# Patient Record
Sex: FEMALE | Race: WHITE | HISPANIC OR LATINO | Employment: OTHER | URBAN - METROPOLITAN AREA
[De-identification: names, ages, dates, MRNs, and addresses within clinical notes are randomized per-mention and may not be internally consistent; named-entity substitution may affect disease eponyms.]

---

## 2017-03-02 ENCOUNTER — OFFICE VISIT (OUTPATIENT)
Dept: FAMILY MEDICINE | Facility: CLINIC | Age: 82
End: 2017-03-02
Payer: MEDICARE

## 2017-03-02 VITALS
OXYGEN SATURATION: 98 % | BODY MASS INDEX: 25.64 KG/M2 | SYSTOLIC BLOOD PRESSURE: 146 MMHG | HEIGHT: 59 IN | DIASTOLIC BLOOD PRESSURE: 63 MMHG | HEART RATE: 72 BPM | WEIGHT: 127.19 LBS | TEMPERATURE: 98 F

## 2017-03-02 DIAGNOSIS — J30.1 SEASONAL ALLERGIC RHINITIS DUE TO POLLEN: ICD-10-CM

## 2017-03-02 DIAGNOSIS — J45.20 RAD (REACTIVE AIRWAY DISEASE), MILD INTERMITTENT, UNCOMPLICATED: Primary | ICD-10-CM

## 2017-03-02 PROCEDURE — 1159F MED LIST DOCD IN RCRD: CPT | Mod: S$GLB,,, | Performed by: FAMILY MEDICINE

## 2017-03-02 PROCEDURE — 99999 PR PBB SHADOW E&M-EST. PATIENT-LVL III: CPT | Mod: PBBFAC,,, | Performed by: FAMILY MEDICINE

## 2017-03-02 PROCEDURE — 1160F RVW MEDS BY RX/DR IN RCRD: CPT | Mod: S$GLB,,, | Performed by: FAMILY MEDICINE

## 2017-03-02 PROCEDURE — 99499 UNLISTED E&M SERVICE: CPT | Mod: S$GLB,,, | Performed by: FAMILY MEDICINE

## 2017-03-02 PROCEDURE — 1126F AMNT PAIN NOTED NONE PRSNT: CPT | Mod: S$GLB,,, | Performed by: FAMILY MEDICINE

## 2017-03-02 PROCEDURE — 1157F ADVNC CARE PLAN IN RCRD: CPT | Mod: S$GLB,,, | Performed by: FAMILY MEDICINE

## 2017-03-02 PROCEDURE — 99214 OFFICE O/P EST MOD 30 MIN: CPT | Mod: S$GLB,,, | Performed by: FAMILY MEDICINE

## 2017-03-02 RX ORDER — PANTOPRAZOLE SODIUM 40 MG/1
TABLET, DELAYED RELEASE ORAL
COMMUNITY
Start: 2016-11-29 | End: 2017-05-22

## 2017-03-02 RX ORDER — FLUTICASONE PROPIONATE 50 MCG
1 SPRAY, SUSPENSION (ML) NASAL DAILY
Qty: 1 BOTTLE | Refills: 1 | Status: SHIPPED | OUTPATIENT
Start: 2017-03-02 | End: 2017-05-22

## 2017-03-02 RX ORDER — MONTELUKAST SODIUM 10 MG/1
10 TABLET ORAL NIGHTLY
Qty: 30 TABLET | Refills: 3 | Status: SHIPPED | OUTPATIENT
Start: 2017-03-02 | End: 2017-04-01

## 2017-03-02 NOTE — PROGRESS NOTES
Subjective:       Patient ID: Brie Han is a 89 y.o. female.    Chief Complaint: Sinus Problem    Sinus Problem   This is a new problem. The current episode started 1 to 4 weeks ago. The problem has been waxing and waning since onset. There has been no fever. Associated symptoms include congestion, coughing, shortness of breath, sinus pressure and sneezing. (Clear PND seasonal pattern by hx) Past treatments include nothing.     Review of Systems   Constitutional: Negative for fever.   HENT: Positive for congestion, sinus pressure and sneezing.    Respiratory: Positive for cough and shortness of breath.    Cardiovascular: Negative for chest pain.   Gastrointestinal: Negative for abdominal pain and nausea.   Skin: Negative for rash.   All other systems reviewed and are negative.      Objective:      Physical Exam   Constitutional: She appears well-developed. No distress.   HENT:   Right Ear: Tympanic membrane is not erythematous.   Left Ear: Tympanic membrane is not erythematous.   Nose: Mucosal edema present. Right sinus exhibits no maxillary sinus tenderness. Left sinus exhibits no maxillary sinus tenderness.   Mouth/Throat: Posterior oropharyngeal erythema present.   Neck: Neck supple.   Cardiovascular: Normal rate and regular rhythm.    No murmur heard.  Pulmonary/Chest: Effort normal and breath sounds normal. She has no wheezes. She has no rales.   Lymphadenopathy:     She has no cervical adenopathy.       Assessment:       1. RAD (reactive airway disease), mild intermittent, uncomplicated    2. Seasonal allergic rhinitis due to pollen        Plan:         Brie was seen today for sinus problem.    Diagnoses and all orders for this visit:    RAD (reactive airway disease), mild intermittent, uncomplicated  -     fluticasone (FLONASE) 50 mcg/actuation nasal spray; 1 spray by Each Nare route once daily.  -     montelukast (SINGULAIR) 10 mg tablet; Take 1 tablet (10 mg total) by mouth every  evening.    Seasonal allergic rhinitis due to pollen  -     fluticasone (FLONASE) 50 mcg/actuation nasal spray; 1 spray by Each Nare route once daily.  -     montelukast (SINGULAIR) 10 mg tablet; Take 1 tablet (10 mg total) by mouth every evening.

## 2017-03-02 NOTE — MR AVS SNAPSHOT
[facility]  [address] [place] [address]  Phone: [phone]  Fax: [phone]                  Brie Han   3/2/2017 11:00 AM   Office Visit    Description:  Female : 1927   Provider:  Clayton Ford MD   Department:  [facility]           Reason for Visit     Sinus Problem           Diagnoses this Visit        Comments    RAD (reactive airway disease), mild intermittent, uncomplicated    -  Primary     Seasonal allergic rhinitis due to pollen                To Do List           Future Appointments        Provider Department Dept Phone    3/2/2017 11:00 AM Clayton Ford MD [facility] [phone]    2017 8:30 AM Colten Gould MD [facility] [phone]      Goals (5 Years of Data)     None       These Medications        Disp Refills Start End    fluticasone (FLONASE) 50 mcg/actuation nasal spray 1 Bottle 1 3/2/2017     1 spray by Each Nare route once daily. - Each Nare    Pharmacy: [facility] [address] [place] [address] [id number] Ph #: [phone]       montelukast (SINGULAIR) 10 mg tablet 30 tablet 3 3/2/2017 2017    Take 1 tablet (10 mg total) by mouth every evening. - Oral    Pharmacy: [facility] [address] [place] [address] [id number] Ph #: [phone]         [facility] On Call     [facility] On Call Nurse Care Line -  Assistance  Registered nurses in the [facility] provide clinical advisement, health education, appointment booking, and other advisory services.  Call for this free service at [phone].             Medications           Message regarding Medications     Verify the changes and/or additions to your medication regime listed below are the same as discussed with your clinician today.  If any of these changes or additions are incorrect, please notify your healthcare provider.        START taking these NEW medications        Refills    fluticasone (FLONASE)  50 mcg/actuation nasal spray 1    Si spray by Each Nare route once daily.    Class: Normal    Route: Each Nare    montelukast (SINGULAIR) 10 mg tablet 3    Sig: Take 1 tablet (10 mg total) by mouth every evening.    Class: Normal    Route: Oral           Verify that the below list of medications is an accurate representation of the medications you are currently taking.  If none reported, the list may be blank. If incorrect, please contact your healthcare provider. Carry this list with you in case of emergency.           Current Medications     b complex vitamins tablet Take 1 tablet by mouth once daily.    calcium citrate-vitamin D (CITRACAL + D) 315-200 mg-unit per tablet Every day    clopidogrel (PLAVIX) 75 mg tablet Take 1 tablet (75 mg total) by mouth once daily. Every day    KRILL OIL ORAL Take by mouth.    losartan (COZAAR) 100 MG tablet Take 1 tablet (100 mg total) by mouth once daily.    lovastatin (MEVACOR) 10 MG tablet TAKE 1 TABLET EVERY EVENING    multivitamin capsule Every day    nifedipine 30 MG ORAL TR24 (PROCARDIA-XL) 30 MG (OSM) 24 hr tablet TAKE 1 TABLET ONE TIME DAILY    pantoprazole (PROTONIX) 40 MG tablet     fluticasone (FLONASE) 50 mcg/actuation nasal spray 1 spray by Each Nare route once daily.    montelukast (SINGULAIR) 10 mg tablet Take 1 tablet (10 mg total) by mouth every evening.           Clinical Reference Information           Your Vitals Were     BP                   146/63 (BP Location: Right arm, Patient Position: Sitting, BP Method: Automatic)           Blood Pressure          Most Recent Value    BP  (!)  146/63      Allergies as of 3/2/2017     Aspirin    Ciprofloxacin    Iron    Iodine    Penicillins      Immunizations Administered on Date of Encounter - 3/2/2017     None      Instructions    Zyrtec (store brand is fine to use) - one a day       Language Assistance Services     ATTENTION: Language assistance services are available, free of charge. Please call  [phone].      ATENCIÓN: Si habla español, tiene a pena disposición servicios gratuitos de asistencia lingüística. Llame al [phone].     CHÚ Ý: N?u b?n nói Ti?ng Vi?t, có các d?ch v? h? tr? ngôn ng? mi?n phí dành cho b?n. G?i s? [phone].         [facility] complies with applicable Federal civil rights laws and does not discriminate on the basis of race, color, national origin, age, disability, or sex.

## 2017-05-06 ENCOUNTER — HOSPITAL ENCOUNTER (EMERGENCY)
Facility: HOSPITAL | Age: 82
Discharge: HOME | End: 2017-05-06
Attending: EMERGENCY MEDICINE
Payer: MEDICARE

## 2017-05-06 VITALS
RESPIRATION RATE: 14 BRPM | SYSTOLIC BLOOD PRESSURE: 173 MMHG | OXYGEN SATURATION: 99 % | TEMPERATURE: 98 F | DIASTOLIC BLOOD PRESSURE: 71 MMHG | HEIGHT: 59 IN | WEIGHT: 125 LBS | HEART RATE: 68 BPM | BODY MASS INDEX: 25.2 KG/M2

## 2017-05-06 DIAGNOSIS — R04.0 POSTERIOR EPISTAXIS: Primary | ICD-10-CM

## 2017-05-06 PROCEDURE — 99283 EMERGENCY DEPT VISIT LOW MDM: CPT

## 2017-05-06 NOTE — ED AVS SNAPSHOT
[facility] [address]  [place] [address]               Brie Han   2017  8:46 AM   ED    Description:  Female : 1927   Department:  [facility]           Your Care was Coordinated By:     Provider Role From To    Saleem Broderick MD Attending Provider 17 0911 --      Reason for Visit     Epistaxis           Diagnoses this Visit        Comments    Posterior epistaxis    -  Primary       ED Disposition     None           To Do List           Follow-up Information     Call Diogo Levin MD.    Specialty:  Otolaryngology    Why:  Call this week to schedule a follow up appointment with Dr. Levin, the ENT.    Contact information:    [address]  [place] [address] [phone]        [facility] On Call     [facility] Care Line -  Assistance  Unless otherwise directed by your provider, please contact [facility], our nurse care line that is available for  assistance.     Registered nurses in the [facility] provide: appointment scheduling, clinical advisement, health education, and other advisory services.  Call: [phone] (toll free)               Medications           Message regarding Medications     Verify the changes and/or additions to your medication regime listed below are the same as discussed with your clinician today.  If any of these changes or additions are incorrect, please notify your healthcare provider.             Verify that the below list of medications is an accurate representation of the medications you are currently taking.  If none reported, the list may be blank. If incorrect, please contact your healthcare provider. Carry this list with you in case of emergency.           Current Medications     b complex vitamins tablet Take 1 tablet by mouth once daily.    calcium citrate-vitamin D (CITRACAL + D) 315-200 mg-unit per tablet Every day    clopidogrel  "(PLAVIX) 75 mg tablet Take 1 tablet (75 mg total) by mouth once daily. Every day    fluticasone (FLONASE) 50 mcg/actuation nasal spray 1 spray by Each Nare route once daily.    KRILL OIL ORAL Take by mouth.    losartan (COZAAR) 100 MG tablet Take 1 tablet (100 mg total) by mouth once daily.    lovastatin (MEVACOR) 10 MG tablet TAKE 1 TABLET EVERY EVENING    multivitamin capsule Every day    nifedipine 30 MG ORAL TR24 (PROCARDIA-XL) 30 MG (OSM) 24 hr tablet TAKE 1 TABLET ONE TIME DAILY    pantoprazole (PROTONIX) 40 MG tablet            Clinical Reference Information           Your Vitals Were     BP Pulse Temp Resp Height Weight    173/71 68 98.1 °F (36.7 °C) (Oral) 14 4' 11" (1.499 m) 56.7 kg (125 lb)    Last Period SpO2 BMI          12/07/1972 99% 25.25 kg/m2        Allergies as of 5/6/2017        Reactions    Aspirin Other (See Comments)    Ciprofloxacin Other (See Comments)    Iron Other (See Comments)    Iodine Rash    Penicillins Rash      Immunizations Administered on Date of Encounter - 5/6/2017     None      ED Micro, Lab, POCT     None      ED Imaging Orders     None        Discharge Instructions         Nosebleed (Adult)    Bleeding from the nose most commonly occurs because of injury or drying and cracking of the inner lining of the nose. Most nosebleeds are because of dry air or nose-picking. They can occur during a common cold or an allergy attack. They can also occur on a very hot day, or from dry air in the winter.  If the bleeding site is found, it may be cauterized. This means it is treated to cause a blood clot to form. This may be done with a chemical, heat, or electricity. If the bleeding continues after the site is cauterized, or if the site cannot be found, packing may be put in your nose. This is to apply pressure and stop the bleeding. The packing may be made of gauze or sponge. A small balloon catheter is sometimes used. These must be removed by your doctor. Some types of packing dissolve on " their own.  Home care  · If packing was put in your nose, unless told otherwise, do not pull on it or try to remove it yourself. You will be given an appointment to have it removed. You may also have been given antibiotics to prevent a sinus infection. If so, finish all of the medicine.  · Do not blow your nose for 12 hours after the bleeding stops. This will allow a strong blood clot to form. Do not pick your nose. This may restart bleeding.  · Avoid drinking alcohol and hot liquids for the next 2 days. Alcohol or hot liquids in your mouth can dilate blood vessels in your nose. This can cause bleeding to start again.  · Do not take ibuprofen, naproxen, or medicines that contain aspirin. These thin the blood and may cause your nose to bleed. You may take acetaminophen for pain, unless another pain medicine was prescribed.  · If the bleeding starts again, sit up and lean forward to prevent swallowing blood. Pinch your nose tightly on both sides, as shown above, for 10 to 15 minutes. Time yourself. Dont release the pressure on your nose until 10 minutes is up. If bleeding does not stop, continue to pinch your nose and call your healthcare provider or return to this facility.  · If you have a cold, allergies, or dry nasal membranes, lubricate the nasal passages. Apply a small amount of petroleum jelly inside the nose with a cotton swab twice a day (morning and night).  · Avoid overheating your home. This can dry the air and make your condition worse.  · Put a humidifier in the room where you sleep. This will add moisture to the air.  · Use a saline nasal spray to keep nasal passages moist.  · Do not pick your nose. Keep fingernails trimmed to decrease risk of bleeds.  · Do not smoke.  Follow-up care  Follow up with your healthcare provider, or as advised. Nasal packing should be rechecked or removed within 2 to 3 days.  When to seek medical advice  Call your healthcare provider right away if any of these occur.  · You  have another nosebleed that you cannot control  · Dizziness, weakness, or fainting  · You become tired or confused  · Fever of 100.4ºF (38ºC) or higher, or as directed by your healthcare provider  · Headache  · Sinus or facial pain  · Shortness of breath or trouble breathing  Date Last Reviewed: 3/22/2015  © 2673-6127 [employer]. [address] [place] [address]. All rights reserved. This information is not intended as a substitute for professional medical care. Always follow your healthcare professional's instructions.          Your Scheduled Appointments     May 22, 2017  8:40 AM CDT   Established Patient Visit with MD [facility] ([facility])    [address] [place] [address] [phone]               [facility] complies with applicable Federal civil rights laws and does not discriminate on the basis of race, color, national origin, age, disability, or sex.        Language Assistance Services     ATTENTION: Language assistance services are available, free of charge. Please call [phone].      ATENCIÓN: Si habla español, tiene a pena disposición servicios gratuitos de asistencia lingüística. Llame al [phone].     CHÚ Ý: N?u b?n nói Ti?ng Vi?t, có các d?ch v? h? tr? ngôn ng? mi?n phí dành cho b?n. G?i s? [phone].

## 2017-05-06 NOTE — DISCHARGE INSTRUCTIONS
Nosebleed (Adult)    Bleeding from the nose most commonly occurs because of injury or drying and cracking of the inner lining of the nose. Most nosebleeds are because of dry air or nose-picking. They can occur during a common cold or an allergy attack. They can also occur on a very hot day, or from dry air in the winter.  If the bleeding site is found, it may be cauterized. This means it is treated to cause a blood clot to form. This may be done with a chemical, heat, or electricity. If the bleeding continues after the site is cauterized, or if the site cannot be found, packing may be put in your nose. This is to apply pressure and stop the bleeding. The packing may be made of gauze or sponge. A small balloon catheter is sometimes used. These must be removed by your doctor. Some types of packing dissolve on their own.  Home care  · If packing was put in your nose, unless told otherwise, do not pull on it or try to remove it yourself. You will be given an appointment to have it removed. You may also have been given antibiotics to prevent a sinus infection. If so, finish all of the medicine.  · Do not blow your nose for 12 hours after the bleeding stops. This will allow a strong blood clot to form. Do not pick your nose. This may restart bleeding.  · Avoid drinking alcohol and hot liquids for the next 2 days. Alcohol or hot liquids in your mouth can dilate blood vessels in your nose. This can cause bleeding to start again.  · Do not take ibuprofen, naproxen, or medicines that contain aspirin. These thin the blood and may cause your nose to bleed. You may take acetaminophen for pain, unless another pain medicine was prescribed.  · If the bleeding starts again, sit up and lean forward to prevent swallowing blood. Pinch your nose tightly on both sides, as shown above, for 10 to 15 minutes. Time yourself. Dont release the pressure on your nose until 10 minutes is up. If bleeding does not stop, continue to pinch your  nose and call your healthcare provider or return to this facility.  · If you have a cold, allergies, or dry nasal membranes, lubricate the nasal passages. Apply a small amount of petroleum jelly inside the nose with a cotton swab twice a day (morning and night).  · Avoid overheating your home. This can dry the air and make your condition worse.  · Put a humidifier in the room where you sleep. This will add moisture to the air.  · Use a saline nasal spray to keep nasal passages moist.  · Do not pick your nose. Keep fingernails trimmed to decrease risk of bleeds.  · Do not smoke.  Follow-up care  Follow up with your healthcare provider, or as advised. Nasal packing should be rechecked or removed within 2 to 3 days.  When to seek medical advice  Call your healthcare provider right away if any of these occur.  · You have another nosebleed that you cannot control  · Dizziness, weakness, or fainting  · You become tired or confused  · Fever of 100.4ºF (38ºC) or higher, or as directed by your healthcare provider  · Headache  · Sinus or facial pain  · Shortness of breath or trouble breathing  Date Last Reviewed: 3/22/2015  © 0877-5721 [employer]. [address] [place] [address], [place] [address]. All rights reserved. This information is not intended as a substitute for professional medical care. Always follow your healthcare professional's instructions.

## 2017-05-06 NOTE — ED PROVIDER NOTES
"Encounter Date: 2017    SCRIBE #1 NOTE: I, Ginger Aguilar, am scribing for, and in the presence of, Dr. Broderick.       History     Chief Complaint   Patient presents with    Epistaxis     This am, resolved.     Review of patient's allergies indicates:   Allergen Reactions    Aspirin Other (See Comments)    Ciprofloxacin Other (See Comments)    Iron Other (See Comments)    Iodine Rash    Penicillins Rash     HPI Comments:   2017 9:16 AM     Chief Complaint: Nosebleed      The patient is a 89 y.o. female with HTN, HLD, and prior stroke who presents to the ED with an onset of nosebleed this morning with associated light-headedness. She reports suddenly feeling light-headed while washing her face and noticed blood on the towel. When she looked in the mirror, the patient states there was blood "running from her nose" and "spitting up blood clots" after. The patient is on a Plavix regimen. She also reports having nasal congestion for the last week. The patient denies SOB, chest pain, abdominal pain, severe HA, weakness, or any other symptoms at this time. No pertinent SHx noted.     The history is provided by the patient.     Past Medical History:   Diagnosis Date    Arthritis     Glaucoma     resolved    Hyperlipidemia     Hypertension     Stroke      Past Surgical History:   Procedure Laterality Date    APPENDECTOMY  's    CATARACT EXTRACTION W/  INTRAOCULAR LENS IMPLANT Bilateral     Dr Rojo      SECTION      3 c/s and d/c    EYE SURGERY      Glaucoma     HYSTERECTOMY      TONSILLECTOMY       Family History   Problem Relation Age of Onset    Early death Mother     Stroke Father     Hypertension Father     Diabetes Father     Hypertension Sister     Thyroid disease Paternal Grandfather     No Known Problems Brother     No Known Problems Maternal Aunt     No Known Problems Maternal Uncle     No Known Problems Paternal Aunt     No Known Problems Paternal Uncle     " No Known Problems Maternal Grandmother     No Known Problems Maternal Grandfather     No Known Problems Paternal Grandmother     Amblyopia Neg Hx     Blindness Neg Hx     Cancer Neg Hx     Cataracts Neg Hx     Glaucoma Neg Hx     Macular degeneration Neg Hx     Retinal detachment Neg Hx     Strabismus Neg Hx      Social History   Substance Use Topics    Smoking status: Never Smoker    Smokeless tobacco: None    Alcohol use No     Review of Systems   Constitutional: Negative for chills and fever.   HENT: Positive for congestion (nasal) and nosebleeds. Negative for rhinorrhea, sneezing and sore throat.    Eyes: Negative for visual disturbance.   Respiratory: Negative for cough and shortness of breath.    Cardiovascular: Negative for chest pain and palpitations.   Gastrointestinal: Negative for abdominal pain, diarrhea, nausea and vomiting.   Genitourinary: Negative for dysuria and hematuria.   Musculoskeletal: Negative for back pain and neck pain.   Skin: Negative for rash.   Neurological: Positive for light-headedness. Negative for seizures, syncope, weakness and headaches.     Physical Exam   Initial Vitals   BP Pulse Resp Temp SpO2   05/06/17 0844 05/06/17 0844 05/06/17 0844 05/06/17 0844 05/06/17 0844   195/81 77 14 98.1 °F (36.7 °C) 99 %     Physical Exam    Nursing note and vitals reviewed.  Constitutional: She appears well-developed.   HENT:   Head: Normocephalic and atraumatic.   Moist lips. Dry tongue. No bleeding noticed in the posterior oropharynx. Boggy and inflamed sinuses with dried blood on the floor of the left naris. No signs of anterior epistaxis. Dried blood on the floor of the right naris. The posterior portion appears to be okay.    Eyes: Conjunctivae and EOM are normal. Pupils are equal, round, and reactive to light.   Neck: Neck supple.   Cardiovascular: Normal rate, regular rhythm, normal heart sounds and intact distal pulses. Exam reveals no gallop and no friction rub.    No  murmur heard.  Pulmonary/Chest: Breath sounds normal. No respiratory distress. She has no decreased breath sounds. She has no wheezes. She has no rhonchi. She has no rales.   Abdominal: Soft. Bowel sounds are normal. There is no tenderness.   Musculoskeletal: Normal range of motion. She exhibits no edema.   Neurological: She is alert and oriented to person, place, and time. She has normal strength. No cranial nerve deficit or sensory deficit.   Neurologically intact.    Skin: Skin is warm and dry. No pallor.   Patient doesn't appear pale.    Psychiatric: She has a normal mood and affect.       ED Course   Procedures   Labs Reviewed - No data to display        Medical Decision Making:   History:   Old Medical Records: I decided to obtain old medical records.  Patient had epistaxis this morning.  She recently had sinusitis over the past week.  I explained to her she needs to take her blood pressure medicine, Afrin only if she bleeds, codeine or lining of her nose with Vaseline and I feel that she is stable for discharge.            Scribe Attestation:   Scribe #1: I performed the above scribed service and the documentation accurately describes the services I performed. I attest to the accuracy of the note.    Attending Attestation:           Physician Attestation for Scribe:  Physician Attestation Statement for Scribe #1: I, Dr. Broderick, reviewed documentation, as scribed by Ginger Aguilar in my presence, and it is both accurate and complete.                 ED Course     Clinical Impression:     1. Posterior epistaxis          Disposition:   Disposition: Discharged  Condition: Stable       Saleem Broderick MD  05/06/17 0949

## 2017-05-09 ENCOUNTER — PATIENT MESSAGE (OUTPATIENT)
Dept: FAMILY MEDICINE | Facility: CLINIC | Age: 82
End: 2017-05-09

## 2017-05-09 DIAGNOSIS — R04.0 POSTERIOR EPISTAXIS: Primary | ICD-10-CM

## 2017-05-10 ENCOUNTER — TELEPHONE (OUTPATIENT)
Dept: FAMILY MEDICINE | Facility: CLINIC | Age: 82
End: 2017-05-10

## 2017-05-10 DIAGNOSIS — I10 ESSENTIAL HYPERTENSION: Primary | ICD-10-CM

## 2017-05-10 DIAGNOSIS — E78.00 HYPERCHOLESTEREMIA: ICD-10-CM

## 2017-05-10 NOTE — TELEPHONE ENCOUNTER
Lab orders placed. Appointment scheduled for 5-18-17. Patient agreed to appointment date and time.

## 2017-05-16 ENCOUNTER — DOCUMENTATION ONLY (OUTPATIENT)
Dept: FAMILY MEDICINE | Facility: CLINIC | Age: 82
End: 2017-05-16

## 2017-05-16 NOTE — PROGRESS NOTES
Pre-Visit Chart Review  For Appointment Scheduled on 05/22/2017    Health Maintenance Due   Topic Date Due    DEXA SCAN  09/28/1967    Zoster Vaccine  09/28/1987    Pneumococcal (65+) (1 of 2 - PCV13) 09/28/1992

## 2017-05-18 ENCOUNTER — LAB VISIT (OUTPATIENT)
Dept: LAB | Facility: HOSPITAL | Age: 82
End: 2017-05-18
Attending: FAMILY MEDICINE
Payer: MEDICARE

## 2017-05-18 DIAGNOSIS — I10 ESSENTIAL HYPERTENSION: ICD-10-CM

## 2017-05-18 DIAGNOSIS — E78.00 HYPERCHOLESTEREMIA: ICD-10-CM

## 2017-05-18 LAB
ALBUMIN SERPL BCP-MCNC: 3.6 G/DL
ALP SERPL-CCNC: 84 U/L
ALT SERPL W/O P-5'-P-CCNC: 21 U/L
ANION GAP SERPL CALC-SCNC: 9 MMOL/L
AST SERPL-CCNC: 26 U/L
BASOPHILS # BLD AUTO: 0.05 K/UL
BASOPHILS NFR BLD: 1 %
BILIRUB SERPL-MCNC: 0.5 MG/DL
BUN SERPL-MCNC: 20 MG/DL
CALCIUM SERPL-MCNC: 9.6 MG/DL
CHLORIDE SERPL-SCNC: 103 MMOL/L
CHOLEST/HDLC SERPL: 3.2 {RATIO}
CO2 SERPL-SCNC: 24 MMOL/L
CREAT SERPL-MCNC: 0.9 MG/DL
DIFFERENTIAL METHOD: ABNORMAL
EOSINOPHIL # BLD AUTO: 0.1 K/UL
EOSINOPHIL NFR BLD: 2.3 %
ERYTHROCYTE [DISTWIDTH] IN BLOOD BY AUTOMATED COUNT: 14.5 %
EST. GFR  (AFRICAN AMERICAN): >60 ML/MIN/1.73 M^2
EST. GFR  (NON AFRICAN AMERICAN): 56.9 ML/MIN/1.73 M^2
GLUCOSE SERPL-MCNC: 90 MG/DL
HCT VFR BLD AUTO: 36.6 %
HDL/CHOLESTEROL RATIO: 31.5 %
HDLC SERPL-MCNC: 197 MG/DL
HDLC SERPL-MCNC: 62 MG/DL
HGB BLD-MCNC: 12 G/DL
LDLC SERPL CALC-MCNC: 106 MG/DL
LYMPHOCYTES # BLD AUTO: 1.7 K/UL
LYMPHOCYTES NFR BLD: 35.4 %
MCH RBC QN AUTO: 31.6 PG
MCHC RBC AUTO-ENTMCNC: 32.8 %
MCV RBC AUTO: 96 FL
MONOCYTES # BLD AUTO: 0.5 K/UL
MONOCYTES NFR BLD: 11.1 %
NEUTROPHILS # BLD AUTO: 2.4 K/UL
NEUTROPHILS NFR BLD: 50.2 %
NONHDLC SERPL-MCNC: 135 MG/DL
PLATELET # BLD AUTO: 299 K/UL
PMV BLD AUTO: 10.5 FL
POTASSIUM SERPL-SCNC: 5.1 MMOL/L
PROT SERPL-MCNC: 7.3 G/DL
RBC # BLD AUTO: 3.8 M/UL
SODIUM SERPL-SCNC: 136 MMOL/L
TRIGL SERPL-MCNC: 145 MG/DL
WBC # BLD AUTO: 4.77 K/UL

## 2017-05-18 PROCEDURE — 80053 COMPREHEN METABOLIC PANEL: CPT

## 2017-05-18 PROCEDURE — 36415 COLL VENOUS BLD VENIPUNCTURE: CPT | Mod: PO

## 2017-05-18 PROCEDURE — 80061 LIPID PANEL: CPT

## 2017-05-18 PROCEDURE — 85025 COMPLETE CBC W/AUTO DIFF WBC: CPT

## 2017-05-22 ENCOUNTER — LAB VISIT (OUTPATIENT)
Dept: LAB | Facility: HOSPITAL | Age: 82
End: 2017-05-22
Attending: FAMILY MEDICINE
Payer: MEDICARE

## 2017-05-22 ENCOUNTER — TELEPHONE (OUTPATIENT)
Dept: FAMILY MEDICINE | Facility: CLINIC | Age: 82
End: 2017-05-22

## 2017-05-22 ENCOUNTER — OFFICE VISIT (OUTPATIENT)
Dept: FAMILY MEDICINE | Facility: CLINIC | Age: 82
End: 2017-05-22
Payer: MEDICARE

## 2017-05-22 VITALS
HEART RATE: 70 BPM | SYSTOLIC BLOOD PRESSURE: 122 MMHG | WEIGHT: 125.69 LBS | TEMPERATURE: 98 F | DIASTOLIC BLOOD PRESSURE: 80 MMHG | HEIGHT: 59 IN | BODY MASS INDEX: 25.34 KG/M2

## 2017-05-22 DIAGNOSIS — I10 ESSENTIAL HYPERTENSION: Primary | ICD-10-CM

## 2017-05-22 DIAGNOSIS — R35.0 URINARY FREQUENCY: ICD-10-CM

## 2017-05-22 DIAGNOSIS — I73.9 PAD (PERIPHERAL ARTERY DISEASE): ICD-10-CM

## 2017-05-22 LAB
BILIRUB SERPL-MCNC: ABNORMAL MG/DL
BLOOD URINE, POC: 50
COLOR, POC UA: ABNORMAL
GLUCOSE UR QL STRIP: ABNORMAL
KETONES UR QL STRIP: ABNORMAL
LEUKOCYTE ESTERASE URINE, POC: ABNORMAL
NITRITE, POC UA: ABNORMAL
PH, POC UA: 8
PROTEIN, POC: ABNORMAL
SPECIFIC GRAVITY, POC UA: 1
UROBILINOGEN, POC UA: ABNORMAL

## 2017-05-22 PROCEDURE — 1160F RVW MEDS BY RX/DR IN RCRD: CPT | Mod: S$GLB,,, | Performed by: FAMILY MEDICINE

## 2017-05-22 PROCEDURE — 99214 OFFICE O/P EST MOD 30 MIN: CPT | Mod: 25,S$GLB,, | Performed by: FAMILY MEDICINE

## 2017-05-22 PROCEDURE — 99999 PR PBB SHADOW E&M-EST. PATIENT-LVL III: CPT | Mod: PBBFAC,,, | Performed by: FAMILY MEDICINE

## 2017-05-22 PROCEDURE — 90732 PPSV23 VACC 2 YRS+ SUBQ/IM: CPT | Mod: S$GLB,,, | Performed by: FAMILY MEDICINE

## 2017-05-22 PROCEDURE — 1159F MED LIST DOCD IN RCRD: CPT | Mod: S$GLB,,, | Performed by: FAMILY MEDICINE

## 2017-05-22 PROCEDURE — 99499 UNLISTED E&M SERVICE: CPT | Mod: S$GLB,,, | Performed by: FAMILY MEDICINE

## 2017-05-22 PROCEDURE — 1125F AMNT PAIN NOTED PAIN PRSNT: CPT | Mod: S$GLB,,, | Performed by: FAMILY MEDICINE

## 2017-05-22 PROCEDURE — G0009 ADMIN PNEUMOCOCCAL VACCINE: HCPCS | Mod: S$GLB,,, | Performed by: FAMILY MEDICINE

## 2017-05-22 PROCEDURE — 81002 URINALYSIS NONAUTO W/O SCOPE: CPT | Mod: S$GLB,,, | Performed by: FAMILY MEDICINE

## 2017-05-22 PROCEDURE — 1157F ADVNC CARE PLAN IN RCRD: CPT | Mod: S$GLB,,, | Performed by: FAMILY MEDICINE

## 2017-05-22 PROCEDURE — 87086 URINE CULTURE/COLONY COUNT: CPT

## 2017-05-22 RX ORDER — NIFEDIPINE 30 MG/1
TABLET, EXTENDED RELEASE ORAL
Qty: 90 TABLET | Refills: 4 | Status: SHIPPED | OUTPATIENT
Start: 2017-05-22 | End: 2018-06-13 | Stop reason: SDUPTHER

## 2017-05-22 RX ORDER — SULFAMETHOXAZOLE AND TRIMETHOPRIM 400; 80 MG/1; MG/1
1 TABLET ORAL 2 TIMES DAILY
Qty: 20 TABLET | Refills: 1 | Status: SHIPPED | OUTPATIENT
Start: 2017-05-22 | End: 2017-05-23 | Stop reason: SDUPTHER

## 2017-05-22 NOTE — PROGRESS NOTES
2 patient identifiers used (name and ). Administered Pneumovax vaccine IM. Patient tolerated well, no bleeding at insertion site noted. Pain scale 1/10. Aseptic technique maintained. Immunization information given to patient.

## 2017-05-22 NOTE — PROGRESS NOTES
Subjective:       Patient ID: Brie Han is a 89 y.o. female.    Chief Complaint: Hypertension; shooting pain in face; and Sinusitis    Patient had elevated BP, no dyspnea, ED visit due to epistaxis. Resoleved. Recent  Zithromax,       Hypertension   This is a recurrent problem. The problem has been resolved since onset. The problem is controlled. Associated symptoms include anxiety and malaise/fatigue. Pertinent negatives include no blurred vision, chest pain, headaches, palpitations, shortness of breath or sweats.     Review of Systems   Constitutional: Positive for malaise/fatigue. Negative for activity change and unexpected weight change.   HENT: Positive for rhinorrhea. Negative for hearing loss and trouble swallowing.    Eyes: Positive for discharge. Negative for blurred vision and visual disturbance.   Respiratory: Negative for chest tightness, shortness of breath and wheezing.    Cardiovascular: Negative for chest pain and palpitations.   Gastrointestinal: Negative for blood in stool, constipation, diarrhea and vomiting.   Endocrine: Negative for polydipsia and polyuria.   Genitourinary: Negative for difficulty urinating, dysuria, hematuria and menstrual problem.   Musculoskeletal: Negative for arthralgias and joint swelling.   Neurological: Positive for weakness. Negative for headaches.   Psychiatric/Behavioral: Negative for confusion and dysphoric mood.       Objective:      Physical Exam   Constitutional: She is oriented to person, place, and time. She appears well-developed and well-nourished.   HENT:   Head: Normocephalic and atraumatic.   Right Ear: External ear normal.   Left Ear: External ear normal.   Nose: Nose normal.   Mouth/Throat: No oropharyngeal exudate.   Eyes: Conjunctivae and EOM are normal. Pupils are equal, round, and reactive to light. Right eye exhibits no discharge. Left eye exhibits no discharge. No scleral icterus.   Neck: Normal range of motion. Neck supple. No JVD present.  No tracheal deviation present. No thyromegaly present.   Cardiovascular: Normal rate, normal heart sounds and intact distal pulses.  Exam reveals no gallop and no friction rub.    No murmur heard.  Pulmonary/Chest: Effort normal. No stridor. No respiratory distress. She has no wheezes. She has no rales. She exhibits no tenderness.   Abdominal: Soft. Bowel sounds are normal. She exhibits no distension and no mass. There is no tenderness. There is no rebound and no guarding.   Musculoskeletal: Normal range of motion. She exhibits no edema.   Lymphadenopathy:     She has no cervical adenopathy.   Neurological: She is alert and oriented to person, place, and time. She displays normal reflexes. No cranial nerve deficit. She exhibits normal muscle tone. Coordination normal.   Skin: Skin is dry. No rash noted. She is not diaphoretic. No erythema. No pallor.   Psychiatric: She has a normal mood and affect. Her behavior is normal. Judgment and thought content normal.   Vitals reviewed.        Chemistry        Component Value Date/Time     05/18/2017 0905    K 5.1 05/18/2017 0905     05/18/2017 0905    CO2 24 05/18/2017 0905    BUN 20 05/18/2017 0905    CREATININE 0.9 05/18/2017 0905    CREATININE 0.7 05/23/2013 1205    GLU 90 05/18/2017 0905        Component Value Date/Time    CALCIUM 9.6 05/18/2017 0905    CALCIUM 9.9 05/23/2013 1205    ALKPHOS 84 05/18/2017 0905    ALKPHOS 101 04/04/2013 2110    AST 26 05/18/2017 0905    AST 24 04/04/2013 2110    ALT 21 05/18/2017 0905    BILITOT 0.5 05/18/2017 0905        Lab Results   Component Value Date    WBC 4.77 05/18/2017    HGB 12.0 05/18/2017    HCT 36.6 (L) 05/18/2017    MCV 96 05/18/2017     05/18/2017       Assessment:       1. Essential hypertension    2. PAD (peripheral artery disease)    3. Urinary frequency        Plan:       Essential hypertension    PAD (peripheral artery disease)    Urinary frequency  -   Improved with Zithromax    Other orders  -      nifedipine (PROCARDIA-XL) 30 MG (OSM) 24 hr tablet; TAKE 1 TABLET ONE TIME DAILY  Dispense: 90 tablet; Refill: 4  -     Pneumococcal Polysaccharide Vaccine (23 Valent) (SQ/IM)      Patient readiness: acceptance and barriers:readiness    During the course of the visit the patient was educated and counseled about the following:     Hypertension:   Dietary sodium restriction.  Check blood pressures daily and record.  Obesity:   General weight loss/lifestyle modification strategies discussed (elicit support from others; identify saboteurs; non-food rewards, etc).    Goals: Hypertension: Reduce Blood Pressure    Did patient meet goals/outcomes: Yes    The following self management tools provided: blood pressure log  excercise log    Patient Instructions (the written plan) was given to the patient/family.     Time spent with patient: 45 minutes

## 2017-05-23 ENCOUNTER — TELEPHONE (OUTPATIENT)
Dept: FAMILY MEDICINE | Facility: CLINIC | Age: 82
End: 2017-05-23

## 2017-05-23 RX ORDER — SULFAMETHOXAZOLE AND TRIMETHOPRIM 400; 80 MG/1; MG/1
1 TABLET ORAL 2 TIMES DAILY
Qty: 20 TABLET | Refills: 1 | Status: SHIPPED | OUTPATIENT
Start: 2017-05-23 | End: 2017-09-07 | Stop reason: ALTCHOICE

## 2017-05-23 NOTE — TELEPHONE ENCOUNTER
----- Message from Deann Miller sent at 5/23/2017  8:29 AM CDT -----  Patient is at the [facility] waiting to  her antibiotic. [facility] says they didn't receive the request.      [facility] [address] [facility] [place] - [address] [address]  [address] [facility] [address]  Phone: [phone] Fax: [phone]      Please call patient back at [phone] phone belongs to a friend of the patient and the patient is using this phone.     Thank you

## 2017-05-23 NOTE — TELEPHONE ENCOUNTER
Patient notified. Verbalized understanding. Original prescription for Bactrim sent to mail order, mail order canceled and called in to local pharmacy.

## 2017-05-23 NOTE — TELEPHONE ENCOUNTER
----- Message from Colten Gould MD sent at 5/22/2017 12:19 PM CDT -----  Bactrim sent. Pending culture.

## 2017-05-23 NOTE — TELEPHONE ENCOUNTER
Call placed to [facility] on [address] to call in prescription for Bactrim. Writer informed pharmacist was in a consultation. Voicemail left for pharmacist regarding patient's order for Bactrim. Patient notified.

## 2017-05-24 LAB — BACTERIA UR CULT: NO GROWTH

## 2017-07-05 DIAGNOSIS — I10 HTN (HYPERTENSION), BENIGN: ICD-10-CM

## 2017-07-05 RX ORDER — LOSARTAN POTASSIUM 100 MG/1
TABLET ORAL
Qty: 90 TABLET | Refills: 5 | Status: SHIPPED | OUTPATIENT
Start: 2017-07-05 | End: 2017-07-05 | Stop reason: SDUPTHER

## 2017-07-05 RX ORDER — LOSARTAN POTASSIUM 100 MG/1
TABLET ORAL
Qty: 90 TABLET | Refills: 5 | Status: SHIPPED | OUTPATIENT
Start: 2017-07-05 | End: 2018-06-13 | Stop reason: SDUPTHER

## 2017-09-05 ENCOUNTER — DOCUMENTATION ONLY (OUTPATIENT)
Dept: FAMILY MEDICINE | Facility: CLINIC | Age: 82
End: 2017-09-05

## 2017-09-05 NOTE — PROGRESS NOTES
Pre-Visit Chart Review  For Appointment Scheduled on 09/07/2017    Health Maintenance Due   Topic Date Due    Zoster Vaccine  09/28/1987    Influenza Vaccine  08/01/2017

## 2017-09-07 ENCOUNTER — OFFICE VISIT (OUTPATIENT)
Dept: FAMILY MEDICINE | Facility: CLINIC | Age: 82
End: 2017-09-07
Payer: MEDICARE

## 2017-09-07 VITALS
DIASTOLIC BLOOD PRESSURE: 60 MMHG | SYSTOLIC BLOOD PRESSURE: 130 MMHG | HEART RATE: 73 BPM | BODY MASS INDEX: 25.34 KG/M2 | TEMPERATURE: 98 F | WEIGHT: 125.69 LBS | HEIGHT: 59 IN

## 2017-09-07 DIAGNOSIS — I10 ESSENTIAL HYPERTENSION: Primary | ICD-10-CM

## 2017-09-07 PROCEDURE — 99999 PR PBB SHADOW E&M-EST. PATIENT-LVL III: CPT | Mod: PBBFAC,,, | Performed by: FAMILY MEDICINE

## 2017-09-07 PROCEDURE — 1157F ADVNC CARE PLAN IN RCRD: CPT | Mod: S$GLB,,, | Performed by: FAMILY MEDICINE

## 2017-09-07 PROCEDURE — 1159F MED LIST DOCD IN RCRD: CPT | Mod: S$GLB,,, | Performed by: FAMILY MEDICINE

## 2017-09-07 PROCEDURE — 3008F BODY MASS INDEX DOCD: CPT | Mod: S$GLB,,, | Performed by: FAMILY MEDICINE

## 2017-09-07 PROCEDURE — 99213 OFFICE O/P EST LOW 20 MIN: CPT | Mod: S$GLB,,, | Performed by: FAMILY MEDICINE

## 2017-09-07 PROCEDURE — 1126F AMNT PAIN NOTED NONE PRSNT: CPT | Mod: S$GLB,,, | Performed by: FAMILY MEDICINE

## 2017-09-07 PROCEDURE — 99499 UNLISTED E&M SERVICE: CPT | Mod: S$GLB,,, | Performed by: FAMILY MEDICINE

## 2017-09-07 RX ORDER — METHYLPREDNISOLONE 4 MG/1
TABLET ORAL
Qty: 1 PACKAGE | Refills: 0 | Status: SHIPPED | OUTPATIENT
Start: 2017-09-07 | End: 2017-09-28

## 2017-09-07 RX ORDER — AZELASTINE 1 MG/ML
1 SPRAY, METERED NASAL 2 TIMES DAILY
Qty: 30 ML | Refills: 3 | Status: SHIPPED | OUTPATIENT
Start: 2017-09-07 | End: 2019-01-03

## 2017-09-07 NOTE — PROGRESS NOTES
Subjective:       Patient ID: Brie Han is a 89 y.o. female.    Chief Complaint: Hypertension and Sinusitis    Hypertension   This is a chronic problem. The problem has been resolved since onset. The problem is controlled. Associated symptoms include anxiety. Pertinent negatives include no blurred vision, chest pain, headaches, malaise/fatigue, neck pain, orthopnea, palpitations or shortness of breath. Risk factors for coronary artery disease include sedentary lifestyle and stress. Past treatments include calcium channel blockers and angiotensin blockers. The current treatment provides significant improvement. Compliance problems include exercise.    Sinusitis   Associated symptoms include congestion, sneezing and a sore throat. Pertinent negatives include no chills, coughing, ear pain, headaches, neck pain, shortness of breath or sinus pressure.     Review of Systems   Constitutional: Negative for chills, fatigue, fever, malaise/fatigue and unexpected weight change.   HENT: Positive for congestion, postnasal drip, rhinorrhea, sneezing and sore throat. Negative for ear discharge, ear pain and sinus pressure.    Eyes: Negative for blurred vision.   Respiratory: Negative for cough, chest tightness, shortness of breath and wheezing.    Cardiovascular: Negative for chest pain, palpitations, orthopnea and leg swelling.   Gastrointestinal: Negative for abdominal pain.   Musculoskeletal: Negative for arthralgias and neck pain.   Neurological: Negative for dizziness, syncope, light-headedness and headaches.       Patient Active Problem List   Diagnosis    HTN (hypertension)    PAD (peripheral artery disease)    CAD (coronary artery disease)    Hypercholesteremia    Refusal of blood transfusions as patient is [employer]    Hyponatremia    Hypokalemia    Chest pain    DDD (degenerative disc disease), lumbosacral    Herpes simplex    Neurogenic pain of right foot    GERD without esophagitis        Objective:      Physical Exam   Constitutional: She is oriented to person, place, and time. She appears well-developed and well-nourished.   HENT:   Right Ear: Tympanic membrane and ear canal normal.   Left Ear: Tympanic membrane and ear canal normal.   Nose: Mucosal edema and rhinorrhea present. Right sinus exhibits no maxillary sinus tenderness and no frontal sinus tenderness. Left sinus exhibits no maxillary sinus tenderness and no frontal sinus tenderness.   Mouth/Throat: Posterior oropharyngeal erythema present. No oropharyngeal exudate, posterior oropharyngeal edema or tonsillar abscesses.   Cardiovascular: Normal rate, regular rhythm and normal heart sounds.    Pulmonary/Chest: Effort normal and breath sounds normal.   Neurological: She is alert and oriented to person, place, and time.   Skin: Skin is warm and dry.   Psychiatric: She has a normal mood and affect.   Nursing note and vitals reviewed.      Lab Results   Component Value Date    WBC 4.77 05/18/2017    HGB 12.0 05/18/2017    HCT 36.6 (L) 05/18/2017     05/18/2017    CHOL 197 05/18/2017    TRIG 145 05/18/2017    HDL 62 05/18/2017    ALT 21 05/18/2017    AST 26 05/18/2017     05/18/2017    K 5.1 05/18/2017     05/18/2017    CREATININE 0.9 05/18/2017    BUN 20 05/18/2017    CO2 24 05/18/2017    TSH 2.8 02/08/2007    INR 0.9 12/16/2013     The ASCVD Risk score (Diane SAMUEL Jr., et al., 2013) failed to calculate for the following reasons:    The 2013 ASCVD risk score is only valid for ages 40 to 79    Assessment:       1. Essential hypertension        Plan:       Essential hypertension  -     Basic metabolic panel; Future; Expected date: 09/07/2017  -     CBC auto differential; Future; Expected date: 09/07/2017    Other orders  -     methylPREDNISolone (MEDROL DOSEPACK) 4 mg tablet; use as directed  Dispense: 1 Package; Refill: 0  -     azelastine (ASTELIN) 137 mcg (0.1 %) nasal spray; 1 spray (137 mcg total) by Nasal route 2 (two)  times daily.  Dispense: 30 mL; Refill: 3  -     aluminum hydrox-magnesium carb (GAVISCON EXTRA STRENGTH) 160-105 mg Chew; 1 tab ac and HS.; Refill: 0      Patient readiness: acceptance and barriers:readiness    During the course of the visit the patient was educated and counseled about the following:     Hypertension:   Dietary sodium restriction.  Regular aerobic exercise.  Check blood pressures daily and record.  Obesity:   General weight loss/lifestyle modification strategies discussed (elicit support from others; identify saboteurs; non-food rewards, etc).    Goals: Hypertension: Reduce Blood Pressure    Did patient meet goals/outcomes: Yes    The following self management tools provided: blood pressure log  excercise log    Patient Instructions (the written plan) was given to the patient/family.     Time spent with patient: 30 minutes

## 2017-09-07 NOTE — PATIENT INSTRUCTIONS
Established High Blood Pressure    High blood pressure (hypertension) is a chronic disease. Often health care providers dont know what causes it. But it can be caused by certain health conditions and medicines.  If you have high blood pressure, you may not have any symptoms. If you do have symptoms, they may include headache, dizziness, changes in your vision, chest pain, and shortness of breath. But even without symptoms, high blood pressure thats not treated raises your risk for heart attack and stroke. High blood pressure is a serious health risk and shouldnt be ignored.  A blood pressure reading is made up of two numbers: a higher number over a lower number. The top number is the systolic pressure. The bottom number is the diastolic pressure. A normal blood pressure is less than 120 over less than 80.  High blood pressure is when either the top number is 140 or higher, or the bottom number is 90 or higher. This must be the result when taking your blood pressure a number of times. The blood pressures between normal and high are called prehypertension.  Home care  If you have high blood pressure, you should do what is listed below to lower your blood pressure. If you are taking medicines for high blood pressure, these methods may reduce or end your need for medicines in the future.  · Begin a weight-loss program if you are overweight.  · Cut back on how much salt you get in your diet. Heres how to do this:  ¨ Dont eat foods that have a lot of salt. These include olives, pickles, smoked meats, and salted potato chips.  ¨ Dont add salt to your food at the table.  ¨ Use only small amounts of salt when cooking.  · Begin an exercise program. Talk with your health care provider about the type of exercise program that would be best for you. It doesn't have to be hard. Even brisk walking for 20 minutes 3 times a week is a good form of exercise.  · Dont take medicines that have heart stimulants. This includes many  cold and sinus decongestant pills and sprays, as well as diet pills. Check the warnings about hypertension on the label. Stimulants such as amphetamine or cocaine could be lethal for someone with high blood pressure. Never take these.  · Limit how much caffeine you get in your diet. Switch to caffeine-free products.  · Stop smoking. If you are a long-time smoker, this can be hard. Enroll in a stop-smoking program to make it more likely that you will quit for good.  · Learn how to handle stress. This is an important part of any program to lower blood pressure. Learn about relaxation methods like meditation, yoga, or biofeedback.  · If your provider prescribed medicines, take them exactly as directed. Missing doses may cause your blood pressure get out of control.  · Consider buying an automatic blood pressure machine. You can get one of these at most pharmacies. Use this to watch your blood pressure at home. Give the results to your provider.  Follow-up care  You will need to make regular visits to your health care provider. This is to check your blood pressure and to make changes to your medicines. Make a follow-up appointment as directed.  When to seek medical advice  Call your health care provider right away if any of these occur:  · Chest pain or shortness of breath  · Severe headache  · Throbbing or rushing sound in the ears  · Nosebleed  · Sudden severe pain in your belly (abdomen)  · Extreme drowsiness, confusion, or fainting  · Dizziness or dizziness with a spinning sensation (vertigo)  · Weakness of an arm or leg or one side of the face  · You have problems speaking or seeing   Date Last Reviewed: 11/25/2014  © 8799-8315 [employer] [address] [place] [address], [place] [address]. All rights reserved. This information is not intended as a substitute for professional medical care. Always follow your healthcare professional's instructions.

## 2017-10-02 RX ORDER — LOVASTATIN 10 MG/1
10 TABLET ORAL NIGHTLY
Qty: 90 TABLET | Refills: 3 | Status: SHIPPED | OUTPATIENT
Start: 2017-10-02 | End: 2018-10-25 | Stop reason: SDUPTHER

## 2017-10-13 ENCOUNTER — TELEPHONE (OUTPATIENT)
Dept: FAMILY MEDICINE | Facility: CLINIC | Age: 82
End: 2017-10-13

## 2017-10-13 NOTE — TELEPHONE ENCOUNTER
Spoke to patient who states she felt fine this morning when she woke up. States she ate breakfast and drank green tea. After breakfast she experienced some chest pain but hesitated to go to the ER. States she is feeling fine now. Denies presence of chest pain presently. Advised patient anytime experiencing chest pain it is strongly advised to be seen at ED. Patient states she is not experiencing chest pain presently, but if symptoms reoccur she will go to ER. Please be advised.

## 2017-10-13 NOTE — TELEPHONE ENCOUNTER
1.Chest pain that last more than 15 mins are worrisome. 2.There are many reason to have chest pains.3.Please consider ; as we get older we have to chew more due to the lack of digestive enzymes in the mouth and the stomach. 4.The stomach and the intestines are also sluggish er than before.  5.We may have vaso-spasm of the heart/ esophagus.  6.Please consider taking Tums/ or gaviscon if chest fullness after meals. Take a baby aspirin 81 with breakfast.  Do not hesitate to go to ED if persistent chest tightness, fatigue, and shortness of breath.

## 2017-10-13 NOTE — TELEPHONE ENCOUNTER
Patient notified. Verbalized understanding. States she is allergic to ASA and can not take anything containing ASA. Please be advised.

## 2017-10-13 NOTE — TELEPHONE ENCOUNTER
----- Message from Rody Quintanilla sent at 10/13/2017  1:42 PM CDT -----  Contact: self  Please call back at [phone] (home)---personal. Needs a call back as soon as possible.

## 2017-10-22 ENCOUNTER — HOSPITAL ENCOUNTER (OUTPATIENT)
Facility: HOSPITAL | Age: 82
Discharge: HOME HEALTH | End: 2017-10-23
Attending: EMERGENCY MEDICINE | Admitting: HOSPITALIST
Payer: MEDICARE

## 2017-10-22 DIAGNOSIS — R11.0 NAUSEA: ICD-10-CM

## 2017-10-22 DIAGNOSIS — G45.9 TIA (TRANSIENT ISCHEMIC ATTACK): Primary | ICD-10-CM

## 2017-10-22 DIAGNOSIS — H54.7 VISION LOSS: ICD-10-CM

## 2017-10-22 DIAGNOSIS — H54.3 VISION LOSS, BILATERAL: ICD-10-CM

## 2017-10-22 PROBLEM — Z86.73 HX-TIA (TRANSIENT ISCHEMIC ATTACK): Status: ACTIVE | Noted: 2017-10-22

## 2017-10-22 LAB
ALBUMIN SERPL BCP-MCNC: 3.8 G/DL
ALP SERPL-CCNC: 83 U/L
ALT SERPL W/O P-5'-P-CCNC: 26 U/L
ANION GAP SERPL CALC-SCNC: 14 MMOL/L
AST SERPL-CCNC: 33 U/L
BACTERIA #/AREA URNS HPF: NORMAL /HPF
BASOPHILS NFR BLD: 0 %
BILIRUB SERPL-MCNC: 0.5 MG/DL
BILIRUB UR QL STRIP: NEGATIVE
BUN SERPL-MCNC: 19 MG/DL
CALCIUM SERPL-MCNC: 10.1 MG/DL
CHLORIDE SERPL-SCNC: 100 MMOL/L
CLARITY UR: CLEAR
CO2 SERPL-SCNC: 20 MMOL/L
COLOR UR: YELLOW
CREAT SERPL-MCNC: 0.8 MG/DL
DIFFERENTIAL METHOD: ABNORMAL
EOSINOPHIL NFR BLD: 3 %
ERYTHROCYTE [DISTWIDTH] IN BLOOD BY AUTOMATED COUNT: 14.3 %
EST. GFR  (AFRICAN AMERICAN): >60 ML/MIN/1.73 M^2
EST. GFR  (NON AFRICAN AMERICAN): >60 ML/MIN/1.73 M^2
GLUCOSE SERPL-MCNC: 93 MG/DL
GLUCOSE UR QL STRIP: NEGATIVE
HCT VFR BLD AUTO: 38.9 %
HGB BLD-MCNC: 13.2 G/DL
HGB UR QL STRIP: ABNORMAL
KETONES UR QL STRIP: NEGATIVE
LEUKOCYTE ESTERASE UR QL STRIP: NEGATIVE
LYMPHOCYTES NFR BLD: 26 %
MAGNESIUM SERPL-MCNC: 2.5 MG/DL
MCH RBC QN AUTO: 32.4 PG
MCHC RBC AUTO-ENTMCNC: 34 G/DL
MCV RBC AUTO: 95 FL
MICROSCOPIC COMMENT: NORMAL
MONOCYTES NFR BLD: 9 %
NEUTROPHILS NFR BLD: 61 %
NEUTS BAND NFR BLD MANUAL: 1 %
NITRITE UR QL STRIP: NEGATIVE
PH UR STRIP: 7 [PH] (ref 5–8)
PHOSPHATE SERPL-MCNC: 3.7 MG/DL
PLATELET # BLD AUTO: 227 K/UL
PLATELET BLD QL SMEAR: ABNORMAL
PMV BLD AUTO: 9.2 FL
POTASSIUM SERPL-SCNC: 4.8 MMOL/L
PROT SERPL-MCNC: 7.5 G/DL
PROT UR QL STRIP: NEGATIVE
RBC # BLD AUTO: 4.09 M/UL
RBC #/AREA URNS HPF: 2 /HPF (ref 0–4)
SODIUM SERPL-SCNC: 134 MMOL/L
SP GR UR STRIP: <=1.005 (ref 1–1.03)
SQUAMOUS #/AREA URNS HPF: 1 /HPF
TROPONIN I SERPL DL<=0.01 NG/ML-MCNC: <0.006 NG/ML
TSH SERPL DL<=0.005 MIU/L-ACNC: 1.53 UIU/ML
URN SPEC COLLECT METH UR: ABNORMAL
UROBILINOGEN UR STRIP-ACNC: NEGATIVE EU/DL
WBC # BLD AUTO: 4.4 K/UL
WBC #/AREA URNS HPF: 0 /HPF (ref 0–5)

## 2017-10-22 PROCEDURE — 94760 N-INVAS EAR/PLS OXIMETRY 1: CPT

## 2017-10-22 PROCEDURE — 81000 URINALYSIS NONAUTO W/SCOPE: CPT

## 2017-10-22 PROCEDURE — 25000003 PHARM REV CODE 250: Performed by: EMERGENCY MEDICINE

## 2017-10-22 PROCEDURE — 84443 ASSAY THYROID STIM HORMONE: CPT

## 2017-10-22 PROCEDURE — 83735 ASSAY OF MAGNESIUM: CPT

## 2017-10-22 PROCEDURE — 85007 BL SMEAR W/DIFF WBC COUNT: CPT | Mod: NCS

## 2017-10-22 PROCEDURE — 93005 ELECTROCARDIOGRAM TRACING: CPT

## 2017-10-22 PROCEDURE — 84484 ASSAY OF TROPONIN QUANT: CPT

## 2017-10-22 PROCEDURE — G0378 HOSPITAL OBSERVATION PER HR: HCPCS

## 2017-10-22 PROCEDURE — 80053 COMPREHEN METABOLIC PANEL: CPT

## 2017-10-22 PROCEDURE — 99285 EMERGENCY DEPT VISIT HI MDM: CPT | Mod: 25

## 2017-10-22 PROCEDURE — 84100 ASSAY OF PHOSPHORUS: CPT

## 2017-10-22 PROCEDURE — 36415 COLL VENOUS BLD VENIPUNCTURE: CPT

## 2017-10-22 PROCEDURE — 85027 COMPLETE CBC AUTOMATED: CPT

## 2017-10-22 RX ORDER — ONDANSETRON 2 MG/ML
4 INJECTION INTRAMUSCULAR; INTRAVENOUS EVERY 8 HOURS PRN
Status: DISCONTINUED | OUTPATIENT
Start: 2017-10-22 | End: 2017-10-23 | Stop reason: HOSPADM

## 2017-10-22 RX ORDER — CLOPIDOGREL BISULFATE 75 MG/1
75 TABLET ORAL DAILY
Status: DISCONTINUED | OUTPATIENT
Start: 2017-10-23 | End: 2017-10-23 | Stop reason: HOSPADM

## 2017-10-22 RX ORDER — ACETAMINOPHEN 325 MG/1
650 TABLET ORAL EVERY 6 HOURS PRN
Status: DISCONTINUED | OUTPATIENT
Start: 2017-10-22 | End: 2017-10-23

## 2017-10-22 RX ORDER — LOVASTATIN 10 MG/1
10 TABLET ORAL NIGHTLY
Status: DISCONTINUED | OUTPATIENT
Start: 2017-10-22 | End: 2017-10-23 | Stop reason: HOSPADM

## 2017-10-22 RX ORDER — ACETAMINOPHEN 325 MG/1
650 TABLET ORAL EVERY 8 HOURS PRN
Status: DISCONTINUED | OUTPATIENT
Start: 2017-10-22 | End: 2017-10-23 | Stop reason: HOSPADM

## 2017-10-22 RX ORDER — NIFEDIPINE 30 MG/1
30 TABLET, EXTENDED RELEASE ORAL DAILY
Status: DISCONTINUED | OUTPATIENT
Start: 2017-10-23 | End: 2017-10-23

## 2017-10-22 RX ORDER — LOSARTAN POTASSIUM 25 MG/1
100 TABLET ORAL DAILY
Status: DISCONTINUED | OUTPATIENT
Start: 2017-10-23 | End: 2017-10-23

## 2017-10-22 RX ORDER — AMOXICILLIN 250 MG
1 CAPSULE ORAL 2 TIMES DAILY PRN
Status: DISCONTINUED | OUTPATIENT
Start: 2017-10-22 | End: 2017-10-23 | Stop reason: HOSPADM

## 2017-10-22 RX ORDER — PANTOPRAZOLE SODIUM 40 MG/1
40 TABLET, DELAYED RELEASE ORAL DAILY
Status: DISCONTINUED | OUTPATIENT
Start: 2017-10-23 | End: 2017-10-23 | Stop reason: HOSPADM

## 2017-10-22 RX ADMIN — LOVASTATIN 10 MG: 10 TABLET ORAL at 09:10

## 2017-10-22 NOTE — ED PROVIDER NOTES
"Encounter Date: 10/22/2017    SCRIBE #1 NOTE: I, Yaneli Quick, am scribing for, and in the presence of,  Dr. Monterroso . I have scribed the entire note.       History     Chief Complaint   Patient presents with    Loss of Vision     2 episodes this week     Nausea       10/22/2017 3:33 PM     Chief complaint: Loss of vision       Brie Han is a 89 y.o. female with a history of stroke, HTN, HLD, glaucoma, and arthritis who presents to the ED concerned for loss of vision that occurred earlier today when she was reading. Patient reports that she had a similar episode "a few weeks ago" where she temporarily lost her vision. Today, she lost her vision for "about a minute" and then it came back. When she lost her vision, "everything was blurry" in both eyes and there was no pain in her eyes. Her vision is back to baseline now. Also, patient reports that she intermittently cannot see the top half of her visual field out of her right eye. When this happens, she can still see clearly out of her left eye. She noticed this intermittent change in vision "a few weeks ago." Additionally, the patient states that her left arm hurt 3 days ago, but the pain was relieved with muscle cream and does not currently hurt. Occasionally, she experiences chest tightness when she is anxious. Today, she experienced mild chest tightness with associated SOB when in the waiting room of the ED after she noticed that her blood pressure was high during triage. Patient denies having a headache, left arm pain today, a history of atrial fibrillation, or chest pain, but notes having a normal tremor in both hands that is unchanged from baseline. She is compliant with Plavix and is allergic to Aspirin. Her PSHx includes a hysterectomy, appendectomy, eye surgery, and a bilateral cataract extraction with intraocular lens implant.       The history is provided by the patient and a relative.     Review of patient's allergies indicates:   Allergen " Reactions    Aspirin Other (See Comments)    Ciprofloxacin Other (See Comments)    Iron Other (See Comments)    Iodine Rash    Penicillins Rash     Past Medical History:   Diagnosis Date    Arthritis     Glaucoma     resolved    Hyperlipidemia     Hypertension     Stroke 2006     Past Surgical History:   Procedure Laterality Date    APPENDECTOMY      CATARACT EXTRACTION W/  INTRAOCULAR LENS IMPLANT Bilateral     Dr Rojo      SECTION      3 c/s and d/c    EYE SURGERY      Glaucoma     HYSTERECTOMY      TONSILLECTOMY       Family History   Problem Relation Age of Onset    Early death Mother     Stroke Father     Hypertension Father     Diabetes Father     Hypertension Sister     Thyroid disease Paternal Grandfather     No Known Problems Brother     No Known Problems Maternal Aunt     No Known Problems Maternal Uncle     No Known Problems Paternal Aunt     No Known Problems Paternal Uncle     No Known Problems Maternal Grandmother     No Known Problems Maternal Grandfather     No Known Problems Paternal Grandmother     Amblyopia Neg Hx     Blindness Neg Hx     Cancer Neg Hx     Cataracts Neg Hx     Glaucoma Neg Hx     Macular degeneration Neg Hx     Retinal detachment Neg Hx     Strabismus Neg Hx      Social History   Substance Use Topics    Smoking status: Never Smoker    Smokeless tobacco: Not on file    Alcohol use No     Review of Systems   Constitutional: Negative for fever.   HENT: Negative for sore throat.    Eyes: Positive for visual disturbance (intermittent ).   Respiratory: Positive for chest tightness (occasional, secondary to anxiety) and shortness of breath (secondary to chest tightness with anxiety).    Cardiovascular: Negative for chest pain.   Gastrointestinal: Negative for nausea.   Genitourinary: Negative for dysuria.   Musculoskeletal: Negative for back pain.   Skin: Negative for rash.   Neurological: Positive for tremors (in hands,  unchanged from baseline). Negative for weakness and headaches.   Hematological: Does not bruise/bleed easily.       Physical Exam     Initial Vitals [10/22/17 1503]   BP Pulse Resp Temp SpO2   (!) 194/81 78 16 97.6 °F (36.4 °C) 99 %      MAP       118.67         Physical Exam    Nursing note and vitals reviewed.  Constitutional: She appears well-developed and well-nourished.  Non-toxic appearance. No distress.   HENT:   Head: Normocephalic and atraumatic.   Eyes: EOM are normal. Pupils are equal, round, and reactive to light.   Extraocular movements intact.    Neck: Normal range of motion. Neck supple. No neck rigidity. No JVD present.   Cardiovascular: Normal rate, regular rhythm and intact distal pulses.   Murmur heard.   Systolic murmur is present   Soft, systolic expiratory murmur.    Abdominal: Soft. Bowel sounds are normal. She exhibits no distension. There is no tenderness. There is no rigidity, no rebound and no guarding.   Musculoskeletal: Normal range of motion.   Neurological: She is alert and oriented to person, place, and time. She has normal strength and normal reflexes. She displays tremor. No cranial nerve deficit or sensory deficit. She exhibits normal muscle tone. Coordination normal. GCS eye subscore is 4. GCS verbal subscore is 5. GCS motor subscore is 6.   Reflex Scores:       Tricep reflexes are 2+ on the right side and 2+ on the left side.       Bicep reflexes are 2+ on the right side and 2+ on the left side.       Brachioradialis reflexes are 2+ on the right side and 2+ on the left side.       Patellar reflexes are 2+ on the right side and 2+ on the left side.       Achilles reflexes are 2+ on the right side and 2+ on the left side.  No sensory deficit. Sensation intact throughout. Intention tremor. Cranial nerves intact. No visual field deficits. Strength is symmetric and 5/5 to flexion and extension in the upper extremities. Speech is clear.    Skin: Skin is warm and dry.   Psychiatric: She  has a normal mood and affect. Her speech is normal and behavior is normal. She is not actively hallucinating.         ED Course   Procedures  Labs Reviewed   CBC W/ AUTO DIFFERENTIAL - Abnormal; Notable for the following:        Result Value    MCH 32.4 (*)     Platelet Estimate Clumped (*)     All other components within normal limits   COMPREHENSIVE METABOLIC PANEL - Abnormal; Notable for the following:     Sodium 134 (*)     CO2 20 (*)     All other components within normal limits   URINALYSIS - Abnormal; Notable for the following:     Specific Gravity, UA <=1.005 (*)     Occult Blood UA 1+ (*)     All other components within normal limits   MAGNESIUM   PHOSPHORUS   TROPONIN I   URINALYSIS MICROSCOPIC   TSH        Imaging Results          X-Ray Chest AP Portable (Final result)  Result time 10/22/17 16:16:12    Final result by Fatoumata Morris MD (10/22/17 16:16:12)                 Impression:        No acute cardiopulmonary disease.        Electronically signed by: FATOUMATA MORRIS MD  Date:     10/22/17  Time:    16:16              Narrative:    Comparison study: 5/23/2014  One view of the chest was obtained.    The heart is not enlarged. The lungs are well expanded and are clear of infiltrate.  The costophrenic sulci are sharp.                             CT Head Without Contrast (Final result)  Result time 10/22/17 16:15:51    Final result by Fatoumata Morris MD (10/22/17 16:15:51)                 Narrative:    Comparison study: 4/4/2013    FINDINGS    Axial scans of the head were obtained without IV contrast.Sagittal and coronal reformatted images were obtained.    The ventricles, sulci, fissures are within normal limits in appearance for the patient's age.  There is no acute intracranial hemorrhage. There is no intracranial mass effect.  There is no acute major vascular territory infarct evident. The calvarium  is intact, the mastoids well pneumatized, and the visualized paranasal sinuses  clear.    IMPRESSION    No acute intracranial findings.      Electronically signed by: ASHLEY MORRIS MD  Date:     10/22/17  Time:    16:15                               X-Rays:   Independently Interpreted Readings:   Head CT: No acute intracranial abnormality appreciated.     Medical Decision Making:   History:   Old Medical Records: I decided to obtain old medical records.  Initial Assessment:   89-year-old woman with a history of previous CVA in vision loss presents emergency Department with recurrent intermittent vision loss.  Last episode today and lasted approximately 1 minute.  Symptoms have resolved.  Neurological exam reveals an a stroke scale of 0.  Concern for TIA.  CT head shows no acute abnormalities.  Patient takes daily Plavix secondary to aspirin ALLERGY.  Patient also endorses chest tightness with a negative cardiac workup initially.  Discussed with Dr. pike who agrees to admit the patient for TIA/stroke workup with MRI and CTA.  She is stable for the floor on telemetry.  Clinical Tests:   Lab Tests: Ordered and Reviewed  Radiological Study: Ordered and Reviewed  Medical Tests: Ordered and Reviewed            Scribe Attestation:   Scribe #1: I performed the above scribed service and the documentation accurately describes the services I performed. I attest to the accuracy of the note.    I, Loc Verdugo, personally performed the services described in this documentation. All medical record entries made by the scribe were at my direction and in my presence.  I have reviewed the chart and agree that the record reflects my personal performance and is accurate and complete. Joey Monterroso MD.  6:50 PM 10/22/2017          ED Course      Clinical Impression:     1. Vision loss    2. Nausea          Disposition:   Disposition: Admitted                        Joey Monterroso MD  10/22/17 1850

## 2017-10-23 VITALS
OXYGEN SATURATION: 99 % | WEIGHT: 121.06 LBS | SYSTOLIC BLOOD PRESSURE: 155 MMHG | TEMPERATURE: 96 F | BODY MASS INDEX: 24.4 KG/M2 | RESPIRATION RATE: 18 BRPM | HEART RATE: 66 BPM | HEIGHT: 59 IN | DIASTOLIC BLOOD PRESSURE: 68 MMHG

## 2017-10-23 PROBLEM — G45.9 TIA (TRANSIENT ISCHEMIC ATTACK): Status: ACTIVE | Noted: 2017-10-23

## 2017-10-23 PROBLEM — I16.0 HYPERTENSIVE URGENCY: Status: RESOLVED | Noted: 2017-10-23 | Resolved: 2017-10-23

## 2017-10-23 PROBLEM — I16.0 HYPERTENSIVE URGENCY: Status: ACTIVE | Noted: 2017-10-23

## 2017-10-23 PROBLEM — H54.7 VISION LOSS: Status: RESOLVED | Noted: 2017-10-22 | Resolved: 2017-10-23

## 2017-10-23 LAB
ALBUMIN SERPL BCP-MCNC: 3.4 G/DL
ALP SERPL-CCNC: 74 U/L
ALT SERPL W/O P-5'-P-CCNC: 22 U/L
ANION GAP SERPL CALC-SCNC: 9 MMOL/L
AORTIC VALVE REGURGITATION: ABNORMAL
AST SERPL-CCNC: 31 U/L
BASOPHILS # BLD AUTO: 0 K/UL
BASOPHILS NFR BLD: 0.6 %
BILIRUB SERPL-MCNC: 0.5 MG/DL
BUN SERPL-MCNC: 13 MG/DL
CALCIUM SERPL-MCNC: 9.9 MG/DL
CHLORIDE SERPL-SCNC: 103 MMOL/L
CHOLEST SERPL-MCNC: 179 MG/DL
CHOLEST/HDLC SERPL: 3.3 {RATIO}
CO2 SERPL-SCNC: 22 MMOL/L
CREAT SERPL-MCNC: 0.7 MG/DL
DIASTOLIC DYSFUNCTION: NO
DIFFERENTIAL METHOD: ABNORMAL
EOSINOPHIL # BLD AUTO: 0.1 K/UL
EOSINOPHIL NFR BLD: 2.9 %
ERYTHROCYTE [DISTWIDTH] IN BLOOD BY AUTOMATED COUNT: 14.8 %
EST. GFR  (AFRICAN AMERICAN): >60 ML/MIN/1.73 M^2
EST. GFR  (NON AFRICAN AMERICAN): >60 ML/MIN/1.73 M^2
ESTIMATED AVG GLUCOSE: 108 MG/DL
GLOBAL PERICARDIAL EFFUSION: ABNORMAL
GLUCOSE SERPL-MCNC: 81 MG/DL
HBA1C MFR BLD HPLC: 5.4 %
HCT VFR BLD AUTO: 37.4 %
HDLC SERPL-MCNC: 55 MG/DL
HDLC SERPL: 30.7 %
HGB BLD-MCNC: 12.7 G/DL
LDLC SERPL CALC-MCNC: 111.2 MG/DL
LYMPHOCYTES # BLD AUTO: 1.4 K/UL
LYMPHOCYTES NFR BLD: 40 %
MAGNESIUM SERPL-MCNC: 2.4 MG/DL
MCH RBC QN AUTO: 32.4 PG
MCHC RBC AUTO-ENTMCNC: 33.9 G/DL
MCV RBC AUTO: 96 FL
MONOCYTES # BLD AUTO: 0.5 K/UL
MONOCYTES NFR BLD: 15.2 %
NEUTROPHILS # BLD AUTO: 1.4 K/UL
NEUTROPHILS NFR BLD: 41.3 %
NONHDLC SERPL-MCNC: 124 MG/DL
PHOSPHATE SERPL-MCNC: 3.4 MG/DL
PLATELET # BLD AUTO: 249 K/UL
PMV BLD AUTO: 8.2 FL
POTASSIUM SERPL-SCNC: 4.7 MMOL/L
PROT SERPL-MCNC: 6.9 G/DL
RBC # BLD AUTO: 3.92 M/UL
RETIRED EF AND QEF - SEE NOTES: 58 (ref 55–65)
SODIUM SERPL-SCNC: 134 MMOL/L
TRIGL SERPL-MCNC: 64 MG/DL
TROPONIN I SERPL DL<=0.01 NG/ML-MCNC: 0.03 NG/ML
TROPONIN I SERPL DL<=0.01 NG/ML-MCNC: 0.03 NG/ML
WBC # BLD AUTO: 3.5 K/UL

## 2017-10-23 PROCEDURE — 85025 COMPLETE CBC W/AUTO DIFF WBC: CPT

## 2017-10-23 PROCEDURE — G0008 ADMIN INFLUENZA VIRUS VAC: HCPCS | Performed by: HOSPITALIST

## 2017-10-23 PROCEDURE — 80053 COMPREHEN METABOLIC PANEL: CPT

## 2017-10-23 PROCEDURE — 96372 THER/PROPH/DIAG INJ SC/IM: CPT

## 2017-10-23 PROCEDURE — 84484 ASSAY OF TROPONIN QUANT: CPT

## 2017-10-23 PROCEDURE — 90471 IMMUNIZATION ADMIN: CPT | Performed by: HOSPITALIST

## 2017-10-23 PROCEDURE — 90662 IIV NO PRSV INCREASED AG IM: CPT | Performed by: HOSPITALIST

## 2017-10-23 PROCEDURE — 63600175 PHARM REV CODE 636 W HCPCS: Performed by: INTERNAL MEDICINE

## 2017-10-23 PROCEDURE — 25000003 PHARM REV CODE 250: Performed by: EMERGENCY MEDICINE

## 2017-10-23 PROCEDURE — 84100 ASSAY OF PHOSPHORUS: CPT

## 2017-10-23 PROCEDURE — 36415 COLL VENOUS BLD VENIPUNCTURE: CPT

## 2017-10-23 PROCEDURE — G0378 HOSPITAL OBSERVATION PER HR: HCPCS

## 2017-10-23 PROCEDURE — 96374 THER/PROPH/DIAG INJ IV PUSH: CPT

## 2017-10-23 PROCEDURE — 80061 LIPID PANEL: CPT

## 2017-10-23 PROCEDURE — 25000003 PHARM REV CODE 250: Performed by: NURSE PRACTITIONER

## 2017-10-23 PROCEDURE — 63600175 PHARM REV CODE 636 W HCPCS: Performed by: HOSPITALIST

## 2017-10-23 PROCEDURE — 83036 HEMOGLOBIN GLYCOSYLATED A1C: CPT

## 2017-10-23 PROCEDURE — 83735 ASSAY OF MAGNESIUM: CPT

## 2017-10-23 RX ORDER — ACETAMINOPHEN 325 MG/1
650 TABLET ORAL EVERY 8 HOURS PRN
Refills: 0 | COMMUNITY
Start: 2017-10-23

## 2017-10-23 RX ORDER — GADOBUTROL 604.72 MG/ML
INJECTION INTRAVENOUS
Status: DISCONTINUED
Start: 2017-10-23 | End: 2017-10-23 | Stop reason: WASHOUT

## 2017-10-23 RX ORDER — NIFEDIPINE 30 MG/1
30 TABLET, EXTENDED RELEASE ORAL DAILY
Status: DISCONTINUED | OUTPATIENT
Start: 2017-10-23 | End: 2017-10-23 | Stop reason: HOSPADM

## 2017-10-23 RX ORDER — LOSARTAN POTASSIUM 25 MG/1
100 TABLET ORAL DAILY
Status: DISCONTINUED | OUTPATIENT
Start: 2017-10-23 | End: 2017-10-23 | Stop reason: HOSPADM

## 2017-10-23 RX ORDER — AZELASTINE 1 MG/ML
1 SPRAY, METERED NASAL 2 TIMES DAILY
Status: DISCONTINUED | OUTPATIENT
Start: 2017-10-23 | End: 2017-10-23 | Stop reason: HOSPADM

## 2017-10-23 RX ADMIN — PANTOPRAZOLE SODIUM 40 MG: 40 TABLET, DELAYED RELEASE ORAL at 08:10

## 2017-10-23 RX ADMIN — HUMAN ALBUMIN MICROSPHERES AND PERFLUTREN 0.11 MG: 10; .22 INJECTION, SOLUTION INTRAVENOUS at 12:10

## 2017-10-23 RX ADMIN — NIFEDIPINE 30 MG: 30 TABLET, FILM COATED, EXTENDED RELEASE ORAL at 12:10

## 2017-10-23 RX ADMIN — CLOPIDOGREL BISULFATE 75 MG: 75 TABLET ORAL at 08:10

## 2017-10-23 RX ADMIN — AZELASTINE HYDROCHLORIDE 137 MCG: 137 SPRAY, METERED NASAL at 12:10

## 2017-10-23 RX ADMIN — INFLUENZA A VIRUSA/MICHIGAN/45/2015 X-275 (H1N1) ANTIGEN (FORMALDEHYDE INACTIVATED), INFLUENZA A VIRUS A/HONG KONG/4801/2014 X-263B (H3N2) ANTIGEN (FORMALDEHYDE INACTIVATED), AND INFLUENZA B VIRUS B/BRISBANE/60/2008 ANTIGEN (FORMALDEHYDE INACTIVATED) 0.5 ML: 60; 60; 60 INJECTION, SUSPENSION INTRAMUSCULAR at 02:10

## 2017-10-23 RX ADMIN — LOSARTAN POTASSIUM 100 MG: 25 TABLET, FILM COATED ORAL at 12:10

## 2017-10-23 NOTE — PLAN OF CARE
10/23/17 1323   Final Note   Assessment Type Final Discharge Note   Discharge Disposition Home

## 2017-10-23 NOTE — PLAN OF CARE
PCP is Dr Gould.  Verified insurance as [employer] medicare.  Pharmacy is [facility] and [facility] on [address].  Patient lives alone, independent with ADL's.  Patient doesn't drive, states friends drive her as needed.  D/c plan is home; possibly with HH.       10/23/17 1203   Discharge Assessment   Assessment Type Discharge Planning Assessment   Confirmed/corrected address and phone number on facesheet? Yes   Assessment information obtained from? Patient   Prior to hospitilization cognitive status: Alert/Oriented   Prior to hospitalization functional status: Independent   Current cognitive status: Alert/Oriented   Current Functional Status: Independent   Lives With alone   Able to Return to Prior Arrangements yes   Is patient able to care for self after discharge? Yes   Patient's perception of discharge disposition home or selfcare   Readmission Within The Last 30 Days no previous admission in last 30 days   Patient currently being followed by outpatient case management? No   Patient currently receives any other outside agency services? No   Equipment Currently Used at Home none   Do you have any problems affording any of your prescribed medications? No   Is the patient taking medications as prescribed? yes   Does the patient have transportation home? Yes   Transportation Available family or friend will provide   Does the patient receive services at the [facility]? No   Discharge Plan A Home   Discharge Plan B Home Health   Patient/Family In Agreement With Plan yes

## 2017-10-23 NOTE — DISCHARGE SUMMARY
"[facility]  Hospital Medicine  Discharge Summary      Patient Name: Brie Han  MRN: [id number]  Admission Date: 10/22/2017  Hospital Length of Stay: 0 days  Discharge Date and Time:  10/23/2017 5:30 PM  Attending Physician: Yanet att. providers found   Discharging Provider: ROSEANNE Ortiz  Primary Care Provider: Colten Gould MD    HPI:   Brie Han is a 89 y.o. female with a history of TIA (with visual manifestations), HTN, HLD, glaucoma, and arthritis.  She was admitted to the service of hospital medicine with transient vision loss concerning for TIA.  She presented to the ED concerned for an episode loss of vision that occurred earlier today when she was reading. Patient reports that she had a similar episode "a few weeks ago" where she temporarily lost her vision. Today, she lost her vision for "about a minute" and then it came back. When she lost her vision, "everything was blurry" in both eyes and there was no pain in her eyes. Her vision is back to baseline now. Also, patient reports that she intermittently cannot see the top half of her visual field out of her right eye (for several weeks now) noting no impairment with LEFT visual field during this time.  Occasionally, she experiences chest tightness when she is anxious. Today, she experienced mild chest tightness with associated SOB when in the waiting room of the ED after she noticed that her blood pressure was high during triage which has since resolved without return.  Denies any SOB, but reports some PND when laying on her LEFT side and has been evaluated for complaint by cardiology. Patient denies having a headache, focal weakness, numbness, or tingling, or speech or gait abnormalities.She reports a chronic LEFT hand tremor unchanged from baseline. She reports prior TIA in 2006 with similar visual manifestations without any visual deficits since that time until now.  She states she is due for an eye exam. She is " compliant with Plavix and is allergic to Aspirin. She lives alone; her grandson drove in from [place] and is at bedside to assist in history provision. Patient placed in hospital for further evaluation and treatment.     * No surgery found *      Indwelling Lines/Drains at time of discharge:   Lines/Drains/Airways          No matching active lines, drains, or airways        Hospital Course:   The patient was monitored closely during her stay. A Ct of head without contrast showed no acute intracranial findings. Patient's overall condition remained stable and reported resolution of her initial symptoms. She was continued on her home plavix and statin. Patient was discussed with Dr. Buitrago. She was initially ordered for MRI and MRA brain and MRA of neck but later canceled due to patient's reported allergy to contrast media. Patient had history of recent carotid ultrasound in past year with no acute findings. An echocardiogram was done that showed no acute issues and no evidence of intracavity mass, thrombi, or vegetation. Patient's overall condition remained stable and she was discharged to home. Patient was to continue plavix and statin and monitor blood pressures at home for follow up with PCP. Patient was also to follow up with her opthamologist as out patient.     Consults:   Consults         Status Ordering Provider     Inpatient consult to Social Work/Case Management  Once     Provider:  (Not yet assigned)    Completed DI [facility]          Significant Diagnostic Studies:     CT Head Without Contrast- No acute intracranial findings.    Cxr- No acute cardiopulmonary disease.    2D Echo-     1 - Normal left ventricular systolic function (EF 55-60%).     2 - Normal left ventricular diastolic function.     3 - Normal right ventricular systolic function .     4 - Small pericardial effusion.     5 - No evidence of intracardiac shunt, technically difficult study.     6 - Difficult windows, Optison used for wall motion  analysis.     7 - Suggest improved systolic function with normal central venous pressure changed from Echo in 2/2013.   Intracavitary: There is no evidence of intracavity mass, thrombi, or vegetation.     Labs:   CMP   Recent Labs  Lab 10/22/17  1648 10/23/17  0433   * 134*   K 4.8 4.7    103   CO2 20* 22*   GLU 93 81   BUN 19 13   CREATININE 0.8 0.7   CALCIUM 10.1 9.9   PROT 7.5 6.9   ALBUMIN 3.8 3.4*   BILITOT 0.5 0.5   ALKPHOS 83 74   AST 33 31   ALT 26 22   ANIONGAP 14 9   ESTGFRAFRICA >60 >60   EGFRNONAA >60 >60   , CBC   Recent Labs  Lab 10/22/17  1648 10/23/17  0702   WBC 4.40 3.50*   HGB 13.2 12.7   HCT 38.9 37.4    249    and Troponin   Recent Labs  Lab 10/23/17  0433   TROPONINI 0.027*       Pending Diagnostic Studies:     None        Final Active Diagnoses:    Diagnosis Date Noted POA    PRINCIPAL PROBLEM:  TIA (transient ischemic attack) [G45.9] 10/23/2017 Yes    Hx-TIA (transient ischemic attack) [Z86.73] 10/22/2017 Not Applicable    GERD without esophagitis [K21.9] 09/14/2015 Yes     Chronic    Hyponatremia [E87.1] 05/20/2013 Yes    Refusal of blood transfusions as patient is Christianity [Z53.1] 02/14/2013 Not Applicable    HTN (hypertension) [I10] 09/21/2012 Yes      Problems Resolved During this Admission:    Diagnosis Date Noted Date Resolved POA    Hypertensive urgency [I16.0] 10/23/2017 10/23/2017 Yes    Vision loss [H54.7] 10/22/2017 10/23/2017 Yes    Chest pain [R07.9] 12/16/2013 10/23/2017 Yes      No new Assessment & Plan notes have been filed under this hospital service since the last note was generated.  Service: Hospital Medicine      Discharged Condition: stable    Disposition: Home-Health Care c    Follow Up:  Follow-up Information     opthamologist.           Colten Gould MD In 2 weeks.    Specialty:  Family Medicine  Why:  Take blood pressure and pulse 2 x day and keep log for follow up  Contact information:  [address] [place]  [address]  [phone]             Sam Smart OD.    Specialty:  Optometry  Why:  Follow up with Dr. Smart as scheduled or sooner if able  Contact information:  [address]  [place] [address]  [phone]             [facility].    Specialty:  Home Health Services  Why:  Home Health  Contact information:  [address] [place] [address]  [phone]                 Patient Instructions:     Referral to Home health   Referral Priority: Routine Referral Type: Home Health Care   Referral Reason: Specialty Services Required    Requested Specialty: Home Health Services    Number of Visits Requested: 1      Diet general   Order Comments: Cardiac diet     Activity as tolerated   Order Comments: Fall precautions     Call MD for:  severe persistent headache     Call MD for:  persistent dizziness, light-headedness, or visual disturbances     Call MD for:  increased confusion or weakness     Call MD for:   Order Comments: Any decline in condition       Medications:  Reconciled Home Medications:   Discharge Medication List as of 10/23/2017  2:59 PM      START taking these medications    Details   acetaminophen (TYLENOL) 325 MG tablet Take 2 tablets (650 mg total) by mouth every 8 (eight) hours as needed., Starting Mon 10/23/2017, OTC         CONTINUE these medications which have NOT CHANGED    Details   azelastine (ASTELIN) 137 mcg (0.1 %) nasal spray 1 spray (137 mcg total) by Nasal route 2 (two) times daily., Starting Thu 9/7/2017, Until Fri 9/7/2018, Normal      b complex vitamins tablet Take 1 tablet by mouth once daily., Until Discontinued, Historical Med      calcium citrate-vitamin D (CITRACAL + D) 315-200 mg-unit per tablet Every day, Starting 11/15/2011, Until Discontinued, Historical Med      clopidogrel (PLAVIX) 75 mg tablet Take 1 tablet (75 mg total) by mouth once daily. Every day, Starting 12/2/2016, Until Discontinued, Normal      KRILL OIL ORAL Take by  mouth., Until Discontinued, Historical Med      losartan (COZAAR) 100 MG tablet TAKE 1 TABLET EVERY DAY, Normal      lovastatin (MEVACOR) 10 MG tablet Take 1 tablet (10 mg total) by mouth every evening., Starting Mon 10/2/2017, Normal      multivitamin capsule Every day, Starting 11/15/2011, Until Discontinued, Historical Med      nifedipine (PROCARDIA-XL) 30 MG (OSM) 24 hr tablet TAKE 1 TABLET ONE TIME DAILY, Normal         STOP taking these medications       aluminum hydrox-magnesium carb (GAVISCON EXTRA STRENGTH) 160-105 mg Chew Comments:   Reason for Stopping:             Time spent on the discharge of patient: 52 minutes      ROSEANNE Ortiz  Department of Hospital Medicine  [facility]

## 2017-10-23 NOTE — ASSESSMENT & PLAN NOTE
Concern for TIA with previous TIA manifested with similar visual deficits.  Continue plavix, statin.  Check lipid panel and HgbA1c.  MRI/MRA brain/neck ordered for AM.  If negative may D/C with opthalmology follow up.  Consider Echo if neurovascular imaging warrants source investigation. Monitor on telemetry. Utilize fall precautions, neuro checks.

## 2017-10-23 NOTE — PLAN OF CARE
10/22/17 2104   Patient Assessment/Suction   Level of Consciousness (AVPU) alert   Respiratory Effort Normal;Unlabored   Expansion/Accessory Muscles/Retractions no use of accessory muscles   PRE-TX-O2-ETCO2   O2 Device (Oxygen Therapy) room air   SpO2 99 %   Pulse 62   Pt tolerates RA well.

## 2017-10-23 NOTE — PLAN OF CARE
10/23/17 1440   Final Note   Assessment Type Final Discharge Note   Discharge Disposition Home-Health

## 2017-10-23 NOTE — ASSESSMENT & PLAN NOTE
Suspect an anxiety reaction with no subsequent chest pain.  Monitor on telemetry and trend troponin.  Would not pursue further cardiac testing if troponin negative.  Patient established with cardiology and may f/u upon discharge unless clinical picture requires more immediate attention.

## 2017-10-23 NOTE — ASSESSMENT & PLAN NOTE
Chronic, hold antihypertensives for now, allowing for permissive HTN (up to asymptomatic 220/110) in case of CVA.  Once imaging completed and CVA ruled out in AM resume BP medications as per home dosing.  Monitor BP closely.

## 2017-10-23 NOTE — PROGRESS NOTES
POC reviewed with pt with verbalized understanding. Pt stated that she had headaches during the shift and was relieved with turning off the lights. Originally was wanting to be DNR and refusing blood, but after talking with NIEVES Griffith, NP has made the decision that she will be Full code.

## 2017-10-23 NOTE — HPI
"Brie Han is a 89 y.o. female with a history of TIA (with visual manifestations), HTN, HLD, glaucoma, and arthritis.  She was admitted to the service of hospital medicine with transient vision loss concerning for TIA.  She presented to the ED concerned for an episode loss of vision that occurred earlier today when she was reading. Patient reports that she had a similar episode "a few weeks ago" where she temporarily lost her vision. Today, she lost her vision for "about a minute" and then it came back. When she lost her vision, "everything was blurry" in both eyes and there was no pain in her eyes. Her vision is back to baseline now. Also, patient reports that she intermittently cannot see the top half of her visual field out of her right eye (for several weeks now) noting no impairment with LEFT visual field during this time.  Occasionally, she experiences chest tightness when she is anxious. Today, she experienced mild chest tightness with associated SOB when in the waiting room of the ED after she noticed that her blood pressure was high during triage which has since resolved without return.  Denies any SOB, but reports some PND when laying on her LEFT side and has been evaluated for complaint by cardiology. Patient denies having a headache, focal weakness, numbness, or tingling, or speech or gait abnormalities.She reports a chronic LEFT hand tremor unchanged from baseline. She reports prior TIA in 2006 with similar visual manifestations without any visual deficits since that time until now.  She states she is due for an eye exam. She is compliant with Plavix and is allergic to Aspirin. She lives alone; her grandson drove in from [place] and is at bedside to assist in history provision. Patient placed in hospital for further evaluation and treatment.   "

## 2017-10-23 NOTE — DISCHARGE INSTRUCTIONS
Thank you for choosing [facility] for your medical care. The primary doctor who is taking care of you at the time of your discharge is Armando Buitrago MD.     You were admitted to the hospital with Vision loss.     Please note your discharge instructions, including diet/activity restrictions, follow-up appointments, and medication changes.  If you have any questions about your medical issues, prescriptions, or any other questions, please feel free to contact the [facility] Medicine Dept at [phone] and we will help.    Please direct all long term medication refills and follow up to your primary care provider, Colten Gould MD. Thank you again for letting us take care of your health care needs.    Please note the following discharge instructions per your discharging physician-  Monet Bella Np

## 2017-10-23 NOTE — PROGRESS NOTES
SSC met with patient and grandson at bedside regarding home health.  Patient's grandson signed patient choice disclosure choosing [facility].  SSC sent patient information to [facility] through [facility] system for processing and acceptance.BRENT tao    The referral for the patient in [facility], room 203, bed 203 B admitted at 10/22/2017 3:19 PM has been updated by [email or web address].  Update: Accepted.

## 2017-10-23 NOTE — SUBJECTIVE & OBJECTIVE
Past Medical History:   Diagnosis Date    Anticoagulant long-term use     Arthritis     Glaucoma     resolved    Hyperlipidemia     Hypertension     Stroke 2006       Past Surgical History:   Procedure Laterality Date    APPENDECTOMY  's    BREAST SURGERY      CATARACT EXTRACTION W/  INTRAOCULAR LENS IMPLANT Bilateral     Dr Rojo      SECTION      3 c/s and d/c    EYE SURGERY      Glaucoma     HYSTERECTOMY      TONSILLECTOMY         Review of patient's allergies indicates:   Allergen Reactions    Aspirin Other (See Comments)    Ciprofloxacin Other (See Comments)    Iron Other (See Comments)    Iodine Rash    Penicillins Rash       No current facility-administered medications on file prior to encounter.      Current Outpatient Prescriptions on File Prior to Encounter   Medication Sig    b complex vitamins tablet Take 1 tablet by mouth once daily.    calcium citrate-vitamin D (CITRACAL + D) 315-200 mg-unit per tablet Every day    clopidogrel (PLAVIX) 75 mg tablet Take 1 tablet (75 mg total) by mouth once daily. Every day    KRILL OIL ORAL Take by mouth.    losartan (COZAAR) 100 MG tablet TAKE 1 TABLET EVERY DAY    lovastatin (MEVACOR) 10 MG tablet Take 1 tablet (10 mg total) by mouth every evening.    multivitamin capsule Every day    nifedipine (PROCARDIA-XL) 30 MG (OSM) 24 hr tablet TAKE 1 TABLET ONE TIME DAILY    azelastine (ASTELIN) 137 mcg (0.1 %) nasal spray 1 spray (137 mcg total) by Nasal route 2 (two) times daily.     Family History     Problem Relation (Age of Onset)    Diabetes Father    Early death Mother    Hypertension Father, Sister    No Known Problems Brother, Maternal Aunt, Maternal Uncle, Paternal Aunt, Paternal Uncle, Maternal Grandmother, Maternal Grandfather, Paternal Grandmother    Stroke Father    Thyroid disease Paternal Grandfather        Social History Main Topics    Smoking status: Never Smoker    Smokeless tobacco: Never Used    Alcohol use No     Drug use: No    Sexual activity: No     Review of Systems   Constitutional: Negative for activity change, chills, fatigue and fever.   HENT: Negative for congestion, postnasal drip, sore throat and trouble swallowing.    Eyes: Positive for visual disturbance. Negative for photophobia, pain and redness.   Respiratory: Negative for cough, shortness of breath and wheezing.    Cardiovascular: Positive for chest pain (intermittent with anxiety). Negative for palpitations and leg swelling.   Gastrointestinal: Negative for abdominal distention, abdominal pain, constipation, diarrhea, nausea and vomiting.   Genitourinary: Negative for difficulty urinating, dysuria, flank pain, frequency and hematuria.   Musculoskeletal: Positive for myalgias (LEFT arm pain 3 days ago 2/2 BP cuff: responsive to muscle cream.). Negative for arthralgias, neck pain and neck stiffness.   Skin: Negative for color change.   Neurological: Positive for tremors. Negative for dizziness, speech difficulty, weakness, light-headedness, numbness and headaches.   Psychiatric/Behavioral: Negative for confusion. The patient is not nervous/anxious.      Objective:     Vital Signs (Most Recent):  Temp: 97.7 °F (36.5 °C) (10/22/17 2339)  Pulse: 60 (10/22/17 2339)  Resp: 18 (10/22/17 2339)  BP: (!) 140/66 (10/22/17 2339)  SpO2: 97 % (10/22/17 2339) Vital Signs (24h Range):  Temp:  [97.6 °F (36.4 °C)-98 °F (36.7 °C)] 97.7 °F (36.5 °C)  Pulse:  [60-78] 60  Resp:  [16-18] 18  SpO2:  [97 %-100 %] 97 %  BP: (140-194)/(66-81) 140/66     Weight: 54.9 kg (121 lb 0.5 oz)  Body mass index is 24.45 kg/m².    Physical Exam   Constitutional: She is oriented to person, place, and time. She appears well-developed and well-nourished. No distress.   HENT:   Head: Normocephalic and atraumatic.   Eyes: Conjunctivae and EOM are normal. Pupils are equal, round, and reactive to light.   Neck: Normal range of motion. Neck supple. No thyromegaly present.   Cardiovascular: Normal  rate, regular rhythm and intact distal pulses.    Murmur (soft murmur) heard.  Pulmonary/Chest: Effort normal and breath sounds normal. No respiratory distress.   Abdominal: Soft. Bowel sounds are normal. She exhibits no distension. There is no tenderness.   Musculoskeletal: Normal range of motion. She exhibits no edema or tenderness.   Neurological: She is alert and oriented to person, place, and time. No cranial nerve deficit or sensory deficit. She exhibits normal muscle tone. Coordination normal.   EOM intact.  Reports some diminished light brightness with RIGHT eye.  No evident visual field deficits.   Skin: Skin is warm and dry. Capillary refill takes less than 2 seconds. No erythema.   Psychiatric: She has a normal mood and affect. Her behavior is normal. Judgment and thought content normal.        Significant Labs:   CBC:   Recent Labs  Lab 10/22/17  1648   WBC 4.40   HGB 13.2   HCT 38.9        CMP:   Recent Labs  Lab 10/22/17  1648   *   K 4.8      CO2 20*   GLU 93   BUN 19   CREATININE 0.8   CALCIUM 10.1   PROT 7.5   ALBUMIN 3.8   BILITOT 0.5   ALKPHOS 83   AST 33   ALT 26   ANIONGAP 14   EGFRNONAA >60     Coagulation: No results for input(s): INR, APTT in the last 48 hours.    Invalid input(s): PT  Magnesium:   Recent Labs  Lab 10/22/17  1648   MG 2.5     Troponin:   Recent Labs  Lab 10/22/17  1648   TROPONINI <0.006     TSH:   Recent Labs  Lab 10/22/17  1845   TSH 1.532       Significant Imaging:  CT Head: No acute intracranial findings.    Chest XR: No acute cardiopulmonary disease.

## 2017-10-23 NOTE — HOSPITAL COURSE
The patient was monitored closely during her stay. A Ct of head without contrast showed no acute intracranial findings. Patient's overall condition remained stable and reported resolution of her initial symptoms. She was continued on her home plavix and statin. Patient was discussed with Dr. Buitrago. She was initially ordered for MRI and MRA brain and MRA of neck but later canceled due to patient's reported allergy to contrast media. Patient had history of recent carotid ultrasound in past year with no acute findings. An echocardiogram was done that showed no acute issues and no evidence of intracavity mass, thrombi, or vegetation. Patient's overall condition remained stable and she was discharged to home. Patient was to continue plavix and statin and monitor blood pressures at home for follow up with PCP. Patient was also to follow up with her opthamologist as out patient.

## 2017-10-23 NOTE — H&P
"[facility] Hospital Medicine  History & Physical    Patient Name: Brie Han  MRN: [id number]  Admission Date: 10/22/2017  Attending Physician: Kathy Vyas MD   Primary Care Provider: Colten Gould MD         Patient information was obtained from patient, relative(s) and ER records.     Subjective:     Principal Problem:Vision loss    Chief Complaint:   Chief Complaint   Patient presents with    Loss of Vision     2 episodes this week     Nausea        HPI: Brie Han is a 89 y.o. female with a history of TIA (with visual manifestations), HTN, HLD, glaucoma, and arthritis.  She was admitted to the service of hospital medicine with transient vision loss concerning for TIA.  She presented to the ED concerned for an episode loss of vision that occurred earlier today when she was reading. Patient reports that she had a similar episode "a few weeks ago" where she temporarily lost her vision. Today, she lost her vision for "about a minute" and then it came back. When she lost her vision, "everything was blurry" in both eyes and there was no pain in her eyes. Her vision is back to baseline now. Also, patient reports that she intermittently cannot see the top half of her visual field out of her right eye (for several weeks now) noting no impairment with LEFT visual field during this time.  Occasionally, she experiences chest tightness when she is anxious. Today, she experienced mild chest tightness with associated SOB when in the waiting room of the ED after she noticed that her blood pressure was high during triage which has since resolved without return.  Denies any SOB, but reports some PND when laying on her LEFT side and has been evaluated for complaint by cardiology. Patient denies having a headache, focal weakness, numbness, or tingling, or speech or gait abnormalities.She reports a chronic LEFT hand tremor unchanged from baseline. She reports prior TIA in 2006 with similar " visual manifestations without any visual deficits since that time until now.  She states she is due for an eye exam. She is compliant with Plavix and is allergic to Aspirin. She lives alone; her grandson drove in from [place] and is at bedside to assist in history provision.    Past Medical History:   Diagnosis Date    Anticoagulant long-term use     Arthritis     Glaucoma     resolved    Hyperlipidemia     Hypertension     Stroke        Past Surgical History:   Procedure Laterality Date    APPENDECTOMY  's    BREAST SURGERY      CATARACT EXTRACTION W/  INTRAOCULAR LENS IMPLANT Bilateral     Dr Rojo      SECTION      3 c/s and d/c    EYE SURGERY      Glaucoma     HYSTERECTOMY      TONSILLECTOMY         Review of patient's allergies indicates:   Allergen Reactions    Aspirin Other (See Comments)    Ciprofloxacin Other (See Comments)    Iron Other (See Comments)    Iodine Rash    Penicillins Rash       No current facility-administered medications on file prior to encounter.      Current Outpatient Prescriptions on File Prior to Encounter   Medication Sig    b complex vitamins tablet Take 1 tablet by mouth once daily.    calcium citrate-vitamin D (CITRACAL + D) 315-200 mg-unit per tablet Every day    clopidogrel (PLAVIX) 75 mg tablet Take 1 tablet (75 mg total) by mouth once daily. Every day    KRILL OIL ORAL Take by mouth.    losartan (COZAAR) 100 MG tablet TAKE 1 TABLET EVERY DAY    lovastatin (MEVACOR) 10 MG tablet Take 1 tablet (10 mg total) by mouth every evening.    multivitamin capsule Every day    nifedipine (PROCARDIA-XL) 30 MG (OSM) 24 hr tablet TAKE 1 TABLET ONE TIME DAILY    azelastine (ASTELIN) 137 mcg (0.1 %) nasal spray 1 spray (137 mcg total) by Nasal route 2 (two) times daily.     Family History     Problem Relation (Age of Onset)    Diabetes Father    Early death Mother    Hypertension Father, Sister    No Known Problems Brother, Maternal Aunt, Maternal  Uncle, Paternal Aunt, Paternal Uncle, Maternal Grandmother, Maternal Grandfather, Paternal Grandmother    Stroke Father    Thyroid disease Paternal Grandfather        Social History Main Topics    Smoking status: Never Smoker    Smokeless tobacco: Never Used    Alcohol use No    Drug use: No    Sexual activity: No     Review of Systems   Constitutional: Negative for activity change, chills, fatigue and fever.   HENT: Negative for congestion, postnasal drip, sore throat and trouble swallowing.    Eyes: Positive for visual disturbance. Negative for photophobia, pain and redness.   Respiratory: Negative for cough, shortness of breath and wheezing.    Cardiovascular: Positive for chest pain (intermittent with anxiety). Negative for palpitations and leg swelling.   Gastrointestinal: Negative for abdominal distention, abdominal pain, constipation, diarrhea, nausea and vomiting.   Genitourinary: Negative for difficulty urinating, dysuria, flank pain, frequency and hematuria.   Musculoskeletal: Positive for myalgias (LEFT arm pain 3 days ago 2/2 BP cuff: responsive to muscle cream.). Negative for arthralgias, neck pain and neck stiffness.   Skin: Negative for color change.   Neurological: Positive for tremors. Negative for dizziness, speech difficulty, weakness, light-headedness, numbness and headaches.   Psychiatric/Behavioral: Negative for confusion. The patient is not nervous/anxious.      Objective:     Vital Signs (Most Recent):  Temp: 97.7 °F (36.5 °C) (10/22/17 2339)  Pulse: 60 (10/22/17 2339)  Resp: 18 (10/22/17 2339)  BP: (!) 140/66 (10/22/17 2339)  SpO2: 97 % (10/22/17 2339) Vital Signs (24h Range):  Temp:  [97.6 °F (36.4 °C)-98 °F (36.7 °C)] 97.7 °F (36.5 °C)  Pulse:  [60-78] 60  Resp:  [16-18] 18  SpO2:  [97 %-100 %] 97 %  BP: (140-194)/(66-81) 140/66     Weight: 54.9 kg (121 lb 0.5 oz)  Body mass index is 24.45 kg/m².    Physical Exam   Constitutional: She is oriented to person, place, and time. She  appears well-developed and well-nourished. No distress.   HENT:   Head: Normocephalic and atraumatic.   Eyes: Conjunctivae and EOM are normal. Pupils are equal, round, and reactive to light.   Neck: Normal range of motion. Neck supple. No thyromegaly present.   Cardiovascular: Normal rate, regular rhythm and intact distal pulses.    Murmur (soft murmur) heard.  Pulmonary/Chest: Effort normal and breath sounds normal. No respiratory distress.   Abdominal: Soft. Bowel sounds are normal. She exhibits no distension. There is no tenderness.   Musculoskeletal: Normal range of motion. She exhibits no edema or tenderness.   Neurological: She is alert and oriented to person, place, and time. No cranial nerve deficit or sensory deficit. She exhibits normal muscle tone. Coordination normal.   EOM intact.  Reports some diminished light brightness with RIGHT eye.  No evident visual field deficits.   Skin: Skin is warm and dry. Capillary refill takes less than 2 seconds. No erythema.   Psychiatric: She has a normal mood and affect. Her behavior is normal. Judgment and thought content normal.        Significant Labs:   CBC:   Recent Labs  Lab 10/22/17  1648   WBC 4.40   HGB 13.2   HCT 38.9        CMP:   Recent Labs  Lab 10/22/17  1648   *   K 4.8      CO2 20*   GLU 93   BUN 19   CREATININE 0.8   CALCIUM 10.1   PROT 7.5   ALBUMIN 3.8   BILITOT 0.5   ALKPHOS 83   AST 33   ALT 26   ANIONGAP 14   EGFRNONAA >60     Coagulation: No results for input(s): INR, APTT in the last 48 hours.    Invalid input(s): PT  Magnesium:   Recent Labs  Lab 10/22/17  1648   MG 2.5     Troponin:   Recent Labs  Lab 10/22/17  1648   TROPONINI <0.006     TSH:   Recent Labs  Lab 10/22/17  1845   TSH 1.532       Significant Imaging:  CT Head: No acute intracranial findings.    Chest XR: No acute cardiopulmonary disease.     Assessment/Plan:     * Vision loss     Hx-TIA (transient ischemic attack)    Concern for TIA with previous TIA  manifested with similar visual deficits.  Continue plavix, statin.  Check lipid panel and HgbA1c.  MRI/MRA brain/neck ordered for AM.  If negative may D/C with opthalmology follow up.  Consider Echo if neurovascular imaging warrants source investigation. Monitor on telemetry. Utilize fall precautions, neuro checks.          HTN (hypertension)    Chronic, hold antihypertensives for now, allowing for permissive HTN (up to asymptomatic 220/110) in case of CVA.  Once imaging completed and CVA ruled out in AM resume BP medications as per home dosing.  Monitor BP closely.          GERD without esophagitis    Chronic, stable.  Continue PPI therapy. Avoid provocative foods.          Refusal of blood transfusions as patient is Sabianism    Noted.  No blood products to be administered.          Chest pain    Suspect an anxiety reaction with no subsequent chest pain.  Monitor on telemetry and trend troponin.  Would not pursue further cardiac testing if troponin negative.  Patient established with cardiology and may f/u upon discharge unless clinical picture requires more immediate attention.              I spent 16 minutes of face to face discussion regarding advance directives and end of life planning which included patient and family. Patient/Family understands the seriousness of their condition and would like to make their end of life decisions known as follows- patient to remain full code.  Does not wish to be kept alive if no chance of meaningful recovery.  Grandson at bedside, acknowledges wishes.    Discussed the importance of getting a life-alert device as patient lives alone.    VTE Risk Mitigation         Ordered     Place sequential compression device  Until discontinued      10/23/17 0055     Medium Risk of VTE  Once      10/22/17 1939     Place MAURA hose  Until discontinued      10/22/17 1939             Estrella Griffith NP  Department of Hospital Medicine   [facility]

## 2017-10-24 ENCOUNTER — PATIENT MESSAGE (OUTPATIENT)
Dept: FAMILY MEDICINE | Facility: CLINIC | Age: 82
End: 2017-10-24

## 2017-10-24 ENCOUNTER — TELEPHONE (OUTPATIENT)
Dept: MEDSURG UNIT | Facility: HOSPITAL | Age: 82
End: 2017-10-24

## 2017-10-25 ENCOUNTER — TELEPHONE (OUTPATIENT)
Dept: FAMILY MEDICINE | Facility: CLINIC | Age: 82
End: 2017-10-25

## 2017-10-25 DIAGNOSIS — G45.9 TRANSIENT CEREBRAL ISCHEMIA, UNSPECIFIED TYPE: Primary | ICD-10-CM

## 2017-10-25 NOTE — TELEPHONE ENCOUNTER
Good afternoon,   Dr. Gould placed a referral to Neurology for this patient. Can you please contact patient's grandjessika Rick at [phone] to schedule an appointment?  Thank you.

## 2017-10-26 ENCOUNTER — TELEPHONE (OUTPATIENT)
Dept: NEUROLOGY | Facility: CLINIC | Age: 82
End: 2017-10-26

## 2017-10-26 ENCOUNTER — PATIENT MESSAGE (OUTPATIENT)
Dept: OPTOMETRY | Facility: CLINIC | Age: 82
End: 2017-10-26

## 2017-10-26 ENCOUNTER — DOCUMENTATION ONLY (OUTPATIENT)
Dept: FAMILY MEDICINE | Facility: CLINIC | Age: 82
End: 2017-10-26

## 2017-10-26 ENCOUNTER — OFFICE VISIT (OUTPATIENT)
Dept: FAMILY MEDICINE | Facility: CLINIC | Age: 82
End: 2017-10-26
Payer: MEDICARE

## 2017-10-26 VITALS
BODY MASS INDEX: 26.22 KG/M2 | HEART RATE: 71 BPM | HEIGHT: 59 IN | DIASTOLIC BLOOD PRESSURE: 60 MMHG | SYSTOLIC BLOOD PRESSURE: 134 MMHG | WEIGHT: 130.06 LBS | TEMPERATURE: 98 F

## 2017-10-26 DIAGNOSIS — G45.3 AMAUROSIS FUGAX: Primary | ICD-10-CM

## 2017-10-26 PROCEDURE — 99499 UNLISTED E&M SERVICE: CPT | Mod: S$GLB,,, | Performed by: FAMILY MEDICINE

## 2017-10-26 PROCEDURE — 99214 OFFICE O/P EST MOD 30 MIN: CPT | Mod: S$GLB,,, | Performed by: FAMILY MEDICINE

## 2017-10-26 PROCEDURE — 99999 PR PBB SHADOW E&M-EST. PATIENT-LVL III: CPT | Mod: PBBFAC,,, | Performed by: FAMILY MEDICINE

## 2017-10-26 NOTE — TELEPHONE ENCOUNTER
Spoke with patient's grandson, Prabhjot. Offered that we could schedule any day that Dr. Lawler is in office for 4pm; requested for 11/20/17, scheduled.

## 2017-10-26 NOTE — TELEPHONE ENCOUNTER
----- Message from Annie Rooney sent at 10/25/2017  3:57 PM CDT -----  Contact: meri Connell call   [phone]

## 2017-10-26 NOTE — TELEPHONE ENCOUNTER
Spoke with patient's grandson, Prabhjot. Stated that she is a prior stroke victim; has had two episodes of temporary vision loss. After last hospitalization, they sent referral for Neurology. Stated they aren't sure why she has had the loss of vision temporarily; stated they thought it might be TIAs. Scheduled for 11/30/17; was wondering if it was possible to get her worked in any sooner one day to follow up; concerned since this is the second time this has happened with no one knowing what was going on with patient.

## 2017-10-26 NOTE — PROGRESS NOTES
"Subjective:       Patient ID: Brie Han is a 89 y.o. female.    Chief Complaint: Hypertension and Blurred Vision    Brie Han is a 89 y.o. female with a history of TIA (with visual manifestations), HTN, HLD, glaucoma, and arthritis.  She was admitted to the service of hospital medicine with transient vision loss concerning for TIA.  She presented to the ED concerned for an episode loss of vision that occurred earlier today when she was reading. Patient reports that she had a similar episode "a few weeks ago" where she temporarily lost her vision. Today, she lost her vision for "about a minute" and then it came back. When she lost her vision, "everything was blurry" in both eyes and there was no pain in her eyes. Her vision is back to baseline now. Also, patient reports that she intermittently cannot see the top half of her visual field out of her right eye (for several weeks now) noting no impairment with LEFT visual field during this time.  Occasionally, she experiences chest tightness when she is anxious. Today, she experienced mild chest tightness with associated SOB when in the waiting room of the ED after she noticed that her blood pressure was high during triage which has since resolved without return.  Denies any SOB, but reports some PND when laying on her LEFT side and has been evaluated for complaint by cardiology. Patient denies having a headache, focal weakness, numbness, or tingling, or speech or gait abnormalities.She reports a chronic LEFT hand tremor unchanged from baseline. She reports prior TIA in 2006 with similar visual manifestations without any visual deficits since that time until now.  She states she is due for an eye exam. She is compliant with Plavix and is allergic to Aspirin. She lives alone; her grandson drove in from [place] and is at bedside to assist in history provision. Patient placed in hospital for further evaluation and treatment.          Hypertension   This is a " recurrent problem. The problem has been waxing and waning since onset. The problem is controlled. Associated symptoms include anxiety. Pertinent negatives include no chest pain, headaches, neck pain or palpitations.     Review of Systems   Constitutional: Negative for activity change and unexpected weight change.   HENT: Negative for hearing loss, rhinorrhea and trouble swallowing.    Eyes: Positive for visual disturbance. Negative for discharge.   Respiratory: Negative for chest tightness and wheezing.    Cardiovascular: Negative for chest pain and palpitations.   Gastrointestinal: Negative for blood in stool, constipation, diarrhea and vomiting.   Endocrine: Negative for polydipsia and polyuria.   Genitourinary: Negative for difficulty urinating, dysuria, hematuria and menstrual problem.   Musculoskeletal: Negative for arthralgias, joint swelling and neck pain.   Neurological: Positive for weakness. Negative for headaches.   Psychiatric/Behavioral: Negative for confusion and dysphoric mood.       Patient Active Problem List   Diagnosis    HTN (hypertension)    PAD (peripheral artery disease)    CAD (coronary artery disease)    Hypercholesteremia    Refusal of blood transfusions as patient is Rastafari    Hyponatremia    Hypokalemia    DDD (degenerative disc disease), lumbosacral    Herpes simplex    Neurogenic pain of right foot    GERD without esophagitis    Hx-TIA (transient ischemic attack)    TIA (transient ischemic attack)       Objective:      Physical Exam    Lab Results   Component Value Date    WBC 3.50 (L) 10/23/2017    HGB 12.7 10/23/2017    HCT 37.4 10/23/2017     10/23/2017    CHOL 179 10/23/2017    TRIG 64 10/23/2017    HDL 55 10/23/2017    ALT 22 10/23/2017    AST 31 10/23/2017     (L) 10/23/2017    K 4.7 10/23/2017     10/23/2017    CREATININE 0.7 10/23/2017    BUN 13 10/23/2017    CO2 22 (L) 10/23/2017    TSH 1.532 10/22/2017    INR 0.9 12/16/2013    HGBA1C  5.4 10/23/2017     The ASCVD Risk score (Diane SAMUEL Brown, et al., 2013) failed to calculate for the following reasons:    The 2013 ASCVD risk score is only valid for ages 40 to 79    Assessment:       1. Amaurosis fugax        Plan:       Amaurosis fugax  -     Holter monitor - 24 hour; Future  -     Ambulatory referral to Optometry      Patient readiness: acceptance and barriers:none    During the course of the visit the patient was educated and counseled about the following:     Hypertension:   Dietary sodium restriction.  Regular aerobic exercise.  Check blood pressures daily and record.    Goals: Hypertension: Reduce Blood Pressure    Did patient meet goals/outcomes: Yes    The following self management tools provided: blood pressure log  excercise log    Patient Instructions (the written plan) was given to the patient/family.     Time spent with patient: 30 minutes

## 2017-10-26 NOTE — TELEPHONE ENCOUNTER
----- Message from Kareen Schafer sent at 10/26/2017  9:51 AM CDT -----  Contact: Caterina Han, patient [phone], returning the nurse's call. Must call before 1 PM today, he is at work. Please advise. Thanks.

## 2017-10-26 NOTE — TELEPHONE ENCOUNTER
----- Message from Cameron Meeks sent at 10/26/2017 10:46 AM CDT -----  Contact: Prabhjot Rick patient's grandson called stated that he was speaking with Ginger and called was disconnected.requesting a call back at [phone]. Thanks,

## 2017-10-26 NOTE — PROGRESS NOTES
Pre-Visit Chart Review  For Appointment Scheduled on 10/27/2017    Health Maintenance Due   Topic Date Due    Zoster Vaccine  09/28[birth year]1987

## 2017-10-27 ENCOUNTER — TELEPHONE (OUTPATIENT)
Dept: FAMILY MEDICINE | Facility: CLINIC | Age: 82
End: 2017-10-27

## 2017-10-27 NOTE — TELEPHONE ENCOUNTER
Spoke to Nany with [facility] who states patient usually takes Losartan in the morning and Nifedipine at night, but was recently instructed while in the hospital to take both of these medications in the morning. Patient requesting to know if she should take both of these medications at the same time. Please advise.

## 2017-10-27 NOTE — TELEPHONE ENCOUNTER
----- Message from Tanesha Brody RT sent at 10/27/2017  4:16 PM CDT -----  Contact: OMAYRA Ayon, [phone], [facility]  OMAYRA Ayon, [phone], [facility], requesting to inform pt had questions about her medication on how long will she need to take, thanks.

## 2017-10-30 NOTE — TELEPHONE ENCOUNTER
Nany with [facility] notified. Verbalized understanding. States she will notify patient.

## 2017-11-07 ENCOUNTER — PATIENT MESSAGE (OUTPATIENT)
Dept: NEUROLOGY | Facility: CLINIC | Age: 82
End: 2017-11-07

## 2017-11-10 ENCOUNTER — OFFICE VISIT (OUTPATIENT)
Dept: OPTOMETRY | Facility: CLINIC | Age: 82
End: 2017-11-10
Payer: MEDICARE

## 2017-11-10 DIAGNOSIS — Z98.42 S/P CATARACT SURGERY, LEFT: ICD-10-CM

## 2017-11-10 DIAGNOSIS — Z98.41 S/P CATARACT SURGERY, RIGHT: ICD-10-CM

## 2017-11-10 DIAGNOSIS — Z96.1 PSEUDOPHAKIA OF BOTH EYES: ICD-10-CM

## 2017-11-10 DIAGNOSIS — Z13.5 SCREENING FOR EYE CONDITION: ICD-10-CM

## 2017-11-10 DIAGNOSIS — H52.7 REFRACTIVE ERRORS: ICD-10-CM

## 2017-11-10 DIAGNOSIS — G45.3 AMAUROSIS FUGAX, BOTH EYES: Primary | ICD-10-CM

## 2017-11-10 PROCEDURE — 92015 DETERMINE REFRACTIVE STATE: CPT | Mod: S$GLB,,, | Performed by: OPTOMETRIST

## 2017-11-10 PROCEDURE — 92014 COMPRE OPH EXAM EST PT 1/>: CPT | Mod: S$GLB,,, | Performed by: OPTOMETRIST

## 2017-11-10 PROCEDURE — 99999 PR PBB SHADOW E&M-EST. PATIENT-LVL II: CPT | Mod: PBBFAC,,, | Performed by: OPTOMETRIST

## 2017-11-10 NOTE — LETTER
November 13, 2017      Colten Gould MD  [address] [facility] [place] [address]           [facility]  [address]  [facility] [address]  Phone: [phone]  Fax: [phone]          Patient: Brie Han   MR Number: [id number]   YOB: 1927   Date of Visit: 11/10/2017       Dear Dr. Colten Gould:    Thank you for referring Brie Han to me for evaluation. Attached you will find relevant portions of my assessment and plan of care.    If you have questions, please do not hesitate to call me. I look forward to following Brie Han along with you.    Sincerely,    Jonatan dEuardo  CC:  No Recipients    If you would like to receive this communication electronically, please contact [email or web address] or [phone] to request more information on [employer] Link access.    For providers and/or their staff who would like to refer a patient to [facility], please contact us through our one-stop-shop provider referral line, [facility] , at [phone].    If you feel you have received this communication in error or would no longer like to receive these types of communications, please e-mail [email or web address]

## 2017-11-11 ENCOUNTER — TELEPHONE (OUTPATIENT)
Dept: OPHTHALMOLOGY | Facility: CLINIC | Age: 82
End: 2017-11-11

## 2017-11-11 NOTE — TELEPHONE ENCOUNTER
Called pt's devan @ [phone] got voice recorder stating voice mailbox not set up yet please call back later.

## 2017-11-11 NOTE — TELEPHONE ENCOUNTER
Called devan's number again got voice recorder has a mailbox that has not been set up yet.  Please try back later.

## 2017-11-12 NOTE — PROGRESS NOTES
"HPI     Concerns About Ocular Health    Additional comments: Eye exam- reports recent diagnosis of "amaurosis   fugax" per PCP (Dr. Gould).  Notes episode of transient loss of vision in the upper half of visual   field in both eyes (simultaneously)  - lasted only few seconds.  Was seen   at [facility] in [place].             Comments   Patient states she's been seeing on 1/2 of things in her OD when closing   her OS. Patient states she was told she loses oxygen and then she lose her   vision for a couple of seconds.( duration 2 weeks ).    Patient's age: 89 y.o.   Occupation: housewife  Approximate date of last eye examination:  11/30/2016  Name of last eye doctor seen: Dr. Smart   City/State: [facility]   Wears glasses? no     If yes, wears  Full-time or part-time?    Present glasses are: Bifocal, SV Distance, SV Reading?  Bifocals?  Approximate age of present glasses:    Got new glasses following last exam, or subsequently?:    Any problem with VA with glasses?    Wears CLs?:  no           If yes:              Type of CL worn:                Wears full-time or part-time:                 Sleeps with contact lenses:                 CL Solution used:                 How often replace CLs:                Any problem with VA with CLs?                Has patient read and signed the contact lens fee information   document?   Headaches?  no  Eye pain/discomfort?  Yes/in and around ou                                                                                     Flashes?  no  Floaters?  No  Diplopia/Double vision?  no  Patient's Ocular History:         Any eye surgeries? S/p cat sx-OU         Any eye injury?  no         Any treatment for eye disease? Was treated for glaucoma in 2003  Family history of eye disease?  none  Significant patient medical history:         1. Diabetes?  no       If yes, IDDM or NIDDM? n/a   2. HBP?  yes              3. Other (describe):  High cholesterol, mild stroke, sinus   pblms, " "abdominal anuerysm   ! OTC eyedrops currently using:  no   ! Prescription eye meds currently using:  no   ! Any history of allergy/adverse reaction to any eye meds used   previously?  no    ! Any history of allergy/adverse reaction to eyedrops used during prior   eye exam(s)? no    ! Any history of allergy/adverse reaction to Novacaine or similar meds?   no   ! Any history of allergy/adverse reaction to Epinephrine or similar meds?   no    ! Patient okay with use of anesthetic eyedrops to check eye pressure?    yes        ! Patient okay with use of eyedrops to dilate pupils today?  yes   !  Allergies/Medications/Medical History/Family History reviewed today?    yes      PD =   66/62  Desired reading distance =  13"                                                                      Last edited by Sam Smart, OD on 11/10/2017  2:38 PM. (History)            Assessment /Plan     For exam results, see Encounter Report.    1. Amaurosis fugax, both eyes  Sedimentation rate, manual    C-reactive protein   2. S/P cataract surgery, left     3. S/P cataract surgery, right     4. Pseudophakia of both eyes     5. Refractive errors     6. Screening for eye condition                  Recent history of bilateral transient vision disturbance.   Saw PCP, who diagnosed probable amaurosis fugax, and suggested complete eye examination.  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  Visual field appears full otherwise.    S/P cataract surgery in both eyes, with bilateral posterior chamber intraocular lens.  Residual refractive error in each eye, with better best-corrected VA in the right eye than in the left eye.  New spectacle lens Rx issued for use as desired    Last carortid ultrasound done in 03/2016 (per Dr. Gould, PCP)  Order ESR and C-reactive protein studies to be " done today to rule out inflammatory etiology of symptoms.   Devan (Saleem) notes that an appointment in neurology has already been scheduled.    Will call regarding results of blood work ordered.    Reviewed results of blood work ordered:     ESR = 27 mm/hr, which is above normal range but not suggestive of                     Temporal Ateritis/Giant Cell Arteritis     C-Reactive Protein = 0.5 mg/L - normal    Addendum to notes:   Spoke with devan on 11/11/2017 and notified him of test results.  Advised him to have his grandmother keep the appointment with neurology as scheduled, and advised him to discuss with the neurologist whether a repeat of carotid artery ultrasound might be justified.     Repeat general eye examination and refraction in one year.

## 2017-11-12 NOTE — PATIENT INSTRUCTIONS
Recent history of bilateral transient vision disturbance.   Saw PCP, who diagnosed probable amaurosis fugax, and suggested complete eye examination.  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  Visual field appears full otherwise.    S/P cataract surgery in both eyes, with bilateral posterior chamber intraocular lens.  Residual refractive error in each eye, with better best-corrected VA in the right eye than in the left eye.  New spectacle lens Rx issued for use as desired    Last carortid ultrasound done in 03/2016 (per Dr. Gould, PCP)  Order ESR and C-reactive protein studies to be done today to rule out inflammatory etiology of symptoms.   Devan (Saleem) notes that an appointment in neurology has already been scheduled.    Will call regarding results of blood work ordered.    Reviewed results of blood work ordered:     ESR = 27 mm/hr, which is above normal range but not suggestive of                     Temporal Ateritis/Giant Cell Arteritis     C-Reactive Protein = 0.5 mg/L - normal    Addendum to notes:   Spoke with devan on 11/11/2017 and notified him of test results.  Advised him to have his grandmother keep the appointment with neurology as scheduled, and advised him to discuss with the neurologist whether a repeat of carotid artery ultrasound might be justified.     Repeat general eye examination and refraction in one year.

## 2017-11-13 ENCOUNTER — PATIENT MESSAGE (OUTPATIENT)
Dept: FAMILY MEDICINE | Facility: CLINIC | Age: 82
End: 2017-11-13

## 2017-11-15 ENCOUNTER — PATIENT MESSAGE (OUTPATIENT)
Dept: FAMILY MEDICINE | Facility: CLINIC | Age: 82
End: 2017-11-15

## 2017-11-15 DIAGNOSIS — G45.3 RETINAL AMAUROSIS FUGAX: Primary | ICD-10-CM

## 2017-11-20 ENCOUNTER — HOSPITAL ENCOUNTER (OUTPATIENT)
Dept: RADIOLOGY | Facility: HOSPITAL | Age: 82
Discharge: HOME | End: 2017-11-20
Attending: FAMILY MEDICINE
Payer: MEDICARE

## 2017-11-20 DIAGNOSIS — G45.3 RETINAL AMAUROSIS FUGAX: ICD-10-CM

## 2017-11-20 PROCEDURE — 93880 EXTRACRANIAL BILAT STUDY: CPT | Mod: 26,,, | Performed by: RADIOLOGY

## 2017-11-20 PROCEDURE — 93880 EXTRACRANIAL BILAT STUDY: CPT | Mod: TC

## 2017-11-21 ENCOUNTER — TELEPHONE (OUTPATIENT)
Dept: NEUROLOGY | Facility: CLINIC | Age: 82
End: 2017-11-21

## 2017-11-21 NOTE — TELEPHONE ENCOUNTER
Attempted to contact so patient could be rescheduled with Zunilda De Leon for hospital f/u TIA. No voicemail box set up.

## 2017-11-24 ENCOUNTER — PATIENT MESSAGE (OUTPATIENT)
Dept: FAMILY MEDICINE | Facility: CLINIC | Age: 82
End: 2017-11-24

## 2017-11-24 NOTE — TELEPHONE ENCOUNTER
Dr. Gould referred pt to Dr. Lawler neurologist but is currently out on leave. Pt would like Dr. Gould to recommend another neurologist.

## 2017-11-27 ENCOUNTER — TELEPHONE (OUTPATIENT)
Dept: FAMILY MEDICINE | Facility: CLINIC | Age: 82
End: 2017-11-27

## 2017-11-27 NOTE — TELEPHONE ENCOUNTER
Dr. Gould placed a referral to Neurology for this patient. Please contact patient to schedule. Thank you.

## 2017-11-28 ENCOUNTER — TELEPHONE (OUTPATIENT)
Dept: FAMILY MEDICINE | Facility: CLINIC | Age: 82
End: 2017-11-28

## 2017-11-28 RX ORDER — MICONAZOLE NITRATE 2 %
POWDER (GRAM) TOPICAL
Qty: 85 G | Refills: 0 | COMMUNITY
Start: 2017-11-28 | End: 2018-01-11 | Stop reason: SDUPTHER

## 2017-11-28 NOTE — TELEPHONE ENCOUNTER
----- Message from Rosi Graham sent at 11/28/2017 10:25 AM CST -----  [facility] [phone] is calling concerning patient having a yeast infection or rash under both breasts. Nurse is calling for treatment orders. Please call back (which would be better) or fax. She did not have the fax on her.

## 2018-01-11 ENCOUNTER — OFFICE VISIT (OUTPATIENT)
Dept: FAMILY MEDICINE | Facility: CLINIC | Age: 83
End: 2018-01-11
Payer: MEDICARE

## 2018-01-11 ENCOUNTER — DOCUMENTATION ONLY (OUTPATIENT)
Dept: FAMILY MEDICINE | Facility: CLINIC | Age: 83
End: 2018-01-11

## 2018-01-11 VITALS
WEIGHT: 125.69 LBS | HEIGHT: 59 IN | TEMPERATURE: 98 F | SYSTOLIC BLOOD PRESSURE: 128 MMHG | DIASTOLIC BLOOD PRESSURE: 60 MMHG | HEART RATE: 74 BPM | BODY MASS INDEX: 25.34 KG/M2

## 2018-01-11 DIAGNOSIS — M51.36 DDD (DEGENERATIVE DISC DISEASE), LUMBAR: ICD-10-CM

## 2018-01-11 DIAGNOSIS — I10 ESSENTIAL HYPERTENSION: Primary | ICD-10-CM

## 2018-01-11 DIAGNOSIS — E78.1 PURE HYPERGLYCERIDEMIA: ICD-10-CM

## 2018-01-11 PROCEDURE — 99499 UNLISTED E&M SERVICE: CPT | Mod: S$GLB,,, | Performed by: FAMILY MEDICINE

## 2018-01-11 PROCEDURE — 99999 PR PBB SHADOW E&M-EST. PATIENT-LVL IV: CPT | Mod: PBBFAC,,, | Performed by: FAMILY MEDICINE

## 2018-01-11 PROCEDURE — 99214 OFFICE O/P EST MOD 30 MIN: CPT | Mod: S$GLB,,, | Performed by: FAMILY MEDICINE

## 2018-01-11 RX ORDER — MICONAZOLE NITRATE 2 %
POWDER (GRAM) TOPICAL
Qty: 85 G | Refills: 0 | COMMUNITY
Start: 2018-01-11 | End: 2019-04-30 | Stop reason: ALTCHOICE

## 2018-01-11 RX ORDER — TIZANIDINE 4 MG/1
4 TABLET ORAL NIGHTLY PRN
Qty: 15 TABLET | Refills: 1 | Status: SHIPPED | OUTPATIENT
Start: 2018-01-11 | End: 2018-01-21

## 2018-01-11 RX ORDER — FOLIC ACID 0.8 MG
800 TABLET ORAL DAILY
Qty: 90 TABLET | Refills: 4 | COMMUNITY
Start: 2018-01-11 | End: 2019-04-30

## 2018-01-11 NOTE — PATIENT INSTRUCTIONS
Established High Blood Pressure    High blood pressure (hypertension) is a chronic disease. Often, healthcare providers dont know what causes it. But it can be caused by certain health conditions and medicines.  If you have high blood pressure, you may not have any symptoms. If you do have symptoms, they may include headache, dizziness, changes in your vision, chest pain, and shortness of breath. But even without symptoms, high blood pressure thats not treated raises your risk for heart attack and stroke. High blood pressure is a serious health risk and shouldnt be ignored.  A blood pressure reading is made up of two numbers: a higher number over a lower number. The top number is the systolic pressure. The bottom number is the diastolic pressure. A normal blood pressure is a systolic pressure of  less than 120 over a diastolic pressure of less than 80. You will see your blood pressure readings written together. For example, a person with a systolic pressure of 188 and a diastolic pressure of 78 will have 118/78 written in the medical record.  High blood pressure is when either the top number is 140 or higher, or the bottom number is 90 or higher. This must be the result when taking your blood pressure a number of times. The blood pressures between normal and high are called prehypertension.  Home care  If you have high blood pressure, you should do what is listed below to lower your blood pressure. If you are taking medicines for high blood pressure, these methods may reduce or end your need for medicines in the future.  · Begin a weight-loss program if you are overweight.  · Cut back on how much salt you get in your diet. Heres how to do this:  ¨ Dont eat foods that have a lot of salt. These include olives, pickles, smoked meats, and salted potato chips.  ¨ Dont add salt to your food at the table.  ¨ Use only small amounts of salt when cooking.  · Start an exercise program. Talk with your healthcare  provider about the type of exercise program that would be best for you. It doesn't have to be hard. Even brisk walking for 20 minutes 3 times a week is a good form of exercise.  · Dont take medicines that stimulate the heart. This includes many over-the-counter cold and sinus decongestant pills and sprays, as well as diet pills. Check the warnings about hypertension on the label. Before buying any over-the-counter medicines or supplements, always ask the pharmacist about the product's potential interaction with your high blood pressure and your high blood pressure medicines.  · Stimulants such as amphetamine or cocaine could be deadly for someone with high blood pressure. Never take these.  · Limit how much caffeine you get in your diet. Switch to caffeine-free products.  · Stop smoking. If you are a long-time smoker, this can be hard. Talk to your healthcare provider about medicines and nicotine replacement options to help you. Also, enroll in a stop-smoking program to make it more likely that you will quit for good.  · Learn how to handle stress. This is an important part of any program to lower blood pressure. Learn about relaxation methods like meditation, yoga, or biofeedback.  · If your provider prescribed medicines, take them exactly as directed. Missing doses may cause your blood pressure get out of control.  · If you miss a dose or doses, check with your healthcare provider or pharmacist about what to do.  · Consider buying an automatic blood pressure machine. Ask your provider for a recommendation. You can get one of these at most pharmacies.     The American Heart Association recommends the following guidelines for home blood pressure monitoring:  · Don't smoke or drink coffee for 30 minutes before taking your blood pressure.  · Go to the bathroom before the test.  · Relax for 5 minutes before taking the measurement.  · Sit with your back supported (don't sit on a couch or soft chair); keep your feet on  the floor uncrossed. Place your arm on a solid flat surface (like a table) with the upper part of the arm at heart level. Place the middle of the cuff directly above the eye of the elbow. Check the monitor's instruction manual for an illustration.  · Take multiple readings. When you measure, take 2 to 3 readings one minute apart and record all of the results.  · Take your blood pressure at the same time every day, or as your healthcare provider recommends.  · Record the date, time, and blood pressure reading.  · Take the record with you to your next medical appointment. If your blood pressure monitor has a built-in memory, simply take the monitor with you to your next appointment.  · Call your provider if you have several high readings. Don't be frightened by a single high blood pressure reading, but if you get several high readings, check in with your healthcare provider.  · Note: When blood pressure reaches a systolic (top number) of 180 or higher OR diastolic (bottom number) of 110 or higher, seek emergency medical treatment.  Follow-up care  You will need to see your healthcare provider regularly. This is to check your blood pressure and to make changes to your medicines. Make a follow-up appointment as directed. Bring the record of your home blood pressure readings to the appointment.  When to seek medical advice  Call your healthcare provider right away if any of these occur:  · Blood pressure reaches a systolic (upper number) of 180 or higher OR a diastolic (bottom number) of 110 or higher  · Chest pain or shortness of breath  · Severe headache  · Throbbing or rushing sound in the ears  · Nosebleed  · Sudden severe pain in your belly (abdomen)  · Extreme drowsiness, confusion, or fainting  · Dizziness or spinning sensation (vertigo)  · Weakness of an arm or leg or one side of the face  · You have problems speaking or seeing   Date Last Reviewed: 12/1/2016  © 3567-3502 [employer]. [address] [place] [address]  [place] [address]. All rights reserved. This information is not intended as a substitute for professional medical care. Always follow your healthcare professional's instructions.

## 2018-01-11 NOTE — PROGRESS NOTES
Pre-Visit Chart Review  For Appointment Scheduled on 01/11/2017    Health Maintenance Due   Topic Date Due    Zoster Vaccine  09/28/1987

## 2018-01-12 NOTE — PROGRESS NOTES
Subjective:       Patient ID: Brie Han is a 89 y.o. female.    Chief Complaint: Hypertension and Chills    Hypertension   This is a chronic problem. The current episode started more than 1 year ago. The problem has been waxing and waning since onset. The problem is controlled. Associated symptoms include anxiety and malaise/fatigue. Pertinent negatives include no chest pain, headaches, neck pain, palpitations, peripheral edema, PND or shortness of breath. There are no associated agents to hypertension. Risk factors for coronary artery disease include sedentary lifestyle, stress, post-menopausal state and family history.     Review of Systems   Constitutional: Positive for malaise/fatigue. Negative for activity change and unexpected weight change.   HENT: Negative for hearing loss, rhinorrhea and trouble swallowing.    Eyes: Negative for discharge and visual disturbance.   Respiratory: Negative for chest tightness, shortness of breath and wheezing.    Cardiovascular: Negative for chest pain, palpitations and PND.   Gastrointestinal: Negative for blood in stool, constipation, diarrhea and vomiting.   Endocrine: Negative for polydipsia and polyuria.   Genitourinary: Negative for difficulty urinating, dysuria, hematuria and menstrual problem.   Musculoskeletal: Positive for arthralgias. Negative for joint swelling and neck pain.   Neurological: Negative for weakness and headaches.   Psychiatric/Behavioral: Negative for confusion and dysphoric mood.       Patient Active Problem List   Diagnosis    HTN (hypertension)    PAD (peripheral artery disease)    CAD (coronary artery disease)    Hypercholesteremia    Refusal of blood transfusions as patient is Faith    Hyponatremia    Hypokalemia    DDD (degenerative disc disease), lumbosacral    Herpes simplex    Neurogenic pain of right foot    GERD without esophagitis    Hx-TIA (transient ischemic attack)    TIA (transient ischemic attack)        Objective:      Physical Exam   Constitutional: She is oriented to person, place, and time. She appears well-developed and well-nourished.   Cardiovascular: Normal rate, regular rhythm and normal heart sounds.    Pulmonary/Chest: Effort normal and breath sounds normal.   Musculoskeletal: She exhibits no edema.   Neurological: She is alert and oriented to person, place, and time.   Skin: Skin is warm and dry.   Psychiatric: She has a normal mood and affect.   Nursing note and vitals reviewed.      Lab Results   Component Value Date    WBC 3.50 (L) 10/23/2017    HGB 12.7 10/23/2017    HCT 37.4 10/23/2017     10/23/2017    CHOL 179 10/23/2017    TRIG 64 10/23/2017    HDL 55 10/23/2017    ALT 22 10/23/2017    AST 31 10/23/2017     (L) 10/23/2017    K 4.7 10/23/2017     10/23/2017    CREATININE 0.7 10/23/2017    BUN 13 10/23/2017    CO2 22 (L) 10/23/2017    TSH 1.532 10/22/2017    INR 0.9 12/16/2013    HGBA1C 5.4 10/23/2017     The ASCVD Risk score (Diane SAMUEL Jr., et al., 2013) failed to calculate for the following reasons:    The 2013 ASCVD risk score is only valid for ages 40 to 79    Assessment:       1. Essential hypertension    2. DDD (degenerative disc disease), lumbar    3. Pure hyperglyceridemia        Plan:       Essential hypertension  -     Comprehensive metabolic panel; Future; Expected date: 01/11/2018  -     Lipid panel; Future; Expected date: 01/11/2018  -     URINALYSIS; Future; Expected date: 01/11/2018  -     CBC auto differential; Future; Expected date: 01/11/2018    DDD (degenerative disc disease), lumbar    Pure hyperglyceridemia  -     Lipid panel; Future; Expected date: 01/11/2018    Other orders  -     tiZANidine (ZANAFLEX) 4 MG tablet; Take 1 tablet (4 mg total) by mouth nightly as needed.  Dispense: 15 tablet; Refill: 1  -     folic acid (FOLVITE) 800 MCG Tab; Take 1 tablet (800 mcg total) by mouth once daily.  Dispense: 90 tablet; Refill: 4  -     miconazole NITRATE 2 %  (LOTRIMIN AF) 2 % top powder; Apply topically as needed for Itching.  Dispense: 85 g; Refill: 0      Patient readiness: acceptance and barriers:none    During the course of the visit the patient was educated and counseled about the following:     Hypertension:   Dietary sodium restriction.  Regular aerobic exercise.  Check blood pressures daily and record.    Goals: Hypertension: Reduce Blood Pressure    Did patient meet goals/outcomes: Yes    The following self management tools provided: blood pressure log  excercise log    Patient Instructions (the written plan) was given to the patient/family.     Time spent with patient: 30 minutes    Barriers to medications present (no )    Adverse reactions to current medications (no)    Over the counter medications reviewed (Yes)

## 2018-01-24 ENCOUNTER — PES CALL (OUTPATIENT)
Dept: ADMINISTRATIVE | Facility: CLINIC | Age: 83
End: 2018-01-24

## 2018-01-26 DIAGNOSIS — I25.84 CORONARY ARTERY DISEASE DUE TO CALCIFIED CORONARY LESION: ICD-10-CM

## 2018-01-26 DIAGNOSIS — I25.10 CORONARY ARTERY DISEASE DUE TO CALCIFIED CORONARY LESION: ICD-10-CM

## 2018-01-26 RX ORDER — CLOPIDOGREL BISULFATE 75 MG/1
TABLET ORAL
Qty: 90 TABLET | Refills: 4 | Status: SHIPPED | OUTPATIENT
Start: 2018-01-26 | End: 2019-01-29 | Stop reason: SDUPTHER

## 2018-03-27 ENCOUNTER — PATIENT MESSAGE (OUTPATIENT)
Dept: DERMATOLOGY | Facility: CLINIC | Age: 83
End: 2018-03-27

## 2018-05-06 ENCOUNTER — HOSPITAL ENCOUNTER (OUTPATIENT)
Facility: HOSPITAL | Age: 83
Discharge: HOME | End: 2018-05-06
Attending: EMERGENCY MEDICINE | Admitting: INTERNAL MEDICINE
Payer: MEDICARE

## 2018-05-06 VITALS
TEMPERATURE: 98 F | BODY MASS INDEX: 24.19 KG/M2 | WEIGHT: 120 LBS | RESPIRATION RATE: 18 BRPM | HEIGHT: 59 IN | OXYGEN SATURATION: 96 % | SYSTOLIC BLOOD PRESSURE: 149 MMHG | DIASTOLIC BLOOD PRESSURE: 65 MMHG | HEART RATE: 66 BPM

## 2018-05-06 DIAGNOSIS — R07.9 CHEST PAIN: Primary | ICD-10-CM

## 2018-05-06 DIAGNOSIS — R07.9 CHEST PAIN, UNSPECIFIED TYPE: ICD-10-CM

## 2018-05-06 LAB
ALBUMIN SERPL BCP-MCNC: 3.5 G/DL
ALP SERPL-CCNC: 92 U/L
ALT SERPL W/O P-5'-P-CCNC: 17 U/L
ANION GAP SERPL CALC-SCNC: 10 MMOL/L
AST SERPL-CCNC: 25 U/L
BASOPHILS # BLD AUTO: 0 K/UL
BASOPHILS NFR BLD: 0.2 %
BILIRUB SERPL-MCNC: 0.3 MG/DL
BNP SERPL-MCNC: 98 PG/ML
BUN SERPL-MCNC: 15 MG/DL
CALCIUM SERPL-MCNC: 9.9 MG/DL
CHLORIDE SERPL-SCNC: 102 MMOL/L
CHOLEST SERPL-MCNC: 170 MG/DL
CHOLEST/HDLC SERPL: 3 {RATIO}
CO2 SERPL-SCNC: 24 MMOL/L
CREAT SERPL-MCNC: 0.8 MG/DL
DIFFERENTIAL METHOD: ABNORMAL
EOSINOPHIL # BLD AUTO: 0.1 K/UL
EOSINOPHIL NFR BLD: 2.1 %
ERYTHROCYTE [DISTWIDTH] IN BLOOD BY AUTOMATED COUNT: 13.9 %
EST. GFR  (AFRICAN AMERICAN): >60 ML/MIN/1.73 M^2
EST. GFR  (NON AFRICAN AMERICAN): >60 ML/MIN/1.73 M^2
GLUCOSE SERPL-MCNC: 101 MG/DL
HCT VFR BLD AUTO: 37.1 %
HDLC SERPL-MCNC: 56 MG/DL
HDLC SERPL: 32.9 %
HGB BLD-MCNC: 12.2 G/DL
INR PPP: 1
LDLC SERPL CALC-MCNC: 98.6 MG/DL
LYMPHOCYTES # BLD AUTO: 1 K/UL
LYMPHOCYTES NFR BLD: 15.3 %
MCH RBC QN AUTO: 31.9 PG
MCHC RBC AUTO-ENTMCNC: 32.9 G/DL
MCV RBC AUTO: 97 FL
MONOCYTES # BLD AUTO: 0.5 K/UL
MONOCYTES NFR BLD: 7.6 %
NEUTROPHILS # BLD AUTO: 4.8 K/UL
NEUTROPHILS NFR BLD: 74.8 %
NONHDLC SERPL-MCNC: 114 MG/DL
PLATELET # BLD AUTO: 253 K/UL
PMV BLD AUTO: 7.7 FL
POTASSIUM SERPL-SCNC: 4.3 MMOL/L
PROT SERPL-MCNC: 7.3 G/DL
PROTHROMBIN TIME: 10.3 SEC
RBC # BLD AUTO: 3.83 M/UL
SODIUM SERPL-SCNC: 136 MMOL/L
TRIGL SERPL-MCNC: 77 MG/DL
TROPONIN I SERPL DL<=0.01 NG/ML-MCNC: 0.01 NG/ML
TROPONIN I SERPL DL<=0.01 NG/ML-MCNC: 0.02 NG/ML
TROPONIN I SERPL DL<=0.01 NG/ML-MCNC: 0.02 NG/ML
WBC # BLD AUTO: 6.4 K/UL

## 2018-05-06 PROCEDURE — G0378 HOSPITAL OBSERVATION PER HR: HCPCS

## 2018-05-06 PROCEDURE — 85025 COMPLETE CBC W/AUTO DIFF WBC: CPT

## 2018-05-06 PROCEDURE — 84484 ASSAY OF TROPONIN QUANT: CPT

## 2018-05-06 PROCEDURE — 25000003 PHARM REV CODE 250: Performed by: NURSE PRACTITIONER

## 2018-05-06 PROCEDURE — 80061 LIPID PANEL: CPT

## 2018-05-06 PROCEDURE — 99284 EMERGENCY DEPT VISIT MOD MDM: CPT | Mod: 25

## 2018-05-06 PROCEDURE — 85610 PROTHROMBIN TIME: CPT

## 2018-05-06 PROCEDURE — 36415 COLL VENOUS BLD VENIPUNCTURE: CPT

## 2018-05-06 PROCEDURE — 84484 ASSAY OF TROPONIN QUANT: CPT | Mod: 91

## 2018-05-06 PROCEDURE — 94761 N-INVAS EAR/PLS OXIMETRY MLT: CPT

## 2018-05-06 PROCEDURE — 83880 ASSAY OF NATRIURETIC PEPTIDE: CPT

## 2018-05-06 PROCEDURE — 80053 COMPREHEN METABOLIC PANEL: CPT

## 2018-05-06 RX ORDER — PANTOPRAZOLE SODIUM 40 MG/1
40 TABLET, DELAYED RELEASE ORAL DAILY
Qty: 30 TABLET | Refills: 0 | Status: SHIPPED | OUTPATIENT
Start: 2018-05-07 | End: 2018-06-13 | Stop reason: SDUPTHER

## 2018-05-06 RX ORDER — PANTOPRAZOLE SODIUM 40 MG/1
40 TABLET, DELAYED RELEASE ORAL DAILY
Status: DISCONTINUED | OUTPATIENT
Start: 2018-05-06 | End: 2018-05-06 | Stop reason: HOSPADM

## 2018-05-06 RX ORDER — NIFEDIPINE 30 MG/1
30 TABLET, EXTENDED RELEASE ORAL DAILY
Status: DISCONTINUED | OUTPATIENT
Start: 2018-05-06 | End: 2018-05-06 | Stop reason: HOSPADM

## 2018-05-06 RX ORDER — ACETAMINOPHEN 325 MG/1
650 TABLET ORAL EVERY 4 HOURS PRN
Status: DISCONTINUED | OUTPATIENT
Start: 2018-05-06 | End: 2018-05-06 | Stop reason: HOSPADM

## 2018-05-06 RX ORDER — AZELASTINE 1 MG/ML
1 SPRAY, METERED NASAL 2 TIMES DAILY
Status: DISCONTINUED | OUTPATIENT
Start: 2018-05-06 | End: 2018-05-06 | Stop reason: HOSPADM

## 2018-05-06 RX ORDER — SODIUM CHLORIDE 0.9 % (FLUSH) 0.9 %
5 SYRINGE (ML) INJECTION
Status: DISCONTINUED | OUTPATIENT
Start: 2018-05-06 | End: 2018-05-06 | Stop reason: HOSPADM

## 2018-05-06 RX ORDER — FOLIC ACID 0.4 MG
800 TABLET ORAL DAILY
Status: DISCONTINUED | OUTPATIENT
Start: 2018-05-06 | End: 2018-05-06 | Stop reason: HOSPADM

## 2018-05-06 RX ORDER — LOSARTAN POTASSIUM 25 MG/1
100 TABLET ORAL DAILY
Status: DISCONTINUED | OUTPATIENT
Start: 2018-05-06 | End: 2018-05-06 | Stop reason: HOSPADM

## 2018-05-06 RX ORDER — CLOPIDOGREL BISULFATE 75 MG/1
75 TABLET ORAL DAILY
Status: DISCONTINUED | OUTPATIENT
Start: 2018-05-06 | End: 2018-05-06 | Stop reason: HOSPADM

## 2018-05-06 RX ORDER — LOVASTATIN 10 MG/1
10 TABLET ORAL NIGHTLY
Status: DISCONTINUED | OUTPATIENT
Start: 2018-05-06 | End: 2018-05-06 | Stop reason: HOSPADM

## 2018-05-06 RX ORDER — PANTOPRAZOLE SODIUM 40 MG/1
40 TABLET, DELAYED RELEASE ORAL DAILY
Qty: 30 TABLET | Refills: 0 | Status: SHIPPED | OUTPATIENT
Start: 2018-05-07 | End: 2018-05-06

## 2018-05-06 RX ADMIN — FOLIC ACID TAB 400 MCG 800 MCG: 400 TAB at 09:05

## 2018-05-06 RX ADMIN — PANTOPRAZOLE SODIUM 40 MG: 40 TABLET, DELAYED RELEASE ORAL at 10:05

## 2018-05-06 RX ADMIN — CLOPIDOGREL BISULFATE 75 MG: 75 TABLET ORAL at 09:05

## 2018-05-06 RX ADMIN — LOSARTAN POTASSIUM 100 MG: 25 TABLET, FILM COATED ORAL at 09:05

## 2018-05-06 RX ADMIN — AZELASTINE HYDROCHLORIDE 137 MCG: 137 SPRAY, METERED NASAL at 09:05

## 2018-05-06 RX ADMIN — NIFEDIPINE 30 MG: 30 TABLET, FILM COATED, EXTENDED RELEASE ORAL at 09:05

## 2018-05-06 RX ADMIN — THERA TABS 1 TABLET: TAB at 09:05

## 2018-05-06 NOTE — H&P
"[facility] Hospital Medicine  History & Physical    Patient Name: Brie Han  MRN: [id number]  Admission Date: 2018  Attending Physician: Wilmar Vazquez MD   Primary Care Provider: Colten Gould MD         Patient information was obtained from patient, parent and ER records.     Subjective:     Principal Problem:Chest pain    Chief Complaint:   Chief Complaint   Patient presents with    Chest Pain     woke her at midnight; pressure to left chest        HPI: Brie Han is a 89 yo female with PMHx significant for HTN, stroke, and hyperlipidemia.  She was admitted to the service of hospital medicine with chest pain.  She reports acute onset of fleeting LEFT chest pressure while laying in bed that she noticed when turning onto her right side.  Associated symptoms include nausea and clamminess, as well as a "head rush".  She states she had a BM and drank some pedialyte and returned to bed.  She became nauseous and clammy again with another "head rush" so she called her daughter to bring her to ED.  She reported mild SOB as well.  No further CP.  No palpitations, vomiting, syncope, leg swelling or other complaints.  No dizziness or lightheadedness. She states she has had some musculoskeletal issues with her LEFT shoulder in the past and did pull herself over with her left arm when turning in bed. She is asymptomatic at present.  Other pertinent medical history as below:     Past Medical History:   Diagnosis Date    Anticoagulant long-term use     Arthritis     Glaucoma     resolved    Hyperlipidemia     Hypertension     Stroke 2006       Past Surgical History:   Procedure Laterality Date    APPENDECTOMY  1950's    BREAST SURGERY      CATARACT EXTRACTION W/  INTRAOCULAR LENS IMPLANT Bilateral     Dr Rojo      SECTION      3 c/s and d/c    EYE SURGERY      Glaucoma     HYSTERECTOMY      TONSILLECTOMY         Review of patient's allergies indicates:   Allergen " Reactions    Contrast media Other (See Comments)    Aspirin Other (See Comments)    Ciprofloxacin Other (See Comments)    Iron Other (See Comments)    Iodine Rash    Penicillins Rash       No current facility-administered medications on file prior to encounter.      Current Outpatient Prescriptions on File Prior to Encounter   Medication Sig    acetaminophen (TYLENOL) 325 MG tablet Take 2 tablets (650 mg total) by mouth every 8 (eight) hours as needed.    azelastine (ASTELIN) 137 mcg (0.1 %) nasal spray 1 spray (137 mcg total) by Nasal route 2 (two) times daily.    b complex vitamins tablet Take 1 tablet by mouth once daily.    calcium citrate-vitamin D (CITRACAL + D) 315-200 mg-unit per tablet Every day    clopidogrel (PLAVIX) 75 mg tablet TAKE 1 TABLET EVERY DAY    folic acid (FOLVITE) 800 MCG Tab Take 1 tablet (800 mcg total) by mouth once daily.    KRILL OIL ORAL Take by mouth.    losartan (COZAAR) 100 MG tablet TAKE 1 TABLET EVERY DAY    lovastatin (MEVACOR) 10 MG tablet Take 1 tablet (10 mg total) by mouth every evening.    miconazole NITRATE 2 % (LOTRIMIN AF) 2 % top powder Apply topically as needed for Itching.    multivitamin capsule Every day    nifedipine (PROCARDIA-XL) 30 MG (OSM) 24 hr tablet TAKE 1 TABLET ONE TIME DAILY     Family History     Problem Relation (Age of Onset)    Diabetes Father    Early death Mother    Hypertension Father, Sister    No Known Problems Brother, Maternal Aunt, Maternal Uncle, Paternal Aunt, Paternal Uncle, Maternal Grandmother, Maternal Grandfather, Paternal Grandmother    Stroke Father    Thyroid disease Paternal Grandfather        Social History Main Topics    Smoking status: Never Smoker    Smokeless tobacco: Never Used    Alcohol use No    Drug use: No    Sexual activity: No     Review of Systems   Constitutional: Negative for activity change, appetite change, chills, fatigue and fever.   HENT: Negative for congestion, postnasal drip, sore throat  and trouble swallowing.    Eyes: Negative for photophobia and visual disturbance.   Respiratory: Positive for shortness of breath. Negative for cough and wheezing.    Cardiovascular: Positive for chest pain. Negative for palpitations and leg swelling.   Gastrointestinal: Positive for nausea. Negative for abdominal distention, anal bleeding, constipation, diarrhea and vomiting.   Genitourinary: Negative for dysuria, flank pain and urgency.   Musculoskeletal: Negative for arthralgias and myalgias.   Skin: Negative for color change.   Neurological: Negative for dizziness, weakness, light-headedness and headaches.   Psychiatric/Behavioral: Negative for confusion. The patient is not nervous/anxious.      Objective:     Vital Signs (Most Recent):  Temp: 97.9 °F (36.6 °C) (05/06/18 0248)  Pulse: 72 (05/06/18 0342)  Resp: 18 (05/06/18 0248)  BP: (!) 154/65 (05/06/18 0342)  SpO2: 96 % (05/06/18 0342) Vital Signs (24h Range):  Temp:  [97.9 °F (36.6 °C)] 97.9 °F (36.6 °C)  Pulse:  [72-84] 72  Resp:  [18] 18  SpO2:  [96 %-100 %] 96 %  BP: (154-183)/(65-77) 154/65     Weight: 54.4 kg (120 lb)  Body mass index is 24.24 kg/m².    Physical Exam   Constitutional: She is oriented to person, place, and time. She appears well-developed and well-nourished. No distress.   HENT:   Head: Normocephalic and atraumatic.   Eyes: Conjunctivae and EOM are normal. Pupils are equal, round, and reactive to light.   Neck: Normal range of motion. Neck supple.   Cardiovascular: Normal rate, regular rhythm, normal heart sounds and intact distal pulses.    No murmur heard.  Pulmonary/Chest: Effort normal and breath sounds normal. No respiratory distress. She exhibits no tenderness.   Abdominal: Soft. Bowel sounds are normal. She exhibits no distension. There is no tenderness.   Musculoskeletal: Normal range of motion. She exhibits no edema or tenderness.   Full ROM of LEFT shoulder without pain   Neurological: She is alert and oriented to person, place,  and time. No cranial nerve deficit.   Skin: Skin is warm and dry. Capillary refill takes less than 2 seconds. No erythema.   Psychiatric: She has a normal mood and affect. Her behavior is normal. Judgment and thought content normal.         CRANIAL NERVES     CN III, IV, VI   Pupils are equal, round, and reactive to light.  Extraocular motions are normal.        Significant Labs:   CBC:   Recent Labs  Lab 05/06/18  0300   WBC 6.40   HGB 12.2   HCT 37.1        CMP:   Recent Labs  Lab 05/06/18  0409      K 4.3      CO2 24      BUN 15   CREATININE 0.8   CALCIUM 9.9   PROT 7.3   ALBUMIN 3.5   BILITOT 0.3   ALKPHOS 92   AST 25   ALT 17   ANIONGAP 10   EGFRNONAA >60     Troponin:   Recent Labs  Lab 05/06/18  0409   TROPONINI 0.006       Significant Imaging:   CXR: no infiltrates or effusion, no cardiomegaly (my review).    EKG: I reviewed. NSR 80 with PACs, LBBB (not new compared with 10/2017).  No ischemic ST or T wave findings.    Assessment/Plan:     * Chest pain    Acute; atypical-- possibly musculoskeletal.  Keep NPO, trend troponin and monitor on telemetry.  Patient refuses stress testing, will check ECHO.  If troponin trend normal can likely d/c with close outpatient f/u.  Check lipid panel in AM.      HEART score: 4          HTN (hypertension)    Chronic, BP slightly elevated on admission.  Continue home antihypertensives.  Monitor BP closely and titrate medication as required for sustained BP control.          GERD without esophagitis              Hypercholesteremia    Chroic, continue statin therapy.  Check lipid panel and adjust statin therapy as clinically indicated.          PAD (peripheral artery disease)    Chronic, continue plavix.              VTE Risk Mitigation     Low: SCD/ TEDs             Estrella Griffith NP  Department of Hospital Medicine   [facility]

## 2018-05-06 NOTE — PLAN OF CARE
Problem: Patient Care Overview  Goal: Plan of Care Review  SR up x2. Safety maintained. Medications reviewed and administered. Diagnostic tests done. Monitored I/O. Appetite good.

## 2018-05-06 NOTE — HOSPITAL COURSE
The patient was monitored closely during hospitalization and kept on continuous telemetry monitoring. The patient  remained in SR. The patient's troponin remained negative. The patient was offered a  stress test which she declined.  There patient did report a prior history of GERD requiring PPI in past and was placed back on PPI.The patient had no further complaints of chest pain and overall remained stable. The patient was discharged to home after an echocardiogram was performed.

## 2018-05-06 NOTE — ED PROVIDER NOTES
Encounter Date: 2018    SCRIBE #1 NOTE: I, Simon Metcalf, am scribing for, and in the presence of, Dr. Haider.       History     Chief Complaint   Patient presents with    Chest Pain     woke her at midnight; pressure to left chest       2018 3:08 AM     Chief complaint: Chest Pain      Brie Han is a 89 y.o. female with a past medical history of HTN, CVA, and hyperlipidemia presenting to the ED with a sudden onset of acute chest pain beginning 3 hrs ago. The pt reported she was almost asleep when the pain began. She described the chest pain as heavy. Associated symptoms of intermittent SOB and leg swelling. The pt endorsed she is currently pain free. She has no history of cigarette smoking. The pt has a past surgical history of cardiac surgery.       The history is provided by the patient.     Review of patient's allergies indicates:   Allergen Reactions    Contrast media Other (See Comments)    Aspirin Other (See Comments)    Ciprofloxacin Other (See Comments)    Iron Other (See Comments)    Iodine Rash    Penicillins Rash     Past Medical History:   Diagnosis Date    Anticoagulant long-term use     Arthritis     Glaucoma     resolved    Hyperlipidemia     Hypertension     Stroke 2006     Past Surgical History:   Procedure Laterality Date    APPENDECTOMY      BREAST SURGERY      CATARACT EXTRACTION W/  INTRAOCULAR LENS IMPLANT Bilateral     Dr Rojo      SECTION      3 c/s and d/c    EYE SURGERY      Glaucoma     HYSTERECTOMY      TONSILLECTOMY       Family History   Problem Relation Age of Onset    Early death Mother     Stroke Father     Hypertension Father     Diabetes Father     Hypertension Sister     Thyroid disease Paternal Grandfather     No Known Problems Brother     No Known Problems Maternal Aunt     No Known Problems Maternal Uncle     No Known Problems Paternal Aunt     No Known Problems Paternal Uncle     No Known Problems Maternal  Grandmother     No Known Problems Maternal Grandfather     No Known Problems Paternal Grandmother     Amblyopia Neg Hx     Blindness Neg Hx     Cancer Neg Hx     Cataracts Neg Hx     Glaucoma Neg Hx     Macular degeneration Neg Hx     Retinal detachment Neg Hx     Strabismus Neg Hx      Social History   Substance Use Topics    Smoking status: Never Smoker    Smokeless tobacco: Never Used    Alcohol use No     Review of Systems   Constitutional: Negative for fever.   HENT: Negative for congestion.    Respiratory: Negative for shortness of breath.    Cardiovascular: Positive for chest pain.   Gastrointestinal: Negative for abdominal pain.   Genitourinary: Negative for dysuria.   Musculoskeletal: Negative for back pain.   Skin: Negative for rash.   Neurological: Negative for headaches.   Psychiatric/Behavioral: Negative for confusion.       Physical Exam     Initial Vitals [05/06/18 0248]   BP Pulse Resp Temp SpO2   (!) 183/77 84 18 97.9 °F (36.6 °C) 100 %      MAP       112.33         Physical Exam    Nursing note and vitals reviewed.  Constitutional: She appears well-nourished.   HENT:   Head: Normocephalic and atraumatic.   Eyes: Conjunctivae and EOM are normal.   Neck: Normal range of motion. Neck supple. No thyroid mass present.   Cardiovascular: Normal rate, regular rhythm and normal heart sounds. Exam reveals no gallop and no friction rub.    No murmur heard.  Pulmonary/Chest: Breath sounds normal. She has no wheezes. She has no rhonchi. She has no rales.   Abdominal: Soft. Normal appearance and bowel sounds are normal. There is no tenderness.   Neurological: She is alert and oriented to person, place, and time. She has normal strength. No cranial nerve deficit or sensory deficit.   Skin: Skin is warm and dry. No rash noted. No erythema.   Psychiatric: She has a normal mood and affect. Her speech is normal. Cognition and memory are normal.         ED Course   Procedures  Labs Reviewed - No data to  display  EKG Readings: (Independently Interpreted)   Initial Reading: No STEMI. Previous EKG: Compared with most recent EKG Heart Rate: 80. Axis: Normal.   Sinus rhythm with premature atrial complex  LBBB old          Medical Decision Making:   History:   Old Medical Records: I decided to obtain old medical records.  Clinical Tests:   Lab Tests: Ordered and Reviewed  Radiological Study: Ordered and Reviewed  Medical Tests: Ordered and Reviewed  ED Management:    Brie Han is a 89 y.o. female with chest pain.    This is an emergent evaluation.  Patient is complaining of chest pain.  My differential diagnosis includes: Acute MI, unstable angina, stable angina, congestive heart failure, pneumonia, pneumothorax, COPD/asthma, bronchitis, and mass.    Clinically, the patient does not have any symptoms of bronchitis, asthma, or COPD exacerbation.    An EKG was performed and was negative for ST segment elevation.  A chest x-ray was performed and was negative for signs of heart failure or pulmonary edema.  There was no evidence of pneumonia or pneumothorax or mass lesion.    Cardiac markers-troponin and BNP-are both negative.  No evidence for NSTEMI or CHF.    This patient has multiple cardiac risk factors. Risk stratification with serial cardiac markers and stress testing is warranted in this patient.  I believe the patient should be admitted for observation to the definitively rule out acute coronary syndrome.    I discussed the patient's presentation and workup with the admitting NP who agrees with placing the patient in the hospital, and she placed orders for the hospitalist.           Imaging Results    None                 Scribe Attestation:   Scribe #1: I performed the above scribed service and the documentation accurately describes the services I performed. I attest to the accuracy of the note.    I, Dr. Trent Haider, personally performed the services described in this documentation. All medical record  entries made by the scribe were at my direction and in my presence.  I have reviewed the chart and agree that the record reflects my personal performance and is accurate and complete. Trent Haider MD.  5:45 AM 05/07/2018             Clinical Impression:   The primary encounter diagnosis was Chest pain. A diagnosis of Chest pain, unspecified type was also pertinent to this visit.    Disposition:   Disposition: Placed in Observation  Condition: Fair                        Trent Haider MD  05/07/18 0546       Trent Haider MD  05/07/18 0546

## 2018-05-06 NOTE — ED NOTES
Assumed care:  Patient awake, alert and oriented x 3, skin warm and dry, in NAD with family at bedside.  Patient woke up around midnight with left chest wall pressure.  States that she got SOB and nauseated for a few minutes and when chest pressure went away so did the nausea and SOB.    Patient identifiers for Brie Han checked and correct.  LOC:  Patient is awake, alert, and aware of environment with an appropriate affect. Patient is oriented x 3 and speaking appropriately.  APPEARANCE:  Patient resting comfortably and in no acute distress. Patient is clean and well groomed, patient's clothing is properly fastened.  SKIN:  The skin is warm and dry. Patient has normal skin turgor and moist mucus membranes. Skin is intact; no bruising or breakdown noted.  MUSCULOSKELETAL:  Patient is moving all extremities well, no obvious deformities noted. Pulses intact.   RESPIRATORY:  Airway is open and patent. Respirations are spontaneous and non-labored with normal effort and rate.  CARDIAC:  Patient has a normal rate and rhythm. No peripheral edema noted. Capillary refill < 3 seconds.  CO chest discomfort, pressure lasting only minutes  ABDOMEN:  No distention noted.  Soft and non-tender upon palpation.  CO intermittent nausea.   NEUROLOGICAL:  PERRL. Facial expression is symmetrical. Hand grasps are equal bilaterally. Normal sensation in all extremities when touched with finger.  CO dizziness  Allergies reported:    Review of patient's allergies indicates:   Allergen Reactions    Contrast media Other (See Comments)    Aspirin Other (See Comments)    Ciprofloxacin Other (See Comments)    Iron Other (See Comments)    Iodine Rash    Penicillins Rash     OTHER NOTES:

## 2018-05-06 NOTE — PLAN OF CARE
SW met w/ pt for assessment prior.   Pt scheduled for d/c today.  Pt lives alone, denies HH or use of DME.  Pt has friends that will provide transport at d/c. Pt reports independent w/ ADL.         05/06/18 0944   Discharge Assessment   Assessment Type Discharge Planning Assessment   Confirmed/corrected address and phone number on facesheet? Yes   Assessment information obtained from? Patient   Prior to hospitilization cognitive status: Alert/Oriented   Prior to hospitalization functional status: Independent   Current cognitive status: Alert/Oriented   Current Functional Status: Independent   Facility Arrived From: home   Lives With alone   Able to Return to Prior Arrangements yes   Is patient able to care for self after discharge? Yes   Who are your caregiver(s) and their phone number(s)? grandson:  Prabhjot Han  [phone] (lives in [place]).      Patient's perception of discharge disposition home or selfcare   Readmission Within The Last 30 Days no previous admission in last 30 days   Patient currently being followed by outpatient case management? No   Patient currently receives any other outside agency services? No   Equipment Currently Used at Home none   Do you have any problems affording any of your prescribed medications? No   Is the patient taking medications as prescribed? yes   Does the patient have transportation home? Yes   Transportation Available family or friend will provide   Does the patient receive services at the [facility]? No   Discharge Plan A Home   Discharge Plan B Home   Patient/Family In Agreement With Plan yes

## 2018-05-06 NOTE — ASSESSMENT & PLAN NOTE
Chroic, continue statin therapy.  Check lipid panel and adjust statin therapy as clinically indicated.

## 2018-05-06 NOTE — HPI
"Brie Han is a 89 yo female with PMHx significant for HTN, stroke, and hyperlipidemia.  She was admitted to the service of hospital medicine with chest pain.  She reports acute onset of fleeting LEFT chest pressure while laying in bed that she noticed when turning onto her right side.  Associated symptoms include nausea and clamminess, as well as a "head rush".  She states she had a BM and drank some pedialyte and returned to bed.  She became nauseous and clammy again with another "head rush" so she called her daughter to bring her to ED.  She reported mild SOB as well.  No further CP.  No palpitations, vomiting, syncope, leg swelling or other complaints.  No dizziness or lightheadedness. She states she has had some musculoskeletal issues with her LEFT shoulder in the past and did pull herself over with her left arm when turning in bed. She is asymptomatic at present. Patient placed in hospital for further evaluation and treatment.   "

## 2018-05-06 NOTE — ASSESSMENT & PLAN NOTE
Chronic, BP slightly elevated on admission.  Continue home antihypertensives.  Monitor BP closely and titrate medication as required for sustained BP control.

## 2018-05-06 NOTE — SUBJECTIVE & OBJECTIVE
Past Medical History:   Diagnosis Date    Anticoagulant long-term use     Arthritis     Glaucoma     resolved    Hyperlipidemia     Hypertension     Stroke 2006       Past Surgical History:   Procedure Laterality Date    APPENDECTOMY  1950's    BREAST SURGERY      CATARACT EXTRACTION W/  INTRAOCULAR LENS IMPLANT Bilateral     Dr Rojo      SECTION      3 c/s and d/c    EYE SURGERY      Glaucoma     HYSTERECTOMY      TONSILLECTOMY         Review of patient's allergies indicates:   Allergen Reactions    Contrast media Other (See Comments)    Aspirin Other (See Comments)    Ciprofloxacin Other (See Comments)    Iron Other (See Comments)    Iodine Rash    Penicillins Rash       No current facility-administered medications on file prior to encounter.      Current Outpatient Prescriptions on File Prior to Encounter   Medication Sig    acetaminophen (TYLENOL) 325 MG tablet Take 2 tablets (650 mg total) by mouth every 8 (eight) hours as needed.    azelastine (ASTELIN) 137 mcg (0.1 %) nasal spray 1 spray (137 mcg total) by Nasal route 2 (two) times daily.    b complex vitamins tablet Take 1 tablet by mouth once daily.    calcium citrate-vitamin D (CITRACAL + D) 315-200 mg-unit per tablet Every day    clopidogrel (PLAVIX) 75 mg tablet TAKE 1 TABLET EVERY DAY    folic acid (FOLVITE) 800 MCG Tab Take 1 tablet (800 mcg total) by mouth once daily.    KRILL OIL ORAL Take by mouth.    losartan (COZAAR) 100 MG tablet TAKE 1 TABLET EVERY DAY    lovastatin (MEVACOR) 10 MG tablet Take 1 tablet (10 mg total) by mouth every evening.    miconazole NITRATE 2 % (LOTRIMIN AF) 2 % top powder Apply topically as needed for Itching.    multivitamin capsule Every day    nifedipine (PROCARDIA-XL) 30 MG (OSM) 24 hr tablet TAKE 1 TABLET ONE TIME DAILY     Family History     Problem Relation (Age of Onset)    Diabetes Father    Early death Mother    Hypertension Father, Sister    No Known Problems Brother,  Maternal Aunt, Maternal Uncle, Paternal Aunt, Paternal Uncle, Maternal Grandmother, Maternal Grandfather, Paternal Grandmother    Stroke Father    Thyroid disease Paternal Grandfather        Social History Main Topics    Smoking status: Never Smoker    Smokeless tobacco: Never Used    Alcohol use No    Drug use: No    Sexual activity: No     Review of Systems   Constitutional: Negative for activity change, appetite change, chills, fatigue and fever.   HENT: Negative for congestion, postnasal drip, sore throat and trouble swallowing.    Eyes: Negative for photophobia and visual disturbance.   Respiratory: Positive for shortness of breath. Negative for cough and wheezing.    Cardiovascular: Positive for chest pain. Negative for palpitations and leg swelling.   Gastrointestinal: Positive for nausea. Negative for abdominal distention, anal bleeding, constipation, diarrhea and vomiting.   Genitourinary: Negative for dysuria, flank pain and urgency.   Musculoskeletal: Negative for arthralgias and myalgias.   Skin: Negative for color change.   Neurological: Negative for dizziness, weakness, light-headedness and headaches.   Psychiatric/Behavioral: Negative for confusion. The patient is not nervous/anxious.      Objective:     Vital Signs (Most Recent):  Temp: 97.9 °F (36.6 °C) (05/06/18 0248)  Pulse: 72 (05/06/18 0342)  Resp: 18 (05/06/18 0248)  BP: (!) 154/65 (05/06/18 0342)  SpO2: 96 % (05/06/18 0342) Vital Signs (24h Range):  Temp:  [97.9 °F (36.6 °C)] 97.9 °F (36.6 °C)  Pulse:  [72-84] 72  Resp:  [18] 18  SpO2:  [96 %-100 %] 96 %  BP: (154-183)/(65-77) 154/65     Weight: 54.4 kg (120 lb)  Body mass index is 24.24 kg/m².    Physical Exam   Constitutional: She is oriented to person, place, and time. She appears well-developed and well-nourished. No distress.   HENT:   Head: Normocephalic and atraumatic.   Eyes: Conjunctivae and EOM are normal. Pupils are equal, round, and reactive to light.   Neck: Normal range of  motion. Neck supple.   Cardiovascular: Normal rate, regular rhythm, normal heart sounds and intact distal pulses.    No murmur heard.  Pulmonary/Chest: Effort normal and breath sounds normal. No respiratory distress. She exhibits no tenderness.   Abdominal: Soft. Bowel sounds are normal. She exhibits no distension. There is no tenderness.   Musculoskeletal: Normal range of motion. She exhibits no edema or tenderness.   Full ROM of LEFT shoulder without pain   Neurological: She is alert and oriented to person, place, and time. No cranial nerve deficit.   Skin: Skin is warm and dry. Capillary refill takes less than 2 seconds. No erythema.   Psychiatric: She has a normal mood and affect. Her behavior is normal. Judgment and thought content normal.         CRANIAL NERVES     CN III, IV, VI   Pupils are equal, round, and reactive to light.  Extraocular motions are normal.        Significant Labs:   CBC:   Recent Labs  Lab 05/06/18  0300   WBC 6.40   HGB 12.2   HCT 37.1        CMP:   Recent Labs  Lab 05/06/18  0409      K 4.3      CO2 24      BUN 15   CREATININE 0.8   CALCIUM 9.9   PROT 7.3   ALBUMIN 3.5   BILITOT 0.3   ALKPHOS 92   AST 25   ALT 17   ANIONGAP 10   EGFRNONAA >60     Troponin:   Recent Labs  Lab 05/06/18  0409   TROPONINI 0.006       Significant Imaging:   CXR: no infiltrates or effusion, no cardiomegaly (my review).    EKG: I reviewed. NSR 80 with PACs, LBBB (not new compared with 10/2017).  No ischemic ST or T wave findings.

## 2018-05-06 NOTE — DISCHARGE SUMMARY
"[facility]  Hospital Medicine  Discharge Summary      Patient Name: Brie Han  MRN: [id number]  Admission Date: 5/6/2018  Hospital Length of Stay: 0 days  Discharge Date and Time:  05/06/2018 2:17 PM  Attending Physician: Wilmar Vazquez MD   Discharging Provider: ROSEANNE Ortiz  Primary Care Provider: Colten Gould MD    HPI:   Brie Han is a 89 yo female with PMHx significant for HTN, stroke, and hyperlipidemia.  She was admitted to the service of hospital medicine with chest pain.  She reports acute onset of fleeting LEFT chest pressure while laying in bed that she noticed when turning onto her right side.  Associated symptoms include nausea and clamminess, as well as a "head rush".  She states she had a BM and drank some pedialyte and returned to bed.  She became nauseous and clammy again with another "head rush" so she called her daughter to bring her to ED.  She reported mild SOB as well.  No further CP.  No palpitations, vomiting, syncope, leg swelling or other complaints.  No dizziness or lightheadedness. She states she has had some musculoskeletal issues with her LEFT shoulder in the past and did pull herself over with her left arm when turning in bed. She is asymptomatic at present. Patient placed in hospital for further evaluation and treatment.     * No surgery found *      Hospital Course:   The patient was monitored closely during hospitalization and kept on continuous telemetry monitoring. The patient  remained in SR. The patient's troponin remained negative. The patient was offered a  stress test which she declined.  There patient did report a prior history of GERD requiring PPI in past and was placed back on PPI.The patient had no further complaints of chest pain and overall remained stable. The patient was discharged to home after an echocardiogram was performed.       Consults: None    Service: Hospital Medicine    Final Active Diagnoses:    Diagnosis Date " Noted POA    PAD (peripheral artery disease) [I73.9] 02/14/2013 Yes    Hypercholesteremia [E78.00] 02/14/2013 Yes    Benign essential HTN [I10] 09/21/2012 Yes      Problems Resolved During this Admission:    Diagnosis Date Noted Date Resolved POA    PRINCIPAL PROBLEM:  Chest pain [R07.9] 05/06/2018 05/06/2018 Yes       Discharged Condition: stable    Disposition: Home or Self Care    Follow Up:  Follow-up Information     Colten Gould MD In 2 weeks.    Specialty:  Family Medicine  Why:  Take blood pressure and pulse 2 x day and keep log for follow up  Contact information:  [place] [id number]  [phone]                 Patient Instructions:     Diet Cardiac     Activity as tolerated     Notify your health care provider if you experience any of the following:  severe uncontrolled pain     Notify your health care provider if you experience any of the following:  difficulty breathing or increased cough     Notify your health care provider if you experience any of the following:   Order Comments: Any decline in condition       Significant Diagnostic Studies:     X-Ray Chest AP Portable-  No acute process.  Atherosclerosis and old granulomatous disease.    Labs:   CMP   Recent Labs  Lab 05/06/18  0409      K 4.3      CO2 24      BUN 15   CREATININE 0.8   CALCIUM 9.9   PROT 7.3   ALBUMIN 3.5   BILITOT 0.3   ALKPHOS 92   AST 25   ALT 17   ANIONGAP 10   ESTGFRAFRICA >60   EGFRNONAA >60   , CBC   Recent Labs  Lab 05/06/18  0300   WBC 6.40   HGB 12.2   HCT 37.1      , Lipid Panel   Lab Results   Component Value Date    CHOL 170 05/06/2018    HDL 56 05/06/2018    LDLCALC 98.6 05/06/2018    TRIG 77 05/06/2018    CHOLHDL 32.9 05/06/2018    and Troponin   Recent Labs  Lab 05/06/18  1157   TROPONINI 0.016       Pending Diagnostic Studies:     Procedure Component Value Units Date/Time    Transthoracic echo (TTE) complete [[id number]]     Order Status:  Sent Lab Status:  No result      Troponin I #2 [[id number]] Collected:  05/06/18 0409    Order Status:  Sent Lab Status:  In process Updated:  05/06/18 0410    Specimen:  Blood from Blood          Medications:  Reconciled Home Medications:      Medication List      START taking these medications    pantoprazole 40 MG tablet  Commonly known as:  PROTONIX  Take 1 tablet (40 mg total) by mouth once daily.  Start taking on:  5/7/2018        CONTINUE taking these medications    acetaminophen 325 MG tablet  Commonly known as:  TYLENOL  Take 2 tablets (650 mg total) by mouth every 8 (eight) hours as needed.     azelastine 137 mcg (0.1 %) nasal spray  Commonly known as:  ASTELIN  1 spray (137 mcg total) by Nasal route 2 (two) times daily.     b complex vitamins tablet  Take 1 tablet by mouth once daily.     CITRACAL PLUS D 315-200 mg-unit per tablet  Generic drug:  calcium citrate-vitamin D3 315-200 mg  Every day     clopidogrel 75 mg tablet  Commonly known as:  PLAVIX  TAKE 1 TABLET EVERY DAY     folic acid 800 MCG Tab  Commonly known as:  FOLVITE  Take 1 tablet (800 mcg total) by mouth once daily.     KRILL OIL ORAL  Take by mouth.     losartan 100 MG tablet  Commonly known as:  COZAAR  TAKE 1 TABLET EVERY DAY     lovastatin 10 MG tablet  Commonly known as:  MEVACOR  Take 1 tablet (10 mg total) by mouth every evening.     miconazole NITRATE 2 % 2 % top powder  Commonly known as:  LOTRIMIN AF  Apply topically as needed for Itching.     multivitamin capsule  Every day     NIFEdipine 30 MG (OSM) 24 hr tablet  Commonly known as:  PROCARDIA-XL  TAKE 1 TABLET ONE TIME DAILY            Indwelling Lines/Drains at time of discharge:   Lines/Drains/Airways          No matching active lines, drains, or airways        Time spent on the discharge of patient: 54 minutes  Patient was seen and examined on the date of discharge and determined to be suitable for discharge.    Monet Bella, ROSEANNE  Department of Hospital Medicine  [facility]

## 2018-05-06 NOTE — ASSESSMENT & PLAN NOTE
Acute; atypical-- possibly musculoskeletal.  Keep NPO, trend troponin and monitor on telemetry.  Patient refuses stress testing, will check ECHO.  If troponin trend normal can likely d/c with close outpatient f/u.  Check lipid panel in AM.      HEART score: 4

## 2018-05-07 ENCOUNTER — TELEPHONE (OUTPATIENT)
Dept: MEDSURG UNIT | Facility: HOSPITAL | Age: 83
End: 2018-05-07

## 2018-05-07 LAB
AORTIC ROOT ANNULUS: 2.43 CM
AORTIC VALVE CUSP SEPERATION: 1.9 CM
AV MEAN GRADIENT: 4.18 MMHG
AV VALVE AREA: 2.04 CM2
BSA FOR ECHO PROCEDURE: 1.51 M2
CV ECHO LV RWT: 0.43 CM
DOP CALC AO PEAK VEL: 1.36 M/S
DOP CALC AO VTI: 0.32 CM
DOP CALC LVOT AREA: 3.11 CM2
DOP CALC LVOT DIAMETER: 1.99 CM
DOP CALC LVOT STROKE VOLUME: 0.65 CM3
DOP CALCLVOT PEAK VEL VTI: 0.21 CM
E WAVE DECELERATION TIME: 277.01 MSEC
E/A RATIO: 0.63
E/E' RATIO: 7.44
ECHO EF ESTIMATED: 55 %
ECHO LV POSTERIOR WALL: 0.97 CM (ref 0.6–1.1)
FRACTIONAL SHORTENING: 23 % (ref 28–44)
INTERVENTRICULAR SEPTUM: 0.98 CM (ref 0.6–1.1)
IVRT: 0.12 MSEC
LEFT ATRIUM SIZE: 2.97 CM
LEFT INTERNAL DIMENSION IN SYSTOLE: 3.48 CM (ref 2.1–4)
LEFT VENTRICULAR INTERNAL DIMENSION IN DIASTOLE: 4.5 CM (ref 3.5–6)
LV LATERAL E/E' RATIO: 9.57
LV SEPTAL E/E' RATIO: 6.09
MV PEAK A VEL: 1.06 M/S
MV PEAK E VEL: 0.67 M/S
MV STENOSIS PRESSURE HALF TIME: 80.33 MS
MV VALVE AREA P 1/2 METHOD: 2.74 CM2
PULM VEIN A" WAVE DURATION": 143 MSEC
PV PEAK GRADIENT: 2.12 MMHG
PV PEAK VELOCITY: 0.73 CM/S
RIGHT VENTRICULAR END-DIASTOLIC DIMENSION: 2.77 CM
TDI LATERAL: 0.07
TDI SEPTAL: 0.11
TDI: 0.09

## 2018-05-15 ENCOUNTER — PATIENT MESSAGE (OUTPATIENT)
Dept: FAMILY MEDICINE | Facility: CLINIC | Age: 83
End: 2018-05-15

## 2018-05-16 ENCOUNTER — PES CALL (OUTPATIENT)
Dept: ADMINISTRATIVE | Facility: CLINIC | Age: 83
End: 2018-05-16

## 2018-05-24 ENCOUNTER — LAB VISIT (OUTPATIENT)
Dept: LAB | Facility: HOSPITAL | Age: 83
End: 2018-05-24
Attending: FAMILY MEDICINE
Payer: MEDICARE

## 2018-05-24 DIAGNOSIS — I10 ESSENTIAL HYPERTENSION: ICD-10-CM

## 2018-05-24 LAB
BACTERIA #/AREA URNS AUTO: NORMAL /HPF
BILIRUB UR QL STRIP: NEGATIVE
CLARITY UR REFRACT.AUTO: ABNORMAL
COLOR UR AUTO: YELLOW
GLUCOSE UR QL STRIP: NEGATIVE
HGB UR QL STRIP: NEGATIVE
KETONES UR QL STRIP: NEGATIVE
LEUKOCYTE ESTERASE UR QL STRIP: ABNORMAL
MICROSCOPIC COMMENT: NORMAL
NITRITE UR QL STRIP: NEGATIVE
NON-SQ EPI CELLS #/AREA URNS AUTO: <1 /HPF
PH UR STRIP: 6 [PH] (ref 5–8)
PROT UR QL STRIP: NEGATIVE
RBC #/AREA URNS AUTO: 2 /HPF (ref 0–4)
SP GR UR STRIP: 1.01 (ref 1–1.03)
SQUAMOUS #/AREA URNS AUTO: 1 /HPF
URN SPEC COLLECT METH UR: ABNORMAL
UROBILINOGEN UR STRIP-ACNC: NEGATIVE EU/DL
WBC #/AREA URNS AUTO: 5 /HPF (ref 0–5)

## 2018-05-24 PROCEDURE — 81001 URINALYSIS AUTO W/SCOPE: CPT

## 2018-06-13 ENCOUNTER — OFFICE VISIT (OUTPATIENT)
Dept: FAMILY MEDICINE | Facility: CLINIC | Age: 83
End: 2018-06-13
Payer: MEDICARE

## 2018-06-13 VITALS
WEIGHT: 120.81 LBS | HEART RATE: 75 BPM | HEIGHT: 59 IN | BODY MASS INDEX: 24.36 KG/M2 | DIASTOLIC BLOOD PRESSURE: 55 MMHG | TEMPERATURE: 98 F | SYSTOLIC BLOOD PRESSURE: 148 MMHG

## 2018-06-13 DIAGNOSIS — K21.9 GASTROESOPHAGEAL REFLUX DISEASE, ESOPHAGITIS PRESENCE NOT SPECIFIED: ICD-10-CM

## 2018-06-13 DIAGNOSIS — Z28.39 IMMUNIZATION DEFICIENCY: ICD-10-CM

## 2018-06-13 DIAGNOSIS — K59.00 CONSTIPATION, UNSPECIFIED CONSTIPATION TYPE: ICD-10-CM

## 2018-06-13 DIAGNOSIS — I10 HTN (HYPERTENSION), BENIGN: Primary | ICD-10-CM

## 2018-06-13 PROCEDURE — 90670 PCV13 VACCINE IM: CPT | Mod: S$GLB,,, | Performed by: FAMILY MEDICINE

## 2018-06-13 PROCEDURE — 99499 UNLISTED E&M SERVICE: CPT | Mod: S$PBB,,, | Performed by: NURSE PRACTITIONER

## 2018-06-13 PROCEDURE — 99214 OFFICE O/P EST MOD 30 MIN: CPT | Mod: S$GLB,,, | Performed by: NURSE PRACTITIONER

## 2018-06-13 PROCEDURE — 99999 PR PBB SHADOW E&M-EST. PATIENT-LVL IV: CPT | Mod: PBBFAC,,, | Performed by: NURSE PRACTITIONER

## 2018-06-13 PROCEDURE — G0009 ADMIN PNEUMOCOCCAL VACCINE: HCPCS | Mod: S$GLB,,, | Performed by: FAMILY MEDICINE

## 2018-06-13 RX ORDER — ASPIRIN 81 MG
100 TABLET, DELAYED RELEASE (ENTERIC COATED) ORAL DAILY
Qty: 90 TABLET | Refills: 3 | Status: SHIPPED | OUTPATIENT
Start: 2018-06-13 | End: 2021-01-28 | Stop reason: CLARIF

## 2018-06-13 RX ORDER — PANTOPRAZOLE SODIUM 40 MG/1
40 TABLET, DELAYED RELEASE ORAL DAILY
Qty: 90 TABLET | Refills: 3 | Status: SHIPPED | OUTPATIENT
Start: 2018-06-13 | End: 2019-06-05 | Stop reason: SDUPTHER

## 2018-06-13 RX ORDER — LOSARTAN POTASSIUM 100 MG/1
100 TABLET ORAL DAILY
Qty: 90 TABLET | Refills: 3 | Status: SHIPPED | OUTPATIENT
Start: 2018-06-13 | End: 2019-06-05 | Stop reason: SDUPTHER

## 2018-06-13 RX ORDER — NIFEDIPINE 30 MG/1
TABLET, EXTENDED RELEASE ORAL
Qty: 90 TABLET | Refills: 3 | Status: SHIPPED | OUTPATIENT
Start: 2018-06-13 | End: 2019-06-05 | Stop reason: SDUPTHER

## 2018-06-13 NOTE — PROGRESS NOTES
Subjective:       Patient ID: Brie Han is a 89 y.o. female.    Chief Complaint: Hypertension (lab results)    Ms. Han presents to the clinic today for routine follow up for hypertension.  Blood pressure is controlled.  She has been in the ED twice in the past for hyponatremia so no longer eats a low sodium diet.  She was hospitalized a little over a month ago for chest pain.  Troponins were normal.  She refused stress test and was sent home with prescription for pantoprazole for GERD.  She has not had any GERD symptoms since taking this.  She is not currently having chest pain.  She lives alone.  She has anxiety at times.  She has had some constipation.  She does not take anything for this.  States fiber makes this worse.       Hypertension   This is a chronic problem. The current episode started more than 1 year ago. The problem is unchanged. The problem is controlled. Associated symptoms include anxiety. Pertinent negatives include no blurred vision, chest pain, headaches, palpitations, peripheral edema or shortness of breath. There are no associated agents to hypertension. Risk factors for coronary artery disease include post-menopausal state. Past treatments include calcium channel blockers. The current treatment provides significant improvement. There are no compliance problems.  There is no history of angina, kidney disease, CAD/MI or heart failure. TIA.     Review of Systems   Constitutional: Negative for chills and fever.   HENT: Negative for congestion, ear pain and sinus pressure.    Eyes: Negative for blurred vision and visual disturbance.   Respiratory: Negative for cough and shortness of breath.    Cardiovascular: Negative for chest pain, palpitations and leg swelling.   Gastrointestinal: Positive for constipation. Negative for abdominal pain, diarrhea, nausea and vomiting.   Neurological: Negative for headaches.   Psychiatric/Behavioral: Negative for dysphoric mood. The patient is  nervous/anxious.        Objective:      Physical Exam   Constitutional: She is oriented to person, place, and time. She appears well-nourished. No distress.   HENT:   Head: Normocephalic and atraumatic.   Right Ear: External ear normal.   Left Ear: External ear normal.   Mouth/Throat: Oropharynx is clear and moist. No oropharyngeal exudate.   Eyes: Pupils are equal, round, and reactive to light. Right eye exhibits no discharge. Left eye exhibits no discharge.   Neck: Neck supple. No thyromegaly present.   Cardiovascular: Normal rate and regular rhythm.  Exam reveals no gallop and no friction rub.    No murmur heard.  Pulmonary/Chest: Effort normal and breath sounds normal. No respiratory distress. She has no wheezes. She has no rales.   Abdominal: Soft. She exhibits no distension. There is generalized tenderness. There is no rigidity and no guarding.   Lymphadenopathy:     She has no cervical adenopathy.   Neurological: She is alert and oriented to person, place, and time. Coordination normal.   Skin: Skin is warm and dry.   Psychiatric: She has a normal mood and affect. Her behavior is normal. Thought content normal.   Vitals reviewed.          Current Outpatient Prescriptions:     acetaminophen (TYLENOL) 325 MG tablet, Take 2 tablets (650 mg total) by mouth every 8 (eight) hours as needed., Disp: , Rfl: 0    azelastine (ASTELIN) 137 mcg (0.1 %) nasal spray, 1 spray (137 mcg total) by Nasal route 2 (two) times daily., Disp: 30 mL, Rfl: 3    b complex vitamins tablet, Take 1 tablet by mouth once daily., Disp: , Rfl:     calcium citrate-vitamin D (CITRACAL + D) 315-200 mg-unit per tablet, Every day, Disp: , Rfl:     clopidogrel (PLAVIX) 75 mg tablet, TAKE 1 TABLET EVERY DAY, Disp: 90 tablet, Rfl: 4    docusate sodium 100 mg capsule, Take 100 mg by mouth once daily., Disp: 90 tablet, Rfl: 3    folic acid (FOLVITE) 800 MCG Tab, Take 1 tablet (800 mcg total) by mouth once daily., Disp: 90 tablet, Rfl: 4    KRILL  OIL ORAL, Take by mouth., Disp: , Rfl:     losartan (COZAAR) 100 MG tablet, Take 1 tablet (100 mg total) by mouth once daily., Disp: 90 tablet, Rfl: 3    lovastatin (MEVACOR) 10 MG tablet, Take 1 tablet (10 mg total) by mouth every evening., Disp: 90 tablet, Rfl: 3    miconazole NITRATE 2 % (LOTRIMIN AF) 2 % top powder, Apply topically as needed for Itching., Disp: 85 g, Rfl: 0    multivitamin capsule, Every day, Disp: , Rfl:     NIFEdipine (PROCARDIA-XL) 30 MG (OSM) 24 hr tablet, TAKE 1 TABLET ONE TIME DAILY, Disp: 90 tablet, Rfl: 3    pantoprazole (PROTONIX) 40 MG tablet, Take 1 tablet (40 mg total) by mouth once daily., Disp: 90 tablet, Rfl: 3           Assessment:       1. HTN (hypertension), benign    2. Immunization deficiency    3. Constipation, unspecified constipation type    4. Gastroesophageal reflux disease, esophagitis presence not specified        Plan:       HTN (hypertension), benign  Stable, continue current medication.  RTC 6 mos.  -     NIFEdipine (PROCARDIA-XL) 30 MG (OSM) 24 hr tablet; TAKE 1 TABLET ONE TIME DAILY  Dispense: 90 tablet; Refill: 3  -     losartan (COZAAR) 100 MG tablet; Take 1 tablet (100 mg total) by mouth once daily.  Dispense: 90 tablet; Refill: 3    Immunization deficiency  -     (In Office Administered) Pneumococcal Conjugate Vaccine (13 Valent) (IM)    Constipation, unspecified constipation type  Eat 4-5 prunes every morning.  -     docusate sodium 100 mg capsule; Take 100 mg by mouth once daily.  Dispense: 90 tablet; Refill: 3    Gastroesophageal reflux disease, esophagitis presence not specified  Stable, continue current medication.  -     pantoprazole (PROTONIX) 40 MG tablet; Take 1 tablet (40 mg total) by mouth once daily.  Dispense: 90 tablet; Refill: 3    Patient readiness: acceptance and barriers:none    During the course of the visit the patient was educated and counseled about the following:     Hypertension:   Medication: no change.    Goals: Hypertension:  Reduce Blood Pressure    Did patient meet goals/outcomes: Yes    The following self management tools provided: declined    Patient Instructions (the written plan) was given to the patient/family.     Time spent with patient: 30 minutes    Barriers to medications present (no )    Adverse reactions to current medications (no)    Over the counter medications reviewed (Yes)

## 2018-07-26 ENCOUNTER — PES CALL (OUTPATIENT)
Dept: ADMINISTRATIVE | Facility: CLINIC | Age: 83
End: 2018-07-26

## 2018-10-25 RX ORDER — LOVASTATIN 10 MG/1
TABLET ORAL
Qty: 90 TABLET | Refills: 3 | Status: SHIPPED | OUTPATIENT
Start: 2018-10-25 | End: 2019-12-20 | Stop reason: SDUPTHER

## 2018-12-21 ENCOUNTER — PATIENT MESSAGE (OUTPATIENT)
Dept: FAMILY MEDICINE | Facility: CLINIC | Age: 83
End: 2018-12-21

## 2018-12-26 ENCOUNTER — LAB VISIT (OUTPATIENT)
Dept: LAB | Facility: HOSPITAL | Age: 83
End: 2018-12-26
Attending: FAMILY MEDICINE
Payer: MEDICARE

## 2018-12-26 DIAGNOSIS — E78.1 PURE HYPERGLYCERIDEMIA: ICD-10-CM

## 2018-12-26 DIAGNOSIS — I10 ESSENTIAL HYPERTENSION: ICD-10-CM

## 2018-12-26 LAB
BASOPHILS # BLD AUTO: 0.08 K/UL
BASOPHILS NFR BLD: 1.6 %
CHOLEST SERPL-MCNC: 175 MG/DL
CHOLEST/HDLC SERPL: 2.4 {RATIO}
DIFFERENTIAL METHOD: ABNORMAL
EOSINOPHIL # BLD AUTO: 0.1 K/UL
EOSINOPHIL NFR BLD: 2.6 %
ERYTHROCYTE [DISTWIDTH] IN BLOOD BY AUTOMATED COUNT: 13.9 %
HCT VFR BLD AUTO: 35.7 %
HDLC SERPL-MCNC: 74 MG/DL
HDLC SERPL: 42.3 %
HGB BLD-MCNC: 11.8 G/DL
IMM GRANULOCYTES # BLD AUTO: 0.01 K/UL
IMM GRANULOCYTES NFR BLD AUTO: 0.2 %
LDLC SERPL CALC-MCNC: 89.6 MG/DL
LYMPHOCYTES # BLD AUTO: 1.4 K/UL
LYMPHOCYTES NFR BLD: 27.4 %
MCH RBC QN AUTO: 32.1 PG
MCHC RBC AUTO-ENTMCNC: 33.1 G/DL
MCV RBC AUTO: 97 FL
MONOCYTES # BLD AUTO: 0.7 K/UL
MONOCYTES NFR BLD: 13 %
NEUTROPHILS # BLD AUTO: 2.8 K/UL
NEUTROPHILS NFR BLD: 55.2 %
NONHDLC SERPL-MCNC: 101 MG/DL
NRBC BLD-RTO: 0 /100 WBC
PLATELET # BLD AUTO: 298 K/UL
PMV BLD AUTO: 10.3 FL
RBC # BLD AUTO: 3.68 M/UL
TRIGL SERPL-MCNC: 57 MG/DL
WBC # BLD AUTO: 5.08 K/UL

## 2018-12-26 PROCEDURE — 80061 LIPID PANEL: CPT

## 2018-12-26 PROCEDURE — 80053 COMPREHEN METABOLIC PANEL: CPT

## 2018-12-26 PROCEDURE — 36415 COLL VENOUS BLD VENIPUNCTURE: CPT | Mod: PO

## 2018-12-26 PROCEDURE — 85025 COMPLETE CBC W/AUTO DIFF WBC: CPT

## 2019-01-02 ENCOUNTER — TELEPHONE (OUTPATIENT)
Dept: FAMILY MEDICINE | Facility: CLINIC | Age: 84
End: 2019-01-02

## 2019-01-02 NOTE — TELEPHONE ENCOUNTER
----- Message from Breanne Pelayo sent at 1/2/2019  3:07 PM CST -----  Patient wanted to know why there was a change in appt time from 10:40 to 11:00 for 01/03/2018.     She is overdue for her flu shot and she has been having ear pain in her left ear.   She would like to discuss during appt.

## 2019-01-02 NOTE — TELEPHONE ENCOUNTER
Confirmed with patient her appointment scheduled for tomorrow's date is for 11 am. Patient verbalized understanding.

## 2019-01-03 ENCOUNTER — OFFICE VISIT (OUTPATIENT)
Dept: FAMILY MEDICINE | Facility: CLINIC | Age: 84
End: 2019-01-03
Payer: MEDICARE

## 2019-01-03 VITALS
WEIGHT: 121.25 LBS | BODY MASS INDEX: 24.44 KG/M2 | HEART RATE: 74 BPM | HEIGHT: 59 IN | DIASTOLIC BLOOD PRESSURE: 60 MMHG | TEMPERATURE: 98 F | SYSTOLIC BLOOD PRESSURE: 132 MMHG

## 2019-01-03 DIAGNOSIS — I10 BENIGN ESSENTIAL HTN: Primary | ICD-10-CM

## 2019-01-03 DIAGNOSIS — G25.2 COARSE TREMORS: ICD-10-CM

## 2019-01-03 DIAGNOSIS — I73.9 PAD (PERIPHERAL ARTERY DISEASE): ICD-10-CM

## 2019-01-03 DIAGNOSIS — E78.00 HYPERCHOLESTEREMIA: ICD-10-CM

## 2019-01-03 DIAGNOSIS — E87.1 HYPONATREMIA: ICD-10-CM

## 2019-01-03 PROBLEM — G45.9 TIA (TRANSIENT ISCHEMIC ATTACK): Status: RESOLVED | Noted: 2017-10-23 | Resolved: 2019-01-03

## 2019-01-03 PROBLEM — Z86.73 HX-TIA (TRANSIENT ISCHEMIC ATTACK): Status: RESOLVED | Noted: 2017-10-22 | Resolved: 2019-01-03

## 2019-01-03 LAB
ALBUMIN SERPL BCP-MCNC: 3.7 G/DL
ALP SERPL-CCNC: 110 U/L
ALT SERPL W/O P-5'-P-CCNC: 18 U/L
ANION GAP SERPL CALC-SCNC: 8 MMOL/L
AST SERPL-CCNC: 24 U/L
BILIRUB SERPL-MCNC: 0.7 MG/DL
BUN SERPL-MCNC: 15 MG/DL
CALCIUM SERPL-MCNC: 9.8 MG/DL
CHLORIDE SERPL-SCNC: 98 MMOL/L
CO2 SERPL-SCNC: 28 MMOL/L
CREAT SERPL-MCNC: 0.8 MG/DL
EST. GFR  (AFRICAN AMERICAN): >60 ML/MIN/1.73 M^2
EST. GFR  (NON AFRICAN AMERICAN): >60 ML/MIN/1.73 M^2
GLUCOSE SERPL-MCNC: 92 MG/DL
POTASSIUM SERPL-SCNC: 4.2 MMOL/L
PROT SERPL-MCNC: 7.6 G/DL
SODIUM SERPL-SCNC: 134 MMOL/L

## 2019-01-03 PROCEDURE — 99999 PR PBB SHADOW E&M-EST. PATIENT-LVL III: ICD-10-PCS | Mod: PBBFAC,,, | Performed by: FAMILY MEDICINE

## 2019-01-03 PROCEDURE — 90662 FLU VACCINE - HIGH DOSE (65+) PRESERVATIVE FREE IM: ICD-10-PCS | Mod: S$GLB,,, | Performed by: FAMILY MEDICINE

## 2019-01-03 PROCEDURE — G0008 ADMIN INFLUENZA VIRUS VAC: HCPCS | Mod: S$GLB,,, | Performed by: FAMILY MEDICINE

## 2019-01-03 PROCEDURE — 99999 PR PBB SHADOW E&M-EST. PATIENT-LVL III: CPT | Mod: PBBFAC,,, | Performed by: FAMILY MEDICINE

## 2019-01-03 PROCEDURE — 99214 OFFICE O/P EST MOD 30 MIN: CPT | Mod: 25,S$GLB,, | Performed by: FAMILY MEDICINE

## 2019-01-03 PROCEDURE — 1101F PT FALLS ASSESS-DOCD LE1/YR: CPT | Mod: CPTII,S$GLB,, | Performed by: FAMILY MEDICINE

## 2019-01-03 PROCEDURE — 99214 PR OFFICE/OUTPT VISIT, EST, LEVL IV, 30-39 MIN: ICD-10-PCS | Mod: 25,S$GLB,, | Performed by: FAMILY MEDICINE

## 2019-01-03 PROCEDURE — 90662 IIV NO PRSV INCREASED AG IM: CPT | Mod: S$GLB,,, | Performed by: FAMILY MEDICINE

## 2019-01-03 PROCEDURE — 1101F PR PT FALLS ASSESS DOC 0-1 FALLS W/OUT INJ PAST YR: ICD-10-PCS | Mod: CPTII,S$GLB,, | Performed by: FAMILY MEDICINE

## 2019-01-03 PROCEDURE — G0008 FLU VACCINE - HIGH DOSE (65+) PRESERVATIVE FREE IM: ICD-10-PCS | Mod: S$GLB,,, | Performed by: FAMILY MEDICINE

## 2019-01-03 RX ORDER — PRIMIDONE 50 MG/1
50 TABLET ORAL NIGHTLY
Qty: 90 TABLET | Refills: 3 | Status: SHIPPED | OUTPATIENT
Start: 2019-01-03 | End: 2020-07-17 | Stop reason: ALTCHOICE

## 2019-01-03 NOTE — PATIENT INSTRUCTIONS
Low-Salt Diet  This diet removes foods that are high in salt. It also limits the amount of salt you use when cooking. It is most often used for people with high blood pressure, edema (fluid retention), and kidney, liver, or heart disease.  Table salt contains the mineral sodium. Your body needs sodium to work normally. But too much sodium can make your health problems worse. Your healthcare provider is recommending a low-salt (also called low-sodium) diet for you. Your total daily allowance of salt is 1,500 to 2,300 milligrams (mg). It is less than 1 teaspoon of table salt. This means you can have only about 500 to 700 mg of sodium at each meal. People with certain health problems should limit salt intake to the lower end of the recommended range.    When you cook, dont add much salt. If you can cook without using salt, even better. Dont add salt to your food at the table.  When shopping, read food labels. Salt is often called sodium on the label. Choose foods that are salt-free, low salt, or very low salt. Note that foods with reduced salt may not lower your salt intake enough.    Beans, potatoes, and pasta  Ok: Dry beans, split peas, lentils, potatoes, rice, macaroni, pasta, spaghetti without added salt  Avoid: Potato chips, tortilla chips, and similar products  Breads and cereals  Ok: Low-sodium breads, rolls, cereals, and cakes; low-salt crackers, matzo crackers  Avoid: Salted crackers, pretzels, popcorn, Hungarian toast, pancakes, muffins  Dairy  Ok: Milk, chocolate milk, hot chocolate mix, low-salt cheeses, and yogurt  Avoid: Processed cheese and cheese spreads; Roquefort, Camembert, and cottage cheese; buttermilk, instant breakfast drink  Desserts  Ok: Ice cream, frozen yogurt, juice bars, gelatin, cookies and pies, sugar, honey, jelly, hard candy  Avoid: Most pies, cakes and cookies prepared or processed with salt; instant pudding  Drinks  Ok: Tea, coffee, fizzy (carbonated) drinks, juices  Avoid: Flavored  coffees, electrolyte replacement drinks, sports drinks  Meats  Ok: All fresh meat, fish, poultry, low-salt tuna, eggs, egg substitute  Avoid: Smoked, pickled, brine-cured, or salted meats and fish. This includes rojas, chipped beef, corned beef, hot dogs, deli meats, ham, kosher meats, salt pork, sausage, canned tuna, salted codfish, smoked salmon, herring, sardines, or anchovies.  Seasonings and spices  Ok: Most seasonings are okay. Good substitutes for salt include: fresh herb blends, hot sauce, lemon, garlic, hernandez, vinegar, dry mustard, parsley, cilantro, horseradish, tomato paste, regular margarine, mayonnaise, unsalted butter, cream cheese, vegetable oil, cream, low-salt salad dressing and gravy.  Avoid: Regular ketchup, relishes, pickles, soy sauce, teriyaki sauce, Worcestershire sauce, BBQ sauce, tartar sauce, meat tenderizer, chili sauce, regular gravy, regular salad dressing, salted butter  Soups  Ok: Low-salt soups and broths made with allowed foods  Avoid: Bouillon cubes, soups with smoked or salted meats, regular soup and broth  Vegetables  Ok: Most vegetables are okay; also low-salt tomato and vegetable juices  Avoid: Sauerkraut and other brine-soaked vegetables; pickles and other pickled vegetables; tomato juice, olives  Date Last Reviewed: 8/1/2016 © 2000-2017 [employer]. [address] [place] [address]. All rights reserved. This information is not intended as a substitute for professional medical care. Always follow your healthcare professional's instructions.        Primidone tablets  What is this medicine?  PRIMIDONE (HELADIO mi done) is a barbiturate. This medicine is used to control seizures in certain types of epilepsy. It is not for use in absence (petit mal) seizures.  How should I use this medicine?  Take this medicine by mouth with a glass of water. Follow the directions on the prescription label. Take your doses at regular intervals. Do not take your medicine more  often than directed. Do not stop taking except on the advice of your doctor or health care professional.  A special MedGuide will be given to you by the pharmacist with each prescription and refill. Be sure to read this information carefully each time.  Contact your pediatrician or health care professional regarding the use of this medication in children. Special care may be needed. While this drug may be prescribed for children for selected conditions, precautions do apply.  What side effects may I notice from receiving this medicine?  Side effects that you should report to your doctor or health care professional as soon as possible:  · allergic reactions like skin rash, itching or hives, swelling of the face, lips, or tongue  · blurred, double vision, or uncontrollable rolling or movements of the eyes  · redness, blistering, peeling or loosening of the skin, including inside the mouth  · shortness of breath or difficulty breathing  · unusual excitement or restlessness, more likely in children and the elderly  · unusually weak or tired  · worsening of mood, thoughts or actions of suicide or dying  Side effects that usually do not require medical attention (report to your doctor or health care professional if they continue or are bothersome):  · clumsiness, unsteadiness, or a hang-over effect  · decreased sexual ability  · dizziness, drowsiness  · loss of appetite  · nausea or vomiting  What may interact with this medicine?  Do not take this medicine with any of the following medications:  · voriconazole  This medicine may also interact with the following medications:  · cancer-treating medications  · cyclosporine  · disopyramide  · doxycycline  · female hormones, including contraceptive or birth control pills  · medicines for mental depression, anxiety or other mood problems  · medicines for treating HIV infection or AIDS  · modafinil  · prescription pain medications  · quinidine  · warfarin  What if I miss a  dose?  If you miss a dose, take it as soon as you can. If it is almost time for your next dose, take only that dose. Do not take double or extra doses.  Where should I keep my medicine?  Keep out of the reach of children.  This medicine may cause accidental overdose and death if it taken by other adults, children, or pets. Mix any unused medicine with a substance like cat litter or coffee grounds. Then throw the medicine away in a sealed container like a sealed bag or a coffee can with a lid. Do not use the medicine after the expiration date.  Store at room temperature between 15 and 30 degrees C (59 and 86 degrees F).  What should I tell my health care provider before I take this medicine?  They need to know if you have any of these conditions:  · kidney disease  · liver disease  · porphyria  · suicidal thoughts, plans, or attempt; a previous suicide attempt by you or a family member  · an unusual or allergic reaction to primidone, phenobarbital, other barbiturates or seizure medications, other medicines, foods, dyes, or preservatives  · pregnant or trying to get pregnant  · breast-feeding  What should I watch for while using this medicine?  Visit your doctor or health care professional for regular checks on your progress. It may be 2 to 3 weeks before you see the full effects of this medicine. Do not suddenly stop taking this medicine, you may increase the risk of seizures. Your doctor or health care professional may want to gradually reduce the dose. Wear a medical identification bracelet or chain to say you have epilepsy, and carry a card that lists all your medications.  You may get drowsy or dizzy. Do not drive, use machinery, or do anything that needs mental alertness until you know how this medicine affects you. Do not stand or sit up quickly, especially if you are an older patient. This reduces the risk of dizzy or fainting spells. Alcohol may interfere with the effect of this medicine. Avoid alcoholic  drinks.  Birth control pills may not work properly while you are taking this medicine. Talk to your doctor about using an extra method of birth control.  The use of this medicine may increase the chance of suicidal thoughts or actions. Pay special attention to how you are responding while on this medicine. Any worsening of mood, or thoughts of suicide or dying should be reported to your health care professional right away.  Women who become pregnant while using this medicine may enroll in the [place] Antiepileptic Drug Pregnancy Registry by calling 1-417.797.1432. This registry collects information about the safety of antiepileptic drug use during pregnancy.  NOTE:This sheet is a summary. It may not cover all possible information. If you have questions about this medicine, talk to your doctor, pharmacist, or health care provider. Copyright© 2017 [employer]        Hyponatremia  Hyponatremia means low sodium levels in the blood. This condition most often occurs after prolonged vomiting or diarrhea, which causes your body to lose too much water and sodium. It can also result from drinking excess amounts of water or the use of diuretics (water pills).  Mild hyponatremia causes no symptoms. It is only discovered with a blood test. As sodium levels in the blood decreases, symptoms begin to appear. This includes weakness, confusion, muscle cramping and seizures.  Home care  · Reduce your daily water intake until the problem is corrected.  · If you have been taking diuretics, you may be asked to stop taking them for a short time.  · If you are having symptoms of weakness or confusion, do not drive or operate dangerous machinery until symptoms resolve.  Follow-up care  Follow up with your healthcare provider for a repeat blood test within the next week or as advised by our staff.  When to seek medical advice  Call your healthcare provider if any of the following occur:  · Increasing  weakness  · Dizziness  · Irregular heartbeat, extra beats or very fast heart rate  · Increasing confusion  · Fainting or loss of consciousness  Date Last Reviewed: 7/26/2015 © 2000-2017 [employer]. [address] [place] [address], [place] [address]. All rights reserved. This information is not intended as a substitute for professional medical care. Always follow your healthcare professional's instructions.

## 2019-01-03 NOTE — PROGRESS NOTES
Subjective:       Patient ID: Brie Han is a 89 y.o. female.    Chief Complaint: Hypertension and Leg Pain    Chronic HT, renal CKD III, no edema.   TAMIKO not controlled, white coat syndrome, and frequent problems with concentration.      Hypertension   This is a chronic problem. The current episode started more than 1 year ago. The problem has been waxing and waning since onset. The problem is uncontrolled. Associated symptoms include anxiety and malaise/fatigue. Pertinent negatives include no chest pain, orthopnea, palpitations, peripheral edema, PND, shortness of breath or sweats.     Review of Systems   Constitutional: Positive for malaise/fatigue. Negative for fatigue and unexpected weight change.   Respiratory: Negative for chest tightness and shortness of breath.    Cardiovascular: Negative for chest pain, palpitations, orthopnea and PND.   Gastrointestinal: Negative for abdominal pain.   Genitourinary: Negative for menstrual problem.   Neurological:        Chronic coarse tremors, left arm with intentional, no cog wheel rigidity.   Psychiatric/Behavioral: Negative for dysphoric mood.       Patient Active Problem List   Diagnosis    Benign essential HTN    PAD (peripheral artery disease)    CAD (coronary artery disease)    Hypercholesteremia    Refusal of blood transfusions as patient is [employer]    Hyponatremia    DDD (degenerative disc disease), lumbosacral    Herpes simplex    Neurogenic pain of right foot    GERD without esophagitis       Objective:      Physical Exam   Constitutional: She is oriented to person, place, and time. She appears well-developed and well-nourished.   HENT:   Head: Normocephalic and atraumatic.   Right Ear: External ear normal.   Left Ear: External ear normal.   Nose: Nose normal.   Mouth/Throat: No oropharyngeal exudate.   Eyes: Conjunctivae and EOM are normal. Pupils are equal, round, and reactive to light. Right eye exhibits no discharge. Left eye  exhibits no discharge. No scleral icterus.   Neck: Normal range of motion. Neck supple. No JVD present. No tracheal deviation present. No thyromegaly present.   Cardiovascular: Normal rate, normal heart sounds and intact distal pulses. Exam reveals no gallop and no friction rub.   No murmur heard.  Pulmonary/Chest: Effort normal. No stridor. No respiratory distress. She has no wheezes. She has no rales. She exhibits no tenderness.   Abdominal: Soft. Bowel sounds are normal. She exhibits no distension and no mass. There is no tenderness. There is no rebound and no guarding.   Musculoskeletal: Normal range of motion. She exhibits no edema.   Lymphadenopathy:     She has no cervical adenopathy.   Neurological: She is alert and oriented to person, place, and time. She displays abnormal reflex. No cranial nerve deficit. She exhibits normal muscle tone. Coordination abnormal.   Skin: Skin is dry. No rash noted. She is not diaphoretic. No erythema. No pallor.   Psychiatric: She has a normal mood and affect. Her behavior is normal. Judgment and thought content normal.   Vitals reviewed.      Lab Results   Component Value Date    WBC 5.08 12/26/2018    HGB 11.8 (L) 12/26/2018    HCT 35.7 (L) 12/26/2018     12/26/2018    CHOL 175 12/26/2018    TRIG 57 12/26/2018    HDL 74 12/26/2018    ALT 18 12/26/2018    AST 24 12/26/2018     (L) 12/26/2018    K 4.2 12/26/2018    CL 98 12/26/2018    CREATININE 0.8 12/26/2018    BUN 15 12/26/2018    CO2 28 12/26/2018    TSH 1.532 10/22/2017    INR 1.0 05/06/2018    HGBA1C 5.4 10/23/2017     The ASCVD Risk score (Diane DC Jr., et al., 2013) failed to calculate for the following reasons:    The 2013 ASCVD risk score is only valid for ages 40 to 79    Assessment:       1. Benign essential HTN    2. PAD (peripheral artery disease)    3. Hypercholesteremia    4. Coarse tremors    5. Hyponatremia        Plan:       Benign essential HTN  -     Basic metabolic panel; Future; Expected  date: 01/03/2019  -     CBC auto differential; Future; Expected date: 01/03/2019    PAD (peripheral artery disease)    Hypercholesteremia    Coarse tremors  -     primidone (MYSOLINE) 50 MG Tab; Take 1 tablet (50 mg total) by mouth every evening.  Dispense: 90 tablet; Refill: 3    Hyponatremia    Other orders  -     Influenza - High Dose (65+) (PF) (IM)      Patient readiness: acceptance and barriers:readiness    During the course of the visit the patient was educated and counseled about the following:     Hypertension:   Regular aerobic exercise.  Check blood pressures daily and record.  Follow up: 6 months and as needed.    Goals: Hypertension: Reduce Blood Pressure    Did patient meet goals/outcomes: Yes    The following self management tools provided: blood pressure log  excercise log    Patient Instructions (the written plan) was given to the patient/family.     Time spent with patient: 30 minutes    Barriers to medications present (no )    Adverse reactions to current medications (no)    Over the counter medications reviewed (Yes)        40 minutes spent counseling patient on diet, exercise, and weight loss.  This has been fully explained to the patient, who indicates understanding.

## 2019-01-05 ENCOUNTER — PATIENT MESSAGE (OUTPATIENT)
Dept: FAMILY MEDICINE | Facility: CLINIC | Age: 84
End: 2019-01-05

## 2019-01-10 ENCOUNTER — TELEPHONE (OUTPATIENT)
Dept: FAMILY MEDICINE | Facility: CLINIC | Age: 84
End: 2019-01-10

## 2019-01-10 NOTE — TELEPHONE ENCOUNTER
----- Message from Jeronimo Watts sent at 1/10/2019 12:48 PM CST -----  Contact: Patient  Type: Needs Medical Advice    Who Called:  Patient  Best Call Back Number: [phone]  Additional Information: Patient does not feel very good from fluid shot on Monday. Patient states they might have had a reaction to it. Please call to advise. Thanks!

## 2019-01-10 NOTE — TELEPHONE ENCOUNTER
Patient states she received flu shot at appointment on 1-3-19. States at the time of her appointment she reported to MD she was already having some sinus issues. Patient states on last night she started experiencing body aches and chills. Also reports she is experiencing frequent bowel movements. Stool is normal in color and texture (formed stool). Patient denies fever, difficulty breathing, dizziness, or palpitations. Advised patient to refer to VIS section 5 where normal reactions to vaccination are listed as hoarseness, cough, fever, aches, headache, and fatigue. Advised patient symptoms do not appear to be reaction to flu shot. Will send message to PCP for notification.

## 2019-01-11 ENCOUNTER — TELEPHONE (OUTPATIENT)
Dept: FAMILY MEDICINE | Facility: CLINIC | Age: 84
End: 2019-01-11

## 2019-01-11 NOTE — TELEPHONE ENCOUNTER
----- Message from Kim Levin sent at 1/11/2019  4:33 PM CST -----  Contact: Dionisio hartman  Type:  Patient Returning Call    Who Called:  Dionisio Dawson  Who Left Message for Patient:  na  Does the patient know what this is regarding?:  Sent a message on [facility]  Best Call Back Number:  [phone]    Additional Information:  na

## 2019-01-11 NOTE — TELEPHONE ENCOUNTER
CKD dx due to decrease kidney levels back 3 yrs ago. Improved.    Use OTC Coricidin if needed for sinus problems.

## 2019-01-12 ENCOUNTER — HOSPITAL ENCOUNTER (EMERGENCY)
Facility: HOSPITAL | Age: 84
Discharge: HOME | End: 2019-01-12
Attending: EMERGENCY MEDICINE
Payer: MEDICARE

## 2019-01-12 VITALS
HEIGHT: 59 IN | HEART RATE: 72 BPM | TEMPERATURE: 99 F | DIASTOLIC BLOOD PRESSURE: 77 MMHG | WEIGHT: 120 LBS | BODY MASS INDEX: 24.19 KG/M2 | SYSTOLIC BLOOD PRESSURE: 198 MMHG | RESPIRATION RATE: 18 BRPM | OXYGEN SATURATION: 100 %

## 2019-01-12 DIAGNOSIS — Z79.899 MEDICATION THERAPY CONTINUED: Primary | ICD-10-CM

## 2019-01-12 PROCEDURE — 99282 EMERGENCY DEPT VISIT SF MDM: CPT

## 2019-01-13 NOTE — ED NOTES
Given written and verbal DC instructions questions answered per MD aware to follow up with PCP encouraged to return if needed.

## 2019-01-13 NOTE — DISCHARGE INSTRUCTIONS
Continue your routine medications as planned.  For worsening symptoms, chest pain, shortness of breath, increased abdominal pain, high grade fever, stroke or stroke like symptoms, immediately go to the nearest Emergency Room or call 911 as soon as possible.

## 2019-01-13 NOTE — ED PROVIDER NOTES
Encounter Date: 2019    SCRIBE #1 NOTE: I, Saleem Schulte, am scribing for, and in the presence of, Elizabeth Lynn PA-C.       History     Chief Complaint   Patient presents with    Medication Reaction     pt reports she thinks she took 2 blood pressure pills this morning, denies adverse symptoms     Time seen by provider: 6:45 PM on 2019      Brie Han is a 89 y.o. female with a PMHx of HTN, CVA, HLD, and HLD who presents to the ED for further evaluation after missing a dose of her medication earlier this am. The patient states that she believes that she accidentally took her procardia instead of her Plavix this am this morning. She relays that she became fearful of missing the dose. She states that since then she has felt fine, but is concerned about the medications she took. She reports calling the on call nurse who recommend she come to the ER. She denied any complaints. Patient has a PSHx of hysterectomy, tonsillectomy, and breast surgery. The patient has no pertinent SHx.             The history is provided by the patient.     Review of patient's allergies indicates:   Allergen Reactions    Contrast media Other (See Comments)    Aspirin Other (See Comments)    Ciprofloxacin Other (See Comments)    Iron Other (See Comments)    Iodine Rash    Penicillins Rash     Past Medical History:   Diagnosis Date    Anticoagulant long-term use     Arthritis     Glaucoma     resolved    Hyperlipidemia     Hypertension     Stroke      Past Surgical History:   Procedure Laterality Date    APPENDECTOMY  's    BREAST SURGERY      CATARACT EXTRACTION W/  INTRAOCULAR LENS IMPLANT Bilateral     Dr Rojo      SECTION      3 c/s and d/c    EYE SURGERY      Glaucoma     HYSTERECTOMY      TONSILLECTOMY       Family History   Problem Relation Age of Onset    Early death Mother     Stroke Father     Hypertension Father     Diabetes Father     Hypertension Sister      Thyroid disease Paternal Grandfather     No Known Problems Brother     No Known Problems Maternal Aunt     No Known Problems Maternal Uncle     No Known Problems Paternal Aunt     No Known Problems Paternal Uncle     No Known Problems Maternal Grandmother     No Known Problems Maternal Grandfather     No Known Problems Paternal Grandmother     Amblyopia Neg Hx     Blindness Neg Hx     Cancer Neg Hx     Cataracts Neg Hx     Glaucoma Neg Hx     Macular degeneration Neg Hx     Retinal detachment Neg Hx     Strabismus Neg Hx      Social History     Tobacco Use    Smoking status: Never Smoker    Smokeless tobacco: Never Used   Substance Use Topics    Alcohol use: No    Drug use: No     Review of Systems   Constitutional: Negative for activity change, appetite change, chills and fever.        +missed medication dosage   HENT: Negative for congestion, rhinorrhea and sore throat.    Eyes: Negative for redness and visual disturbance.   Respiratory: Negative for cough, chest tightness and shortness of breath.    Cardiovascular: Negative for chest pain.   Gastrointestinal: Negative for abdominal pain, diarrhea, nausea and vomiting.   Genitourinary: Negative for dysuria and frequency.   Musculoskeletal: Negative for back pain, neck pain and neck stiffness.   Skin: Negative for rash.   Neurological: Negative for dizziness, syncope, numbness and headaches.       Physical Exam     Initial Vitals [01/12/19 1736]   BP Pulse Resp Temp SpO2   (!) 187/75 74 18 98.1 °F (36.7 °C) 99 %      MAP       --         Physical Exam    Nursing note and vitals reviewed.  Constitutional: She appears well-developed and well-nourished. She is cooperative.  Non-toxic appearance. She does not have a sickly appearance.   HENT:   Head: Normocephalic and atraumatic.   Right Ear: External ear normal.   Left Ear: External ear normal.   Nose: Nose normal.   Mouth/Throat: Oropharynx is clear and moist.   Eyes: Conjunctivae and lids are  normal. Pupils are equal, round, and reactive to light.   Neck: Normal range of motion and full passive range of motion without pain. Neck supple.   Cardiovascular: Normal rate, regular rhythm and normal heart sounds. Exam reveals no gallop and no friction rub.    No murmur heard.  Pulmonary/Chest: Breath sounds normal. She has no wheezes. She has no rhonchi. She has no rales.   Abdominal: Normal appearance.   Neurological: She is alert and oriented to person, place, and time.   Skin: Skin is warm, dry and intact. No rash noted.         ED Course   Procedures  Labs Reviewed - No data to display       Imaging Results    None          Medical Decision Making:   History:   Old Medical Records: I decided to obtain old medical records.       APC / Resident Notes:   Urgent evaluation of a 89-year-old female who presents after confusion about her medications.  She states that she normally takes Plavix in the morning but accidentally took her Procardia.  She notices tonight when she went to take her routine nightly medications.  The on-call nurse recommended that she be evaluated.  After talking with her it seems that she did not take Plavix today.  She did not double any of her blood pressure medications.  She states she took the broke cardiac out of her mouth before she repeated taking it again this evening.  Her blood pressure is actually elevated.  She states this is secondary to her being worked up for having to be here.  She denies any complaints.  She took her routine dose of Plavix here in the emergency room and has been instructed to continue her routine medications tomorrow as planned. Return precautions given. Based on my clinical evaluation, I do not appreciate any immediate, emergent, or life threatening condition or etiology that warrants additional workup today and feel that the patient can be discharged with close follow up care.  Patient is to follow up with their primary care provider. Case was discussed  with Dr. Haider who is in agreement with the plan of care. All questions answered.          Scribe Attestation:   Scribe #1: I performed the above scribed service and the documentation accurately describes the services I performed. I attest to the accuracy of the note.    I, Elizabeth Lynn PA-C, personally performed the services described in this documentation. All medical record entries made by the scribe were at my direction and in my presence.  I have reviewed the chart and agree that the record reflects my personal performance and is accurate and complete. Elizabeth Lynn PA-C.  9:25 PM 01/12/2019             Clinical Impression:   The encounter diagnosis was Medication therapy continued.      Disposition:   Disposition: Discharged  Condition: Stable                        Elizabeth Lynn PA-C  01/12/19 [id number]

## 2019-01-21 ENCOUNTER — PATIENT MESSAGE (OUTPATIENT)
Dept: FAMILY MEDICINE | Facility: CLINIC | Age: 84
End: 2019-01-21

## 2019-01-21 ENCOUNTER — TELEPHONE (OUTPATIENT)
Dept: FAMILY MEDICINE | Facility: CLINIC | Age: 84
End: 2019-01-21

## 2019-01-21 NOTE — TELEPHONE ENCOUNTER
----- Message from Dariana Caldwell sent at 1/21/2019  1:45 PM CST -----  Contact: devan Diggs - Saleem Han - [phone] is asking for Dr Gould to please look at his message on [facility] and give him a call/Thank You

## 2019-01-23 ENCOUNTER — OFFICE VISIT (OUTPATIENT)
Dept: OPHTHALMOLOGY | Facility: CLINIC | Age: 84
End: 2019-01-23
Payer: MEDICARE

## 2019-01-23 DIAGNOSIS — Z96.1 PSEUDOPHAKIA OF BOTH EYES: ICD-10-CM

## 2019-01-23 DIAGNOSIS — H02.834 DERMATOCHALASIS OF BOTH UPPER EYELIDS: ICD-10-CM

## 2019-01-23 DIAGNOSIS — H40.023 OPEN ANGLE WITH BORDERLINE FINDINGS AND HIGH GLAUCOMA RISK IN BOTH EYES: Primary | ICD-10-CM

## 2019-01-23 DIAGNOSIS — H02.831 DERMATOCHALASIS OF BOTH UPPER EYELIDS: ICD-10-CM

## 2019-01-23 PROCEDURE — 92015 PR REFRACTION: ICD-10-PCS | Mod: S$GLB,,, | Performed by: OPHTHALMOLOGY

## 2019-01-23 PROCEDURE — 92020 GONIOSCOPY: CPT | Mod: S$GLB,,, | Performed by: OPHTHALMOLOGY

## 2019-01-23 PROCEDURE — 92015 DETERMINE REFRACTIVE STATE: CPT | Mod: S$GLB,,, | Performed by: OPHTHALMOLOGY

## 2019-01-23 PROCEDURE — 92004 PR EYE EXAM, NEW PATIENT,COMPREHESV: ICD-10-PCS | Mod: S$GLB,,, | Performed by: OPHTHALMOLOGY

## 2019-01-23 PROCEDURE — 92004 COMPRE OPH EXAM NEW PT 1/>: CPT | Mod: S$GLB,,, | Performed by: OPHTHALMOLOGY

## 2019-01-23 PROCEDURE — 99999 PR PBB SHADOW E&M-EST. PATIENT-LVL III: ICD-10-PCS | Mod: PBBFAC,,, | Performed by: OPHTHALMOLOGY

## 2019-01-23 PROCEDURE — 99999 PR PBB SHADOW E&M-EST. PATIENT-LVL III: CPT | Mod: PBBFAC,,, | Performed by: OPHTHALMOLOGY

## 2019-01-23 PROCEDURE — 92020 PR SPECIAL EYE EVAL,GONIOSCOPY: ICD-10-PCS | Mod: S$GLB,,, | Performed by: OPHTHALMOLOGY

## 2019-01-23 NOTE — PROGRESS NOTES
HPI     DLS- 11/10/17 by Berry     Pt is here for routine eye exam. Pt states in her OD va she can only see   inferiorly. OS va is great. Denies eye pain. Cat sx in 2003 OU. Pt   complains of dry eyes in the morning. No flashes/floaters. No current   gtts. Artifical tears prn. Pt is currently taking meds for high blood   pressure, she states it is controlled currently. Wears correction only for   reading.     Last edited by River Perez on 1/23/2019 10:02 AM. (History)        ROS     Negative for: Constitutional, Gastrointestinal, Neurological, Skin,   Genitourinary, Musculoskeletal, HENT, Endocrine, Cardiovascular, Eyes,   Respiratory, Psychiatric, Allergic/Imm, Heme/Lymph    Last edited by Kvng Stern Jr., MD on 1/23/2019 11:35 AM. (History)        Assessment /Plan     For exam results, see Encounter Report.    Open angle with borderline findings and high glaucoma risk in both eyes- patient was diagnosed with glaucoma in the past, s/p LPI OU but after cataract surgery was taken  Off glaucoma meds, Today IOP 22 OU, gonio: open to SS ([facility])  -     Anderson Visual Field - OU - Extended - Both Eyes; Future; Expected date: 02/23/2019  -     Posterior Segment OCT Optic Nerve- Both eyes; Future; Expected date: 02/23/2019   -needs pachy at next visit  Follow-up in about 1 month (around 2/23/2019) for HVF, OCT ON.  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

## 2019-01-23 NOTE — PATIENT INSTRUCTIONS
Glaucoma, Open-Angle (Chronic)    The eye is a fluid-filled globe with a lens in the front and a light-sensitive screen in the back (retina). The optic nerve conducts light signals from the retina to the brain and allows you to see visual images. Eye fluid is constantly produced within the eye. Excess fluid drains out into the bloodstream.  Open-angle glaucoma is a condition where the fluid pressure in the eye gradually increases and reduces blood flow to the optic nerve. This causes a gradual loss of vision, over months to years, which may progress to complete blindness if not treated. The cause of glaucoma is not known.  Open-angle glaucoma is painless. The first symptoms may be loss of peripheral (side) vision. Since most people dont pay much attention to their peripheral vision, you may have a lot of vision loss before you become aware of the problem. The vision loss is permanent.  Open-angle glaucoma can be treated with eye drops, surgery, and sometimes pills. These help to lower the pressure in the eye. Treatment is usually successful at keeping pressures low and preventing further vision loss. However, the condition cannot be cured. You will need to receive treatment for the rest of your life. Regular follow-up care with an ophthalmologist (a medical doctor who specializes in eye care) is very important to follow your response to the medicines.  Home care  · Take prescribed medicines exactly as directed.  · The eye needs certain vitamins and minerals for good health--especially vitamins A, C, and E, as well as zinc and copper. Eat a healthy diet full of fruits and vegetables to ensure that you get enough of these nutrients. If you have trouble following a balanced diet, consider taking a vitamin and mineral supplement.  · Drink 6 to 8 glasses of water in the course of a day. Drinking too much at one time (more than 1 quart) may increase eye pressure.  · Limit the amount of caffeine that you  drink.  · Regular exercise (3 times a week) may help reduce eye pressure. Avoid exercise positions with your head below your waist (such as bending over). This position will increase eye pressure. Talk to your healthcare provider about an appropriate exercise program for you.  · Protect your eyes. An eye injury can cause increased eye pressure. Wear safety glasses or goggles when you play sports, use tools or machinery, or work with chemicals.  Follow-up care  Follow up with your eye doctor, or as advised. Regular appointments will help make sure that your treatment is helping to keep your eyes at a safe pressure. Note: Open-angle glaucoma tends to run in families. Other family members over the age of 40 should also be examined by an eye doctor.  When to seek medical advice  Call your healthcare provider right away if any of these occur:  · Further loss of peripheral vision  · Blurred vision  · Eye pain or redness  · Severe headache  · [place] halos around lights  · Sudden loss of vision  Date Last Reviewed: 6/22/2015  © 8023-9408 [employer]. [address] [place] [address], [place] [address]. All rights reserved. This information is not intended as a substitute for professional medical care. Always follow your healthcare professional's instructions.

## 2019-01-23 NOTE — LETTER
January 23, 2019      Colten Gould MD  [address] [place] [address]           [facility]  [address] [facility] [address] [place] [address]  Phone: [phone]  Fax: [phone]          Patient: Brie Han   MR Number: [id number]   YOB: 1927   Date of Visit: 1/23/2019       Dear Dr. Colten Gould:    Thank you for referring Brie Han to me for evaluation. Attached you will find relevant portions of my assessment and plan of care.    If you have questions, please do not hesitate to call me. I look forward to following Brie Han along with you.    Sincerely,    Kvng Stern Jr., MD    Enclosure  CC:  No Recipients    If you would like to receive this communication electronically, please contact [email or web address] or [phone] to request more information on [employer] access.    For providers and/or their staff who would like to refer a patient to [facility], please contact us through our one-stop-shop provider referral line, [facility] [place], at [phone].    If you feel you have received this communication in error or would no longer like to receive these types of communications, please e-mail [email or web address]

## 2019-01-29 DIAGNOSIS — I25.10 CORONARY ARTERY DISEASE DUE TO CALCIFIED CORONARY LESION: ICD-10-CM

## 2019-01-29 DIAGNOSIS — I25.84 CORONARY ARTERY DISEASE DUE TO CALCIFIED CORONARY LESION: ICD-10-CM

## 2019-01-29 RX ORDER — CLOPIDOGREL BISULFATE 75 MG/1
TABLET ORAL
Qty: 90 TABLET | Refills: 4 | Status: SHIPPED | OUTPATIENT
Start: 2019-01-29 | End: 2020-01-20

## 2019-01-30 ENCOUNTER — PATIENT MESSAGE (OUTPATIENT)
Dept: FAMILY MEDICINE | Facility: CLINIC | Age: 84
End: 2019-01-30

## 2019-01-30 DIAGNOSIS — B37.89 CANDIDIASIS OF BREAST: Primary | ICD-10-CM

## 2019-02-05 RX ORDER — NYSTATIN AND TRIAMCINOLONE ACETONIDE 100000; 1 [USP'U]/G; MG/G
OINTMENT TOPICAL 2 TIMES DAILY
Qty: 60 G | Refills: 1 | Status: SHIPPED | OUTPATIENT
Start: 2019-02-05 | End: 2019-04-30 | Stop reason: ALTCHOICE

## 2019-02-05 RX ORDER — FLUCONAZOLE 150 MG/1
150 TABLET ORAL DAILY
Qty: 1 TABLET | Refills: 0 | Status: SHIPPED | OUTPATIENT
Start: 2019-02-05 | End: 2019-02-06

## 2019-02-25 ENCOUNTER — PATIENT MESSAGE (OUTPATIENT)
Dept: FAMILY MEDICINE | Facility: CLINIC | Age: 84
End: 2019-02-25

## 2019-02-26 ENCOUNTER — CLINICAL SUPPORT (OUTPATIENT)
Dept: OPHTHALMOLOGY | Facility: CLINIC | Age: 84
End: 2019-02-26
Payer: MEDICARE

## 2019-02-26 ENCOUNTER — OFFICE VISIT (OUTPATIENT)
Dept: OPHTHALMOLOGY | Facility: CLINIC | Age: 84
End: 2019-02-26
Payer: MEDICARE

## 2019-02-26 ENCOUNTER — CLINICAL SUPPORT (OUTPATIENT)
Dept: OPHTHALMOLOGY | Facility: CLINIC | Age: 84
End: 2019-02-26
Attending: OPHTHALMOLOGY
Payer: MEDICARE

## 2019-02-26 DIAGNOSIS — H40.023 OPEN ANGLE WITH BORDERLINE FINDINGS AND HIGH GLAUCOMA RISK IN BOTH EYES: ICD-10-CM

## 2019-02-26 DIAGNOSIS — H02.831 DERMATOCHALASIS OF BOTH UPPER EYELIDS: ICD-10-CM

## 2019-02-26 DIAGNOSIS — Z96.1 PSEUDOPHAKIA OF BOTH EYES: ICD-10-CM

## 2019-02-26 DIAGNOSIS — H02.834 DERMATOCHALASIS OF BOTH UPPER EYELIDS: ICD-10-CM

## 2019-02-26 DIAGNOSIS — H40.1132 PRIMARY OPEN ANGLE GLAUCOMA (POAG) OF BOTH EYES, MODERATE STAGE: Primary | ICD-10-CM

## 2019-02-26 PROCEDURE — 99999 PR PBB SHADOW E&M-EST. PATIENT-LVL III: CPT | Mod: PBBFAC,HCNC,, | Performed by: OPHTHALMOLOGY

## 2019-02-26 PROCEDURE — 99999 PR PBB SHADOW E&M-EST. PATIENT-LVL III: ICD-10-PCS | Mod: PBBFAC,HCNC,, | Performed by: OPHTHALMOLOGY

## 2019-02-26 PROCEDURE — 99999 PR PBB SHADOW E&M-EST. PATIENT-LVL I: CPT | Mod: PBBFAC,HCNC,,

## 2019-02-26 PROCEDURE — 92012 PR EYE EXAM, EST PATIENT,INTERMED: ICD-10-PCS | Mod: HCNC,S$GLB,, | Performed by: OPHTHALMOLOGY

## 2019-02-26 PROCEDURE — 99999 PR PBB SHADOW E&M-EST. PATIENT-LVL I: ICD-10-PCS | Mod: PBBFAC,HCNC,,

## 2019-02-26 PROCEDURE — 92012 INTRM OPH EXAM EST PATIENT: CPT | Mod: HCNC,S$GLB,, | Performed by: OPHTHALMOLOGY

## 2019-02-26 RX ORDER — LATANOPROST 50 UG/ML
1 SOLUTION/ DROPS OPHTHALMIC NIGHTLY
Qty: 2.5 ML | Refills: 12 | Status: SHIPPED | OUTPATIENT
Start: 2019-02-26 | End: 2019-05-22 | Stop reason: SDUPTHER

## 2019-02-26 NOTE — PATIENT INSTRUCTIONS
Glaucoma, Open-Angle (Chronic)    The eye is a fluid-filled globe with a lens in the front and a light-sensitive screen in the back (retina). The optic nerve conducts light signals from the retina to the brain and allows you to see visual images. Eye fluid is constantly produced within the eye. Excess fluid drains out into the bloodstream.  Open-angle glaucoma is a condition where the fluid pressure in the eye gradually increases and reduces blood flow to the optic nerve. This causes a gradual loss of vision, over months to years, which may progress to complete blindness if not treated. The cause of glaucoma is not known.  Open-angle glaucoma is painless. The first symptoms may be loss of peripheral (side) vision. Since most people dont pay much attention to their peripheral vision, you may have a lot of vision loss before you become aware of the problem. The vision loss is permanent.  Open-angle glaucoma can be treated with eye drops, surgery, and sometimes pills. These help to lower the pressure in the eye. Treatment is usually successful at keeping pressures low and preventing further vision loss. However, the condition cannot be cured. You will need to receive treatment for the rest of your life. Regular follow-up care with an ophthalmologist (a medical doctor who specializes in eye care) is very important to follow your response to the medicines.  Home care  · Take prescribed medicines exactly as directed.  · The eye needs certain vitamins and minerals for good health--especially vitamins A, C, and E, as well as zinc and copper. Eat a healthy diet full of fruits and vegetables to ensure that you get enough of these nutrients. If you have trouble following a balanced diet, consider taking a vitamin and mineral supplement.  · Drink 6 to 8 glasses of water in the course of a day. Drinking too much at one time (more than 1 quart) may increase eye pressure.  · Limit the amount of caffeine that you  drink.  · Regular exercise (3 times a week) may help reduce eye pressure. Avoid exercise positions with your head below your waist (such as bending over). This position will increase eye pressure. Talk to your healthcare provider about an appropriate exercise program for you.  · Protect your eyes. An eye injury can cause increased eye pressure. Wear safety glasses or goggles when you play sports, use tools or machinery, or work with chemicals.  Follow-up care  Follow up with your eye doctor, or as advised. Regular appointments will help make sure that your treatment is helping to keep your eyes at a safe pressure. Note: Open-angle glaucoma tends to run in families. Other family members over the age of 40 should also be examined by an eye doctor.  When to seek medical advice  Call your healthcare provider right away if any of these occur:  · Further loss of peripheral vision  · Blurred vision  · Eye pain or redness  · Severe headache  · [place] halos around lights  · Sudden loss of vision  Date Last Reviewed: 6/22/2015  © 3220-2805 [employer], [employer]. [address] [place] [address], [place] [address]. All rights reserved. This information is not intended as a substitute for professional medical care. Always follow your healthcare professional's instructions.

## 2019-02-26 NOTE — PROGRESS NOTES
HPI     Glaucoma      Additional comments: 1 month iop ck with hvf and oct review              Comments     DLS: 1/23/19    Pt states no changes in va since last visit. Pt is having sinus and   allergy issues today.               Last edited by Cheryle Quintana on 2/26/2019 10:13 AM. (History)        ROS     Negative for: Constitutional, Gastrointestinal, Neurological, Skin,   Genitourinary, Musculoskeletal, HENT, Endocrine, Cardiovascular, Eyes,   Respiratory, Psychiatric, Allergic/Imm, Heme/Lymph    Last edited by Kvng Stern Jr., MD on 2/26/2019 10:40 AM. (History)        Assessment /Plan     For exam results, see Encounter Report.    Primary open angle glaucoma (POAG) of both eyes, moderate stage  -     latanoprost 0.005 % ophthalmic solution; Place 1 drop into both eyes every evening.  Dispense: 2.5 mL; Refill: 12  HVF altitudinal defect OD and arcuate defect OS OCT ON inferior thinning OU  Follow-up in about 1 month (around 3/26/2019) for IOP and Medication check.     Dermatochalasis of both upper eyelids- borderline, continue to monitor    Pseudophakia of both eyes- stable, observe

## 2019-03-26 ENCOUNTER — OFFICE VISIT (OUTPATIENT)
Dept: OPHTHALMOLOGY | Facility: CLINIC | Age: 84
End: 2019-03-26
Payer: MEDICARE

## 2019-03-26 DIAGNOSIS — H40.1132 PRIMARY OPEN ANGLE GLAUCOMA (POAG) OF BOTH EYES, MODERATE STAGE: Primary | ICD-10-CM

## 2019-03-26 DIAGNOSIS — H02.831 DERMATOCHALASIS OF BOTH UPPER EYELIDS: ICD-10-CM

## 2019-03-26 DIAGNOSIS — Z96.1 PSEUDOPHAKIA OF BOTH EYES: ICD-10-CM

## 2019-03-26 DIAGNOSIS — H02.834 DERMATOCHALASIS OF BOTH UPPER EYELIDS: ICD-10-CM

## 2019-03-26 PROCEDURE — 92012 INTRM OPH EXAM EST PATIENT: CPT | Mod: HCNC,S$GLB,, | Performed by: OPHTHALMOLOGY

## 2019-03-26 PROCEDURE — 99999 PR PBB SHADOW E&M-EST. PATIENT-LVL III: ICD-10-PCS | Mod: PBBFAC,HCNC,, | Performed by: OPHTHALMOLOGY

## 2019-03-26 PROCEDURE — 99999 PR PBB SHADOW E&M-EST. PATIENT-LVL III: CPT | Mod: PBBFAC,HCNC,, | Performed by: OPHTHALMOLOGY

## 2019-03-26 PROCEDURE — 92012 PR EYE EXAM, EST PATIENT,INTERMED: ICD-10-PCS | Mod: HCNC,S$GLB,, | Performed by: OPHTHALMOLOGY

## 2019-03-26 NOTE — PROGRESS NOTES
HPI     Dry Eye      Additional comments: pt uses atears (Not PM Gel) in PM only// advised pt   she can use throughtout the day also//   She               Glaucoma      Additional comments: 1 month iop ch//  Latanoprost QHS OU//              Comments     pt uses atears (Not PM Gel) in PM only// advised pt she can use   throughtout the day also//     1 month iop ch//  Latanoprost QHS OU//  NO trouble putting and getting   drops in per pt//          Last edited by Ana Lilia Marlow on 3/26/2019 10:30 AM. (History)        ROS     Negative for: Constitutional, Gastrointestinal, Neurological, Skin,   Genitourinary, Musculoskeletal, HENT, Endocrine, Cardiovascular, Eyes,   Respiratory, Psychiatric, Allergic/Imm, Heme/Lymph    Last edited by Kvng Stern Jr., MD on 3/26/2019 10:49 AM. (History)        Assessment /Plan     For exam results, see Encounter Report.    Primary open angle glaucoma (POAG) of both eyes, moderate stage    Dermatochalasis of both upper eyelids    Pseudophakia of both eyes       Primary open angle glaucoma (POAG) of both eyes, moderate stage  -Improved IOP with latanoprost QHS OU, continue latanoprost QHS OU  Follow up in about 4 months (around 7/26/2019) for IOP and Medication check.     Dermatochalasis of both upper eyelids- borderline, continue to monitor- asymptomatic, observe    Pseudophakia of both eyes- stable, observe

## 2019-03-26 NOTE — PATIENT INSTRUCTIONS
"  What Is Glaucoma?    Glaucoma is an eye disease that can cause blindness. If caught early, it can usually be controlled. But it often has no symptoms, so you need regular eye exams. Glaucoma usually begins when pressure builds up in the eye. This pressure can damage the optic nerve. The optic nerve sends messages to the brain so you can see. There are two main kinds of glaucoma: "open-angle" and "closed-angle."  Drainage area  The eye is always producing fluid. The eye's drainage areas may become clogged or blocked. Too much fluid stays in the eye. This increases eye pressure.  Optic nerve  Too much pressure in the eye can damage the optic nerve. If damaged, this nerve cannot send the messages to the brain that let you see.  Open-Angle Glaucoma  Open-angle is the most common kind of glaucoma. It occurs slowly as people age. The drainage area in the eye becomes clogged. Not enough fluid drains from the eye, so pressure slowly builds up. This causes gradual loss of side (peripheral) vision. You may not even notice changes until much of your vision is lost.  Closed-Angle Glaucoma  Closed-angle glaucoma is less common than open-angle. It usually comes on quickly. The drainage area in the eye suddenly becomes completely blocked. Eye pressure builds rapidly. You may notice blurred vision and rainbow halos around lights. You may also have headaches, nausea, vomiting, and severe pain. If not treated right away, blindness can occur quickly.  Date Last Reviewed: 6/1/2015  © 2706-0456 [employer]. [address] [place] [address], [place] [address]. All rights reserved. This information is not intended as a substitute for professional medical care. Always follow your healthcare professional's instructions.        "

## 2019-04-15 ENCOUNTER — PATIENT OUTREACH (OUTPATIENT)
Dept: ADMINISTRATIVE | Facility: HOSPITAL | Age: 84
End: 2019-04-15

## 2019-04-17 ENCOUNTER — LAB VISIT (OUTPATIENT)
Dept: LAB | Facility: HOSPITAL | Age: 84
End: 2019-04-17
Attending: FAMILY MEDICINE
Payer: MEDICARE

## 2019-04-17 DIAGNOSIS — I10 BENIGN ESSENTIAL HTN: ICD-10-CM

## 2019-04-17 LAB
ANION GAP SERPL CALC-SCNC: 8 MMOL/L (ref 8–16)
BASOPHILS # BLD AUTO: 0.04 K/UL (ref 0–0.2)
BASOPHILS NFR BLD: 1.1 % (ref 0–1.9)
BUN SERPL-MCNC: 13 MG/DL (ref 10–30)
CALCIUM SERPL-MCNC: 9.7 MG/DL (ref 8.7–10.5)
CHLORIDE SERPL-SCNC: 101 MMOL/L (ref 95–110)
CO2 SERPL-SCNC: 28 MMOL/L (ref 23–29)
CREAT SERPL-MCNC: 0.8 MG/DL (ref 0.5–1.4)
DIFFERENTIAL METHOD: ABNORMAL
EOSINOPHIL # BLD AUTO: 0.1 K/UL (ref 0–0.5)
EOSINOPHIL NFR BLD: 2.7 % (ref 0–8)
ERYTHROCYTE [DISTWIDTH] IN BLOOD BY AUTOMATED COUNT: 14.4 % (ref 11.5–14.5)
EST. GFR  (AFRICAN AMERICAN): >60 ML/MIN/1.73 M^2
EST. GFR  (NON AFRICAN AMERICAN): >60 ML/MIN/1.73 M^2
GLUCOSE SERPL-MCNC: 101 MG/DL (ref 70–110)
HCT VFR BLD AUTO: 36.9 % (ref 37–48.5)
HGB BLD-MCNC: 11.7 G/DL (ref 12–16)
IMM GRANULOCYTES # BLD AUTO: 0.01 K/UL (ref 0–0.04)
IMM GRANULOCYTES NFR BLD AUTO: 0.3 % (ref 0–0.5)
LYMPHOCYTES # BLD AUTO: 1.2 K/UL (ref 1–4.8)
LYMPHOCYTES NFR BLD: 31.8 % (ref 18–48)
MCH RBC QN AUTO: 31.9 PG (ref 27–31)
MCHC RBC AUTO-ENTMCNC: 31.7 G/DL (ref 32–36)
MCV RBC AUTO: 101 FL (ref 82–98)
MONOCYTES # BLD AUTO: 0.5 K/UL (ref 0.3–1)
MONOCYTES NFR BLD: 14.3 % (ref 4–15)
NEUTROPHILS # BLD AUTO: 1.9 K/UL (ref 1.8–7.7)
NEUTROPHILS NFR BLD: 49.8 % (ref 38–73)
NRBC BLD-RTO: 0 /100 WBC
PLATELET # BLD AUTO: 277 K/UL (ref 150–350)
PMV BLD AUTO: 10.4 FL (ref 9.2–12.9)
POTASSIUM SERPL-SCNC: 4.9 MMOL/L (ref 3.5–5.1)
RBC # BLD AUTO: 3.67 M/UL (ref 4–5.4)
SODIUM SERPL-SCNC: 137 MMOL/L (ref 136–145)
WBC # BLD AUTO: 3.71 K/UL (ref 3.9–12.7)

## 2019-04-17 PROCEDURE — 85025 COMPLETE CBC W/AUTO DIFF WBC: CPT | Mod: HCNC

## 2019-04-17 PROCEDURE — 80048 BASIC METABOLIC PNL TOTAL CA: CPT | Mod: HCNC

## 2019-04-17 PROCEDURE — 36415 COLL VENOUS BLD VENIPUNCTURE: CPT | Mod: HCNC,PO

## 2019-04-18 DIAGNOSIS — D72.819 LEUKOPENIA, UNSPECIFIED TYPE: Primary | ICD-10-CM

## 2019-04-29 ENCOUNTER — PATIENT MESSAGE (OUTPATIENT)
Dept: FAMILY MEDICINE | Facility: CLINIC | Age: 84
End: 2019-04-29

## 2019-04-30 ENCOUNTER — HOSPITAL ENCOUNTER (OUTPATIENT)
Dept: RADIOLOGY | Facility: CLINIC | Age: 84
Discharge: HOME | End: 2019-04-30
Attending: PHYSICIAN ASSISTANT
Payer: MEDICARE

## 2019-04-30 ENCOUNTER — OFFICE VISIT (OUTPATIENT)
Dept: FAMILY MEDICINE | Facility: CLINIC | Age: 84
End: 2019-04-30
Payer: MEDICARE

## 2019-04-30 VITALS
SYSTOLIC BLOOD PRESSURE: 134 MMHG | BODY MASS INDEX: 24.31 KG/M2 | WEIGHT: 120.56 LBS | RESPIRATION RATE: 17 BRPM | OXYGEN SATURATION: 98 % | DIASTOLIC BLOOD PRESSURE: 62 MMHG | HEART RATE: 70 BPM | TEMPERATURE: 98 F | HEIGHT: 59 IN

## 2019-04-30 DIAGNOSIS — M54.31 RIGHT SIDED SCIATICA: ICD-10-CM

## 2019-04-30 DIAGNOSIS — R21 RASH: ICD-10-CM

## 2019-04-30 DIAGNOSIS — I10 BENIGN ESSENTIAL HTN: Primary | ICD-10-CM

## 2019-04-30 DIAGNOSIS — M79.602 LEFT ARM PAIN: ICD-10-CM

## 2019-04-30 DIAGNOSIS — M25.551 RIGHT HIP PAIN: ICD-10-CM

## 2019-04-30 DIAGNOSIS — M54.2 NECK PAIN ON LEFT SIDE: ICD-10-CM

## 2019-04-30 DIAGNOSIS — D53.9 MACROCYTIC ANEMIA: ICD-10-CM

## 2019-04-30 DIAGNOSIS — K64.9 HEMORRHOIDS, UNSPECIFIED HEMORRHOID TYPE: ICD-10-CM

## 2019-04-30 DIAGNOSIS — D64.9 ANEMIA, UNSPECIFIED TYPE: ICD-10-CM

## 2019-04-30 PROCEDURE — 72050 X-RAY EXAM NECK SPINE 4/5VWS: CPT | Mod: TC,HCNC,FY,PO

## 2019-04-30 PROCEDURE — 99999 PR PBB SHADOW E&M-EST. PATIENT-LVL V: ICD-10-PCS | Mod: PBBFAC,HCNC,, | Performed by: PHYSICIAN ASSISTANT

## 2019-04-30 PROCEDURE — 99499 UNLISTED E&M SERVICE: CPT | Mod: HCNC,S$GLB,, | Performed by: PHYSICIAN ASSISTANT

## 2019-04-30 PROCEDURE — 99999 PR PBB SHADOW E&M-EST. PATIENT-LVL V: CPT | Mod: PBBFAC,HCNC,, | Performed by: PHYSICIAN ASSISTANT

## 2019-04-30 PROCEDURE — 73502 XR HIP 2 VIEW RIGHT: ICD-10-PCS | Mod: 26,HCNC,RT,S$GLB | Performed by: RADIOLOGY

## 2019-04-30 PROCEDURE — 73030 X-RAY EXAM OF SHOULDER: CPT | Mod: 26,HCNC,LT,S$GLB | Performed by: RADIOLOGY

## 2019-04-30 PROCEDURE — 72050 X-RAY EXAM NECK SPINE 4/5VWS: CPT | Mod: 26,HCNC,, | Performed by: RADIOLOGY

## 2019-04-30 PROCEDURE — 73030 X-RAY EXAM OF SHOULDER: CPT | Mod: TC,HCNC,FY,PO,LT

## 2019-04-30 PROCEDURE — 99499 RISK ADDL DX/OHS AUDIT: ICD-10-PCS | Mod: HCNC,S$GLB,, | Performed by: PHYSICIAN ASSISTANT

## 2019-04-30 PROCEDURE — 1101F PT FALLS ASSESS-DOCD LE1/YR: CPT | Mod: HCNC,CPTII,S$GLB, | Performed by: PHYSICIAN ASSISTANT

## 2019-04-30 PROCEDURE — 99214 OFFICE O/P EST MOD 30 MIN: CPT | Mod: HCNC,S$GLB,, | Performed by: PHYSICIAN ASSISTANT

## 2019-04-30 PROCEDURE — 72050 XR CERVICAL SPINE COMPLETE 5 VIEW: ICD-10-PCS | Mod: 26,HCNC,, | Performed by: RADIOLOGY

## 2019-04-30 PROCEDURE — 73502 X-RAY EXAM HIP UNI 2-3 VIEWS: CPT | Mod: 26,HCNC,RT,S$GLB | Performed by: RADIOLOGY

## 2019-04-30 PROCEDURE — 73030 XR SHOULDER COMPLETE 2 OR MORE VIEWS LEFT: ICD-10-PCS | Mod: 26,HCNC,LT,S$GLB | Performed by: RADIOLOGY

## 2019-04-30 PROCEDURE — 99214 PR OFFICE/OUTPT VISIT, EST, LEVL IV, 30-39 MIN: ICD-10-PCS | Mod: HCNC,S$GLB,, | Performed by: PHYSICIAN ASSISTANT

## 2019-04-30 PROCEDURE — 73502 X-RAY EXAM HIP UNI 2-3 VIEWS: CPT | Mod: TC,HCNC,FY,PO,RT

## 2019-04-30 PROCEDURE — 1101F PR PT FALLS ASSESS DOC 0-1 FALLS W/OUT INJ PAST YR: ICD-10-PCS | Mod: HCNC,CPTII,S$GLB, | Performed by: PHYSICIAN ASSISTANT

## 2019-04-30 RX ORDER — FLUCONAZOLE 150 MG/1
150 TABLET ORAL ONCE
Qty: 1 TABLET | Refills: 0 | Status: SHIPPED | OUTPATIENT
Start: 2019-04-30 | End: 2019-04-30

## 2019-04-30 RX ORDER — NYSTATIN 100000 [USP'U]/G
POWDER TOPICAL 4 TIMES DAILY
Qty: 60 G | Refills: 2 | Status: SHIPPED | OUTPATIENT
Start: 2019-04-30 | End: 2020-07-17 | Stop reason: ALTCHOICE

## 2019-04-30 NOTE — PROGRESS NOTES
Subjective:       Patient ID: Brie Han is a 89 y.o. female.    Chief Complaint: Follow-up (4 month )    Ms. Han comes to clinic today for follow up. She recently had labs that revealed anemia; this is chronic. The patient is accompanied by her grandson today. The patient has several complaints/ concerns. She reports that her left arm and left side of her neck have been causing her pain over the last month. She reports at rest there is no pain. The patient reports the pain is present when she is lifting and using her left arm. She also complains of left hip pain with sciatica and some pain radiating to the anterior thigh. The patient has chronic difficulty with hemorrhoids. She reports occasional bright red blood after bowel movement. She reports this can typically be controlled with high fiber diet and exercise. She uses preporation H with relief. The patient also has a rash underneath her breasts. This is chronic. She reports she has been applying ointment with some improvement in symptoms but no resolution. The patient continues to live alone. She cares for herself and takes care of her home. She does not drive. She does have glaucoma for which she is followed by Dr. Stern.     Review of Systems   Constitutional: Negative for activity change and unexpected weight change.   HENT: Negative for hearing loss, rhinorrhea and trouble swallowing.    Eyes: Positive for visual disturbance. Negative for discharge.   Respiratory: Negative for chest tightness and wheezing.    Cardiovascular: Negative for chest pain and palpitations.   Gastrointestinal: Negative for blood in stool, constipation, diarrhea and vomiting.   Endocrine: Negative for polydipsia and polyuria.   Genitourinary: Negative for difficulty urinating, dysuria, hematuria and menstrual problem.   Musculoskeletal: Negative for arthralgias, joint swelling and neck pain.   Neurological: Negative for weakness and headaches.   Psychiatric/Behavioral:  Negative for confusion and dysphoric mood.       Objective:      Physical Exam   Constitutional: She is oriented to person, place, and time.   HENT:   Mouth/Throat: Oropharynx is clear and moist. No oropharyngeal exudate.   Eyes: Pupils are equal, round, and reactive to light. Conjunctivae are normal.   Cardiovascular: Normal rate and regular rhythm.   Pulmonary/Chest: Effort normal and breath sounds normal. She has no wheezes.   Abdominal: Soft. Bowel sounds are normal. There is no tenderness.   Genitourinary:         Musculoskeletal: She exhibits no edema.   Lymphadenopathy:     She has no cervical adenopathy.   Neurological: She is alert and oriented to person, place, and time.   Skin: Rash noted. There is erythema.   Erythematous macular rash with satellite lesions presnet under bilateral breasts   Psychiatric: Her behavior is normal.       Assessment:       1. Benign essential HTN    2. Hemorrhoids, unspecified hemorrhoid type    3. Neck pain on left side    4. Left arm pain    5. Rash    6. Right hip pain    7. Right sided sciatica    8. Anemia, unspecified type    9. Macrocytic anemia        Plan:       Brie was seen today for follow-up.    Diagnoses and all orders for this visit:    Benign essential HTN  Well controlled  Low salt diet  Continue current meidcation  Hemorrhoids, unspecified hemorrhoid type  Chronic  High fiber diet  Increase water intake  Avoid constipation  Neck pain on left side  -     X-Ray Cervical Spine Complete 5 view; Future  -     X-Ray Shoulder 2 or More Views Left; Future    Left arm pain  -     X-Ray Cervical Spine Complete 5 view; Future  -     X-Ray Shoulder 2 or More Views Left; Future    Rash  -     nystatin (MYCOSTATIN) powder; Apply topically 4 (four) times daily.  -     fluconazole (DIFLUCAN) 150 MG Tab; Take 1 tablet (150 mg total) by mouth once. for 1 dose  Keep area dry and cool  Right hip pain  -     X-Ray Hip 2 or 3 views Right; Future    Right sided sciatica  -      X-Ray Hip 2 or 3 views Right; Future    Anemia, unspecified type  -     CBC auto differential; Future  -     Vitamin B12; Future  -     Folate; Future  -     Iron and TIBC; Future  -     Ferritin; Future    Macrocytic anemia  -     Vitamin B12; Future  -     Folate; Future

## 2019-05-01 ENCOUNTER — INITIAL CONSULT (OUTPATIENT)
Dept: PHYSICAL MEDICINE AND REHAB | Facility: CLINIC | Age: 84
End: 2019-05-01
Payer: MEDICARE

## 2019-05-01 VITALS
SYSTOLIC BLOOD PRESSURE: 134 MMHG | BODY MASS INDEX: 24.19 KG/M2 | HEIGHT: 59 IN | DIASTOLIC BLOOD PRESSURE: 61 MMHG | WEIGHT: 120 LBS | HEART RATE: 72 BPM

## 2019-05-01 DIAGNOSIS — M54.31 RIGHT SIDED SCIATICA: ICD-10-CM

## 2019-05-01 DIAGNOSIS — M25.512 LEFT SHOULDER PAIN, UNSPECIFIED CHRONICITY: ICD-10-CM

## 2019-05-01 DIAGNOSIS — M79.602 LEFT ARM PAIN: ICD-10-CM

## 2019-05-01 DIAGNOSIS — M54.2 NECK PAIN ON LEFT SIDE: ICD-10-CM

## 2019-05-01 DIAGNOSIS — M25.551 RIGHT HIP PAIN: ICD-10-CM

## 2019-05-01 PROCEDURE — 99999 PR PBB SHADOW E&M-EST. PATIENT-LVL III: ICD-10-PCS | Mod: PBBFAC,HCNC,, | Performed by: PHYSICAL MEDICINE & REHABILITATION

## 2019-05-01 PROCEDURE — 20553 PR INJECT TRIGGER POINTS, > 3: ICD-10-PCS | Mod: 59,HCNC,S$GLB, | Performed by: PHYSICAL MEDICINE & REHABILITATION

## 2019-05-01 PROCEDURE — 99204 PR OFFICE/OUTPT VISIT, NEW, LEVL IV, 45-59 MIN: ICD-10-PCS | Mod: 25,HCNC,S$GLB, | Performed by: PHYSICAL MEDICINE & REHABILITATION

## 2019-05-01 PROCEDURE — 99499 UNLISTED E&M SERVICE: CPT | Mod: HCNC,S$GLB,, | Performed by: PHYSICAL MEDICINE & REHABILITATION

## 2019-05-01 PROCEDURE — 1101F PR PT FALLS ASSESS DOC 0-1 FALLS W/OUT INJ PAST YR: ICD-10-PCS | Mod: HCNC,CPTII,S$GLB, | Performed by: PHYSICAL MEDICINE & REHABILITATION

## 2019-05-01 PROCEDURE — 20553 NJX 1/MLT TRIGGER POINTS 3/>: CPT | Mod: 59,HCNC,S$GLB, | Performed by: PHYSICAL MEDICINE & REHABILITATION

## 2019-05-01 PROCEDURE — 20611 PR DRAIN/ASP/INJECT MAJOR JOINT/BURSA W/US GUIDANCE: ICD-10-PCS | Mod: HCNC,LT,S$GLB, | Performed by: PHYSICAL MEDICINE & REHABILITATION

## 2019-05-01 PROCEDURE — 1101F PT FALLS ASSESS-DOCD LE1/YR: CPT | Mod: HCNC,CPTII,S$GLB, | Performed by: PHYSICAL MEDICINE & REHABILITATION

## 2019-05-01 PROCEDURE — 99204 OFFICE O/P NEW MOD 45 MIN: CPT | Mod: 25,HCNC,S$GLB, | Performed by: PHYSICAL MEDICINE & REHABILITATION

## 2019-05-01 PROCEDURE — 99499 RISK ADDL DX/OHS AUDIT: ICD-10-PCS | Mod: HCNC,S$GLB,, | Performed by: PHYSICAL MEDICINE & REHABILITATION

## 2019-05-01 PROCEDURE — 20611 DRAIN/INJ JOINT/BURSA W/US: CPT | Mod: HCNC,LT,S$GLB, | Performed by: PHYSICAL MEDICINE & REHABILITATION

## 2019-05-01 PROCEDURE — 99999 PR PBB SHADOW E&M-EST. PATIENT-LVL III: CPT | Mod: PBBFAC,HCNC,, | Performed by: PHYSICAL MEDICINE & REHABILITATION

## 2019-05-01 RX ORDER — BACLOFEN 10 MG/1
10 TABLET ORAL NIGHTLY
Qty: 30 TABLET | Refills: 6 | Status: SHIPPED | OUTPATIENT
Start: 2019-05-01 | End: 2020-01-06 | Stop reason: ALTCHOICE

## 2019-05-01 RX ORDER — METHYLPREDNISOLONE ACETATE 40 MG/ML
40 INJECTION, SUSPENSION INTRA-ARTICULAR; INTRALESIONAL; INTRAMUSCULAR; SOFT TISSUE
Status: COMPLETED | OUTPATIENT
Start: 2019-05-01 | End: 2019-05-01

## 2019-05-01 RX ORDER — DICLOFENAC SODIUM 10 MG/G
4 GEL TOPICAL 4 TIMES DAILY
Qty: 2 TUBE | Refills: 6 | Status: SHIPPED | OUTPATIENT
Start: 2019-05-01 | End: 2020-01-06 | Stop reason: ALTCHOICE

## 2019-05-01 RX ORDER — LIDOCAINE HYDROCHLORIDE 10 MG/ML
4 INJECTION INFILTRATION; PERINEURAL
Status: COMPLETED | OUTPATIENT
Start: 2019-05-01 | End: 2019-05-01

## 2019-05-01 RX ADMIN — LIDOCAINE HYDROCHLORIDE 4 ML: 10 INJECTION INFILTRATION; PERINEURAL at 12:05

## 2019-05-01 RX ADMIN — METHYLPREDNISOLONE ACETATE 40 MG: 40 INJECTION, SUSPENSION INTRA-ARTICULAR; INTRALESIONAL; INTRAMUSCULAR; SOFT TISSUE at 12:05

## 2019-05-01 NOTE — LETTER
May 1, 2019      Kathryn Lawton PA-C  [address]  [place] [address]           [place] [facility]  [address] [place] [address]  [address]  [place] [address]  Phone: [phone]  Fax: [phone]          Patient: Brie Han   MR Number: [id number]   YOB: 1927   Date of Visit: 5/1/2019       Dear Kathryn Lawton:    Thank you for referring Brie Han to me for evaluation. Attached you will find relevant portions of my assessment and plan of care.    If you have questions, please do not hesitate to call me. I look forward to following Brie Han along with you.    Sincerely,    Brenda Avery,     Enclosure  CC:  No Recipients    If you would like to receive this communication electronically, please contact [email or web address] or [phone] to request more information on [employer] access.    For providers and/or their staff who would like to refer a patient to [facility], please contact us through our one-stop-shop provider referral line, [facility] , at [phone].    If you feel you have received this communication in error or would no longer like to receive these types of communications, please e-mail [email or web address]

## 2019-05-01 NOTE — PROGRESS NOTES
HPI:  Patient is a 89 y.o. year old female w. Left sided neck pain and shoulder pain. It has been going on for 4 wks. She is having difficulty rasing her arm. It is worsening.  Morning's are worse. Different movements in her shoulder worsen her pain. Moving her arm back causes her a lot of pain.  She is also complaining of left sided back pain radiating to her hip. This has been going on for several years. Walking worsens her symptoms. She denies any trauma or falls.    Imaging  X-Ray Shoulder 2 or More Views Left   Order: [id number]   Status:  Final result   Visible to patient:  Yes (Patient Portal) Next appt:  07/30/2019 at 01:00 PM in Family Medicine (Kathryn Lawton PA-C) Dx:  Left arm pain; Neck pain on left side   Details     Reading Physician Reading Date Result Priority   Dae Verma MD 4/30/2019 Routine      Narrative     EXAMINATION:  XR SHOULDER COMPLETE 2 OR MORE VIEWS LEFT    CLINICAL HISTORY:  Cervicalgia    TECHNIQUE:  Two or three views of the left shoulder were performed.    COMPARISON:  None    FINDINGS:  Osteopenia.  No acute fracture or malalignment.  Moderate degenerative change of the acromioclavicular joint.  Glenohumeral joint not directly seen in profile although appears preserved.  No soft tissue mineralization.  Aortic arch atherosclerosis.             X-Ray Cervical Spine Complete 5 view   Order: [id number]   Status:  Final result   Visible to patient:  Yes (Patient Portal) Next appt:  07/30/2019 at 01:00 PM in Family Medicine (Kathryn Lawton PA-C) Dx:  Left arm pain; Neck pain on left side   Details     Reading Physician Reading Date Result Priority   Dae Verma MD 4/30/2019 Routine      Narrative     EXAMINATION:  XR CERVICAL SPINE COMPLETE 5 VIEW    CLINICAL HISTORY:  . Cervicalgia    TECHNIQUE:  AP, Lateral, bilateral oblique and open mouth views of the cervical spine were performed.    COMPARISON:  None    FINDINGS:  3 mm retrolisthesis C4 on C5.  No displaced  fracture with preserved vertebral body heights.  Degree of osteopenia limits assessment for nondisplaced fracture.  Mild degenerative change anterior C1-2 articulation.  Moderate degenerative disc disease at C3-4 and C4-5 and mild elsewhere.  Uncovertebral spurs contribute to bony neural foraminal narrowing on the right from C3-4 through C6-7 and on the left at C3-4, C4-5.  Atherosclerosis.           Labs  egfr cr gluc nl    Past Medical History:   Diagnosis Date    Anticoagulant long-term use     Arthritis     Glaucoma     resolved    Hyperlipidemia     Hypertension     Stroke 2006     Past Surgical History:   Procedure Laterality Date    APPENDECTOMY      BREAST SURGERY      CATARACT EXTRACTION W/  INTRAOCULAR LENS IMPLANT Bilateral     Dr Rojo      SECTION      3 c/s and d/c    EYE SURGERY      Glaucoma     HYSTERECTOMY      TONSILLECTOMY       Family History   Problem Relation Age of Onset    Early death Mother     Stroke Father     Hypertension Father     Diabetes Father     Hypertension Sister     Thyroid disease Paternal Grandfather     No Known Problems Brother     No Known Problems Maternal Aunt     No Known Problems Maternal Uncle     No Known Problems Paternal Aunt     No Known Problems Paternal Uncle     No Known Problems Maternal Grandmother     No Known Problems Maternal Grandfather     No Known Problems Paternal Grandmother     Amblyopia Neg Hx     Blindness Neg Hx     Cancer Neg Hx     Cataracts Neg Hx     Glaucoma Neg Hx     Macular degeneration Neg Hx     Retinal detachment Neg Hx     Strabismus Neg Hx      Social History     Socioeconomic History    Marital status:      Spouse name: Not on file    Number of children: Not on file    Years of education: Not on file    Highest education level: Not on file   Occupational History    Not on file   Social Needs    Financial resource strain: Not on file    Food insecurity:     Worry: Not  on file     Inability: Not on file    Transportation needs:     Medical: Not on file     Non-medical: Not on file   Tobacco Use    Smoking status: Never Smoker    Smokeless tobacco: Never Used   Substance and Sexual Activity    Alcohol use: No    Drug use: No    Sexual activity: Never   Lifestyle    Physical activity:     Days per week: Not on file     Minutes per session: Not on file    Stress: Not on file   Relationships    Social connections:     Talks on phone: Not on file     Gets together: Not on file     Attends Jain service: Not on file     Active member of club or organization: Not on file     Attends meetings of clubs or organizations: Not on file     Relationship status: Not on file   Other Topics Concern    Not on file   Social History Narrative    Not on file       Review of patient's allergies indicates:   Allergen Reactions    Contrast media Other (See Comments)    Aspirin Other (See Comments)    Ciprofloxacin Other (See Comments)    Iron Other (See Comments)    Iodine Rash    Penicillins Rash       Current Outpatient Medications:     acetaminophen (TYLENOL) 325 MG tablet, Take 2 tablets (650 mg total) by mouth every 8 (eight) hours as needed., Disp: , Rfl: 0    b complex vitamins tablet, Take 1 tablet by mouth once daily., Disp: , Rfl:     calcium citrate-vitamin D (CITRACAL + D) 315-200 mg-unit per tablet, Every day, Disp: , Rfl:     clopidogrel (PLAVIX) 75 mg tablet, TAKE 1 TABLET EVERY DAY, Disp: 90 tablet, Rfl: 4    docusate sodium 100 mg capsule, Take 100 mg by mouth once daily., Disp: 90 tablet, Rfl: 3    KRILL OIL ORAL, Take by mouth., Disp: , Rfl:     latanoprost 0.005 % ophthalmic solution, Place 1 drop into both eyes every evening., Disp: 2.5 mL, Rfl: 12    losartan (COZAAR) 100 MG tablet, Take 1 tablet (100 mg total) by mouth once daily., Disp: 90 tablet, Rfl: 3    lovastatin (MEVACOR) 10 MG tablet, TAKE 1 TABLET EVERY EVENING, Disp: 90 tablet, Rfl: 3     "multivitamin capsule, Every day, Disp: , Rfl:     NIFEdipine (PROCARDIA-XL) 30 MG (OSM) 24 hr tablet, TAKE 1 TABLET ONE TIME DAILY, Disp: 90 tablet, Rfl: 3    nystatin (MYCOSTATIN) powder, Apply topically 4 (four) times daily., Disp: 60 g, Rfl: 2    pantoprazole (PROTONIX) 40 MG tablet, Take 1 tablet (40 mg total) by mouth once daily., Disp: 90 tablet, Rfl: 3    primidone (MYSOLINE) 50 MG Tab, Take 1 tablet (50 mg total) by mouth every evening., Disp: 90 tablet, Rfl: 3      Review of Systems:    No nausea, vomiting, fevers, chills , contipation, diarrhea or sweats,no weight change, occ neck stiffness, no chest pain, no sob, no change of bowel or bladder habits,no coordination issues      Physical Exam:      Vitals:    05/01/19 1043   BP: 134/61   Pulse: 72     alert and oriented ×4 follows commands answers all questions appropriately,affect wnl  Manual muscle test 5 out of 5 except for left shoulder abduction and fwd flexion sensation to light touch grossly intact  Dec rom left shoulder all directions  +impingement 90deg in abduction and fwd flexion  +hawkin's +neers  +tenderness left cervical paraspinals  Nl gait  No C/C/E  -spurling's      Assessment:  Left rtc impingement  Shoulder oa  Cervical ddd w. Recurrent musc spasm      Plan:    Therapeutic/diagnostic cortisone injection to left shoulder today  tpi's to left sided neck today  Pamphlet for possible percutaneous tenotomy of supraspinatus issued, we may consider this in future if she gets miminal improvement w. Above  voltaren gel trial as adjunct  Baclofen 5mg nightly trial      Procedure note: Risk and benefit of US guided subacromial bursa  injection given to pt. 25 g 1.5" utilized. Injection performed after sterile prep w. chlorohexedine , verbal consent explained, no complications. Depomedrol 40mg 1ml and lidocaine 1ml were used, US guidance was used since it has been shown to have greater efficiency w. Accurate needle placement  Ultrasound " "interpretation was performed prior to the procedure to identify the target and any adjacent neurovascular structures.  Subsequently, interpretation was performed during real- time needle guidance confirming placement. Post- intervention interpretation was also performed confirming appropriate injectate flow and hemostasis.  Images indicating needle placement have been saved in [employer].    PROCEDURE NOTE:Risk and benefit of trigger point injections given to pt. Injections performed w. A" 25G needle after sterile prep w. chlorohexedine, verbal consent obtained . NO complications. b/l trapezius, splenius cap and lev scapulae were injected with a total of 3ML of lidocaine 1% to each side          Thank you for this interesting referral    "

## 2019-05-02 ENCOUNTER — PATIENT MESSAGE (OUTPATIENT)
Dept: FAMILY MEDICINE | Facility: CLINIC | Age: 84
End: 2019-05-02

## 2019-05-02 ENCOUNTER — PATIENT MESSAGE (OUTPATIENT)
Dept: PHYSICAL MEDICINE AND REHAB | Facility: CLINIC | Age: 84
End: 2019-05-02

## 2019-05-07 ENCOUNTER — PATIENT MESSAGE (OUTPATIENT)
Dept: PHYSICAL MEDICINE AND REHAB | Facility: CLINIC | Age: 84
End: 2019-05-07

## 2019-05-08 ENCOUNTER — PATIENT MESSAGE (OUTPATIENT)
Dept: PHYSICAL MEDICINE AND REHAB | Facility: CLINIC | Age: 84
End: 2019-05-08

## 2019-05-13 ENCOUNTER — PES CALL (OUTPATIENT)
Dept: ADMINISTRATIVE | Facility: CLINIC | Age: 84
End: 2019-05-13

## 2019-05-22 DIAGNOSIS — H40.1132 PRIMARY OPEN ANGLE GLAUCOMA (POAG) OF BOTH EYES, MODERATE STAGE: ICD-10-CM

## 2019-05-22 RX ORDER — LATANOPROST 50 UG/ML
1 SOLUTION/ DROPS OPHTHALMIC NIGHTLY
Qty: 2.5 ML | Refills: 12 | Status: SHIPPED | OUTPATIENT
Start: 2019-05-22 | End: 2020-05-25 | Stop reason: SDUPTHER

## 2019-05-28 ENCOUNTER — TELEPHONE (OUTPATIENT)
Dept: PHYSICAL MEDICINE AND REHAB | Facility: CLINIC | Age: 84
End: 2019-05-28

## 2019-05-28 NOTE — TELEPHONE ENCOUNTER
----- Message from Chapin SARAVIA Frisard sent at 5/28/2019 10:30 AM CDT -----  Contact: same  Patient called in and stated she needs to cancel her injection appt for this Friday 5/31/19 because she has no transportation.  Patient would like a call back back to reschedule at [phone])

## 2019-06-05 ENCOUNTER — PATIENT MESSAGE (OUTPATIENT)
Dept: FAMILY MEDICINE | Facility: CLINIC | Age: 84
End: 2019-06-05

## 2019-06-05 DIAGNOSIS — I10 HTN (HYPERTENSION), BENIGN: ICD-10-CM

## 2019-06-05 DIAGNOSIS — K21.9 GASTROESOPHAGEAL REFLUX DISEASE, ESOPHAGITIS PRESENCE NOT SPECIFIED: ICD-10-CM

## 2019-06-05 RX ORDER — LOSARTAN POTASSIUM 100 MG/1
100 TABLET ORAL DAILY
Qty: 90 TABLET | Refills: 2 | Status: SHIPPED | OUTPATIENT
Start: 2019-06-05 | End: 2020-05-26

## 2019-06-05 RX ORDER — PANTOPRAZOLE SODIUM 40 MG/1
40 TABLET, DELAYED RELEASE ORAL DAILY
Qty: 90 TABLET | Refills: 3 | Status: SHIPPED | OUTPATIENT
Start: 2019-06-05 | End: 2019-08-06

## 2019-06-05 RX ORDER — NIFEDIPINE 30 MG/1
TABLET, EXTENDED RELEASE ORAL
Qty: 90 TABLET | Refills: 3 | Status: SHIPPED | OUTPATIENT
Start: 2019-06-05 | End: 2019-08-06

## 2019-06-05 RX ORDER — LOSARTAN POTASSIUM 100 MG/1
100 TABLET ORAL DAILY
Qty: 90 TABLET | Refills: 3 | Status: SHIPPED | OUTPATIENT
Start: 2019-06-05 | End: 2019-08-06

## 2019-06-05 RX ORDER — PANTOPRAZOLE SODIUM 40 MG/1
40 TABLET, DELAYED RELEASE ORAL DAILY
Qty: 90 TABLET | Refills: 2 | Status: SHIPPED | OUTPATIENT
Start: 2019-06-05 | End: 2020-07-17 | Stop reason: ALTCHOICE

## 2019-06-05 RX ORDER — NIFEDIPINE 30 MG/1
TABLET, EXTENDED RELEASE ORAL
Qty: 90 TABLET | Refills: 3 | Status: SHIPPED | OUTPATIENT
Start: 2019-06-05 | End: 2020-01-20 | Stop reason: SDUPTHER

## 2019-06-05 NOTE — TELEPHONE ENCOUNTER
Patient requesting a refill of the following medication:  Losartan--LR--6/13/18  Nifedipine--LR--6/13/18  Pantoprazole--LR--6/13/18  LOV--4/30/19 (OSMANY Lawton)  FOV--7/30/19 (OSMANY Lawton)

## 2019-07-15 ENCOUNTER — TELEPHONE (OUTPATIENT)
Dept: OPHTHALMOLOGY | Facility: CLINIC | Age: 84
End: 2019-07-15

## 2019-07-15 ENCOUNTER — PATIENT MESSAGE (OUTPATIENT)
Dept: OPHTHALMOLOGY | Facility: CLINIC | Age: 84
End: 2019-07-15

## 2019-07-15 NOTE — TELEPHONE ENCOUNTER
Spoke with pt's son to go over upcoming appointment. Pt's son asked if they had availability to see Dr. Stern this upcoming Thursday. I notified him that Dr. Stern is in surgery on that day and would not be able to. I notified the patient that I can add her appointment to the wait list so if something does open up we can schedule her there. Pt agreed and I was able to add appointment to wait list.

## 2019-08-06 ENCOUNTER — OFFICE VISIT (OUTPATIENT)
Dept: OPHTHALMOLOGY | Facility: CLINIC | Age: 84
End: 2019-08-06
Payer: MEDICARE

## 2019-08-06 DIAGNOSIS — H02.834 DERMATOCHALASIS OF BOTH UPPER EYELIDS: ICD-10-CM

## 2019-08-06 DIAGNOSIS — H02.831 DERMATOCHALASIS OF BOTH UPPER EYELIDS: ICD-10-CM

## 2019-08-06 DIAGNOSIS — H40.1132 PRIMARY OPEN ANGLE GLAUCOMA (POAG) OF BOTH EYES, MODERATE STAGE: Primary | ICD-10-CM

## 2019-08-06 DIAGNOSIS — Z96.1 PSEUDOPHAKIA OF BOTH EYES: ICD-10-CM

## 2019-08-06 PROCEDURE — 92012 INTRM OPH EXAM EST PATIENT: CPT | Mod: HCNC,S$GLB,, | Performed by: OPHTHALMOLOGY

## 2019-08-06 PROCEDURE — 92012 PR EYE EXAM, EST PATIENT,INTERMED: ICD-10-PCS | Mod: HCNC,S$GLB,, | Performed by: OPHTHALMOLOGY

## 2019-08-06 PROCEDURE — 99999 PR PBB SHADOW E&M-EST. PATIENT-LVL III: CPT | Mod: PBBFAC,HCNC,, | Performed by: OPHTHALMOLOGY

## 2019-08-06 PROCEDURE — 99999 PR PBB SHADOW E&M-EST. PATIENT-LVL III: ICD-10-PCS | Mod: PBBFAC,HCNC,, | Performed by: OPHTHALMOLOGY

## 2019-08-06 RX ORDER — FLUCONAZOLE 150 MG/1
TABLET ORAL
Refills: 0 | COMMUNITY
Start: 2019-04-30 | End: 2019-08-22 | Stop reason: ALTCHOICE

## 2019-08-06 NOTE — PATIENT INSTRUCTIONS
"  What Is Glaucoma?    Glaucoma is an eye disease that can cause blindness. If caught early, it can usually be controlled. But it often has no symptoms, so you need regular eye exams. Glaucoma usually begins when pressure builds up in the eye. This pressure can damage the optic nerve. The optic nerve sends messages to the brain so you can see. There are two main kinds of glaucoma: "open-angle" and "closed-angle."  Drainage area  The eye is always producing fluid. The eye's drainage areas may become clogged or blocked. Too much fluid stays in the eye. This increases eye pressure.  Optic nerve  Too much pressure in the eye can damage the optic nerve. If damaged, this nerve cannot send the messages to the brain that let you see.  Open-Angle Glaucoma  Open-angle is the most common kind of glaucoma. It occurs slowly as people age. The drainage area in the eye becomes clogged. Not enough fluid drains from the eye, so pressure slowly builds up. This causes gradual loss of side (peripheral) vision. You may not even notice changes until much of your vision is lost.  Closed-Angle Glaucoma  Closed-angle glaucoma is less common than open-angle. It usually comes on quickly. The drainage area in the eye suddenly becomes completely blocked. Eye pressure builds rapidly. You may notice blurred vision and rainbow halos around lights. You may also have headaches, nausea, vomiting, and severe pain. If not treated right away, blindness can occur quickly.  Date Last Reviewed: 6/1/2015  © 1353-6743 [employer]. [address] [place] [address], [place] [address]. All rights reserved. This information is not intended as a substitute for professional medical care. Always follow your healthcare professional's instructions.        "

## 2019-08-06 NOTE — PROGRESS NOTES
HPI     Glaucoma      Additional comments: 4 month iop/med ck              Comments     DLS: 3/26/19    Pt states no changes in va since last visit. + sometimes eyes itch and are   painful on inside.     Gtts: Latanoprost QHS OU          Last edited by Cheryle Quintana on 8/6/2019 10:58 AM. (History)            Assessment /Plan     For exam results, see Encounter Report.    Primary open angle glaucoma (POAG) of both eyes, moderate stage    Dermatochalasis of both upper eyelids    Pseudophakia of both eyes       Primary open angle glaucoma (POAG) of both eyes, moderate stage  -IOP 20 OU, typically runs in the low 20's, been stable with latanoprost  -continue latanoprost QHS OU  Follow up in about 4 months (around 12/6/2019) for IOP and Medication check.     Dermatochalasis of both upper eyelids- borderline, continue to monitor- asymptomatic, observe    Pseudophakia of both eyes- stable, observe

## 2019-08-19 ENCOUNTER — PATIENT MESSAGE (OUTPATIENT)
Dept: FAMILY MEDICINE | Facility: CLINIC | Age: 84
End: 2019-08-19

## 2019-08-22 ENCOUNTER — OFFICE VISIT (OUTPATIENT)
Dept: FAMILY MEDICINE | Facility: CLINIC | Age: 84
End: 2019-08-22
Payer: MEDICARE

## 2019-08-22 VITALS
RESPIRATION RATE: 16 BRPM | HEIGHT: 59 IN | SYSTOLIC BLOOD PRESSURE: 138 MMHG | BODY MASS INDEX: 24 KG/M2 | DIASTOLIC BLOOD PRESSURE: 68 MMHG | TEMPERATURE: 99 F | OXYGEN SATURATION: 97 % | WEIGHT: 119.06 LBS | HEART RATE: 67 BPM

## 2019-08-22 DIAGNOSIS — M54.2 CERVICALGIA: ICD-10-CM

## 2019-08-22 DIAGNOSIS — M25.512 CHRONIC LEFT SHOULDER PAIN: ICD-10-CM

## 2019-08-22 DIAGNOSIS — I10 BENIGN ESSENTIAL HTN: ICD-10-CM

## 2019-08-22 DIAGNOSIS — G89.29 CHRONIC LEFT SHOULDER PAIN: ICD-10-CM

## 2019-08-22 DIAGNOSIS — M50.30 DDD (DEGENERATIVE DISC DISEASE), CERVICAL: Primary | ICD-10-CM

## 2019-08-22 PROCEDURE — 99214 OFFICE O/P EST MOD 30 MIN: CPT | Mod: HCNC,S$GLB,, | Performed by: PHYSICIAN ASSISTANT

## 2019-08-22 PROCEDURE — 99999 PR PBB SHADOW E&M-EST. PATIENT-LVL V: ICD-10-PCS | Mod: PBBFAC,HCNC,, | Performed by: PHYSICIAN ASSISTANT

## 2019-08-22 PROCEDURE — 99999 PR PBB SHADOW E&M-EST. PATIENT-LVL V: CPT | Mod: PBBFAC,HCNC,, | Performed by: PHYSICIAN ASSISTANT

## 2019-08-22 PROCEDURE — 1101F PR PT FALLS ASSESS DOC 0-1 FALLS W/OUT INJ PAST YR: ICD-10-PCS | Mod: HCNC,CPTII,S$GLB, | Performed by: PHYSICIAN ASSISTANT

## 2019-08-22 PROCEDURE — 1101F PT FALLS ASSESS-DOCD LE1/YR: CPT | Mod: HCNC,CPTII,S$GLB, | Performed by: PHYSICIAN ASSISTANT

## 2019-08-22 PROCEDURE — 99214 PR OFFICE/OUTPT VISIT, EST, LEVL IV, 30-39 MIN: ICD-10-PCS | Mod: HCNC,S$GLB,, | Performed by: PHYSICIAN ASSISTANT

## 2019-08-22 NOTE — PROGRESS NOTES
Subjective:       Patient ID: Brie Han is a 89 y.o. female.    Chief Complaint: Headache and Neck Pain    Ms. Han comes to clinic today accompanied by her grandson. The patient continues to have pain in her neck and shoulder. She was seen by Dr. Saucedo for this but the baclofen provided little relief. The patient reports she sleeps on 4-5 pillows each night. She reports this is out of habit due to history of GERD.  The patient reports that her GERD is controlled with pantoprazole and she does not need to sleep on so many pillows. The patient also often falls asleep in her recliner with her neck hanging down. The patient otherwise is feeling well and has no complaints. She is interested in physical therapy to help with her neck pain.     Review of Systems   Constitutional: Negative for activity change and unexpected weight change.   HENT: Negative for hearing loss, rhinorrhea and trouble swallowing.    Eyes: Positive for visual disturbance. Negative for discharge.   Respiratory: Negative for chest tightness and wheezing.    Cardiovascular: Negative for chest pain and palpitations.   Gastrointestinal: Negative for blood in stool, constipation, diarrhea and vomiting.   Endocrine: Negative for polydipsia and polyuria.   Genitourinary: Negative for difficulty urinating, dysuria, hematuria and menstrual problem.   Musculoskeletal: Positive for arthralgias, myalgias and neck pain. Negative for joint swelling.   Neurological: Negative for weakness and headaches.   Psychiatric/Behavioral: Negative for confusion and dysphoric mood.       Objective:      Physical Exam   Constitutional: She is oriented to person, place, and time.   Neck:   Left lateral cervical spine TTP  Flexion and extension of the cervical spine mild discomfort  Rotation of cervical spine causes mild discomfort.    Cardiovascular: Normal rate and regular rhythm.   Pulmonary/Chest: Effort normal and breath sounds normal. She has no wheezes.    Abdominal: Soft. Bowel sounds are normal. There is no tenderness.   Musculoskeletal: She exhibits no edema.   Neurological: She is alert and oriented to person, place, and time.   Skin: No erythema.   Psychiatric: Her behavior is normal.       Assessment:       1. DDD (degenerative disc disease), cervical    2. Cervicalgia    3. Chronic left shoulder pain    4. Benign essential HTN        Plan:   Brie was seen today for headache and neck pain.    Diagnoses and all orders for this visit:    DDD (degenerative disc disease), cervical  -     Ambulatory Referral to Physical/Occupational Therapy  -     Ambulatory referral to Orthopedics    Cervicalgia  -     Ambulatory Referral to Physical/Occupational Therapy  -     Ambulatory referral to Orthopedics    Chronic left shoulder pain  -     Ambulatory Referral to Physical/Occupational Therapy  -     Ambulatory referral to Orthopedics    Benign essential HTN  Controlled  Low salt diet  Continue current medication    Patient to try outpatient PT. If they do not hear from PT in the next week to start sessions, please contact office.

## 2019-08-27 ENCOUNTER — CLINICAL SUPPORT (OUTPATIENT)
Dept: REHABILITATION | Facility: HOSPITAL | Age: 84
End: 2019-08-27
Payer: MEDICARE

## 2019-08-27 DIAGNOSIS — M25.512 CHRONIC LEFT SHOULDER PAIN: ICD-10-CM

## 2019-08-27 DIAGNOSIS — M54.2 CERVICALGIA: ICD-10-CM

## 2019-08-27 DIAGNOSIS — M50.30 DDD (DEGENERATIVE DISC DISEASE), CERVICAL: Primary | ICD-10-CM

## 2019-08-27 DIAGNOSIS — G89.29 CHRONIC LEFT SHOULDER PAIN: ICD-10-CM

## 2019-08-27 PROCEDURE — 97161 PT EVAL LOW COMPLEX 20 MIN: CPT | Mod: HCNC,PN

## 2019-08-27 NOTE — PLAN OF CARE
TIME RECORD    Date: 08/27/2019    Start Time:  0906  Stop Time:  0955    PROCEDURES:    TIMED  Procedure Time Min.    Start:  Stop:     Start:  Stop:     Start:  Stop:     Start:  Stop:          UNTIMED  Procedure Time Min.   EVAL Start:  Stop:     Start:  Stop:      Total Timed Minutes:    Total Timed Units:    Total Untimed Units:  1  Charges Billed/# of units:  1    OUTPATIENT PHYSICAL THERAPY   PATIENT EVALUATION  Onset Date: Insidious.  Primary Diagnosis:   1. DDD (degenerative disc disease), cervical     2. Cervicalgia     3. Chronic left shoulder pain       Treatment Diagnosis: Neck pain,left shoulder pain.  Past Medical History:   Diagnosis Date    Anticoagulant long-term use     Arthritis     Glaucoma     resolved    Hyperlipidemia     Hypertension     Stroke 2006     Precautions: Standard.  Prior Therapy: No.  Medications: Brie Han has a current medication list which includes the following prescription(s): acetaminophen, b complex vitamins, baclofen, calcium citrate-vitamin d3 315-200 mg, clopidogrel, diclofenac sodium, docusate sodium, krill oil, latanoprost, losartan, lovastatin, multivitamin, nifedipine, nystatin, pantoprazole, and primidone.  Nutrition:  Normal  History of Present Illness: Neck and left shoulder pain started ~ 6 months ago without trauma.  Prior Level of Function: Independent  Social History: Not working.  Place of Residence (Steps/Adaptations): In trailer,4 steps to enter.  Functional Deficits Leading to Referral/Nature of Injury: Difficulties with ADLs,functional activities,homemaking,self care.  Patient Therapy Goals: Restore previous LEWIS.    Subjective     Brie JEWEL Han states .    Pain:  Location: neck  and shoulder left.  Description: Aching, Dull, Sharp and Variable  Activities Which Increase Pain: Standing, Bending, Extension, Flexing and Lifting  Activities Which Decrease Pain: relaxation, pain medication and rest  Pain Scale: 0/10 at best 2/10 now  10/10  at worst    Objective     Posture: Head forward,increased t/kyphosis,right shoulder elevated.  Palpation: C3-T1 paraspinals left upper trap,all aspects of left shoulder tender.   Sensation: Intact.  DTRs:  Range of Motion/Strength: Strength of CS is grossly 3-/5 in all planes.  Cervical/Thoracic/Lumbar AROM: Pain/Dysfunction with Movement:   Flexion   30    Extension  15    Right side bending  20    Left side bending   25    Right rotation  70    Left rotation   70       ROM of left shoulder is grossly WFL. Strength ~ 3/5 in all planes.  Flexibility: Decreased in CS.  Gait: Without AD  Analysis: SBA - guarded.  Bed Mobility:Independent  Transfers: Independent  Special Tests: Compression test on CS neg (-). C5 test pos (+) on left. Reach back test 12 cm.  Other: NDI 13/50 26% IMPAIRED.  Treatment: EVAL.    Assessment       Initial Assessment (Pertinent finding, problem list and factors affecting outcome): Pt presents ROM and strength deficits,poor posture decreased functional status due to pain and above listed deficits.  Rehab Potiential: good    Short Term Goals (3 Weeks): 1.Decrease pain x 1 grade. 2.Start HEP.  Long Term Goals (6 Weeks): 1.Pain 0-4/10. 2.Independent HEP. 3.ROM WNL/WFL.4.Strength4+/5.5.Normalize posture.6.NDI 11/50. 7.Restore previous LEWIS.    Plan     Certification Period: 8/27/19 to 10/27/19.  Recommended Treatment Plan: 2 times per week for 6 weeks: Cervical/Lumbar Traction, Electrical Stimulation PRN, Manual Therapy, Moist Heat/ Ice, Patient Education, Therapeutic Activites and Therapeutic Exercise  Other Recommendations: Dry needling PRN.IMS PRN.      Therapist: Tutu Perez, PT    I CERTIFY THE NEED FOR THESE SERVICES FURNISHED UNDER THIS PLAN OF TREATMENT AND WHILE UNDER MY CARE    Physician's comments: ________________________________________________________________________________________________________________________________________________      Physician's Name:  ___________________________________

## 2019-08-28 ENCOUNTER — CLINICAL SUPPORT (OUTPATIENT)
Dept: REHABILITATION | Facility: HOSPITAL | Age: 84
End: 2019-08-28
Payer: MEDICARE

## 2019-08-28 DIAGNOSIS — M54.2 CERVICALGIA: Primary | ICD-10-CM

## 2019-08-28 PROCEDURE — 97110 THERAPEUTIC EXERCISES: CPT | Mod: HCNC,PN

## 2019-08-28 NOTE — PROGRESS NOTES
Name: Brie Han  [facility] Number: [id number]  Date of Treatment: 08/28/2019   Diagnosis:   Encounter Diagnosis   Name Primary?    Cervicalgia Yes       Time in: 1115  Time Out: 1155  Total Treatment Time: 40  Group Time: 0      Subjective:    Brie reports neck soreness on L.  Patient reports their pain to be not stated/10 on a 0-10 scale with 0 being no pain and 10 being the worst pain imaginable.    Objective    Brie received therapeutic exercises to develop strength, endurance and posture for 40 minutes including:     Cervical ROM 2x10: Rotation, side bend, flex, ext  Chin tuck 2x10  Retro shoulder rolls 2x10  UBE 2'/2'  OHP 3'  Rows x20 Red Tband  Ext x20 Yellow Tband    Written Home Exercises Provided: yes    Pt demo good understanding of the education provided. Brie demonstrated good return demonstration of activities with cues for direction, proper form and decreased speed.     Assessment:     Pt will continue to benefit from skilled PT intervention. Medical Necessity is demonstrated by:  Pain limits function of effected part for some activities, Requires skilled supervision to complete and progress HEP and Weakness.    Plan:    Continue with established Plan of Care towards PT goals.

## 2019-09-03 ENCOUNTER — CLINICAL SUPPORT (OUTPATIENT)
Dept: REHABILITATION | Facility: HOSPITAL | Age: 84
End: 2019-09-03
Payer: MEDICARE

## 2019-09-03 DIAGNOSIS — M54.2 CERVICALGIA: Primary | ICD-10-CM

## 2019-09-03 PROCEDURE — 97110 THERAPEUTIC EXERCISES: CPT | Mod: HCNC,PN

## 2019-09-03 NOTE — PROGRESS NOTES
Name: Brie Han  [facility] Number: [id number]  Date of Treatment: 09/03/2019   Diagnosis:   Encounter Diagnosis   Name Primary?    Cervicalgia Yes       Time in: 1602  Time Out: 1658  Total Treatment Time: 56    Subjective:    Brie reports no pain during the day but nighttime pain is sometimes 8/10.      Objective:    Patient received individual therapy to increase strength, endurance, ROM, flexibility, posture and core stabilization with activities as follows:     Brie received therapeutic exercises to develop strength, endurance, ROM, flexibility, posture and core stabilization for 56 minutes including:     Seated UBE 3/3 L1  Seated TE 10/2:     Scap retraction B     Retro rolls B     C/s AROM : rot, side flexion     Chin tucks      T/s extn over foam roll  Standing rows RTB 10/2  Standing extn B RTB 10/2  Seated OH pulley flexion 3'    Continue HEP daily. Educated pt of DOMS effect. Written and pictorial HEP of ex's in EPIC reviewed and issued to pt. Pt instructed to perform HEP daily and stop if symptoms increase.  Issued RTB  for HEP with instruction to keep away from small children, animals, and anyone with latex allergies. Pt verbalizes understanding.     Pt demo good understanding of the education provided. Patient demonstrated good return demonstration of activities.     Assessment:     Progressing well with increased challenges without increase in discomfort.     Pt will continue to benefit from skilled PT intervention. Medical Necessity is demonstrated by:  Requires skilled supervision to complete and progress HEP and Weakness.    Patient is making good progress towards established goals.    Plan:    Continue with established Plan of Care towards PT goals.

## 2019-09-03 NOTE — PATIENT INSTRUCTIONS
Scapular Retraction: Elbow Flexion (Standing)        With elbows bent to 90°, pinch shoulder blades together and rotate arms out, keeping elbows bent.  Repeat __10__ times per set. Do __2__ sets per session. Do __1-2__ sessions per day.     [email or web address]     Copyright © [employer]. All rights reserved.   Strengthening: Resisted Extension        Hold tubing in right hand, arm forward. Pull arm back, elbow straight.  Repeat _10___ times per set. Do _2___ sets per session. Do _1-2___ sessions per day.     [email or web address]://[email or web address]     Copyright © [employer]. All rights reserved.   Scapular Retraction: Bilateral        Facing anchor, pull arms back, bringing shoulder blades together.  Repeat _10___ times per set. Do _2___ sets per session. Do _1-2___ sessions per day.     [email or web address]     Copyright © [employer]. All rights reserved.

## 2019-09-05 ENCOUNTER — CLINICAL SUPPORT (OUTPATIENT)
Dept: REHABILITATION | Facility: HOSPITAL | Age: 84
End: 2019-09-05
Payer: MEDICARE

## 2019-09-05 DIAGNOSIS — M25.512 CHRONIC LEFT SHOULDER PAIN: Primary | ICD-10-CM

## 2019-09-05 DIAGNOSIS — G89.29 CHRONIC LEFT SHOULDER PAIN: Primary | ICD-10-CM

## 2019-09-05 PROCEDURE — 97140 MANUAL THERAPY 1/> REGIONS: CPT | Mod: HCNC,PN

## 2019-09-05 PROCEDURE — 97014 ELECTRIC STIMULATION THERAPY: CPT | Mod: HCNC,PN

## 2019-09-05 PROCEDURE — 97110 THERAPEUTIC EXERCISES: CPT | Mod: HCNC,PN

## 2019-09-05 PROCEDURE — 97010 HOT OR COLD PACKS THERAPY: CPT | Mod: HCNC,PN

## 2019-09-05 NOTE — PROGRESS NOTES
"Name: Brie HOLLOWAY [facility] Number: [id number]  Date of Treatment: 09/05/2019   Diagnosis:Neck pain and Left shoulder pain    Time in: 1500  Time Out: 1600  Total Treatment Time: 60  Group Time: no      Subjective:    Brie reports Left upper shoulder (trap) pain that is radiating down her left arm. She states it keeps her awake and the pain is really bad when she first wakes up in the AM.  Patient reports their pain to be 4/10 on a 0-10 scale with 0 being no pain and 10 being the worst pain imaginable.    Objective    Brie received therapeutic exercises to develop strength, ROM, flexibility and posture for 40 minutes including:     UBE 5' forward  OH puley 3' shoulder flexion  CROM 2 x 10 ea rotation, side bending, forward/back  Upper trap stretch L 5/20"  Chin tucks 2 x 10  Rows with RTB standing x 20  Shoulder extension standing with RTB x 20  Thoracic extension in sitting (stretching) x 20  Scapular retraction x 20  Retro rolls x 20    MHP x 10 min    Brie received the following manual therapy techniques: Myofacial release and Soft tissue Mobilization were applied to the: left upper trap for 10 minutes.     The patient received the following supervised modalities after being cleared for contradictions: IFC Electrical Stimulation:  Brie received IFC Electrical Stimulation for pain control applied to the upper traps left. Pt received stimulation at 19 Ma for 10 minutes. Brie tolderated treatment well without any adverse effects.      Written Home Exercises Provided: continue with current  Pt demo good understanding of the education provided. Brie demonstrated good return demonstration of activities.     Assessment:     Pt had reduced pain and increased today after IFC with MHP and STM including myofascial release. She had palpable muscle spasm on upper trap that were released.  Pt will continue to benefit from skilled PT intervention. Medical Necessity is demonstrated by:  Pain limits " function of effected part for some activities, Unable to participate fully in daily activities, Requires skilled supervision to complete and progress HEP and Weakness.    Patient is making fair progress towards established goals.    New/Revised goals: no      Plan:  Continue with established Plan of Care towards PT goals.

## 2019-09-10 ENCOUNTER — CLINICAL SUPPORT (OUTPATIENT)
Dept: REHABILITATION | Facility: HOSPITAL | Age: 84
End: 2019-09-10
Payer: MEDICARE

## 2019-09-10 DIAGNOSIS — M54.2 CERVICALGIA: Primary | ICD-10-CM

## 2019-09-10 DIAGNOSIS — M25.512 CHRONIC LEFT SHOULDER PAIN: ICD-10-CM

## 2019-09-10 DIAGNOSIS — G89.29 CHRONIC LEFT SHOULDER PAIN: ICD-10-CM

## 2019-09-10 PROCEDURE — 97010 HOT OR COLD PACKS THERAPY: CPT | Mod: HCNC,PN

## 2019-09-10 PROCEDURE — 97110 THERAPEUTIC EXERCISES: CPT | Mod: HCNC,PN

## 2019-09-10 PROCEDURE — 97140 MANUAL THERAPY 1/> REGIONS: CPT | Mod: HCNC,PN

## 2019-09-10 PROCEDURE — 97014 ELECTRIC STIMULATION THERAPY: CPT | Mod: HCNC,PN

## 2019-09-10 NOTE — PROGRESS NOTES
"Name: Brie HOLLOWAY [facility] Number: [id number]  Date of Treatment: 09/10/2019   Diagnosis:   Encounter Diagnoses   Name Primary?    Cervicalgia Yes    Chronic left shoulder pain        Time in: 0845  Time Out: 1002  Total Treatment Time: 77      Subjective:    Brie reports "It's always night time when it hurts the worst."  Patient reports their pain to be 4/10 on a 0-10 scale with 0 being no pain and 10 being the worst pain imaginable.    Objective  Brie received therapeutic exercises to develop strength, endurance, ROM, flexibility and posture for 52 minutes including:   UBE 5'  OHP 3' in flexion  T/s extension with foam roll 10/10"  Scap retraction 3/10  Retro shld rolls 3/10  Rhomboid stretch 3/30s   AROM Cspine flex, ext, rot R/L, SB R/L   Upper trap stretch 3/30s R/L  Chin tucks 3/10    MT x 10' prior to therex session 2* increased discomfort today     CP and IFC x 15' to cervical spine and between shoulder blades to decrease pain and soreness post therex    Written Home Exercises Provided: Cont with HEP  Pt demo good understanding of the education provided. Brie demonstrated good return demonstration of activities.     Assessment:   Patient with soreness between B shld blades and B UT's, requires demonstration with therex and tactile cues to ensure being performed correctly    Pt will continue to benefit from skilled PT intervention. Medical Necessity is demonstrated by:  Pain limits function of effected part for some activities, Requires skilled supervision to complete and progress HEP and Weakness.    Patient is making fair progress towards established goals.    Plan:  Continue with established Plan of Care towards PT goals.   "

## 2019-09-12 ENCOUNTER — CLINICAL SUPPORT (OUTPATIENT)
Dept: REHABILITATION | Facility: HOSPITAL | Age: 84
End: 2019-09-12
Attending: PHYSICIAN ASSISTANT
Payer: MEDICARE

## 2019-09-12 DIAGNOSIS — G89.29 CHRONIC LEFT SHOULDER PAIN: Primary | ICD-10-CM

## 2019-09-12 DIAGNOSIS — M25.512 CHRONIC LEFT SHOULDER PAIN: Primary | ICD-10-CM

## 2019-09-12 PROCEDURE — 97140 MANUAL THERAPY 1/> REGIONS: CPT | Mod: HCNC,PN

## 2019-09-12 PROCEDURE — 97010 HOT OR COLD PACKS THERAPY: CPT | Mod: HCNC,PN

## 2019-09-12 PROCEDURE — 97110 THERAPEUTIC EXERCISES: CPT | Mod: HCNC,PN

## 2019-09-12 NOTE — PROGRESS NOTES
Name: Brie HOLLOWAY Farin  [facility] Number: [id number]  Date of Treatment: 09/12/2019   Diagnosis:   Encounter Diagnosis   Name Primary?    Chronic left shoulder pain Yes       Time in: 1500  Time Out: 1555  Total Treatment Time: 55  Group Time: 0      Subjective:    Brie reports continued L shoulder soreness.  Patient reports their pain to be 5/10 on a 0-10 scale with 0 being no pain and 10 being the worst pain imaginable.    Objective    Brie received therapeutic exercises to develop strength, endurance, flexibility and posture for 30 minutes including:     UBE 4'/4'  Shoulder flex with 2# cane in sit x20  Stand sh. Ext and IR 2# cane behind back x20  Pulleys 13# x20: rows, high rows, pull downs, add R/L    Brie received the following manual therapy techniques: Soft tissue Mobilization were applied to the: L UT and rhomboid for 10 minutes.     The patient received the following supervised modalities after being cleared for contradictions: IFC Electrical Stimulation:  Brie received IFC Electrical Stimulation for pain control applied to the L shoulder along with cold pack. Pt received stimulation at a frequency of 9.5mA for 15 minutes. Brie tolerated treatment well without any adverse effects.      Pt demo good understanding of the education provided. Brie demonstrated good return demonstration of activities with cues to stay within pain free range.     Assessment:     Pt will continue to benefit from skilled PT intervention. Medical Necessity is demonstrated by:  Pain limits function of effected part for some activities, Unable to participate fully in daily activities, Requires skilled supervision to complete and progress HEP and Weakness.    Plan:    Continue with established Plan of Care towards PT goals.

## 2019-09-20 ENCOUNTER — CLINICAL SUPPORT (OUTPATIENT)
Dept: REHABILITATION | Facility: HOSPITAL | Age: 84
End: 2019-09-20
Payer: MEDICARE

## 2019-09-20 PROCEDURE — 97010 HOT OR COLD PACKS THERAPY: CPT | Mod: HCNC,PN

## 2019-09-20 PROCEDURE — 97110 THERAPEUTIC EXERCISES: CPT | Mod: HCNC,PN

## 2019-09-20 NOTE — PROGRESS NOTES
"Name: Brie HOLLOWAY [facility] Number: [id number]  Date of Treatment: 09/20/2019   Diagnosis: No diagnosis found.    Time in: 0900  Time Out: 1005  Total Treatment Time: 65    Subjective:    Brie reports no pain at present. Had "almost a 10" out of 10 pain L shoulder while sleeping last night. Only has pain during the day when she extends L UE behind her at end range.      Objective:    Patient received individual therapy to increase strength, endurance, ROM, flexibility, posture and core stabilization with activities as follows:     Brie received therapeutic exercises to develop strength, endurance, ROM, flexibility, posture and core stabilization for 55 minutes including:     Seated UBE 5/5 L1  Seated TE 10/3:     Scap retraction B     Retro rolls B     C/s AROM : rot, side flexion     Chin tucks     L bicep isometrics with A  Standing rows RTB 10/2  Standing extn B RTB 10/2  Seated OH pulley flexion 3'  PROM L shoulder all planes  AAROM L shoulder all planes  Standing L shoulder flexion isometrics 10/3 with ball against wall    MHP to post neck and shoulders per pt request for improved pain x 10 minutes in seated position after being cleared for contraindications.     Continue HEP daily.    Pt demo good understanding of the education provided. Patient demonstrated good return demonstration of activities.     Assessment:     Difficulty maintaining scapular setting with ex's and requires cues. Discomfort with ex's requiring vc for proper techniques.     Pt will continue to benefit from skilled PT intervention. Medical Necessity is demonstrated by:  Requires skilled supervision to complete and progress HEP and Weakness.    Patient is making good progress towards established goals.    Plan:    Continue with established Plan of Care towards PT goals.     "

## 2019-09-24 ENCOUNTER — CLINICAL SUPPORT (OUTPATIENT)
Dept: REHABILITATION | Facility: HOSPITAL | Age: 84
End: 2019-09-24
Attending: PHYSICIAN ASSISTANT
Payer: MEDICARE

## 2019-09-24 ENCOUNTER — PATIENT MESSAGE (OUTPATIENT)
Dept: FAMILY MEDICINE | Facility: CLINIC | Age: 84
End: 2019-09-24

## 2019-09-24 DIAGNOSIS — M25.512 LEFT SHOULDER PAIN, UNSPECIFIED CHRONICITY: ICD-10-CM

## 2019-09-24 DIAGNOSIS — M54.2 NECK PAIN: Primary | ICD-10-CM

## 2019-09-24 PROCEDURE — 97110 THERAPEUTIC EXERCISES: CPT | Mod: HCNC,PN

## 2019-09-24 PROCEDURE — 97010 HOT OR COLD PACKS THERAPY: CPT | Mod: HCNC,PN

## 2019-09-24 NOTE — PROGRESS NOTES
09/24/19 1300   Cx/Thoracic Exercises   Shoulder Shrugs Rep/Sets/Weight 30   Band Rows Reps/Sets/Resistance 10# 30   Band Shoulder Extension Reps/Sets/Resistance 10# 30   Cervical Flexion Stretch Reps/Sets/Hold Time 10, EXT 10   Shoulder Exercises   Overhead Pulley 6'   Additional Exercises   Additional Exercise UBE 5/5',WAND FLEX 30   Additional Exercise CHIN TUCS

## 2019-09-24 NOTE — PROGRESS NOTES
TIME RECORD    Date:  09/24/2019    Start Time:  1306  Stop Time:  1357    PROCEDURES:    TIMED  Procedure Time Min.   TE Start:1306  Stop:1346 40    Start:  Stop:     Start:  Stop:     Start:  Stop:          UNTIMED  Procedure Time Min.   HP Start:1347  Stop:1357 10    Start:  Stop:      Total Timed Minutes:  40  Total Timed Units:  3  Total Untimed Units:  1  Charges Billed/# of units:  4      Progress/Current Status    Subjective:     Patient ID: Brie Han is a 89 y.o. female.  Diagnosis:   1. Neck pain     2. Left shoulder pain, unspecified chronicity       Pain: 5 /10      Objective:     TE for ROM,strength,stabilization.    Assessment:     Performed well. Reviewed POC with LONNY WALDEN    Patient Education/Response:     Posture ed.    Plans and Goals:     Cont per POC.

## 2019-09-24 NOTE — PROGRESS NOTES
6th visit PT-PTA face-to-face discussion with YOSVANY Cam re: pt status, POC, and plan for progression done today.    Kathleen Washington PTA

## 2019-09-27 ENCOUNTER — CLINICAL SUPPORT (OUTPATIENT)
Dept: REHABILITATION | Facility: HOSPITAL | Age: 84
End: 2019-09-27
Payer: MEDICARE

## 2019-09-27 DIAGNOSIS — M50.30 DDD (DEGENERATIVE DISC DISEASE), CERVICAL: ICD-10-CM

## 2019-09-27 DIAGNOSIS — G89.29 CHRONIC LEFT SHOULDER PAIN: ICD-10-CM

## 2019-09-27 DIAGNOSIS — M25.512 CHRONIC LEFT SHOULDER PAIN: ICD-10-CM

## 2019-09-27 DIAGNOSIS — M54.2 CERVICALGIA: Primary | ICD-10-CM

## 2019-09-27 PROCEDURE — 97010 HOT OR COLD PACKS THERAPY: CPT | Mod: HCNC,PN

## 2019-09-27 PROCEDURE — 97110 THERAPEUTIC EXERCISES: CPT | Mod: HCNC,PN

## 2019-09-27 PROCEDURE — 97140 MANUAL THERAPY 1/> REGIONS: CPT | Mod: HCNC,PN

## 2019-09-27 NOTE — PROGRESS NOTES
"Name: Brie HOLLOWAY [facility] Number: [id number]  Date of Treatment: 09/27/2019   Diagnosis:   Encounter Diagnoses   Name Primary?    Cervicalgia Yes    Chronic left shoulder pain     DDD (degenerative disc disease), cervical        Time in: 0905  Time Out: 1005  Total Treatment Time: 60         Subjective:    Brie reports no changes.  Patient reports their pain to be 2/10 on a 0-10 scale with 0 being no pain and 10 being the worst pain imaginable.    Objective  Brie received therapeutic exercises to develop strength, endurance, ROM, flexibility and posture for 40 minutes including:  UBE 5'fwd/5'bwd  Scap squeezes 30  Shld shrug 30  Post shld roll 30  Scapular setting 10 x 3"  Tspine ext over foam roll 10 x 5"    Manual therapy x 10'  in form of STM/MFR/TrP release to help decrease tension in lat dorsi at lateral edge of scapular border, upper traps, teres minor in sitting with L UE abd to 90, downward force, moderate amt of pressure    MHP after TE/MT x 10' to decrease pain    Written Home Exercises Provided: Cont with HEP  Pt demo good understanding of the education provided. Brie demonstrated fair return demonstration of activities.     Assessment:   Patient with decreased discomfort after heat, pain did increase throughout session as therex/MT were challenging for the patient.  Patinet limited 2* kyphosis.  Focus on improving posture with proper cervical position    Pt will continue to benefit from skilled PT intervention. Medical Necessity is demonstrated by:  Fall Risk, Pain limits function of effected part for some activities, Requires skilled supervision to complete and progress HEP and Weakness.    Patient is making good progress towards established goals.    Plan:  Continue with established Plan of Care towards PT goals.   "

## 2019-10-01 ENCOUNTER — CLINICAL SUPPORT (OUTPATIENT)
Dept: REHABILITATION | Facility: HOSPITAL | Age: 84
End: 2019-10-01
Payer: MEDICARE

## 2019-10-01 DIAGNOSIS — M54.2 CERVICALGIA: Primary | ICD-10-CM

## 2019-10-01 PROCEDURE — 97140 MANUAL THERAPY 1/> REGIONS: CPT | Mod: HCNC,PN

## 2019-10-01 PROCEDURE — 97032 APPL MODALITY 1+ESTIM EA 15: CPT | Mod: HCNC,PN

## 2019-10-01 NOTE — PROGRESS NOTES
"Name: Brie HOLLOWAY [facility] Number: [id number]  Date of Treatment: 10/01/2019   Diagnosis:   Encounter Diagnosis   Name Primary?    Cervicalgia Yes       Time in: 1005  Time Out: 1105  Total Treatment Time: 60  Group Time: 0      Subjective:    Brie reports continued difficulty with sleep 2nd to neck and shoulder soreness.  Patient reports their pain to be 7/10 on a 0-10 scale with 0 being no pain and 10 being the worst pain imaginable.    Objective    Brie received therapeutic exercises to develop strength, endurance, ROM, flexibility and posture for 25 minutes including:     UBE 5'/5'  OHP 5'  Retro shoulder rolls x30  Thoracic ext in chair with roll behind back (along thoracic spine) x10 5" hold    Hot pack to neck 10' before exercises to increase flexibility and decrease soreness    Manual therapy to L UT of soft tissue mobilization and myofascia release 10'.    The patient received the following supervised modalities after being cleared for contradictions: IFC Electrical Stimulation:  Brie received IFC Electrical Stimulation for pain control applied to the L Upper trap along with cold pack. Pt received stimulation at a frequency of 12.5mA for 15 minutes. Brie tolerated treatment well without any adverse effects.    Pt demo good understanding of the education provided. Brie demonstrated good return demonstration of activities with cues for direction and proper form.     Assessment:     Pt will continue to benefit from skilled PT intervention. Medical Necessity is demonstrated by:  Pain limits function of effected part for some activities, Unable to participate fully in daily activities, Requires skilled supervision to complete and progress HEP and Weakness.    Plan:    Continue with established Plan of Care towards PT goals.   "

## 2019-10-04 ENCOUNTER — CLINICAL SUPPORT (OUTPATIENT)
Dept: REHABILITATION | Facility: HOSPITAL | Age: 84
End: 2019-10-04
Attending: PHYSICIAN ASSISTANT
Payer: MEDICARE

## 2019-10-04 DIAGNOSIS — M25.512 LEFT SHOULDER PAIN, UNSPECIFIED CHRONICITY: ICD-10-CM

## 2019-10-04 DIAGNOSIS — M50.30 DDD (DEGENERATIVE DISC DISEASE), CERVICAL: Primary | ICD-10-CM

## 2019-10-04 PROCEDURE — 97140 MANUAL THERAPY 1/> REGIONS: CPT | Mod: HCNC,PN

## 2019-10-04 PROCEDURE — 97110 THERAPEUTIC EXERCISES: CPT | Mod: HCNC,PN

## 2019-10-04 NOTE — PROGRESS NOTES
"Name: Brie HOLLOWAY [facility] Number: [id number]  Date of Treatment: 10/04/2019   Diagnosis:   Encounter Diagnoses   Name Primary?    DDD (degenerative disc disease), cervical Yes    Left shoulder pain, unspecified chronicity        Time in: 1122  Time Out: 1200  Total Treatment Time: 38  Group Time: no      Subjective:    Brie reports improvement of symptoms and decreased pain.  Patient reports their pain to be 0/10 on a 0-10 scale with 0 being no pain and 10 being the worst pain imaginable. Patient reports she was so much better after last treatment and could sleep through the night.    Objective    Brie received therapeutic exercises to develop strength, ROM, flexibility and posture for 23 minutes including:     UBE 5/5  Shoulder shrugs x 30  Shoulder retro rolls x 30  Chin Tucks x 30  Scapular retraction with pulley rows 20# x 30  Wall corner stretch 5 x 10"  OH pulleys: shoulder flexion 3'    MH pack    Brie received the following manual therapy techniques: Soft tissue Mobilization were applied to the: left shoulder and cervical spine for 10 minutes.       Written Home Exercises Provided: continue with current  Pt demo good understanding of the education provided. Brie demonstrated good return demonstration of activities.     Assessment:       Pt will continue to benefit from skilled PT intervention. Medical Necessity is demonstrated by:  Pain limits function of effected part for some activities, Requires skilled supervision to complete and progress HEP and Weakness.    Patient is making good progress towards established goals.    New/Revised goals: no      Plan:  Continue with established Plan of Care towards PT goals.   "

## 2019-10-08 ENCOUNTER — CLINICAL SUPPORT (OUTPATIENT)
Dept: REHABILITATION | Facility: HOSPITAL | Age: 84
End: 2019-10-08
Attending: PHYSICIAN ASSISTANT
Payer: MEDICARE

## 2019-10-08 DIAGNOSIS — M25.512 CHRONIC LEFT SHOULDER PAIN: ICD-10-CM

## 2019-10-08 DIAGNOSIS — G89.29 CHRONIC LEFT SHOULDER PAIN: ICD-10-CM

## 2019-10-08 DIAGNOSIS — M50.30 DDD (DEGENERATIVE DISC DISEASE), CERVICAL: Primary | ICD-10-CM

## 2019-10-08 PROCEDURE — 97110 THERAPEUTIC EXERCISES: CPT | Mod: HCNC,PN

## 2019-10-08 PROCEDURE — 97010 HOT OR COLD PACKS THERAPY: CPT | Mod: HCNC,PN

## 2019-10-08 PROCEDURE — 97164 PT RE-EVAL EST PLAN CARE: CPT | Mod: HCNC,PN,59

## 2019-10-08 NOTE — PLAN OF CARE
TIME RECORD    Date: 10/08/2019    Start Time:  0900  Stop Time:  1005.    PROCEDURES:    TIMED  Procedure Time Min.   TE Start:0916  Stop:0954 38    Start:  Stop:     Start:  Stop:     Start:  Stop:          UNTIMED  Procedure Time Min.   RE-EVAL Start:0900  Stop:0915 15   HP Start:0955  Stop:1005 10     Total Timed Minutes:  38  Total Timed Units:  3  Total Untimed Units:  1  Charges Billed/# of units:  4    PHYSICAL THERAPY UPDATED PLAN OF TREATMENT    Patient name: Brie Han  Onset Date:  Insidious.  SOC Date:  8/27/19  Primary Diagnosis:    1. DDD (degenerative disc disease), cervical     2. Chronic left shoulder pain       Treatment Diagnosis:  Neck pain,left shoulder pain.    Precautions:  STANDARD.  Visits from SOC:  12  Functional Level Prior to SOC:  Independent.    Updated Assessment:    Cervical/Thoracic/Lumbar AROM: Pain/Dysfunction with Movement:   Flexion  35     Ext 25    Right side bending   25    Left side bending    25    Right rotation  70    Left rotation    75      Strength of CS is grossly 3+/5.  NDI 14/50.  Previous Short Term Goals Status:   Goals met.  New Short Term Goals Status:   Strength 4/5.  Long Term Goal Status:   continue per initial plan of care.  Reasons for Recertification of Therapy:   Pt presents ROM and strength deficits,poor posture,decreased functional status due to neck and left shoulder pain    Certification Period: 10/9/19 to 11/23/19  Recommended Treatment Plan: 2 times per week for 4 weeks:8 visits. Cervical/Lumbar Traction, Electrical Stimulation PRN, Manual Therapy, Moist Heat/ Ice, Patient Education, Therapeutic Activites and Therapeutic Exercise  Other Recommendations: DN PRN. IMS PRN.        Therapist's Name: CINTHIA DEVLIN P.T.   Date: 10/08/2019    I CERTIFY THE NEED FOR THESE SERVICES FURNISHED UNDER THIS PLAN OF TREATMENT AND WHILE UNDER MY CARE    Physician's comments:  ________________________________________________________________________________________________________________________________________________      Physician's Name: ___________________________________

## 2019-10-08 NOTE — PROGRESS NOTES
10/08/19 1000   Cx/Thoracic Exercises   Shoulder Shrugs Rep/Sets/Weight 30,ROLLS,RETR 30   Cervical Flexion Stretch Reps/Sets/Hold Time 10,EXT10   Shoulder Exercises   Overhead Pulley 6'   Additional Exercises   Additional Exercise UBE 5/5   Additional Exercise CHIN TUCKS.

## 2019-10-11 ENCOUNTER — CLINICAL SUPPORT (OUTPATIENT)
Dept: REHABILITATION | Facility: HOSPITAL | Age: 84
End: 2019-10-11
Payer: MEDICARE

## 2019-10-11 DIAGNOSIS — G89.29 CHRONIC LEFT SHOULDER PAIN: Primary | ICD-10-CM

## 2019-10-11 DIAGNOSIS — M25.512 CHRONIC LEFT SHOULDER PAIN: Primary | ICD-10-CM

## 2019-10-11 DIAGNOSIS — M54.2 CERVICALGIA: ICD-10-CM

## 2019-10-11 PROCEDURE — 97010 HOT OR COLD PACKS THERAPY: CPT | Mod: HCNC,PN

## 2019-10-11 PROCEDURE — 97140 MANUAL THERAPY 1/> REGIONS: CPT | Mod: HCNC,PN

## 2019-10-11 PROCEDURE — 97110 THERAPEUTIC EXERCISES: CPT | Mod: HCNC,PN

## 2019-10-11 NOTE — PROGRESS NOTES
"Name: Brie HOLLOWAY [facility] Number: [id number]  Date of Treatment: 10/11/2019   Diagnosis:   Encounter Diagnoses   Name Primary?    Chronic left shoulder pain Yes    Cervicalgia        Time in: 0900  Time Out: 1010  Total Treatment Time: 70  Group Time: 0      Subjective:    Brie reports doing well during the day and through days activities, but experiences neck and L shoulder pain at night, reports gets "dizzy" if laying flat and has 2 pillows to support head at night without better results.  Patient reports their pain to be 2-3/10 on a 0-10 scale with 0 being no pain and 10 being the worst pain imaginable.    Objective    Brie received therapeutic exercises to develop strength, endurance, ROM, flexibility and posture for 50 minutes including:     UBE 5'/5' (assist to maintain seat forward to dec kyphotic posture)  OHP 6'  Chin tucks 3x10  Supine on pillows with towel roll for neck support for 5 minutes without complaints (discussed need for change in pillows when current situation is not working)  PNF D2 Yellow Tband against wall 2x10 (x2)     Manual therapy to L UT and L lateral UE of soft tissue mobilization and myofascia release 10'    Cold pack to neck and L lat UE 10' to decrease soreness    Pt demo good understanding of the education provided. Brie demonstrated good return demonstration of activities with cues for direction and proper form.     Assessment:     Pt will continue to benefit from skilled PT intervention. Medical Necessity is demonstrated by:  Pain limits function of effected part for some activities, Requires skilled supervision to complete and progress HEP and Weakness.    Plan:    Continue with established Plan of Care towards PT goals.   "

## 2019-10-16 ENCOUNTER — CLINICAL SUPPORT (OUTPATIENT)
Dept: REHABILITATION | Facility: HOSPITAL | Age: 84
End: 2019-10-16
Attending: PHYSICIAN ASSISTANT
Payer: MEDICARE

## 2019-10-16 DIAGNOSIS — M25.512 CHRONIC LEFT SHOULDER PAIN: Primary | ICD-10-CM

## 2019-10-16 DIAGNOSIS — G89.29 CHRONIC LEFT SHOULDER PAIN: Primary | ICD-10-CM

## 2019-10-16 PROCEDURE — 97110 THERAPEUTIC EXERCISES: CPT | Mod: HCNC,PN

## 2019-10-16 PROCEDURE — 97010 HOT OR COLD PACKS THERAPY: CPT | Mod: HCNC,PN

## 2019-10-16 NOTE — PROGRESS NOTES
"Name: Brie HOLLOWAY [facility] Number: [id number]  Date of Treatment: 10/16/2019   Diagnosis: Degenerative Disk Disease                     Neck and shoulder pain  Time in: 0950  Time Out: 1045  Total Treatment Time: 55  Group Time: no      Subjective:    Brie reports improvement of symptoms and decreased pain. She states she is sleeping better at night now. Patient reports their pain to be 0/10 on a 0-10 scale with 0 being no pain and 10 being the worst pain imaginable.    Objective    Brie received therapeutic exercises to develop strength, endurance, ROM, flexibility and posture for 55 minutes including:     UBE 5/5  Scapular retraction with RTB 2 x 10  Retro rolls against wall x 20  Chin tucks x 20  Pulley rows 20# x 20   Low rows 20# x 20  PNF D2 with YTB 2 x 10 B  OH pulleys 3' with stretch at end range of shoulder flexion  Supine shoulder retraction/protractoin with 2# bar 2 x 10  Shoulder flexion with 2# bar 2 x 20  Prone cervical retraction 2 x 10  Roll TB up wall and overhead 2 x 10  Corner stretch with C-spine in neutral 3/30"    [facility]       Written Home Exercises Provided: continue with current  Pt demo good understanding of the education provided. Brie demonstrated good return demonstration of activities.     Assessment:     Patient is kyphotic which leads to most of her pain. As this is improving she is having less pain. Continue with PT's POC.  Pt will continue to benefit from skilled PT intervention. Medical Necessity is demonstrated by:  Pain limits function of effected part for some activities, Requires skilled supervision to complete and progress HEP and Weakness.    Patient is making good progress towards established goals.    New/Revised goals: no      Plan:  Continue with established Plan of Care towards PT goals.   "

## 2019-10-22 ENCOUNTER — PATIENT MESSAGE (OUTPATIENT)
Dept: FAMILY MEDICINE | Facility: CLINIC | Age: 84
End: 2019-10-22

## 2019-10-22 ENCOUNTER — CLINICAL SUPPORT (OUTPATIENT)
Dept: REHABILITATION | Facility: HOSPITAL | Age: 84
End: 2019-10-22
Payer: MEDICARE

## 2019-10-22 DIAGNOSIS — M50.30 DDD (DEGENERATIVE DISC DISEASE), CERVICAL: Primary | ICD-10-CM

## 2019-10-22 PROCEDURE — 97110 THERAPEUTIC EXERCISES: CPT | Mod: HCNC,PN

## 2019-10-22 PROCEDURE — 97010 HOT OR COLD PACKS THERAPY: CPT | Mod: HCNC,PN

## 2019-10-22 NOTE — PROGRESS NOTES
10/22/19 1500   Cx/Thoracic Exercises   Shoulder Shrugs Rep/Sets/Weight 30,ROLLS/RETR 30   Cervical Flexion Stretch Reps/Sets/Hold Time 10,LR/RR, SBL/R 10/10   Shoulder Exercises   Overhead Pulley 6' FL, 4' IR.   Band Rows Reps/Sets/Hold Time/Resistance 10# 30   Band Shoulder Extension Reps/Sets/Resistance 10# 30   Additional Exercises   Additional Exercise UBE 5/5,CHIN TUCKS 30   Additional Exercise MTT.

## 2019-10-22 NOTE — PROGRESS NOTES
TIME RECORD    Date:  10/22/2019    Start Time:  1100  Stop Time:  1153    PROCEDURES:    TIMED  Procedure Time Min.   TE Start:1100  Stop:1142 42    Start:  Stop:     Start:  Stop:     Start:  Stop:          UNTIMED  Procedure Time Min.   HP Start:1143  Stop:1153 10    Start:  Stop:      Total Timed Minutes:  42  Total Timed Units:  3  Total Untimed Units:  1  Charges Billed/# of units:  4      Progress/Current Status    Subjective:     Patient ID: Brie Han is a 89 y.o. female.  Diagnosis:   1. DDD (degenerative disc disease), cervical       Pain: 1 /10      Objective:     TE for ROM,strength f/b HP x 10 to neck.    Assessment:     Reach back test 10 cm.      Patient Education/Response:     Posture ed.    Plans and Goals:     Cont per POC.

## 2019-10-25 ENCOUNTER — CLINICAL SUPPORT (OUTPATIENT)
Dept: REHABILITATION | Facility: HOSPITAL | Age: 84
End: 2019-10-25
Payer: MEDICARE

## 2019-10-25 DIAGNOSIS — M54.2 CERVICALGIA: Primary | ICD-10-CM

## 2019-10-25 PROCEDURE — 97010 HOT OR COLD PACKS THERAPY: CPT | Mod: HCNC,PN

## 2019-10-25 PROCEDURE — 97110 THERAPEUTIC EXERCISES: CPT | Mod: HCNC,PN

## 2019-10-25 NOTE — PROGRESS NOTES
"Name: Brie HOLLOWAY [facility] Number: [id number]  Date of Treatment: 10/25/2019   Diagnosis:   Encounter Diagnosis   Name Primary?    Cervicalgia Yes       Time in: 1210  Time Out: 1302  Total Treatment Time: 52      Subjective:    Brie reports improvement of symptoms and decreased pain,"Sorry I was late, the streets are all flooded!"  "During the day it doesn't hurt, it's at night when it flares up."  Patient reports their pain to be 2/10 on a 0-10 scale with 0 being no pain and 10 being the worst pain imaginable.    Objective  Brie received therapeutic exercises to develop strength, endurance, ROM, flexibility and posture for 42 minutes including:   SciFit x 10' with UE's to increase blood flow to UE's and upper trunk   Scapular retraction with RTB 2 x 10   Retro rolls x 20   Chin tucks x 20   Pulley rows 5# x 20 (1 UE at a time with pulleys in rehab gym)   Low rows 20# x 20    PNF D2 with YTB 2 x 10 B   Supine shoulder retraction/protractoin with 2# bar 2 x 10   Supine shoulder flexion with 2# bar 2 x 20   Prone cervical retraction 2 x 10     MHP x 10' to decrease pain after stretching and therex    Written Home Exercises Provided: Cont with HEP  Pt demo good understanding of the education provided. Brie demonstrated good return demonstration of activities. Advised patient to use towel roll at lumbar curve when sitting in chairs with full back (in car, at kitchen table, couch/recliner)    Assessment:   Patient progressing well with therex, remains requiring VC's for postural correction in sitting and standing.  When towel roll is placed at patients lumbar spine, patients posture significantly improved    Pt will continue to benefit from skilled PT intervention. Medical Necessity is demonstrated by:  Fall Risk, Continued inability to participate in vocational pursuits, Pain limits function of effected part for some activities, Requires skilled supervision to complete and progress HEP and " Weakness.    Patient is making good progress towards established goals.    Plan:  Continue with established Plan of Care towards PT goals.

## 2019-10-29 ENCOUNTER — CLINICAL SUPPORT (OUTPATIENT)
Dept: REHABILITATION | Facility: HOSPITAL | Age: 84
End: 2019-10-29
Payer: MEDICARE

## 2019-10-29 DIAGNOSIS — M50.30 DDD (DEGENERATIVE DISC DISEASE), CERVICAL: Primary | ICD-10-CM

## 2019-10-29 PROCEDURE — 97010 HOT OR COLD PACKS THERAPY: CPT | Mod: HCNC,PN

## 2019-10-29 PROCEDURE — 97110 THERAPEUTIC EXERCISES: CPT | Mod: HCNC,PN

## 2019-10-29 NOTE — PROGRESS NOTES
10/29/19 0900   Cx/Thoracic Exercises   Shoulder Shrugs Rep/Sets/Weight 30,ROLLS/RETR 30   Cervical Flexion Stretch Reps/Sets/Hold Time CS ROM 10/10 EACH.   Shoulder Exercises   Overhead Pulley 6'   Band Rows Reps/Sets/Hold Time/Resistance 10# 30   Band Shoulder Extension Reps/Sets/Resistance 10# 30.   Additional Exercises   Additional Exercise UBE 5/5.   Additional Exercise MTT

## 2019-10-29 NOTE — PROGRESS NOTES
TIME RECORD    Date:  10/29/2019    Start Time:  0923  Stop Time:  0957    PROCEDURES:    TIMED  Procedure Time Min.   TE Start:0923  Stop:0946 23    Start:  Stop:     Start:  Stop:     Start:  Stop:          UNTIMED  Procedure Time Min.   HP Start:0947  Stop:0957 10    Start:  Stop:      Total Timed Minutes:  23  Total Timed Units:  2  Total Untimed Units:  1  Charges Billed/# of units:  3      Progress/Current Status    Subjective:     Patient ID: Brie Han is a 89 y.o. female.  Diagnosis:   1. DDD (degenerative disc disease), cervical       Pain: 1 /10      Objective:     TE for ROM,strength,stabilization f/b HP X 10'.    Assessment:     Short session,pt late.     Patient Education/Response:     Posture ed.    Plans and Goals:     Cont per POC.

## 2019-12-10 ENCOUNTER — OFFICE VISIT (OUTPATIENT)
Dept: OPHTHALMOLOGY | Facility: CLINIC | Age: 84
End: 2019-12-10
Payer: MEDICARE

## 2019-12-10 DIAGNOSIS — Z96.1 PSEUDOPHAKIA OF BOTH EYES: ICD-10-CM

## 2019-12-10 DIAGNOSIS — H02.834 DERMATOCHALASIS OF BOTH UPPER EYELIDS: ICD-10-CM

## 2019-12-10 DIAGNOSIS — H02.831 DERMATOCHALASIS OF BOTH UPPER EYELIDS: ICD-10-CM

## 2019-12-10 DIAGNOSIS — H40.1132 PRIMARY OPEN ANGLE GLAUCOMA (POAG) OF BOTH EYES, MODERATE STAGE: Primary | ICD-10-CM

## 2019-12-10 PROCEDURE — 99999 PR PBB SHADOW E&M-EST. PATIENT-LVL III: CPT | Mod: PBBFAC,HCNC,, | Performed by: OPHTHALMOLOGY

## 2019-12-10 PROCEDURE — 92012 INTRM OPH EXAM EST PATIENT: CPT | Mod: HCNC,S$GLB,, | Performed by: OPHTHALMOLOGY

## 2019-12-10 PROCEDURE — 99999 PR PBB SHADOW E&M-EST. PATIENT-LVL III: ICD-10-PCS | Mod: PBBFAC,HCNC,, | Performed by: OPHTHALMOLOGY

## 2019-12-10 PROCEDURE — 92012 PR EYE EXAM, EST PATIENT,INTERMED: ICD-10-PCS | Mod: HCNC,S$GLB,, | Performed by: OPHTHALMOLOGY

## 2019-12-10 NOTE — PATIENT INSTRUCTIONS
"  What Is Glaucoma?    Glaucoma is an eye disease that can cause blindness. If caught early, it can usually be controlled. But it often has no symptoms, so you need regular eye exams. Glaucoma usually begins when pressure builds up in the eye. This pressure can damage the optic nerve. The optic nerve sends messages to the brain so you can see. There are two main kinds of glaucoma: "open-angle" and "closed-angle."  Drainage area  The eye is always producing fluid. The eye's drainage areas may become clogged or blocked. Too much fluid stays in the eye. This increases eye pressure.  Optic nerve  Too much pressure in the eye can damage the optic nerve. If damaged, this nerve cannot send the messages to the brain that let you see.  Open-Angle Glaucoma  Open-angle is the most common kind of glaucoma. It occurs slowly as people age. The drainage area in the eye becomes clogged. Not enough fluid drains from the eye, so pressure slowly builds up. This causes gradual loss of side (peripheral) vision. You may not even notice changes until much of your vision is lost.  Closed-Angle Glaucoma  Closed-angle glaucoma is less common than open-angle. It usually comes on quickly. The drainage area in the eye suddenly becomes completely blocked. Eye pressure builds rapidly. You may notice blurred vision and rainbow halos around lights. You may also have headaches, nausea, vomiting, and severe pain. If not treated right away, blindness can occur quickly.  Date Last Reviewed: 6/1/2015  © 7821-1042 [employer]. [address] [place] [address], [place] [address]. All rights reserved. This information is not intended as a substitute for professional medical care. Always follow your healthcare professional's instructions.        "

## 2019-12-10 NOTE — PROGRESS NOTES
HPI     Glaucoma      Additional comments: 4 month iop ck              Comments     DLS: 8/6/19    Pt states having a little more difficulty reading with gls since last   visit. Has gray blob in va OD.    Gtts: Latanoprost QHS OU          Last edited by Cheryle Quintana on 12/10/2019 10:33 AM. (History)            Assessment /Plan     For exam results, see Encounter Report.    Primary open angle glaucoma (POAG) of both eyes, moderate stage    Dermatochalasis of both upper eyelids    Pseudophakia of both eyes         Primary open angle glaucoma (POAG) of both eyes, moderate stage- HVF: OD dense superior altitudinal defect OS- nasal inf and sup  -IOP 16 OU, typically runs in the low 20's, been stable with latanoprost  -continue latanoprost QHS OU  Follow up in about 4 months (around 4/10/2020) for IOP and Medication check.     Dermatochalasis of both upper eyelids- borderline, continue to monitor- asymptomatic, observe    Pseudophakia of both eyes- stable, observe

## 2019-12-20 NOTE — TELEPHONE ENCOUNTER
----- Message from Rachelle Nunes sent at 12/20/2019  9:18 AM CST -----  Type:  Pharmacy Calling to Clarify an RX    Name of Caller:  Tanisha  Pharmacy Name:    [facility] [address]  [address] [facility] [address]  Phone: [phone] Fax: [phone]      Prescription Name:  lovastatin (MEVACOR) 10 MG tablet  What do they need to clarify?:  Refill request for patient  Additional Information:  States that a refill request was sent to office on 12/12/2019 with no response

## 2019-12-23 RX ORDER — LOVASTATIN 10 MG/1
10 TABLET ORAL NIGHTLY
Qty: 90 TABLET | Refills: 3 | Status: SHIPPED | OUTPATIENT
Start: 2019-12-23 | End: 2020-11-13

## 2020-01-06 ENCOUNTER — OFFICE VISIT (OUTPATIENT)
Dept: FAMILY MEDICINE | Facility: CLINIC | Age: 85
End: 2020-01-06
Payer: MEDICARE

## 2020-01-06 VITALS
HEIGHT: 59 IN | OXYGEN SATURATION: 98 % | WEIGHT: 119.06 LBS | BODY MASS INDEX: 24 KG/M2 | HEART RATE: 70 BPM | TEMPERATURE: 98 F | DIASTOLIC BLOOD PRESSURE: 60 MMHG | SYSTOLIC BLOOD PRESSURE: 122 MMHG

## 2020-01-06 DIAGNOSIS — D50.8 IRON DEFICIENCY ANEMIA SECONDARY TO INADEQUATE DIETARY IRON INTAKE: Primary | ICD-10-CM

## 2020-01-06 DIAGNOSIS — I10 BENIGN ESSENTIAL HTN: ICD-10-CM

## 2020-01-06 PROCEDURE — 1101F PR PT FALLS ASSESS DOC 0-1 FALLS W/OUT INJ PAST YR: ICD-10-PCS | Mod: HCNC,CPTII,S$GLB, | Performed by: FAMILY MEDICINE

## 2020-01-06 PROCEDURE — G0008 ADMIN INFLUENZA VIRUS VAC: HCPCS | Mod: HCNC,S$GLB,, | Performed by: FAMILY MEDICINE

## 2020-01-06 PROCEDURE — 99999 PR PBB SHADOW E&M-EST. PATIENT-LVL IV: CPT | Mod: PBBFAC,HCNC,, | Performed by: FAMILY MEDICINE

## 2020-01-06 PROCEDURE — 1126F PR PAIN SEVERITY QUANTIFIED, NO PAIN PRESENT: ICD-10-PCS | Mod: HCNC,S$GLB,, | Performed by: FAMILY MEDICINE

## 2020-01-06 PROCEDURE — 1126F AMNT PAIN NOTED NONE PRSNT: CPT | Mod: HCNC,S$GLB,, | Performed by: FAMILY MEDICINE

## 2020-01-06 PROCEDURE — 1159F PR MEDICATION LIST DOCUMENTED IN MEDICAL RECORD: ICD-10-PCS | Mod: HCNC,S$GLB,, | Performed by: FAMILY MEDICINE

## 2020-01-06 PROCEDURE — 90662 FLU VACCINE - HIGH DOSE (65+) PRESERVATIVE FREE IM: ICD-10-PCS | Mod: HCNC,S$GLB,, | Performed by: FAMILY MEDICINE

## 2020-01-06 PROCEDURE — 99214 PR OFFICE/OUTPT VISIT, EST, LEVL IV, 30-39 MIN: ICD-10-PCS | Mod: HCNC,25,S$GLB, | Performed by: FAMILY MEDICINE

## 2020-01-06 PROCEDURE — 99214 OFFICE O/P EST MOD 30 MIN: CPT | Mod: HCNC,25,S$GLB, | Performed by: FAMILY MEDICINE

## 2020-01-06 PROCEDURE — 99999 PR PBB SHADOW E&M-EST. PATIENT-LVL IV: ICD-10-PCS | Mod: PBBFAC,HCNC,, | Performed by: FAMILY MEDICINE

## 2020-01-06 PROCEDURE — 90662 IIV NO PRSV INCREASED AG IM: CPT | Mod: HCNC,S$GLB,, | Performed by: FAMILY MEDICINE

## 2020-01-06 PROCEDURE — 1101F PT FALLS ASSESS-DOCD LE1/YR: CPT | Mod: HCNC,CPTII,S$GLB, | Performed by: FAMILY MEDICINE

## 2020-01-06 PROCEDURE — 1159F MED LIST DOCD IN RCRD: CPT | Mod: HCNC,S$GLB,, | Performed by: FAMILY MEDICINE

## 2020-01-06 PROCEDURE — G0008 FLU VACCINE - HIGH DOSE (65+) PRESERVATIVE FREE IM: ICD-10-PCS | Mod: HCNC,S$GLB,, | Performed by: FAMILY MEDICINE

## 2020-01-06 NOTE — PROGRESS NOTES
Subjective:       Patient ID: Brie Han is a 89 y.o. female.    Chief Complaint: Hypertension    Hypertension   This is a chronic problem. Associated symptoms include neck pain. Pertinent negatives include no chest pain, headaches or palpitations.     Review of Systems   Constitutional: Negative for activity change and unexpected weight change.   HENT: Positive for hearing loss. Negative for rhinorrhea and trouble swallowing.    Eyes: Positive for visual disturbance. Negative for discharge.   Respiratory: Negative for chest tightness and wheezing.    Cardiovascular: Negative for chest pain and palpitations.   Gastrointestinal: Negative for blood in stool, constipation, diarrhea and vomiting.   Endocrine: Negative for polydipsia and polyuria.   Genitourinary: Negative for difficulty urinating, dysuria, hematuria and menstrual problem.   Musculoskeletal: Positive for arthralgias, back pain, myalgias and neck pain. Negative for joint swelling.   Neurological: Negative for weakness and headaches.   Psychiatric/Behavioral: Negative for confusion and dysphoric mood. The patient is nervous/anxious.        Patient Active Problem List   Diagnosis    Benign essential HTN    PAD (peripheral artery disease)    CAD (coronary artery disease)    Hypercholesteremia    Refusal of blood transfusions as patient is Protestant    Hyponatremia    DDD (degenerative disc disease), lumbosacral    Herpes simplex    Neurogenic pain of right foot    GERD without esophagitis    DDD (degenerative disc disease), cervical    Cervicalgia    Chronic left shoulder pain    Left shoulder pain       Objective:      Physical Exam   Constitutional: She is oriented to person, place, and time. She appears well-developed and well-nourished.   HENT:   Head: Normocephalic and atraumatic.   Right Ear: External ear normal.   Left Ear: External ear normal.   Nose: Nose normal.   Mouth/Throat: No oropharyngeal exudate.   Eyes: Pupils  are equal, round, and reactive to light. Conjunctivae and EOM are normal. Right eye exhibits no discharge. Left eye exhibits no discharge. No scleral icterus.   Neck: Normal range of motion. Neck supple. No JVD present. No tracheal deviation present. No thyromegaly present.   Cardiovascular: Normal rate, normal heart sounds and intact distal pulses. Exam reveals no gallop and no friction rub.   No murmur heard.  Pulmonary/Chest: Effort normal. No stridor. No respiratory distress. She has no wheezes. She has no rales. She exhibits no tenderness.   Abdominal: Soft. Bowel sounds are normal. She exhibits no distension and no mass. There is no tenderness. There is no rebound and no guarding.   Musculoskeletal: She exhibits no edema.        Cervical back: She exhibits decreased range of motion and tenderness.        Lumbar back: She exhibits decreased range of motion and tenderness.   Lymphadenopathy:     She has no cervical adenopathy.   Neurological: She is alert and oriented to person, place, and time. She has normal reflexes. She displays normal reflexes. No cranial nerve deficit or sensory deficit. She exhibits normal muscle tone. Coordination normal.   Skin: Skin is dry. No rash noted. She is not diaphoretic. No erythema. No pallor.   Psychiatric: She has a normal mood and affect. Her behavior is normal. Judgment and thought content normal.   Vitals reviewed.      Lab Results   Component Value Date    WBC 3.87 (L) 04/30/2019    HGB 11.6 (L) 04/30/2019    HCT 35.6 (L) 04/30/2019     04/30/2019    CHOL 175 12/26/2018    TRIG 57 12/26/2018    HDL 74 12/26/2018    ALT 18 12/26/2018    AST 24 12/26/2018     04/17/2019    K 4.9 04/17/2019     04/17/2019    CREATININE 0.8 04/17/2019    BUN 13 04/17/2019    CO2 28 04/17/2019    TSH 1.532 10/22/2017    INR 1.0 05/06/2018    HGBA1C 5.4 10/23/2017     The ASCVD Risk score (Diane BAKER Jr., et al., 2013) failed to calculate for the following reasons:    The 2013  ASCVD risk score is only valid for ages 40 to 79  She wishes to also address some pain in the neck, back and shoulders at today's visit. These have been mild-to-moderate in nature, gradual in onset. . OTC or prescription NSAID's are recommended for PRN use, side effects are discussed. Return for further discussion if these persist or worsen.    Assessment:       1. Iron deficiency anemia secondary to inadequate dietary iron intake    2. Benign essential HTN        Plan:       Iron deficiency anemia secondary to inadequate dietary iron intake  -     CBC auto differential; Future; Expected date: 01/06/2020    Benign essential HTN  -     Lipid panel; Future; Expected date: 01/06/2020  -     Comprehensive metabolic panel; Future; Expected date: 01/06/2020  -     CBC auto differential; Future; Expected date: 01/06/2020    Other orders  -     Influenza - High Dose (65+) (PF) (IM)      Patient readiness: acceptance and barriers:readiness    During the course of the visit the patient was educated and counseled about the following:     Hypertension:   Regular aerobic exercise.    Goals: Hypertension: Reduce Blood Pressure    Did patient meet goals/outcomes: No    The following self management tools provided: excercise log    Patient Instructions (the written plan) was given to the patient/family.     Time spent with patient: 30 minutes    Barriers to medications present (no )    Adverse reactions to current medications (no)    Over the counter medications reviewed (Yes)        30-35 minutes spent counseling patient on diet, exercise, and weight loss.  This has been fully explained to the patient, who indicates understanding.

## 2020-01-06 NOTE — PATIENT INSTRUCTIONS

## 2020-01-20 ENCOUNTER — CLINICAL SUPPORT (OUTPATIENT)
Dept: OPHTHALMOLOGY | Facility: CLINIC | Age: 85
End: 2020-01-20
Payer: MEDICARE

## 2020-01-20 ENCOUNTER — TELEPHONE (OUTPATIENT)
Dept: FAMILY MEDICINE | Facility: CLINIC | Age: 85
End: 2020-01-20

## 2020-01-20 DIAGNOSIS — I25.10 CORONARY ARTERY DISEASE DUE TO CALCIFIED CORONARY LESION: ICD-10-CM

## 2020-01-20 DIAGNOSIS — I25.84 CORONARY ARTERY DISEASE DUE TO CALCIFIED CORONARY LESION: ICD-10-CM

## 2020-01-20 DIAGNOSIS — I10 HTN (HYPERTENSION), BENIGN: ICD-10-CM

## 2020-01-20 DIAGNOSIS — H40.1132 PRIMARY OPEN ANGLE GLAUCOMA (POAG) OF BOTH EYES, MODERATE STAGE: Primary | ICD-10-CM

## 2020-01-20 PROCEDURE — 92133 CPTRZD OPH DX IMG PST SGM ON: CPT | Mod: S$GLB,,, | Performed by: OPHTHALMOLOGY

## 2020-01-20 PROCEDURE — 92083 HUMPHREY VISUAL FIELD - OU - BOTH EYES: ICD-10-PCS | Mod: S$GLB,,, | Performed by: OPHTHALMOLOGY

## 2020-01-20 PROCEDURE — 92133 POSTERIOR SEGMENT OCT OPTIC NERVE(OCULAR COHERENCE TOMOGRAPHY) - OU - BOTH EYES: ICD-10-PCS | Mod: S$GLB,,, | Performed by: OPHTHALMOLOGY

## 2020-01-20 PROCEDURE — 92083 EXTENDED VISUAL FIELD XM: CPT | Mod: S$GLB,,, | Performed by: OPHTHALMOLOGY

## 2020-01-20 RX ORDER — NIFEDIPINE 30 MG/1
TABLET, EXTENDED RELEASE ORAL
Qty: 30 TABLET | Refills: 0 | Status: SHIPPED | OUTPATIENT
Start: 2020-01-20 | End: 2020-01-27 | Stop reason: SDUPTHER

## 2020-01-20 RX ORDER — CLOPIDOGREL BISULFATE 75 MG/1
TABLET ORAL
Qty: 90 TABLET | Refills: 4 | Status: SHIPPED | OUTPATIENT
Start: 2020-01-20 | End: 2021-02-11 | Stop reason: SDUPTHER

## 2020-01-20 NOTE — TELEPHONE ENCOUNTER
----- Message from Rachelle Nunes sent at 1/20/2020  8:57 AM CST -----  Type: Needs Medical Advice    Who Called:  Prabhjot maravilla)  Pharmacy name and phone #:    [facility] - [place], [place] - [address] [address]  [address] [address] [address]  Phone: [phone] Fax: [phone]  Best Call Back Number: [phone]  Additional Information: requesting a short for NIFEdipine (PROCARDIA-XL) 30 MG (OSM) 24 hr tablet to sent to to the above pharmacy. Patient missed the delivery. Please give call back.

## 2020-01-20 NOTE — TELEPHONE ENCOUNTER
See patient message . Caterina is requesting a 30 day supply to [facility] local pharmacy until mail order Rx is delivered

## 2020-01-27 DIAGNOSIS — I10 HTN (HYPERTENSION), BENIGN: ICD-10-CM

## 2020-01-27 RX ORDER — NIFEDIPINE 30 MG/1
TABLET, EXTENDED RELEASE ORAL
Qty: 90 TABLET | Refills: 3 | Status: SHIPPED | OUTPATIENT
Start: 2020-01-27 | End: 2020-11-13 | Stop reason: SDUPTHER

## 2020-01-28 ENCOUNTER — TELEPHONE (OUTPATIENT)
Dept: FAMILY MEDICINE | Facility: CLINIC | Age: 85
End: 2020-01-28

## 2020-01-28 NOTE — TELEPHONE ENCOUNTER
Spoke to pharmacy regarding this, looks like Rx for 30 days supply for nifedipine was picked up today at 01/28/20

## 2020-01-28 NOTE — TELEPHONE ENCOUNTER
----- Message from Jax Hodges sent at 1/28/2020  7:48 AM CST -----  Contact: manju Rick  Type: Needs Medical Advice    Who Called:  Prabhjot    Pharmacy name and phone #:    [facility] - [place], [place] - [address] [address]  [address] [address]  [facility] [address]  Phone: [phone] Fax: [phone]    Best Call Back Number: [phone]  Additional Information: requesting a call back to discuss a medication. He would not disclose anything further.

## 2020-04-13 NOTE — PROGRESS NOTES
Outpatient Therapy Discharge Summary     Name: Brie Han  [facility] Number: [id number]    Therapy Diagnosis:   Encounter Diagnosis   Name Primary?    DDD (degenerative disc disease), cervical Yes     Physician: Kathryn Lawton PA-C    Physician Orders: eval and treat  Medical Diagnosis: neck pain  Evaluation Date: 8/27/19      Date of Last visit: 10/29/19  Total Visits Received: 17  Cancelled Visits: NA  No Show Visits: NA    Assessment    Goals: Not met.    Discharge reason: Patient has not attended therapy since 10/29/19    Plan   This patient is discharged from Physical Therapy due to nonattendance.

## 2020-05-05 ENCOUNTER — PATIENT MESSAGE (OUTPATIENT)
Dept: ADMINISTRATIVE | Facility: HOSPITAL | Age: 85
End: 2020-05-05

## 2020-05-21 ENCOUNTER — PATIENT MESSAGE (OUTPATIENT)
Dept: OPHTHALMOLOGY | Facility: CLINIC | Age: 85
End: 2020-05-21

## 2020-05-25 ENCOUNTER — OFFICE VISIT (OUTPATIENT)
Dept: OPHTHALMOLOGY | Facility: CLINIC | Age: 85
End: 2020-05-25
Payer: MEDICARE

## 2020-05-25 DIAGNOSIS — I10 HTN (HYPERTENSION), BENIGN: ICD-10-CM

## 2020-05-25 DIAGNOSIS — H40.1132 PRIMARY OPEN ANGLE GLAUCOMA (POAG) OF BOTH EYES, MODERATE STAGE: Primary | ICD-10-CM

## 2020-05-25 DIAGNOSIS — H02.831 DERMATOCHALASIS OF BOTH UPPER EYELIDS: ICD-10-CM

## 2020-05-25 DIAGNOSIS — H02.834 DERMATOCHALASIS OF BOTH UPPER EYELIDS: ICD-10-CM

## 2020-05-25 DIAGNOSIS — Z96.1 PSEUDOPHAKIA OF BOTH EYES: ICD-10-CM

## 2020-05-25 PROCEDURE — 99499 RISK ADDL DX/OHS AUDIT: ICD-10-PCS | Mod: HCNC,S$GLB,, | Performed by: OPHTHALMOLOGY

## 2020-05-25 PROCEDURE — 99999 PR PBB SHADOW E&M-EST. PATIENT-LVL II: ICD-10-PCS | Mod: PBBFAC,HCNC,, | Performed by: OPHTHALMOLOGY

## 2020-05-25 PROCEDURE — 99499 UNLISTED E&M SERVICE: CPT | Mod: HCNC,S$GLB,, | Performed by: OPHTHALMOLOGY

## 2020-05-25 PROCEDURE — 92012 INTRM OPH EXAM EST PATIENT: CPT | Mod: HCNC,S$GLB,, | Performed by: OPHTHALMOLOGY

## 2020-05-25 PROCEDURE — 99999 PR PBB SHADOW E&M-EST. PATIENT-LVL II: CPT | Mod: PBBFAC,HCNC,, | Performed by: OPHTHALMOLOGY

## 2020-05-25 PROCEDURE — 92012 PR EYE EXAM, EST PATIENT,INTERMED: ICD-10-PCS | Mod: HCNC,S$GLB,, | Performed by: OPHTHALMOLOGY

## 2020-05-25 RX ORDER — LATANOPROST 50 UG/ML
1 SOLUTION/ DROPS OPHTHALMIC NIGHTLY
Qty: 2.5 ML | Refills: 12 | Status: SHIPPED | OUTPATIENT
Start: 2020-05-25 | End: 2021-04-12

## 2020-05-25 NOTE — PROGRESS NOTES
HPI     4 month IOP check, Using Latanoprost OU HS. Denies eye pain states drops   burned once and went down her that happened once.     Last edited by Minda Mireles on 5/25/2020  9:55 AM. (History)        ROS     Negative for: Constitutional, Gastrointestinal, Neurological, Skin,   Genitourinary, Musculoskeletal, HENT, Endocrine, Cardiovascular, Eyes,   Respiratory, Psychiatric, Allergic/Imm, Heme/Lymph    Last edited by Kvng Stern Jr., MD on 5/25/2020 10:17 AM. (History)        Assessment /Plan     For exam results, see Encounter Report.    Primary open angle glaucoma (POAG) of both eyes, moderate stage    Dermatochalasis of both upper eyelids    Pseudophakia of both eyes       Primary open angle glaucoma (POAG) of both eyes, moderate stage- HVF: OD dense superior altitudinal defect OS- nasal inf and sup  -IOP 16 OU, typically runs in the low 20's, been stable with latanoprost  -continue latanoprost QHS OU  Follow up in about 4 months (around 9/25/2020) for IOP and Medication check.     Dermatochalasis of both upper eyelids- borderline, continue to monitor- asymptomatic, observe    Pseudophakia of both eyes- stable, observe

## 2020-05-26 RX ORDER — LOSARTAN POTASSIUM 100 MG/1
TABLET ORAL
Qty: 90 TABLET | Refills: 1 | Status: SHIPPED | OUTPATIENT
Start: 2020-05-26 | End: 2020-11-13 | Stop reason: SDUPTHER

## 2020-07-10 ENCOUNTER — PATIENT MESSAGE (OUTPATIENT)
Dept: FAMILY MEDICINE | Facility: CLINIC | Age: 85
End: 2020-07-10

## 2020-07-10 NOTE — TELEPHONE ENCOUNTER
Lab appointment scheduled. Patient's grandson (Prabhjot) agreed to appointment date, time, and location.

## 2020-07-14 ENCOUNTER — LAB VISIT (OUTPATIENT)
Dept: LAB | Facility: HOSPITAL | Age: 85
End: 2020-07-14
Attending: FAMILY MEDICINE
Payer: MEDICARE

## 2020-07-14 DIAGNOSIS — D50.8 IRON DEFICIENCY ANEMIA SECONDARY TO INADEQUATE DIETARY IRON INTAKE: ICD-10-CM

## 2020-07-14 DIAGNOSIS — I10 BENIGN ESSENTIAL HTN: ICD-10-CM

## 2020-07-14 LAB
ALBUMIN SERPL BCP-MCNC: 3.9 G/DL (ref 3.5–5.2)
ALP SERPL-CCNC: 86 U/L (ref 55–135)
ALT SERPL W/O P-5'-P-CCNC: 21 U/L (ref 10–44)
ANION GAP SERPL CALC-SCNC: 9 MMOL/L (ref 8–16)
AST SERPL-CCNC: 25 U/L (ref 10–40)
BASOPHILS # BLD AUTO: 0.06 K/UL (ref 0–0.2)
BASOPHILS NFR BLD: 1.2 % (ref 0–1.9)
BILIRUB SERPL-MCNC: 0.5 MG/DL (ref 0.1–1)
BUN SERPL-MCNC: 27 MG/DL (ref 10–30)
CALCIUM SERPL-MCNC: 9.9 MG/DL (ref 8.7–10.5)
CHLORIDE SERPL-SCNC: 99 MMOL/L (ref 95–110)
CHOLEST SERPL-MCNC: 169 MG/DL (ref 120–199)
CHOLEST/HDLC SERPL: 2.7 {RATIO} (ref 2–5)
CO2 SERPL-SCNC: 27 MMOL/L (ref 23–29)
CREAT SERPL-MCNC: 0.9 MG/DL (ref 0.5–1.4)
DIFFERENTIAL METHOD: ABNORMAL
EOSINOPHIL # BLD AUTO: 0.1 K/UL (ref 0–0.5)
EOSINOPHIL NFR BLD: 2.5 % (ref 0–8)
ERYTHROCYTE [DISTWIDTH] IN BLOOD BY AUTOMATED COUNT: 13.9 % (ref 11.5–14.5)
EST. GFR  (AFRICAN AMERICAN): >60 ML/MIN/1.73 M^2
EST. GFR  (NON AFRICAN AMERICAN): 56 ML/MIN/1.73 M^2
GLUCOSE SERPL-MCNC: 93 MG/DL (ref 70–110)
HCT VFR BLD AUTO: 37.8 % (ref 37–48.5)
HDLC SERPL-MCNC: 62 MG/DL (ref 40–75)
HDLC SERPL: 36.7 % (ref 20–50)
HGB BLD-MCNC: 12.2 G/DL (ref 12–16)
IMM GRANULOCYTES # BLD AUTO: 0.01 K/UL (ref 0–0.04)
IMM GRANULOCYTES NFR BLD AUTO: 0.2 % (ref 0–0.5)
LDLC SERPL CALC-MCNC: 90 MG/DL (ref 63–159)
LYMPHOCYTES # BLD AUTO: 1.8 K/UL (ref 1–4.8)
LYMPHOCYTES NFR BLD: 37 % (ref 18–48)
MCH RBC QN AUTO: 31.4 PG (ref 27–31)
MCHC RBC AUTO-ENTMCNC: 32.3 G/DL (ref 32–36)
MCV RBC AUTO: 97 FL (ref 82–98)
MONOCYTES # BLD AUTO: 0.7 K/UL (ref 0.3–1)
MONOCYTES NFR BLD: 15 % (ref 4–15)
NEUTROPHILS # BLD AUTO: 2.1 K/UL (ref 1.8–7.7)
NEUTROPHILS NFR BLD: 44.1 % (ref 38–73)
NONHDLC SERPL-MCNC: 107 MG/DL
NRBC BLD-RTO: 0 /100 WBC
PLATELET # BLD AUTO: 249 K/UL (ref 150–350)
PMV BLD AUTO: 9.7 FL (ref 9.2–12.9)
POTASSIUM SERPL-SCNC: 4.9 MMOL/L (ref 3.5–5.1)
PROT SERPL-MCNC: 7.6 G/DL (ref 6–8.4)
RBC # BLD AUTO: 3.89 M/UL (ref 4–5.4)
SODIUM SERPL-SCNC: 135 MMOL/L (ref 136–145)
TRIGL SERPL-MCNC: 85 MG/DL (ref 30–150)
WBC # BLD AUTO: 4.86 K/UL (ref 3.9–12.7)

## 2020-07-14 PROCEDURE — 85025 COMPLETE CBC W/AUTO DIFF WBC: CPT | Mod: HCNC

## 2020-07-14 PROCEDURE — 36415 COLL VENOUS BLD VENIPUNCTURE: CPT | Mod: HCNC

## 2020-07-14 PROCEDURE — 80053 COMPREHEN METABOLIC PANEL: CPT | Mod: HCNC

## 2020-07-14 PROCEDURE — 80061 LIPID PANEL: CPT | Mod: HCNC

## 2020-07-17 ENCOUNTER — OFFICE VISIT (OUTPATIENT)
Dept: FAMILY MEDICINE | Facility: CLINIC | Age: 85
End: 2020-07-17
Payer: MEDICARE

## 2020-07-17 VITALS
OXYGEN SATURATION: 98 % | TEMPERATURE: 98 F | WEIGHT: 119.5 LBS | BODY MASS INDEX: 24.09 KG/M2 | SYSTOLIC BLOOD PRESSURE: 122 MMHG | HEART RATE: 71 BPM | DIASTOLIC BLOOD PRESSURE: 58 MMHG | HEIGHT: 59 IN

## 2020-07-17 DIAGNOSIS — M25.512 LEFT SHOULDER PAIN, UNSPECIFIED CHRONICITY: ICD-10-CM

## 2020-07-17 DIAGNOSIS — I10 BENIGN ESSENTIAL HTN: ICD-10-CM

## 2020-07-17 DIAGNOSIS — I70.0 ATHEROSCLEROSIS OF AORTA: ICD-10-CM

## 2020-07-17 DIAGNOSIS — I10 HTN (HYPERTENSION), BENIGN: ICD-10-CM

## 2020-07-17 DIAGNOSIS — M54.2 CERVICALGIA: Primary | ICD-10-CM

## 2020-07-17 PROCEDURE — 1126F AMNT PAIN NOTED NONE PRSNT: CPT | Mod: HCNC,S$GLB,, | Performed by: FAMILY MEDICINE

## 2020-07-17 PROCEDURE — 1159F PR MEDICATION LIST DOCUMENTED IN MEDICAL RECORD: ICD-10-PCS | Mod: HCNC,S$GLB,, | Performed by: FAMILY MEDICINE

## 2020-07-17 PROCEDURE — 99214 OFFICE O/P EST MOD 30 MIN: CPT | Mod: HCNC,S$GLB,, | Performed by: FAMILY MEDICINE

## 2020-07-17 PROCEDURE — 1159F MED LIST DOCD IN RCRD: CPT | Mod: HCNC,S$GLB,, | Performed by: FAMILY MEDICINE

## 2020-07-17 PROCEDURE — 1101F PR PT FALLS ASSESS DOC 0-1 FALLS W/OUT INJ PAST YR: ICD-10-PCS | Mod: HCNC,CPTII,S$GLB, | Performed by: FAMILY MEDICINE

## 2020-07-17 PROCEDURE — 99999 PR PBB SHADOW E&M-EST. PATIENT-LVL IV: ICD-10-PCS | Mod: PBBFAC,HCNC,, | Performed by: FAMILY MEDICINE

## 2020-07-17 PROCEDURE — 99499 RISK ADDL DX/OHS AUDIT: ICD-10-PCS | Mod: HCNC,S$GLB,, | Performed by: FAMILY MEDICINE

## 2020-07-17 PROCEDURE — 1101F PT FALLS ASSESS-DOCD LE1/YR: CPT | Mod: HCNC,CPTII,S$GLB, | Performed by: FAMILY MEDICINE

## 2020-07-17 PROCEDURE — 1126F PR PAIN SEVERITY QUANTIFIED, NO PAIN PRESENT: ICD-10-PCS | Mod: HCNC,S$GLB,, | Performed by: FAMILY MEDICINE

## 2020-07-17 PROCEDURE — 99214 PR OFFICE/OUTPT VISIT, EST, LEVL IV, 30-39 MIN: ICD-10-PCS | Mod: HCNC,S$GLB,, | Performed by: FAMILY MEDICINE

## 2020-07-17 PROCEDURE — 99499 UNLISTED E&M SERVICE: CPT | Mod: HCNC,S$GLB,, | Performed by: FAMILY MEDICINE

## 2020-07-17 PROCEDURE — 99999 PR PBB SHADOW E&M-EST. PATIENT-LVL IV: CPT | Mod: PBBFAC,HCNC,, | Performed by: FAMILY MEDICINE

## 2020-07-17 NOTE — PATIENT INSTRUCTIONS
Low-Salt Diet  This diet removes foods that are high in salt. It also limits the amount of salt you use when cooking. It is most often used for people with high blood pressure, edema (fluid retention), and kidney, liver, or heart disease.  Table salt contains the mineral sodium. Your body needs sodium to work normally. But too much sodium can make your health problems worse. Your healthcare provider is recommending a low-salt (also called low-sodium) diet for you. Your total daily allowance of salt is 1,500 to 2,300 milligrams (mg). It is less than 1 teaspoon of table salt. This means you can have only about 500 to 700 mg of sodium at each meal. People with certain health problems should limit salt intake to the lower end of the recommended range.    When you cook, dont add much salt. If you can cook without using salt, even better. Dont add salt to your food at the table.  When shopping, read food labels. Salt is often called sodium on the label. Choose foods that are salt-free, low salt, or very low salt. Note that foods with reduced salt may not lower your salt intake enough.    Beans, potatoes, and pasta  Ok: Dry beans, split peas, lentils, potatoes, rice, macaroni, pasta, spaghetti without added salt  Avoid: Potato chips, tortilla chips, and similar products  Breads and cereals  Ok: Low-sodium breads, rolls, cereals, and cakes; low-salt crackers, matzo crackers  Avoid: Salted crackers, pretzels, popcorn, Turkmen toast, pancakes, muffins  Dairy  Ok: Milk, chocolate milk, hot chocolate mix, low-salt cheeses, and yogurt  Avoid: Processed cheese and cheese spreads; Roquefort, Camembert, and cottage cheese; buttermilk, instant breakfast drink  Desserts  Ok: Ice cream, frozen yogurt, juice bars, gelatin, cookies and pies, sugar, honey, jelly, hard candy  Avoid: Most pies, cakes and cookies prepared or processed with salt; instant pudding  Drinks  Ok: Tea, coffee, fizzy (carbonated) drinks, juices  Avoid: Flavored  coffees, electrolyte replacement drinks, sports drinks  Meats  Ok: All fresh meat, fish, poultry, low-salt tuna, eggs, egg substitute  Avoid: Smoked, pickled, brine-cured, or salted meats and fish. This includes rojas, chipped beef, corned beef, hot dogs, deli meats, ham, kosher meats, salt pork, sausage, canned tuna, salted codfish, smoked salmon, herring, sardines, or anchovies.  Seasonings and spices  Ok: Most seasonings are okay. Good substitutes for salt include: fresh herb blends, hot sauce, lemon, garlic, hernandez, vinegar, dry mustard, parsley, cilantro, horseradish, tomato paste, regular margarine, mayonnaise, unsalted butter, cream cheese, vegetable oil, cream, low-salt salad dressing and gravy.  Avoid: Regular ketchup, relishes, pickles, soy sauce, teriyaki sauce, Worcestershire sauce, BBQ sauce, tartar sauce, meat tenderizer, chili sauce, regular gravy, regular salad dressing, salted butter  Soups  Ok: Low-salt soups and broths made with allowed foods  Avoid: Bouillon cubes, soups with smoked or salted meats, regular soup and broth  Vegetables  Ok: Most vegetables are okay; also low-salt tomato and vegetable juices  Avoid: Sauerkraut and other brine-soaked vegetables; pickles and other pickled vegetables; tomato juice, olives  Date Last Reviewed: 8/1/2016 © 2000-2017 [employer]. [address] [place] [id number]. All rights reserved. This information is not intended as a substitute for professional medical care. Always follow your healthcare professional's instructions.        Discharge Instructions: Taking Your Blood Pressure  Blood pressure is the force of blood as it moves from the heart through the blood vessels. You can take your own blood pressure reading using a digital monitor. Take readings as often as your healthcare provider instructs. Take your readings each time in the same way, using the same arm. Here are guidelines for taking your blood pressure.  The American Heart  Association (AHA) recommends purchasing a blood pressure monitor that is validated and approved by the Association for the Advancement of Medical Instrumentation, the [place] Hypertension Society, and the International Protocol for the Validation of Automated BP Measuring Devices. If the blood pressure monitor is for a senior adult, a pregnant woman, or a child, make certain it is validated for use with such a population. For the most reliable readings, the AHA recommends an automatic, cuff-style, upper arm (bicep) monitor. The readings from finger and wrist monitors are not as reliable as the upper arm monitor.        Step 1. Relax    · Wait at least a half hour after smoking, eating, or exercising. Do not drink coffee, tea, soda, or other caffeinated beverages before checking your blood pressure.   · Sit comfortably at a table. Place the monitor near you.  · Rest for a few minutes before you begin.        Step 2. Wrap the cuff    · Place your arm on the table, palm up. Put your arm in a position that is level with your heart. Wrap the cuff around your upper arm, about an inch above your elbow. Its best to wrap the cuff on bare skin, not over clothing.  · Make sure your cuff fits. If it doesnt wrap around your upper arm, order a larger cuff. A cuff that is too large or too small can result in an inaccurate blood pressure reading.           Step 3. Inflate the cuff    · Pump the cuff until the scale reads 200. If you have a self-inflating cuff, push the button that starts the pump.  · The cuff will tighten, then loosen.  · The numbers will change. When they stop changing, your blood pressure reading will appear.  · If you get a reading that is too high or too low for you, relax for a few minutes. Then do the test again.    Step 4. Write down the results  · Write down your blood pressure numbers. Tutu the date and time. Keep your results in one place, such as a notebook.  · Remove the cuff from your arm. Turn off  the machine.  · Take the readings with you to your medical appointments.  · If you start a new blood pressure medicine, or change a blood pressure medicine dose, note the day you started the new drug or dosage on your blood pressure recording sheet. This will help your healthcare provider monitor the effect of medication changes.     Date Last Reviewed: 4/27/2016  © 1877-0223 [employer]. [address] [place] [address]. All rights reserved. This information is not intended as a substitute for professional medical care. Always follow your healthcare professional's instructions.        Step-by-Step  Checking Your Blood Pressure    Date Last Reviewed: 4/27/2016  © 4783-2501 [employer]. [address] [place] [address]. All rights reserved. This information is not intended as a substitute for professional medical care. Always follow your healthcare professional's instructions.        Eating Heart-Healthy Foods  Eating has a big impact on your heart health. In fact, eating healthier can improve several of your heart risks at once. For instance, it helps you manage weight, cholesterol, and blood pressure. Here are ideas to help you make heart-healthy changes without giving up all the foods and flavors you love.  Getting started  · Talk with your health care provider about eating plans, such as the DASH or Mediterranean diet. You may also be referred to a dietitian.  · Change a few things at a time. Give yourself time to get used to a few eating changes before adding more.  · Work to create a tasty, healthy eating plan that you can stick to for the rest of your life.    Goals for healthy eating  Below are some tips to improve your eating habits:  · Limit saturated fats and trans fats. Saturated fats raise your levels of cholesterol, so keep these fats to a minimum. They are found in foods such as fatty meats, whole milk, cheese, and palm and coconut oils. Avoid trans fats  because they lower good cholesterol as well as raise bad cholesterol. Trans fats are most often found in processed foods.  · Reduce sodium (salt) intake. Eating too much salt may increase your blood pressure. Limit your sodium intake to 2,300 milligrams (mg) per day, or less if your health care provider recommends it. Dining out less often and eating fewer processed foods are two great ways to decrease the amount of salt you consume.  · Managing calories. A calorie is a unit of energy. Your body burns calories for fuel, but if you eat more calories than your body burns, the extras are stored as fat. Your health care provider can help you create a diet plan to manage your calories. This will likely include eating healthier foods as well as exercising regularly. To help you track your progress, keep a diary to record what you eat and how often you exercise.  Choose the right foods  Aim to make these foods staples of your diet. If you have diabetes, you may have different recommendations than what is listed here:  · Fruits and vegetable provide plenty of nutrients without a lot of calories. At meals, fill half your plate with these foods. Split the other half of your plate between whole grains and lean protein.  · Whole grains are high in fiber and rich in vitamins and nutrients. Good choices include whole-wheat bread, pasta, and brown rice.  · Lean proteins give you nutrition with less fat. Good choices include fish, skinless chicken, and beans.  · Low-fat or nonfat dairy provides nutrients without a lot of fat. Try low-fat or nonfat milk, cheese, or yogurt.  · Healthy fats can be good for you in small amounts. These are unsaturated fats, such as olive oil, nuts, and fish. Try to have at least 2 servings per week of fatty fish such as salmon, sardines, mackerel, rainbow trout, and albacore tuna. These contain omega-3 fatty acids, which are good for your heart. Flaxseed is another source of a heart-healthy fat.  More  on heart healthy eating    Read food labels  Healthy eating starts at the grocery store. Be sure to pay attention to food labels on packaged foods. Look for products that are high in fiber and protein, and low in saturated fat, cholesterol, and sodium. Avoid products that contain trans fat. And pay close attention to serving size. For instance, if you plan to eat two servings, double all the numbers on the label.  Prepare food right  A key part of healthy cooking is cutting down on added fat and salt. Look on the internet for lower-fat, lower-sodium recipes. Also, try these tips:  · Remove fat from meat and skin from poultry before cooking.  · Skim fat from the surface of soups and sauces.  · Broil, boil, bake, steam, grill, and microwave food without added fats.  · Choose ingredients that spice up your food without adding calories, fat, or sodium. Try these items: horseradish, hot sauce, lemon, mustard, nonfat salad dressings, and vinegar. For salt-free herbs and spices, try basil, cilantro, cinnamon, pepper, and rosemary.  Date Last Reviewed: 6/25/2015  © 0034-0642 [employer]. [address] [place] [address], [place] [address]. All rights reserved. This information is not intended as a substitute for professional medical care. Always follow your healthcare professional's instructions.        Walking for Fitness  Fitness walking has something for everyone, even people who are already fit. Walking is one of the safest ways to condition your body aerobically. It can boost energy, help you lose weight, and reduce stress.    Physical benefits  · Walking strengthens your heart and lungs, and tones your muscles.  · When walking, your feet land with less impact than in other sports. This reduces chances of muscle, bone, and joint injury.  · Regular walking improves your cholesterol levels and lowers your risk of heart disease. And it helps you control your blood sugar if you have diabetes.  · Walking is a  weight-bearing activity, which helps maintain bone density. This can help prevent osteoporosis.  Personal rewards  · Taking walks can help you relax and manage stress. And fitness walking may make you feel better about yourself.  · Walking can help you sleep better at night and make you less likely to be depressed.  · Regular walking may help maintain your memory as you get older.  · Walking is a great way to spend extra time with friends and family members. Be sure to invite your dog along!  Q&A about fitness walking  Q: Will walking keep me fit?  A: Yes. Regular walking at the right pace gives you all the benefits of other aerobic activities, such as jogging and swimming.  Q: Will walking help me lose weight and keep it off?  A: Yes. Per mile, walking can burn as many calories as jogging. Your health care provider can help work walking into your weight-loss plan.  Q: Is walking safe for my health?  A: Yes. Walking is safe if you have high blood pressure, diabetes, heart disease, or other conditions. Talk to your healthcare provider before you start.  Date Last Reviewed: 4/1/2017  © 0857-6217 [employer]. [address] [place] [address]. All rights reserved. This information is not intended as a substitute for professional medical care. Always follow your healthcare professional's instructions.        Deep Breathing  Deep breathing helps keep your lungs clear. If youve had surgery or not, it will help you get better faster. It may also prevent a lung infection. If you have lung problems, it will help you breathe easier. Follow the steps below.       1.  · Sit on the edge of a bed or a chair. Or lie on your back with your knees slightly bent.  · Hold a pillow or rolled-up towel firmly against your incision with both hands.  · Breathe out normally. 2.  · Breathe in deeply through your nose.  · You should feel your stomach push out as you breathe in. 3.  · Pucker your lips as if you were going  to blow out a candle.  · Breathe out slowly through your mouth. You should feel your chest go down as you breathe out.  · Rest for a few seconds. Then repeat as many times as directed by your health care provider.  · You may be given a device called an incentive spirometer to help you with the exercise while in the hospital.   Date Last Reviewed: 10/16/2014  © 6536-6195 [employer]. [address] [place] [address]. All rights reserved. This information is not intended as a substitute for professional medical care. Always follow your healthcare professional's instructions.        Exercises to Increase Agility: Cross Steps    The following exercise helps to increase your ease and quickness of movement. It copies complex movements.  Note: Always warm up before exercising and cool down after exercising. Stop any exercise that causes pain.  · Start with knees slightly bent and feet shoulder-width apart.  · Cross your right foot in front of your left.  · With your left foot, step to your left.  · With your right foot, step behind your left foot.  · With your left foot, step to the left.  · After doing the cross-step sequence 2 times in that direction, switch directions.  Repeat the entire exercise 10 times.  Date Last Reviewed: 8/16/2015  © 3615-8432 [employer]. [address] [place] [address]. All rights reserved. This information is not intended as a substitute for professional medical care. Always follow your healthcare professional's instructions.        Exercises to Increase Agility: Short Sprints    The following exercise helps to increase your ease and quickness of movement. It copies complex movements.  Note: Always waarm up before and cool down after exercising. Stop any exercise that causes pain.  · Sprint forward 10 to 15 feet. Stop. Feel your muscles absorb the shock.  · Sprint backward the same distance. Stop.  · Go forward again.  · Continue for 2 to 3  minutes.  · Gradually increase your distance, speed, and total exercise time.  Date Last Reviewed: 8/16/2015  © 5379-8807 [employer]. [address] [place] [address]. All rights reserved. This information is not intended as a substitute for professional medical care. Always follow your healthcare professional's instructions.        Exercises to Increase Agility: Side Steps    The following exercise helps to increase your ease and quickness of movement. It copies complex movements.  Note: Always warm up before and cool down after exercising. Stop any exercise that causes pain.  · Start with knees slightly bent and feet together.  · Step to the side with your left foot.  · Step with your right foot to meet your left foot.  · Step 3 times. Keep your steps short and comfortable.  · Repeat the sequence in the opposite direction.  · Continue for 2 to 3 minutes.  Date Last Reviewed: 8/16/2015 © 2000-2017 [employer]. [address] [place] [address]. All rights reserved. This information is not intended as a substitute for professional medical care. Always follow your healthcare professional's instructions.      Back Exercise to Keep Fit for Low Back Pain  To help in your recovery and to prevent further back problems, keep your back, abdominal muscles and legs strong. Walk daily as soon as you can. Gradually add other physical activities such as swimming and biking, which can help improve lower back strength. Begin as soon as you can do them comfortably. Do not do any exercises that make your pain a lot worse. The following are some back exercises that can help relieve low back pain.     Pelvic tilt   Repeat 5-10 times, twice per day.  Lie flat on your back (or stand with your back to a wall), knees bent, feet flat on the floor, body relaxed. Tighten your abdominal and buttock muscles, and tilt your pelvis. The curve of the small of your back should flatten towards the floor  (or wall). Hold 10 seconds and then relax.       Knee raise     Repeat 5-10 times, twice per day.    Lie flat on your back, knees bent. Bring one knee slowly to your chest. Hug your knee gently. Then lower your leg toward the floor, keeping your knee bent. Do not straighten your legs. Repeat exercise with your other leg.              Partial press-up     Lie face down on a soft, firm surface. Do not turn your head to either side. Rest your arms bent at the elbows alongside your body. Relax for a few minutes. Then raise your upper body enough to lean on your elbows. Relax your lower back and legs as much as possible. Hold this position for 30 seconds at first. Gradually work up to five minutes. Or try slow press-ups. Hold each for five seconds, and repeat five to six times.              Copyright © 2012 by [employer]   Basia VICENTE, Chuy HOLLOWAY, Juan ROSEN, Mell SNOWDEN, Jonh R, Laurence B, Geovany K, Tunde C, Linda B, River S, Filomena L, Kathryn ZEPEDA. [employer]. Adult Acute and Subacute Low Back Pain. Updated November 2012.

## 2020-08-09 NOTE — PROGRESS NOTES
Subjective:       Patient ID: Brie Han is a 89 y.o. female.    Chief Complaint: Follow-up (6 month )    Follow-up  This is a chronic problem. Associated symptoms include weakness. Pertinent negatives include no abdominal pain, anorexia, arthralgias, change in bowel habit, chest pain, chills, congestion, fatigue, fever, headaches, joint swelling, myalgias, nausea, neck pain, rash, sore throat, swollen glands or urinary symptoms. Nothing aggravates the symptoms. The treatment provided significant relief.     Review of Systems   Constitutional: Negative for chills, fatigue and fever.   HENT: Positive for hearing loss. Negative for congestion and sore throat.    Cardiovascular: Negative for chest pain.   Gastrointestinal: Negative for abdominal pain, anorexia, change in bowel habit and nausea.   Musculoskeletal: Negative for arthralgias, joint swelling, myalgias and neck pain.   Skin: Negative for rash.   Neurological: Positive for weakness. Negative for headaches.       Patient Active Problem List   Diagnosis    Benign essential HTN    PAD (peripheral artery disease)    CAD (coronary artery disease)    Hypercholesteremia    Refusal of blood transfusions as patient is Christianity    Hyponatremia    DDD (degenerative disc disease), lumbosacral    Neurogenic pain of right foot    DDD (degenerative disc disease), cervical    Cervicalgia    Chronic left shoulder pain    Left shoulder pain       Objective:      Physical Exam  Vitals signs reviewed.   Constitutional:       Appearance: She is well-developed. She is not diaphoretic.   HENT:      Head: Normocephalic and atraumatic.      Right Ear: External ear normal.      Left Ear: External ear normal.      Nose: Nose normal.      Mouth/Throat:      Pharynx: No oropharyngeal exudate.   Eyes:      General: No scleral icterus.        Right eye: No discharge.         Left eye: No discharge.      Conjunctiva/sclera: Conjunctivae normal.      Pupils: Pupils  are equal, round, and reactive to light.   Neck:      Musculoskeletal: Normal range of motion and neck supple.      Thyroid: No thyromegaly.      Vascular: No JVD.      Trachea: No tracheal deviation.   Cardiovascular:      Rate and Rhythm: Normal rate.      Heart sounds: Normal heart sounds. No murmur. No friction rub. No gallop.    Pulmonary:      Effort: Pulmonary effort is normal. No respiratory distress.      Breath sounds: No stridor. No wheezing or rales.   Chest:      Chest wall: No tenderness.   Abdominal:      General: Bowel sounds are normal. There is no distension.      Palpations: Abdomen is soft. There is no mass.      Tenderness: There is no abdominal tenderness. There is no guarding or rebound.   Musculoskeletal: Normal range of motion.   Lymphadenopathy:      Cervical: No cervical adenopathy.   Skin:     General: Skin is dry.      Coloration: Skin is not pale.      Findings: No erythema or rash.   Neurological:      Mental Status: She is alert and oriented to person, place, and time.      Cranial Nerves: No cranial nerve deficit.      Motor: No abnormal muscle tone.      Coordination: Coordination normal.      Deep Tendon Reflexes: Reflexes normal.   Psychiatric:         Behavior: Behavior normal.         Thought Content: Thought content normal.         Judgment: Judgment normal.         Lab Results   Component Value Date    WBC 4.86 07/14/2020    HGB 12.2 07/14/2020    HCT 37.8 07/14/2020     07/14/2020    CHOL 169 07/14/2020    TRIG 85 07/14/2020    HDL 62 07/14/2020    ALT 21 07/14/2020    AST 25 07/14/2020     (L) 07/14/2020    K 4.9 07/14/2020    CL 99 07/14/2020    CREATININE 0.9 07/14/2020    BUN 27 07/14/2020    CO2 27 07/14/2020    TSH 1.532 10/22/2017    INR 1.0 05/06/2018    HGBA1C 5.4 10/23/2017     The ASCVD Risk score (Diane DC Jr., et al., 2013) failed to calculate for the following reasons:    The 2013 ASCVD risk score is only valid for ages 40 to 79    Assessment:        1. Cervicalgia    2. Atherosclerosis of aorta    3. Left shoulder pain, unspecified chronicity    4. Benign essential HTN    5. HTN (hypertension), benign        Plan:       Cervicalgia    Atherosclerosis of aorta    Left shoulder pain, unspecified chronicity    Benign essential HTN    HTN (hypertension), benign  -     Comprehensive metabolic panel; Future; Expected date: 07/17/2020  -     CBC auto differential; Future; Expected date: 07/17/2020      Patient readiness: acceptance and barriers:none    During the course of the visit the patient was educated and counseled about the following:     Hypertension:   Check blood pressures 3 times weekly and record.  Follow up: 4 months and as needed.    Goals: Hypertension: Reduce Blood Pressure    Did patient meet goals/outcomes: Yes    The following self management tools provided: blood pressure log  excercise log    Patient Instructions (the written plan) was given to the patient/family.     Time spent with patient: 30 minutes    Barriers to medications present (yes )    Adverse reactions to current medications (yes)    Over the counter medications reviewed (Yes)        30-minute visit. 10 minutes spent counseling patient on diet, exercise, and weight loss.  This has been fully explained to the patient, who indicates understanding.    Discussed health maintenance guidelines appropriate for age.

## 2020-08-31 ENCOUNTER — OFFICE VISIT (OUTPATIENT)
Dept: OPHTHALMOLOGY | Facility: CLINIC | Age: 85
End: 2020-08-31
Payer: MEDICARE

## 2020-08-31 DIAGNOSIS — H02.831 DERMATOCHALASIS OF BOTH UPPER EYELIDS: ICD-10-CM

## 2020-08-31 DIAGNOSIS — Z96.1 PSEUDOPHAKIA OF BOTH EYES: ICD-10-CM

## 2020-08-31 DIAGNOSIS — H40.1132 PRIMARY OPEN ANGLE GLAUCOMA (POAG) OF BOTH EYES, MODERATE STAGE: Primary | ICD-10-CM

## 2020-08-31 DIAGNOSIS — H02.834 DERMATOCHALASIS OF BOTH UPPER EYELIDS: ICD-10-CM

## 2020-08-31 PROCEDURE — 92012 INTRM OPH EXAM EST PATIENT: CPT | Mod: HCNC,S$GLB,, | Performed by: OPHTHALMOLOGY

## 2020-08-31 PROCEDURE — 99999 PR PBB SHADOW E&M-EST. PATIENT-LVL III: ICD-10-PCS | Mod: PBBFAC,HCNC,, | Performed by: OPHTHALMOLOGY

## 2020-08-31 PROCEDURE — 99999 PR PBB SHADOW E&M-EST. PATIENT-LVL III: CPT | Mod: PBBFAC,HCNC,, | Performed by: OPHTHALMOLOGY

## 2020-08-31 PROCEDURE — 92012 PR EYE EXAM, EST PATIENT,INTERMED: ICD-10-PCS | Mod: HCNC,S$GLB,, | Performed by: OPHTHALMOLOGY

## 2020-08-31 NOTE — PROGRESS NOTES
HPI     4 month IOP check. Denies eye pain states eyes itch sometimes but she   does suffer from allergies. Using Latanoprost OU HS states compliance.     Last edited by Minda Mireles on 8/31/2020  9:10 AM. (History)        ROS     Negative for: Constitutional, Gastrointestinal, Neurological, Skin,   Genitourinary, Musculoskeletal, HENT, Endocrine, Cardiovascular, Eyes,   Respiratory, Psychiatric, Allergic/Imm, Heme/Lymph    Last edited by Kvng Stern Jr., MD on 8/31/2020  9:27 AM. (History)        Assessment /Plan     For exam results, see Encounter Report.    Primary open angle glaucoma (POAG) of both eyes, moderate stage    Dermatochalasis of both upper eyelids    Pseudophakia of both eyes       Primary open angle glaucoma (POAG) of both eyes, moderate stage- HVF: OD dense superior altitudinal defect OS- nasal inf and sup  -IOP 16 OU, typically runs in the low 20's, been stable with latanoprost  -continue latanoprost QHS OU  Follow up in about 4 months (around 12/31/2020) for IOP and Medication check.     Dermatochalasis of both upper eyelids- borderline, continue to monitor- asymptomatic, observe    Pseudophakia of both eyes- stable, observe

## 2020-09-11 ENCOUNTER — PES CALL (OUTPATIENT)
Dept: ADMINISTRATIVE | Facility: CLINIC | Age: 85
End: 2020-09-11

## 2020-09-29 ENCOUNTER — PATIENT MESSAGE (OUTPATIENT)
Dept: OTHER | Facility: OTHER | Age: 85
End: 2020-09-29

## 2020-11-02 ENCOUNTER — PES CALL (OUTPATIENT)
Dept: ADMINISTRATIVE | Facility: CLINIC | Age: 85
End: 2020-11-02

## 2020-11-13 DIAGNOSIS — I10 HTN (HYPERTENSION), BENIGN: ICD-10-CM

## 2020-11-13 RX ORDER — NIFEDIPINE 30 MG/1
TABLET, EXTENDED RELEASE ORAL
Qty: 90 TABLET | Refills: 3 | Status: SHIPPED | OUTPATIENT
Start: 2020-11-13 | End: 2021-10-28 | Stop reason: SDUPTHER

## 2020-11-13 RX ORDER — LOVASTATIN 10 MG/1
10 TABLET ORAL NIGHTLY
Qty: 90 TABLET | Refills: 3 | Status: SHIPPED | OUTPATIENT
Start: 2020-11-13 | End: 2022-08-05

## 2020-11-13 RX ORDER — LOSARTAN POTASSIUM 100 MG/1
100 TABLET ORAL DAILY
Qty: 90 TABLET | Refills: 3 | Status: SHIPPED | OUTPATIENT
Start: 2020-11-13 | End: 2021-11-01 | Stop reason: SDUPTHER

## 2020-11-13 NOTE — TELEPHONE ENCOUNTER
Prescription refill request.   LOV: 07/17/2020  NOV:01/22/2021   LDR: Cozaar-05/26/2020, Procardia-PROMISE- 01/27/2020  Last Labs: 07/14/2020

## 2020-12-11 ENCOUNTER — PATIENT MESSAGE (OUTPATIENT)
Dept: OTHER | Facility: OTHER | Age: 85
End: 2020-12-11

## 2020-12-16 ENCOUNTER — PATIENT OUTREACH (OUTPATIENT)
Dept: ADMINISTRATIVE | Facility: OTHER | Age: 85
End: 2020-12-16

## 2020-12-16 NOTE — PROGRESS NOTES
LINKS immunization registry not responding  Care Everywhere updated  Health Maintenance updated  Chart reviewed for overdue Proactive [facility] ([facility]) health maintenance testing (CRS, Breast Ca, Diabetic Eye Exam)   Orders entered:N/A

## 2020-12-18 ENCOUNTER — OFFICE VISIT (OUTPATIENT)
Dept: OPHTHALMOLOGY | Facility: CLINIC | Age: 85
End: 2020-12-18
Payer: MEDICARE

## 2020-12-18 ENCOUNTER — TELEPHONE (OUTPATIENT)
Dept: FAMILY MEDICINE | Facility: CLINIC | Age: 85
End: 2020-12-18

## 2020-12-18 ENCOUNTER — IMMUNIZATION (OUTPATIENT)
Dept: PHARMACY | Facility: CLINIC | Age: 85
End: 2020-12-18
Payer: MEDICARE

## 2020-12-18 DIAGNOSIS — H02.834 DERMATOCHALASIS OF BOTH UPPER EYELIDS: ICD-10-CM

## 2020-12-18 DIAGNOSIS — H40.1132 PRIMARY OPEN ANGLE GLAUCOMA (POAG) OF BOTH EYES, MODERATE STAGE: Primary | ICD-10-CM

## 2020-12-18 DIAGNOSIS — H02.831 DERMATOCHALASIS OF BOTH UPPER EYELIDS: ICD-10-CM

## 2020-12-18 DIAGNOSIS — Z96.1 PSEUDOPHAKIA OF BOTH EYES: ICD-10-CM

## 2020-12-18 PROCEDURE — 1126F AMNT PAIN NOTED NONE PRSNT: CPT | Mod: HCNC,S$GLB,, | Performed by: OPHTHALMOLOGY

## 2020-12-18 PROCEDURE — 99499 RISK ADDL DX/OHS AUDIT: ICD-10-PCS | Mod: S$GLB,,, | Performed by: OPHTHALMOLOGY

## 2020-12-18 PROCEDURE — 1157F ADVNC CARE PLAN IN RCRD: CPT | Mod: HCNC,S$GLB,, | Performed by: OPHTHALMOLOGY

## 2020-12-18 PROCEDURE — 3288F FALL RISK ASSESSMENT DOCD: CPT | Mod: HCNC,CPTII,S$GLB, | Performed by: OPHTHALMOLOGY

## 2020-12-18 PROCEDURE — 99999 PR PBB SHADOW E&M-EST. PATIENT-LVL III: CPT | Mod: PBBFAC,HCNC,, | Performed by: OPHTHALMOLOGY

## 2020-12-18 PROCEDURE — 1100F PTFALLS ASSESS-DOCD GE2>/YR: CPT | Mod: HCNC,CPTII,S$GLB, | Performed by: OPHTHALMOLOGY

## 2020-12-18 PROCEDURE — 99499 UNLISTED E&M SERVICE: CPT | Mod: S$GLB,,, | Performed by: OPHTHALMOLOGY

## 2020-12-18 PROCEDURE — 92012 INTRM OPH EXAM EST PATIENT: CPT | Mod: HCNC,S$GLB,, | Performed by: OPHTHALMOLOGY

## 2020-12-18 PROCEDURE — 99999 PR PBB SHADOW E&M-EST. PATIENT-LVL III: ICD-10-PCS | Mod: PBBFAC,HCNC,, | Performed by: OPHTHALMOLOGY

## 2020-12-18 PROCEDURE — 1100F PR PT FALLS ASSESS DOC 2+ FALLS/FALL W/INJURY/YR: ICD-10-PCS | Mod: HCNC,CPTII,S$GLB, | Performed by: OPHTHALMOLOGY

## 2020-12-18 PROCEDURE — 3288F PR FALLS RISK ASSESSMENT DOCUMENTED: ICD-10-PCS | Mod: HCNC,CPTII,S$GLB, | Performed by: OPHTHALMOLOGY

## 2020-12-18 PROCEDURE — 1126F PR PAIN SEVERITY QUANTIFIED, NO PAIN PRESENT: ICD-10-PCS | Mod: HCNC,S$GLB,, | Performed by: OPHTHALMOLOGY

## 2020-12-18 PROCEDURE — 1157F PR ADVANCE CARE PLAN OR EQUIV PRESENT IN MEDICAL RECORD: ICD-10-PCS | Mod: HCNC,S$GLB,, | Performed by: OPHTHALMOLOGY

## 2020-12-18 PROCEDURE — 92012 PR EYE EXAM, EST PATIENT,INTERMED: ICD-10-PCS | Mod: HCNC,S$GLB,, | Performed by: OPHTHALMOLOGY

## 2020-12-18 NOTE — TELEPHONE ENCOUNTER
----- Message from Michelle Smart sent at 12/18/2020  8:21 AM CST -----  Contact: pt  Type: Needs Medical Advice    Who Called: pt Alok Chris Call Back Number: [phone]  Additional Info-caller is inquiring about flu shot for pt.  Please Advise-Thank you~

## 2020-12-18 NOTE — PATIENT INSTRUCTIONS
"  What Is Glaucoma?    Glaucoma is an eye disease that can cause blindness. If caught early, it can usually be controlled. But it often has no symptoms, so you need regular eye exams. Glaucoma usually begins when pressure builds up in the eye. This pressure can damage the optic nerve. The optic nerve sends messages to the brain so you can see. There are two main kinds of glaucoma: "open-angle" and "closed-angle."  Drainage area  The eye is always producing fluid. The eye's drainage areas may become clogged or blocked. Too much fluid stays in the eye. This increases eye pressure.  Optic nerve  Too much pressure in the eye can damage the optic nerve. If damaged, this nerve cannot send the messages to the brain that let you see.  Open-Angle Glaucoma  Open-angle is the most common kind of glaucoma. It occurs slowly as people age. The drainage area in the eye becomes clogged. Not enough fluid drains from the eye, so pressure slowly builds up. This causes gradual loss of side (peripheral) vision. You may not even notice changes until much of your vision is lost.  Closed-Angle Glaucoma  Closed-angle glaucoma is less common than open-angle. It usually comes on quickly. The drainage area in the eye suddenly becomes completely blocked. Eye pressure builds rapidly. You may notice blurred vision and rainbow halos around lights. You may also have headaches, nausea, vomiting, and severe pain. If not treated right away, blindness can occur quickly.  Date Last Reviewed: 6/1/2015  © 6679-0725 [employer]. [address] [place] [address], [place] [address]. All rights reserved. This information is not intended as a substitute for professional medical care. Always follow your healthcare professional's instructions.        "

## 2020-12-18 NOTE — TELEPHONE ENCOUNTER
Patient will be in [place] today seeing Ophth. Wants to know if patient can get flu shot at that time. He will book flu shot there.

## 2020-12-18 NOTE — PROGRESS NOTES
HPI     Glaucoma      Additional comments: 4 month iop ch// Latanopriost QHS ou              Comments     4 month iop ch// Latanopriost QHS ou          Last edited by Ana Lilia Marlow on 12/18/2020  9:59 AM. (History)        ROS     Negative for: Constitutional, Gastrointestinal, Neurological, Skin,   Genitourinary, Musculoskeletal, HENT, Endocrine, Cardiovascular, Eyes,   Respiratory, Psychiatric, Allergic/Imm, Heme/Lymph    Last edited by Kvng Stern Jr., MD on 12/18/2020 10:19 AM. (History)        Assessment /Plan     For exam results, see Encounter Report.    Primary open angle glaucoma (POAG) of both eyes, moderate stage    Dermatochalasis of both upper eyelids    Pseudophakia of both eyes     Primary open angle glaucoma (POAG) of both eyes, moderate stage- HVF: OD dense superior altitudinal defect OS- nasal inf and sup  -IOP OD 19 OS 18, typically runs in the low 20's, been stable with latanoprost  -continue latanoprost QHS OU  Follow up in about 4 months (around 4/18/2021) for IOP and Medication check, HVF, OCT ON, Gonio,Refraction and Dilation.     Dermatochalasis of both upper eyelids- borderline, continue to monitor- asymptomatic, observe    Pseudophakia of both eyes- stable, observe

## 2021-01-09 ENCOUNTER — PATIENT MESSAGE (OUTPATIENT)
Dept: FAMILY MEDICINE | Facility: CLINIC | Age: 86
End: 2021-01-09

## 2021-01-09 ENCOUNTER — OFFICE VISIT (OUTPATIENT)
Dept: FAMILY MEDICINE | Facility: CLINIC | Age: 86
End: 2021-01-09
Payer: MEDICARE

## 2021-01-09 ENCOUNTER — LAB VISIT (OUTPATIENT)
Dept: LAB | Facility: HOSPITAL | Age: 86
End: 2021-01-09
Attending: FAMILY MEDICINE
Payer: MEDICARE

## 2021-01-09 DIAGNOSIS — R21 RASH: Primary | ICD-10-CM

## 2021-01-09 DIAGNOSIS — I10 HTN (HYPERTENSION), BENIGN: ICD-10-CM

## 2021-01-09 LAB
ALBUMIN SERPL BCP-MCNC: 3.8 G/DL (ref 3.5–5.2)
ALP SERPL-CCNC: 81 U/L (ref 55–135)
ALT SERPL W/O P-5'-P-CCNC: 16 U/L (ref 10–44)
ANION GAP SERPL CALC-SCNC: 7 MMOL/L (ref 8–16)
AST SERPL-CCNC: 25 U/L (ref 10–40)
BASOPHILS # BLD AUTO: 0.04 K/UL (ref 0–0.2)
BASOPHILS NFR BLD: 0.9 % (ref 0–1.9)
BILIRUB SERPL-MCNC: 0.6 MG/DL (ref 0.1–1)
BUN SERPL-MCNC: 17 MG/DL (ref 10–30)
CALCIUM SERPL-MCNC: 9.7 MG/DL (ref 8.7–10.5)
CHLORIDE SERPL-SCNC: 97 MMOL/L (ref 95–110)
CO2 SERPL-SCNC: 29 MMOL/L (ref 23–29)
CREAT SERPL-MCNC: 0.9 MG/DL (ref 0.5–1.4)
DIFFERENTIAL METHOD: ABNORMAL
EOSINOPHIL # BLD AUTO: 0 K/UL (ref 0–0.5)
EOSINOPHIL NFR BLD: 0.9 % (ref 0–8)
ERYTHROCYTE [DISTWIDTH] IN BLOOD BY AUTOMATED COUNT: 13.5 % (ref 11.5–14.5)
EST. GFR  (AFRICAN AMERICAN): >60 ML/MIN/1.73 M^2
EST. GFR  (NON AFRICAN AMERICAN): 55.3 ML/MIN/1.73 M^2
GLUCOSE SERPL-MCNC: 93 MG/DL (ref 70–110)
HCT VFR BLD AUTO: 38 % (ref 37–48.5)
HGB BLD-MCNC: 12.1 G/DL (ref 12–16)
IMM GRANULOCYTES # BLD AUTO: 0.01 K/UL (ref 0–0.04)
IMM GRANULOCYTES NFR BLD AUTO: 0.2 % (ref 0–0.5)
LYMPHOCYTES # BLD AUTO: 1.2 K/UL (ref 1–4.8)
LYMPHOCYTES NFR BLD: 29.1 % (ref 18–48)
MCH RBC QN AUTO: 32.1 PG (ref 27–31)
MCHC RBC AUTO-ENTMCNC: 31.8 G/DL (ref 32–36)
MCV RBC AUTO: 101 FL (ref 82–98)
MONOCYTES # BLD AUTO: 0.6 K/UL (ref 0.3–1)
MONOCYTES NFR BLD: 14.2 % (ref 4–15)
NEUTROPHILS # BLD AUTO: 2.3 K/UL (ref 1.8–7.7)
NEUTROPHILS NFR BLD: 54.7 % (ref 38–73)
NRBC BLD-RTO: 0 /100 WBC
PLATELET # BLD AUTO: 261 K/UL (ref 150–350)
PMV BLD AUTO: 10.6 FL (ref 9.2–12.9)
POTASSIUM SERPL-SCNC: 4.7 MMOL/L (ref 3.5–5.1)
PROT SERPL-MCNC: 7.4 G/DL (ref 6–8.4)
RBC # BLD AUTO: 3.77 M/UL (ref 4–5.4)
SODIUM SERPL-SCNC: 133 MMOL/L (ref 136–145)
WBC # BLD AUTO: 4.23 K/UL (ref 3.9–12.7)

## 2021-01-09 PROCEDURE — 99212 OFFICE O/P EST SF 10 MIN: CPT | Mod: HCNC,95,, | Performed by: NURSE PRACTITIONER

## 2021-01-09 PROCEDURE — 85025 COMPLETE CBC W/AUTO DIFF WBC: CPT | Mod: HCNC

## 2021-01-09 PROCEDURE — 1157F ADVNC CARE PLAN IN RCRD: CPT | Mod: HCNC,95,, | Performed by: NURSE PRACTITIONER

## 2021-01-09 PROCEDURE — 1157F PR ADVANCE CARE PLAN OR EQUIV PRESENT IN MEDICAL RECORD: ICD-10-PCS | Mod: HCNC,95,, | Performed by: NURSE PRACTITIONER

## 2021-01-09 PROCEDURE — 99212 PR OFFICE/OUTPT VISIT, EST, LEVL II, 10-19 MIN: ICD-10-PCS | Mod: HCNC,95,, | Performed by: NURSE PRACTITIONER

## 2021-01-09 PROCEDURE — 1159F MED LIST DOCD IN RCRD: CPT | Mod: HCNC,95,, | Performed by: NURSE PRACTITIONER

## 2021-01-09 PROCEDURE — 1159F PR MEDICATION LIST DOCUMENTED IN MEDICAL RECORD: ICD-10-PCS | Mod: HCNC,95,, | Performed by: NURSE PRACTITIONER

## 2021-01-09 PROCEDURE — 36415 COLL VENOUS BLD VENIPUNCTURE: CPT | Mod: HCNC,PO

## 2021-01-09 PROCEDURE — 80053 COMPREHEN METABOLIC PANEL: CPT | Mod: HCNC

## 2021-01-09 RX ORDER — HYDROCORTISONE 25 MG/G
CREAM TOPICAL 2 TIMES DAILY
Qty: 10.5 G | Refills: 0 | Status: SHIPPED | OUTPATIENT
Start: 2021-01-09 | End: 2021-07-26 | Stop reason: ALTCHOICE

## 2021-01-09 RX ORDER — LORATADINE 5 MG/1
1 TABLET, ORALLY DISINTEGRATING ORAL NIGHTLY
Qty: 5 TABLET | Refills: 0 | Status: SHIPPED | OUTPATIENT
Start: 2021-01-09 | End: 2021-01-22 | Stop reason: ALTCHOICE

## 2021-01-22 ENCOUNTER — OFFICE VISIT (OUTPATIENT)
Dept: FAMILY MEDICINE | Facility: CLINIC | Age: 86
End: 2021-01-22
Payer: MEDICARE

## 2021-01-22 VITALS
HEIGHT: 59 IN | TEMPERATURE: 97 F | OXYGEN SATURATION: 99 % | RESPIRATION RATE: 16 BRPM | WEIGHT: 120.81 LBS | SYSTOLIC BLOOD PRESSURE: 140 MMHG | HEART RATE: 70 BPM | DIASTOLIC BLOOD PRESSURE: 68 MMHG | BODY MASS INDEX: 24.36 KG/M2

## 2021-01-22 DIAGNOSIS — E78.41 ELEVATED LIPOPROTEIN(A): ICD-10-CM

## 2021-01-22 DIAGNOSIS — D53.9 MACROCYTIC ANEMIA: ICD-10-CM

## 2021-01-22 DIAGNOSIS — D50.8 IRON DEFICIENCY ANEMIA SECONDARY TO INADEQUATE DIETARY IRON INTAKE: ICD-10-CM

## 2021-01-22 DIAGNOSIS — B88.8 INFESTATION BY BED BUG: Primary | ICD-10-CM

## 2021-01-22 DIAGNOSIS — I10 BENIGN ESSENTIAL HTN: ICD-10-CM

## 2021-01-22 PROCEDURE — 1126F PR PAIN SEVERITY QUANTIFIED, NO PAIN PRESENT: ICD-10-PCS | Mod: S$GLB,,, | Performed by: FAMILY MEDICINE

## 2021-01-22 PROCEDURE — 1101F PR PT FALLS ASSESS DOC 0-1 FALLS W/OUT INJ PAST YR: ICD-10-PCS | Mod: CPTII,S$GLB,, | Performed by: FAMILY MEDICINE

## 2021-01-22 PROCEDURE — 1126F AMNT PAIN NOTED NONE PRSNT: CPT | Mod: S$GLB,,, | Performed by: FAMILY MEDICINE

## 2021-01-22 PROCEDURE — 1157F PR ADVANCE CARE PLAN OR EQUIV PRESENT IN MEDICAL RECORD: ICD-10-PCS | Mod: S$GLB,,, | Performed by: FAMILY MEDICINE

## 2021-01-22 PROCEDURE — 3288F FALL RISK ASSESSMENT DOCD: CPT | Mod: CPTII,S$GLB,, | Performed by: FAMILY MEDICINE

## 2021-01-22 PROCEDURE — 99213 OFFICE O/P EST LOW 20 MIN: CPT | Mod: S$GLB,,, | Performed by: FAMILY MEDICINE

## 2021-01-22 PROCEDURE — 1159F MED LIST DOCD IN RCRD: CPT | Mod: S$GLB,,, | Performed by: FAMILY MEDICINE

## 2021-01-22 PROCEDURE — 1101F PT FALLS ASSESS-DOCD LE1/YR: CPT | Mod: CPTII,S$GLB,, | Performed by: FAMILY MEDICINE

## 2021-01-22 PROCEDURE — 99999 PR PBB SHADOW E&M-EST. PATIENT-LVL V: CPT | Mod: PBBFAC,,, | Performed by: FAMILY MEDICINE

## 2021-01-22 PROCEDURE — 1159F PR MEDICATION LIST DOCUMENTED IN MEDICAL RECORD: ICD-10-PCS | Mod: S$GLB,,, | Performed by: FAMILY MEDICINE

## 2021-01-22 PROCEDURE — 1157F ADVNC CARE PLAN IN RCRD: CPT | Mod: S$GLB,,, | Performed by: FAMILY MEDICINE

## 2021-01-22 PROCEDURE — 3288F PR FALLS RISK ASSESSMENT DOCUMENTED: ICD-10-PCS | Mod: CPTII,S$GLB,, | Performed by: FAMILY MEDICINE

## 2021-01-22 PROCEDURE — 99213 PR OFFICE/OUTPT VISIT, EST, LEVL III, 20-29 MIN: ICD-10-PCS | Mod: S$GLB,,, | Performed by: FAMILY MEDICINE

## 2021-01-22 PROCEDURE — 99999 PR PBB SHADOW E&M-EST. PATIENT-LVL V: ICD-10-PCS | Mod: PBBFAC,,, | Performed by: FAMILY MEDICINE

## 2021-01-22 RX ORDER — HYDROCORTISONE VALERATE CREAM 2 MG/G
CREAM TOPICAL 2 TIMES DAILY
Qty: 60 G | Refills: 3 | Status: SHIPPED | OUTPATIENT
Start: 2021-01-22 | End: 2021-07-26 | Stop reason: ALTCHOICE

## 2021-01-28 ENCOUNTER — PATIENT MESSAGE (OUTPATIENT)
Dept: FAMILY MEDICINE | Facility: CLINIC | Age: 86
End: 2021-01-28

## 2021-01-28 DIAGNOSIS — L29.9 PRURITUS AND RELATED CONDITIONS: Primary | ICD-10-CM

## 2021-02-01 ENCOUNTER — PATIENT MESSAGE (OUTPATIENT)
Dept: FAMILY MEDICINE | Facility: CLINIC | Age: 86
End: 2021-02-01

## 2021-02-04 ENCOUNTER — OFFICE VISIT (OUTPATIENT)
Dept: DERMATOLOGY | Facility: CLINIC | Age: 86
End: 2021-02-04
Payer: MEDICARE
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
## 2021-02-04 DIAGNOSIS — L85.3 XEROSIS CUTIS: Primary | ICD-10-CM

## 2021-02-04 PROCEDURE — 99203 PR OFFICE/OUTPT VISIT, NEW, LEVL III, 30-44 MIN: ICD-10-PCS | Mod: S$GLB,,, | Performed by: STUDENT IN AN ORGANIZED HEALTH CARE EDUCATION/TRAINING PROGRAM

## 2021-02-04 PROCEDURE — 1126F PR PAIN SEVERITY QUANTIFIED, NO PAIN PRESENT: ICD-10-PCS | Mod: S$GLB,,, | Performed by: STUDENT IN AN ORGANIZED HEALTH CARE EDUCATION/TRAINING PROGRAM

## 2021-02-04 PROCEDURE — 1159F PR MEDICATION LIST DOCUMENTED IN MEDICAL RECORD: ICD-10-PCS | Mod: S$GLB,,, | Performed by: STUDENT IN AN ORGANIZED HEALTH CARE EDUCATION/TRAINING PROGRAM

## 2021-02-04 PROCEDURE — 99999 PR PBB SHADOW E&M-EST. PATIENT-LVL III: ICD-10-PCS | Mod: PBBFAC,,, | Performed by: STUDENT IN AN ORGANIZED HEALTH CARE EDUCATION/TRAINING PROGRAM

## 2021-02-04 PROCEDURE — 99999 PR PBB SHADOW E&M-EST. PATIENT-LVL III: CPT | Mod: PBBFAC,,, | Performed by: STUDENT IN AN ORGANIZED HEALTH CARE EDUCATION/TRAINING PROGRAM

## 2021-02-04 PROCEDURE — 1126F AMNT PAIN NOTED NONE PRSNT: CPT | Mod: S$GLB,,, | Performed by: STUDENT IN AN ORGANIZED HEALTH CARE EDUCATION/TRAINING PROGRAM

## 2021-02-04 PROCEDURE — 1157F ADVNC CARE PLAN IN RCRD: CPT | Mod: S$GLB,,, | Performed by: STUDENT IN AN ORGANIZED HEALTH CARE EDUCATION/TRAINING PROGRAM

## 2021-02-04 PROCEDURE — 1157F PR ADVANCE CARE PLAN OR EQUIV PRESENT IN MEDICAL RECORD: ICD-10-PCS | Mod: S$GLB,,, | Performed by: STUDENT IN AN ORGANIZED HEALTH CARE EDUCATION/TRAINING PROGRAM

## 2021-02-04 PROCEDURE — 3288F PR FALLS RISK ASSESSMENT DOCUMENTED: ICD-10-PCS | Mod: CPTII,S$GLB,, | Performed by: STUDENT IN AN ORGANIZED HEALTH CARE EDUCATION/TRAINING PROGRAM

## 2021-02-04 PROCEDURE — 3288F FALL RISK ASSESSMENT DOCD: CPT | Mod: CPTII,S$GLB,, | Performed by: STUDENT IN AN ORGANIZED HEALTH CARE EDUCATION/TRAINING PROGRAM

## 2021-02-04 PROCEDURE — 99203 OFFICE O/P NEW LOW 30 MIN: CPT | Mod: S$GLB,,, | Performed by: STUDENT IN AN ORGANIZED HEALTH CARE EDUCATION/TRAINING PROGRAM

## 2021-02-04 PROCEDURE — 1101F PT FALLS ASSESS-DOCD LE1/YR: CPT | Mod: CPTII,S$GLB,, | Performed by: STUDENT IN AN ORGANIZED HEALTH CARE EDUCATION/TRAINING PROGRAM

## 2021-02-04 PROCEDURE — 1101F PR PT FALLS ASSESS DOC 0-1 FALLS W/OUT INJ PAST YR: ICD-10-PCS | Mod: CPTII,S$GLB,, | Performed by: STUDENT IN AN ORGANIZED HEALTH CARE EDUCATION/TRAINING PROGRAM

## 2021-02-04 PROCEDURE — 1159F MED LIST DOCD IN RCRD: CPT | Mod: S$GLB,,, | Performed by: STUDENT IN AN ORGANIZED HEALTH CARE EDUCATION/TRAINING PROGRAM

## 2021-02-04 RX ORDER — TRIAMCINOLONE ACETONIDE 1 MG/G
OINTMENT TOPICAL
Qty: 30 G | Refills: 0 | Status: SHIPPED | OUTPATIENT
Start: 2021-02-04 | End: 2023-08-10

## 2021-02-26 ENCOUNTER — IMMUNIZATION (OUTPATIENT)
Dept: INTERNAL MEDICINE | Facility: CLINIC | Age: 86
End: 2021-02-26
Payer: MEDICARE

## 2021-02-26 DIAGNOSIS — Z23 NEED FOR VACCINATION: Primary | ICD-10-CM

## 2021-02-26 PROCEDURE — 91300 COVID-19, MRNA, LNP-S, PF, 30 MCG/0.3 ML DOSE VACCINE: CPT | Mod: PBBFAC | Performed by: INTERNAL MEDICINE

## 2021-03-16 ENCOUNTER — PATIENT MESSAGE (OUTPATIENT)
Dept: OPHTHALMOLOGY | Facility: CLINIC | Age: 86
End: 2021-03-16

## 2021-03-17 ENCOUNTER — TELEPHONE (OUTPATIENT)
Dept: OPHTHALMOLOGY | Facility: CLINIC | Age: 86
End: 2021-03-17

## 2021-03-19 ENCOUNTER — IMMUNIZATION (OUTPATIENT)
Dept: INTERNAL MEDICINE | Facility: CLINIC | Age: 86
End: 2021-03-19
Payer: MEDICARE

## 2021-03-19 ENCOUNTER — PATIENT MESSAGE (OUTPATIENT)
Dept: FAMILY MEDICINE | Facility: CLINIC | Age: 86
End: 2021-03-19

## 2021-03-19 DIAGNOSIS — Z23 NEED FOR VACCINATION: Primary | ICD-10-CM

## 2021-03-19 DIAGNOSIS — K21.9 GASTROESOPHAGEAL REFLUX DISEASE WITHOUT ESOPHAGITIS: Primary | ICD-10-CM

## 2021-03-19 PROCEDURE — 91300 COVID-19, MRNA, LNP-S, PF, 30 MCG/0.3 ML DOSE VACCINE: CPT | Mod: HCNC,PBBFAC | Performed by: INTERNAL MEDICINE

## 2021-03-19 PROCEDURE — 0002A COVID-19, MRNA, LNP-S, PF, 30 MCG/0.3 ML DOSE VACCINE: CPT | Mod: HCNC,PBBFAC | Performed by: INTERNAL MEDICINE

## 2021-03-30 RX ORDER — PANTOPRAZOLE SODIUM 20 MG/1
20 TABLET, DELAYED RELEASE ORAL DAILY
Qty: 30 TABLET | Refills: 11 | Status: SHIPPED | OUTPATIENT
Start: 2021-03-30 | End: 2023-08-10

## 2021-04-05 ENCOUNTER — PES CALL (OUTPATIENT)
Dept: ADMINISTRATIVE | Facility: CLINIC | Age: 86
End: 2021-04-05

## 2021-04-23 ENCOUNTER — OFFICE VISIT (OUTPATIENT)
Dept: OPHTHALMOLOGY | Facility: CLINIC | Age: 86
End: 2021-04-23
Payer: MEDICARE

## 2021-04-23 ENCOUNTER — CLINICAL SUPPORT (OUTPATIENT)
Dept: OPHTHALMOLOGY | Facility: CLINIC | Age: 86
End: 2021-04-23
Payer: MEDICARE

## 2021-04-23 DIAGNOSIS — Z96.1 PSEUDOPHAKIA OF BOTH EYES: ICD-10-CM

## 2021-04-23 DIAGNOSIS — H40.1132 PRIMARY OPEN ANGLE GLAUCOMA (POAG) OF BOTH EYES, MODERATE STAGE: Primary | ICD-10-CM

## 2021-04-23 DIAGNOSIS — H02.831 DERMATOCHALASIS OF BOTH UPPER EYELIDS: ICD-10-CM

## 2021-04-23 DIAGNOSIS — H02.834 DERMATOCHALASIS OF BOTH UPPER EYELIDS: ICD-10-CM

## 2021-04-23 DIAGNOSIS — H04.123 DRY EYES, BILATERAL: ICD-10-CM

## 2021-04-23 PROCEDURE — 92133 POSTERIOR SEGMENT OCT OPTIC NERVE(OCULAR COHERENCE TOMOGRAPHY) - OU - BOTH EYES: ICD-10-PCS | Mod: S$GLB,,, | Performed by: OPHTHALMOLOGY

## 2021-04-23 PROCEDURE — 99214 OFFICE O/P EST MOD 30 MIN: CPT | Mod: S$GLB,,, | Performed by: OPHTHALMOLOGY

## 2021-04-23 PROCEDURE — 1126F PR PAIN SEVERITY QUANTIFIED, NO PAIN PRESENT: ICD-10-PCS | Mod: S$GLB,,, | Performed by: OPHTHALMOLOGY

## 2021-04-23 PROCEDURE — 1159F MED LIST DOCD IN RCRD: CPT | Mod: S$GLB,,, | Performed by: OPHTHALMOLOGY

## 2021-04-23 PROCEDURE — 92015 DETERMINE REFRACTIVE STATE: CPT | Mod: S$GLB,,, | Performed by: OPHTHALMOLOGY

## 2021-04-23 PROCEDURE — 92083 EXTENDED VISUAL FIELD XM: CPT | Mod: S$GLB,,, | Performed by: OPHTHALMOLOGY

## 2021-04-23 PROCEDURE — 3288F FALL RISK ASSESSMENT DOCD: CPT | Mod: CPTII,S$GLB,, | Performed by: OPHTHALMOLOGY

## 2021-04-23 PROCEDURE — 1101F PT FALLS ASSESS-DOCD LE1/YR: CPT | Mod: CPTII,S$GLB,, | Performed by: OPHTHALMOLOGY

## 2021-04-23 PROCEDURE — 1159F PR MEDICATION LIST DOCUMENTED IN MEDICAL RECORD: ICD-10-PCS | Mod: S$GLB,,, | Performed by: OPHTHALMOLOGY

## 2021-04-23 PROCEDURE — 1101F PR PT FALLS ASSESS DOC 0-1 FALLS W/OUT INJ PAST YR: ICD-10-PCS | Mod: CPTII,S$GLB,, | Performed by: OPHTHALMOLOGY

## 2021-04-23 PROCEDURE — 99499 RISK ADDL DX/OHS AUDIT: ICD-10-PCS | Mod: HCNC,S$GLB,, | Performed by: OPHTHALMOLOGY

## 2021-04-23 PROCEDURE — 1126F AMNT PAIN NOTED NONE PRSNT: CPT | Mod: S$GLB,,, | Performed by: OPHTHALMOLOGY

## 2021-04-23 PROCEDURE — 92083 HUMPHREY VISUAL FIELD - OU - BOTH EYES: ICD-10-PCS | Mod: S$GLB,,, | Performed by: OPHTHALMOLOGY

## 2021-04-23 PROCEDURE — 99214 PR OFFICE/OUTPT VISIT, EST, LEVL IV, 30-39 MIN: ICD-10-PCS | Mod: S$GLB,,, | Performed by: OPHTHALMOLOGY

## 2021-04-23 PROCEDURE — 92015 PR REFRACTION: ICD-10-PCS | Mod: S$GLB,,, | Performed by: OPHTHALMOLOGY

## 2021-04-23 PROCEDURE — 1157F PR ADVANCE CARE PLAN OR EQUIV PRESENT IN MEDICAL RECORD: ICD-10-PCS | Mod: S$GLB,,, | Performed by: OPHTHALMOLOGY

## 2021-04-23 PROCEDURE — 92133 CPTRZD OPH DX IMG PST SGM ON: CPT | Mod: S$GLB,,, | Performed by: OPHTHALMOLOGY

## 2021-04-23 PROCEDURE — 1157F ADVNC CARE PLAN IN RCRD: CPT | Mod: S$GLB,,, | Performed by: OPHTHALMOLOGY

## 2021-04-23 PROCEDURE — 3288F PR FALLS RISK ASSESSMENT DOCUMENTED: ICD-10-PCS | Mod: CPTII,S$GLB,, | Performed by: OPHTHALMOLOGY

## 2021-04-23 PROCEDURE — 99999 PR PBB SHADOW E&M-EST. PATIENT-LVL III: ICD-10-PCS | Mod: PBBFAC,,, | Performed by: OPHTHALMOLOGY

## 2021-04-23 PROCEDURE — 99999 PR PBB SHADOW E&M-EST. PATIENT-LVL III: CPT | Mod: PBBFAC,,, | Performed by: OPHTHALMOLOGY

## 2021-04-23 PROCEDURE — 99499 UNLISTED E&M SERVICE: CPT | Mod: HCNC,S$GLB,, | Performed by: OPHTHALMOLOGY

## 2021-04-23 RX ORDER — BRIMONIDINE TARTRATE 2 MG/ML
1 SOLUTION/ DROPS OPHTHALMIC 2 TIMES DAILY
Qty: 15 ML | Refills: 4 | Status: SHIPPED | OUTPATIENT
Start: 2021-04-23 | End: 2022-03-15

## 2021-04-23 RX ORDER — BRIMONIDINE TARTRATE 2 MG/ML
1 SOLUTION/ DROPS OPHTHALMIC 2 TIMES DAILY
Qty: 5 ML | Refills: 0 | Status: SHIPPED | OUTPATIENT
Start: 2021-04-23 | End: 2022-03-15

## 2021-07-01 ENCOUNTER — PATIENT MESSAGE (OUTPATIENT)
Dept: OPHTHALMOLOGY | Facility: CLINIC | Age: 86
End: 2021-07-01

## 2021-07-02 ENCOUNTER — TELEPHONE (OUTPATIENT)
Dept: OPHTHALMOLOGY | Facility: CLINIC | Age: 86
End: 2021-07-02

## 2021-07-10 ENCOUNTER — LAB VISIT (OUTPATIENT)
Dept: LAB | Facility: HOSPITAL | Age: 86
End: 2021-07-10
Attending: FAMILY MEDICINE
Payer: MEDICARE

## 2021-07-10 DIAGNOSIS — I10 BENIGN ESSENTIAL HTN: ICD-10-CM

## 2021-07-10 DIAGNOSIS — E78.41 ELEVATED LIPOPROTEIN(A): ICD-10-CM

## 2021-07-10 LAB
ALBUMIN SERPL BCP-MCNC: 3.8 G/DL (ref 3.5–5.2)
ALP SERPL-CCNC: 79 U/L (ref 55–135)
ALT SERPL W/O P-5'-P-CCNC: 20 U/L (ref 10–44)
ANION GAP SERPL CALC-SCNC: 11 MMOL/L (ref 8–16)
AST SERPL-CCNC: 27 U/L (ref 10–40)
BASOPHILS # BLD AUTO: 0.05 K/UL (ref 0–0.2)
BASOPHILS NFR BLD: 1.4 % (ref 0–1.9)
BILIRUB SERPL-MCNC: 0.6 MG/DL (ref 0.1–1)
BUN SERPL-MCNC: 18 MG/DL (ref 10–30)
CALCIUM SERPL-MCNC: 9.6 MG/DL (ref 8.7–10.5)
CHLORIDE SERPL-SCNC: 97 MMOL/L (ref 95–110)
CHOLEST SERPL-MCNC: 183 MG/DL (ref 120–199)
CHOLEST/HDLC SERPL: 2.6 {RATIO} (ref 2–5)
CO2 SERPL-SCNC: 24 MMOL/L (ref 23–29)
CREAT SERPL-MCNC: 0.9 MG/DL (ref 0.5–1.4)
DIFFERENTIAL METHOD: ABNORMAL
EOSINOPHIL # BLD AUTO: 0.1 K/UL (ref 0–0.5)
EOSINOPHIL NFR BLD: 2 % (ref 0–8)
ERYTHROCYTE [DISTWIDTH] IN BLOOD BY AUTOMATED COUNT: 13.9 % (ref 11.5–14.5)
EST. GFR  (AFRICAN AMERICAN): >60 ML/MIN/1.73 M^2
EST. GFR  (NON AFRICAN AMERICAN): 55.3 ML/MIN/1.73 M^2
GLUCOSE SERPL-MCNC: 81 MG/DL (ref 70–110)
HCT VFR BLD AUTO: 36.3 % (ref 37–48.5)
HDLC SERPL-MCNC: 71 MG/DL (ref 40–75)
HDLC SERPL: 38.8 % (ref 20–50)
HGB BLD-MCNC: 11.9 G/DL (ref 12–16)
IMM GRANULOCYTES # BLD AUTO: 0 K/UL (ref 0–0.04)
IMM GRANULOCYTES NFR BLD AUTO: 0 % (ref 0–0.5)
LDLC SERPL CALC-MCNC: 99.6 MG/DL (ref 63–159)
LYMPHOCYTES # BLD AUTO: 0.9 K/UL (ref 1–4.8)
LYMPHOCYTES NFR BLD: 27 % (ref 18–48)
MCH RBC QN AUTO: 31.6 PG (ref 27–31)
MCHC RBC AUTO-ENTMCNC: 32.8 G/DL (ref 32–36)
MCV RBC AUTO: 97 FL (ref 82–98)
MONOCYTES # BLD AUTO: 0.5 K/UL (ref 0.3–1)
MONOCYTES NFR BLD: 13.2 % (ref 4–15)
NEUTROPHILS # BLD AUTO: 2 K/UL (ref 1.8–7.7)
NEUTROPHILS NFR BLD: 56.4 % (ref 38–73)
NONHDLC SERPL-MCNC: 112 MG/DL
NRBC BLD-RTO: 0 /100 WBC
PLATELET # BLD AUTO: 297 K/UL (ref 150–450)
PMV BLD AUTO: 10.4 FL (ref 9.2–12.9)
POTASSIUM SERPL-SCNC: 4.3 MMOL/L (ref 3.5–5.1)
PROT SERPL-MCNC: 7.4 G/DL (ref 6–8.4)
RBC # BLD AUTO: 3.76 M/UL (ref 4–5.4)
SODIUM SERPL-SCNC: 132 MMOL/L (ref 136–145)
TRIGL SERPL-MCNC: 62 MG/DL (ref 30–150)
WBC # BLD AUTO: 3.48 K/UL (ref 3.9–12.7)

## 2021-07-10 PROCEDURE — 80053 COMPREHEN METABOLIC PANEL: CPT | Performed by: FAMILY MEDICINE

## 2021-07-10 PROCEDURE — 80061 LIPID PANEL: CPT | Performed by: FAMILY MEDICINE

## 2021-07-10 PROCEDURE — 36415 COLL VENOUS BLD VENIPUNCTURE: CPT | Mod: PO | Performed by: FAMILY MEDICINE

## 2021-07-10 PROCEDURE — 85025 COMPLETE CBC W/AUTO DIFF WBC: CPT | Performed by: FAMILY MEDICINE

## 2021-07-26 ENCOUNTER — OFFICE VISIT (OUTPATIENT)
Dept: FAMILY MEDICINE | Facility: CLINIC | Age: 86
End: 2021-07-26
Payer: MEDICARE

## 2021-07-26 VITALS
BODY MASS INDEX: 23.11 KG/M2 | TEMPERATURE: 98 F | OXYGEN SATURATION: 99 % | RESPIRATION RATE: 12 BRPM | DIASTOLIC BLOOD PRESSURE: 65 MMHG | HEIGHT: 59 IN | WEIGHT: 114.63 LBS | HEART RATE: 70 BPM | SYSTOLIC BLOOD PRESSURE: 142 MMHG

## 2021-07-26 DIAGNOSIS — I73.9 PAD (PERIPHERAL ARTERY DISEASE): ICD-10-CM

## 2021-07-26 DIAGNOSIS — I10 HTN (HYPERTENSION), BENIGN: ICD-10-CM

## 2021-07-26 DIAGNOSIS — H90.3 SENSORINEURAL HEARING LOSS (SNHL) OF BOTH EARS: Primary | ICD-10-CM

## 2021-07-26 PROCEDURE — 1101F PR PT FALLS ASSESS DOC 0-1 FALLS W/OUT INJ PAST YR: ICD-10-PCS | Mod: CPTII,S$GLB,, | Performed by: FAMILY MEDICINE

## 2021-07-26 PROCEDURE — 99999 PR PBB SHADOW E&M-EST. PATIENT-LVL V: ICD-10-PCS | Mod: PBBFAC,,, | Performed by: FAMILY MEDICINE

## 2021-07-26 PROCEDURE — 99999 PR PBB SHADOW E&M-EST. PATIENT-LVL V: CPT | Mod: PBBFAC,,, | Performed by: FAMILY MEDICINE

## 2021-07-26 PROCEDURE — 1159F PR MEDICATION LIST DOCUMENTED IN MEDICAL RECORD: ICD-10-PCS | Mod: CPTII,S$GLB,, | Performed by: FAMILY MEDICINE

## 2021-07-26 PROCEDURE — 1157F ADVNC CARE PLAN IN RCRD: CPT | Mod: CPTII,S$GLB,, | Performed by: FAMILY MEDICINE

## 2021-07-26 PROCEDURE — 3288F PR FALLS RISK ASSESSMENT DOCUMENTED: ICD-10-PCS | Mod: CPTII,S$GLB,, | Performed by: FAMILY MEDICINE

## 2021-07-26 PROCEDURE — 1101F PT FALLS ASSESS-DOCD LE1/YR: CPT | Mod: CPTII,S$GLB,, | Performed by: FAMILY MEDICINE

## 2021-07-26 PROCEDURE — 1160F PR REVIEW ALL MEDS BY PRESCRIBER/CLIN PHARMACIST DOCUMENTED: ICD-10-PCS | Mod: CPTII,S$GLB,, | Performed by: FAMILY MEDICINE

## 2021-07-26 PROCEDURE — 99499 RISK ADDL DX/OHS AUDIT: ICD-10-PCS | Mod: HCNC,S$GLB,, | Performed by: FAMILY MEDICINE

## 2021-07-26 PROCEDURE — 1126F AMNT PAIN NOTED NONE PRSNT: CPT | Mod: CPTII,S$GLB,, | Performed by: FAMILY MEDICINE

## 2021-07-26 PROCEDURE — 99214 OFFICE O/P EST MOD 30 MIN: CPT | Mod: S$GLB,,, | Performed by: FAMILY MEDICINE

## 2021-07-26 PROCEDURE — 3288F FALL RISK ASSESSMENT DOCD: CPT | Mod: CPTII,S$GLB,, | Performed by: FAMILY MEDICINE

## 2021-07-26 PROCEDURE — 99214 PR OFFICE/OUTPT VISIT, EST, LEVL IV, 30-39 MIN: ICD-10-PCS | Mod: S$GLB,,, | Performed by: FAMILY MEDICINE

## 2021-07-26 PROCEDURE — 1160F RVW MEDS BY RX/DR IN RCRD: CPT | Mod: CPTII,S$GLB,, | Performed by: FAMILY MEDICINE

## 2021-07-26 PROCEDURE — 1157F PR ADVANCE CARE PLAN OR EQUIV PRESENT IN MEDICAL RECORD: ICD-10-PCS | Mod: CPTII,S$GLB,, | Performed by: FAMILY MEDICINE

## 2021-07-26 PROCEDURE — 1159F MED LIST DOCD IN RCRD: CPT | Mod: CPTII,S$GLB,, | Performed by: FAMILY MEDICINE

## 2021-07-26 PROCEDURE — 1126F PR PAIN SEVERITY QUANTIFIED, NO PAIN PRESENT: ICD-10-PCS | Mod: CPTII,S$GLB,, | Performed by: FAMILY MEDICINE

## 2021-07-26 PROCEDURE — 99499 UNLISTED E&M SERVICE: CPT | Mod: HCNC,S$GLB,, | Performed by: FAMILY MEDICINE

## 2021-08-12 ENCOUNTER — PATIENT MESSAGE (OUTPATIENT)
Dept: OTOLARYNGOLOGY | Facility: CLINIC | Age: 86
End: 2021-08-12

## 2021-08-18 ENCOUNTER — PATIENT OUTREACH (OUTPATIENT)
Dept: ADMINISTRATIVE | Facility: OTHER | Age: 86
End: 2021-08-18

## 2021-09-20 DIAGNOSIS — H91.90 HEARING DIFFICULTY, UNSPECIFIED LATERALITY: Primary | ICD-10-CM

## 2021-10-26 ENCOUNTER — OFFICE VISIT (OUTPATIENT)
Dept: OPHTHALMOLOGY | Facility: CLINIC | Age: 86
End: 2021-10-26
Payer: MEDICARE

## 2021-10-26 DIAGNOSIS — H02.834 DERMATOCHALASIS OF BOTH UPPER EYELIDS: ICD-10-CM

## 2021-10-26 DIAGNOSIS — H26.491 PCO (POSTERIOR CAPSULAR OPACIFICATION), RIGHT: Primary | ICD-10-CM

## 2021-10-26 DIAGNOSIS — H40.1132 PRIMARY OPEN ANGLE GLAUCOMA (POAG) OF BOTH EYES, MODERATE STAGE: ICD-10-CM

## 2021-10-26 DIAGNOSIS — H02.831 DERMATOCHALASIS OF BOTH UPPER EYELIDS: ICD-10-CM

## 2021-10-26 DIAGNOSIS — H04.123 DRY EYES, BILATERAL: ICD-10-CM

## 2021-10-26 DIAGNOSIS — Z96.1 PSEUDOPHAKIA OF BOTH EYES: ICD-10-CM

## 2021-10-26 PROCEDURE — 1159F MED LIST DOCD IN RCRD: CPT | Mod: HCNC,CPTII,S$GLB, | Performed by: OPHTHALMOLOGY

## 2021-10-26 PROCEDURE — 99214 PR OFFICE/OUTPT VISIT, EST, LEVL IV, 30-39 MIN: ICD-10-PCS | Mod: HCNC,S$GLB,, | Performed by: OPHTHALMOLOGY

## 2021-10-26 PROCEDURE — 99499 UNLISTED E&M SERVICE: CPT | Mod: S$GLB,,, | Performed by: OPHTHALMOLOGY

## 2021-10-26 PROCEDURE — 1101F PR PT FALLS ASSESS DOC 0-1 FALLS W/OUT INJ PAST YR: ICD-10-PCS | Mod: HCNC,CPTII,S$GLB, | Performed by: OPHTHALMOLOGY

## 2021-10-26 PROCEDURE — 99999 PR PBB SHADOW E&M-EST. PATIENT-LVL III: ICD-10-PCS | Mod: PBBFAC,HCNC,, | Performed by: OPHTHALMOLOGY

## 2021-10-26 PROCEDURE — 99999 PR PBB SHADOW E&M-EST. PATIENT-LVL III: CPT | Mod: PBBFAC,HCNC,, | Performed by: OPHTHALMOLOGY

## 2021-10-26 PROCEDURE — 99499 RISK ADDL DX/OHS AUDIT: ICD-10-PCS | Mod: S$GLB,,, | Performed by: OPHTHALMOLOGY

## 2021-10-26 PROCEDURE — 1159F PR MEDICATION LIST DOCUMENTED IN MEDICAL RECORD: ICD-10-PCS | Mod: HCNC,CPTII,S$GLB, | Performed by: OPHTHALMOLOGY

## 2021-10-26 PROCEDURE — 3288F FALL RISK ASSESSMENT DOCD: CPT | Mod: HCNC,CPTII,S$GLB, | Performed by: OPHTHALMOLOGY

## 2021-10-26 PROCEDURE — 1160F PR REVIEW ALL MEDS BY PRESCRIBER/CLIN PHARMACIST DOCUMENTED: ICD-10-PCS | Mod: HCNC,CPTII,S$GLB, | Performed by: OPHTHALMOLOGY

## 2021-10-26 PROCEDURE — 1160F RVW MEDS BY RX/DR IN RCRD: CPT | Mod: HCNC,CPTII,S$GLB, | Performed by: OPHTHALMOLOGY

## 2021-10-26 PROCEDURE — 1126F AMNT PAIN NOTED NONE PRSNT: CPT | Mod: HCNC,CPTII,S$GLB, | Performed by: OPHTHALMOLOGY

## 2021-10-26 PROCEDURE — 1126F PR PAIN SEVERITY QUANTIFIED, NO PAIN PRESENT: ICD-10-PCS | Mod: HCNC,CPTII,S$GLB, | Performed by: OPHTHALMOLOGY

## 2021-10-26 PROCEDURE — 1157F PR ADVANCE CARE PLAN OR EQUIV PRESENT IN MEDICAL RECORD: ICD-10-PCS | Mod: HCNC,CPTII,S$GLB, | Performed by: OPHTHALMOLOGY

## 2021-10-26 PROCEDURE — 1157F ADVNC CARE PLAN IN RCRD: CPT | Mod: HCNC,CPTII,S$GLB, | Performed by: OPHTHALMOLOGY

## 2021-10-26 PROCEDURE — 1101F PT FALLS ASSESS-DOCD LE1/YR: CPT | Mod: HCNC,CPTII,S$GLB, | Performed by: OPHTHALMOLOGY

## 2021-10-26 PROCEDURE — 99214 OFFICE O/P EST MOD 30 MIN: CPT | Mod: HCNC,S$GLB,, | Performed by: OPHTHALMOLOGY

## 2021-10-26 PROCEDURE — 3288F PR FALLS RISK ASSESSMENT DOCUMENTED: ICD-10-PCS | Mod: HCNC,CPTII,S$GLB, | Performed by: OPHTHALMOLOGY

## 2021-10-28 ENCOUNTER — PATIENT MESSAGE (OUTPATIENT)
Dept: FAMILY MEDICINE | Facility: CLINIC | Age: 86
End: 2021-10-28
Payer: MEDICARE

## 2021-10-28 ENCOUNTER — PATIENT MESSAGE (OUTPATIENT)
Dept: OTOLARYNGOLOGY | Facility: CLINIC | Age: 86
End: 2021-10-28
Payer: MEDICARE

## 2021-10-28 ENCOUNTER — TELEPHONE (OUTPATIENT)
Dept: FAMILY MEDICINE | Facility: CLINIC | Age: 86
End: 2021-10-28
Payer: MEDICARE

## 2021-10-29 ENCOUNTER — TELEPHONE (OUTPATIENT)
Dept: FAMILY MEDICINE | Facility: CLINIC | Age: 86
End: 2021-10-29
Payer: MEDICARE

## 2021-10-29 ENCOUNTER — IMMUNIZATION (OUTPATIENT)
Dept: PRIMARY CARE CLINIC | Facility: CLINIC | Age: 86
End: 2021-10-29
Payer: MEDICARE

## 2021-10-29 DIAGNOSIS — Z23 NEED FOR VACCINATION: Primary | ICD-10-CM

## 2021-10-29 PROCEDURE — 91300 COVID-19, MRNA, LNP-S, PF, 30 MCG/0.3 ML DOSE VACCINE: ICD-10-PCS | Mod: S$GLB,,, | Performed by: FAMILY MEDICINE

## 2021-10-29 PROCEDURE — 91300 COVID-19, MRNA, LNP-S, PF, 30 MCG/0.3 ML DOSE VACCINE: CPT | Mod: S$GLB,,, | Performed by: FAMILY MEDICINE

## 2021-10-29 PROCEDURE — 0003A COVID-19, MRNA, LNP-S, PF, 30 MCG/0.3 ML DOSE VACCINE: CPT | Mod: S$GLB,,, | Performed by: FAMILY MEDICINE

## 2021-10-29 PROCEDURE — 0003A COVID-19, MRNA, LNP-S, PF, 30 MCG/0.3 ML DOSE VACCINE: ICD-10-PCS | Mod: S$GLB,,, | Performed by: FAMILY MEDICINE

## 2021-11-01 ENCOUNTER — PATIENT MESSAGE (OUTPATIENT)
Dept: OPHTHALMOLOGY | Facility: CLINIC | Age: 86
End: 2021-11-01
Payer: MEDICARE

## 2021-11-02 ENCOUNTER — PATIENT MESSAGE (OUTPATIENT)
Dept: OPHTHALMOLOGY | Facility: CLINIC | Age: 86
End: 2021-11-02
Payer: MEDICARE

## 2021-11-09 ENCOUNTER — PATIENT MESSAGE (OUTPATIENT)
Dept: OPHTHALMOLOGY | Facility: CLINIC | Age: 86
End: 2021-11-09
Payer: MEDICARE

## 2021-11-11 ENCOUNTER — PROCEDURE VISIT (OUTPATIENT)
Dept: OPHTHALMOLOGY | Facility: CLINIC | Age: 86
End: 2021-11-11
Payer: MEDICARE

## 2021-11-11 ENCOUNTER — CLINICAL SUPPORT (OUTPATIENT)
Dept: AUDIOLOGY | Facility: CLINIC | Age: 86
End: 2021-11-11
Payer: MEDICARE

## 2021-11-11 DIAGNOSIS — H26.491 PCO (POSTERIOR CAPSULAR OPACIFICATION), RIGHT: Primary | ICD-10-CM

## 2021-11-11 DIAGNOSIS — H90.3 SENSORINEURAL HEARING LOSS (SNHL) OF BOTH EARS: ICD-10-CM

## 2021-11-11 DIAGNOSIS — Z98.890 S/P YAG CAPSULOTOMY, RIGHT: ICD-10-CM

## 2021-11-11 PROCEDURE — 66821 AFTER CATARACT LASER SURGERY: CPT | Mod: HCNC,RT,S$GLB, | Performed by: OPHTHALMOLOGY

## 2021-11-11 PROCEDURE — 99999 PR PBB SHADOW E&M-EST. PATIENT-LVL II: CPT | Mod: PBBFAC,HCNC,,

## 2021-11-11 PROCEDURE — 99999 PR PBB SHADOW E&M-EST. PATIENT-LVL II: ICD-10-PCS | Mod: PBBFAC,HCNC,,

## 2021-11-11 PROCEDURE — 99499 NO LOS: ICD-10-PCS | Mod: HCNC,S$GLB,, | Performed by: OPHTHALMOLOGY

## 2021-11-11 PROCEDURE — 92567 PR TYMPA2METRY: ICD-10-PCS | Mod: HCNC,S$GLB,, | Performed by: AUDIOLOGIST

## 2021-11-11 PROCEDURE — 92567 TYMPANOMETRY: CPT | Mod: HCNC,S$GLB,, | Performed by: AUDIOLOGIST

## 2021-11-11 PROCEDURE — 66821 YAG CAPSULOTOMY - OD - RIGHT EYE: ICD-10-PCS | Mod: HCNC,RT,S$GLB, | Performed by: OPHTHALMOLOGY

## 2021-11-11 PROCEDURE — 92557 PR COMPREHENSIVE HEARING TEST: ICD-10-PCS | Mod: HCNC,S$GLB,, | Performed by: AUDIOLOGIST

## 2021-11-11 PROCEDURE — 99499 UNLISTED E&M SERVICE: CPT | Mod: HCNC,S$GLB,, | Performed by: OPHTHALMOLOGY

## 2021-11-11 PROCEDURE — 92557 COMPREHENSIVE HEARING TEST: CPT | Mod: HCNC,S$GLB,, | Performed by: AUDIOLOGIST

## 2021-11-17 ENCOUNTER — PATIENT MESSAGE (OUTPATIENT)
Dept: OTOLARYNGOLOGY | Facility: CLINIC | Age: 86
End: 2021-11-17
Payer: MEDICARE

## 2021-11-24 ENCOUNTER — OFFICE VISIT (OUTPATIENT)
Dept: OPHTHALMOLOGY | Facility: CLINIC | Age: 86
End: 2021-11-24
Payer: MEDICARE

## 2021-11-24 ENCOUNTER — OFFICE VISIT (OUTPATIENT)
Dept: OTOLARYNGOLOGY | Facility: CLINIC | Age: 86
End: 2021-11-24
Payer: MEDICARE

## 2021-11-24 ENCOUNTER — PATIENT MESSAGE (OUTPATIENT)
Dept: OTOLARYNGOLOGY | Facility: CLINIC | Age: 86
End: 2021-11-24
Payer: MEDICARE

## 2021-11-24 VITALS — BODY MASS INDEX: 23.11 KG/M2 | WEIGHT: 114.63 LBS | TEMPERATURE: 99 F | HEIGHT: 59 IN

## 2021-11-24 DIAGNOSIS — H40.1132 PRIMARY OPEN ANGLE GLAUCOMA (POAG) OF BOTH EYES, MODERATE STAGE: Primary | ICD-10-CM

## 2021-11-24 DIAGNOSIS — Z96.1 PSEUDOPHAKIA OF BOTH EYES: ICD-10-CM

## 2021-11-24 DIAGNOSIS — H04.123 DRY EYES, BILATERAL: ICD-10-CM

## 2021-11-24 DIAGNOSIS — H02.834 DERMATOCHALASIS OF BOTH UPPER EYELIDS: ICD-10-CM

## 2021-11-24 DIAGNOSIS — H02.831 DERMATOCHALASIS OF BOTH UPPER EYELIDS: ICD-10-CM

## 2021-11-24 DIAGNOSIS — H90.3 BILATERAL SENSORINEURAL HEARING LOSS: Primary | ICD-10-CM

## 2021-11-24 DIAGNOSIS — Z98.890 S/P YAG CAPSULOTOMY, RIGHT: ICD-10-CM

## 2021-11-24 PROCEDURE — 1157F ADVNC CARE PLAN IN RCRD: CPT | Mod: HCNC,CPTII,S$GLB, | Performed by: OPHTHALMOLOGY

## 2021-11-24 PROCEDURE — 1157F PR ADVANCE CARE PLAN OR EQUIV PRESENT IN MEDICAL RECORD: ICD-10-PCS | Mod: HCNC,CPTII,S$GLB, | Performed by: OTOLARYNGOLOGY

## 2021-11-24 PROCEDURE — 99203 PR OFFICE/OUTPT VISIT, NEW, LEVL III, 30-44 MIN: ICD-10-PCS | Mod: HCNC,S$GLB,, | Performed by: OTOLARYNGOLOGY

## 2021-11-24 PROCEDURE — 99999 PR PBB SHADOW E&M-EST. PATIENT-LVL III: ICD-10-PCS | Mod: PBBFAC,HCNC,, | Performed by: OTOLARYNGOLOGY

## 2021-11-24 PROCEDURE — 99203 OFFICE O/P NEW LOW 30 MIN: CPT | Mod: HCNC,S$GLB,, | Performed by: OTOLARYNGOLOGY

## 2021-11-24 PROCEDURE — 99999 PR PBB SHADOW E&M-EST. PATIENT-LVL III: ICD-10-PCS | Mod: PBBFAC,HCNC,, | Performed by: OPHTHALMOLOGY

## 2021-11-24 PROCEDURE — 99024 PR POST-OP FOLLOW-UP VISIT: ICD-10-PCS | Mod: HCNC,S$GLB,, | Performed by: OPHTHALMOLOGY

## 2021-11-24 PROCEDURE — 99024 POSTOP FOLLOW-UP VISIT: CPT | Mod: HCNC,S$GLB,, | Performed by: OPHTHALMOLOGY

## 2021-11-24 PROCEDURE — 1157F ADVNC CARE PLAN IN RCRD: CPT | Mod: HCNC,CPTII,S$GLB, | Performed by: OTOLARYNGOLOGY

## 2021-11-24 PROCEDURE — 99999 PR PBB SHADOW E&M-EST. PATIENT-LVL III: CPT | Mod: PBBFAC,HCNC,, | Performed by: OTOLARYNGOLOGY

## 2021-11-24 PROCEDURE — 99999 PR PBB SHADOW E&M-EST. PATIENT-LVL III: CPT | Mod: PBBFAC,HCNC,, | Performed by: OPHTHALMOLOGY

## 2021-11-24 PROCEDURE — 1157F PR ADVANCE CARE PLAN OR EQUIV PRESENT IN MEDICAL RECORD: ICD-10-PCS | Mod: HCNC,CPTII,S$GLB, | Performed by: OPHTHALMOLOGY

## 2022-01-11 ENCOUNTER — LAB VISIT (OUTPATIENT)
Dept: LAB | Facility: HOSPITAL | Age: 87
End: 2022-01-11
Attending: FAMILY MEDICINE
Payer: MEDICARE

## 2022-01-11 DIAGNOSIS — I10 HTN (HYPERTENSION), BENIGN: ICD-10-CM

## 2022-01-11 LAB
ANION GAP SERPL CALC-SCNC: 6 MMOL/L (ref 8–16)
BASOPHILS # BLD AUTO: 0.04 K/UL (ref 0–0.2)
BASOPHILS NFR BLD: 0.9 % (ref 0–1.9)
BUN SERPL-MCNC: 21 MG/DL (ref 10–30)
CALCIUM SERPL-MCNC: 9.7 MG/DL (ref 8.7–10.5)
CHLORIDE SERPL-SCNC: 97 MMOL/L (ref 95–110)
CO2 SERPL-SCNC: 29 MMOL/L (ref 23–29)
CREAT SERPL-MCNC: 0.8 MG/DL (ref 0.5–1.4)
DIFFERENTIAL METHOD: ABNORMAL
EOSINOPHIL # BLD AUTO: 0.1 K/UL (ref 0–0.5)
EOSINOPHIL NFR BLD: 2.4 % (ref 0–8)
ERYTHROCYTE [DISTWIDTH] IN BLOOD BY AUTOMATED COUNT: 14.4 % (ref 11.5–14.5)
EST. GFR  (AFRICAN AMERICAN): >60 ML/MIN/1.73 M^2
EST. GFR  (NON AFRICAN AMERICAN): >60 ML/MIN/1.73 M^2
GLUCOSE SERPL-MCNC: 86 MG/DL (ref 70–110)
HCT VFR BLD AUTO: 35.5 % (ref 37–48.5)
HGB BLD-MCNC: 11.8 G/DL (ref 12–16)
IMM GRANULOCYTES # BLD AUTO: 0 K/UL (ref 0–0.04)
IMM GRANULOCYTES NFR BLD AUTO: 0 % (ref 0–0.5)
LYMPHOCYTES # BLD AUTO: 1.3 K/UL (ref 1–4.8)
LYMPHOCYTES NFR BLD: 31.1 % (ref 18–48)
MCH RBC QN AUTO: 32.3 PG (ref 27–31)
MCHC RBC AUTO-ENTMCNC: 33.2 G/DL (ref 32–36)
MCV RBC AUTO: 97 FL (ref 82–98)
MONOCYTES # BLD AUTO: 0.6 K/UL (ref 0.3–1)
MONOCYTES NFR BLD: 13 % (ref 4–15)
NEUTROPHILS # BLD AUTO: 2.2 K/UL (ref 1.8–7.7)
NEUTROPHILS NFR BLD: 52.6 % (ref 38–73)
NRBC BLD-RTO: 0 /100 WBC
PLATELET # BLD AUTO: 266 K/UL (ref 150–450)
PMV BLD AUTO: 10.6 FL (ref 9.2–12.9)
POTASSIUM SERPL-SCNC: 4.4 MMOL/L (ref 3.5–5.1)
RBC # BLD AUTO: 3.65 M/UL (ref 4–5.4)
SODIUM SERPL-SCNC: 132 MMOL/L (ref 136–145)
WBC # BLD AUTO: 4.24 K/UL (ref 3.9–12.7)

## 2022-01-11 PROCEDURE — 36415 COLL VENOUS BLD VENIPUNCTURE: CPT | Mod: HCNC,PO | Performed by: FAMILY MEDICINE

## 2022-01-11 PROCEDURE — 85025 COMPLETE CBC W/AUTO DIFF WBC: CPT | Mod: HCNC | Performed by: FAMILY MEDICINE

## 2022-01-11 PROCEDURE — 80048 BASIC METABOLIC PNL TOTAL CA: CPT | Mod: HCNC | Performed by: FAMILY MEDICINE

## 2022-01-14 ENCOUNTER — OFFICE VISIT (OUTPATIENT)
Dept: FAMILY MEDICINE | Facility: CLINIC | Age: 87
End: 2022-01-14
Payer: MEDICARE

## 2022-01-14 VITALS
TEMPERATURE: 98 F | BODY MASS INDEX: 23.56 KG/M2 | OXYGEN SATURATION: 97 % | DIASTOLIC BLOOD PRESSURE: 68 MMHG | WEIGHT: 116.88 LBS | HEIGHT: 59 IN | SYSTOLIC BLOOD PRESSURE: 126 MMHG | RESPIRATION RATE: 16 BRPM | HEART RATE: 70 BPM

## 2022-01-14 DIAGNOSIS — N18.31 STAGE 3A CHRONIC KIDNEY DISEASE: ICD-10-CM

## 2022-01-14 DIAGNOSIS — I73.9 PAD (PERIPHERAL ARTERY DISEASE): ICD-10-CM

## 2022-01-14 DIAGNOSIS — M77.8 TENDINITIS OF LEFT ELBOW: ICD-10-CM

## 2022-01-14 DIAGNOSIS — G25.0 ESSENTIAL TREMOR: ICD-10-CM

## 2022-01-14 DIAGNOSIS — I10 HTN (HYPERTENSION), BENIGN: Primary | ICD-10-CM

## 2022-01-14 PROCEDURE — 1160F RVW MEDS BY RX/DR IN RCRD: CPT | Mod: HCNC,CPTII,S$GLB, | Performed by: FAMILY MEDICINE

## 2022-01-14 PROCEDURE — 3288F FALL RISK ASSESSMENT DOCD: CPT | Mod: HCNC,CPTII,S$GLB, | Performed by: FAMILY MEDICINE

## 2022-01-14 PROCEDURE — 1157F ADVNC CARE PLAN IN RCRD: CPT | Mod: HCNC,CPTII,S$GLB, | Performed by: FAMILY MEDICINE

## 2022-01-14 PROCEDURE — 99499 RISK ADDL DX/OHS AUDIT: ICD-10-PCS | Mod: S$GLB,,, | Performed by: FAMILY MEDICINE

## 2022-01-14 PROCEDURE — 99499 UNLISTED E&M SERVICE: CPT | Mod: S$GLB,,, | Performed by: FAMILY MEDICINE

## 2022-01-14 PROCEDURE — 1126F AMNT PAIN NOTED NONE PRSNT: CPT | Mod: HCNC,CPTII,S$GLB, | Performed by: FAMILY MEDICINE

## 2022-01-14 PROCEDURE — 1157F PR ADVANCE CARE PLAN OR EQUIV PRESENT IN MEDICAL RECORD: ICD-10-PCS | Mod: HCNC,CPTII,S$GLB, | Performed by: FAMILY MEDICINE

## 2022-01-14 PROCEDURE — 1101F PR PT FALLS ASSESS DOC 0-1 FALLS W/OUT INJ PAST YR: ICD-10-PCS | Mod: HCNC,CPTII,S$GLB, | Performed by: FAMILY MEDICINE

## 2022-01-14 PROCEDURE — 3288F PR FALLS RISK ASSESSMENT DOCUMENTED: ICD-10-PCS | Mod: HCNC,CPTII,S$GLB, | Performed by: FAMILY MEDICINE

## 2022-01-14 PROCEDURE — 99999 PR PBB SHADOW E&M-EST. PATIENT-LVL V: CPT | Mod: PBBFAC,HCNC,, | Performed by: FAMILY MEDICINE

## 2022-01-14 PROCEDURE — 1126F PR PAIN SEVERITY QUANTIFIED, NO PAIN PRESENT: ICD-10-PCS | Mod: HCNC,CPTII,S$GLB, | Performed by: FAMILY MEDICINE

## 2022-01-14 PROCEDURE — 99214 OFFICE O/P EST MOD 30 MIN: CPT | Mod: HCNC,S$GLB,, | Performed by: FAMILY MEDICINE

## 2022-01-14 PROCEDURE — 1159F PR MEDICATION LIST DOCUMENTED IN MEDICAL RECORD: ICD-10-PCS | Mod: HCNC,CPTII,S$GLB, | Performed by: FAMILY MEDICINE

## 2022-01-14 PROCEDURE — 1101F PT FALLS ASSESS-DOCD LE1/YR: CPT | Mod: HCNC,CPTII,S$GLB, | Performed by: FAMILY MEDICINE

## 2022-01-14 PROCEDURE — 1160F PR REVIEW ALL MEDS BY PRESCRIBER/CLIN PHARMACIST DOCUMENTED: ICD-10-PCS | Mod: HCNC,CPTII,S$GLB, | Performed by: FAMILY MEDICINE

## 2022-01-14 PROCEDURE — 99999 PR PBB SHADOW E&M-EST. PATIENT-LVL V: ICD-10-PCS | Mod: PBBFAC,HCNC,, | Performed by: FAMILY MEDICINE

## 2022-01-14 PROCEDURE — 99214 PR OFFICE/OUTPT VISIT, EST, LEVL IV, 30-39 MIN: ICD-10-PCS | Mod: HCNC,S$GLB,, | Performed by: FAMILY MEDICINE

## 2022-01-14 PROCEDURE — 1159F MED LIST DOCD IN RCRD: CPT | Mod: HCNC,CPTII,S$GLB, | Performed by: FAMILY MEDICINE

## 2022-01-17 PROBLEM — N18.31 STAGE 3A CHRONIC KIDNEY DISEASE: Status: ACTIVE | Noted: 2022-01-17

## 2022-01-17 NOTE — PROGRESS NOTES
Subjective:       Patient ID: Brie Han is a 89 y.o. female.    Chief Complaint: Follow-up    SUBJECTIVE: Brie Han is a 89 y.o. female seen for a follow up visit; she has hypertension, hyperlipidemia, coronary artery disease and peripheral vascular disease.Patient denies any exertional chest pain, dyspnea, palpitations, syncope, orthopnea, edema or paroxysmal nocturnal dyspnea.  .  Current Outpatient Medications:  acetaminophen (TYLENOL) 325 MG tablet, Take 2 tablets (650 mg total) by mouth every 8 (eight) hours as needed., Disp: , Rfl: 0  b complex vitamins tablet, Take 1 tablet by mouth once daily., Disp: , Rfl:    brimonidine 0.2% (ALPHAGAN) 0.2 % Drop, Place 1 drop into the right eye 2 (two) times a day., Disp: 15 mL, Rfl: 4  brimonidine 0.2% (ALPHAGAN) 0.2 % Drop, Place 1 drop into the right eye 2 (two) times daily., Disp: 5 mL, Rfl: 0  calcium citrate-vitamin D3 315-200 mg (CITRACAL+D) 315 mg-5 mcg (200 unit) per tablet, Every day, Disp: , Rfl:   clopidogreL (PLAVIX) 75 mg tablet, Take 1 tablet (75 mg total) by mouth once daily., Disp: 90 tablet, Rfl: 4  clopidogreL (PLAVIX) 75 mg tablet, TAKE 1 TABLET EVERY DAY, Disp: 90 tablet, Rfl: 3  KRILL OIL ORAL, Take by mouth., Disp: , Rfl:   latanoprost 0.005 % ophthalmic solution, INSTILL 1 DROP INTO BOTH EYES EVERY EVENING., Disp: 2.5 mL, Rfl: 11  losartan (COZAAR) 100 MG tablet, Take 1 tablet (100 mg total) by mouth once daily., Disp: 90 tablet, Rfl: 3  lovastatin (MEVACOR) 10 MG tablet, TAKE 1 TABLET EVERY EVENING, Disp: 90 tablet, Rfl: 3  lovastatin (MEVACOR) 10 MG tablet, Take 1 tablet (10 mg total) by mouth every evening., Disp: 90 tablet, Rfl: 3  lovastatin (MEVACOR) 10 MG tablet, TAKE 1 TABLET (10 MG TOTAL) BY MOUTH EVERY EVENING., Disp: 90 tablet, Rfl: 1  multivitamin capsule, Every day, Disp: , Rfl:   NIFEdipine (PROCARDIA-XL) 30 MG (OSM) 24 hr tablet, TAKE 1 TABLET ONE TIME DAILY, Disp: 90 tablet, Rfl: 3  pantoprazole (PROTONIX) 20 MG  tablet, Take 1 tablet (20 mg total) by mouth once daily., Disp: 30 tablet, Rfl: 11  triamcinolone acetonide 0.1% (KENALOG) 0.1 % ointment, Use on AA BID x 14 days, Disp: 30 g, Rfl: 0    No current facility-administered medications for this visit.    Patient Active Problem List:     Benign essential HTN     PAD (peripheral artery disease)     CAD (coronary artery disease)     Hypercholesteremia     Refusal of blood transfusions as patient is Jew     Hyponatremia     DDD (degenerative disc disease), lumbosacral     Neurogenic pain of right foot     DDD (degenerative disc disease), cervical     Cervicalgia     Chronic left shoulder pain     Left shoulder pain     Stage 3a chronic kidney disease      Follow-up  This is a chronic problem. Associated symptoms include weakness. Pertinent negatives include no abdominal pain, anorexia, arthralgias, change in bowel habit, chest pain, chills, congestion, fatigue, fever, headaches, joint swelling, myalgias, nausea, neck pain, rash, sore throat, swollen glands or urinary symptoms. Nothing aggravates the symptoms. The treatment provided significant relief.     Review of Systems   Constitutional: Negative for chills, fatigue, fever and unexpected weight change.   HENT: Positive for hearing loss. Negative for congestion and sore throat.    Respiratory: Negative for chest tightness and shortness of breath.    Cardiovascular: Negative for chest pain.   Gastrointestinal: Negative for abdominal pain, anorexia, change in bowel habit and nausea.   Genitourinary: Negative for menstrual problem.   Musculoskeletal: Negative for arthralgias, joint swelling, myalgias and neck pain.   Skin: Negative for rash.   Allergic/Immunologic: Positive for food allergies.   Neurological: Positive for weakness. Negative for headaches.   Psychiatric/Behavioral: Negative for dysphoric mood.       Patient Active Problem List   Diagnosis    Benign essential HTN    PAD (peripheral artery  disease)    CAD (coronary artery disease)    Hypercholesteremia    Refusal of blood transfusions as patient is Zoroastrianism    Hyponatremia    DDD (degenerative disc disease), lumbosacral    Neurogenic pain of right foot    DDD (degenerative disc disease), cervical    Cervicalgia    Chronic left shoulder pain    Left shoulder pain    Stage 3a chronic kidney disease       Objective:      Physical Exam  Vitals reviewed.   Constitutional:       Appearance: She is well-developed. She is not diaphoretic.   HENT:      Head: Normocephalic and atraumatic.      Right Ear: External ear normal. Decreased hearing noted.      Left Ear: External ear normal. Decreased hearing noted.      Nose: Nose normal.      Mouth/Throat:      Pharynx: No oropharyngeal exudate.   Eyes:      General: No scleral icterus.        Right eye: No discharge.         Left eye: No discharge.      Conjunctiva/sclera: Conjunctivae normal.      Pupils: Pupils are equal, round, and reactive to light.   Neck:      Thyroid: No thyromegaly.      Vascular: No JVD.      Trachea: No tracheal deviation.   Cardiovascular:      Rate and Rhythm: Normal rate and regular rhythm.      Heart sounds: Murmur heard.    Systolic murmur is present with a grade of 2/6.  No friction rub. No gallop.    Pulmonary:      Effort: Pulmonary effort is normal. No respiratory distress.      Breath sounds: No stridor. No wheezing or rales.   Chest:      Chest wall: No tenderness.   Abdominal:      General: Bowel sounds are normal. There is no distension.      Palpations: Abdomen is soft. There is no mass.      Tenderness: There is no abdominal tenderness. There is no guarding or rebound.   Musculoskeletal:      Cervical back: Neck supple. Crepitus present. Pain with movement and spinous process tenderness present. Decreased range of motion.      Thoracic back: Deformity present. Decreased range of motion.      Lumbar back: Deformity present. Decreased range of motion.  Scoliosis present.   Lymphadenopathy:      Cervical: No cervical adenopathy.   Skin:     General: Skin is dry.      Coloration: Skin is not pale.      Findings: No erythema or rash.   Neurological:      Mental Status: She is alert and oriented to person, place, and time.      Cranial Nerves: No cranial nerve deficit.      Motor: No abnormal muscle tone.      Coordination: Coordination normal.      Deep Tendon Reflexes: Reflexes normal.   Psychiatric:         Behavior: Behavior normal.         Thought Content: Thought content normal.         Judgment: Judgment normal.         Lab Results   Component Value Date    WBC 4.24 01/11/2022    HGB 11.8 (L) 01/11/2022    HCT 35.5 (L) 01/11/2022     01/11/2022    CHOL 183 07/10/2021    TRIG 62 07/10/2021    HDL 71 07/10/2021    ALT 20 07/10/2021    AST 27 07/10/2021     (L) 01/11/2022    K 4.4 01/11/2022    CL 97 01/11/2022    CREATININE 0.8 01/11/2022    BUN 21 01/11/2022    CO2 29 01/11/2022    TSH 1.532 10/22/2017    INR 1.0 05/06/2018    HGBA1C 5.4 10/23/2017     The ASCVD Risk score (Diane DC Jr., et al., 2013) failed to calculate for the following reasons:    The 2013 ASCVD risk score is only valid for ages 40 to 79    Assessment:       1. HTN (hypertension), benign    2. Tendinitis of left elbow    3. Essential tremor    4. Stage 3a chronic kidney disease    5. PAD (peripheral artery disease)        Plan:       HTN (hypertension), benign  -     Basic Metabolic Panel; Future; Expected date: 01/14/2022  -     CBC Auto Differential; Future; Expected date: 01/14/2022    Tendinitis of left elbow    Essential tremor  -     Ambulatory referral/consult to Physical/Occupational Therapy; Future; Expected date: 01/21/2022  -     Ambulatory referral/consult to Physical/Occupational Therapy; Future; Expected date: 01/21/2022    Stage 3a chronic kidney disease    PAD (peripheral artery disease)      Patient readiness: acceptance and barriers:none    During the course of  the visit the patient was educated and counseled about the following:     Hypertension:   Check blood pressures 3 times weekly and record.  Follow up: 4 months and as needed.    Goals: Hypertension: Reduce Blood Pressure    Did patient meet goals/outcomes: Yes    The following self management tools provided: blood pressure log  excercise log    Patient Instructions (the written plan) was given to the patient/family.     Time spent with patient: 30 minutes    Barriers to medications present (yes )    Adverse reactions to current medications (yes)    Over the counter medications reviewed (Yes)        30-minute visit. 10 minutes spent counseling patient on diet, exercise, and weight loss.  This has been fully explained to the patient, who indicates understanding.    Discussed health maintenance guidelines appropriate for age.  Discussed health maintenance guidelines appropriate for age.

## 2022-01-24 ENCOUNTER — CLINICAL SUPPORT (OUTPATIENT)
Dept: REHABILITATION | Facility: HOSPITAL | Age: 87
End: 2022-01-24
Attending: FAMILY MEDICINE
Payer: MEDICARE

## 2022-01-24 DIAGNOSIS — G25.0 ESSENTIAL TREMOR: ICD-10-CM

## 2022-01-24 DIAGNOSIS — Z74.09 DECREASED FUNCTIONAL MOBILITY AND ENDURANCE: Primary | ICD-10-CM

## 2022-01-24 DIAGNOSIS — R26.89 IMPAIRMENT OF BALANCE: ICD-10-CM

## 2022-01-24 PROCEDURE — 97530 THERAPEUTIC ACTIVITIES: CPT | Mod: HCNC,PN

## 2022-01-24 PROCEDURE — 97165 OT EVAL LOW COMPLEX 30 MIN: CPT | Mod: HCNC,PN

## 2022-01-24 PROCEDURE — 97162 PT EVAL MOD COMPLEX 30 MIN: CPT | Mod: HCNC,PN

## 2022-01-24 NOTE — PROGRESS NOTES
Thank you for consult. Please refer to POC for complete note and goals.  Rivka Martinez PT, DPT, CLT  1/24/2022

## 2022-01-24 NOTE — PROGRESS NOTES
[facility] Occupational Therapy  Initial Neurological Evaluation     Date: 1/24/2022  Patient: Brie Han  Chart Number: [id number]    Therapy Diagnosis: Essential tremor, L medial epicondylitis  Physician: Colten Gould MD    Physician Orders: Eval and Treat  Medical Diagnosis: essential tremor  Evaluation Date: 1/24/2022  Plan of Care Expiration: 3/4/2022  Insurance Authorization period Expiration: 2/24/2022  Date of Return to MD: 7/14/2022  Visit # / Visits Authorized: 1 / 1    Time In:1123  Time Out: 1200  Total Billable (one on one) Time:37 minutes    Precautions: Standard and Fall    Subjective     History of Current Condition: Essential tremors which has gotten worse since she has aged. It is mostly left hand affecting writing and eating. She had injection 2 weeks ago in left medial elbow for tendonitis. She had TIA in the past.     Involved Side: bilateral with Left worse than right  Dominant Side: Left  Date of Onset: chronic after a fever when she was a little girl.   Surgical Procedure: none  Imaging: none   Previous Therapy: physical therapy for shoulders in the past. physical therapy evaluated today for balance.      Pain:  Pain Related Behaviors Observed: no   Functional Pain Scale Rating 0-10:   0/10 on average since injection. 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  Home Access: mobile home 4 step egress with rail  DME: none     Leisure: housekeeping, reading, and cooking. She goes to grocery store with grandson.     Driving: no longer driving due to glaucoma    Past Medical History/Physical Systems Review:   Brie Han  has a past medical history of  Anticoagulant long-term use, Arthritis, Glaucoma, Hyperlipidemia, Hypertension, and Stroke.    Brie Han  has a past surgical history that includes Hysterectomy;  section; Eye surgery; Tonsillectomy; Appendectomy ('s); Breast surgery; and Cataract extraction w/  intraocular lens implant (Bilateral).    Brie has a current medication list which includes the following prescription(s): acetaminophen, b complex vitamins, brimonidine 0.2%, brimonidine 0.2%, calcium citrate-vitamin d3 315-200 mg, clopidogrel, clopidogrel, krill oil, latanoprost, losartan, lovastatin, lovastatin, lovastatin, multivitamin, nifedipine, pantoprazole, and triamcinolone acetonide 0.1%.    Review of patient's allergies indicates:   Allergen Reactions    Contrast media Other (See Comments)    Aspirin Other (See Comments)    Ciprofloxacin Other (See Comments)    Iron Other (See Comments)    Iodine Rash    Penicillins Rash          Objective     Cognitive Exam: alert and oriented    Physical Exam:  Postural examination/scapula alignment: Rounded shoulders bilateral with mild kyphosis.   Joint integrity: Firm end feeling  Skin integrity: intact  Edema: none   Palpation: Tender to palpation at spine of scapula bilateral and medial L elbow.    A/P range of motion left upper extremity:  Shoulder flex: 135/145  Shoulder abd: 130/147  external rotation normal  internal rotation normal but poor scapular stability  Hadd normal  Elbow wrist and hand WNL    Fist: normal      Strength eval   **within available ROM** Left   Shoulder flex 4+/5   Shoulder abd 4+/5   Shoulder ER 4+/5   Shoulder IR 4/5   Shoulder Extension 4+/5   Shoulder Horizontal adduction 4+/5   Elbow flex 4+/5   Elbow ext 4+/5   Wrist flex 4+/5   Wrist ext 4+/5   Supination 4+/5   Pronation 4+/5      Strength: (NEELAM Dynamometer in lbs.) Average 3 trials, Position II:     Theresa Cardenas    Left Right   Rung II 38# 45#     Pinch Strength (Measured in psi)     Eval  Eval    Left Right   Key Pinch 11 psi 13 psi   3pt Pinch 11 psi 11.5 psi   2pt Pinch 10 psi 9.5 psi     Fine Motor Coordination: 9 Hole Peg Test  Left Eval Right Eval   31 28     Gross motor coordination:   - HELLEN: intact   - Finger to Nose: intact   - Finger Flicks: impaired slow    Tone:  Modified Triston Scale:   0 - No increase in muscle tone    Comments:     Sensation:  Brie  reports normal sensation    Balance:   Static Sit - GOOD+: Takes MAXIMAL challenges from all directions.    Dynamic sit- GOOD+: Takes MAXIMAL challenges from all directions.    Endurance Deficit: mild, she is very active                    Functional Status      Functional Mobility: I  ADL's:  Feeding: Independent  Grooming: Independent  Hygiene: Independent  UB Dressing: Independent  LB Dressing: Independent  Toileting: Independent  Bathing: Independent  IADL's:  Homecare: Modified Luce  Cooking: Modified Luce  Laundry: Modified Luce  Yard work: not applicable  Use of telephone: Independent  Money management: Independent  Medication management: Independent  Handwriting:handwriting is legible but slow and shaky  Technology Use:not applicable    Comments:      CMS Impairment/Limitation/Restriction for FOTO upper extremity QOL Survey    Therapist reviewed FOTO scores for Brie Han on 1/24/2022.   FOTO documents entered into [employer] - see Media section.             Treatment     Eval only. Reviewed Occupational Therapy role and purpose and goals.     Home Exercises and Patient Education Provided    Education provided:   -role of OT, goals for OT, scheduling/cancellations, insurance limitations with patient.  -Additional Education provided: remediation vs. adaptation    Written Home Exercises Provided: none.    Assessment     Brie Han is a 89 y.o. female referred to outpatient occupational therapy and presents with a medical diagnosis of essential tremor and medial epicondylitis, resulting in pain and  demonstrates limitations as described in the chart below. Following medical record review it is determined that pt will benefit from occupational therapy services in order to maximize pain free and/or functional use of left UE.    Pt prognosis is Good due to  Great premorbid condition.   Pt will benefit from skilled outpatient Occupational Therapy to address the deficits stated above and in the chart below, provide pt/family education, and to maximize pt's level of independence.     Plan of care discussed with patient: Yes  Pt's spiritual, cultural and educational needs considered and patient is agreeable to the plan of care and goals as stated below:     Anticipated Barriers for therapy: none    Medical Necessity is demonstrated by the following  Profile and History Assessment of Occupational Performance Level of Clinical Decision Making Complexity Score   Occupational Profile:   Brie Han is a 89 y.o. 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  She has difficulty with carrying things due to tremor and pain in left elbow.            Comorbidities:   advanced age    Medical and Therapy History Review:   Brief               Performance Deficits    Physical:  Joint Mobility  Control of Voluntary Movement   Strength  Pinch Strength  Gross Motor Coordination  Pain    Cognitive:  No Deficits    Psychosocial:    No Deficits     Clinical Decision Making:  low    Assessment Process:  Problem-Focused Assessments    Modification/Need for Assistance:  Not Necessary    Intervention Selection:  Limited Treatment Options       low  Based on PMHX, co morbidities , data from assessments and functional level of assistance required with task and clinical presentation directly impacting function.       The following goals were discussed with the patient and patient is in agreement with them as to be addressed in the treatment plan.       Long Term Goals:  Time frame: 5 weeks/ 10 visits  I in Home exercise  program for tremor control and medial epicondylitis pain management.  Aware of adaptive techniques to help complete ADL and IADL safely.  Complete grocery list legibly.   Short Term Goals:  Time frame: 2 weeks/ 5 visits  Demonstrate tremor control techniques daily.  I with Home exercise program .      Plan   Certification Period/Plan of care expiration: 1/24/2022 to 3/4/2022.    Outpatient Occupational Therapy 2 times weekly for 5 weeks to include the following interventions: Patient Education, Self Care, Therapeutic Activities and Therapeutic Exercise.    Martha Dangelo OT      I certify the need for these services furnished under this plan of treatment and while under my care.  ____________________________________ Physician/Referring Practitioner   Date of Signature

## 2022-01-24 NOTE — PLAN OF CARE
"[facility]  Physical Therapy Initial Evaluation    Date: 2022   Name: Brie Han  Clinic Number: [id number]    Therapy Diagnosis:   Encounter Diagnosis   Name Primary?    Essential tremor      Physician: Colten Gould MD    Physician Orders: PT Eval and Treat  Medical Diagnosis from Referral: G25.0 (ICD-10-CM) - Essential tremor  Evaluation Date: 2022  Authorization Period Expiration: 2022 - 2022  Plan of Care Expiration: 2022  Visit # / Visits authorized:     Time In: 930  Time Out: 1015  Total Appointment Time (timed & untimed codes): 45 minutes    Precautions: Standard and Fall    Subjective   Date of onset: past few years  History of current condition - Brie reports: having "pinching" pain all over body, especially left arm and legs when trying to sleep. Patient's grandson present during evaluation, states she has a tremor in her left hand, hand writting has worsened and drops things. Patient's grandson would like her to receive some strengthening exercises to keep her independent.  Patient also has glaucoma and is losing eye sight.      Medical History:   Past Medical History:   Diagnosis Date    Anticoagulant long-term use     Arthritis     Glaucoma     resolved    Hyperlipidemia     Hypertension     Stroke 2006       Surgical History:   Brie Han  has a past surgical history that includes Hysterectomy;  section; Eye surgery; Tonsillectomy; Appendectomy ('s); Breast surgery; and Cataract extraction w/  intraocular lens implant (Bilateral).    Medications:   Brie has a current medication list which includes the following prescription(s): acetaminophen, b complex vitamins, brimonidine 0.2%, brimonidine 0.2%, calcium citrate-vitamin d3 315-200 mg, clopidogrel, clopidogrel, krill oil, latanoprost, losartan, lovastatin, lovastatin, lovastatin, multivitamin, nifedipine, pantoprazole, and triamcinolone acetonide " "0.1%.    Allergies:   Review of patient's allergies indicates:   Allergen Reactions    Contrast media Other (See Comments)    Aspirin Other (See Comments)    Ciprofloxacin Other (See Comments)    Iron Other (See Comments)    Iodine Rash    Penicillins Rash        Imaging, no recent imaging:    Social History:  Residence: lives alone trailer/mobile home 4STE, railng  Support available: family - grandson ( lives in [facility], here 2-3x/wk)   Equipment Used: grab bar in shower  Equipment Owned (not using): None  Prior level of function: independent  Current level of functions: independent, needs help grocery, doctor visits.  Hand Dominance: left.   Work: Retired.   Drive: no longer.  Sleep schedule: sleeps wells  Appetite: normal  BMI: 23.60kg/m2  Managing Medicines/Managing Home: yes.   Hobbies: prepare for meetings as [employer].    Pain:  Current 0/10, worst 10/10, best 0/10   Location: all over body uncomfortable  Description: pinching  Aggravating Factors: lying down for bed  Easing Factors: when active, its okay. No pain medicine    Patients goals: "relief in my left arm."    Objective     Vitals:   Blood Pressure:176/72mmHg (BP meds in morning)  Heart Rate: 67bpm  O2: 99%    Cognition:   · Alert  · AxOx4  · Command following: Follows multistep verbal commands  · Fluency: clear/fluent  Hearing: Intact  Vision:  wears glasses ; glaucoma, losing vision in right eye.    Posture:   Sitting: rounded shoulders, kyphotic posture  Standing: dowager's hump present    Functional Mobility:  Bed mobility: independence  Sit to stand:  independence  Stand pivot:  independence     Left LE Right LE   Edema absent absent   ROM AROM WFL AROM WFL   Strength 4/5 4/5   Sensation intact to light touch intact to light touch   Coordination normal normal   *Left upper extremity coordination slower but intact    Gait Assessment:   · Patient ambulated: 120ft   · Patient required: supervision  · Patient used: no assistive " "device  · Gait Pattern observed: reciprocal gait  · Gait Speed: 0.98m/s  · 0.80m/s - 1.25 Community Ambulator  · Normal gait speed for average healthy adult: 1.2m/s-1.4m/s  · Gait Deviation(s): occasional unsteady gait, narrow base of support, shuffle gait and flexed posture  · Impairments due to: impaired balance and decreased strength  - Stairs: Supervision. patient negotiated steps with reciprocal gait pattern using hand rail.    Outcome Measures:  Five Times Sit to Stand Test:  How long the patient takes to completed 5 sit to stand form chair with arms folded across chest: 20 seconds  "Inability to perform test in less than 13.6 seconds indicates increased disability and Morbidity." (Eron 2000).  Normative values for community dwelling elderly:   · 60-68y/o: 11.4 seconds  · 70-80y/o: 12.6 seconds  · 80-90y/o: 14.8 seconds    Dynamic Gait Index (DGI):    1. Gait level surface (3) Normal: Walks 20ft, no assistive devices, good speed, no evidence for imbalance, normal gait pattern.    2. Change in gait speed: (2) Mild Impairment: Is able to change speed but demonstrates mild gait deviations, or not gaitdeviations but unable to achieve a significant change in velocity, or uses an assistive device..    3. Gait with horizontal head turns:(2) Mild Impairment: Performs head turns smoothly with slight change in gait velocity, i.e., minordisruption to smooth gait path or uses walking aid..    4. Gait with vertical head turns (2) Mild Impairment: Performs head turns smoothly with slight change in gait velocity, i.e., minordisruption to smooth gait path or uses walking aid..    5. Gait and pivot turn:(2) Mild Impairment: Pivot turns safely in > 3 seconds and stops with no loss of balance..    6. Step over obstacle:(2) Mild Impairment: Is able to step over box, but must slow down and adjust steps to clear box safely..    7. Step around obstacles:(2) Mild Impairment: Is able to step around both cones, but must slow down " and adjust steps to clearCones..    8. Steps:(2) Mild Impairment: Alternating feet, must use rail..    TOTAL SCORE: 17  / 24    < 19/24 = predictive of falls in the elderly  > 22/24 = safe ambulators     Activities-specific Balance Confidence Scale:   Score: 62.5%--moderate level of physical functioning    TREATMENT   Total Treatment time (time-based codes) separate from Evaluation: 10 minutes    Brie participated in dynamic functional therapeutic activities to improve functional performance for 10  minutes, including:  The following education  Home Exercises and Patient Education Provided    Education provided:   - deficits noted including decreased strength and balance as well as poor posture.  - Patient and Family member Verbalized Understanding.  - Time provided for therapeutic counseling and discussion of current health disposition. All questions answered to satisfaction, within scope of PT practice; voiced no other concerns.     Written Home Exercises Provided: no.  Exercises were reviewed and Brie was able to demonstrate them prior to the end of the session.  Brie demonstrated good  understanding of the education provided.     Assessment   Brie is a 89 y.o. female referred to outpatient Physical Therapy with a medical diagnosis of essential tremor. Patient presents with decreased balance and strength. Patient with very independent lifestyle, would benefit from conditioning program to continue safe mobility at home and in the community.     Patient prognosis is Good.   Patientt will benefit from skilled outpatient Physical Therapy to address the deficits stated above and in the chart below, provide patient /family education, and to maximize patientt's level of independence.     Plan of care discussed with patient: Yes  Patient's spiritual, cultural and educational needs considered and patient is agreeable to the plan of care and goals as stated below:     Anticipated Barriers for therapy:  none    Medical Necessity is demonstrated by the following  History  Co-morbidities and personal factors that may impact the plan of care Co-morbidities:   history of CVA, HTN and glaucoma    Personal Factors:   no deficits     moderate   Examination  Body Structures and Functions, activity limitations and participation restrictions that may impact the plan of care Body Regions:   lower extremities  upper extremities  trunk    Body Systems:    ROM  strength  balance  blood pressure    Participation Restrictions:   none    Activity limitations:   Learning and applying knowledge  no deficits    General Tasks and Commands  no deficits    Communication  no deficits    Mobility  no deficits    Self care  no deficits    Domestic Life  no deficits    Interactions/Relationships  no deficits    Life Areas  no deficits    Community and Social Life  no deficits         moderate   Clinical Presentation evolving clinical presentation with changing clinical characteristics moderate   Decision Making/ Complexity Score: moderate     Goals:  Short Term Goals: 5 weeks   1. Patient to perform dynamic standing for 20 minutes with [place] using No Assistive Device to prepare for functional tasks in standing. NOT MET  2. Patient to perform stair navigation with right Handrails Modified [place] using No Assistive Device. NOT MET  3. Patient to increase DGI score to normative value to show reduction in fall risk. NOT MET    Long Term Goals: 10 weeks   1. Patient to increase strength of BLE  to 5/5. NOT MET  2. Patient to report decrease in pain by 4 points to increase quality of life. NOT MET  3. Patient to increase gait speed to normative values to increase community ambulation and quality of life. NOT MET    Plan   Plan of care Certification: 1/24/2022 to 4/30/2022.    Outpatient Physical Therapy 2 times weekly for 10 weeks to include the following interventions: Gait Training, Manual Therapy, Neuromuscular Re-ed, Patient  Education, Therapeutic Activities and Therapeutic Exercise.     Rivka Martinez PT, DPT, CLT  1/24/2022

## 2022-01-25 NOTE — PLAN OF CARE
[facility] Occupational Therapy  Initial Neurological Evaluation     Date: 1/24/2022  Patient: Brie Han  Chart Number: [id number]    Therapy Diagnosis: Essential tremor, L medial epicondylitis  Physician: Colten Gould MD    Physician Orders: Eval and Treat  Medical Diagnosis: essential tremor  Evaluation Date: 1/24/2022  Plan of Care Expiration: 3/4/2022  Insurance Authorization period Expiration: 2/24/2022  Date of Return to MD: 7/14/2022  Visit # / Visits Authorized: 1 / 1    Time In:1123  Time Out: 1200  Total Billable (one on one) Time:37 minutes    Precautions: Standard and Fall    Subjective     History of Current Condition: Essential tremors which has gotten worse since she has aged. It is mostly left hand affecting writing and eating. She had injection 2 weeks ago in left medial elbow for tendonitis. She had TIA in the past.     Involved Side: bilateral with Left worse than right  Dominant Side: Left  Date of Onset: chronic after a fever when she was a little girl.   Surgical Procedure: none  Imaging: none   Previous Therapy: physical therapy for shoulders in the past. physical therapy evaluated today for balance.      Pain:  Pain Related Behaviors Observed: no   Functional Pain Scale Rating 0-10:   0/10 on average since injection. 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  Home Access: mobile home 4 step egress with rail  DME: none     Leisure: housekeeping, reading, and cooking. She goes to grocery store with grandson.     Driving: no longer driving due to glaucoma    Past Medical History/Physical Systems Review:   Brie Han  has a past medical history of  Anticoagulant long-term use, Arthritis, Glaucoma, Hyperlipidemia, Hypertension, and Stroke.    Brie Han  has a past surgical history that includes Hysterectomy;  section; Eye surgery; Tonsillectomy; Appendectomy ('s); Breast surgery; and Cataract extraction w/  intraocular lens implant (Bilateral).    Brie has a current medication list which includes the following prescription(s): acetaminophen, b complex vitamins, brimonidine 0.2%, brimonidine 0.2%, calcium citrate-vitamin d3 315-200 mg, clopidogrel, clopidogrel, krill oil, latanoprost, losartan, lovastatin, lovastatin, lovastatin, multivitamin, nifedipine, pantoprazole, and triamcinolone acetonide 0.1%.    Review of patient's allergies indicates:   Allergen Reactions    Contrast media Other (See Comments)    Aspirin Other (See Comments)    Ciprofloxacin Other (See Comments)    Iron Other (See Comments)    Iodine Rash    Penicillins Rash          Objective     Cognitive Exam: alert and oriented    Physical Exam:  Postural examination/scapula alignment: Rounded shoulders bilateral with mild kyphosis.   Joint integrity: Firm end feeling  Skin integrity: intact  Edema: none   Palpation: Tender to palpation at spine of scapula bilateral and medial L elbow.    A/P range of motion left upper extremity:  Shoulder flex: 135/145  Shoulder abd: 130/147  external rotation normal  internal rotation normal but poor scapular stability  Hadd normal  Elbow wrist and hand WNL    Fist: normal      Strength eval   **within available ROM** Left   Shoulder flex 4+/5   Shoulder abd 4+/5   Shoulder ER 4+/5   Shoulder IR 4/5   Shoulder Extension 4+/5   Shoulder Horizontal adduction 4+/5   Elbow flex 4+/5   Elbow ext 4+/5   Wrist flex 4+/5   Wrist ext 4+/5   Supination 4+/5   Pronation 4+/5      Strength: (NEELAM Dynamometer in lbs.) Average 3 trials, Position II:     Theresa Cardenas    Left Right   Rung II 38# 45#     Pinch Strength (Measured in psi)     Eval  Eval    Left Right   Key Pinch 11 psi 13 psi   3pt Pinch 11 psi 11.5 psi   2pt Pinch 10 psi 9.5 psi     Fine Motor Coordination: 9 Hole Peg Test  Left Eval Right Eval   31 28     Gross motor coordination:   - HELLEN: intact   - Finger to Nose: intact   - Finger Flicks: impaired slow    Tone:  Modified [place] Scale:   0 - No increase in muscle tone    Comments:     Sensation:  Brie  reports normal sensation    Balance:   Static Sit - GOOD+: Takes MAXIMAL challenges from all directions.    Dynamic sit- GOOD+: Takes MAXIMAL challenges from all directions.    Endurance Deficit: mild, she is very active                    Functional Status      Functional Mobility: I  ADL's:  Feeding: Independent  Grooming: Independent  Hygiene: Independent  UB Dressing: Independent  LB Dressing: Independent  Toileting: Independent  Bathing: Independent  IADL's:  Homecare: Modified [place]  Cooking: Modified [place]  Laundry: [place]  Yard work: not applicable  Use of telephone: Independent  Money management: Independent  Medication management: Independent  Handwriting:handwriting is legible but slow and shaky  Technology Use:not applicable    Comments:      CMS Impairment/Limitation/Restriction for FOTO upper extremity QOL Survey    Therapist reviewed FOTO scores for Brie Han on 1/24/2022.   FOTO documents entered into [employer] - see Media section.             Treatment     Eval only. Reviewed Occupational Therapy role and purpose and goals.     Home Exercises and Patient Education Provided    Education provided:   -role of OT, goals for OT, scheduling/cancellations, insurance limitations with patient.  -Additional Education provided: remediation vs. adaptation    Written Home Exercises Provided: none.    Assessment     Brie Han is a 89 y.o. female referred to outpatient occupational therapy and presents with a medical diagnosis of essential tremor and medial epicondylitis, resulting in pain and  demonstrates limitations as described in the chart below. Following medical record review it is determined that pt will benefit from occupational therapy services in order to maximize pain free and/or functional use of left UE.    Pt prognosis is Good due to  Great premorbid condition.   Pt will benefit from skilled outpatient Occupational Therapy to address the deficits stated above and in the chart below, provide pt/family education, and to maximize pt's level of independence.     Plan of care discussed with patient: Yes  Pt's spiritual, cultural and educational needs considered and patient is agreeable to the plan of care and goals as stated below:     Anticipated Barriers for therapy: none    Medical Necessity is demonstrated by the following  Profile and History Assessment of Occupational Performance Level of Clinical Decision Making Complexity Score   Occupational Profile:   Brie Han is a 89 y.o. 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  She has difficulty with carrying things due to tremor and pain in left elbow.     Comorbidities:   advanced age    Medical and Therapy History Review:   Brief               Performance Deficits    Physical:  Joint Mobility  Control of Voluntary Movement   Strength  Pinch Strength  Gross Motor Coordination  Pain    Cognitive:  No Deficits    Psychosocial:    No Deficits     Clinical Decision Making:  low    Assessment Process:  Problem-Focused Assessments    Modification/Need for Assistance:  Not Necessary    Intervention Selection:  Limited Treatment Options       low  Based on PMHX, co morbidities , data from assessments and functional level of assistance required with task and clinical presentation directly impacting function.       The following goals were discussed with the patient and patient is in agreement with them as to be addressed in the treatment plan.       Long Term Goals:  Time frame: 5 weeks/ 10 visits  I in Home exercise program for  tremor control and medial epicondylitis pain management.  Aware of adaptive techniques to help complete ADL and IADL safely.  Complete grocery list legibly.   Short Term Goals:  Time frame: 2 weeks/ 5 visits  Demonstrate tremor control techniques daily.  I with Home exercise program .      Plan   Certification Period/Plan of care expiration: 1/24/2022 to 3/4/2022.    Outpatient Occupational Therapy 2 times weekly for 5 weeks to include the following interventions: Patient Education, Self Care, Therapeutic Activities and Therapeutic Exercise.    Martha Dangelo OT      I certify the need for these services furnished under this plan of treatment and while under my care.  ____________________________________ Physician/Referring Practitioner   Date of Signature

## 2022-01-27 NOTE — PROGRESS NOTES
"[facility]  Occupational Therapy Treatment Note    Date: 1/28/2022  Name: Brie Han  Clinic Number: [id number]    Therapy Diagnosis:   Encounter Diagnoses   Name Primary?    Tremor     Impaired function of upper extremity     Left arm pain      Physician: Colten Gould MD     Physician Orders: Eval and Treat  Medical Diagnosis: essential tremor  Evaluation Date: 1/24/2022  Plan of Care Expiration: 3/4/2022  Progress Note Due: 2/4/2022   Insurance Authorization period Expiration: 2/24/2022  Date of Return to MD: 7/14/2022  Visit # / Visits Authorized: 1 / 20  Total Visits: 2    FOTO: 56/100 on 1/24/2022 (higher score=higher function)     Precautions:  Standard and Fall    Time In: 0805  Time Out: 0845  Total Billable Time: 40 minutes    SUBJECTIVE     Pt reports: she is concerned about her handwriting and how much effort it takes to write. She feels like she has to  the pen really hard and push down into the paper really hard.     Related to the medial epicondylitis, she notes the cortisone shot did help. Also, she has been using "2 goats" ointment, which she finds beneficial.    She was compliant with home exercise program given last session.   Response to previous treatment: no concerns  Functional change:  no significant change compared to previous session per pt report     Pain: 0/10 at rest, but up to 6/10 during handwriting.   Location: left forearm and up in to shoulder     OBJECTIVE     Objective Measures updated at progress report unless specified.    Treatment     Brie received the treatments listed below:     Manual therapy techniques for 2 minutes, including:  Circular massage to medial epicondyle in both directions.    Therapeutic exercises to develop ROM and flexibility for 6 minutes, including:  Gentle stretching to wrist and finger flexors to decrease irritation at medial epicondyle.    Therapeutic activities to improve functional performance for 32  " "minutes, including:    Handwriting: "Today is Friday. It is cloudy outside." Print name. Sign name. She only completed this in entirety for the standard pen. See Media.    When writing with all pens/ , thumb cmc with palmar drift noted.   Standard pen: Not timed, but required significantly increased time and effort. 100% legible.   : ~70s for the 1st sentence only, 100% legible  Pen with red foam : ~80s for the 1st sentence, 100% legible  The Pencil  Pinch : ~70s for the 1st sentence, 100% legible with slight decrease in effort reported  The Pencil  Crossover : ~80s for the 1st sentence, 100% legible (but with decreased quality compared to other writing samples) and with significant increase in effort reported.    Pt noted pain 6/10 in forearm during writing with standard pen.    Patient Education and Home Exercises      Education provided:   - Benefit of using a built-up pen. Benefit of the "Pinch " as the way her fingers are supported facilitates her ability to push down into the paper.   - how to order additional pencil   - Progress towards goals     Written Home Exercises Provided: Patient instructed to cont prior HEP (and work on handwriting with pencil ).  Exercises were reviewed and Brie was able to demonstrate them prior to the end of the session.  Brie demonstrated good  understanding of the HEP provided. See EMR under Patient Instructions for exercises provided during therapy sessions.       Assessment     Pt would continue to benefit from skilled OT. Although the quality of her handwriting did not change appreciably with any of the above writing utensils (except with the crossover ), the effort necessary for writing decreased with the Pinch . OT issued one for home use and ed pt/ grandson on how to order via [employer] or [employer]. Will continue to address proximal stabilization and pain related to medial epicondylitis, but it is anticipated that use " of the Pinch  will decrease the amount of effort pt has to put into writing, and thereby, hopefully decrease her medial elbow pain.      Brie is progressing slowly towards her goals and there are no updates to goals at this time. Pt prognosis is Good.     Pt will continue to benefit from skilled outpatient occupational therapy to address the deficits listed in the problem list on initial evaluation provide pt/family education and to maximize pt's level of independence in the home and community environment.     Pt's spiritual, cultural and educational needs considered and pt agreeable to plan of care and goals.    Anticipated barriers to occupational therapy: none    Goals:  Long Term Goals:  Time frame: 5 weeks/ 10 visits  I in Home exercise program for tremor control and medial epicondylitis pain management. (progressing 1/28/2022)   Aware of adaptive techniques to help complete ADL and IADL safely.  Complete grocery list legibly. (progressing 1/28/2022)   Short Term Goals:  Time frame: 2 weeks/ 5 visits  Demonstrate tremor control techniques daily. (progressing 1/28/2022)   I with Home exercise program . (progressing 1/28/2022)     PLAN     Certification Period/Plan of care expiration: 1/24/2022 to 3/4/2022.     Outpatient Occupational Therapy 2 times weekly for 5 weeks to include the following interventions: Patient Education, Self Care, Therapeutic Activities and Therapeutic Exercise.    Updates/Grading for next session: review benefit of pen ; HEP for management of medial epicondylitis    Daya Grant OT

## 2022-01-28 ENCOUNTER — CLINICAL SUPPORT (OUTPATIENT)
Dept: REHABILITATION | Facility: HOSPITAL | Age: 87
End: 2022-01-28
Payer: MEDICARE

## 2022-01-28 DIAGNOSIS — Z74.09 DECREASED FUNCTIONAL MOBILITY AND ENDURANCE: ICD-10-CM

## 2022-01-28 DIAGNOSIS — R25.1 TREMOR: ICD-10-CM

## 2022-01-28 DIAGNOSIS — R26.89 IMPAIRMENT OF BALANCE: ICD-10-CM

## 2022-01-28 DIAGNOSIS — R68.89 IMPAIRED FUNCTION OF UPPER EXTREMITY: ICD-10-CM

## 2022-01-28 DIAGNOSIS — M79.602 LEFT ARM PAIN: ICD-10-CM

## 2022-01-28 PROCEDURE — 97110 THERAPEUTIC EXERCISES: CPT | Mod: HCNC,PN,CQ

## 2022-01-28 PROCEDURE — 97530 THERAPEUTIC ACTIVITIES: CPT | Mod: HCNC,PN

## 2022-01-28 PROCEDURE — 97112 NEUROMUSCULAR REEDUCATION: CPT | Mod: HCNC,PN,CQ

## 2022-01-28 PROCEDURE — 97110 THERAPEUTIC EXERCISES: CPT | Mod: HCNC,PN

## 2022-01-28 NOTE — PROGRESS NOTES
[facility]   Physical Therapy Treatment Note     Name: Brie Han  Clinic Number: [id number]    Therapy Diagnosis:   Encounter Diagnoses   Name Primary?    Decreased functional mobility and endurance     Impairment of balance      Physician: Colten Gould MD    Visit Date: 1/28/2022    Physician Orders: PT Eval and Treat  Medical Diagnosis from Referral: G25.0 (ICD-10-CM) - Essential tremor  Evaluation Date: 1/24/2022  Authorization Period Expiration: 1/24/2022 - 2/24/2022  Plan of Care Expiration: 4/30/2022  Visit # / Visits authorized: 1/20  FOTO visit #: 1/5  FOTO score: 70.6 (1/28/2022)    PTA Visit #: 1/5     Time In: 0845  Time Out: 0930  Total Billable Time: 45 minutes    Precautions: Standard and Fall       SUBJECTIVE     Pt reports: Doing well, without complaints.  She had not received  home exercise program.  Response to previous treatment: no problems stated  Functional change: ongoing    Pain: 0/10  Location:  Not Applicable      OBJECTIVE     Objective Measures updated at progress report unless specified.     Treatment     Brie received the treatments listed below:      therapeutic exercises to develop strength, endurance, range of motion , flexibility, posture and core stabilization for 30 minutes including:    NuStep Level 2 10 minutes with Bilateral Upper Extremities     Sit: Hamstring stretch with Dorsiflexion, foot on stool 30 times Right Left     Supine: Ball squeeze 3 sets of 10     Straight leg raise 2 sets of 10 Right Left     neuromuscular re-education activities to improve: Balance, Proprioception and Posture for 15 minutes. The following activities were included:    Parallel bars: Bilateral Heel Raises / Toe Raises 20 times    March in place 20 times Right Left alternating    Hip abduction with toe touch 20 times Right Left alternating    Hamstring curls 20 times Right Left alternating    Patient Education and Home Exercises     Home Exercises Provided  and Patient Education Provided     Education provided:   - Patient provided with verbal and demonstrative instruction for all activities performed in today's session.  - Decrease speed with all activities    Written Home Exercises Provided: yes. Exercises were reviewed and Brie was able to demonstrate them prior to the end of the session.  Brie demonstrated fair  understanding of the education provided. See EMR under Patient Instructions for exercises provided during therapy sessions    ASSESSMENT     Brie provided good participation and effort during today's session without complaints.  Treatment focused on lower extremity strengthening, core stabilization and balance with patient needing cues for counting. Brie did well with all activities and needed monitoring for over doing each exercise.    Brie Is progressing well towards her goals.   Pt prognosis is Good.     Pt will continue to benefit from skilled outpatient physical therapy to address the deficits listed in the problem list box on initial evaluation, provide pt/family education and to maximize pt's level of independence in the home and community environment.     Pt's spiritual, cultural and educational needs considered and pt agreeable to plan of care and goals.     Anticipated barriers to physical therapy: none    Goals:     Short Term Goals: 5 weeks   1. Patient to perform dynamic standing for 20 minutes with [place] using No Assistive Device to prepare for functional tasks in standing. (ongoing)  2. Patient to perform stair navigation with right Handrails Modified [place] using No Assistive Device. (ongoing)  3. Patient to increase DGI score to normative value to show reduction in fall risk. (ongoing)     Long Term Goals: 10 weeks   1. Patient to increase strength of BLE  to 5/5. (ongoing)  2. Patient to report decrease in pain by 4 points to increase quality of life. (ongoing)  3. Patient to increase gait speed to  normative values to increase community ambulation and quality of life. (ongoing)      PLAN     Outpatient Physical Therapy 2 times weekly for 10 weeks to include the following interventions: Gait Training, Manual Therapy, Neuromuscular Re-ed, Patient Education, Therapeutic Activities and Therapeutic Exercise.       Deann Yuen, PTA

## 2022-02-01 ENCOUNTER — CLINICAL SUPPORT (OUTPATIENT)
Dept: REHABILITATION | Facility: HOSPITAL | Age: 87
End: 2022-02-01
Payer: MEDICARE

## 2022-02-01 DIAGNOSIS — Z74.09 DECREASED FUNCTIONAL MOBILITY AND ENDURANCE: ICD-10-CM

## 2022-02-01 DIAGNOSIS — R26.89 IMPAIRMENT OF BALANCE: ICD-10-CM

## 2022-02-01 PROCEDURE — 97110 THERAPEUTIC EXERCISES: CPT | Mod: HCNC,PN

## 2022-02-01 NOTE — PROGRESS NOTES
"[facility]   Physical Therapy Treatment Note     Name: Brie Han  Clinic Number: [id number]    Therapy Diagnosis:   Encounter Diagnoses   Name Primary?    Decreased functional mobility and endurance     Impairment of balance      Physician: Colten Gould MD    Visit Date: 2/1/2022    Physician Orders: PT Eval and Treat  Medical Diagnosis from Referral: G25.0 (ICD-10-CM) - Essential tremor  Evaluation Date: 1/24/2022  Authorization Period Expiration: 1/24/2022 - 2/24/2022  Plan of Care Expiration: 4/30/2022  Visit # / Visits authorized: 1/20  FOTO visit #: 1/5  FOTO score: 70.6 (1/28/2022)     PTA Visit #: 1/5     Time In: 1135  Time Out: 1220  Total Billable Time: 45 minutes    SUBJECTIVE     Pt reports: feeling okay today, no questions after last session  She was compliant with home exercise program.  Response to previous treatment: n/a  Functional change: n/a    Pain: 0/10  Location: none    OBJECTIVE     Objective Measures updated at progress report unless specified.     Treatment     Brie received the treatments listed below:      therapeutic exercises to develop strength, endurance, ROM, flexibility, posture and core stabilization for 45 minutes including:    -Nustep, BUE/BLE x8' level 2    Seated: hamstring stretch, foot on stool 3x30 bilaterally. Tactile and verbal cues for posture   LAQs with #2lbs   Marching with #2lbs     Standing   Heel raises on blue foam; 2x10   Tandem walking x 3 laps   Backward walking x3 laps   gastroc stretch on half roll 3x30" bilaterall   scap retractions with red theraband 2x10   Shoulder extensions with red theraband 2x10    Patient Education and Home Exercises     Home Exercises Provided and Patient Education Provided     Education provided:   - proper posture and form for all exercises    Written Home Exercises Provided: Patient instructed to cont prior HEP. Exercises were reviewed and Brie was able to demonstrate them prior to the " end of the session.  Brie demonstrated good  understanding of the education provided. See EMR under Patient Instructions for exercises provided during therapy sessions    ASSESSMENT     Patient tolerated stretching and strengthening activities well with no complaints of pain. Patient with kyphotic posture, able to correct with tactile cues. Patient with good endurance with all activities as well.     Brie Is progressing well towards her goals.   Pt prognosis is Good.     Pt will continue to benefit from skilled outpatient physical therapy to address the deficits listed in the problem list box on initial evaluation, provide pt/family education and to maximize pt's level of independence in the home and community environment.     Pt's spiritual, cultural and educational needs considered and pt agreeable to plan of care and goals.     Anticipated barriers to physical therapy: none    Goals:   Short Term Goals: 5 weeks   1. Patient to perform dynamic standing for 20 minutes with [place] using No Assistive Device to prepare for functional tasks in standing. (ongoing)  2. Patient to perform stair navigation with right Handrails Modified [place] using No Assistive Device. (ongoing)  3. Patient to increase DGI score to normative value to show reduction in fall risk. (ongoing)     Long Term Goals: 10 weeks   1. Patient to increase strength of BLE  to 5/5. (ongoing)  2. Patient to report decrease in pain by 4 points to increase quality of life. (ongoing)  3. Patient to increase gait speed to normative values to increase community ambulation and quality of life. (ongoing)    PLAN     Outpatient Physical Therapy 2 times weekly for 10 weeks to include the following interventions: Gait Training, Manual Therapy, Neuromuscular Re-ed, Patient Education, Therapeutic Activities and Therapeutic Exercise.     Rivka Martinez PT, DPT, CLT  2/1/2022

## 2022-02-03 ENCOUNTER — CLINICAL SUPPORT (OUTPATIENT)
Dept: REHABILITATION | Facility: HOSPITAL | Age: 87
End: 2022-02-03
Payer: MEDICARE

## 2022-02-03 DIAGNOSIS — R26.89 IMPAIRMENT OF BALANCE: ICD-10-CM

## 2022-02-03 DIAGNOSIS — Z74.09 DECREASED FUNCTIONAL MOBILITY AND ENDURANCE: Primary | ICD-10-CM

## 2022-02-03 PROCEDURE — 97530 THERAPEUTIC ACTIVITIES: CPT | Mod: HCNC,PN

## 2022-02-03 PROCEDURE — 97110 THERAPEUTIC EXERCISES: CPT | Mod: HCNC,PN

## 2022-02-03 NOTE — PROGRESS NOTES
"[facility]  Occupational Therapy Treatment Note    Date: 2/3/2022  Name: Brie Han  Clinic Number: [id number]    Therapy Diagnosis:   Encounter Diagnoses   Name Primary?    Decreased functional mobility and endurance Yes    Impairment of balance      Physician: Colten Gould MD     Physician Orders: Eval and Treat  Medical Diagnosis: essential tremor  Evaluation Date: 1/24/2022  Plan of Care Expiration: 3/4/2022  Progress Note Due: 2/4/2022   Insurance Authorization period Expiration: 2/24/2022  Date of Return to MD: 7/14/2022  Visit # / Visits Authorized: 2 / 20  Total Visits: 2    FOTO: 56/100 on 1/24/2022 (higher score=higher function)     Precautions:  Standard and Fall    Time In: 0830  Time Out: 0915  Total Billable Time: 45 minutes    SUBJECTIVE     Pt reports: she continues to be having trouble with her handwriting but grandson ordered her a pinch  for home which she has been using. "I have been keeping very busy which makes me tired", "My elbow hurts the most when I am vacuuming, mopping, and writing".    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     She was compliant with home exercise program given last session.   Response to previous treatment: no concerns.  Functional change:  no significant change compared to previous session. Pt has been practicing using the pinch  for handwriting at home.    Pain: 0/10 at rest, but up to 5/10 during handwriting.   Location: left forearm and up in to shoulder     OBJECTIVE     Objective Measures updated at progress report unless specified.    Treatment     Brie received the treatments listed below:     Manual therapy techniques for 4 minutes, including:  -Circular massage to medial epicondyle in both directions.    Therapeutic exercises to increase ROM and flexibility for 10 minutes, including:  -Gentle stretching to wrist and finger flexors as well as abduction/ " "adduction of digits to decrease irritation at medial epicondyle.  -Gentle flexion and extension of fingers using a flat towel to focus on gripping motion.    Therapeutic activities to improve functional performance for 34 minutes, including:    Handwriting: "Today is Thursday. It is foggy outside." Sign name. She only completed this in entirety 2 times. See Media.    -When writing with both utensils/ , thumb cmc with palmar drift noted.   -Continued to use pinch  with ball-point pen and demonstrated 100% legibility, taking ~2 min 12 sec.   -Attempted to use juneLsy  and did not initiate handwriting due to uncomfortable fit of the .  -Continued to use pinch  with fine tip felt marker to decrease required force while writing and demonstrated 100% legibility within ~1 min 45 sec. Patient reported that she liked using the marker because it was easier to write with and required less force.    Pt noted pain 5/10 in forearm during writing with standard pen and 4/10 while using the felt tip marker.    Patient Education and Home Exercises      Education provided:   - Benefit of using a felt tip marker compared to a ballpoint pen to reduce required force while handwriting   - Simple stretches to do throughout the day for relief from medial epicondylitis  - Progress towards goals     Written Home Exercises Provided: Continued stretching of wrist and fingers flexors demonstrated during session.  Stretches were reviewed and Brie was able to demonstrate them prior to the end of the session.  Brie demonstrated good understanding of these stretches. Continued practice of handwriting using the pinch  with both a felt tip marker and a ball point pen.     Assessment     Pt would continue to benefit from skilled OT. The quality of Brie handwriting I consistent with last session, but more techniques were introduced to practice at home such as using a fine point felt tip marker to decrease the amount " of force required to complete handwriting when needed. Grandson confirmed he will look for fine tip markers at home for Brie to practice with. Will continue to address pain related to medial epicondylitis, and go over stretches and exercises that will hopefully stretch out that left arm and reduce pain related to overuse.    Brie is progressing slowly towards her goals and there are no updates to goals at this time. Pt prognosis is Good.     Pt will continue to benefit from skilled outpatient occupational therapy to address the deficits listed in the problem list on initial evaluation provide pt/family education and to maximize pt's level of independence in the home and community environment.     Pt's spiritual, cultural and educational needs considered and pt agreeable to plan of care and goals.    Anticipated barriers to occupational therapy: none    Goals:  Long Term Goals:  Time frame: 5 weeks/ 10 visits  I in Home exercise program for tremor control and medial epicondylitis pain management. (progressing 2/3/2022)   Aware of adaptive techniques to help complete ADL and IADL safely. (progressing 2/3/2022)  Complete grocery list legibly. (progressing 2/3/2022)   Short Term Goals:  Time frame: 2 weeks/ 5 visits  Demonstrate tremor control techniques daily. (progressing 2/3/2022)   I with Home exercise program . (progressing 2/3/2022)     PLAN     Certification Period/Plan of care expiration: 1/24/2022 to 3/4/2022.     Outpatient Occupational Therapy 2 times weekly for 5 weeks to include the following interventions: Patient Education, Self Care, Therapeutic Activities and Therapeutic Exercise.    Updates/Grading for next session: review benefit of pen  and marker use; HEP for management of medial epicondylitis, and light proximal strengthening of left upper extremity.    Mario Rojas, OT

## 2022-02-04 ENCOUNTER — CLINICAL SUPPORT (OUTPATIENT)
Dept: REHABILITATION | Facility: HOSPITAL | Age: 87
End: 2022-02-04
Payer: MEDICARE

## 2022-02-04 DIAGNOSIS — R68.89 IMPAIRED FUNCTION OF UPPER EXTREMITY: ICD-10-CM

## 2022-02-04 DIAGNOSIS — R25.1 TREMOR: Primary | ICD-10-CM

## 2022-02-04 DIAGNOSIS — Z74.09 DECREASED FUNCTIONAL MOBILITY AND ENDURANCE: ICD-10-CM

## 2022-02-04 DIAGNOSIS — R26.89 IMPAIRMENT OF BALANCE: ICD-10-CM

## 2022-02-04 DIAGNOSIS — M79.602 LEFT ARM PAIN: ICD-10-CM

## 2022-02-04 PROCEDURE — 97110 THERAPEUTIC EXERCISES: CPT | Mod: HCNC,PN

## 2022-02-04 PROCEDURE — 97530 THERAPEUTIC ACTIVITIES: CPT | Mod: HCNC,PN

## 2022-02-04 NOTE — PATIENT INSTRUCTIONS
"  Stretch to Wrist Flexors  Use your right hand to bend the left wrist up/back as shown. The left elbow can be straight or bent. Hold for 30 seconds and repeat 10 times, 2 sets per day.       Scapular Retraction (Standing or Seated)        With arms at sides, pinch shoulder blades together and try to touch your elbows behind your back (I know they won't actually touch). Hold the position for 5s.  Repeat 10 times per set. Do 2 sets per session. Do 2 sessions per day.     Supine Shoulder Circles          Lie on your back with your arm straight up toward the ceiling at shoulder height. Moving your whole arm as one unit, "draw" circles on the ceiling. The elbow, forearm and wrist should be straight. Make the largest circles you can control without pain.     Do 10 in each direction, 3 sets, 1 times per day with a 3# weight.      "

## 2022-02-04 NOTE — PROGRESS NOTES
"[facility]  Occupational Therapy Treatment Note    Date: 2/4/2022  Name: Brie Han  Clinic Number: [id number]    Therapy Diagnosis:   Encounter Diagnoses   Name Primary?    Tremor Yes    Impaired function of upper extremity     Left arm pain      Physician: Colten Gould MD     Physician Orders: Eval and Treat  Medical Diagnosis: essential tremor  Evaluation Date: 1/24/2022  Plan of Care Expiration: 3/4/2022  Progress Note Due: 2/4/2022   Insurance Authorization period Expiration: 2/24/2022  Date of Return to MD: 7/14/2022  Visit # / Visits Authorized: 3/ 20  Total Visits: 4    FOTO: 56/100 on 1/24/2022 (higher score=higher function)     Precautions:  Standard and Fall    Time In: 0847  Time Out: 0930  Total Billable Time: 40 minutes    SUBJECTIVE     Pt reports:   She is doing well today.   "My elbow hurts the most when I am vacuuming, mopping, and writing".    She was compliant with home exercise program given last session.   Response to previous treatment: no concerns.  Functional change:  no significant change compared to previous session.     Pain: 0/10 at rest but she does complain of generalized achiness due to the weather  Location: left forearm and up in to shoulder     OBJECTIVE     Objective Measures updated at progress report unless specified.    Treatment     Brie received the treatments listed below:     Manual therapy techniques for 4 minutes, including:  -Circular massage to medial epicondyle in both directions.    Therapeutic exercises to increase ROM and flexibility for 20 minutes, including:  -Gentle stretching to wrist and finger flexors as well as abduction/ adduction of digits to decrease irritation at medial epicondyle, 10x30s.  -Bilateral scapular retraction AROM holding x5s each, 2x10 to work on proximal strength to facilitate distal control.    Therapeutic activities to improve functional performance for 0 minutes, including:  -n/a     Neuromuscular " re-education activities to improve Coordination, Kinesthetic Sense and Proprioception for 16 minutes. The following activities were included:  -supine on wedge (attempted lying flat but she complained of dizziness and noted that this is a longstanding issue for her) for left ue shoulder circles for coordination and strengthening of periscapular muscles. Mod verbal & tactile cues for keeping elbow, wrist and fingers straight and producing all movement from shoulder. She completed AROM x10 in each direction, 2x10 in each direction with flashlight to increase visual feedback, x10 in each direction for 1#, 2# & 3# (pt has 3# weight at home and would like to be able to use)     Dynamic Self-Care activities to increase independence, safety and/or efficiency in activities of daily living for 0 minutes. The following activities were included:  -n/a     Patient Education and Home Exercises      Education provided:   - Simple stretches to do throughout the day for relief from medial epicondylitis  - recommend grandson be near pt when she's doing shoulder circles, especially with the 3# weight. He v/u.   -continue working on functional handwriting at home with pinch  (write notes, grocery list, etc)  - Progress towards goals     Written Home Exercises Provided: yes  Exercises were reviewed and Brie was able to demonstrate them prior to the end of the session.  Brie demonstrated good understanding of the HEP provided.     See EMR under Patient Instructions for exercises provided 2/4/2022.      Assessment     Pt would continue to benefit from skilled OT. Decreased complaints of pain noted today in the left arm, but overall achiness was noted due to the weather. Decreased focus on handwriting today to allow for proximal strengthening, which will lend distal control in the hopes of decreasing pressure when gripping. Will continue to address pain related to medial epicondylitis, and go over stretches and exercises that  will hopefully stretch out that left arm and reduce pain related to overuse.    Brie is progressing slowly towards her goals and there are no updates to goals at this time. Pt prognosis is Good.     Pt will continue to benefit from skilled outpatient occupational therapy to address the deficits listed in the problem list on initial evaluation provide pt/family education and to maximize pt's level of independence in the home and community environment.     Pt's spiritual, cultural and educational needs considered and pt agreeable to plan of care and goals.    Anticipated barriers to occupational therapy: none    Goals:  Long Term Goals:  Time frame: 5 weeks/ 10 visits  I in Home exercise program for tremor control and medial epicondylitis pain management. (progressing 2/4/2022)   Aware of adaptive techniques to help complete ADL and IADL safely. (progressing 2/4/2022)  Complete grocery list legibly. (progressing 2/4/2022)   Short Term Goals:  Time frame: 2 weeks/ 5 visits  Demonstrate tremor control techniques daily. (progressing 2/4/2022)   I with Home exercise program . (progressing 2/4/2022)     PLAN     Certification Period/Plan of care expiration: 1/24/2022 to 3/4/2022.     Outpatient Occupational Therapy 2 times weekly for 5 weeks to include the following interventions: Patient Education, Self Care, Therapeutic Activities and Therapeutic Exercise.    Updates/Grading for next session: review new HEP; functional overhead reaching with light-weight items, focus on using only a light .     Daya Grant, OT

## 2022-02-04 NOTE — PROGRESS NOTES
"[facility]   Physical Therapy Treatment Note     Name: Brie Han  Clinic Number: [id number]    Therapy Diagnosis:   Encounter Diagnoses   Name Primary?    Decreased functional mobility and endurance     Impairment of balance      Physician: Colten Gould MD    Visit Date: 2/4/2022    Physician Orders: PT Eval and Treat  Medical Diagnosis from Referral: G25.0 (ICD-10-CM) - Essential tremor  Evaluation Date: 1/24/2022  Authorization Period Expiration: 1/24/2022 - 4/24/2022  Plan of Care Expiration: 4/30/2022  Episodes: 5   Visit # / Visits authorized: 3/20  FOTO visit #: 1/5  FOTO score: 70.6 (1/28/2022)     PTA Visit #: 1/5     Time In: 1020  Time Out: 1115  Total Billable Time: 55 minutes    SUBJECTIVE     Pt reports: having some soreness after last session.  She was compliant with home exercise program.  Response to previous treatment: a little soreness, but feeling good.  Functional change: n/a    Pain: 0/10  Location: none    OBJECTIVE     Objective Measures updated at progress report unless specified.     Treatment     Brie received the treatments listed below:      therapeutic exercises to develop strength, endurance, ROM, flexibility, posture and core stabilization for 45 minutes including:    -Nustep, BUE/BLE x10' level 3    Supine:   hamstring stretch with strap bilaterally 3x30"   Bridges 2x10   Hip abd/add with ball and red theraband 2x10   single leg raise bilaterally 2x10     Standing   Heel raises on blue foam; 2x10   Squats on blue foam; 2x10    Patient Education and Home Exercises     Home Exercises Provided and Patient Education Provided     Education provided:   - proper posture and form for all exercises    Written Home Exercises Provided: Patient instructed to cont prior HEP. Exercises were reviewed and Brie was able to demonstrate them prior to the end of the session.  Brie demonstrated good  understanding of the education provided. See EMR under " Patient Instructions for exercises provided during therapy sessions    ASSESSMENT     Patient tolerated stretching and strengthening activities well with no complaints of pain. Focused session on low back and hip strengthening to improve balance.     Brie Is progressing well towards her goals.   Pt prognosis is Good.     Pt will continue to benefit from skilled outpatient physical therapy to address the deficits listed in the problem list box on initial evaluation, provide pt/family education and to maximize pt's level of independence in the home and community environment.     Pt's spiritual, cultural and educational needs considered and pt agreeable to plan of care and goals.     Anticipated barriers to physical therapy: none    Goals:   Short Term Goals: 5 weeks   1. Patient to perform dynamic standing for 20 minutes with [place] using No Assistive Device to prepare for functional tasks in standing. (ongoing)  2. Patient to perform stair navigation with right Handrails Modified [place] using No Assistive Device. (ongoing)  3. Patient to increase DGI score to normative value to show reduction in fall risk. (ongoing)     Long Term Goals: 10 weeks   1. Patient to increase strength of BLE  to 5/5. (ongoing)  2. Patient to report decrease in pain by 4 points to increase quality of life. (ongoing)  3. Patient to increase gait speed to normative values to increase community ambulation and quality of life. (ongoing)    PLAN     Outpatient Physical Therapy 2 times weekly for 10 weeks to include the following interventions: Gait Training, Manual Therapy, Neuromuscular Re-ed, Patient Education, Therapeutic Activities and Therapeutic Exercise.     Rivka Martinez PT, DPT, CLT  2/4/2022

## 2022-02-09 NOTE — PROGRESS NOTES
"[facility]   Physical Therapy Treatment Note     Name: Brie Han  Clinic Number: [id number]    Therapy Diagnosis:   Encounter Diagnoses   Name Primary?    Decreased functional mobility and endurance     Impairment of balance      Physician: Colten Gould MD    Visit Date: 2/10/2022    Physician Orders: PT Eval and Treat  Medical Diagnosis from Referral: G25.0 (ICD-10-CM) - Essential tremor  Evaluation Date: 1/24/2022  Authorization Period Expiration: 1/24/2022 - 4/24/2022  Plan of Care Expiration: 4/30/2022  Episodes: 6  Visit # / Visits authorized: 4/20  FOTO visit #: 1/5  FOTO score: 70.6 (1/28/2022)     PTA Visit #: 1/5     Time In: 920  Time Out: 1005  Total Billable Time: 45 minutes    SUBJECTIVE     Pt reports: "I was having some leg cramps after last session."  She was compliant with home exercise program.  Response to previous treatment: feeling okay  Functional change: "a little better balance."    Pain: 0/10  Location: none    OBJECTIVE     Objective Measures updated at progress report unless specified.     Treatment     Brie received the treatments listed below:      therapeutic exercises to develop strength, endurance, ROM, flexibility, posture and core stabilization for 45 minutes including:    -SciFit, BLE x10' level 1    Supine:   hamstring stretch with strap bilaterally 3x30"   Bridges 2x10   Supine to sit & sit to supine x 2 trials    -verbal cues for log roll to protect back and shoulders as patient had arm behind her putting more strain on shoulder joint.    Seated:   Hip abd/add with ball and red theraband 2x10      Standing in parallel bars:   Heel raises on blue foam: 2x10   Squats on blue foam: 2x10   Marching on blue foam: 2 x 1 minute    -verbal cues for light hands on bars and upright posture.     Gait: 214ft with no assistance device, independence. Patient with reciprocal gait pattern and appropriate step length and arm swing.     Patient Education " and Home Exercises     Home Exercises Provided and Patient Education Provided     Education provided:   - proper posture and form for all exercises    Written Home Exercises Provided: Patient instructed to cont prior HEP. Exercises were reviewed and Brie was able to demonstrate them prior to the end of the session.  Brie demonstrated good  understanding of the education provided. See EMR under Patient Instructions for exercises provided during therapy sessions    ASSESSMENT     Patient tolerated stretching and strengthening activities well with no complaints of pain. Provided verbal cues for logroll technique for safety of back and protection of shoulders.     Brie Is progressing well towards her goals.   Pt prognosis is Good.     Pt will continue to benefit from skilled outpatient physical therapy to address the deficits listed in the problem list box on initial evaluation, provide pt/family education and to maximize pt's level of independence in the home and community environment.     Pt's spiritual, cultural and educational needs considered and pt agreeable to plan of care and goals.     Anticipated barriers to physical therapy: none    Goals:   Short Term Goals: 5 weeks   1. Patient to perform dynamic standing for 20 minutes with [place] using No Assistive Device to prepare for functional tasks in standing. (ongoing)  2. Patient to perform stair navigation with right Handrails Modified [place] using No Assistive Device. (ongoing)  3. Patient to increase DGI score to normative value to show reduction in fall risk. (ongoing)     Long Term Goals: 10 weeks   1. Patient to increase strength of BLE  to 5/5. (ongoing)  2. Patient to report decrease in pain by 4 points to increase quality of life. (ongoing)  3. Patient to increase gait speed to normative values to increase community ambulation and quality of life. (ongoing)    PLAN     Outpatient Physical Therapy 2 times weekly for 10 weeks to  include the following interventions: Gait Training, Manual Therapy, Neuromuscular Re-ed, Patient Education, Therapeutic Activities and Therapeutic Exercise.     Rivka Martinez PT, DPT, CLT  2/10/2022

## 2022-02-10 ENCOUNTER — CLINICAL SUPPORT (OUTPATIENT)
Dept: REHABILITATION | Facility: HOSPITAL | Age: 87
End: 2022-02-10
Payer: MEDICARE

## 2022-02-10 DIAGNOSIS — M79.602 LEFT ARM PAIN: ICD-10-CM

## 2022-02-10 DIAGNOSIS — R68.89 IMPAIRED FUNCTION OF UPPER EXTREMITY: ICD-10-CM

## 2022-02-10 DIAGNOSIS — Z74.09 DECREASED FUNCTIONAL MOBILITY AND ENDURANCE: ICD-10-CM

## 2022-02-10 DIAGNOSIS — R26.89 IMPAIRMENT OF BALANCE: ICD-10-CM

## 2022-02-10 DIAGNOSIS — R25.1 TREMOR: Primary | ICD-10-CM

## 2022-02-10 PROCEDURE — 97112 NEUROMUSCULAR REEDUCATION: CPT | Mod: HCNC,PN

## 2022-02-10 PROCEDURE — 97110 THERAPEUTIC EXERCISES: CPT | Mod: HCNC,PN

## 2022-02-10 NOTE — PROGRESS NOTES
"[facility]  Occupational Therapy Treatment Note    Date: 2/11/2022  Name: Brie Han  Clinic Number: [id number]    Therapy Diagnosis:   Encounter Diagnoses   Name Primary?    Tremor     Impaired function of upper extremity     Left arm pain      Physician: Colten Gould MD     Physician Orders: Eval and Treat  Medical Diagnosis: essential tremor  Evaluation Date: 1/24/2022  Plan of Care Expiration: 3/4/2022  Progress Note Due: 3/4/2022   Insurance Authorization period Expiration: 2/24/2022  Date of Return to MD: 7/14/2022  Visit # / Visits Authorized: 5/ 20  Total Visits: 6    FOTO: 56/100 on 1/24/2022 (higher score=higher function)     Precautions:  Standard and Fall    Time In: 850  Time Out: 930  Total Billable Time: 40 minutes    SUBJECTIVE     Pt reports:   Complained of total body soreness after therapy yesterda. Left elbow feels pretty good "compared to what I was feeling.   She was compliant with home exercise program given last session.   Response to previous treatment: no concerns.  Functional change:  no significant change compared to previous session.     Pain: 0/10 at rest, but right elbow discomfort  Location: bilateral arms sore from work out.     OBJECTIVE     Objective Measures updated at progress report unless specified.    Treatment     Brie received the treatments listed below:     Manual therapy techniques for 4 minutes, including:  -Circular massage to medial epicondyle in both directions to both elbows.    Therapeutic exercises to increase ROM and flexibility for 41 minutes, including:  -ergometer no resistance height 5 , 2.5 minutes forward and 2.5 minutes reverse with rest between rotations. Gloves worn to help .   -Gentle mobilization and stretching of soft-tissues throughout bilateral shoulders, including levator, serratus anterior, subscapularis, anterior complex and combined internal rotators to allow for increased PROM and decreased " irritation.  -stretch to wrist flexors (HEP review) 5x30s, pt encouraged to keep elbow bent to decrease compensatory shoulder shrug  -Bilateral shoulder reverse rolls  X 30 to work on proximal strength to facilitate distal control.  -bilateral weight bearing on arms in external rotation and extension with various weight shifts for posture and proximal strength.  -Supine on wedge- scapula stabilization with reverse pendulum and supine punches    Therapeutic activities to improve functional performance for 0 minutes, including:  -n/a     Neuromuscular re-education activities to improve Coordination, Kinesthetic Sense and Proprioception for 0 minutes. -n/a  Dynamic Self-Care activities to increase independence, safety and/or efficiency in activities of daily living for 0 minutes. The following activities were included:  -n/a     Patient Education and Home Exercises      Education provided:   - Simple stretches to do throughout the day for relief from medial epicondylitis - allow the elbow to be bent and be sure to relax shoulders.   - recommend grandson be near pt when she's doing shoulder circles, especially with the 2# weight. He verbalized understanding. Educated to decrease weight if too sore and decrease frequency.   -continue working on functional handwriting at home with pinch  (write notes, grocery list, etc)  - Progress towards goals   -reviewed yellow theraband Home exercise program and how to set up at home.   Written Home Exercises Provided: yes  Exercises were reviewed and rBie was able to demonstrate them prior to the end of the session.  Brie demonstrated good understanding of the HEP provided.     See EMR under Patient Instructions for exercises provided 2/11/2022.      Assessment     Pt would continue to benefit from skilled OT. Limited resisted exercises this date due to general muscle soreness from resistance. Reviewed Home exercise program but did not perform due to muscle soreness. No  tremors noted during treatment this date.  She may benefit from ed regarding modifications to strategies for cleaning at home to limit discomfort.     Brie is progressing slowly towards her goals and there are no updates to goals at this time. Pt prognosis is Good.     Pt will continue to benefit from skilled outpatient occupational therapy to address the deficits listed in the problem list on initial evaluation provide pt/family education and to maximize pt's level of independence in the home and community environment.     Pt's spiritual, cultural and educational needs considered and pt agreeable to plan of care and goals.    Anticipated barriers to occupational therapy: none    Goals:  Long Term Goals:  Time frame: 5 weeks/ 10 visits  I in Home exercise program for tremor control and medial epicondylitis pain management. (progressing 2/11/2022)   Aware of adaptive techniques to help complete ADL and IADL safely. (progressing 2/11/2022)  Complete grocery list legibly. (progressing 2/11/2022)   Short Term Goals:  Time frame: 2 weeks/ 5 visits  Demonstrate tremor control techniques daily. (progressing 2/11/2022)   I with Home exercise program . (progressing 2/11/2022)     PLAN     Certification Period/Plan of care expiration: 1/24/2022 to 3/4/2022.     Outpatient Occupational Therapy 2 times weekly for 5 weeks to include the following interventions: Patient Education, Self Care, Therapeutic Activities and Therapeutic Exercise.    Updates/Grading for next session: Review bilateral scapular retraction against yellow t-band with handles and add to HEP as appropriate. Print out Home exercise program from 2/11/2022. Functional overhead reaching with light-weight items, focus on using only a light . Consider addressing potential modifications to IADLs to decrease pain/ need for compensatory movements.     Martha Dangelo, OT        Complex Repair And Z Plasty Text: The defect edges were debeveled with a #15 scalpel blade.  The primary defect was closed partially with a complex linear closure.  Given the location of the remaining defect, shape of the defect and the proximity to free margins a Z plasty was deemed most appropriate for complete closure of the defect.  Using a sterile surgical marker, an appropriate advancement flap was drawn incorporating the defect and placing the expected incisions within the relaxed skin tension lines where possible.    The area thus outlined was incised deep to adipose tissue with a #15 scalpel blade.  The skin margins were undermined to an appropriate distance in all directions utilizing iris scissors.

## 2022-02-10 NOTE — PROGRESS NOTES
"[facility]  Occupational Therapy Treatment Note    Date: 2/10/2022  Name: Brie Han  Clinic Number: [id number]    Therapy Diagnosis:   Encounter Diagnoses   Name Primary?    Tremor Yes    Impaired function of upper extremity     Left arm pain      Physician: Colten Gould MD     Physician Orders: Eval and Treat  Medical Diagnosis: essential tremor  Evaluation Date: 1/24/2022  Plan of Care Expiration: 3/4/2022  Progress Note Due: 2/4/2022   Insurance Authorization period Expiration: 2/24/2022  Date of Return to MD: 7/14/2022  Visit # / Visits Authorized: 4/ 20  Total Visits: 5    FOTO: 56/100 on 1/24/2022 (higher score=higher function)     Precautions:  Standard and Fall    Time In: 9551-0351 & 0630-1069  Time Out:   Total Billable Time: 40 minutes    SUBJECTIVE     Pt reports:   Right shoulder is sore today. Caterina notes that she cleaned yesterday. Left elbow feels pretty good "compared to what I was feeling. Now the left is helping the right." She noted that she was wrong about her weights at home and they are 2#, not 3#.    She was compliant with home exercise program given last session.   Response to previous treatment: no concerns.  Functional change:  no significant change compared to previous session.     Pain: 0/10 at rest, but right upper extremity discomfort  Location: left forearm and up in to shoulder     OBJECTIVE     Objective Measures updated at progress report unless specified.    Treatment     Brie received the treatments listed below:     Manual therapy techniques for 4 minutes, including:  -Circular massage to medial epicondyle in both directions.    Therapeutic exercises to increase ROM and flexibility for 28 minutes, including:  -Gentle mobilization and stretching of soft-tissues throughout bilateral shoulders, including levator, serratus anterior, subscapularis, anterior complex and combined internal rotators to allow for increased PROM and decreased " irritation.  -stretch to wrist flexors (HEP review) 5x30s, pt encouraged to keep elbow bent to decrease compensatory shoulder shrug  -Bilateral scapular retraction AROM holding x5s each, x5 to work on proximal strength to facilitate distal control.  -bilateral elbow flexion, 2#, focus on light , palm up, palm down, thumb up, 3x10 each with tactile input to improve sitting posture and keep arms at side for focus on elbow (and not incorporating shoulders)  -bilateral scapular retraction, 2x10 against yellow t-band with handles and cues to limit force of  to decrease irritation to the left elbow .    Therapeutic activities to improve functional performance for 0 minutes, including:  -n/a     Neuromuscular re-education activities to improve Coordination, Kinesthetic Sense and Proprioception for 8 minutes. The following activities were included:  -supine on wedge for left ue shoulder circles for coordination and strengthening of periscapular muscles. Mod verbal & tactile cues for keeping elbow, straight and producing all movement from shoulder. She completed x10 in each direction with 2# weight.     Dynamic Self-Care activities to increase independence, safety and/or efficiency in activities of daily living for 0 minutes. The following activities were included:  -n/a     Patient Education and Home Exercises      Education provided:   - Simple stretches to do throughout the day for relief from medial epicondylitis - allow the elbow to be bent and be sure to relax shoulders.   - recommend grandson be near pt when she's doing shoulder circles, especially with the 3# weight. He v/u.   -continue working on functional handwriting at home with pinch  (write notes, grocery list, etc)  - Progress towards goals     Written Home Exercises Provided: yes  Exercises were reviewed and Brie was able to demonstrate them prior to the end of the session.  Brie demonstrated good understanding of the HEP provided.     See  EMR under Patient Instructions for exercises provided 2/4/2022.      Assessment     Pt would continue to benefit from skilled OT. Pt demonstrated good awareness of keeping a loose  after initial education. She does complain of increased right shoulder discomfort today (at typical site of referred rotator cuff pain), which needs to be monitored. Will continue to address pain related to medial epicondylitis, and go over stretches and exercises that will hopefully stretch out that left arm and reduce pain related to overuse. She may benefit from ed regarding modifications to strategies for cleaning at home to limit discomfort.     Brie is progressing slowly towards her goals and there are no updates to goals at this time. Pt prognosis is Good.     Pt will continue to benefit from skilled outpatient occupational therapy to address the deficits listed in the problem list on initial evaluation provide pt/family education and to maximize pt's level of independence in the home and community environment.     Pt's spiritual, cultural and educational needs considered and pt agreeable to plan of care and goals.    Anticipated barriers to occupational therapy: none    Goals:  Long Term Goals:  Time frame: 5 weeks/ 10 visits  I in Home exercise program for tremor control and medial epicondylitis pain management. (progressing 2/10/2022)   Aware of adaptive techniques to help complete ADL and IADL safely. (progressing 2/10/2022)  Complete grocery list legibly. (progressing 2/10/2022)   Short Term Goals:  Time frame: 2 weeks/ 5 visits  Demonstrate tremor control techniques daily. (progressing 2/10/2022)   I with Home exercise program . (progressing 2/10/2022)     PLAN     Certification Period/Plan of care expiration: 1/24/2022 to 3/4/2022.     Outpatient Occupational Therapy 2 times weekly for 5 weeks to include the following interventions: Patient Education, Self Care, Therapeutic Activities and Therapeutic  Exercise.    Updates/Grading for next session: Review bilateral scapular retraction against yellow t-band with handles and add to HEP as appropriate. Functional overhead reaching with light-weight items, focus on using only a light . Consider addressing potential modifications to IADLs to decrease pain/ need for compensatory movements.     Daya Grant, OT

## 2022-02-11 ENCOUNTER — CLINICAL SUPPORT (OUTPATIENT)
Dept: REHABILITATION | Facility: HOSPITAL | Age: 87
End: 2022-02-11
Payer: MEDICARE

## 2022-02-11 DIAGNOSIS — M25.512 CHRONIC LEFT SHOULDER PAIN: ICD-10-CM

## 2022-02-11 DIAGNOSIS — R68.89 IMPAIRED FUNCTION OF UPPER EXTREMITY: ICD-10-CM

## 2022-02-11 DIAGNOSIS — G89.29 CHRONIC LEFT SHOULDER PAIN: ICD-10-CM

## 2022-02-11 DIAGNOSIS — M79.602 LEFT ARM PAIN: ICD-10-CM

## 2022-02-11 DIAGNOSIS — R25.1 TREMOR: ICD-10-CM

## 2022-02-11 PROCEDURE — 97110 THERAPEUTIC EXERCISES: CPT | Mod: HCNC,PN

## 2022-02-24 ENCOUNTER — CLINICAL SUPPORT (OUTPATIENT)
Dept: REHABILITATION | Facility: HOSPITAL | Age: 87
End: 2022-02-24
Payer: MEDICARE

## 2022-02-24 DIAGNOSIS — R68.89 IMPAIRED FUNCTION OF UPPER EXTREMITY: ICD-10-CM

## 2022-02-24 DIAGNOSIS — R26.89 IMPAIRMENT OF BALANCE: ICD-10-CM

## 2022-02-24 DIAGNOSIS — M79.602 LEFT ARM PAIN: ICD-10-CM

## 2022-02-24 DIAGNOSIS — Z74.09 DECREASED FUNCTIONAL MOBILITY AND ENDURANCE: Primary | ICD-10-CM

## 2022-02-24 DIAGNOSIS — R25.1 TREMOR: Primary | ICD-10-CM

## 2022-02-24 PROCEDURE — 97110 THERAPEUTIC EXERCISES: CPT | Mod: HCNC,PN,CQ

## 2022-02-24 PROCEDURE — 97110 THERAPEUTIC EXERCISES: CPT | Mod: HCNC,PN

## 2022-02-24 PROCEDURE — 97530 THERAPEUTIC ACTIVITIES: CPT | Mod: HCNC,PN

## 2022-02-24 NOTE — PROGRESS NOTES
"[facility]  Occupational Therapy Treatment Note & POC Update    Date: 2/24/2022  Name: Brie Han  Clinic Number: [id number]    Therapy Diagnosis:   Encounter Diagnoses   Name Primary?    Tremor Yes    Impaired function of upper extremity     Left arm pain      Physician: Colten Gould MD     Physician Orders: Eval and Treat  Medical Diagnosis: essential tremor  Evaluation Date: 1/24/2022  Plan of Care Expiration: 3/4/2022  Progress Note Due: 3/4/2022   Insurance Authorization period Expiration: 2/24/2022  Date of Return to MD: 7/14/2022  Visit # / Visits Authorized: 6/ 20  Total Visits: 7    FOTO: 56/100 on 1/24/2022 (higher score=higher function)     Precautions:  Standard and Fall    Time In: 1301  Time Out: 1345  Total Billable Time: 42 minutes    SUBJECTIVE     Pt reports:   Feeling better than last week. Elbows are feeling better. She's been writing letters and overall is "doing better."   However, sometimes, especially when getting up in the morning, she feels like her tremors are worse, making it difficult to manage dishes.    She was compliant with home exercise program given last session.   Response to previous treatment: no concerns.  Functional change: improving tolerance for handwriting    Pain: 0/10 at rest, but right elbow discomfort  Location: bilateral arms sore from work out.     OBJECTIVE     Objective Measures updated at progress report unless specified.    Hand Strength (NEELAM Dynamometer, Setting II)   (lbs) Right Left   1 45 48   2 47 48   3 51 49   avg 47.7 48.7   avg on eval 45 38   [norms for women aged 75+: R=42.6 +/-11.0; L=37.6 +/-8.9 (Edil et al, 1985)]    Fine motor coordination:   9 Hole Peg Test (9HPT): (R) 27s (L) 29s (same time x2 trials. Attempted finger flicks prior to the 2nd trial).  [norms for women aged 71+: R=22.49s +/-6.02s; L=24.11s +/-5.66s (Anitra et al, 2003)]    Treatment     Brie received the treatments listed below: "     Therapeutic exercises to increase ROM and flexibility for 17 minutes, including:  -yellow t-band for bilateral scapular retraction with mod cues for positioning and performance, controlling return to start position. 3x10  -yellow t-band for bilateral shoulder extension. Had to reposition band to a lower position in door as pt was consistently bending her elbows on return to start position. With band positioned at hip height, she was able to complete shoulder extension with elbows also in extension. Again, mod cues for performance, controlling return to start position. 3x10   -measurements as noted above.    Therapeutic activities to improve functional performance for 25 minutes, including:  Handwriting for grocery list of 10 items using pinch  on ballpoint pen with 100% legibility and min spelling errors related to English as 2nd language. No pain in elbow following.   left upper extremity placement and removal of Squigz requiring overhead reach to work on  and pinch strength x6 to place and 6 to remove. No pain in elbow following.   -measurements as noted above.       Patient Education and Home Exercises      Education provided:   - Simple stretches to do throughout the day for relief from medial epicondylitis - allow the elbow to be bent and be sure to relax shoulders.   - recommend grandson be near pt when she's doing shoulder circles, especially with the 2# weight. He verbalized understanding. Educated to decrease weight if too sore and decrease frequency.   -continue working on functional handwriting at home with pinch  (write notes, grocery list, etc)  - Progress towards goals   -reviewed yellow theraband Home exercise program and how to set up at home.   Written Home Exercises Provided: yes  Exercises were reviewed and Brie was able to demonstrate them prior to the end of the session.  Brie demonstrated good understanding of the HEP provided.     See EMR under Patient Instructions for  exercises provided 2/11/2022.      Assessment     Pt would continue to benefit from skilled OT. Significant improvement in elbow pain noted today compared to previous session. See attached POC udpate. Limited resisted exercises this date due to general muscle soreness from resistance. Reviewed Home exercise program but did not perform due to muscle soreness. No tremors noted during treatment this date.  She may benefit from ed regarding modifications to strategies for cleaning at home to limit discomfort.     Brie is progressing slowly towards her goals and there are no updates to goals at this time. Pt prognosis is Good.     Pt will continue to benefit from skilled outpatient occupational therapy to address the deficits listed in the problem list on initial evaluation provide pt/family education and to maximize pt's level of independence in the home and community environment.     Pt's spiritual, cultural and educational needs considered and pt agreeable to plan of care and goals.    Anticipated barriers to occupational therapy: none    Goals:  Long Term Goals:  Time frame: 5 weeks/ 10 visits  I in Home exercise program for tremor control and medial epicondylitis pain management. (progressing 2/24/2022)   Aware of adaptive techniques to help complete ADL and IADL safely. (progressing 2/24/2022)  Complete grocery list legibly. (MET 2/24/22 for a list of 10 items)    Short Term Goals:  Time frame: 2 weeks/ 5 visits  Demonstrate tremor control techniques daily. (progressing 2/24/2022)   I with Home exercise program . (progressing 2/24/2022)     PLAN     Certification Period/Plan of care expiration: 1/24/2022 to 3/4/2022.     Outpatient Occupational Therapy 2 times weekly for 5 weeks to include the following interventions: Patient Education, Self Care, Therapeutic Activities and Therapeutic Exercise.    Updates/Grading for next session: see POC update; Functional overhead reaching with light-weight items, focus on  using only a light . Address potential modifications to IADLs to decrease pain/ need for compensatory movements.     Daya Grant, OT

## 2022-02-24 NOTE — PROGRESS NOTES
"[facility]   Physical Therapy Treatment Note     Name: Brie Han  Clinic Number: [id number]    Therapy Diagnosis:   Encounter Diagnoses   Name Primary?    Decreased functional mobility and endurance Yes    Impairment of balance      Physician: Colten Gould MD    Visit Date: 2/24/2022    Physician Orders: PT Eval and Treat  Medical Diagnosis from Referral: G25.0 (ICD-10-CM) - Essential tremor  Evaluation Date: 1/24/2022  Authorization Period Expiration: 1/24/2022 - 4/24/2022  Plan of Care Expiration: 4/30/2022  Episodes: 7  Visit # / Visits authorized: 5/20  FOTO visit #: 7  FOTO score: 70.6 (1/28/2022) 59 (2/24/2022)     PTA Visit #: 1/5    Time In: 1215  Time Out: 1300  Total Billable Time: 45 minutes    SUBJECTIVE     Pt reports: Bilateral knee soreness, reports went to grocery this morning and did not sleep well last night.  Also reported Bilateral "arm" soreness with use on SciFit.  She was compliant with home exercise program.  Response to previous treatment: no problems stated  Functional change: ongoing    Pain: 0/10  Location: Bilateral knees and Bilateral Upper Extremities     OBJECTIVE     Objective Measures updated at progress report unless specified.     Treatment     Brie received the treatments listed below:      therapeutic exercises to develop strength, endurance, range of motion , flexibility, posture and core stabilization for 45 minutes including:    SciFit Level 1 10 minutes with Bilateral Upper Extremities to without with 1 rest break at 5 minutes    Sit: Dorsiflexion with foot on stool 30 times Right Left    Long arc quads 3 sets of 10 2# Right Left    March 3 sets of 10 2# Right Left    Ball squeeze 30 times    Parallel bars: Anterior/posterior weight shift 30 times on blue foam cushion    March in place 20 times    Gait without assistive device 350 feet even/uneven surface, up/down 60 foot ramp, Up/down platform steps x2 step through without upper " extremity support, up/down 21 steps step through with Right upper extremity support going up and Bilateral upper extremity support going down,    Patient Education and Home Exercises     Home Exercises Provided and Patient Education Provided     Education provided:   - Patient provided with verbal and demonstrative instruction for all activities performed in today's session.    Written Home Exercises Provided: Patient instructed to cont prior HEP. Exercises were reviewed and Brie was able to demonstrate them prior to the end of the session.  Brie demonstrated good  understanding of the education provided. See EMR under Patient Instructions for exercises provided during therapy sessions    ASSESSMENT     Brie provided good participation and effort during today's session with reports of Bilateral knee  and Bilateral arm soreness with NuStep where she took a break and was able to continue, reported no pain after rest break.  Treatment focused on Bilateral lower extremity strengthening and prolonged standing activities of 15 minutes.    Brie Is progressing well towards her goals.   Pt prognosis is Good.     Pt will continue to benefit from skilled outpatient physical therapy to address the deficits listed in the problem list box on initial evaluation, provide pt/family education and to maximize pt's level of independence in the home and community environment.     Pt's spiritual, cultural and educational needs considered and pt agreeable to plan of care and goals.     Anticipated barriers to physical therapy: none    Goals:   Short Term Goals: 5 weeks   1. Patient to perform dynamic standing for 20 minutes with Willard using No Assistive Device to prepare for functional tasks in standing. (progressing)  2. Patient to perform stair navigation with right Handrails Modified Willard using No Assistive Device. (progressing)  3. Patient to increase DGI score to normative value to show reduction in  fall risk. (ongoing)     Long Term Goals: 10 weeks   1. Patient to increase strength of BLE  to 5/5. (ongoing)  2. Patient to report decrease in pain by 4 points to increase quality of life. (progressing)  3. Patient to increase gait speed to normative values to increase community ambulation and quality of life. (ongoing)    PLAN     Outpatient Physical Therapy 2 times weekly for 10 weeks to include the following interventions: Gait Training, Manual Therapy, Neuromuscular Re-ed, Patient Education, Therapeutic Activities and Therapeutic Exercise.     Deann Yuen, Physical Therapist Assistant

## 2022-02-25 ENCOUNTER — CLINICAL SUPPORT (OUTPATIENT)
Dept: REHABILITATION | Facility: HOSPITAL | Age: 87
End: 2022-02-25
Payer: MEDICARE

## 2022-02-25 DIAGNOSIS — R26.89 IMPAIRMENT OF BALANCE: ICD-10-CM

## 2022-02-25 DIAGNOSIS — M79.602 LEFT ARM PAIN: ICD-10-CM

## 2022-02-25 DIAGNOSIS — R25.1 TREMOR: Primary | ICD-10-CM

## 2022-02-25 DIAGNOSIS — Z74.09 DECREASED FUNCTIONAL MOBILITY AND ENDURANCE: Primary | ICD-10-CM

## 2022-02-25 DIAGNOSIS — R68.89 IMPAIRED FUNCTION OF UPPER EXTREMITY: ICD-10-CM

## 2022-02-25 PROCEDURE — 97530 THERAPEUTIC ACTIVITIES: CPT | Mod: HCNC,PN

## 2022-02-25 PROCEDURE — 97110 THERAPEUTIC EXERCISES: CPT | Mod: HCNC,PN

## 2022-02-25 PROCEDURE — 97110 THERAPEUTIC EXERCISES: CPT | Mod: HCNC,PN,CQ

## 2022-02-25 NOTE — PROGRESS NOTES
[facility]   Physical Therapy Treatment Note     Name: Brie Han  Clinic Number: [id number]    Therapy Diagnosis:   Encounter Diagnoses   Name Primary?    Decreased functional mobility and endurance Yes    Impairment of balance      Physician: Colten Gould MD    Visit Date: 2/25/2022    Physician Orders: PT Eval and Treat  Medical Diagnosis from Referral: G25.0 (ICD-10-CM) - Essential tremor  Evaluation Date: 1/24/2022  Authorization Period Expiration: 1/24/2022 - 4/24/2022  Plan of Care Expiration: 4/30/2022  Episodes: 8  Visit # / Visits authorized: 6/20  FOTO visit #: 7  FOTO score: 70.6 (1/28/2022) 59 (2/24/2022)     PTA Visit #: 2/5    Time In: 0933  Time Out: 1015  Total Billable Time: 43 minutes    SUBJECTIVE     Pt reports: Doing well today, without complaints. Reported back pain at end or time on NuStep.  She was compliant with home exercise program.  Response to previous treatment: no problems stated  Functional change: ongoing    Pain: 0/10  Location: Not Applicable     OBJECTIVE     Objective Measures updated at progress report unless specified.     Treatment     Brie received the treatments listed below:      therapeutic exercises to develop strength, endurance, range of motion , flexibility, posture and core stabilization for 43 minutes including:    NuStep Level 3 10 minutes Bilater Lower Extremities only    Supine: Bridge with ball squeeze 2 sets of 10     Lateral trunk rotation with Theraball 30 times Right Left     Double knee to chest with Theraball 20 times    Straight leg raise 3 sets of 10 Right Left     Gait around mat x2: forward, to Left, backward, to Right contact guard assistance     Patient Education and Home Exercises     Home Exercises Provided and Patient Education Provided     Education provided:   - Patient provided with verbal and demonstrative instruction for all activities performed in today's session.    Written Home Exercises Provided:  Patient instructed to cont prior HEP. Exercises were reviewed and Brie was able to demonstrate them prior to the end of the session.  Brie demonstrated good  understanding of the education provided. See EMR under Patient Instructions for exercises provided during therapy sessions    ASSESSMENT     Brie provided good participation and effort during today's session with reports of back pain at end of NuStep where patient had scooted forward.  Treatment focused on Bilateral lower extremity strengthening and core stabilization activities to improve hamstring length while doing Straight leg raise without lifting hips.    Brie Is progressing well towards her goals.   Pt prognosis is Good.     Pt will continue to benefit from skilled outpatient physical therapy to address the deficits listed in the problem list box on initial evaluation, provide pt/family education and to maximize pt's level of independence in the home and community environment.     Pt's spiritual, cultural and educational needs considered and pt agreeable to plan of care and goals.     Anticipated barriers to physical therapy: none    Goals:   Short Term Goals: 5 weeks   1. Patient to perform dynamic standing for 20 minutes with Caroleen using No Assistive Device to prepare for functional tasks in standing. (progressing)  2. Patient to perform stair navigation with right Handrails Modified Caroleen using No Assistive Device. (progressing)  3. Patient to increase DGI score to normative value to show reduction in fall risk. (ongoing)     Long Term Goals: 10 weeks   1. Patient to increase strength of BLE  to 5/5. (ongoing)  2. Patient to report decrease in pain by 4 points to increase quality of life. (progressing)  3. Patient to increase gait speed to normative values to increase community ambulation and quality of life. (ongoing)    PLAN     Outpatient Physical Therapy 2 times weekly for 10 weeks to include the following  interventions: Gait Training, Manual Therapy, Neuromuscular Re-ed, Patient Education, Therapeutic Activities and Therapeutic Exercise.     Deann Yuen, Physical Therapist Assistant

## 2022-02-25 NOTE — PROGRESS NOTES
[facility]  Occupational Therapy Treatment Note     Date: 2/28/2022  Name: rBie Han  Clinic Number: [id number]    Therapy Diagnosis:   Encounter Diagnoses   Name Primary?    Tremor Yes    Impaired function of upper extremity     Left arm pain      Physician: Colten Gould MD     Physician Orders: Eval and Treat  Medical Diagnosis: essential tremor  Evaluation Date: 1/24/2022  Plan of Care Expiration: 3/4/2022  Progress Note Due: 3/4/2022   Insurance Authorization period Expiration: 2/24/2022  Date of Return to MD: 7/14/2022  Visit # / Visits Authorized: 8/ 20  Total Visits: 9    FOTO: 56/100 on 1/24/2022 (higher score=higher function)     Precautions:  Standard and Fall    Time In: 1700  Time Out: 1745  Total Billable Time: 45 minutes    SUBJECTIVE     Pt reports:   Feeling good today. Pt states she feels like her tremors get better throughout the day. She is only having trouble with cooking and handwriting.    She was compliant with home exercise program given last session.   Response to previous treatment: no concerns.  Functional change: improving tolerance for handwriting    Pain: 0/10 at rest, but right elbow discomfort  Location: bilateral arms sore from work out.     OBJECTIVE     Objective Measures updated at progress report unless specified.      Treatment     Brie received the treatments listed below:     Therapeutic exercises to increase ROM and flexibility for 20 minutes, including:  -used overhead pully system with no resistance for bilateral upper extremity AROM (for 5 minutes)   -elbow flexion/ extension, and overhead reaching   -bilateral scapular retraction with mod cues for positioning and performance, controlling return to start position. 3x10  -table top wrist and digit stretches on top of wash clothes for ease of movements   -finger flexion and extension (finger scrunches)   -radial and ulnar deviation   -digit abduction and adduction   -wrist  "circles  -tendon gliding exercises- had some trouble coordinating these on left side, but will continue to practice    Therapeutic activities to improve functional performance for 25 minutes, including:  -pt brought pen and  she was provided last session to work on handwriting again today   -handwriting, executive functioning crossword puzzle activity- completed puzzle with 75% legibility.   -pt required mod verbal cueing to fill out puzzle   -while standing (for standing tolerance during activities):    -table top "gardening" activity filling up small seedling cups with "soil" as pt likes to garden at home    -practice of functional pronation and supination of wrist with coordinated finer movements     -had greater difficulties performing this activity with left upper extremity due to slight tremoring  -pt picked up and placed large marbles one at a time in groups of 4 for finger to palm translation and in hand manipulation in left hand   -serial opposition of medium sized olvin in groups of 5 to improved fine motor coordination (FMC)   -slight tremoring while manipulating marbles    Patient Education and Home Exercises      Education provided:   -continue working on functional handwriting at home with pinch  (write notes, grocery list, etc)  -Progress towards goals   -reviewed benefits of built up handles on utensils, where to get them and how to make them at home.    Written Home Exercises Provided: yes  Exercises were reviewed and Brie was able to demonstrate them prior to the end of the session.  Brie demonstrated good understanding of the HEP provided.     See EMR under Patient Instructions for exercises provided 2/11/2022.      Assessment     Pt would continue to benefit from skilled OT. Significant improvement in elbow pain noted today compared to previous session. Pt feels she is having most trouble with meal prep, cooking, and handwriting which were the main focus of todays session. " Therapist went over benefit of built up handles, where to get them, or how to simply make them at home. Will continue working on these areas.    Brie is progressing slowly towards her goals and there are no updates to goals at this time. Pt prognosis is Good.     Pt will continue to benefit from skilled outpatient occupational therapy to address the deficits listed in the problem list on initial evaluation provide pt/family education and to maximize pt's level of independence in the home and community environment.     Pt's spiritual, cultural and educational needs considered and pt agreeable to plan of care and goals.    Anticipated barriers to occupational therapy: none    Goals:  Long Term Goals:  Time frame: 5 weeks/ 10 visits  I in Home exercise program for tremor control and medial epicondylitis pain management. (progressing 2/28/2022)   Aware of adaptive techniques to help complete ADL and IADL safely. (progressing 2/28/2022)  Complete grocery list legibly. (MET 2/24/22 for a list of 10 items)    Short Term Goals:  Time frame: 2 weeks/ 5 visits  Demonstrate tremor control techniques daily. (progressing 2/28/2022)   I with Home exercise program . (progressing 2/28/2022)     PLAN     Certification Period/Plan of care expiration: 1/24/2022 to 3/4/2022.     Outpatient Occupational Therapy 2 times weekly for 5 weeks to include the following interventions: Patient Education, Self Care, Therapeutic Activities and Therapeutic Exercise.    Updates/Grading for next session: compensatory options for Activities of Daily Living, fine motor coordination (FMC), stretches of wrist, digits, elbows, handwriting, meal prep    Mario Rojas, OT

## 2022-02-25 NOTE — PLAN OF CARE
"[facility]  Occupational Therapy Plan of Care Note    Name: Brie Han  Clinic Number: [id number]    Therapy Diagnosis:   Encounter Diagnoses   Name Primary?    Tremor Yes    Impaired function of upper extremity     Left arm pain      Physician: Colten Gould MD    Visit Date: 2/24/2022    Physician Orders: Eval and Treat  Medical Diagnosis: essential tremor  Evaluation Date: 1/24/2022  Authorization Period Expiration: 4/30/2022   Plan of Care Expiration: 3/4/2022  New Plan of Care Expiration: 4/1/2022  Visit # / Visits authorized: 6/ 20  FOTO: 60/100 on 2/25/2022 (higher score=higher function)     Precautions: Standard and Fall  Functional Level Prior to Evaluation:  Modified independent    SUBJECTIVE     Update: Feeling better than last week. Elbows are feeling better. She's been writing letters and overall is "doing better."   However, sometimes, especially when getting up in the morning, she feels like her tremors are worse, making it difficult to manage dishes.    OBJECTIVE     Update:   Handwriting for grocery list of 10 items using pinch  on ballpoint pen with 100% legibility and min spelling errors related to English as 2nd language. No pain in elbow following.     Hand Strength (NEELAM Dynamometer, Setting II)   (lbs) Right Left   1 45 48   2 47 48   3 51 49   avg 47.7 48.7   avg on eval 45 38   [norms for women aged 75+: R=42.6 +/-11.0; L=37.6 +/-8.9 (Edil et al, 1985)]     Fine motor coordination:   9 Hole Peg Test (9HPT): (R) 27s (L) 29s (same time x2 trials. Attempted finger flicks prior to the 2nd trial).  [norms for women aged 71+: R=22.49s +/-6.02s; L=24.11s +/-5.66s (Anitra et al, 2003)]    ASSESSMENT     Update: Pt reports improved function verbally, and objective measures taken during therapy session demonstrates improving strength and coordination as well as decreased pain/ difficulty related to the left elbow. However, her responses on FOTO are " inconsistent with all of the above. Ms. Baird has made significant progress in improving her pain and function but has not yet met goals. She can continue to benefit from skilled therapy for upper extremity and core strengthening and to ensure proper performance of HEP.    Long Term Goal Status: continue per initial plan of care.    GOALS  Long Term Goals:  Time frame: 5 weeks/ 10 visits  I in Home exercise program for tremor control and medial epicondylitis pain management. (progressing 2/24/2022)   Aware of adaptive techniques to help complete ADL and IADL safely. (progressing 2/24/2022)  Complete grocery list legibly. (MET 2/24/22 for a list of 10 items)    Short Term Goals:  Time frame: 2 weeks/ 5 visits  Demonstrate tremor control techniques daily. (progressing 2/24/2022)   I with Home exercise program . (progressing 2/24/2022)     Reasons for Recertification of Therapy:  Ms. Baird has made significant progress in improving her pain and function but has not yet met goals. She can continue to benefit from skilled therapy for upper extremity and core strengthening and to ensure proper performance of HEP.    PLAN     Updated Certification Period: 2/24/2022 to 4/1/2022   Recommended Treatment Plan: 2 times per week for 5 weeks:  Manual Therapy, Moist Heat/ Ice, Neuromuscular Re-ed, Patient Education, Self Care, Therapeutic Activities and Therapeutic Exercise    Daya Grant OT    I CERTIFY THE NEED FOR THESE SERVICES FURNISHED UNDER THIS PLAN OF TREATMENT AND WHILE UNDER MY CARE  Physician's comments:      Physician's Signature: ___________________________________________________

## 2022-02-25 NOTE — PROGRESS NOTES
[facility]  Occupational Therapy Treatment Note & POC Update    Date: 2/25/2022  Name: Brie Han  Clinic Number: [id number]    Therapy Diagnosis:   Encounter Diagnoses   Name Primary?    Tremor Yes    Impaired function of upper extremity     Left arm pain      Physician: Colten Gould MD     Physician Orders: Eval and Treat  Medical Diagnosis: essential tremor  Evaluation Date: 1/24/2022  Plan of Care Expiration: 3/4/2022  Progress Note Due: 3/4/2022   Insurance Authorization period Expiration: 2/24/2022  Date of Return to MD: 7/14/2022  Visit # / Visits Authorized: 7/ 20  Total Visits: 8    FOTO: 56/100 on 1/24/2022 (higher score=higher function)     Precautions:  Standard and Fall    Time In: 1015  Time Out: 1100  Total Billable Time: 45 minutes    SUBJECTIVE     Pt reports:   Feeling good today. Pt states she feels like her tremors get better throughout the day. She is only having trouble with cooking and handwriting.    She was compliant with home exercise program given last session.   Response to previous treatment: no concerns.  Functional change: improving tolerance for handwriting    Pain: 0/10 at rest, but right elbow discomfort  Location: bilateral arms sore from work out.     OBJECTIVE     Objective Measures updated at progress report unless specified.      Treatment     Brie received the treatments listed below:     Therapeutic exercises to increase ROM and flexibility for 20 minutes, including:  -yellow t-band for bilateral scapular retraction with mod cues for positioning and performance, controlling return to start position. 3x10  -yellow t-band for bilateral shoulder extension.   -these were placed in closed door to for stable hold   -pt advised to stand farther back if she feels there is not enough resistance  -table top wrist and digit stretches on top of wash clothes for ease of movements   -finger flexion and extension (finger scrunches)   -radial and ulnar  "deviation   -digit abduction and adduction   -wrist circles    Therapeutic activities to improve functional performance for 25 minutes, including:  -pt brought pen and  she was provided last session to work on handwriting today   -handwriting, executive function/ planning activity writing down provided dates in the calender to organization, and did so with 75% legibility.   -pt became fatigued after 10 minutes of this  -while standing (for standing tolerance during activities):    -pt practiced "flipping omelets" with a spatula, pan, and sponges   -pt practiced using both hands to flip and had greater trouble with left upper extremity and noticed an increase in tremors   -pt practiced cutting theraputty with a knife and fork to simulate meal prep.   -pt enjoyed this activity and liked the built up handles- felt like she had more control. Pt and grandson advised they can get spoons with  built up handles on [employer] or they can make them at home by wrapping something around handle such as a washcloth.         Patient Education and Home Exercises      Education provided:   -continue working on functional handwriting at home with pinch  (write notes, grocery list, etc)  - Progress towards goals   -reviewed yellow theraband Home exercise program and how to set up at home.   -reviewed benefits of built up handles on utensils, where to get them and how to make them at home.    Written Home Exercises Provided: yes  Exercises were reviewed and Brie was able to demonstrate them prior to the end of the session.  Brie demonstrated good understanding of the HEP provided.     See EMR under Patient Instructions for exercises provided 2/11/2022.      Assessment     Pt would continue to benefit from skilled OT. Significant improvement in elbow pain noted today compared to previous session. Pt feels she is having most trouble with meal prep, cooking, and handwriting which were the main focus of todays session. " Therapist went over benefit of built up handles, where to get them, or how to simply make them at home. Will continue working on these areas.    Brie is progressing slowly towards her goals and there are no updates to goals at this time. Pt prognosis is Good.     Pt will continue to benefit from skilled outpatient occupational therapy to address the deficits listed in the problem list on initial evaluation provide pt/family education and to maximize pt's level of independence in the home and community environment.     Pt's spiritual, cultural and educational needs considered and pt agreeable to plan of care and goals.    Anticipated barriers to occupational therapy: none    Goals:  Long Term Goals:  Time frame: 5 weeks/ 10 visits  I in Home exercise program for tremor control and medial epicondylitis pain management. (progressing 2/25/2022)   Aware of adaptive techniques to help complete ADL and IADL safely. (progressing 2/25/2022)  Complete grocery list legibly. (MET 2/24/22 for a list of 10 items)    Short Term Goals:  Time frame: 2 weeks/ 5 visits  Demonstrate tremor control techniques daily. (progressing 2/25/2022)   I with Home exercise program . (progressing 2/25/2022)     PLAN     Certification Period/Plan of care expiration: 1/24/2022 to 3/4/2022.     Outpatient Occupational Therapy 2 times weekly for 5 weeks to include the following interventions: Patient Education, Self Care, Therapeutic Activities and Therapeutic Exercise.    Updates/Grading for next session: compensatory options for Activities of Daily Living, fine motor coordination (FMC), stretches of wrist, digits, elbows, handwriting, meal prep    Mario Rojas, OT

## 2022-02-25 NOTE — PROGRESS NOTES
"[facility]  Occupational Therapy Treatment Note    Date: 2/25/2022  Name: Brie Han  Clinic Number: [id number]    Therapy Diagnosis:   Encounter Diagnoses   Name Primary?    Tremor Yes    Impaired function of upper extremity     Left arm pain      Physician: Colten Gould MD     Physician Orders: Eval and Treat  Medical Diagnosis: essential tremor  Evaluation Date: 1/24/2022  Plan of Care Expiration: 3/4/2022  Progress Note Due: 3/4/2022   Insurance Authorization period Expiration: 2/24/2022  Date of Return to MD: 7/14/2022  Visit # / Visits Authorized: 6/ 20  Total Visits: 7    FOTO: 56/100 on 1/24/2022 (higher score=higher function)     Precautions:  Standard and Fall    Time In: 1301  Time Out: ***  Total Billable Time: *** minutes    SUBJECTIVE     Pt reports:   Feeling better than last week. Elbows are feeling better. She's been writing letters and overall is "doing better."   However, sometimes, especially when getting up in the morning, she feels like her tremors are worse, making it difficult to manage dishes.    She was compliant with home exercise program given last session.   Response to previous treatment: no concerns.  Functional change: improving tolerance for handwriting    Pain: 0/10 at rest, but right elbow discomfort  Location: bilateral arms sore from work out.     OBJECTIVE     Objective Measures updated at progress report unless specified.    Treatment     List  Yellow band  squigz    Hand Strength (NEELAM Dynamometer, Setting II)   (lbs) Right Left   1 45 48   2 47 48   3 51 49   avg 47.7 48.7   avg on eval 45 38   {[id number]}    Gross motor coordination:   - HELLEN (Rapid Alternating Movements): ***  - Finger to Nose (5 times): ***  - Finger Flicks (coordination moving from digit flexion to digit extension): ***    [facility] and [facility] assessment: (R) *** (L) ***  {[id number]}    Fine motor coordination:   -   Thumb to Finger Opposition: " ***     9 Hole Peg Test (9HPT): (R) 27s (L) 29s (x2 trials. Attempted finger flicks prior to the 2nd trial).  {[id number]}      Brie received the treatments listed below:     Manual therapy techniques for 4 minutes, including:  -Circular massage to medial epicondyle in both directions to both elbows.    Therapeutic exercises to increase ROM and flexibility for 41 minutes, including:  -ergometer no resistance height 5 , 2.5 minutes forward and 2.5 minutes reverse with rest between rotations. Gloves worn to help .   -Gentle mobilization and stretching of soft-tissues throughout bilateral shoulders, including levator, serratus anterior, subscapularis, anterior complex and combined internal rotators to allow for increased PROM and decreased irritation.  -stretch to wrist flexors (HEP review) 5x30s, pt encouraged to keep elbow bent to decrease compensatory shoulder shrug  -Bilateral shoulder reverse rolls  X 30 to work on proximal strength to facilitate distal control.  -bilateral weight bearing on arms in external rotation and extension with various weight shifts for posture and proximal strength.  -Supine on wedge- scapula stabilization with reverse pendulum and supine punches    Therapeutic activities to improve functional performance for *** minutes, including:  Handwriting for grocery list of 10 items with 100% legibility and min spelling errors related to english as 2nd language. No pain in elbow following.     Neuromuscular re-education activities to improve Coordination, Kinesthetic Sense and Proprioception for 0 minutes. -n/a  Dynamic Self-Care activities to increase independence, safety and/or efficiency in activities of daily living for 0 minutes. The following activities were included:  -n/a     Patient Education and Home Exercises      Education provided:   - Simple stretches to do throughout the day for relief from medial epicondylitis - allow the elbow to be bent and be sure to relax  shoulders.   - recommend grandjessika be near pt when she's doing shoulder circles, especially with the 2# weight. He verbalized understanding. Educated to decrease weight if too sore and decrease frequency.   -continue working on functional handwriting at home with pinch  (write notes, grocery list, etc)  - Progress towards goals   -reviewed yellow theraband Home exercise program and how to set up at home.   Written Home Exercises Provided: yes  Exercises were reviewed and Brie was able to demonstrate them prior to the end of the session.  Brie demonstrated good understanding of the HEP provided.     See EMR under Patient Instructions for exercises provided 2/11/2022.      Assessment     Pt would continue to benefit from skilled OT. Limited resisted exercises this date due to general muscle soreness from resistance. Reviewed Home exercise program but did not perform due to muscle soreness. No tremors noted during treatment this date.  She may benefit from ed regarding modifications to strategies for cleaning at home to limit discomfort.     Brie is progressing slowly towards her goals and there are no updates to goals at this time. Pt prognosis is Good.     Pt will continue to benefit from skilled outpatient occupational therapy to address the deficits listed in the problem list on initial evaluation provide pt/family education and to maximize pt's level of independence in the home and community environment.     Pt's spiritual, cultural and educational needs considered and pt agreeable to plan of care and goals.    Anticipated barriers to occupational therapy: none    Goals:  Long Term Goals:  Time frame: 5 weeks/ 10 visits  I in Home exercise program for tremor control and medial epicondylitis pain management. (progressing 2/25/2022)   Aware of adaptive techniques to help complete ADL and IADL safely. (progressing 2/25/2022)  Complete grocery list legibly. (MET 2/24/22 for a list of 10 items)    Short  Term Goals:  Time frame: 2 weeks/ 5 visits  Demonstrate tremor control techniques daily. (progressing 2/25/2022)   I with Home exercise program . (progressing 2/25/2022)     PLAN     Certification Period/Plan of care expiration: 1/24/2022 to 3/4/2022.     Outpatient Occupational Therapy 2 times weekly for 5 weeks to include the following interventions: Patient Education, Self Care, Therapeutic Activities and Therapeutic Exercise.    Updates/Grading for next session: Review bilateral scapular retraction against yellow t-band with handles and add to HEP as appropriate. Print out Home exercise program from 2/11/2022. Functional overhead reaching with light-weight items, focus on using only a light . Consider addressing potential modifications to IADLs to decrease pain/ need for compensatory movements.     Mario Rojas, OT

## 2022-02-28 ENCOUNTER — CLINICAL SUPPORT (OUTPATIENT)
Dept: REHABILITATION | Facility: HOSPITAL | Age: 87
End: 2022-02-28
Payer: MEDICARE

## 2022-02-28 DIAGNOSIS — M79.602 LEFT ARM PAIN: ICD-10-CM

## 2022-02-28 DIAGNOSIS — R25.1 TREMOR: Primary | ICD-10-CM

## 2022-02-28 DIAGNOSIS — R68.89 IMPAIRED FUNCTION OF UPPER EXTREMITY: ICD-10-CM

## 2022-02-28 PROCEDURE — 97530 THERAPEUTIC ACTIVITIES: CPT | Mod: HCNC,PN

## 2022-02-28 PROCEDURE — 97110 THERAPEUTIC EXERCISES: CPT | Mod: HCNC,PN

## 2022-03-03 ENCOUNTER — CLINICAL SUPPORT (OUTPATIENT)
Dept: REHABILITATION | Facility: HOSPITAL | Age: 87
End: 2022-03-03
Payer: MEDICARE

## 2022-03-03 ENCOUNTER — OFFICE VISIT (OUTPATIENT)
Dept: FAMILY MEDICINE | Facility: CLINIC | Age: 87
End: 2022-03-03
Payer: MEDICARE

## 2022-03-03 VITALS
OXYGEN SATURATION: 97 % | BODY MASS INDEX: 22.8 KG/M2 | TEMPERATURE: 98 F | HEART RATE: 62 BPM | WEIGHT: 113.13 LBS | DIASTOLIC BLOOD PRESSURE: 58 MMHG | SYSTOLIC BLOOD PRESSURE: 118 MMHG | RESPIRATION RATE: 18 BRPM | HEIGHT: 59 IN

## 2022-03-03 DIAGNOSIS — R35.0 URINARY FREQUENCY: Primary | ICD-10-CM

## 2022-03-03 DIAGNOSIS — Z74.09 DECREASED FUNCTIONAL MOBILITY AND ENDURANCE: Primary | ICD-10-CM

## 2022-03-03 DIAGNOSIS — R26.89 IMPAIRMENT OF BALANCE: ICD-10-CM

## 2022-03-03 LAB
BILIRUB SERPL-MCNC: ABNORMAL MG/DL
BLOOD URINE, POC: 50
CLARITY, POC UA: CLEAR
COLOR, POC UA: YELLOW
GLUCOSE UR QL STRIP: NORMAL
KETONES UR QL STRIP: ABNORMAL
LEUKOCYTE ESTERASE URINE, POC: ABNORMAL
NITRITE, POC UA: ABNORMAL
PH, POC UA: 7
PROTEIN, POC: ABNORMAL
SPECIFIC GRAVITY, POC UA: 1
UROBILINOGEN, POC UA: NORMAL

## 2022-03-03 PROCEDURE — 99213 OFFICE O/P EST LOW 20 MIN: CPT | Mod: S$GLB,,, | Performed by: NURSE PRACTITIONER

## 2022-03-03 PROCEDURE — 1157F PR ADVANCE CARE PLAN OR EQUIV PRESENT IN MEDICAL RECORD: ICD-10-PCS | Mod: CPTII,S$GLB,, | Performed by: NURSE PRACTITIONER

## 2022-03-03 PROCEDURE — 99999 PR PBB SHADOW E&M-EST. PATIENT-LVL IV: CPT | Mod: PBBFAC,,, | Performed by: NURSE PRACTITIONER

## 2022-03-03 PROCEDURE — 1126F PR PAIN SEVERITY QUANTIFIED, NO PAIN PRESENT: ICD-10-PCS | Mod: CPTII,S$GLB,, | Performed by: NURSE PRACTITIONER

## 2022-03-03 PROCEDURE — 81002 URINALYSIS NONAUTO W/O SCOPE: CPT | Mod: S$GLB,,, | Performed by: NURSE PRACTITIONER

## 2022-03-03 PROCEDURE — 1159F MED LIST DOCD IN RCRD: CPT | Mod: CPTII,S$GLB,, | Performed by: NURSE PRACTITIONER

## 2022-03-03 PROCEDURE — 1101F PR PT FALLS ASSESS DOC 0-1 FALLS W/OUT INJ PAST YR: ICD-10-PCS | Mod: CPTII,S$GLB,, | Performed by: NURSE PRACTITIONER

## 2022-03-03 PROCEDURE — 1157F ADVNC CARE PLAN IN RCRD: CPT | Mod: CPTII,S$GLB,, | Performed by: NURSE PRACTITIONER

## 2022-03-03 PROCEDURE — 1160F PR REVIEW ALL MEDS BY PRESCRIBER/CLIN PHARMACIST DOCUMENTED: ICD-10-PCS | Mod: CPTII,S$GLB,, | Performed by: NURSE PRACTITIONER

## 2022-03-03 PROCEDURE — 81002 POCT URINE DIPSTICK WITHOUT MICROSCOPE: ICD-10-PCS | Mod: S$GLB,,, | Performed by: NURSE PRACTITIONER

## 2022-03-03 PROCEDURE — 97530 THERAPEUTIC ACTIVITIES: CPT | Mod: HCNC,PN

## 2022-03-03 PROCEDURE — 99213 PR OFFICE/OUTPT VISIT, EST, LEVL III, 20-29 MIN: ICD-10-PCS | Mod: S$GLB,,, | Performed by: NURSE PRACTITIONER

## 2022-03-03 PROCEDURE — 1160F RVW MEDS BY RX/DR IN RCRD: CPT | Mod: CPTII,S$GLB,, | Performed by: NURSE PRACTITIONER

## 2022-03-03 PROCEDURE — 1101F PT FALLS ASSESS-DOCD LE1/YR: CPT | Mod: CPTII,S$GLB,, | Performed by: NURSE PRACTITIONER

## 2022-03-03 PROCEDURE — 1126F AMNT PAIN NOTED NONE PRSNT: CPT | Mod: CPTII,S$GLB,, | Performed by: NURSE PRACTITIONER

## 2022-03-03 PROCEDURE — 99999 PR PBB SHADOW E&M-EST. PATIENT-LVL IV: ICD-10-PCS | Mod: PBBFAC,,, | Performed by: NURSE PRACTITIONER

## 2022-03-03 PROCEDURE — 3288F PR FALLS RISK ASSESSMENT DOCUMENTED: ICD-10-PCS | Mod: CPTII,S$GLB,, | Performed by: NURSE PRACTITIONER

## 2022-03-03 PROCEDURE — 1159F PR MEDICATION LIST DOCUMENTED IN MEDICAL RECORD: ICD-10-PCS | Mod: CPTII,S$GLB,, | Performed by: NURSE PRACTITIONER

## 2022-03-03 PROCEDURE — 3288F FALL RISK ASSESSMENT DOCD: CPT | Mod: CPTII,S$GLB,, | Performed by: NURSE PRACTITIONER

## 2022-03-03 PROCEDURE — 87086 URINE CULTURE/COLONY COUNT: CPT | Mod: HCNC | Performed by: NURSE PRACTITIONER

## 2022-03-03 RX ORDER — DOXYCYCLINE 100 MG/1
100 CAPSULE ORAL 2 TIMES DAILY
Qty: 14 CAPSULE | Refills: 0 | Status: SHIPPED | OUTPATIENT
Start: 2022-03-03 | End: 2022-03-10

## 2022-03-03 NOTE — PROGRESS NOTES
Subjective:       Patient ID: Brie Han is a 89 y.o. female.    Chief Complaint: Urinary Frequency and Dysuria    Dysuria   This is a new problem. The current episode started 1 to 4 weeks ago. The quality of the pain is described as burning. The pain is at a severity of 4/10. There has been no fever. Associated symptoms include frequency and urgency. Pertinent negatives include no hematuria or rash. She has tried nothing for the symptoms.       Past Medical History:   Diagnosis Date    Anticoagulant long-term use     Arthritis     Glaucoma     resolved    Hyperlipidemia     Hypertension     Stroke 2006       Review of patient's allergies indicates:   Allergen Reactions    Contrast media Other (See Comments)    Aspirin Other (See Comments)    Ciprofloxacin Other (See Comments)    Iron Other (See Comments)    Iodine Rash    Penicillins Rash         Current Outpatient Medications:     acetaminophen (TYLENOL) 325 MG tablet, Take 2 tablets (650 mg total) by mouth every 8 (eight) hours as needed., Disp: , Rfl: 0    b complex vitamins tablet, Take 1 tablet by mouth once daily., Disp: , Rfl:     calcium citrate-vitamin D3 315-200 mg (CITRACAL+D) 315 mg-5 mcg (200 unit) per tablet, Every day, Disp: , Rfl:     clopidogreL (PLAVIX) 75 mg tablet, Take 1 tablet (75 mg total) by mouth once daily., Disp: 90 tablet, Rfl: 4    clopidogreL (PLAVIX) 75 mg tablet, TAKE 1 TABLET EVERY DAY, Disp: 90 tablet, Rfl: 3    KRILL OIL ORAL, Take by mouth., Disp: , Rfl:     latanoprost 0.005 % ophthalmic solution, INSTILL 1 DROP INTO BOTH EYES EVERY EVENING., Disp: 2.5 mL, Rfl: 11    losartan (COZAAR) 100 MG tablet, Take 1 tablet (100 mg total) by mouth once daily., Disp: 90 tablet, Rfl: 3    lovastatin (MEVACOR) 10 MG tablet, TAKE 1 TABLET EVERY EVENING, Disp: 90 tablet, Rfl: 3    lovastatin (MEVACOR) 10 MG tablet, Take 1 tablet (10 mg total) by mouth every evening., Disp: 90 tablet, Rfl: 3    lovastatin (MEVACOR)  "10 MG tablet, TAKE 1 TABLET (10 MG TOTAL) BY MOUTH EVERY EVENING., Disp: 90 tablet, Rfl: 1    multivitamin capsule, Every day, Disp: , Rfl:     NIFEdipine (PROCARDIA-XL) 30 MG (OSM) 24 hr tablet, TAKE 1 TABLET ONE TIME DAILY, Disp: 90 tablet, Rfl: 3    pantoprazole (PROTONIX) 20 MG tablet, Take 1 tablet (20 mg total) by mouth once daily., Disp: 30 tablet, Rfl: 11    triamcinolone acetonide 0.1% (KENALOG) 0.1 % ointment, Use on AA BID x 14 days, Disp: 30 g, Rfl: 0    brimonidine 0.2% (ALPHAGAN) 0.2 % Drop, Place 1 drop into the right eye 2 (two) times a day., Disp: 15 mL, Rfl: 4    brimonidine 0.2% (ALPHAGAN) 0.2 % Drop, Place 1 drop into the right eye 2 (two) times daily., Disp: 5 mL, Rfl: 0    Review of Systems   Constitutional: Negative for unexpected weight change.   HENT: Negative for trouble swallowing.    Eyes: Negative for visual disturbance.   Respiratory: Negative for shortness of breath.    Cardiovascular: Negative for chest pain, palpitations and leg swelling.   Gastrointestinal: Negative for blood in stool.   Genitourinary: Positive for dysuria, frequency and urgency. Negative for hematuria.   Skin: Negative for rash.   Allergic/Immunologic: Negative for immunocompromised state.   Neurological: Negative for headaches.   Hematological: Does not bruise/bleed easily.   Psychiatric/Behavioral: Negative for agitation. The patient is not nervous/anxious.        Objective:      BP (!) 118/58 (BP Location: Right arm, Patient Position: Sitting, BP Method: Large (Manual))   Pulse 62   Temp 97.7 °F (36.5 °C) (Oral)   Resp 18   Ht 4' 11" (1.499 m)   Wt 51.3 kg (113 lb 1.5 oz)   LMP 12/07/1972   SpO2 97%   BMI 22.84 kg/m²   Physical Exam  Constitutional:       Appearance: She is well-developed.   Eyes:      Pupils: Pupils are equal, round, and reactive to light.   Cardiovascular:      Rate and Rhythm: Normal rate and regular rhythm.      Heart sounds: Normal heart sounds.   Pulmonary:      Effort: " Pulmonary effort is normal.      Breath sounds: Normal breath sounds.   Abdominal:      General: Bowel sounds are normal.      Palpations: Abdomen is soft.   Musculoskeletal:         General: Normal range of motion.      Cervical back: Normal range of motion.   Skin:     General: Skin is warm and dry.   Neurological:      Mental Status: She is alert and oriented to person, place, and time.   Psychiatric:         Behavior: Behavior normal.         Thought Content: Thought content normal.         Judgment: Judgment normal.         Assessment:       1. Urinary frequency    2. BMI 22.0-22.9, adult        Plan:       Urinary frequency  -     POCT URINE DIPSTICK WITHOUT MICROSCOPE  -     Cancel: POCT URINE DIPSTICK WITHOUT MICROSCOPE  -     Urine culture  -     doxycycline (MONODOX) 100 MG capsule; Take 1 capsule (100 mg total) by mouth 2 (two) times daily. for 7 days  Dispense: 14 capsule; Refill: 0    BMI 22.0-22.9, adult  Stable, continue management        Time spent with patient: 20 minutes    Patient with be reevaluated in 3 months or sooner prn    Greater than 50% of this visit was spent counseling as described in above documentation:Yes

## 2022-03-03 NOTE — PROGRESS NOTES
"[facility] OUTPATIENT THERAPY AND WELLNESS   Physical Therapy Treatment Note     Name: Brie Han  Clinic Number: [id number]    Therapy Diagnosis:   Encounter Diagnoses   Name Primary?    Decreased functional mobility and endurance Yes    Impairment of balance      Physician: Colten Gould MD    Visit Date: 3/4/2022    Physician Orders: PT Eval and Treat  Medical Diagnosis from Referral: G25.0 (ICD-10-CM) - Essential tremor  Evaluation Date: 1/24/2022  Authorization Period Expiration: 1/24/2022 - 4/24/2022  Plan of Care Expiration: 4/30/2022  Episodes: 10  Visit # / Visits authorized: 8/20  FOTO visit #: 7  FOTO score: 70.6 (1/28/2022) 59 (2/24/2022)     PTA Visit #: 0/5    Time In: 0930  Time Out: 1030  Total Billable Time: 60 minutes    SUBJECTIVE     Pt reports: "I started my antibiotics this morning."  She was compliant with home exercise program.  Response to previous treatment: no problems stated  Functional change: ongoing    Pain: 0/10  Location: Not Applicable     OBJECTIVE     Objective Measures updated at progress report unless specified.     Treatment     Brie received the treatments listed below:      therapeutic exercises to develop strength, endurance, range of motion , flexibility, posture and core stabilization for 60 minutes including:     NuStep Level 2 10 minutes Bilater Lower Extremities only    Performed all exercises on home exercise program provided:  Scap retractions with red theraband x20  Shoulder extensions with red theraband x20  Bicep curls with red theraband x20  minisquats x20  Heel raises x 20  Seated hip adduction with towel x20  Seated hip abduction with red theraband x20  Supine single knee to chest x10 bilaterally  Supine bridges x10    Patient Education and Home Exercises     Home Exercises Provided and Patient Education Provided     Education provided:   - Patient provided with verbal and demonstrative instruction for all activities performed in today's " session.    Written Home Exercises Provided: provided home exercise program 3/4/22. Provided walking program as well. Exercises were reviewed and Brie was able to demonstrate them prior to the end of the session.  Brie demonstrated good  understanding of the education provided. See EMR under Patient Instructions for exercises provided during therapy sessions    ASSESSMENT     Brie provided good participation and effort during today's session. Patient with good improvements in balance and mobility. Provided patient with home exercise program to maintain strength and balance. Encouraged patient to start a walking program to keep and improve her endurance gains.     Brie Is progressing well towards her goals.   Pt prognosis is Good.     Pt will continue to benefit from skilled outpatient physical therapy to address the deficits listed in the problem list box on initial evaluation, provide pt/family education and to maximize pt's level of independence in the home and community environment.     Pt's spiritual, cultural and educational needs considered and pt agreeable to plan of care and goals.     Anticipated barriers to physical therapy: none    Goals:   Short Term Goals: 5 weeks   1. Patient to perform dynamic standing for 20 minutes with [place] using No Assistive Device to prepare for functional tasks in standing. (progressing)  2. Patient to perform stair navigation with right Handrails Modified [place] using No Assistive Device. (MET)  3. Patient to increase DGI score to normative value to show reduction in fall risk. (MET)     Long Term Goals: 10 weeks   1. Patient to increase strength of BLE  to 5/5. (MET)  2. Patient to report decrease in pain by 4 points to increase quality of life. (MET)  3. Patient to increase gait speed to normative values to increase community ambulation and quality of life. (MET)     New goals to be met by 4/3/22  1. Patient independent with home exercise  program. (ongoing)  2. Patient to perform dynamic standing balance activities for 20 minutes with independence. (ongoing)  3. FOTO to increase by 10 points from initial visit. (ongoing)    PLAN     Outpatient Physical Therapy 1 times weekly for 10 weeks to include the following interventions: Gait Training, Manual Therapy, Neuromuscular Re-ed, Patient Education, Therapeutic Activities and Therapeutic Exercise.     Rivka Martinez PT, DPT, CLT  3/4/2022

## 2022-03-03 NOTE — PLAN OF CARE
"[facility]   Physical Therapy Treatment Note   plan of care update    Name: Brie Han  Clinic Number: [id number]    Therapy Diagnosis:   Encounter Diagnoses   Name Primary?    Decreased functional mobility and endurance Yes    Impairment of balance      Physician: Colten Gould MD    Visit Date: 3/3/2022    Physician Orders: PT Eval and Treat  Medical Diagnosis from Referral: G25.0 (ICD-10-CM) - Essential tremor  Evaluation Date: 1/24/2022  Authorization Period Expiration: 1/24/2022 - 4/24/2022  Plan of Care Expiration: 4/30/2022  Plan of Care update: 4/3/2022  Episodes: 9  Visit # / Visits authorized: 7/20  FOTO visit #: 7  FOTO score: 70.6 (1/28/2022) 59 (2/24/2022)     PTA Visit #: 0/5    Time In: 0930  Time Out: 1015  Total Billable Time: 45 minutes    SUBJECTIVE     Pt reports: Doing well today. Patient states having another appointment this morning.  She was compliant with home exercise program.  Response to previous treatment: no problems stated  Functional change: "my legs are stronger."    Pain: 0/10  Location: Not Applicable     OBJECTIVE     Objective Measures updated at progress report unless specified.     Therapeutic Activity x 45 minutes:   bilateral lower extremities 5/5 grossly on manual muscle testing.    Five Times Sit to Stand Test:  How long the patient takes to completed 5 sit to stand form chair with arms folded across chest: 20 seconds  "Inability to perform test in less than 13.6 seconds indicates increased disability and Morbidity." (Eron 2000).  Normative values for community dwelling elderly:   · 60-68y/o: 11.4 seconds  · 70-80y/o: 12.6 seconds  · 80-90y/o: 14.8 seconds    Dynamic Gait Index (DGI):    1. Gait level surface (3) Normal: Walks 20ft, no assistive devices, good speed, no evidence for imbalance, normal gait pattern.    2. Change in gait speed: (3) Normal: Able to smoothly change walking speed without loss of balance or gait deviation. " Shows asignificant difference in walking speeds between normal, fast and slow speeds..    3. Gait with horizontal head turns:(3) Normal: Performs head turns smoothly with no change in gait..    4. Gait with vertical head turns (3) Normal: Performs head turns smoothly with no change in gait..    5. Gait and pivot turn:(3) Normal: Pivot turns safely within 3 seconds and stops quickly with no loss of balance..    6. Step over obstacle:(2) Mild Impairment: Is able to step over box, but must slow down and adjust steps to clear box safely..    7. Step around obstacles: (3) Normal: Is able to walk around cones safely without changing gait speed; no evidence ofImbalance..    8. Steps:(2) Mild Impairment: Alternating feet, must use rail..    TOTAL SCORE: 22 / 24    < 19/24 = predictive of falls in the elderly  > 22/24 = safe ambulators    Gait Speed: 1.2m/s  · 1.2m/s - 1.4m/s normal walking speed & can cross street  · Normal gait speed for average healthy adult: 1.2m/s-1.4m/s    Activities-specific Balance Confidence Scale:   Score: 81%--high level of physical functioning    Treatment     Brie received the treatments listed below:      therapeutic exercises to develop strength, endurance, range of motion , flexibility, posture and core stabilization for 0 minutes including:    Re-assessed patient's balance, strength and goals.     Patient Education and Home Exercises     Home Exercises Provided and Patient Education Provided     Education provided:   - Patient provided with verbal and demonstrative instruction for all activities performed in today's session.    Written Home Exercises Provided: Patient instructed to cont prior HEP. Exercises were reviewed and Brie was able to demonstrate them prior to the end of the session.  Brie demonstrated good  understanding of the education provided. See EMR under Patient Instructions for exercises provided during therapy sessions    ASSESSMENT     Brie provided good  participation and effort during today's session. Patient with improved strength, balance and gait speed since evaluation. Patient also with no complaints of pain. Patient has met goals as well. Patient is appropriate to reduce physical therapy frequency to 1x/wk for 4 weeks to continue balance training and provide home exercise program to continue once therapy is completed.     Brie Is progressing well towards her goals.   Pt prognosis is Good.     Pt will continue to benefit from skilled outpatient physical therapy to address the deficits listed in the problem list box on initial evaluation, provide pt/family education and to maximize pt's level of independence in the home and community environment.     Pt's spiritual, cultural and educational needs considered and pt agreeable to plan of care and goals.     Anticipated barriers to physical therapy: none    Goals:   Short Term Goals: 5 weeks   1. Patient to perform dynamic standing for 20 minutes with [place] using No Assistive Device to prepare for functional tasks in standing. (progressing)  2. Patient to perform stair navigation with right Handrails Modified [place] using No Assistive Device. (MET)  3. Patient to increase DGI score to normative value to show reduction in fall risk. (MET)     Long Term Goals: 10 weeks   1. Patient to increase strength of BLE  to 5/5. (MET)  2. Patient to report decrease in pain by 4 points to increase quality of life. (MET)  3. Patient to increase gait speed to normative values to increase community ambulation and quality of life. (MET)    New goals to be met by 4/3/22  1. Patient independent with home exercise program. (ongoing)  2. Patient to perform dynamic standing balance activities for 20 minutes with independence. (ongoing)  3. FOTO to increase by 10 points from initial visit. (ongoing)    PLAN     Outpatient Physical Therapy 1 times weekly for 5 weeks to include the following interventions: Gait Training,  Manual Therapy, Neuromuscular Re-ed, Patient Education, Therapeutic Activities and Therapeutic Exercise.     Rivka Martinez PT, DPT, CLT  3/3/2022

## 2022-03-04 ENCOUNTER — CLINICAL SUPPORT (OUTPATIENT)
Dept: REHABILITATION | Facility: HOSPITAL | Age: 87
End: 2022-03-04
Payer: MEDICARE

## 2022-03-04 DIAGNOSIS — Z74.09 DECREASED FUNCTIONAL MOBILITY AND ENDURANCE: Primary | ICD-10-CM

## 2022-03-04 DIAGNOSIS — R26.89 IMPAIRMENT OF BALANCE: ICD-10-CM

## 2022-03-04 PROCEDURE — 97110 THERAPEUTIC EXERCISES: CPT | Mod: HCNC,PN

## 2022-03-05 LAB — BACTERIA UR CULT: NO GROWTH

## 2022-03-08 ENCOUNTER — CLINICAL SUPPORT (OUTPATIENT)
Dept: REHABILITATION | Facility: HOSPITAL | Age: 87
End: 2022-03-08
Payer: MEDICARE

## 2022-03-08 DIAGNOSIS — M79.602 LEFT ARM PAIN: ICD-10-CM

## 2022-03-08 DIAGNOSIS — R25.1 TREMOR: Primary | ICD-10-CM

## 2022-03-08 DIAGNOSIS — R68.89 IMPAIRED FUNCTION OF UPPER EXTREMITY: ICD-10-CM

## 2022-03-08 PROCEDURE — 97110 THERAPEUTIC EXERCISES: CPT | Mod: HCNC,PN

## 2022-03-08 PROCEDURE — 97530 THERAPEUTIC ACTIVITIES: CPT | Mod: HCNC,PN

## 2022-03-08 NOTE — PROGRESS NOTES
"[facility]  Occupational Therapy Treatment Note     Date: 3/8/2022  Name: Brie Han  Clinic Number: [id number]    Therapy Diagnosis:   Encounter Diagnoses   Name Primary?    Tremor Yes    Impaired function of upper extremity     Left arm pain      Physician: Colten Gould MD     Physician Orders: Eval and Treat  Medical Diagnosis: essential tremor  Evaluation Date: 1/24/2022  Plan of Care Expiration: 4/1/2022  Progress Note Due: 3/4/2022   Insurance Authorization period Expiration: 4/30/2022  Date of Return to MD: 7/14/2022  Visit # / Visits Authorized: 9/ 20  Total Visits: 10    FOTO: 60/100 on 2/25/2022 (higher score=higher function)    Precautions:  Standard and Fall    Time In: 0927  Time Out: 1000  Total Billable Time: 30 minutes    SUBJECTIVE     Pt reports: "I got nervous... he was rushing me."   She was compliant with home exercise program given last session.   Response to previous treatment: no concerns.  Functional change: handwriting is improving    Pain: 0/10 at rest, but right elbow discomfort  Location: bilateral arms sore from work out.     OBJECTIVE     Objective Measures updated at progress report unless specified.    Treatment       LOB x2 during session - slight trip maneuvering around chair when leaving lobby and then when positioning self for t-band exs.  At 0941, HT=177/69, HR=69, SpO2=98%.  At 1001, AO=627/70, HR=69, SpO2=98%.     Brie received the treatments listed below:     Therapeutic exercises to increase ROM and flexibility for 13 minutes, including:  -bilateral scapular retraction: yellow t-band x15, red t-band x15  -bilateral shoulder extension: red t-band 2x15  -right & left shoulder external rotation was initiated but deferred due to LOB noted above and OT taking vitals. Completed remainder of session seated.     Therapeutic activities to improve functional performance for 17 minutes, including:    Due to posterior pelvic tilt, pt positioned on " wedge to increase upright positioning. No change with fixing postural positioning, and tremor still present. Attempted serial opposition, but pt not consistently switching fingers. Completed with ~10 sets of 4 on each hand. Repeated with 1/2# weight at wrist, improved attention to alternating fingers and slight decr in tremor noted. Repeated with 1/4# weight at dorsum of hand, again with slight improvement in tremor noted, slower movements and increased attention to alternating fingers.     Patient Education and Home Exercises      Education provided:   -continue working on functional handwriting at home with pinch  (write notes, grocery list, etc)  -Progress towards goals   -reviewed benefits of built up handles on utensils, where to get them and how to make them at home.  -possible benefit of Readi-steadi Glove system to address tremors.     Written Home Exercises Provided: Patient instructed to cont prior HEP.  Exercises were reviewed and Brie was able to demonstrate them prior to the end of the session.  Brie demonstrated good  understanding of the HEP provided.     See EMR under Patient Instructions for exercises provided during therapy sessions.      Assessment     Pt would continue to benefit from skilled OT. Ms. Baird responded well to distal weighting and may benefit from a Readi-Steadi Glove system, an anti-tremor orthotic. Pt and grandson expressed interest. Will continue to discuss and get measurements and videos at next session. See attached POC update.    Brie is progressing slowly towards her goals and there are no updates to goals at this time. Pt prognosis is Good.     Pt will continue to benefit from skilled outpatient occupational therapy to address the deficits listed in the problem list on initial evaluation provide pt/family education and to maximize pt's level of independence in the home and community environment.     Pt's spiritual, cultural and educational needs considered  and pt agreeable to plan of care and goals.    Anticipated barriers to occupational therapy: none    Goals:  Long Term Goals:  Time frame: 5 weeks/ 10 visits  I in Home exercise program for tremor control and medial epicondylitis pain management. (progressing 3/8/2022)   Aware of adaptive techniques to help complete ADL and IADL safely. (progressing 3/8/2022)  Complete grocery list legibly. (MET 2/24/22 for a list of 10 items)    Short Term Goals:  Time frame: 2 weeks/ 5 visits  Demonstrate tremor control techniques daily. (progressing 3/8/2022)   I with Home exercise program . (progressing 3/8/2022)     PLAN     Updated Certification Period: 2/24/2022 to 4/1/2022   Recommended Treatment Plan: 2 times per week for 5 weeks:  Manual Therapy, Moist Heat/ Ice, Neuromuscular Re-ed, Patient Education, Self Care, Therapeutic Activities and Therapeutic Exercise     Updates/Grading for next session: measurements and videos for naheedi-nigel glove; compensatory options for Activities of Daily Living, fine motor coordination (FMC), stretches of wrist, digits, elbows, handwriting, meal prep    Daya Grant, OT

## 2022-03-09 ENCOUNTER — CLINICAL SUPPORT (OUTPATIENT)
Dept: REHABILITATION | Facility: HOSPITAL | Age: 87
End: 2022-03-09
Payer: MEDICARE

## 2022-03-09 DIAGNOSIS — Z74.09 DECREASED FUNCTIONAL MOBILITY AND ENDURANCE: Primary | ICD-10-CM

## 2022-03-09 DIAGNOSIS — R26.89 IMPAIRMENT OF BALANCE: ICD-10-CM

## 2022-03-09 PROCEDURE — 97110 THERAPEUTIC EXERCISES: CPT | Mod: HCNC,PN,CQ

## 2022-03-09 NOTE — PROGRESS NOTES
[facility]   Physical Therapy Treatment Note     Name: Brie Han  Clinic Number: [id number]    Therapy Diagnosis:   Encounter Diagnoses   Name Primary?    Decreased functional mobility and endurance Yes    Impairment of balance      Physician: Colten Gould MD    Visit Date: 3/9/2022    Physician Orders: PT Eval and Treat  Medical Diagnosis from Referral: G25.0 (ICD-10-CM) - Essential tremor  Evaluation Date: 1/24/2022  Authorization Period Expiration: 1/24/2022 - 4/24/2022  Plan of Care Expiration: 4/30/2022  Episodes: 8  Visit # / Visits authorized: 9/20  FOTO visit #: 9  FOTO score: 70.6 (1/28/2022) 59 (2/24/2022)     PTA Visit #: 1/5    Time In: 1422  Time Out: 1508  Total Billable Time: 46 minutes    SUBJECTIVE     Pt reports: Brie reported feeling tired today secondary to busy activities at home. Reports no pain but does report muscle fatigue with exercise. Reports sleeping well last night but not the previous nights secondary to taking medications.  Response to previous treatment: no problems stated  Functional change: ongoing    Pain: 0/10  Location: Not Applicable     OBJECTIVE     Objective Measures updated at progress report unless specified.     Treatment     Brie received the treatments listed below:      therapeutic exercises to develop strength, endurance, range of motion , flexibility, posture and core stabilization for 48 minutes including:    NuStep Level 3 11.5 minutes Bilater Lower Extremities and upper extremities    Sitting: Ball squeezes 3 sets of 10              hip abduction Red Theraband 3 sets of 10     Parallel Bars:                  Calf raises 3 sets of 10                  Mini squats 2 sets of 10 stand by assist, cues for proper form                  Hamstring curls 2 sets of 10 left right alternating      Standing:   Biceps curl with red thera-band 2 sets of 10 stand by assist, manual and verbal                                   cues to not  elevate shoulder                  Rows with red thera-band 2 sets of 10 stand by assist, manual and verbal cues to engage                shoulder blades and keep elbows in    Supine: Bridge  2 sets of 10     single knee to chest with  1 set of 15 left right    Patient Education and Home Exercises     Home Exercises Provided and Patient Education Provided     Education provided:   - Patient provided with verbal and demonstrative instruction for all activities performed in today's session.    Written Home Exercises Provided: Patient instructed to cont prior HEP. Exercises were reviewed and Mercedes was able to demonstrate them prior to the end of the session.  Mercedes demonstrated good  understanding of the education provided. See EMR under Patient Instructions for exercises provided during therapy sessions    ASSESSMENT     Mercedes provided good participation and effort during today's session with reports of fatigue so mercedes was given rest breaks as needed. Treatment focused on Bilateral lower extremity and upper extremity strengthening activities to improve muscle endurance. Mercedes needed cues for proper form and direction when performing her tasks. Mercedes provided with towel in supine to support head/neck.    Mercedes Is progressing well towards her goals.   Pt prognosis is Good.     Pt will continue to benefit from skilled outpatient physical therapy to address the deficits listed in the problem list box on initial evaluation, provide pt/family education and to maximize pt's level of independence in the home and community environment.     Pt's spiritual, cultural and educational needs considered and pt agreeable to plan of care and goals.     Anticipated barriers to physical therapy: none    Goals:   Short Term Goals: 5 weeks   1. Patient to perform dynamic standing for 20 minutes with [place] using No Assistive Device to prepare for functional tasks in standing. (progressing)  2. Patient to perform  stair navigation with right Handrails Modified [place] using No Assistive Device. (progressing)  3. Patient to increase DGI score to normative value to show reduction in fall risk. (ongoing)     Long Term Goals: 10 weeks   1. Patient to increase strength of BLE  to 5/5. (ongoing)  2. Patient to report decrease in pain by 4 points to increase quality of life. (progressing)  3. Patient to increase gait speed to normative values to increase community ambulation and quality of life. (ongoing)    PLAN     Outpatient Physical Therapy 2 times weekly for 10 weeks to include the following interventions: Gait Training, Manual Therapy, Neuromuscular Re-ed, Patient Education, Therapeutic Activities and Therapeutic Exercise.     I certify that I was present in the room directing SONAL Livingston  in service delivery and guiding them using my skilled judgment. As the co-signing therapist I have reviewed the students documentation and am responsible for the treatment, assessment, and plan.      Deann Yuen, Physical Therapist Assistant

## 2022-03-11 ENCOUNTER — CLINICAL SUPPORT (OUTPATIENT)
Dept: REHABILITATION | Facility: HOSPITAL | Age: 87
End: 2022-03-11
Payer: MEDICARE

## 2022-03-11 DIAGNOSIS — R25.1 TREMOR: Primary | ICD-10-CM

## 2022-03-11 DIAGNOSIS — M79.602 LEFT ARM PAIN: ICD-10-CM

## 2022-03-11 DIAGNOSIS — R68.89 IMPAIRED FUNCTION OF UPPER EXTREMITY: ICD-10-CM

## 2022-03-11 PROCEDURE — 97530 THERAPEUTIC ACTIVITIES: CPT | Mod: PN

## 2022-03-11 NOTE — PATIENT INSTRUCTIONS
PUTTY        Squeeze putty in hand trying to keep it round by rotating putty after each squeeze. Push fingers through putty to palm each time.   Avoid pain. 20 squeezes, 3 times per day.       PUTTY ROLL OUT AND TIP PINCH           Roll some putty into a ball with your left hand. Then, roll it into a tube. Gently pinch the putty with the tips of your left index finger and thumb; repeat down the section.  Then roll into a ball again and then into a tube again and repeat. Do the entire set 3 times.        PUTTY ROLL OUT AND 3 POINT PINCH           Roll some putty into a ball with your left hand. Then, roll it into a tube. Gently pinch the putty with the tips of your left index finger, long finger and thumb; repeat down the section. Then roll into a ball again and then into a tube again and repeat. Do the entire set 3 times.      PUTTY LATERAL PINCH      Hold the putty at the top of your hand. Squeeze the putty between your thumb and the side of your 2nd finger as shown. Repeat 15 pinches. 1 time per day.      PUTTY ADDUCTION    Hold the putty between your index & middle fingers. Squeeze gently. Repeat between all fingers. Repeat 10 times between each set of fingers.

## 2022-03-11 NOTE — PROGRESS NOTES
"[facility]  Occupational Therapy Treatment Note     Date: 3/11/2022  Name: Brie Han  Clinic Number: [id number]    Therapy Diagnosis:   Encounter Diagnoses   Name Primary?    Tremor Yes    Impaired function of upper extremity     Left arm pain      Physician: Colten Gould MD     Physician Orders: Eval and Treat  Medical Diagnosis: essential tremor  Evaluation Date: 1/24/2022  Plan of Care Expiration: 4/1/2022  Progress Note Due: 3/4/2022   Insurance Authorization period Expiration: 4/30/2022  Date of Return to MD: 7/14/2022  Visit # / Visits Authorized: 10/ 20  Total Visits: 11    FOTO: 60/100 on 2/25/2022 (higher score=higher function)    Precautions:  Standard and Fall    Time In: 1115  Time Out: 1155  Total Billable Time: 40 minutes    SUBJECTIVE     Pt reports: "I got nervous... he was rushing me."   She was compliant with home exercise program given last session.   Response to previous treatment: no concerns.  Functional change: handwriting is improving    Pain: 0/10 at rest, but right elbow discomfort  Location: bilateral arms sore from work out.     OBJECTIVE     Objective Measures updated at progress report unless specified.    Treatment     Brie received the treatments listed below:     Therapeutic activities to improve functional performance for 40 minutes, including:    Handwriting (to complete video release)  Pt was able to spread fingers for tracing of hand  Functional tasks like Bringing spoon to mouth, bringing empty cup to mouth and scooping beads with video of tremor.  Pt was unable to scoop small beads from one container to another with the left upper extremity and task was abandoned after 45s. However, once a 1/4# weight was added to the dorsum of her hand, she as able to complete 5 scoops in 32.38s.   Handwriting with 1/4# weight on dorsum of hand with improved performance.    Patient Education and Home Exercises      Education provided:   -continue " working on functional handwriting at home with pinch  (write notes, grocery list, etc)  -Progress towards goals   -reviewed benefits of built up handles on utensils, where to get them and how to make them at home.  -possible benefit of Readi-steadi Glove system to address tremors.     Written Home Exercises Provided: Patient instructed to cont prior HEP.  Exercises were reviewed and Brie was able to demonstrate them prior to the end of the session.  Brie demonstrated good  understanding of the HEP provided.     See EMR under Patient Instructions for exercises provided during therapy sessions.      Assessment     Pt would continue to benefit from skilled OT. Ms. Baird thus far has demonstrated some progress with traditional methods for tremor management, including awareness training, proximal strengthening, use of a particular pen  and other strategies. Pain from her left shoulder and medial epicondylitis have improved significantly. However, she continues to have her tremor interfering with her daily functional activities. Despite pen  (which does help considerably), she continues to apply tighter  and increased tension throughout the arm when writing, that may have contributed to her medial epicondylitis and shoulder pain. She has responded well to distal weighting at the dorsum of the hand and would likely benefit from a Readi-Steadi Glove system, an anti-tremor orthotic. Pt and grandson expressed interest.     Brie is progressing slowly towards her goals and there are no updates to goals at this time. Pt prognosis is Good.     Pt will continue to benefit from skilled outpatient occupational therapy to address the deficits listed in the problem list on initial evaluation provide pt/family education and to maximize pt's level of independence in the home and community environment.     Pt's spiritual, cultural and educational needs considered and pt agreeable to plan of care and  goals.    Anticipated barriers to occupational therapy: none    Goals:  Long Term Goals:  Time frame: 5 weeks/ 10 visits  I in Home exercise program for tremor control and medial epicondylitis pain management. (progressing 3/11/2022)   Aware of adaptive techniques to help complete ADL and IADL safely. (progressing 3/11/2022)  Complete grocery list legibly. (MET 2/24/22 for a list of 10 items)    Short Term Goals:  Time frame: 2 weeks/ 5 visits  Demonstrate tremor control techniques daily. (progressing 3/11/2022)   I with Home exercise program . (progressing 3/11/2022)     PLAN     Updated Certification Period: 2/24/2022 to 4/1/2022   Recommended Treatment Plan: 2 times per week for 5 weeks:  Manual Therapy, Moist Heat/ Ice, Neuromuscular Re-ed, Patient Education, Self Care, Therapeutic Activities and Therapeutic Exercise     Updates/Grading for next session: use distal weighting to work on FMC to simulate benefits of Readi-Steadi Orthotic system.     Daya Grant, OT

## 2022-03-14 ENCOUNTER — CLINICAL SUPPORT (OUTPATIENT)
Dept: REHABILITATION | Facility: HOSPITAL | Age: 87
End: 2022-03-14
Payer: MEDICARE

## 2022-03-14 DIAGNOSIS — R68.89 IMPAIRED FUNCTION OF UPPER EXTREMITY: ICD-10-CM

## 2022-03-14 DIAGNOSIS — R26.89 IMPAIRMENT OF BALANCE: ICD-10-CM

## 2022-03-14 DIAGNOSIS — R25.1 TREMOR: Primary | ICD-10-CM

## 2022-03-14 DIAGNOSIS — M79.602 LEFT ARM PAIN: ICD-10-CM

## 2022-03-14 DIAGNOSIS — Z74.09 DECREASED FUNCTIONAL MOBILITY AND ENDURANCE: Primary | ICD-10-CM

## 2022-03-14 PROCEDURE — 97530 THERAPEUTIC ACTIVITIES: CPT | Mod: HCNC,PN

## 2022-03-14 PROCEDURE — 97112 NEUROMUSCULAR REEDUCATION: CPT | Mod: HCNC,PN,CQ

## 2022-03-14 PROCEDURE — 97110 THERAPEUTIC EXERCISES: CPT | Mod: HCNC,PN,CQ

## 2022-03-14 PROCEDURE — 97110 THERAPEUTIC EXERCISES: CPT | Mod: HCNC,PN

## 2022-03-14 NOTE — PROGRESS NOTES
[facility]   Physical Therapy Treatment Note     Name: Brie Han  Clinic Number: [id number]    Therapy Diagnosis:   Encounter Diagnoses   Name Primary?    Decreased functional mobility and endurance Yes    Impairment of balance      Physician: Colten Gould MD    Visit Date: 3/14/2022    Physician Orders: PT Eval and Treat  Medical Diagnosis from Referral: G25.0 (ICD-10-CM) - Essential tremor  Evaluation Date: 1/24/2022  Authorization Period Expiration: 1/24/2022 - 4/24/2022  Plan of Care Expiration: 4/30/2022  Episodes: 8  Visit # / Visits authorized: 10/20  FOTO visit #: 12  FOTO score: 70.6 (1/28/2022) 59 (2/24/2022)     PTA Visit #: 2/5    Time In: 1430  Time Out: 1515  Total Billable Time: 45 minutes    SUBJECTIVE     Pt reports: Brie reported she doesn't want to do therapy today because she is really tired.  Response to previous treatment: no problems stated  Functional change: ongoing    Pain: 0/10  Location: Not Applicable     OBJECTIVE     Objective Measures updated at progress report unless specified.     Treatment     Brie received the treatments listed below:      therapeutic exercises to develop strength, endurance, range of motion , flexibility, posture and core stabilization for 20 minutes including:    NuStep Level 3 10 minutes Bilater Lower Extremities only    Supine: straight leg raise 1# Right Left 2 sets of 10, Right 1x10 Left                 Bridges 2 sets of 10               Short arc quad 2 sets of 10 left right 2#    neuromuscular re-education activities to improve: Balance, Coordination and Proprioception for 25 minutes. The following activities were included:    Parallel Bars:                  Calf raises 2 sets of 10 on foam pad without upper extremity support standby assistance                   Step ups on 8 inch box 2 sets of 10 unilateral upper extremity standby assistance                   Side steps 3 time 12 feet without upper extremity  support. standby assistance                   Standing marching 2 sets of 10 with upper extremity support standby assistance                   Mini squats 1 set of 10 without upper extremity support standby assistance     Patient Education and Home Exercises     Home Exercises Provided and Patient Education Provided     Education provided:   - Patient provided with verbal and demonstrative instruction for all activities performed in today's session.    Written Home Exercises Provided: Patient instructed to cont prior HEP. Exercises were reviewed and Mercedes was able to demonstrate them prior to the end of the session.  Mercedes demonstrated good  understanding of the education provided. See EMR under Patient Instructions for exercises provided during therapy sessions    ASSESSMENT     Mercedes provided good participation and effort during today's session with reports of fatigue so mercedes was given rest breaks as needed. Treatment focused on Bilateral lower extremity and balance activities without upper extremity use. Mercedes needed frequent tactile and verbal cues to perform exercises correctly.     Mercedes Is progressing towards her goals.   Pt prognosis is Good.     Pt's spiritual, cultural and educational needs considered and pt agreeable to plan of care and goals.     Anticipated barriers to physical therapy: none    Goals:   Short Term Goals: 5 weeks   1. Patient to perform dynamic standing for 20 minutes with [place] using No Assistive Device to prepare for functional tasks in standing. (progressing)  2. Patient to perform stair navigation with right Handrails Modified [place] using No Assistive Device. (progressing)  3. Patient to increase DGI score to normative value to show reduction in fall risk. (ongoing)     Long Term Goals: 10 weeks   1. Patient to increase strength of BLE  to 5/5. (ongoing)  2. Patient to report decrease in pain by 4 points to increase quality of life. (progressing)  3.  Patient to increase gait speed to normative values to increase community ambulation and quality of life. (ongoing)    PLAN     Outpatient Physical Therapy 2 times weekly for 10 weeks to include the following interventions: Gait Training, Manual Therapy, Neuromuscular Re-ed, Patient Education, Therapeutic Activities and Therapeutic Exercise.     I certify that I was present in the room directing SONAL Livingston  in service delivery and guiding them using my skilled judgment. As the co-signing therapist I have reviewed the students documentation and am responsible for the treatment, assessment, and plan.      Deann Yuen, Physical Therapist Assistant

## 2022-03-14 NOTE — PROGRESS NOTES
"[facility]  Occupational Therapy Treatment Note     Date: 3/14/2022  Name: Brie Han  Clinic Number: [id number]    Therapy Diagnosis:   Encounter Diagnoses   Name Primary?    Tremor Yes    Impaired function of upper extremity     Left arm pain      Physician: Colten Gould MD     Physician Orders: Eval and Treat  Medical Diagnosis: essential tremor  Evaluation Date: 1/24/2022  Plan of Care Expiration: 4/1/2022  Progress Note Due: 4/1/2022   Insurance Authorization period Expiration: 2/24/2022  Date of Return to MD: 7/14/2022  Visit # / Visits Authorized: 10/ 20  Total Visits: 11    FOTO: 56/100 on 1/24/2022 (higher score=higher function)     Precautions:  Standard and Fall    Time In: 1515  Time Out: 1545  Total Billable Time: 30 minutes due to late arrival    SUBJECTIVE     Pt reports: "I have been busy, busy, busy. I have an eye doctor appointment tomorrow, and lots to do."    She was compliant with home exercise program given last session.   Response to previous treatment: no concerns.  Functional change: improving tolerance for handwriting    Pain: 0/10 at rest, but right elbow discomfort  Location: bilateral arms sore from work out.     OBJECTIVE     Objective Measures updated at progress report unless specified.      Treatment     Brie received the treatments listed below:     Therapeutic exercises to increase ROM and flexibility for 15 minutes, including:  -for shoulder height reaching and pinch strength:   -placed resistance clips along shoulder height surface   -was able to pinch up to green clip (blue and black had too much resistance)  -used red theraband (with handles) placed and stabilized into door crack- performed bilateral elbow flexion and extension right below shoulder height to work on scapular adduction and proximal stabilization  -tendon gliding exercises- continued to have some trouble coordinating these on left side    Therapeutic activities to improve " functional performance for 15 minutes, including:  -pt brought pen and  she was provided previously to work on handwriting again today   -handwriting, executive functioning activity:   -was provided a grocery list and had to transfer items from list into provided categories for planning and organization   -requires some assistance to visually attend to all given categories to organize groceries    -was told to finish this activity at home for HW.  -visual attention, and visual perceptual activity:   -was given a deck of cards, and asked to slide one card off at a time (also working on finger to palm translation and fine motor coordination  (FMC)   -then asked to place cards into suits   -required mod verbal cues to slow down and place cards into right suits due to visual inattention   -also occasionally dropped cards due to tremors    Patient Education and Home Exercises      Education provided:   -continue working on functional handwriting at home with pinch  (write notes, grocery list, etc)  -Progress towards goals   -reviewed benefits of built up handles on utensils, where to get them and how to make them at home.    Written Home Exercises Provided: yes  Exercises were reviewed and Brie was able to demonstrate them prior to the end of the session.  Brie demonstrated good understanding of the HEP provided.     See EMR under Patient Instructions for exercises provided 2/11/2022.      Assessment     Pt would continue to benefit from skilled OT. Pt is aware that proximal strengthening and stabilization is important to aid with distal motor control and stabilization. Measurements and videos were taken previous session to obtain a redi steadi  for ease of tremors during Activities of Daily Living and IADLs. Pt was given handwriting homework today to also work on sustained attention to task, and visual attention/ perception. She continues to work hard during therapy but requires verbal cueing to slow  down during tasks and focus on form and accuracy.     Brie is progressing slowly towards her goals and there are no updates to goals at this time. Pt prognosis is Good.     Pt will continue to benefit from skilled outpatient occupational therapy to address the deficits listed in the problem list on initial evaluation provide pt/family education and to maximize pt's level of independence in the home and community environment.     Pt's spiritual, cultural and educational needs considered and pt agreeable to plan of care and goals.    Anticipated barriers to occupational therapy: none    Goals:  Long Term Goals:  Time frame: 5 weeks/ 10 visits  I in Home exercise program for tremor control and medial epicondylitis pain management. (progressing 3/14/2022)   Aware of adaptive techniques to help complete ADL and IADL safely. (progressing 3/14/2022)  Complete grocery list legibly. (MET 2/24/22 for a list of 10 items)    Short Term Goals:  Time frame: 2 weeks/ 5 visits  Demonstrate tremor control techniques daily. (progressing 3/14/2022)   I with Home exercise program . (progressing 3/14/2022)     PLAN     Certification Period/Plan of care expiration: 3/4/2022 to 4/01/2022     Outpatient Occupational Therapy 2 times weekly for 5 weeks to include the following interventions: Patient Education, Self Care, Therapeutic Activities and Therapeutic Exercise.    Updates/Grading for next session: proximal strength and stabilization of bilateral upper extremities, fine motor coordination (FMC), stretches of wrist, digits, elbows, handwriting, meal prep, and visual perceptual tasks.    Mario Rojas, OT

## 2022-03-15 ENCOUNTER — OFFICE VISIT (OUTPATIENT)
Dept: OPHTHALMOLOGY | Facility: CLINIC | Age: 87
End: 2022-03-15
Payer: MEDICARE

## 2022-03-15 DIAGNOSIS — H02.834 DERMATOCHALASIS OF BOTH UPPER EYELIDS: ICD-10-CM

## 2022-03-15 DIAGNOSIS — Z98.890 S/P YAG CAPSULOTOMY, RIGHT: ICD-10-CM

## 2022-03-15 DIAGNOSIS — H04.123 DRY EYES, BILATERAL: ICD-10-CM

## 2022-03-15 DIAGNOSIS — Z96.1 PSEUDOPHAKIA OF BOTH EYES: ICD-10-CM

## 2022-03-15 DIAGNOSIS — H40.1132 PRIMARY OPEN ANGLE GLAUCOMA (POAG) OF BOTH EYES, MODERATE STAGE: Primary | ICD-10-CM

## 2022-03-15 DIAGNOSIS — H02.831 DERMATOCHALASIS OF BOTH UPPER EYELIDS: ICD-10-CM

## 2022-03-15 PROCEDURE — 1159F MED LIST DOCD IN RCRD: CPT | Mod: CPTII,S$GLB,, | Performed by: OPHTHALMOLOGY

## 2022-03-15 PROCEDURE — 1126F AMNT PAIN NOTED NONE PRSNT: CPT | Mod: CPTII,S$GLB,, | Performed by: OPHTHALMOLOGY

## 2022-03-15 PROCEDURE — 99999 PR PBB SHADOW E&M-EST. PATIENT-LVL III: ICD-10-PCS | Mod: PBBFAC,,, | Performed by: OPHTHALMOLOGY

## 2022-03-15 PROCEDURE — 1159F PR MEDICATION LIST DOCUMENTED IN MEDICAL RECORD: ICD-10-PCS | Mod: CPTII,S$GLB,, | Performed by: OPHTHALMOLOGY

## 2022-03-15 PROCEDURE — 1157F PR ADVANCE CARE PLAN OR EQUIV PRESENT IN MEDICAL RECORD: ICD-10-PCS | Mod: CPTII,S$GLB,, | Performed by: OPHTHALMOLOGY

## 2022-03-15 PROCEDURE — 1160F PR REVIEW ALL MEDS BY PRESCRIBER/CLIN PHARMACIST DOCUMENTED: ICD-10-PCS | Mod: CPTII,S$GLB,, | Performed by: OPHTHALMOLOGY

## 2022-03-15 PROCEDURE — 1160F RVW MEDS BY RX/DR IN RCRD: CPT | Mod: CPTII,S$GLB,, | Performed by: OPHTHALMOLOGY

## 2022-03-15 PROCEDURE — 99214 OFFICE O/P EST MOD 30 MIN: CPT | Mod: S$GLB,,, | Performed by: OPHTHALMOLOGY

## 2022-03-15 PROCEDURE — 99999 PR PBB SHADOW E&M-EST. PATIENT-LVL III: CPT | Mod: PBBFAC,,, | Performed by: OPHTHALMOLOGY

## 2022-03-15 PROCEDURE — 99214 PR OFFICE/OUTPT VISIT, EST, LEVL IV, 30-39 MIN: ICD-10-PCS | Mod: S$GLB,,, | Performed by: OPHTHALMOLOGY

## 2022-03-15 PROCEDURE — 1157F ADVNC CARE PLAN IN RCRD: CPT | Mod: CPTII,S$GLB,, | Performed by: OPHTHALMOLOGY

## 2022-03-15 PROCEDURE — 1126F PR PAIN SEVERITY QUANTIFIED, NO PAIN PRESENT: ICD-10-PCS | Mod: CPTII,S$GLB,, | Performed by: OPHTHALMOLOGY

## 2022-03-15 NOTE — PROGRESS NOTES
HPI     Pt here for 4 month f/u for IOP and med ck for POAG.    Pt using Latanoprost QHS OU. Pt sts she has been off Brimonidine since   July 2021. Pt sts it gives her HA's and was told to stop. Pt uses Refresh   2-3 times daily OU. Pt c/o dry eyes due to allergies. Pt sts [facility] OU is   stable since last visit.     Last edited by Anali Bocanegra on 3/15/2022 10:24 AM. (History)        ROS     Negative for: Constitutional, Gastrointestinal, Neurological, Skin,   Genitourinary, Musculoskeletal, HENT, Endocrine, Cardiovascular, Eyes,   Respiratory, Psychiatric, Allergic/Imm, Heme/Lymph    Last edited by Kvng Stern Jr., MD on 3/15/2022 10:39 AM. (History)        Assessment /Plan     For exam results, see Encounter Report.    Primary open angle glaucoma (POAG) of both eyes, moderate stage    Dermatochalasis of both upper eyelids    Pseudophakia of both eyes    S/P YAG capsulotomy, right    Dry eyes, bilateral      Stable superior altitudinal defect OD, no change  IOP OD 20 OS 20, IOP stable  Brimonidine 1 drop 1 x daily OU  Latanoprost QHS  OU  AT's 3-4 x daily OU prn  Rx for glasses given to patient  Follow up in about 4 months (around 7/15/2022) for IOP and Medication check.

## 2022-03-17 ENCOUNTER — TELEPHONE (OUTPATIENT)
Dept: FAMILY MEDICINE | Facility: CLINIC | Age: 87
End: 2022-03-17
Payer: MEDICARE

## 2022-03-17 ENCOUNTER — TELEPHONE (OUTPATIENT)
Dept: REHABILITATION | Facility: HOSPITAL | Age: 87
End: 2022-03-17
Payer: MEDICARE

## 2022-03-17 NOTE — TELEPHONE ENCOUNTER
----- Message from Daya Grant OT sent at 3/17/2022  8:51 AM CDT -----  Regarding: orthosis script  Good Morning!  I just faxed a script to Dr. Gould for a Readi-Steadi Orthotic for Mrs. Han to address her essential tremor. We have attempted multiple approaches with little success, but I really think ,based on some things that we have done in therapy, that this device will help decrease the tremor in the left hand. If Dr. Gould could sign and return the script to me, I'd greatly appreciate it. Please let me know if he has any questions or concerns.     Thanks!  -JOHNNY Morales, STIVEN

## 2022-03-24 ENCOUNTER — CLINICAL SUPPORT (OUTPATIENT)
Dept: REHABILITATION | Facility: HOSPITAL | Age: 87
End: 2022-03-24
Payer: MEDICARE

## 2022-03-24 DIAGNOSIS — M25.512 LEFT SHOULDER PAIN, UNSPECIFIED CHRONICITY: ICD-10-CM

## 2022-03-24 DIAGNOSIS — R25.1 TREMOR: Primary | ICD-10-CM

## 2022-03-24 DIAGNOSIS — R68.89 IMPAIRED FUNCTION OF UPPER EXTREMITY: ICD-10-CM

## 2022-03-24 DIAGNOSIS — R26.89 IMPAIRMENT OF BALANCE: ICD-10-CM

## 2022-03-24 DIAGNOSIS — M79.602 LEFT ARM PAIN: ICD-10-CM

## 2022-03-24 DIAGNOSIS — Z74.09 DECREASED FUNCTIONAL MOBILITY AND ENDURANCE: Primary | ICD-10-CM

## 2022-03-24 PROCEDURE — 97112 NEUROMUSCULAR REEDUCATION: CPT | Mod: PN,CQ

## 2022-03-24 PROCEDURE — 97110 THERAPEUTIC EXERCISES: CPT | Mod: PN

## 2022-03-24 PROCEDURE — 97110 THERAPEUTIC EXERCISES: CPT | Mod: PN,CQ

## 2022-03-24 PROCEDURE — 97530 THERAPEUTIC ACTIVITIES: CPT | Mod: PN

## 2022-03-24 NOTE — PROGRESS NOTES
[facility]  Occupational Therapy Treatment Note     Date: 3/25/2022  Name: Brie Han  Clinic Number: [id number]    Therapy Diagnosis:   Encounter Diagnoses   Name Primary?    Tremor Yes    Impaired function of upper extremity     Left arm pain      Physician: Colten Gould MD     Physician Orders: Eval and Treat  Medical Diagnosis: essential tremor  Evaluation Date: 1/24/2022  Plan of Care Expiration: 4/1/2022  Progress Note Due: 4/1/2022   Insurance Authorization period Expiration: 2/24/2022  Date of Return to MD: 7/14/2022  Visit # / Visits Authorized: 12/ 20  Total Visits: 13    FOTO: 56/100 on 1/24/2022 (higher score=higher function)     Precautions:  Standard and Fall    Time In: 1100  Time Out: 1145  Total Billable Time: 45    SUBJECTIVE     Pt reports: She and grandson went for a walk before therapy today. She was compliant with home exercise program given last session.   Response to previous treatment: Less pain  Functional change: improving tolerance for handwriting    Pain: 0/10 at rest and with ergometer today  Location: bilateral arms not sore from work out.     OBJECTIVE     Objective Measures updated at progress report unless specified.      Treatment     Mercedes received the treatments listed below:     Therapeutic exercises to increase ROM and flexibility for 30 minutes, including:  -for range of motion and pinch strength:   -ergometer no resistance, height 5 for 6 minutes switching directions as needed.   -2# dowel x 30 for shoulder flexion, chest press, horizontal abduction, biceps curl, and reverse curls   -clothes pin task with varied resistance clothes  with both hands.    - concentric wrist flexion and extension x 20 each wrist and each movement with 1 #   - 1# supination/pronation and hammer x 20 each.   Therapeutic activities to improve functional performance for 15 minutes, including:   -Education regarding pacing activity and resting. Education on  respecting pain and resting on days she has pain.     Brie was assisted in obtaining a ready steady glove. This is in process.   Patient Education and Home Exercises      Education provided:   -continue working on functional handwriting at home with pinch  (write notes, grocery list, etc)  -Progress towards goals   -reviewed benefits of built up handles on utensils, where to get them and how to make them at home.  - reviewed energy conservation.   Written Home Exercises Provided: yes  Exercises were reviewed and Brie was able to demonstrate them prior to the end of the session.  Brie demonstrated good understanding of the HEP provided.     See EMR under Patient Instructions for exercises provided 2/11/2022.    3/24/2022: Patient given packet of clock climber, kite strings, loop group, and hill/valley to practice hand writing.   Assessment   Patient is having more days pain free. She has occasional pain which she needs to learn to respect.   Pt would continue to benefit from skilled OT. Pt is aware that proximal strengthening and stabilization is important to aid with distal motor control and stabilization. She continues to work hard during therapy but requires verbal cueing to slow down during tasks and focus on form and accuracy. She and grandson need Home exercise program review and grandson will need to supervise to slow Brie and assure accuracy and safety.     Brie is progressing  towards her goals and there are no updates to goals at this time. Pt prognosis is Good.     Pt will continue to benefit from skilled outpatient occupational therapy to address the deficits listed in the problem list on initial evaluation provide pt/family education and to maximize pt's level of independence in the home and community environment.     Pt's spiritual, cultural and educational needs considered and pt agreeable to plan of care and goals.    Anticipated barriers to occupational therapy:  none    Goals:  Long Term Goals:  Time frame: 5 weeks/ 10 visits  I in Home exercise program for tremor control and medial epicondylitis pain management. (progressing 3/25/2022)   Aware of adaptive techniques to help complete ADL and IADL safely. (progressing 3/25/2022)  Complete grocery list legibly. (MET 2/24/22 for a list of 10 items)    Short Term Goals:  Time frame: 2 weeks/ 5 visits  Demonstrate tremor control techniques daily. (progressing 3/25/2022)   I with Home exercise program . (progressing 3/25/2022)     PLAN     Certification Period/Plan of care expiration: 3/4/2022 to 4/01/2022     Outpatient Occupational Therapy 2 times weekly for 5 weeks to include the following interventions: Patient Education, Self Care, Therapeutic Activities and Therapeutic Exercise.    Updates/Grading for next session: review tremor control techniques and light strengthening Home exercise program. Review energy conservation and work simplification. D/C next week at 3/29/2022 visit.   Martha Dangelo, OT

## 2022-03-24 NOTE — PROGRESS NOTES
[facility]   Physical Therapy Treatment Note     Name: Brie Han  [facility] Number: [id number]    Therapy Diagnosis:   Encounter Diagnoses   Name Primary?    Decreased functional mobility and endurance Yes    Impairment of balance      Physician: Colten Gould MD    Visit Date: 3/24/2022    Physician Orders: PT Eval and Treat  Medical Diagnosis from Referral: G25.0 (ICD-10-CM) - Essential tremor  Evaluation Date: 1/24/2022  Authorization Period Expiration: 1/24/2022 - 4/24/2022  Plan of Care Expiration: 4/30/2022  Episodes: 8  Visit # / Visits authorized: 11/20  FOTO visit #: 13  FOTO score: 70.6 (1/28/2022) 59 (2/24/2022)     PTA Visit #: 3/5    Time In: 0920  Time Out: 1000  Total Billable Time: 40 minutes    SUBJECTIVE     Pt reports: Brie reported she doesn't want to be pushed too hard today cause she is tired. She stated she did not sleep much last night.  Response to previous treatment: no problems stated  Functional change: ongoing    Pain: 0/10  Location: Not Applicable     OBJECTIVE     Objective Measures updated at progress report unless specified.     Treatment     Brie received the treatments listed below:      therapeutic exercises to develop strength, endurance, range of motion and core stabilization for 32 minutes including:    NuStep Level 4 10 minutes Bilater Lower Extremities only    Supine: straight leg raise 1# Right Left 2 sets of 10,                Bridges with ball squeeze 2 sets of 10               Hip abduction hook-lying with Green thera-band 2 sets of 10     (Time includes rest breaks)    neuromuscular re-education activities to improve: Balance, Coordination and Proprioception for 8 minutes. The following activities were included:    Parallel Bars:Calf raises 2 sets of 10 on foam pad without upper extremity support standby                                       assistance                          tandem stance on foam pad 2 times 30 seconds without  upper extremity support                                standby assistance     Patient Education and Home Exercises     Home Exercises Provided and Patient Education Provided     Education provided:   - Patient provided with verbal and demonstrative instruction for all activities performed in today's session.    Written Home Exercises Provided: Patient instructed to cont prior HEP. Exercises were reviewed and Mercedes was able to demonstrate them prior to the end of the session.  Mercedes demonstrated good  understanding of the education provided. See EMR under Patient Instructions for exercises provided during therapy sessions    ASSESSMENT     Mercedes provided good participation and effort during today's session with reports of fatigue and  was given rest breaks as needed. Treatment focused on Bilateral lower extremity and some balance activities without upper extremity use. Standing activities were decreased secondary to reports of fatigue and needed verbal cues with mat activities for proper form.     Mercedes Is progressing towards her goals.   Pt prognosis is Good.     Pt's spiritual, cultural and educational needs considered and pt agreeable to plan of care and goals.     Anticipated barriers to physical therapy: none    Goals:   Short Term Goals: 5 weeks   1. Patient to perform dynamic standing for 20 minutes with [place] using No Assistive Device to prepare for functional tasks in standing. (progressing)  2. Patient to perform stair navigation with right Handrails Modified [place] using No Assistive Device. (progressing)  3. Patient to increase DGI score to normative value to show reduction in fall risk. (ongoing)     Long Term Goals: 10 weeks   1. Patient to increase strength of BLE  to 5/5. (progressing)  2. Patient to report decrease in pain by 4 points to increase quality of life. (progressing)  3. Patient to increase gait speed to normative values to increase community ambulation and quality  of life. (ongoing)    PLAN     Outpatient Physical Therapy 2 times weekly for 10 weeks to include the following interventions: Gait Training, Manual Therapy, Neuromuscular Re-ed, Patient Education, Therapeutic Activities and Therapeutic Exercise.     I certify that I was present in the room directing Bladimir Regan, Student Physical Therapist Assistant   in service delivery and guiding them using my skilled judgment. As the co-signing therapist I have reviewed the students documentation and am responsible for the treatment, assessment, and plan.      Deann Yuen, Physical Therapist Assistant

## 2022-03-25 ENCOUNTER — CLINICAL SUPPORT (OUTPATIENT)
Dept: REHABILITATION | Facility: HOSPITAL | Age: 87
End: 2022-03-25
Payer: MEDICARE

## 2022-03-25 DIAGNOSIS — M79.602 LEFT ARM PAIN: ICD-10-CM

## 2022-03-25 DIAGNOSIS — R68.89 IMPAIRED FUNCTION OF UPPER EXTREMITY: ICD-10-CM

## 2022-03-25 DIAGNOSIS — R25.1 TREMOR: Primary | ICD-10-CM

## 2022-03-25 PROCEDURE — 97110 THERAPEUTIC EXERCISES: CPT | Mod: PN

## 2022-03-25 PROCEDURE — 97530 THERAPEUTIC ACTIVITIES: CPT | Mod: PN

## 2022-03-28 ENCOUNTER — CLINICAL SUPPORT (OUTPATIENT)
Dept: REHABILITATION | Facility: HOSPITAL | Age: 87
End: 2022-03-28
Payer: MEDICARE

## 2022-03-28 ENCOUNTER — PATIENT MESSAGE (OUTPATIENT)
Dept: OPHTHALMOLOGY | Facility: CLINIC | Age: 87
End: 2022-03-28
Payer: MEDICARE

## 2022-03-28 DIAGNOSIS — M79.602 LEFT ARM PAIN: ICD-10-CM

## 2022-03-28 DIAGNOSIS — R68.89 IMPAIRED FUNCTION OF UPPER EXTREMITY: ICD-10-CM

## 2022-03-28 DIAGNOSIS — R26.89 IMPAIRMENT OF BALANCE: ICD-10-CM

## 2022-03-28 DIAGNOSIS — Z74.09 DECREASED FUNCTIONAL MOBILITY AND ENDURANCE: Primary | ICD-10-CM

## 2022-03-28 DIAGNOSIS — R25.1 TREMOR: Primary | ICD-10-CM

## 2022-03-28 PROCEDURE — 97110 THERAPEUTIC EXERCISES: CPT | Mod: PN

## 2022-03-28 PROCEDURE — 97112 NEUROMUSCULAR REEDUCATION: CPT | Mod: PN

## 2022-03-28 PROCEDURE — 97116 GAIT TRAINING THERAPY: CPT | Mod: PN

## 2022-03-28 PROCEDURE — 97530 THERAPEUTIC ACTIVITIES: CPT | Mod: PN

## 2022-03-28 NOTE — PROGRESS NOTES
"[facility]   Physical Therapy Treatment Note     Name: Brie Han  Clinic Number: [id number]    Therapy Diagnosis:   Encounter Diagnoses   Name Primary?    Decreased functional mobility and endurance Yes    Impairment of balance      Physician: Colten Gould MD    Visit Date: 3/28/2022    Physician Orders: PT Eval and Treat  Medical Diagnosis from Referral: G25.0 (ICD-10-CM) - Essential tremor  Evaluation Date: 1/24/2022  Authorization Period Expiration: 1/24/2022 - 4/24/2022  Plan of Care Expiration: 4/30/2022  Episodes: 14  Visit # / Visits authorized: 12/20  FOTO visit #: 13  FOTO score: 70.6 (1/28/2022) 59 (2/24/2022)     PTA Visit #: 0/5    Time In: 1130  Time Out: 1215  Total Billable Time: 45 minutes    SUBJECTIVE     Pt reports: has been doing walking program well, feeling well. Still has some balance difficulties.  Response to previous treatment: doing well with home exercise program and walking program  Functional change: ongoing    Pain: 0/10  Location: Not Applicable     OBJECTIVE     Objective Measures updated at progress report unless specified.     Treatment     Brie received the treatments listed below:      therapeutic exercises to develop strength, endurance, range of motion and core stabilization for 15 minutes including:    NuStep Level 3 10 minutes Bilater Lower Extremities & bilateral upper extremities    Standing gastroc stretch on foam roll 3x30"    neuromuscular re-education activities to improve: Balance, Coordination and Proprioception for 20 minutes. The following activities were included:    Parallel Bars:Calf raises 2 sets of 10 on foam pad without upper extremity support standby    Mini squats on blue foam 2x10    Static stand x1' eyes open foam    Static stand x1' eyes closed on blue foam    scap retractions 2x10 red theraband on blue foam    Agility ladder: bilaterally step into one square forward and laterally; x2 trials.    gait training to " improve functional mobility and safety for 10 minutes, includinft on unlevel surfaces outside in courtyard and in gym. No loss of balance or difficulty noted.                                                       Patient Education and Home Exercises     Home Exercises Provided and Patient Education Provided     Education provided:   - Patient provided with verbal and demonstrative instruction for all activities performed in today's session.    Written Home Exercises Provided: Patient instructed to cont prior HEP. Exercises were reviewed and Brie was able to demonstrate them prior to the end of the session.  Bire demonstrated good  understanding of the education provided. See EMR under Patient Instructions for exercises provided during therapy sessions    ASSESSMENT     Brie provided good participation and effort during today's session. Treatment focused on Bilateral lower extremity and some balance activities without upper extremity use. Increased balance activities in dynamic sense. Ms. Han is doing very well. Appropriate for discharge after next visit.     Brie Is progressing towards her goals.   Pt prognosis is Good.     Pt's spiritual, cultural and educational needs considered and pt agreeable to plan of care and goals.     Anticipated barriers to physical therapy: none    Goals:   Short Term Goals: 5 weeks   1. Patient to perform dynamic standing for 20 minutes with [place] using No Assistive Device to prepare for functional tasks in standing. (progressing)  2. Patient to perform stair navigation with right Handrails Modified [place] using No Assistive Device. (MET)  3. Patient to increase DGI score to normative value to show reduction in fall risk. (MET)     Long Term Goals: 10 weeks   1. Patient to increase strength of BLE  to 5/5. (MET)  2. Patient to report decrease in pain by 4 points to increase quality of life. (MET)  3. Patient to increase gait speed to normative  values to increase community ambulation and quality of life. (MET)     New goals to be met by 4/3/22  1. Patient independent with home exercise program. (MET)  2. Patient to perform dynamic standing balance activities for 20 minutes with independence. (ongoing)  3. FOTO to increase by 10 points from initial visit. (ongoing)    PLAN     Outpatient Physical Therapy 1 times weekly for 10 weeks to include the following interventions: Gait Training, Manual Therapy, Neuromuscular Re-ed, Patient Education, Therapeutic Activities and Therapeutic Exercise.     Rivka Martinez PT, DPT, CLT  3/28/2022

## 2022-03-28 NOTE — PROGRESS NOTES
"[facility]  Occupational Therapy Treatment Note     Date: 3/28/2022  Name: Brie Han  Clinic Number: [id number]    Therapy Diagnosis:   Encounter Diagnoses   Name Primary?    Tremor Yes    Impaired function of upper extremity     Left arm pain      Physician: Colten Gould MD     Physician Orders: Eval and Treat  Medical Diagnosis: essential tremor  Evaluation Date: 1/24/2022  Plan of Care Expiration: 4/1/2022  Progress Note Due: 4/1/2022   Insurance Authorization period Expiration: 2/24/2022  Date of Return to MD: 7/14/2022  Visit # / Visits Authorized: 14/ 20  Total Visits: 15    FOTO: 56/100 on 1/24/2022 (higher score=higher function)     Precautions:  Standard and Fall    Time In: 1035  Time Out: 1115  Total Billable Time: 40    SUBJECTIVE     Pt reports: "I feel like my handwriting has gotten a lot better"     She was compliant with home exercise program given last session.   Response to previous treatment: Less pain, and increased control of hand manipulation    Functional change: improving tolerance for handwriting    Pain: 0/10 at rest   Location: bilateral arms not sore from work out.     OBJECTIVE     Objective Measures updated at progress report unless specified.      Treatment     Brie received the treatments listed below:     Therapeutic exercises to increase ROM and flexibility for 25 minutes, including:  -for wrist active range of motion and pinch strength:   -bilateral wrist exercises with 1 pound wrist weights donned    -wrist flexion/ extension, pronation/ supination and ulnar/radial deviation  -theraputty hand/  strengthening exercises    Therapeutic activities to improve functional performance for 15 minutes, including:  -theraputty meal prep activity   -cutting putty into small pieces, and rolling into small balls    -kneading dough   -finger to palm translation of medium sized marbles   -10 marbles at a time  -Handwriting and executive functioning " activity:   -pt asked to draw out family tree from memory   -required min verbal cueing for proper ergonomic positioning for handwriting   -increased legibility and efficiency with handwriting tasks noted each session      Brie was assisted in obtaining a ready steady glove. This is still in process.   Patient Education and Home Exercises      Education provided:   -continue working on functional handwriting at home with pinch  (write notes, grocery list, etc)  -Progress towards goals   -reviewed benefits of built up handles on utensils, where to get them and how to make them at home.  - reviewed energy conservation.     Written Home Exercises Provided: yes  Exercises were reviewed and Brie was able to demonstrate them prior to the end of the session.  Brie demonstrated good understanding of the HEP provided.     See EMR under Patient Instructions for exercises provided 2/11/2022.    3/24/2022: Patient given packet of clock climber, kite strings, loop group, and hill/valley to practice hand writing.   Assessment     Pt is aware that proximal strengthening and stabilization is important to aid with distal motor control and stabilization. She continues to work hard during therapy but requires verbal cueing to slow down during tasks and focus on form and accuracy. She and grandson appear compliant with Home exercise program review and grandson will need to supervise to slow rBie and assure accuracy and safety.     Brie is progressing  towards her goals and there are no updates to goals at this time. Pt prognosis is Good.     Pt will continue to benefit from skilled outpatient occupational therapy to address the deficits listed in the problem list on initial evaluation provide pt/family education and to maximize pt's level of independence in the home and community environment.     Pt's spiritual, cultural and educational needs considered and pt agreeable to plan of care and goals.    Anticipated  barriers to occupational therapy: none    Goals:  Long Term Goals:  Time frame: 5 weeks/ 10 visits  I in Home exercise program for tremor control and medial epicondylitis pain management. (progressing 3/28/2022)   Aware of adaptive techniques to help complete ADL and IADL safely. (progressing 3/28/2022)  Complete grocery list legibly. (MET 2/24/22 for a list of 10 items)    Short Term Goals:  Time frame: 2 weeks/ 5 visits  Demonstrate tremor control techniques daily. (progressing 3/28/2022)   I with Home exercise program . (progressing 3/28/2022)     PLAN     Certification Period/Plan of care expiration: 3/4/2022 to 4/01/2022     Outpatient Occupational Therapy 2 times weekly for 5 weeks to include the following interventions: Patient Education, Self Care, Therapeutic Activities and Therapeutic Exercise.    Updates/Grading for next session:  D/C next week at 3/29/2022 visit.     Mario Rojas, OT

## 2022-03-29 ENCOUNTER — CLINICAL SUPPORT (OUTPATIENT)
Dept: REHABILITATION | Facility: HOSPITAL | Age: 87
End: 2022-03-29
Payer: MEDICARE

## 2022-03-29 ENCOUNTER — TELEPHONE (OUTPATIENT)
Dept: OPHTHALMOLOGY | Facility: CLINIC | Age: 87
End: 2022-03-29
Payer: MEDICARE

## 2022-03-29 ENCOUNTER — PATIENT MESSAGE (OUTPATIENT)
Dept: OPTOMETRY | Facility: CLINIC | Age: 87
End: 2022-03-29
Payer: MEDICARE

## 2022-03-29 DIAGNOSIS — M79.602 LEFT ARM PAIN: ICD-10-CM

## 2022-03-29 DIAGNOSIS — R25.1 TREMOR: Primary | ICD-10-CM

## 2022-03-29 DIAGNOSIS — R68.89 IMPAIRED FUNCTION OF UPPER EXTREMITY: ICD-10-CM

## 2022-03-29 PROCEDURE — 97110 THERAPEUTIC EXERCISES: CPT | Mod: PN

## 2022-03-29 NOTE — PROGRESS NOTES
"[facility]  Occupational Therapy Treatment Note and PROGRESS NOTE    Date: 3/29/2022  Name: Brie Han  Clinic Number: [id number]    Therapy Diagnosis:   Encounter Diagnoses   Name Primary?    Tremor Yes    Impaired function of upper extremity     Left arm pain      Physician: Colten Gould MD     Physician Orders: Eval and Treat  Medical Diagnosis: essential tremor  Evaluation Date: 1/24/2022  Plan of Care Expiration: 4/1/2022  Progress Note Due: 4/1/2022   Insurance Authorization period Expiration: 4/30/2022  Date of Return to MD: 7/14/2022  Visit # / Visits Authorized: 15/ 20  Total Visits: 16  FOTO: see below    Precautions:  Standard and Fall    Time In: 8:35  Time Out: 09:15  Total Billable Time: 40 minutes    SUBJECTIVE     Pt reports: "after I take the pressure meds, and I start feeling more shaky I take a nap, and after about 15-20 minutes I will better, and I start getting up".     She was compliant with home exercise program given last session.   Response to previous treatment: Less pain, and increased control of hand manipulation    Functional change: improving tolerance for handwriting    Pain: 0/10 at rest   Location: bilateral arms not sore from work out.     OBJECTIVE        Strength eval 3/29/2022   **within available ROM** Left LEFT   Shoulder flex 4+/5 5/5   Shoulder abd 4+/5 5/5   Shoulder ER 4+/5 4+/5   Shoulder IR 4/5 5/5   Shoulder Extension 4+/5 5/5   Shoulder Horizontal adduction 4+/5 4+/5   Elbow flex 4+/5 5/5   Elbow ext 4+/5 5/5   Wrist flex 4+/5 5/5   Wrist ext 4+/5 5/5   Supination 4+/5 5/5   Pronation 4+/5 5/5       Strength: (NEELAM Dynamometer in lbs.) Average 3 trials, Position II:       Eval Eval 2/24/2022 2/24/2022 3/29/2022 3/29/2022     Left Right Left  right left right   Rung II 38# 45# 48.7# 47.7# 44.7# 48.3#    [norms for women aged 75+: R=42.6 +/-11.0; L=37.6 +/-8.9 (Edil et al, 1985)]    Pinch Strength (Measured in psi)       Eval " Theresa 3/29/2022 3/29/2022     Left Right LEFT  RIGHT   Key Pinch 11 psi 13 psi 10 11   3pt Pinch 11 psi 11.5 psi 10 10   2pt Pinch 10 psi 9.5 psi 7 6      Fine Motor Coordination: 9 Hole Peg Test   Theresa 2/24/2022 3/29/2022   LEFT 31 27 54.12 (first attempt); 49.79 (2nd attempt)   RIGHT 28 29 29.30   Comment: tremors get worse after taking pressure medication, which she takes in the morning. With attempt to increase speed for completion, tremors increase, which increases slip .   [norms for women aged 71+: R=22.49s +/-6.02s; L=24.11s +/-5.66s (Anitra et al, 2003)]    Gross motor coordination:   · HELLEN: intact   · Finger to Nose: intact   · Finger Flicks: impaired slow      FOTO                     Functional Status      Functional Mobility: I    ADL's:  Feeding: Independent  Grooming: Independent  Hygiene: Independent  UB Dressing: Independent  LB Dressing: Independent  Toileting: Independent  Bathing: Independent    IADL's:  Homecare: Modified [place]  Cooking: Modified [place]  Laundry: Modified [place]  Yard work: not applicable  Use of telephone: Independent  Money management: Independent  Medication management: Independent  Handwriting: Independent and legible  Technology Use:not applicable    Treatment     Brie received the treatments listed below:     Therapeutic exercises to increase ROM and flexibility for 40 minutes, including:  - measures taken (see above)    Patient Education and Home Exercises      Education provided:   - continue working on functional handwriting at home with pinch  (write notes, grocery list, etc)  -Progress towards goals   -reviewed benefits of built up handles on utensils, where to get them and how to make them at home.  - reviewed energy conservation.   - readi steadi glove    Written Home Exercises Provided: instructed to continue HEP previously given.  Exercises were reviewed and Brie was able to demonstrate them prior to the end of the session.   Brie demonstrated good understanding of the HEP provided.     See EMR under Patient Instructions for exercises provided 2/11/2022.    3/24/2022: Patient given packet of clock climber, kite strings, loop group, and hill/valley to practice hand writing.     Assessment     Patient participated in taking measures today; although with report of increased (I) with handwriting, shows inconsistent improvement as per measures taken today. Is (I) with HEP, and she and grandson without questions regarding performance. Discussed plan to hold therapy for now, until maikel swensoncathryn arrives to continue with therapy to increase functional use with glove in place.     Brie is progressing towards her goals; updates to goals can be seen below. Pt prognosis is Good.     Pt will continue to benefit from skilled outpatient occupational therapy to address the deficits listed in the problem list on initial evaluation provide pt/family education and to maximize pt's level of independence in the home and community environment.     Pt's spiritual, cultural and educational needs considered and pt agreeable to plan of care and goals.    Anticipated barriers to occupational therapy: none    Goals:  Long Term Goals:  Time frame: 5 weeks/ 10 visits  I in Home exercise program for tremor control and medial epicondylitis pain management. (progressing 3/29/2022)   Aware of adaptive techniques to help complete ADL and IADL safely. (progressing 3/29/2022)  Complete grocery list legibly. (MET 2/24/22 for a list of 10 items)      Short Term Goals:  Time frame: 2 weeks/ 5 visits  Demonstrate tremor control techniques daily. (progressing 3/29/2022)   I with Home exercise program . (MET 3/29/2022)     PLAN     HOLD Occupational Therapy at this time; continue once readi-steadi gloves arrives    SHIRLEY Anders, OTR/L, CEAS-I  3/29/2022

## 2022-03-29 NOTE — TELEPHONE ENCOUNTER
----- Message from Cheryle Quintana sent at 3/28/2022  5:11 PM CDT -----  Kvng Stern Jr., MD  You 14 minutes ago (4:56 PM)      Referral to low vision for vision evalution with dr. warner   Message text          Rowena Reese MA routed conversation to Kvng Stern Jr., MD 5 hours ago (11:56 AM)    Brie Han Larry Jr., MD 5 hours ago (11:54 AM)    Last office visit, forgot to ask about Low Vision Occupational Therapy. Does Dr. Stern feel this would help my grandmother adjust better to her diagnosis?  If so what is available?  Thank you,  Dionisio Han   [phone]

## 2022-04-15 ENCOUNTER — PATIENT MESSAGE (OUTPATIENT)
Dept: OPTOMETRY | Facility: CLINIC | Age: 87
End: 2022-04-15
Payer: MEDICARE

## 2022-04-21 ENCOUNTER — PATIENT MESSAGE (OUTPATIENT)
Dept: OPHTHALMOLOGY | Facility: CLINIC | Age: 87
End: 2022-04-21
Payer: MEDICARE

## 2022-04-22 DIAGNOSIS — H40.1132 PRIMARY OPEN ANGLE GLAUCOMA (POAG) OF BOTH EYES, MODERATE STAGE: Primary | ICD-10-CM

## 2022-04-22 RX ORDER — BRIMONIDINE TARTRATE 2 MG/ML
1 SOLUTION/ DROPS OPHTHALMIC 2 TIMES DAILY
Qty: 5 ML | Refills: 12 | Status: SHIPPED | OUTPATIENT
Start: 2022-04-22 | End: 2022-05-24

## 2022-05-06 ENCOUNTER — OFFICE VISIT (OUTPATIENT)
Dept: OPTOMETRY | Facility: CLINIC | Age: 87
End: 2022-05-06
Payer: MEDICARE

## 2022-05-06 DIAGNOSIS — H52.7 REFRACTIVE ERROR: Primary | ICD-10-CM

## 2022-05-06 PROCEDURE — 1160F PR REVIEW ALL MEDS BY PRESCRIBER/CLIN PHARMACIST DOCUMENTED: ICD-10-PCS | Mod: CPTII,S$GLB,, | Performed by: OPTOMETRIST

## 2022-05-06 PROCEDURE — 1160F RVW MEDS BY RX/DR IN RCRD: CPT | Mod: CPTII,S$GLB,, | Performed by: OPTOMETRIST

## 2022-05-06 PROCEDURE — 3288F FALL RISK ASSESSMENT DOCD: CPT | Mod: CPTII,S$GLB,, | Performed by: OPTOMETRIST

## 2022-05-06 PROCEDURE — 1159F MED LIST DOCD IN RCRD: CPT | Mod: CPTII,S$GLB,, | Performed by: OPTOMETRIST

## 2022-05-06 PROCEDURE — 1157F PR ADVANCE CARE PLAN OR EQUIV PRESENT IN MEDICAL RECORD: ICD-10-PCS | Mod: CPTII,S$GLB,, | Performed by: OPTOMETRIST

## 2022-05-06 PROCEDURE — 99499 NO LOS: ICD-10-PCS | Mod: S$GLB,,, | Performed by: OPTOMETRIST

## 2022-05-06 PROCEDURE — 99999 PR PBB SHADOW E&M-EST. PATIENT-LVL I: ICD-10-PCS | Mod: PBBFAC,,, | Performed by: OPTOMETRIST

## 2022-05-06 PROCEDURE — 3288F PR FALLS RISK ASSESSMENT DOCUMENTED: ICD-10-PCS | Mod: CPTII,S$GLB,, | Performed by: OPTOMETRIST

## 2022-05-06 PROCEDURE — 99999 PR PBB SHADOW E&M-EST. PATIENT-LVL III: ICD-10-PCS | Mod: PBBFAC,,, | Performed by: OPTOMETRIST

## 2022-05-06 PROCEDURE — 99999 PR PBB SHADOW E&M-EST. PATIENT-LVL III: CPT | Mod: PBBFAC,,, | Performed by: OPTOMETRIST

## 2022-05-06 PROCEDURE — 99499 UNLISTED E&M SERVICE: CPT | Mod: S$GLB,,, | Performed by: OPTOMETRIST

## 2022-05-06 PROCEDURE — 99999 PR PBB SHADOW E&M-EST. PATIENT-LVL I: CPT | Mod: PBBFAC,,, | Performed by: OPTOMETRIST

## 2022-05-06 PROCEDURE — 1125F AMNT PAIN NOTED PAIN PRSNT: CPT | Mod: CPTII,S$GLB,, | Performed by: OPTOMETRIST

## 2022-05-06 PROCEDURE — 1159F PR MEDICATION LIST DOCUMENTED IN MEDICAL RECORD: ICD-10-PCS | Mod: CPTII,S$GLB,, | Performed by: OPTOMETRIST

## 2022-05-06 PROCEDURE — 1101F PR PT FALLS ASSESS DOC 0-1 FALLS W/OUT INJ PAST YR: ICD-10-PCS | Mod: CPTII,S$GLB,, | Performed by: OPTOMETRIST

## 2022-05-06 PROCEDURE — 1101F PT FALLS ASSESS-DOCD LE1/YR: CPT | Mod: CPTII,S$GLB,, | Performed by: OPTOMETRIST

## 2022-05-06 PROCEDURE — 1125F PR PAIN SEVERITY QUANTIFIED, PAIN PRESENT: ICD-10-PCS | Mod: CPTII,S$GLB,, | Performed by: OPTOMETRIST

## 2022-05-06 PROCEDURE — 1157F ADVNC CARE PLAN IN RCRD: CPT | Mod: CPTII,S$GLB,, | Performed by: OPTOMETRIST

## 2022-05-06 NOTE — PROGRESS NOTES
HPI     Blurred Vision      Additional comments: Sees better without glasses              Comments     Pt here for low va eval DLS- 03/15/22    Pt states she is wanting to get some vision therapy.   Doesn't see well with current specs, sees better without them.            Last edited by Rl Yusuf, OD on 5/6/2022  4:09 PM. (History)            Assessment /Plan     For exam results, see Encounter Report.    Refractive error      1. Remake spec rx , saw better with glasses today, refer to [facility] for occupational therapy.

## 2022-05-10 ENCOUNTER — PATIENT MESSAGE (OUTPATIENT)
Dept: FAMILY MEDICINE | Facility: CLINIC | Age: 87
End: 2022-05-10
Payer: MEDICARE

## 2022-05-13 ENCOUNTER — TELEPHONE (OUTPATIENT)
Dept: FAMILY MEDICINE | Facility: CLINIC | Age: 87
End: 2022-05-13
Payer: MEDICARE

## 2022-05-18 ENCOUNTER — TELEPHONE (OUTPATIENT)
Dept: FAMILY MEDICINE | Facility: CLINIC | Age: 87
End: 2022-05-18
Payer: MEDICARE

## 2022-05-18 NOTE — TELEPHONE ENCOUNTER
Pt will send message to fax for clinical notes needed to be sent for orthotic glove recommended per PT

## 2022-05-18 NOTE — TELEPHONE ENCOUNTER
"----- Message from Cyndi Hubbard sent at 5/18/2022  1:56 PM CDT -----  Contact: patient  Type: Needs Medical Advice  Who Called:  patient  Symptoms (please be specific):    How long has patient had these symptoms:    Pharmacy name and phone #:    Best Call Back Number: [phone]  Additional Information: patient requesting a return call concerning "previous messages"        "

## 2022-05-31 ENCOUNTER — PATIENT MESSAGE (OUTPATIENT)
Dept: REHABILITATION | Facility: HOSPITAL | Age: 87
End: 2022-05-31
Payer: MEDICARE

## 2022-06-13 ENCOUNTER — CLINICAL SUPPORT (OUTPATIENT)
Dept: REHABILITATION | Facility: HOSPITAL | Age: 87
End: 2022-06-13
Payer: MEDICARE

## 2022-06-13 ENCOUNTER — TELEPHONE (OUTPATIENT)
Dept: REHABILITATION | Facility: HOSPITAL | Age: 87
End: 2022-06-13
Payer: MEDICARE

## 2022-06-13 DIAGNOSIS — R68.89 IMPAIRED FUNCTION OF UPPER EXTREMITY: ICD-10-CM

## 2022-06-13 DIAGNOSIS — R25.1 TREMOR: Primary | ICD-10-CM

## 2022-06-13 DIAGNOSIS — M79.602 LEFT ARM PAIN: ICD-10-CM

## 2022-06-13 PROCEDURE — 97530 THERAPEUTIC ACTIVITIES: CPT | Mod: PN

## 2022-06-13 NOTE — PLAN OF CARE
[facility]  Occupational Therapy Plan of Care Note    Name: Brie Han  Clinic Number: [id number]    Therapy Diagnosis:   Encounter Diagnoses   Name Primary?    Tremor Yes    Impaired function of upper extremity     Left arm pain      Physician: Colten Goudl MD    Visit Date: 6/13/2022    Physician Orders: Eval and Treat  Medical Diagnosis: essential tremor  Evaluation Date: 1/24/2022  Authorization Period Expiration: 4/30/2022   Updated Plan of Care Expiration: 7/29/2022  Plan of Care Expiration: 4/1/202  Visit # / Visits authorized: 17/ 20  FOTO: 60/100 on 2/25/2022 (higher score=higher function)     Time In: 1250  Time Out: 1335  Total Time: 45    Precautions: Standard and Fall  Functional Level Prior to Evaluation:  Modified independent    SUBJECTIVE     Update: Ms. Baird has been using the left arm at home, she has been writing, everything continues to take an extended amount of time due to her tremor. Her Readi Steadi glove (with wrist & forearm components) arrived last week. She has not attempted using it yet, preferring to wait for OT. She is fatigued this afternoon after going to 2 different grocery stores.    OBJECTIVE     Update:     Scooping on 6/13/2022:  With forearm and wrist components of Readi Steadi Glove: completed scoop x5 in 14.19s (1st trial) & 16.33s (2nd trial)  With forearm component of Readi Steadi Glove only: completed scoop x5 in 14.76s  With wrist component of Readi Steadi Glove only: scoop x5 in 17.47s  With no device: scoop x5 in 16.32s    Scooping on 3/11/2022  Pt was unable to scoop small beads from one container to another with the left upper extremity and task was abandoned after 45s. However, once a 1/4# weight was added to the dorsum of her hand, she was able to complete 5 scoops in 32.38s.       Fine motor coordination:   9 Hole Peg Test (9HPT) Left Right   6/13/2022 (left wrist component only) 43.98 nt   6/13/2022 (no device) 35.24s 26.37s     6/13/22 (left forearm component only) 34.13s nt   6/13/2022 (left wrist & forearm components) 38.162 nt   2/25/2022 27s 29s   [norms for women aged 71+: R=22.49s +/-6.02s; L=24.11s +/-5.66s (Anitra et al, 2003)]    Handwriting:     Handwriting on 6/13/2022: 4 trials of an 8-items grocery list using the components of the Readi-Steadi Glove as noted above. Handwriting was not timed.     Handwriting on 3/11/2022 for grocery list of 10 items using pinch  on ballpoint pen with 100% legibility and min spelling errors related to English as 2nd language. No pain in elbow following. (2/25/2022)      Treatment     Brie received the treatments listed below:     Therapeutic activities to improve functional performance for 45 minutes, including:  Measurements as noted above.  Fit and ed re: use of Readi-Steadi Glove.  She did complains of discomfort with the wrist/hand component specificially at the dorsum of the 3rd MCP. OT added foam donut at that spot to decrease pressure/ risk for skin concerns and reported increased comfort after adjustment.     ASSESSMENT     Update: Ms. Baird demonstrated variable fine motor coordination today, which is expected given that she is fatigued, hasn't had therapy in 3 months and is just learning how to use the Readi Steadi Glove. She had significant improvements in her ability to simulate scooping food with minimal interference from her tremor in comparison to 3/11/2022. However, she had a decline in her 9-hole peg test time.  Now that it has arrived, she would benefit from several additional sessions to ensure independence and efficiency with use of the Readi-Steadi Glove, allowing for control of her tremor and increased independence and efficiency with all her desired activities.     Long Term Goal Status: continue unmet goals and add new goals as noted below.     GOALS  Long Term Goals:  Time frame: 5 weeks/ 10 visits  I in Home exercise program for tremor control and medial  epicondylitis pain management. (progressing 2/24/2022)   Aware of adaptive techniques to help complete ADL and IADL safely. (progressing 2/24/2022)  Complete grocery list legibly. (MET 2/24/22 for a list of 10 items)    Short Term Goals:  Time frame: 2 weeks/ 5 visits  Demonstrate tremor control techniques daily. (progressing 2/24/2022)   I with Home exercise program . (progressing 2/24/2022)     NEW GOAL 6/13/2022: Pt will be independent with wear/ care of Readi-Steadi Glove system including don/doff and choosing when to wear which component of the glove.  NEW GOAL 6/13/2022: Pt will be able to carry her coffee with the left upper extremity without interference from tremor using the Readi Steadi glove.  NEW GOAL 6/13/2022: Pt will be able to complete general household cooking and cleaning with use of the Readi-Steadi Glove and minimal interference from her tremor.     Reasons for Recertification of Therapy:  Ms. Baird has received her Readi-steadi Glove. Initial fitting was completed today. She would benefit from additional therapy sessions to ensure fit and independence with wear and care to gain maximum benefit of this new device.     PLAN     Updated Certification Period: 6/13/2022-7/29/2022   Recommended Treatment Plan: 2 times per week for 1 week and then 1 time per week for 4 weeks: Manual Therapy, Moist Heat/ Ice, Neuromuscular Re-ed, Patient Education, Self Care, Therapeutic Activities and Therapeutic Exercise; orthotic wear/ care; and any other treatment modalities that will facilitate Brie Han's ability to reach her goals.      Daya Grant OT    I CERTIFY THE NEED FOR THESE SERVICES FURNISHED UNDER THIS PLAN OF TREATMENT AND WHILE UNDER MY CARE  Physician's comments:      Physician's Signature: ___________________________________________________

## 2022-06-13 NOTE — PROGRESS NOTES
Occupational Therapy  Updated Plan of Care    Brie Han  MRN: [id number]    Plan of care update completed. Please see attached POC for details. Thank you for consult!    JOHNNY Lance LOTR     6/13/2022

## 2022-06-15 ENCOUNTER — CLINICAL SUPPORT (OUTPATIENT)
Dept: REHABILITATION | Facility: HOSPITAL | Age: 87
End: 2022-06-15
Payer: MEDICARE

## 2022-06-15 DIAGNOSIS — R68.89 IMPAIRED FUNCTION OF UPPER EXTREMITY: ICD-10-CM

## 2022-06-15 DIAGNOSIS — M79.602 LEFT ARM PAIN: ICD-10-CM

## 2022-06-15 DIAGNOSIS — R25.1 TREMOR: Primary | ICD-10-CM

## 2022-06-15 PROCEDURE — 97530 THERAPEUTIC ACTIVITIES: CPT | Mod: PN

## 2022-06-15 NOTE — PROGRESS NOTES
"[facility]  Occupational Therapy Treatment Note     Name: Brie Han  Clinic Number: [id number]     Therapy Diagnosis:        Encounter Diagnoses   Name Primary?    Tremor Yes    Impaired function of upper extremity      Left arm pain        Physician: Colten Gould MD     Visit Date: 6/15/2022      Physician Orders: Eval and Treat  Medical Diagnosis: essential tremor  Evaluation Date: 1/24/2022  Authorization Period Expiration: 12/31/2022  Updated Plan of Care Expiration: 7/29/2022  Progress Note Due: 7/8/2022  Visit # / Visits authorized: 17/ 40  FOTO: 60/100 on 2/25/2022 (higher score=higher function)      Time In: 1120  Time Out: 1200   Total Time: 40     Precautions: Standard and Fall     Subjective     Pt reports: Used glove yesterday while doing "nothing". She did not wear it during house work. She doesn't want to wear the device during housework because she doesn't want to get it dirty. She's had no recent symptoms of epicondylitis.     she was partially compliant with home exercise program given last session.   Response to previous treatment: no concerns  Functional change: as noted above    Pain: 0/10  Location:  n/a     Treatment      Brie received the treatments listed below:      Therapeutic activities to improve functional performance for 45 minutes, including:  Pt donned/doffed orthosis x2 with min A on the 1st trial and independence on the 2nd trial.  Multiple functional activities including filling and carrying mugs of drink, washing dishes, flipping pancakes, stirring batter, cutting food and self-feeding. Completed these with forearm component for the IADL tasks and forearm and hand/wrist component for carrying her mug and self-feeding.     Home Exercises and Education Provided     Education provided:   - Ed to use the forearm component during cleaning as it does help to control the tremor and it's far enough away from her hand that it shouldn't get dirty " during housework.  -Ed to use both parts of the device during self-feeding, carrying her coffee, other fine motor coordination (FMC) tasks for best efficiency.  - Progress towards goals     Written Home Exercises Provided: Patient instructed to cont prior HEP.  Exercises were reviewed and Mercedes was able to demonstrate them prior to the end of the session.  Mercedes demonstrated good  understanding of the HEP provided.   .   See EMR under Patient Instructions for exercises provided during prior visits.      ASSESSMENT      Ms. Baird is able to don/doff her glove. She is able to complete everyday tasks with increased indpendence and efficiency using the components of the glove as appropriate. Additional visits will be scheduled one at a time on an as-needed basis due to today's good results.      Long Term Goal Status: continue unmet goals and add new goals as noted below.      GOALS  Long Term Goals:  Time frame: 5 weeks/ 10 visits  I in Home exercise program for tremor control and medial epicondylitis pain management. (MET 6/15/2022, no symptoms of lateral epicondylitis noted today)  Aware of adaptive techniques to help complete ADL and IADL safely. (progressing 6/15/2022)   Complete grocery list legibly. (MET 2/24/22 for a list of 10 items)    Short Term Goals:  Time frame: 2 weeks/ 5 visits  Demonstrate tremor control techniques daily. (progressing 6/15/2022)   I with Home exercise program . (MET 6/15/2022 for functional HEP)      Pt will be independent with wear/ care of Readi-Steadi Glove system including don/doff and choosing when to wear which component of the glove. (progressing 6/15/2022)   Pt will be able to carry her coffee with the left upper extremity without interference from tremor using the Readi Steadi glove. (progressing 6/15/2022)   Pt will be able to complete general household cooking and cleaning with use of the Readi-Steadi Glove and minimal interference from her tremor. (progressing  6/15/2022)       Plan   Updated Certification Period: 6/13/2022-7/29/2022   Recommended Treatment Plan: 2 times per week for 1 week and then 1 time per week for 4 weeks: Manual Therapy, Moist Heat/ Ice, Neuromuscular Re-ed, Patient Education, Self Care, Therapeutic Activities and Therapeutic Exercise; orthotic wear/ care; and any other treatment modalities that will facilitate Brie Han's ability to reach her goals.       Updates/Grading for next session: Continue addressing functional tasks with Readi-Jesusadi Glove as necessary.     Daya Grant, OT

## 2022-07-06 ENCOUNTER — LAB VISIT (OUTPATIENT)
Dept: LAB | Facility: HOSPITAL | Age: 87
End: 2022-07-06
Attending: FAMILY MEDICINE
Payer: MEDICARE

## 2022-07-06 DIAGNOSIS — I10 HTN (HYPERTENSION), BENIGN: ICD-10-CM

## 2022-07-06 LAB
ANION GAP SERPL CALC-SCNC: 9 MMOL/L (ref 8–16)
BASOPHILS # BLD AUTO: 0.05 K/UL (ref 0–0.2)
BASOPHILS NFR BLD: 1.2 % (ref 0–1.9)
BUN SERPL-MCNC: 20 MG/DL (ref 10–30)
CALCIUM SERPL-MCNC: 9.6 MG/DL (ref 8.7–10.5)
CHLORIDE SERPL-SCNC: 101 MMOL/L (ref 95–110)
CO2 SERPL-SCNC: 24 MMOL/L (ref 23–29)
CREAT SERPL-MCNC: 0.8 MG/DL (ref 0.5–1.4)
DIFFERENTIAL METHOD: ABNORMAL
EOSINOPHIL # BLD AUTO: 0.1 K/UL (ref 0–0.5)
EOSINOPHIL NFR BLD: 2.1 % (ref 0–8)
ERYTHROCYTE [DISTWIDTH] IN BLOOD BY AUTOMATED COUNT: 13.9 % (ref 11.5–14.5)
EST. GFR  (AFRICAN AMERICAN): >60 ML/MIN/1.73 M^2
EST. GFR  (NON AFRICAN AMERICAN): >60 ML/MIN/1.73 M^2
GLUCOSE SERPL-MCNC: 83 MG/DL (ref 70–110)
HCT VFR BLD AUTO: 36.7 % (ref 37–48.5)
HGB BLD-MCNC: 12 G/DL (ref 12–16)
IMM GRANULOCYTES # BLD AUTO: 0.01 K/UL (ref 0–0.04)
IMM GRANULOCYTES NFR BLD AUTO: 0.2 % (ref 0–0.5)
LYMPHOCYTES # BLD AUTO: 1.4 K/UL (ref 1–4.8)
LYMPHOCYTES NFR BLD: 33.6 % (ref 18–48)
MCH RBC QN AUTO: 32.1 PG (ref 27–31)
MCHC RBC AUTO-ENTMCNC: 32.7 G/DL (ref 32–36)
MCV RBC AUTO: 98 FL (ref 82–98)
MONOCYTES # BLD AUTO: 0.5 K/UL (ref 0.3–1)
MONOCYTES NFR BLD: 12.4 % (ref 4–15)
NEUTROPHILS # BLD AUTO: 2.1 K/UL (ref 1.8–7.7)
NEUTROPHILS NFR BLD: 50.5 % (ref 38–73)
NRBC BLD-RTO: 0 /100 WBC
PLATELET # BLD AUTO: 253 K/UL (ref 150–450)
PMV BLD AUTO: 10.6 FL (ref 9.2–12.9)
POTASSIUM SERPL-SCNC: 4.9 MMOL/L (ref 3.5–5.1)
RBC # BLD AUTO: 3.74 M/UL (ref 4–5.4)
SODIUM SERPL-SCNC: 134 MMOL/L (ref 136–145)
WBC # BLD AUTO: 4.2 K/UL (ref 3.9–12.7)

## 2022-07-06 PROCEDURE — 80048 BASIC METABOLIC PNL TOTAL CA: CPT | Performed by: FAMILY MEDICINE

## 2022-07-06 PROCEDURE — 36415 COLL VENOUS BLD VENIPUNCTURE: CPT | Mod: PO | Performed by: FAMILY MEDICINE

## 2022-07-06 PROCEDURE — 85025 COMPLETE CBC W/AUTO DIFF WBC: CPT | Performed by: FAMILY MEDICINE

## 2022-07-20 ENCOUNTER — OFFICE VISIT (OUTPATIENT)
Dept: OPHTHALMOLOGY | Facility: CLINIC | Age: 87
End: 2022-07-20
Payer: MEDICARE

## 2022-07-20 DIAGNOSIS — Z98.890 S/P YAG CAPSULOTOMY, RIGHT: ICD-10-CM

## 2022-07-20 DIAGNOSIS — Z96.1 PSEUDOPHAKIA OF BOTH EYES: ICD-10-CM

## 2022-07-20 DIAGNOSIS — H04.123 DRY EYES, BILATERAL: ICD-10-CM

## 2022-07-20 DIAGNOSIS — H02.831 DERMATOCHALASIS OF BOTH UPPER EYELIDS: ICD-10-CM

## 2022-07-20 DIAGNOSIS — H40.1132 PRIMARY OPEN ANGLE GLAUCOMA (POAG) OF BOTH EYES, MODERATE STAGE: Primary | ICD-10-CM

## 2022-07-20 DIAGNOSIS — H02.834 DERMATOCHALASIS OF BOTH UPPER EYELIDS: ICD-10-CM

## 2022-07-20 PROCEDURE — 1160F PR REVIEW ALL MEDS BY PRESCRIBER/CLIN PHARMACIST DOCUMENTED: ICD-10-PCS | Mod: CPTII,S$GLB,, | Performed by: OPHTHALMOLOGY

## 2022-07-20 PROCEDURE — 1101F PR PT FALLS ASSESS DOC 0-1 FALLS W/OUT INJ PAST YR: ICD-10-PCS | Mod: CPTII,S$GLB,, | Performed by: OPHTHALMOLOGY

## 2022-07-20 PROCEDURE — 1159F MED LIST DOCD IN RCRD: CPT | Mod: CPTII,S$GLB,, | Performed by: OPHTHALMOLOGY

## 2022-07-20 PROCEDURE — 1126F PR PAIN SEVERITY QUANTIFIED, NO PAIN PRESENT: ICD-10-PCS | Mod: CPTII,S$GLB,, | Performed by: OPHTHALMOLOGY

## 2022-07-20 PROCEDURE — 1101F PT FALLS ASSESS-DOCD LE1/YR: CPT | Mod: CPTII,S$GLB,, | Performed by: OPHTHALMOLOGY

## 2022-07-20 PROCEDURE — 1160F RVW MEDS BY RX/DR IN RCRD: CPT | Mod: CPTII,S$GLB,, | Performed by: OPHTHALMOLOGY

## 2022-07-20 PROCEDURE — 99999 PR PBB SHADOW E&M-EST. PATIENT-LVL III: ICD-10-PCS | Mod: PBBFAC,,, | Performed by: OPHTHALMOLOGY

## 2022-07-20 PROCEDURE — 1157F ADVNC CARE PLAN IN RCRD: CPT | Mod: CPTII,S$GLB,, | Performed by: OPHTHALMOLOGY

## 2022-07-20 PROCEDURE — 1157F PR ADVANCE CARE PLAN OR EQUIV PRESENT IN MEDICAL RECORD: ICD-10-PCS | Mod: CPTII,S$GLB,, | Performed by: OPHTHALMOLOGY

## 2022-07-20 PROCEDURE — 3288F FALL RISK ASSESSMENT DOCD: CPT | Mod: CPTII,S$GLB,, | Performed by: OPHTHALMOLOGY

## 2022-07-20 PROCEDURE — 99999 PR PBB SHADOW E&M-EST. PATIENT-LVL III: CPT | Mod: PBBFAC,,, | Performed by: OPHTHALMOLOGY

## 2022-07-20 PROCEDURE — 1126F AMNT PAIN NOTED NONE PRSNT: CPT | Mod: CPTII,S$GLB,, | Performed by: OPHTHALMOLOGY

## 2022-07-20 PROCEDURE — 1159F PR MEDICATION LIST DOCUMENTED IN MEDICAL RECORD: ICD-10-PCS | Mod: CPTII,S$GLB,, | Performed by: OPHTHALMOLOGY

## 2022-07-20 PROCEDURE — 99214 PR OFFICE/OUTPT VISIT, EST, LEVL IV, 30-39 MIN: ICD-10-PCS | Mod: S$GLB,,, | Performed by: OPHTHALMOLOGY

## 2022-07-20 PROCEDURE — 3288F PR FALLS RISK ASSESSMENT DOCUMENTED: ICD-10-PCS | Mod: CPTII,S$GLB,, | Performed by: OPHTHALMOLOGY

## 2022-07-20 PROCEDURE — 99214 OFFICE O/P EST MOD 30 MIN: CPT | Mod: S$GLB,,, | Performed by: OPHTHALMOLOGY

## 2022-07-20 NOTE — PROGRESS NOTES
HPI     Glaucoma      Additional comments: 4 month iop ck              Comments     DLS: 5/6/22    Pt states no pain but eyes a little irritated and gritty. Just put drops   in about 30 min ago.     Gtts: Brimonidine BID, Refresh 3-4 times a day OU          Last edited by Cheryle Quintana on 7/20/2022  1:47 PM. (History)        ROS     Negative for: Constitutional, Gastrointestinal, Neurological, Skin,   Genitourinary, Musculoskeletal, HENT, Endocrine, Cardiovascular, Eyes,   Respiratory, Psychiatric, Allergic/Imm, Heme/Lymph    Last edited by Kvng Stern Jr., MD on 7/20/2022  2:00 PM. (History)        Assessment /Plan     For exam results, see Encounter Report.    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

## 2022-08-05 ENCOUNTER — OFFICE VISIT (OUTPATIENT)
Dept: FAMILY MEDICINE | Facility: CLINIC | Age: 87
End: 2022-08-05
Payer: MEDICARE

## 2022-08-05 VITALS
OXYGEN SATURATION: 99 % | SYSTOLIC BLOOD PRESSURE: 158 MMHG | BODY MASS INDEX: 23.78 KG/M2 | HEART RATE: 68 BPM | WEIGHT: 117.94 LBS | DIASTOLIC BLOOD PRESSURE: 68 MMHG | TEMPERATURE: 98 F | HEIGHT: 59 IN

## 2022-08-05 DIAGNOSIS — I25.10 CORONARY ARTERY DISEASE DUE TO CALCIFIED CORONARY LESION: ICD-10-CM

## 2022-08-05 DIAGNOSIS — G25.0 ESSENTIAL TREMOR: Primary | ICD-10-CM

## 2022-08-05 DIAGNOSIS — E78.5 HYPERLIPIDEMIA WITH TARGET LDL LESS THAN 70: ICD-10-CM

## 2022-08-05 DIAGNOSIS — I73.9 PAD (PERIPHERAL ARTERY DISEASE): ICD-10-CM

## 2022-08-05 DIAGNOSIS — I25.84 CORONARY ARTERY DISEASE DUE TO CALCIFIED CORONARY LESION: ICD-10-CM

## 2022-08-05 DIAGNOSIS — I10 HTN (HYPERTENSION), BENIGN: ICD-10-CM

## 2022-08-05 PROCEDURE — 3288F FALL RISK ASSESSMENT DOCD: CPT | Mod: CPTII,S$GLB,, | Performed by: FAMILY MEDICINE

## 2022-08-05 PROCEDURE — 99214 PR OFFICE/OUTPT VISIT, EST, LEVL IV, 30-39 MIN: ICD-10-PCS | Mod: S$GLB,,, | Performed by: FAMILY MEDICINE

## 2022-08-05 PROCEDURE — 1157F ADVNC CARE PLAN IN RCRD: CPT | Mod: CPTII,S$GLB,, | Performed by: FAMILY MEDICINE

## 2022-08-05 PROCEDURE — 1101F PT FALLS ASSESS-DOCD LE1/YR: CPT | Mod: CPTII,S$GLB,, | Performed by: FAMILY MEDICINE

## 2022-08-05 PROCEDURE — 99214 OFFICE O/P EST MOD 30 MIN: CPT | Mod: S$GLB,,, | Performed by: FAMILY MEDICINE

## 2022-08-05 PROCEDURE — 1126F AMNT PAIN NOTED NONE PRSNT: CPT | Mod: CPTII,S$GLB,, | Performed by: FAMILY MEDICINE

## 2022-08-05 PROCEDURE — 99999 PR PBB SHADOW E&M-EST. PATIENT-LVL III: CPT | Mod: PBBFAC,,, | Performed by: FAMILY MEDICINE

## 2022-08-05 PROCEDURE — 1126F PR PAIN SEVERITY QUANTIFIED, NO PAIN PRESENT: ICD-10-PCS | Mod: CPTII,S$GLB,, | Performed by: FAMILY MEDICINE

## 2022-08-05 PROCEDURE — 99999 PR PBB SHADOW E&M-EST. PATIENT-LVL III: ICD-10-PCS | Mod: PBBFAC,,, | Performed by: FAMILY MEDICINE

## 2022-08-05 PROCEDURE — 1157F PR ADVANCE CARE PLAN OR EQUIV PRESENT IN MEDICAL RECORD: ICD-10-PCS | Mod: CPTII,S$GLB,, | Performed by: FAMILY MEDICINE

## 2022-08-05 PROCEDURE — 3288F PR FALLS RISK ASSESSMENT DOCUMENTED: ICD-10-PCS | Mod: CPTII,S$GLB,, | Performed by: FAMILY MEDICINE

## 2022-08-05 PROCEDURE — 1101F PR PT FALLS ASSESS DOC 0-1 FALLS W/OUT INJ PAST YR: ICD-10-PCS | Mod: CPTII,S$GLB,, | Performed by: FAMILY MEDICINE

## 2022-08-05 RX ORDER — CLOPIDOGREL BISULFATE 75 MG/1
75 TABLET ORAL DAILY
Qty: 90 TABLET | Refills: 3 | Status: SHIPPED | OUTPATIENT
Start: 2022-08-05 | End: 2023-06-13

## 2022-08-05 RX ORDER — NIFEDIPINE 30 MG/1
TABLET, EXTENDED RELEASE ORAL
Qty: 90 TABLET | Refills: 3 | Status: SHIPPED | OUTPATIENT
Start: 2022-08-05 | End: 2023-10-10

## 2022-08-05 RX ORDER — LOSARTAN POTASSIUM 100 MG/1
100 TABLET ORAL DAILY
Qty: 90 TABLET | Refills: 3 | Status: SHIPPED | OUTPATIENT
Start: 2022-08-05 | End: 2023-06-18

## 2022-08-05 RX ORDER — LOVASTATIN 10 MG/1
10 TABLET ORAL NIGHTLY
Qty: 90 TABLET | Refills: 3 | Status: SHIPPED | OUTPATIENT
Start: 2022-08-05 | End: 2023-06-13

## 2022-08-06 ENCOUNTER — PATIENT MESSAGE (OUTPATIENT)
Dept: FAMILY MEDICINE | Facility: CLINIC | Age: 87
End: 2022-08-06
Payer: MEDICARE

## 2022-08-06 ENCOUNTER — PATIENT MESSAGE (OUTPATIENT)
Dept: OPHTHALMOLOGY | Facility: CLINIC | Age: 87
End: 2022-08-06
Payer: MEDICARE

## 2022-08-08 ENCOUNTER — TELEPHONE (OUTPATIENT)
Dept: OPHTHALMOLOGY | Facility: CLINIC | Age: 87
End: 2022-08-08
Payer: MEDICARE

## 2022-08-08 NOTE — TELEPHONE ENCOUNTER
Spoke with pt grandson via phone. He states that he was able to schedule appt for Optometry for pt. All understanding.

## 2022-08-08 NOTE — TELEPHONE ENCOUNTER
Called pt and spoke with her son concerning pt's eye problems. Pt has redness in eyes and on lids with irritation and crusting. Told Prabhjot to have pt use cool compresses 4-5 times a day for about 15 minutes each time and to use Pataday BID. Scheduled her with Dr Stern on 8/17 at 10am.

## 2022-08-09 ENCOUNTER — OFFICE VISIT (OUTPATIENT)
Dept: OPHTHALMOLOGY | Facility: CLINIC | Age: 87
End: 2022-08-09
Payer: MEDICARE

## 2022-08-09 DIAGNOSIS — Z96.1 PSEUDOPHAKIA OF BOTH EYES: ICD-10-CM

## 2022-08-09 DIAGNOSIS — H02.834 DERMATOCHALASIS OF BOTH UPPER EYELIDS: ICD-10-CM

## 2022-08-09 DIAGNOSIS — Z98.890 S/P YAG CAPSULOTOMY, RIGHT: ICD-10-CM

## 2022-08-09 DIAGNOSIS — T78.40XA ALLERGIC REACTION TO DRUG, INITIAL ENCOUNTER: Primary | ICD-10-CM

## 2022-08-09 DIAGNOSIS — H02.831 DERMATOCHALASIS OF BOTH UPPER EYELIDS: ICD-10-CM

## 2022-08-09 DIAGNOSIS — H40.1132 PRIMARY OPEN ANGLE GLAUCOMA (POAG) OF BOTH EYES, MODERATE STAGE: ICD-10-CM

## 2022-08-09 PROCEDURE — 1160F PR REVIEW ALL MEDS BY PRESCRIBER/CLIN PHARMACIST DOCUMENTED: ICD-10-PCS | Mod: CPTII,S$GLB,, | Performed by: OPHTHALMOLOGY

## 2022-08-09 PROCEDURE — 99214 PR OFFICE/OUTPT VISIT, EST, LEVL IV, 30-39 MIN: ICD-10-PCS | Mod: S$GLB,,, | Performed by: OPHTHALMOLOGY

## 2022-08-09 PROCEDURE — 1157F PR ADVANCE CARE PLAN OR EQUIV PRESENT IN MEDICAL RECORD: ICD-10-PCS | Mod: CPTII,S$GLB,, | Performed by: OPHTHALMOLOGY

## 2022-08-09 PROCEDURE — 3288F FALL RISK ASSESSMENT DOCD: CPT | Mod: CPTII,S$GLB,, | Performed by: OPHTHALMOLOGY

## 2022-08-09 PROCEDURE — 1159F PR MEDICATION LIST DOCUMENTED IN MEDICAL RECORD: ICD-10-PCS | Mod: CPTII,S$GLB,, | Performed by: OPHTHALMOLOGY

## 2022-08-09 PROCEDURE — 1160F RVW MEDS BY RX/DR IN RCRD: CPT | Mod: CPTII,S$GLB,, | Performed by: OPHTHALMOLOGY

## 2022-08-09 PROCEDURE — 1125F PR PAIN SEVERITY QUANTIFIED, PAIN PRESENT: ICD-10-PCS | Mod: CPTII,S$GLB,, | Performed by: OPHTHALMOLOGY

## 2022-08-09 PROCEDURE — 3288F PR FALLS RISK ASSESSMENT DOCUMENTED: ICD-10-PCS | Mod: CPTII,S$GLB,, | Performed by: OPHTHALMOLOGY

## 2022-08-09 PROCEDURE — 1101F PR PT FALLS ASSESS DOC 0-1 FALLS W/OUT INJ PAST YR: ICD-10-PCS | Mod: CPTII,S$GLB,, | Performed by: OPHTHALMOLOGY

## 2022-08-09 PROCEDURE — 1159F MED LIST DOCD IN RCRD: CPT | Mod: CPTII,S$GLB,, | Performed by: OPHTHALMOLOGY

## 2022-08-09 PROCEDURE — 1101F PT FALLS ASSESS-DOCD LE1/YR: CPT | Mod: CPTII,S$GLB,, | Performed by: OPHTHALMOLOGY

## 2022-08-09 PROCEDURE — 99999 PR PBB SHADOW E&M-EST. PATIENT-LVL III: CPT | Mod: PBBFAC,,, | Performed by: OPHTHALMOLOGY

## 2022-08-09 PROCEDURE — 99999 PR PBB SHADOW E&M-EST. PATIENT-LVL III: ICD-10-PCS | Mod: PBBFAC,,, | Performed by: OPHTHALMOLOGY

## 2022-08-09 PROCEDURE — 1125F AMNT PAIN NOTED PAIN PRSNT: CPT | Mod: CPTII,S$GLB,, | Performed by: OPHTHALMOLOGY

## 2022-08-09 PROCEDURE — 99214 OFFICE O/P EST MOD 30 MIN: CPT | Mod: S$GLB,,, | Performed by: OPHTHALMOLOGY

## 2022-08-09 PROCEDURE — 1157F ADVNC CARE PLAN IN RCRD: CPT | Mod: CPTII,S$GLB,, | Performed by: OPHTHALMOLOGY

## 2022-08-09 RX ORDER — DORZOLAMIDE HYDROCHLORIDE AND TIMOLOL MALEATE 20; 5 MG/ML; MG/ML
1 SOLUTION/ DROPS OPHTHALMIC 2 TIMES DAILY
Qty: 10 ML | Refills: 2 | Status: SHIPPED | OUTPATIENT
Start: 2022-08-09 | End: 2022-10-17 | Stop reason: SDUPTHER

## 2022-08-09 NOTE — PATIENT INSTRUCTIONS
Stop brimonidine    Hold latanoprost     Systane Preservative tears 3-4 x daily both eyes    Continue Cool Compresses prn to eyelids    Continue Pataday 1 drop 1 x daily both eyes    Start Cosopt 1 drop 2 x daily both eyes

## 2022-08-09 NOTE — PROGRESS NOTES
HPI     Urgent visit: Pt c/o red, irritated, crusty eyes.     Pt sts the skin is sensitive to touch. Pt sts about 30 mins after taking   Brimonidine the eyes get more red. Pt is not sure if the gtt is causing   this. Symptoms started around Thursday and started to get worse Saturday.   Pt using cold compresses TID and just started yesterday.     Last edited by Anali Bocanegra on 8/9/2022  1:13 PM. (History)        ROS     Negative for: Constitutional, Gastrointestinal, Neurological, Skin,   Genitourinary, Musculoskeletal, HENT, Endocrine, Cardiovascular, Eyes,   Respiratory, Psychiatric, Allergic/Imm, Heme/Lymph    Last edited by Kvng Stern Jr., MD on 8/9/2022  4:44 PM. (History)        Assessment /Plan     For exam results, see Encounter Report.    Allergic reaction to drug, initial encounter    Primary open angle glaucoma (POAG) of both eyes, moderate stage  -     dorzolamide-timolol 2-0.5% (COSOPT) 22.3-6.8 mg/mL ophthalmic solution; Place 1 drop into both eyes 2 (two) times daily.  Dispense: 10 mL; Refill: 2    Dermatochalasis of both upper eyelids    Pseudophakia of both eyes    S/P YAG capsulotomy, right      +allergy brimonidine, with skin scaling and erythema  Stop brimonidine    Hold latanoprost     Systane Preservative tears 3-4 x daily both eyes    Continue Cool Compresses prn to eyelids    Continue Pataday 1 drop 1 x daily both eyes    Start Cosopt 1 drop 2 x daily both eyes    Follow up in about 1 month (around 9/9/2022) for IOP and Medication check.

## 2022-08-19 NOTE — PROGRESS NOTES
[facility]     Subjective:    Chief Complaint:   Chief Complaint   Patient presents with    Follow-up       History of Present Illness:  89 y.o. female presents for multiple issues.   SUBJECTIVE: Brie Han is a 89 y.o. female seen for a follow up visit; she has hypertension, hyperlipidemia, coronary artery disease and peripheral vascular disease.Patient denies any exertional chest pain, dyspnea, palpitations, syncope, orthopnea, edema or paroxysmal nocturnal dyspnea.  Neurological ROS: no TIA or stroke symptoms  positive for - tremors    Current Outpatient Medications   Medication Sig Dispense Refill    acetaminophen (TYLENOL) 325 MG tablet Take 2 tablets (650 mg total) by mouth every 8 (eight) hours as needed.  0    b complex vitamins tablet Take 1 tablet by mouth once daily.      brimonidine 0.2% (ALPHAGAN) 0.2 % Drop Place 1 drop into both eyes 2 (two) times a day. 5 mL 6    calcium citrate-vitamin D3 315-200 mg (CITRACAL+D) 315 mg-5 mcg (200 unit) per tablet Every day      KRILL OIL ORAL Take by mouth.      latanoprost 0.005 % ophthalmic solution INSTILL 1 DROP INTO BOTH EYES EVERY EVENING. 2.5 mL 11    multivitamin capsule Every day      triamcinolone acetonide 0.1% (KENALOG) 0.1 % ointment Use on AA BID x 14 days 30 g 0    clopidogreL (PLAVIX) 75 mg tablet Take 1 tablet (75 mg total) by mouth once daily. 90 tablet 3    dorzolamide-timolol 2-0.5% (COSOPT) 22.3-6.8 mg/mL ophthalmic solution Place 1 drop into both eyes 2 (two) times daily. 10 mL 2    losartan (COZAAR) 100 MG tablet Take 1 tablet (100 mg total) by mouth once daily. 90 tablet 3    lovastatin (MEVACOR) 10 MG tablet Take 1 tablet (10 mg total) by mouth every evening. 90 tablet 3    NIFEdipine (PROCARDIA-XL) 30 MG (OSM) 24 hr tablet TAKE 1 TABLET ONE TIME DAILY 90 tablet 3    pantoprazole (PROTONIX) 20 MG tablet Take 1 tablet (20 mg total) by mouth once daily. 30 tablet 11     No current facility-administered  medications for this visit.     Patient Active Problem List   Diagnosis    Benign essential HTN    PAD (peripheral artery disease)    CAD (coronary artery disease)    Hypercholesteremia    Refusal of blood transfusions as patient is Congregational    Hyponatremia    DDD (degenerative disc disease), lumbosacral    Neurogenic pain of right foot    DDD (degenerative disc disease), cervical    Cervicalgia    Chronic left shoulder pain    Left shoulder pain    Stage 3a chronic kidney disease    Decreased functional mobility and endurance    Impairment of balance    Tremor    Impaired function of upper extremity    Left arm pain      reviewed the patients chart dating back for the past few appointments. See above.    The following portions of the patient's history were reviewed and updated as appropriate: allergies, current medications, past family history, past medical history, past social history, past surgical history and problem list.    She denies chest pain upon exertion, dyspnea, nausea, vomiting, diaphoresis, and syncope. No pleuritic chest pain, unilateral leg swelling, calf tenderness, or calf pain.      Past Medical History:   Diagnosis Date    Anticoagulant long-term use     Arthritis     Glaucoma     resolved    Hyperlipidemia     Hypertension     Stroke        Past Surgical History:   Procedure Laterality Date    APPENDECTOMY  's    BREAST SURGERY      CATARACT EXTRACTION W/  INTRAOCULAR LENS IMPLANT Bilateral     Dr Rojo      SECTION      3 c/s and d/c    EYE SURGERY      Glaucoma     HYSTERECTOMY      TONSILLECTOMY         Social History  Social History     Tobacco Use    Smoking status: Never Smoker    Smokeless tobacco: Never Used   Substance Use Topics    Alcohol use: No    Drug use: No       Family History   Problem Relation Age of Onset    Early death Mother     Stroke Father     Hypertension Father     Diabetes Father     Hypertension  "Sister     Thyroid disease Paternal Grandfather     No Known Problems Brother     No Known Problems Maternal Aunt     No Known Problems Maternal Uncle     No Known Problems Paternal Aunt     No Known Problems Paternal Uncle     No Known Problems Maternal Grandmother     No Known Problems Maternal Grandfather     No Known Problems Paternal Grandmother     Amblyopia Neg Hx     Blindness Neg Hx     Cancer Neg Hx     Cataracts Neg Hx     Glaucoma Neg Hx     Macular degeneration Neg Hx     Retinal detachment Neg Hx     Strabismus Neg Hx      Review of patient's allergies indicates:   Allergen Reactions    Contrast media Other (See Comments)    Aspirin Other (See Comments)    Ciprofloxacin Other (See Comments)    Iron Other (See Comments)    Iodine Rash    Penicillins Rash       Review of Systems [Negative unless checked off]    General ROS: []fever, []chills, []weight loss, [x]malaise/fatigue.  ENT ROS: []congestion, []rhinorrhea,  []sore throat, []neck pain, [x]hearing loss.  Ophthalmic ROS:[]blurry vision, [] double vision, []photophobia, []eye pain.  Respiratory ROS: []cough, []pleuritic chest pain, []shortness of breath, []wheezing.  CVS ROS:[]chest pain, []dyspnea on exertion, []palpitations, []orthopnea, []leg swelling, []PND.   GI ROS: []nausea, []vomiting, [] epigastric pain, []abd pain, []diarrhea, []constipation, []blood/melena in stool.   Urology ROS:[]dysuria, []frequency, []flank pain,[] trouble voiding, [] hematuria.   MSK ROS: []myalgias, []joint pain, []muscular weakness,  []back pain, [] falls.   Derm ROS: []pruritis, []rash, []jaundice.  Neurological:[]dizziness,[]numbness,[]loss of consciousness, []weakness  []headaches.   Psych ROS: []hallucinations, []depression, []anxiety, []suicidal ideation.    Physical Examination  BP (!) 158/68 (BP Location: Right arm, Patient Position: Sitting, BP Method: Medium (Manual))   Pulse 68   Temp 97.9 °F (36.6 °C)   Ht 4' 11" (1.499 m)   Wt " "53.5 kg (117 lb 15.1 oz)   LMP 12/07/1972   SpO2 99%   BMI 23.82 kg/m²   Wt Readings from Last 3 Encounters:   08/05/22 53.5 kg (117 lb 15.1 oz)   03/03/22 51.3 kg (113 lb 1.5 oz)   01/14/22 53 kg (116 lb 13.5 oz)     BP Readings from Last 3 Encounters:   08/05/22 (!) 158/68   03/03/22 (!) 118/58   01/14/22 126/68     Estimated body mass index is 23.82 kg/m² as calculated from the following:    Height as of this encounter: 4' 11" (1.499 m).    Weight as of this encounter: 53.5 kg (117 lb 15.1 oz).     General appearance: alert, cooperative, no distress  Eyes: pupils equal and reactive, extraocular eye movements intact   Ears: bilateral TM's and external ear canals normal, Moderate hearing loss.   Nose: normal and patent, no erythema, discharge or polyps   Sinuses: Normal paranasal sinuses without tenderness   Throat: mucous membranes moist, pharynx normal without lesions   Neck: no JVD, no thyromegaly, trachea midline  Lungs: clear to auscultation, no wheezes, rales or rhonchi, symmetric air entry, no dullness to percussion bilaterally.  Heart: normal rate, regular rhythm, normal S1, S2, no murmurs, rubs, clicks or gallops, no displacement of the PMI.  Abdomen: soft, nontender, nondistended, no masses or organomegaly No rigidity, rebound, or guarding.   Back: full range of motion, no tenderness, palpable spasm or pain on motion   Extremities: peripheral pulses normal, no pedal edema, no clubbing or cyanosis   Feet: warm, good capillary refill.  Neurological:alert, oriented, normal speech, Positive focal findings or movement disorder noted   Psychiatric: alert, oriented to person, place, and time  Integument: normal coloration and turgor, no rashes, no suspicious skin lesions noted.    Data reviewed    Previous medical records reviewed and summarized above in HPI. 274}    Laboratory    I have reviewed old labs below:   274}  Lab Results   Component Value Date    WBC 4.20 07/06/2022    HGB 12.0 07/06/2022    HCT " 36.7 (L) 07/06/2022    MCV 98 07/06/2022     07/06/2022     (L) 07/06/2022    K 4.9 07/06/2022     07/06/2022    CALCIUM 9.6 07/06/2022    PHOS 3.4 10/23/2017    CO2 24 07/06/2022    GLU 83 07/06/2022    BUN 20 07/06/2022    CREATININE 0.8 07/06/2022    ANIONGAP 9 07/06/2022    ESTGFRAFRICA >60.0 07/06/2022    EGFRNONAA >60.0 07/06/2022    PROT 7.4 07/10/2021    ALBUMIN 3.8 07/10/2021    BILITOT 0.6 07/10/2021    ALKPHOS 79 07/10/2021    ALT 20 07/10/2021    AST 27 07/10/2021    INR 1.0 05/06/2018    CHOL 183 07/10/2021    TRIG 62 07/10/2021    HDL 71 07/10/2021    LDLCALC 99.6 07/10/2021    TSH 1.532 10/22/2017    HGBA1C 5.4 10/23/2017       Imaging/Tracing: I have reviewed the pertinent results/findings and my personal findings are below:  274}    Assessment/Plan     274}    Brie Han is a 89 y.o. female who presents to clinic with:    1. Essential tremor    2. Coronary artery disease due to calcified coronary lesion    3. HTN (hypertension), benign    4. PAD (peripheral artery disease)    5. Hyperlipidemia with target LDL less than 70         Diagnostic impression remarks       1. Coronary artery disease due to calcified coronary lesion  - clopidogreL (PLAVIX) 75 mg tablet; Take 1 tablet (75 mg total) by mouth once daily.  Dispense: 90 tablet; Refill: 3    2. HTN (hypertension), benign  - NIFEdipine (PROCARDIA-XL) 30 MG (OSM) 24 hr tablet; TAKE 1 TABLET ONE TIME DAILY  Dispense: 90 tablet; Refill: 3  - losartan (COZAAR) 100 MG tablet; Take 1 tablet (100 mg total) by mouth once daily.  Dispense: 90 tablet; Refill: 3    3. Essential tremor    4. PAD (peripheral artery disease)    5. Hyperlipidemia with target LDL less than 70  - lovastatin (MEVACOR) 10 MG tablet; Take 1 tablet (10 mg total) by mouth every evening.  Dispense: 90 tablet; Refill: 3  - Lipid panel; Future  - Comprehensive metabolic panel; Future      Medication Monitoring: In today's visit, monitoring for drug toxicity was  accomplished. Proper use of medications was discussed.     Counseling: Counseling included discussion regarding imaging findings, diagnosis, possibilities, treatment options, medications, risks, and benefits. She had many questions regarding the options and long-term effects. All questions were answered. She expressed understanding after counseling regarding the diagnosis and recommendations. She was capable and demonstrated competence with understanding of these options. Shared decision making was performed resulting in her choosing the current treatment plan.     She was counseled about the importance of healthy dietary habits as well as routine physical activity and exercise for better health outcomes. I also discussed the importance of cancer screening.     I also discussed the importance of close follow up to discuss labs, change or modify her medications if needed, monitor side effects, and further evaluation of medical problems.     Additional workup planned: see labs ordered below.    See below for labs and meds ordered with associated diagnosis      Medication List with Changes/Refills   New Medications    DORZOLAMIDE-TIMOLOL 2-0.5% (COSOPT) 22.3-6.8 MG/ML OPHTHALMIC SOLUTION    Place 1 drop into both eyes 2 (two) times daily.   Current Medications    ACETAMINOPHEN (TYLENOL) 325 MG TABLET    Take 2 tablets (650 mg total) by mouth every 8 (eight) hours as needed.    B COMPLEX VITAMINS TABLET    Take 1 tablet by mouth once daily.    BRIMONIDINE 0.2% (ALPHAGAN) 0.2 % DROP    Place 1 drop into both eyes 2 (two) times a day.    CALCIUM CITRATE-VITAMIN D3 315-200 MG (CITRACAL+D) 315 MG-5 MCG (200 UNIT) PER TABLET    Every day    KRILL OIL ORAL    Take by mouth.    LATANOPROST 0.005 % OPHTHALMIC SOLUTION    INSTILL 1 DROP INTO BOTH EYES EVERY EVENING.    MULTIVITAMIN CAPSULE    Every day    PANTOPRAZOLE (PROTONIX) 20 MG TABLET    Take 1 tablet (20 mg total) by mouth once daily.    TRIAMCINOLONE ACETONIDE 0.1%  (KENALOG) 0.1 % OINTMENT    Use on AA BID x 14 days   Changed and/or Refilled Medications    Modified Medication Previous Medication    CLOPIDOGREL (PLAVIX) 75 MG TABLET clopidogreL (PLAVIX) 75 mg tablet       Take 1 tablet (75 mg total) by mouth once daily.    TAKE 1 TABLET EVERY DAY    LOSARTAN (COZAAR) 100 MG TABLET losartan (COZAAR) 100 MG tablet       Take 1 tablet (100 mg total) by mouth once daily.    Take 1 tablet (100 mg total) by mouth once daily.    LOVASTATIN (MEVACOR) 10 MG TABLET lovastatin (MEVACOR) 10 MG tablet       Take 1 tablet (10 mg total) by mouth every evening.    TAKE 1 TABLET EVERY EVENING    NIFEDIPINE (PROCARDIA-XL) 30 MG (OSM) 24 HR TABLET NIFEdipine (PROCARDIA-XL) 30 MG (OSM) 24 hr tablet       TAKE 1 TABLET ONE TIME DAILY    TAKE 1 TABLET ONE TIME DAILY   Discontinued Medications    CLOPIDOGREL (PLAVIX) 75 MG TABLET    TAKE 1 TABLET EVERY DAY    LOVASTATIN (MEVACOR) 10 MG TABLET    Take 1 tablet (10 mg total) by mouth every evening.    LOVASTATIN (MEVACOR) 10 MG TABLET    TAKE 1 TABLET EVERY EVENING     Modified Medications    Modified Medication Previous Medication    CLOPIDOGREL (PLAVIX) 75 MG TABLET clopidogreL (PLAVIX) 75 mg tablet       Take 1 tablet (75 mg total) by mouth once daily.    TAKE 1 TABLET EVERY DAY    LOSARTAN (COZAAR) 100 MG TABLET losartan (COZAAR) 100 MG tablet       Take 1 tablet (100 mg total) by mouth once daily.    Take 1 tablet (100 mg total) by mouth once daily.    LOVASTATIN (MEVACOR) 10 MG TABLET lovastatin (MEVACOR) 10 MG tablet       Take 1 tablet (10 mg total) by mouth every evening.    TAKE 1 TABLET EVERY EVENING    NIFEDIPINE (PROCARDIA-XL) 30 MG (OSM) 24 HR TABLET NIFEdipine (PROCARDIA-XL) 30 MG (OSM) 24 hr tablet       TAKE 1 TABLET ONE TIME DAILY    TAKE 1 TABLET ONE TIME DAILY       Follow up in about 6 months (around 2/5/2023) for labs before next visit. for further workup and reassessment if labs and tests obtained are stable or sooner as needed.  "She was instructed to call the clinic or go to the emergency department if her symptoms do not improve, worsens, or if new symptoms develop. Patient knows to call any time if an emergency arises. Shared decision making occurred and she verbalized understanding in agreement with this plan.     Documentation entered by me for this encounter may have been done in part using speech-recognition technology. Although I have made an effort to ensure accuracy, "sound like" errors may exist and should be interpreted in context.       Colten Gould MD     Discussed health maintenance guidelines appropriate for age.    "

## 2022-08-20 ENCOUNTER — NURSE TRIAGE (OUTPATIENT)
Dept: ADMINISTRATIVE | Facility: CLINIC | Age: 87
End: 2022-08-20
Payer: MEDICARE

## 2022-08-20 NOTE — TELEPHONE ENCOUNTER
"    Reason for Disposition   Complete loss of vision in 1 or both eyes     Happened last night, lasted one minute, then vision returned and normal this morning.  Caller states will not go to the ED   Caller hangs up   Caller hangs up    Protocols used: ST VISION LOSS OR CHANGE-A-AH, NO CONTACT OR DUPLICATE CONTACT CALL-A-AH, DIFFICULT CALL-A-AH      Brie PATEL Prabhjot Han called.   He said he is not with her at this time. Said she had a loss of vision, began last night, at 10 pm, lasted about a minute.  Vision became normal after that minute, and it is normal this morning. He said this has happened to her before. He was somewhat agitated, states he wants to speak with ophthalmology "so she can be seen today" and when I explained that clinic is closed in that specialty on weekends and there is no on-call provider listed for Dr Stern, he stated that he did not want to be told to go to the ED, and that he will not be bringing her to any emergency room.  When I tried to ask additional questions in effort to assist / triage, he stated "If you can't call an ophthalmologist, then I don't need to talk to you",  and ended the call.  Message to Dr Stern. Also Dr Gould, pcp.  Please contact caller directly with any additional care advice.     "

## 2022-08-22 ENCOUNTER — TELEPHONE (OUTPATIENT)
Dept: OPHTHALMOLOGY | Facility: CLINIC | Age: 87
End: 2022-08-22
Payer: MEDICARE

## 2022-08-25 ENCOUNTER — TELEPHONE (OUTPATIENT)
Dept: OPHTHALMOLOGY | Facility: CLINIC | Age: 87
End: 2022-08-25
Payer: MEDICARE

## 2022-08-26 ENCOUNTER — TELEPHONE (OUTPATIENT)
Dept: OPHTHALMOLOGY | Facility: CLINIC | Age: 87
End: 2022-08-26
Payer: MEDICARE

## 2022-08-26 NOTE — TELEPHONE ENCOUNTER
Called pt's grandson Prabhjot concerning pt's vision problem. Prabhjot states that pt loss her vision for about a minute last weekend. Vision came back and she has no lasting damage. I spoke with Dr Stern and and informed Prabhjot that he said to have her keep her appt with him on 9/16 but to let us know if she has this happen again and we would get her in.

## 2022-09-23 ENCOUNTER — OFFICE VISIT (OUTPATIENT)
Dept: OPHTHALMOLOGY | Facility: CLINIC | Age: 87
End: 2022-09-23
Payer: MEDICARE

## 2022-09-23 DIAGNOSIS — Z96.1 PSEUDOPHAKIA OF BOTH EYES: ICD-10-CM

## 2022-09-23 DIAGNOSIS — H40.1132 PRIMARY OPEN ANGLE GLAUCOMA (POAG) OF BOTH EYES, MODERATE STAGE: ICD-10-CM

## 2022-09-23 DIAGNOSIS — H02.834 DERMATOCHALASIS OF BOTH UPPER EYELIDS: ICD-10-CM

## 2022-09-23 DIAGNOSIS — G45.3 AMAUROSIS FUGAX OF RIGHT EYE: Primary | ICD-10-CM

## 2022-09-23 DIAGNOSIS — H02.831 DERMATOCHALASIS OF BOTH UPPER EYELIDS: ICD-10-CM

## 2022-09-23 DIAGNOSIS — H04.123 DRY EYES, BILATERAL: ICD-10-CM

## 2022-09-23 PROCEDURE — 3288F PR FALLS RISK ASSESSMENT DOCUMENTED: ICD-10-PCS | Mod: CPTII,S$GLB,, | Performed by: OPHTHALMOLOGY

## 2022-09-23 PROCEDURE — 1126F PR PAIN SEVERITY QUANTIFIED, NO PAIN PRESENT: ICD-10-PCS | Mod: CPTII,S$GLB,, | Performed by: OPHTHALMOLOGY

## 2022-09-23 PROCEDURE — 99214 OFFICE O/P EST MOD 30 MIN: CPT | Mod: S$GLB,,, | Performed by: OPHTHALMOLOGY

## 2022-09-23 PROCEDURE — 1159F MED LIST DOCD IN RCRD: CPT | Mod: CPTII,S$GLB,, | Performed by: OPHTHALMOLOGY

## 2022-09-23 PROCEDURE — 1160F PR REVIEW ALL MEDS BY PRESCRIBER/CLIN PHARMACIST DOCUMENTED: ICD-10-PCS | Mod: CPTII,S$GLB,, | Performed by: OPHTHALMOLOGY

## 2022-09-23 PROCEDURE — 3288F FALL RISK ASSESSMENT DOCD: CPT | Mod: CPTII,S$GLB,, | Performed by: OPHTHALMOLOGY

## 2022-09-23 PROCEDURE — 1160F RVW MEDS BY RX/DR IN RCRD: CPT | Mod: CPTII,S$GLB,, | Performed by: OPHTHALMOLOGY

## 2022-09-23 PROCEDURE — 1101F PT FALLS ASSESS-DOCD LE1/YR: CPT | Mod: CPTII,S$GLB,, | Performed by: OPHTHALMOLOGY

## 2022-09-23 PROCEDURE — 1157F PR ADVANCE CARE PLAN OR EQUIV PRESENT IN MEDICAL RECORD: ICD-10-PCS | Mod: CPTII,S$GLB,, | Performed by: OPHTHALMOLOGY

## 2022-09-23 PROCEDURE — 1101F PR PT FALLS ASSESS DOC 0-1 FALLS W/OUT INJ PAST YR: ICD-10-PCS | Mod: CPTII,S$GLB,, | Performed by: OPHTHALMOLOGY

## 2022-09-23 PROCEDURE — 99999 PR PBB SHADOW E&M-EST. PATIENT-LVL III: ICD-10-PCS | Mod: PBBFAC,,, | Performed by: OPHTHALMOLOGY

## 2022-09-23 PROCEDURE — 99999 PR PBB SHADOW E&M-EST. PATIENT-LVL III: CPT | Mod: PBBFAC,,, | Performed by: OPHTHALMOLOGY

## 2022-09-23 PROCEDURE — 1157F ADVNC CARE PLAN IN RCRD: CPT | Mod: CPTII,S$GLB,, | Performed by: OPHTHALMOLOGY

## 2022-09-23 PROCEDURE — 1159F PR MEDICATION LIST DOCUMENTED IN MEDICAL RECORD: ICD-10-PCS | Mod: CPTII,S$GLB,, | Performed by: OPHTHALMOLOGY

## 2022-09-23 PROCEDURE — 1126F AMNT PAIN NOTED NONE PRSNT: CPT | Mod: CPTII,S$GLB,, | Performed by: OPHTHALMOLOGY

## 2022-09-23 PROCEDURE — 99214 PR OFFICE/OUTPT VISIT, EST, LEVL IV, 30-39 MIN: ICD-10-PCS | Mod: S$GLB,,, | Performed by: OPHTHALMOLOGY

## 2022-09-23 NOTE — PROGRESS NOTES
HPI     Glaucoma     Additional comments: 1 month iop ck           Comments    DLS: 8/9/22    Pt states around 8/22 pt has a loss of va in one eye for about a minute   but has not happened again.     Gtts: Dorzolamide-timolol BID, Systane PF 3-4 times daily OU          Last edited by Cheryle Quintana on 9/23/2022  3:21 PM.        ROS    Negative for: Constitutional, Gastrointestinal, Neurological, Skin,   Genitourinary, Musculoskeletal, HENT, Endocrine, Cardiovascular, Eyes,   Respiratory, Psychiatric, Allergic/Imm, Heme/Lymph  Last edited by Kvng Stern Jr., MD on 9/23/2022  3:58 PM.        Assessment /Plan     For exam results, see Encounter Report.    Amaurosis fugax of right eye  -     US Carotid Bilateral; Future; Expected date: 09/23/2022    Primary open angle glaucoma (POAG) of both eyes, moderate stage    Dermatochalasis of both upper eyelids    Pseudophakia of both eyes    Dry eyes, bilateral    IOP 16 OU, stable   UTS carotid to check for carotid stenosis  Continue cosopt BID OU  Continue Systane PF 3-4 x daily OU  Follow up in about 4 months (around 1/23/2023) for IOP and Medication check.

## 2022-09-26 ENCOUNTER — HOSPITAL ENCOUNTER (OUTPATIENT)
Dept: RADIOLOGY | Facility: HOSPITAL | Age: 87
Discharge: HOME | End: 2022-09-26
Attending: OPHTHALMOLOGY
Payer: MEDICARE

## 2022-09-26 DIAGNOSIS — G45.3 AMAUROSIS FUGAX OF RIGHT EYE: ICD-10-CM

## 2022-09-26 PROCEDURE — 93880 EXTRACRANIAL BILAT STUDY: CPT | Mod: 26,,, | Performed by: RADIOLOGY

## 2022-09-26 PROCEDURE — 93880 US CAROTID BILATERAL: ICD-10-PCS | Mod: 26,,, | Performed by: RADIOLOGY

## 2022-09-26 PROCEDURE — 93880 EXTRACRANIAL BILAT STUDY: CPT | Mod: TC

## 2022-10-05 ENCOUNTER — PATIENT MESSAGE (OUTPATIENT)
Dept: OPHTHALMOLOGY | Facility: CLINIC | Age: 87
End: 2022-10-05
Payer: MEDICARE

## 2022-10-21 ENCOUNTER — PATIENT MESSAGE (OUTPATIENT)
Dept: FAMILY MEDICINE | Facility: CLINIC | Age: 87
End: 2022-10-21
Payer: MEDICARE

## 2022-10-26 ENCOUNTER — TELEPHONE (OUTPATIENT)
Dept: FAMILY MEDICINE | Facility: CLINIC | Age: 87
End: 2022-10-26
Payer: MEDICARE

## 2022-10-26 DIAGNOSIS — H40.1132 PRIMARY OPEN ANGLE GLAUCOMA (POAG) OF BOTH EYES, MODERATE STAGE: ICD-10-CM

## 2022-10-26 RX ORDER — DORZOLAMIDE HYDROCHLORIDE AND TIMOLOL MALEATE 20; 5 MG/ML; MG/ML
1 SOLUTION/ DROPS OPHTHALMIC 2 TIMES DAILY
Qty: 10 ML | Refills: 5 | Status: SHIPPED | OUTPATIENT
Start: 2022-10-26 | End: 2022-12-29 | Stop reason: SDUPTHER

## 2022-10-26 NOTE — TELEPHONE ENCOUNTER
Spoke to pt devan stating Dr. Gould advised pt to hold off on taking the 4th COVID shot. Devan is wanting to know if ok for pt to have the 4th injection. Devan does not know why she was advised to hold previously. Please advise

## 2022-10-26 NOTE — TELEPHONE ENCOUNTER
----- Message from Zheng Sanon sent at 10/26/2022  9:57 AM CDT -----  Contact: pt at [phone]  Type: Needs Medical Advice  Who Called:  manju devan  Best Call Back Number: [phone]  Additional Information: pt grandson called in wanting to know is it ok to take the COVID vaccine this will be the 4th one for the Pt

## 2022-10-27 ENCOUNTER — TELEPHONE (OUTPATIENT)
Dept: OPTOMETRY | Facility: CLINIC | Age: 87
End: 2022-10-27
Payer: MEDICARE

## 2022-10-27 ENCOUNTER — PATIENT MESSAGE (OUTPATIENT)
Dept: OPHTHALMOLOGY | Facility: CLINIC | Age: 87
End: 2022-10-27
Payer: MEDICARE

## 2022-10-27 NOTE — TELEPHONE ENCOUNTER
Spoke with Prabhjot. States they had problems getting the drops called in on 10/17.  They sent a message but ended up having to make phone calls to get the rx sent in.  There was some confusion and instead of going to [facility], it went to mail order.  His grandmother is out of drops.      ----- Message from Stephanie Guadalupe sent at 10/27/2022 10:55 AM CDT -----  Regarding: pt call back  Name of Who is Calling: JUVENAL LOZA [[id number]] Prabhjot (son)      What is the request in detail: Requesting a call back. States they are having an issue with the prescription that was previously sent to the pharmacy. Please advise!         Can the clinic reply by [facility]: NO        What Number to Call Back if not in [facility]: [phone] (son)

## 2022-10-27 NOTE — TELEPHONE ENCOUNTER
----- Message from Stephanie Guadalupe sent at 10/27/2022 10:55 AM CDT -----  Regarding: pt call back  Name of Who is Calling: JUVENAL LOZA [[id number]] Prabhjot (son)      What is the request in detail: Requesting a call back. States they are having an issue with the prescription that was previously sent to the pharmacy. Please advise!         Can the clinic reply by [facility]: NO        [facility]: [phone] (son)

## 2022-11-08 ENCOUNTER — IMMUNIZATION (OUTPATIENT)
Dept: PRIMARY CARE CLINIC | Facility: CLINIC | Age: 87
End: 2022-11-08
Payer: MEDICARE

## 2022-11-08 DIAGNOSIS — Z23 NEED FOR VACCINATION: Primary | ICD-10-CM

## 2022-11-08 PROCEDURE — 91312 COVID-19, MRNA, LNP-S, BIVALENT BOOSTER, PF, 30 MCG/0.3 ML DOSE: CPT | Mod: S$GLB,,, | Performed by: FAMILY MEDICINE

## 2022-11-08 PROCEDURE — 91312 COVID-19, MRNA, LNP-S, BIVALENT BOOSTER, PF, 30 MCG/0.3 ML DOSE: ICD-10-PCS | Mod: S$GLB,,, | Performed by: FAMILY MEDICINE

## 2022-11-08 PROCEDURE — 0124A COVID-19, MRNA, LNP-S, BIVALENT BOOSTER, PF, 30 MCG/0.3 ML DOSE: CPT | Mod: S$GLB,,, | Performed by: FAMILY MEDICINE

## 2022-11-08 PROCEDURE — 0124A COVID-19, MRNA, LNP-S, BIVALENT BOOSTER, PF, 30 MCG/0.3 ML DOSE: ICD-10-PCS | Mod: S$GLB,,, | Performed by: FAMILY MEDICINE

## 2022-12-29 DIAGNOSIS — H40.1132 PRIMARY OPEN ANGLE GLAUCOMA (POAG) OF BOTH EYES, MODERATE STAGE: ICD-10-CM

## 2022-12-29 RX ORDER — DORZOLAMIDE HYDROCHLORIDE AND TIMOLOL MALEATE 20; 5 MG/ML; MG/ML
1 SOLUTION/ DROPS OPHTHALMIC 2 TIMES DAILY
Qty: 10 ML | Refills: 5 | Status: SHIPPED | OUTPATIENT
Start: 2022-12-29 | End: 2023-11-06 | Stop reason: SDUPTHER

## 2023-01-13 ENCOUNTER — OFFICE VISIT (OUTPATIENT)
Dept: OPHTHALMOLOGY | Facility: CLINIC | Age: 88
End: 2023-01-13
Payer: MEDICARE

## 2023-01-13 DIAGNOSIS — H02.834 DERMATOCHALASIS OF BOTH UPPER EYELIDS: ICD-10-CM

## 2023-01-13 DIAGNOSIS — Z96.1 PSEUDOPHAKIA OF BOTH EYES: ICD-10-CM

## 2023-01-13 DIAGNOSIS — H40.1132 PRIMARY OPEN ANGLE GLAUCOMA (POAG) OF BOTH EYES, MODERATE STAGE: Primary | ICD-10-CM

## 2023-01-13 DIAGNOSIS — H02.831 DERMATOCHALASIS OF BOTH UPPER EYELIDS: ICD-10-CM

## 2023-01-13 PROCEDURE — 1101F PT FALLS ASSESS-DOCD LE1/YR: CPT | Mod: HCNC,CPTII,S$GLB, | Performed by: OPHTHALMOLOGY

## 2023-01-13 PROCEDURE — 1160F RVW MEDS BY RX/DR IN RCRD: CPT | Mod: HCNC,CPTII,S$GLB, | Performed by: OPHTHALMOLOGY

## 2023-01-13 PROCEDURE — 1159F PR MEDICATION LIST DOCUMENTED IN MEDICAL RECORD: ICD-10-PCS | Mod: HCNC,CPTII,S$GLB, | Performed by: OPHTHALMOLOGY

## 2023-01-13 PROCEDURE — 1159F MED LIST DOCD IN RCRD: CPT | Mod: HCNC,CPTII,S$GLB, | Performed by: OPHTHALMOLOGY

## 2023-01-13 PROCEDURE — 99999 PR PBB SHADOW E&M-EST. PATIENT-LVL III: ICD-10-PCS | Mod: PBBFAC,HCNC,, | Performed by: OPHTHALMOLOGY

## 2023-01-13 PROCEDURE — 99214 OFFICE O/P EST MOD 30 MIN: CPT | Mod: HCNC,S$GLB,, | Performed by: OPHTHALMOLOGY

## 2023-01-13 PROCEDURE — 99999 PR PBB SHADOW E&M-EST. PATIENT-LVL III: CPT | Mod: PBBFAC,HCNC,, | Performed by: OPHTHALMOLOGY

## 2023-01-13 PROCEDURE — 1101F PR PT FALLS ASSESS DOC 0-1 FALLS W/OUT INJ PAST YR: ICD-10-PCS | Mod: HCNC,CPTII,S$GLB, | Performed by: OPHTHALMOLOGY

## 2023-01-13 PROCEDURE — 99214 PR OFFICE/OUTPT VISIT, EST, LEVL IV, 30-39 MIN: ICD-10-PCS | Mod: HCNC,S$GLB,, | Performed by: OPHTHALMOLOGY

## 2023-01-13 PROCEDURE — 3288F FALL RISK ASSESSMENT DOCD: CPT | Mod: HCNC,CPTII,S$GLB, | Performed by: OPHTHALMOLOGY

## 2023-01-13 PROCEDURE — 1157F ADVNC CARE PLAN IN RCRD: CPT | Mod: HCNC,CPTII,S$GLB, | Performed by: OPHTHALMOLOGY

## 2023-01-13 PROCEDURE — 1157F PR ADVANCE CARE PLAN OR EQUIV PRESENT IN MEDICAL RECORD: ICD-10-PCS | Mod: HCNC,CPTII,S$GLB, | Performed by: OPHTHALMOLOGY

## 2023-01-13 PROCEDURE — 1160F PR REVIEW ALL MEDS BY PRESCRIBER/CLIN PHARMACIST DOCUMENTED: ICD-10-PCS | Mod: HCNC,CPTII,S$GLB, | Performed by: OPHTHALMOLOGY

## 2023-01-13 PROCEDURE — 1126F PR PAIN SEVERITY QUANTIFIED, NO PAIN PRESENT: ICD-10-PCS | Mod: HCNC,CPTII,S$GLB, | Performed by: OPHTHALMOLOGY

## 2023-01-13 PROCEDURE — 1126F AMNT PAIN NOTED NONE PRSNT: CPT | Mod: HCNC,CPTII,S$GLB, | Performed by: OPHTHALMOLOGY

## 2023-01-13 PROCEDURE — 3288F PR FALLS RISK ASSESSMENT DOCUMENTED: ICD-10-PCS | Mod: HCNC,CPTII,S$GLB, | Performed by: OPHTHALMOLOGY

## 2023-01-13 NOTE — PROGRESS NOTES
HPI     Glaucoma     Additional comments: 4 month iop ck with MRx and DFE           Comments    DLS: 9/23/22    Pt states vision is a little blurry.     Gtts: Dorzolamide-timolol BID, Systane PF 3-4 times a day OU          Last edited by Cheryle Quintana on 1/13/2023  4:05 PM.        ROS    Negative for: Constitutional, Gastrointestinal, Neurological, Skin,   Genitourinary, Musculoskeletal, HENT, Endocrine, Cardiovascular, Eyes,   Respiratory, Psychiatric, Allergic/Imm, Heme/Lymph  Last edited by Kvng Stern Jr., MD on 1/13/2023  4:57 PM.        Assessment /Plan     For exam results, see Encounter Report.    Primary open angle glaucoma (POAG) of both eyes, moderate stage    Dermatochalasis of both upper eyelids    Pseudophakia of both eyes      Good IOP 14 OU, stable  Continue cosopt BID OU  Systane PF 3-4 x daily OU prn  Rx for glasses given to be remade  Follow up in about 4 months (around 5/13/2023) for IOP and Medication check.

## 2023-01-14 ENCOUNTER — PATIENT MESSAGE (OUTPATIENT)
Dept: OPTOMETRY | Facility: CLINIC | Age: 88
End: 2023-01-14
Payer: MEDICARE

## 2023-01-30 ENCOUNTER — PATIENT MESSAGE (OUTPATIENT)
Dept: FAMILY MEDICINE | Facility: CLINIC | Age: 88
End: 2023-01-30
Payer: MEDICARE

## 2023-02-03 ENCOUNTER — LAB VISIT (OUTPATIENT)
Dept: LAB | Facility: HOSPITAL | Age: 88
End: 2023-02-03
Attending: FAMILY MEDICINE
Payer: MEDICARE

## 2023-02-03 DIAGNOSIS — E78.5 HYPERLIPIDEMIA WITH TARGET LDL LESS THAN 70: ICD-10-CM

## 2023-02-03 LAB
ALBUMIN SERPL BCP-MCNC: 3.8 G/DL (ref 3.5–5.2)
ALP SERPL-CCNC: 74 U/L (ref 55–135)
ALT SERPL W/O P-5'-P-CCNC: 18 U/L (ref 10–44)
ANION GAP SERPL CALC-SCNC: 9 MMOL/L (ref 8–16)
AST SERPL-CCNC: 21 U/L (ref 10–40)
BILIRUB SERPL-MCNC: 0.6 MG/DL (ref 0.1–1)
BUN SERPL-MCNC: 19 MG/DL (ref 10–30)
CALCIUM SERPL-MCNC: 9.3 MG/DL (ref 8.7–10.5)
CHLORIDE SERPL-SCNC: 103 MMOL/L (ref 95–110)
CHOLEST SERPL-MCNC: 182 MG/DL (ref 120–199)
CHOLEST/HDLC SERPL: 2.9 {RATIO} (ref 2–5)
CO2 SERPL-SCNC: 23 MMOL/L (ref 23–29)
CREAT SERPL-MCNC: 0.7 MG/DL (ref 0.5–1.4)
EST. GFR  (NO RACE VARIABLE): >60 ML/MIN/1.73 M^2
GLUCOSE SERPL-MCNC: 74 MG/DL (ref 70–110)
HDLC SERPL-MCNC: 63 MG/DL (ref 40–75)
HDLC SERPL: 34.6 % (ref 20–50)
LDLC SERPL CALC-MCNC: 103.4 MG/DL (ref 63–159)
NONHDLC SERPL-MCNC: 119 MG/DL
POTASSIUM SERPL-SCNC: 4.2 MMOL/L (ref 3.5–5.1)
PROT SERPL-MCNC: 7.5 G/DL (ref 6–8.4)
SODIUM SERPL-SCNC: 135 MMOL/L (ref 136–145)
TRIGL SERPL-MCNC: 78 MG/DL (ref 30–150)

## 2023-02-03 PROCEDURE — 80061 LIPID PANEL: CPT | Mod: HCNC | Performed by: FAMILY MEDICINE

## 2023-02-03 PROCEDURE — 36415 COLL VENOUS BLD VENIPUNCTURE: CPT | Mod: HCNC,PO | Performed by: FAMILY MEDICINE

## 2023-02-03 PROCEDURE — 80053 COMPREHEN METABOLIC PANEL: CPT | Mod: HCNC | Performed by: FAMILY MEDICINE

## 2023-02-07 DIAGNOSIS — Z00.00 ENCOUNTER FOR MEDICARE ANNUAL WELLNESS EXAM: ICD-10-CM

## 2023-02-09 ENCOUNTER — PATIENT MESSAGE (OUTPATIENT)
Dept: OPHTHALMOLOGY | Facility: CLINIC | Age: 88
End: 2023-02-09
Payer: MEDICARE

## 2023-02-09 DIAGNOSIS — Z00.00 ENCOUNTER FOR MEDICARE ANNUAL WELLNESS EXAM: ICD-10-CM

## 2023-02-13 ENCOUNTER — OFFICE VISIT (OUTPATIENT)
Dept: OPTOMETRY | Facility: CLINIC | Age: 88
End: 2023-02-13
Payer: MEDICARE

## 2023-02-13 DIAGNOSIS — H04.123 DRY EYE SYNDROME OF BILATERAL LACRIMAL GLANDS: Primary | ICD-10-CM

## 2023-02-13 DIAGNOSIS — H16.143 PUNCTATE KERATITIS OF BOTH EYES: ICD-10-CM

## 2023-02-13 PROCEDURE — 1126F AMNT PAIN NOTED NONE PRSNT: CPT | Mod: HCNC,CPTII,S$GLB,

## 2023-02-13 PROCEDURE — 1157F ADVNC CARE PLAN IN RCRD: CPT | Mod: HCNC,CPTII,S$GLB,

## 2023-02-13 PROCEDURE — 3288F PR FALLS RISK ASSESSMENT DOCUMENTED: ICD-10-PCS | Mod: HCNC,CPTII,S$GLB,

## 2023-02-13 PROCEDURE — 99213 OFFICE O/P EST LOW 20 MIN: CPT | Mod: HCNC,S$GLB,,

## 2023-02-13 PROCEDURE — 1101F PT FALLS ASSESS-DOCD LE1/YR: CPT | Mod: HCNC,CPTII,S$GLB,

## 2023-02-13 PROCEDURE — 1157F PR ADVANCE CARE PLAN OR EQUIV PRESENT IN MEDICAL RECORD: ICD-10-PCS | Mod: HCNC,CPTII,S$GLB,

## 2023-02-13 PROCEDURE — 1101F PR PT FALLS ASSESS DOC 0-1 FALLS W/OUT INJ PAST YR: ICD-10-PCS | Mod: HCNC,CPTII,S$GLB,

## 2023-02-13 PROCEDURE — 1159F PR MEDICATION LIST DOCUMENTED IN MEDICAL RECORD: ICD-10-PCS | Mod: HCNC,CPTII,S$GLB,

## 2023-02-13 PROCEDURE — 1126F PR PAIN SEVERITY QUANTIFIED, NO PAIN PRESENT: ICD-10-PCS | Mod: HCNC,CPTII,S$GLB,

## 2023-02-13 PROCEDURE — 99213 PR OFFICE/OUTPT VISIT, EST, LEVL III, 20-29 MIN: ICD-10-PCS | Mod: HCNC,S$GLB,,

## 2023-02-13 PROCEDURE — 3288F FALL RISK ASSESSMENT DOCD: CPT | Mod: HCNC,CPTII,S$GLB,

## 2023-02-13 PROCEDURE — 99999 PR PBB SHADOW E&M-EST. PATIENT-LVL III: ICD-10-PCS | Mod: PBBFAC,HCNC,,

## 2023-02-13 PROCEDURE — 1159F MED LIST DOCD IN RCRD: CPT | Mod: HCNC,CPTII,S$GLB,

## 2023-02-13 PROCEDURE — 99999 PR PBB SHADOW E&M-EST. PATIENT-LVL III: CPT | Mod: PBBFAC,HCNC,,

## 2023-02-13 RX ORDER — TOBRAMYCIN AND DEXAMETHASONE 3; 1 MG/ML; MG/ML
1 SUSPENSION/ DROPS OPHTHALMIC 4 TIMES DAILY
Qty: 5 ML | Refills: 0 | Status: SHIPPED | OUTPATIENT
Start: 2023-02-13 | End: 2023-02-20

## 2023-02-13 NOTE — PROGRESS NOTES
HPI    Pt here for tearing OS x 2 weeks.     Pt having FBS, burning, and tearing OS. Pt using Pataday QAM OU and   Systane 2-3 times per day.   Last edited by Anali Bocanegra on 2/13/2023 11:04 AM.        ROS    Positive for: Eyes  Negative for: Constitutional, Gastrointestinal, Neurological, Skin,   Genitourinary, Musculoskeletal, HENT, Endocrine, Cardiovascular,   Respiratory, Psychiatric, Allergic/Imm, Heme/Lymph  Last edited by Linda Rogers, OD on 2/13/2023 11:25 AM.        Assessment /Plan     For exam results, see Encounter Report.    Dry eye syndrome of bilateral lacrimal glands    Punctate keratitis of both eyes  -     tobramycin-dexAMETHasone 0.3-0.1% (TOBRADEX) 0.3-0.1 % DrpS; Place 1 drop into both eyes 4 (four) times daily. for 7 days  Dispense: 5 mL; Refill: 0      1-2. Longstanding history of dry eye per pt. Recommended pt increase Systane use to QID. If needing more than QID, begin using preservative free Systane or similar. Recommended gel drop at bedtime, pt has had poor success with Systane Gel and Genteal, gave other recommendations (Refresh PM, Celluvisc, Blink Gel). Recommended warm compresses at bedtime. Ed pt to only use Pataday prn for relief of itching, as Pataday will only exacerbate dryness. Caution on fan use, recommended humidifier. Rx'd Tobradex to use QID x 7 days to help with pt's current flare-up, ed that cannot use long-term. Pt complaining of epiphora OS. Puncta appears patent. If Tobradex and new dry eye regimen do not improve symptoms, consider Restasis/Xiidra. If epiphora continues OS only, consider dilation and irrigation. Ed on importance of waiting between glaucoma drops to instill Tobradex and artificial tears to avoid wash out.    RTC: as scheduled with Dr. Stern for glaucoma care.

## 2023-02-15 ENCOUNTER — PATIENT MESSAGE (OUTPATIENT)
Dept: OPHTHALMOLOGY | Facility: CLINIC | Age: 88
End: 2023-02-15
Payer: MEDICARE

## 2023-02-17 ENCOUNTER — PATIENT MESSAGE (OUTPATIENT)
Dept: OPTOMETRY | Facility: CLINIC | Age: 88
End: 2023-02-17
Payer: MEDICARE

## 2023-03-03 ENCOUNTER — DOCUMENTATION ONLY (OUTPATIENT)
Dept: REHABILITATION | Facility: HOSPITAL | Age: 88
End: 2023-03-03
Payer: MEDICARE

## 2023-03-03 DIAGNOSIS — R25.1 TREMOR: Primary | ICD-10-CM

## 2023-03-03 DIAGNOSIS — M79.602 LEFT ARM PAIN: ICD-10-CM

## 2023-03-03 DIAGNOSIS — R68.89 IMPAIRED FUNCTION OF UPPER EXTREMITY: ICD-10-CM

## 2023-03-03 NOTE — PROGRESS NOTES
[facility]  Occupational Therapy Discharge Note    Name: Brie Han  Clinic Number: [id number]    Therapy Diagnosis:   Encounter Diagnoses   Name Primary?    Tremor Yes    Impaired function of upper extremity     Left arm pain      Physician: Colten Gould MD    Physician Orders: Eval and Treat  Medical Diagnosis: essential tremor  Evaluation Date: 1/24/2022    Date of Last visit: 6/15/2022  Total Visits Received: 17    ASSESSMENT      Ms. Han received a left upper extremity ReadiSteadi Glove. She and her grandson demonstrated modified independence in use. They were instructed to call with any concerns. She has not returned to therapy since 6/15/2022.     Discharge reason: Patient has not attended therapy since 6/15/2022 and Patient has reached the maximum rehab potential for the present time as long as she uses her ReadiSteadi Glove.    Goals:   Long Term Goals:  Time frame: 5 weeks/ 10 visits  I in Home exercise program for tremor control and medial epicondylitis pain management. (MET 6/15/2022, no symptoms of lateral epicondylitis noted today)  Aware of adaptive techniques to help complete ADL and IADL safely. NOT MET  Complete grocery list legibly. (MET 2/24/22 for a list of 10 items)    Short Term Goals:  Time frame: 2 weeks/ 5 visits  Demonstrate tremor control techniques daily. NOT MET  I with Home exercise program . (MET 6/15/2022 for functional HEP)      Pt will be independent with wear/ care of Readi-Steadi Glove system including don/doff and choosing when to wear which component of the glove. NOT MET  Pt will be able to carry her coffee with the left upper extremity without interference from tremor using the Readi Steadi glove. NOT MET  Pt will be able to complete general household cooking and cleaning with use of the Readi-Steadi Glove and minimal interference from her tremor. NOT MET   PLAN   This patient is discharged from Occupational Therapy      Daya Grant OT

## 2023-03-08 ENCOUNTER — OFFICE VISIT (OUTPATIENT)
Dept: FAMILY MEDICINE | Facility: CLINIC | Age: 88
End: 2023-03-08
Payer: MEDICARE

## 2023-03-08 VITALS
SYSTOLIC BLOOD PRESSURE: 142 MMHG | BODY MASS INDEX: 23.51 KG/M2 | WEIGHT: 116.63 LBS | HEART RATE: 56 BPM | TEMPERATURE: 98 F | DIASTOLIC BLOOD PRESSURE: 60 MMHG | HEIGHT: 59 IN

## 2023-03-08 DIAGNOSIS — G25.0 ESSENTIAL TREMOR: ICD-10-CM

## 2023-03-08 DIAGNOSIS — H40.1130 PRIMARY OPEN ANGLE GLAUCOMA OF BOTH EYES, UNSPECIFIED GLAUCOMA STAGE: ICD-10-CM

## 2023-03-08 DIAGNOSIS — I10 HTN (HYPERTENSION), BENIGN: Primary | ICD-10-CM

## 2023-03-08 DIAGNOSIS — N18.31 STAGE 3A CHRONIC KIDNEY DISEASE: ICD-10-CM

## 2023-03-08 DIAGNOSIS — I73.9 PAD (PERIPHERAL ARTERY DISEASE): ICD-10-CM

## 2023-03-08 DIAGNOSIS — E78.5 HYPERLIPIDEMIA WITH TARGET LDL LESS THAN 70: ICD-10-CM

## 2023-03-08 PROCEDURE — 1157F PR ADVANCE CARE PLAN OR EQUIV PRESENT IN MEDICAL RECORD: ICD-10-PCS | Mod: HCNC,CPTII,S$GLB, | Performed by: NURSE PRACTITIONER

## 2023-03-08 PROCEDURE — 1160F PR REVIEW ALL MEDS BY PRESCRIBER/CLIN PHARMACIST DOCUMENTED: ICD-10-PCS | Mod: HCNC,CPTII,S$GLB, | Performed by: NURSE PRACTITIONER

## 2023-03-08 PROCEDURE — 3288F FALL RISK ASSESSMENT DOCD: CPT | Mod: HCNC,CPTII,S$GLB, | Performed by: NURSE PRACTITIONER

## 2023-03-08 PROCEDURE — 1160F RVW MEDS BY RX/DR IN RCRD: CPT | Mod: HCNC,CPTII,S$GLB, | Performed by: NURSE PRACTITIONER

## 2023-03-08 PROCEDURE — 99214 OFFICE O/P EST MOD 30 MIN: CPT | Mod: HCNC,S$GLB,, | Performed by: NURSE PRACTITIONER

## 2023-03-08 PROCEDURE — 1159F MED LIST DOCD IN RCRD: CPT | Mod: HCNC,CPTII,S$GLB, | Performed by: NURSE PRACTITIONER

## 2023-03-08 PROCEDURE — 1157F ADVNC CARE PLAN IN RCRD: CPT | Mod: HCNC,CPTII,S$GLB, | Performed by: NURSE PRACTITIONER

## 2023-03-08 PROCEDURE — 1101F PR PT FALLS ASSESS DOC 0-1 FALLS W/OUT INJ PAST YR: ICD-10-PCS | Mod: HCNC,CPTII,S$GLB, | Performed by: NURSE PRACTITIONER

## 2023-03-08 PROCEDURE — 99999 PR PBB SHADOW E&M-EST. PATIENT-LVL IV: ICD-10-PCS | Mod: PBBFAC,HCNC,, | Performed by: NURSE PRACTITIONER

## 2023-03-08 PROCEDURE — 99214 PR OFFICE/OUTPT VISIT, EST, LEVL IV, 30-39 MIN: ICD-10-PCS | Mod: HCNC,S$GLB,, | Performed by: NURSE PRACTITIONER

## 2023-03-08 PROCEDURE — 99999 PR PBB SHADOW E&M-EST. PATIENT-LVL IV: CPT | Mod: PBBFAC,HCNC,, | Performed by: NURSE PRACTITIONER

## 2023-03-08 PROCEDURE — 3288F PR FALLS RISK ASSESSMENT DOCUMENTED: ICD-10-PCS | Mod: HCNC,CPTII,S$GLB, | Performed by: NURSE PRACTITIONER

## 2023-03-08 PROCEDURE — 1126F PR PAIN SEVERITY QUANTIFIED, NO PAIN PRESENT: ICD-10-PCS | Mod: HCNC,CPTII,S$GLB, | Performed by: NURSE PRACTITIONER

## 2023-03-08 PROCEDURE — 1126F AMNT PAIN NOTED NONE PRSNT: CPT | Mod: HCNC,CPTII,S$GLB, | Performed by: NURSE PRACTITIONER

## 2023-03-08 PROCEDURE — 1159F PR MEDICATION LIST DOCUMENTED IN MEDICAL RECORD: ICD-10-PCS | Mod: HCNC,CPTII,S$GLB, | Performed by: NURSE PRACTITIONER

## 2023-03-08 PROCEDURE — 1101F PT FALLS ASSESS-DOCD LE1/YR: CPT | Mod: HCNC,CPTII,S$GLB, | Performed by: NURSE PRACTITIONER

## 2023-03-08 NOTE — PROGRESS NOTES
Subjective:       Patient ID: Brie Han is a 89 y.o. female.    Chief Complaint: Follow-up (6 month follow up)    HPI      Patient presents today with her son for chronic conditons follow up. Labs reviewed. Last visit with PCP  on 8/5/22.    2/13/23 Optometry-Sue/Jarred: Dry eye syndrome of bilateral lacrimal glands-on tobradex; primary open angle glaucoma of both eyes.  Past Medical History:   Diagnosis Date    Anticoagulant long-term use     Arthritis     Glaucoma     resolved    Hyperlipidemia     Hypertension     Stroke 2006       Review of patient's allergies indicates:   Allergen Reactions    Contrast media Other (See Comments)    Aspirin Other (See Comments)    Ciprofloxacin Other (See Comments)    Iron Other (See Comments)    Iodine Rash    Penicillins Rash         Current Outpatient Medications:     acetaminophen (TYLENOL) 325 MG tablet, Take 2 tablets (650 mg total) by mouth every 8 (eight) hours as needed., Disp: , Rfl: 0    b complex vitamins tablet, Take 1 tablet by mouth once daily., Disp: , Rfl:     calcium citrate-vitamin D3 315-200 mg (CITRACAL+D) 315 mg-5 mcg (200 unit) per tablet, Every day, Disp: , Rfl:     clopidogreL (PLAVIX) 75 mg tablet, Take 1 tablet (75 mg total) by mouth once daily., Disp: 90 tablet, Rfl: 3    dorzolamide-timolol 2-0.5% (COSOPT) 22.3-6.8 mg/mL ophthalmic solution, Place 1 drop into both eyes 2 (two) times daily., Disp: 10 mL, Rfl: 5    KRILL OIL ORAL, Take by mouth., Disp: , Rfl:     losartan (COZAAR) 100 MG tablet, Take 1 tablet (100 mg total) by mouth once daily., Disp: 90 tablet, Rfl: 3    lovastatin (MEVACOR) 10 MG tablet, Take 1 tablet (10 mg total) by mouth every evening., Disp: 90 tablet, Rfl: 3    multivitamin capsule, Every day, Disp: , Rfl:     NIFEdipine (PROCARDIA-XL) 30 MG (OSM) 24 hr tablet, TAKE 1 TABLET ONE TIME DAILY, Disp: 90 tablet, Rfl: 3    triamcinolone acetonide 0.1% (KENALOG) 0.1 % ointment, Use on AA BID x 14 days, Disp: 30 g,  "Rfl: 0    latanoprost 0.005 % ophthalmic solution, INSTILL 1 DROP INTO BOTH EYES EVERY EVENING., Disp: 2.5 mL, Rfl: 11    pantoprazole (PROTONIX) 20 MG tablet, Take 1 tablet (20 mg total) by mouth once daily., Disp: 30 tablet, Rfl: 11    Review of Systems   Constitutional:  Negative for unexpected weight change.   HENT:  Negative for trouble swallowing.    Eyes:  Negative for visual disturbance.   Respiratory:  Negative for shortness of breath.    Cardiovascular:  Negative for chest pain, palpitations and leg swelling.   Gastrointestinal:  Negative for blood in stool.   Genitourinary:  Negative for hematuria.   Skin:  Negative for rash.   Allergic/Immunologic: Negative for immunocompromised state.   Neurological:  Negative for headaches.   Hematological:  Does not bruise/bleed easily.   Psychiatric/Behavioral:  Negative for agitation. The patient is not nervous/anxious.      Objective:      BP (!) 142/60   Pulse (!) 56   Temp 97.7 °F (36.5 °C) (Oral)   Ht 4' 11" (1.499 m)   Wt 52.9 kg (116 lb 10 oz)   LMP 12/07/1972   BMI 23.56 kg/m²   Physical Exam  Constitutional:       Appearance: She is well-developed.   HENT:      Head: Normocephalic.   Cardiovascular:      Rate and Rhythm: Normal rate and regular rhythm.      Heart sounds: Normal heart sounds.   Pulmonary:      Effort: Pulmonary effort is normal.   Musculoskeletal:         General: Normal range of motion.   Skin:     General: Skin is warm and dry.   Neurological:      Mental Status: She is alert and oriented to person, place, and time.   Psychiatric:         Behavior: Behavior normal.         Thought Content: Thought content normal.         Judgment: Judgment normal.       Assessment:       1. HTN (hypertension), benign    2. Hyperlipidemia with target LDL less than 70    3. Stage 3a chronic kidney disease    4. Primary open angle glaucoma of both eyes, unspecified glaucoma stage    5. PAD (peripheral artery disease)    6. Essential tremor    7. BMI " 23.0-23.9, adult        Plan:       HTN (hypertension), benign  Stable, continue management  Low sodium diet  Hyperlipidemia with target LDL less than 70  Stable, on lovastatin  Stage 3a chronic kidney disease  Stable and chronic.  Will continue to monitor q3-6 months and control chronic conditions as optimally as possible to preserve function.   primary open angle glaucoma of both eyes  Stable, continue follow up  PAD (peripheral artery disease)  Stable, continue management  Essential tremor  Stable, continue management  BMI 23.0-23.9, adult  Stable, continue management        Patient readiness: acceptance and barriers:none    During the course of the visit the patient was educated and counseled about the following:     Hypertension:   Dietary sodium restriction.  Regular aerobic exercise.    Goals: Hypertension: Reduce Blood Pressure    Did patient meet goals/outcomes: Yes    The following self management tools provided: declined    Patient Instructions (the written plan) was given to the patient/family.     Time spent with patient: 15 minutes    Barriers to medications present (no )    Adverse reactions to current medications (no)    Over the counter medications reviewed (Yes)

## 2023-03-08 NOTE — PATIENT INSTRUCTIONS
"Kelton Baird,     If you are due for any health screening(s) below please notify me so we can arrange them to be ordered and scheduled to maintain your health. Most healthy patients complete it. Don't lose out on improving your health.     All of your core healthy metrics are met.                          Patient Education       Checking Your Blood Pressure at Home   The Basics   Written by the doctors and editors at [facility]   How is blood pressure measured? -- Blood pressure is usually measured with a device that goes around your upper arm. This is often done in a doctor's office. But some people also check their blood pressure themselves, at home or at work.  Blood pressure is explained with 2 numbers. For instance, your blood pressure might be "140 over 90." The first (top) number is the pressure inside your arteries when your heart is matias. The second (bottom) number is the pressure inside your arteries when your heart is relaxed. The table shows how doctors and nurses define high and normal blood pressure (table 1).  If your blood pressure gets too high, it puts you at risk for heart attack, stroke, and kidney disease. High blood pressure does not usually cause symptoms. But it can be serious.  What is a home blood pressure meter? -- A home blood pressure meter (or "monitor") is a device you can use to check your blood pressure yourself. It has a cuff that goes around your upper arm (figure 1). Some devices have a cuff that goes around your wrist instead. But doctors aren't sure if these work as well. The meter also has a small screen, or dial, that shows your blood pressure numbers.  There are also special meters you can wear for a day or 2. These are different because they automatically check your blood pressure throughout the day and night, even while you are sleeping. If your doctor thinks you should use one of these devices, they will talk to you about how to wear it.  Why do I need to check my " blood pressure at home? -- If your doctor knows or suspects that you have high blood pressure, they might want you to check it at home. There are a few reasons for this. Your doctor might want to look at:  Whether your blood pressure measures the same at home as it did in the doctor's office  How well your blood pressure medicines are working  Changes in your blood pressure, for example, if it goes up and down  People who check their own blood pressure at home usually do better at keeping it low.  How do I choose a home blood pressure meter? -- When choosing a home blood pressure meter, you will probably want to think about:  Cost - Some devices cost more than others. You should also check to see if your insurance will help pay for your device.  Size - It's important to make sure the cuff fits your arm comfortably. Your doctor or nurse can help you with this.  How easy it is to use - You should make sure you understand how to use the device. You also need to be able to read the numbers on the screen.  You do not need a prescription to buy a home blood pressure meter. You can buy them at most pharmacies or over the internet. Your doctor or nurse can help you choose the right device for you.  How do I check my blood pressure at home? -- Once you have a home blood pressure meter, your doctor or nurse should check it to make sure it fits you and works correctly.  When it's time to check your blood pressure:  Go to the bathroom and empty your bladder first. Having a full bladder can temporarily increase your blood pressure, making the results inaccurate.  Sit in a chair with your feet flat on the ground.  Try to breathe normally and stay calm.  Attach the cuff to your arm. Place the cuff directly on your skin, not over your clothing. The cuff should be tight enough to not slip down, but not uncomfortably tight.  Sit and relax for about 3 to 5 minutes with the cuff on.  Follow the directions that came with your device to  start measuring your blood pressure. This might involve squeezing the bulb at the end of the tube to inflate the cuff (fill it with air). With some monitors, you just need to press a button to inflate the cuff. When the cuff fills with air, it feels like someone is squeezing your arm, but it should not hurt. Then you will slowly deflate the cuff (let the air out of it), or it will deflate by itself. The screen or dial will show your blood pressure numbers.  Stay seated and relax for 1 minute, then measure your blood pressure again.  How often should I check my blood pressure? -- It depends. Different people need to follow different schedules. Your doctor or nurse will tell you how often to check your blood pressure, and when. Some people need to check their blood pressure twice a day, in the morning and evening.  Your doctor or nurse will probably tell you to keep track of your blood pressure for at least a few days (table 2). Then they will look at the numbers. The reason for this is that it's normal for your blood pressure to change a bit from day to day. For example, the numbers might change depending on whether you recently had caffeine, just exercised, or feel stressed. Checking your blood pressure over several days - or longer - will give your doctor or nurse a better idea of what is average for you.  How should I keep track of my blood pressure? -- Some blood pressure meters will record your numbers for you, or send them to your computer or smartphone. If yours does not do this, you will need to write them down. Your doctor or nurse can help you figure out the best way to keep track of the numbers.  What if my blood pressure is high? -- Your doctor or nurse will tell you what to do if your blood pressure is high when you check it at home. If you get a number that is higher than normal, measure it again to see if it is still high. If it is very high (above a certain number, which your doctor or nurse will tell  "you to watch out for), you should call your doctor right away.  If your blood pressure is only a little high, your doctor or nurse might tell you to keep checking it for a few more days or weeks, and then call if it does not go back down. Then they can help you decide what to do next.  All topics are updated as new evidence becomes available and our peer review process is complete.  This topic retrieved from [employer] on: Sep 21, 2021.  Topic 206117 Version 4.0  Release: 29.4.2 - C29.263  © 2021 UpToDate, Inc. and/or its affiliates. All rights reserved.  table 1: Definition of normal and high blood pressure  Level  Top number  Bottom number    High 130 or above 80 or above   Elevated 120 to 129 79 or below   Normal 119 or below 79 or below   These definitions are from the American College of Cardiology/American Heart Association. Other expert groups might use slightly different definitions.  "Elevated blood pressure" is a term doctor or nurses use as a warning. It means you do not yet have high blood pressure, but your blood pressure is not as low as it should be for good health.  Graphic 34962 Version 6.0  figure 1: Using a home blood pressure meter     This is an example of a person using a home blood pressure meter.  Graphic 588819 Version 1.0    table 2: 7-day diary for checking blood pressure at home  Day 1  Day 2  Day 3  Day 4  Day 5  Day 6  Day 7    Morning  1st read Morning  1st read Morning  1st read Morning  1st read Morning  1st read Morning  1st read Morning  1st read   Systolic: __________ Systolic: __________ Systolic: __________ Systolic: __________ Systolic: __________ Systolic: __________ Systolic: __________   Diastolic: __________ Diastolic: __________ Diastolic: __________ Diastolic: __________ Diastolic: __________ Diastolic: __________ Diastolic: __________   Pulse: __________ Pulse: __________ Pulse: __________ Pulse: __________ Pulse: __________ Pulse: __________ Pulse: __________   Morning  " 2nd read Morning  2nd read Morning  2nd read Morning  2nd read Morning  2nd read Morning  2nd read Morning  2nd read   Systolic: __________ Systolic: __________ Systolic: __________ Systolic: __________ Systolic: __________ Systolic: __________ Systolic: __________   Diastolic: __________ Diastolic: __________ Diastolic: __________ Diastolic: __________ Diastolic: __________ Diastolic: __________ Diastolic: __________   Pulse: __________ Pulse: __________ Pulse: __________ Pulse: __________ Pulse: __________ Pulse: __________ Pulse: __________   Evening  1st read Evening  1st read Evening  1st read Evening  1st read Evening  1st read Evening  1st read Evening  1st read   Systolic: __________ Systolic: __________ Systolic: __________ Systolic: __________ Systolic: __________ Systolic: __________ Systolic: __________   Diastolic: __________ Diastolic: __________ Diastolic: __________ Diastolic: __________ Diastolic: __________ Diastolic: __________ Diastolic: __________   Pulse: __________ Pulse: __________ Pulse: __________ Pulse: __________ Pulse: __________ Pulse: __________ Pulse: __________   Evening  2nd read Evening  2nd read Evening  2nd read Evening  2nd read Evening  2nd read Evening  2nd read Evening  2nd read   Systolic: __________ Systolic: __________ Systolic: __________ Systolic: __________ Systolic: __________ Systolic: __________ Systolic: __________   Diastolic: __________ Diastolic: __________ Diastolic: __________ Diastolic: __________ Diastolic: __________ Diastolic: __________ Diastolic: __________   Pulse: __________ Pulse: __________ Pulse: __________ Pulse: __________ Pulse: __________ Pulse: __________ Pulse: __________   Notes    Notes    Notes    Notes    Notes    Notes    Notes      ____________________ ____________________ ____________________ ____________________ ____________________ ____________________ ____________________   ____________________ ____________________  ____________________ ____________________ ____________________ ____________________ ____________________   ____________________ ____________________ ____________________ ____________________ ____________________ ____________________ ____________________   Patient name: ______________________________     Patient ID: ________________________________    Primary care provider: _______________________    Average BP: _______________________________    [facility] 579733 Version 1.0  Consumer Information Use and Disclaimer   This information is not specific medical advice and does not replace information you receive from your health care provider. This is only a brief summary of general information. It does NOT include all information about conditions, illnesses, injuries, tests, procedures, treatments, therapies, discharge instructions or life-style choices that may apply to you. You must talk with your health care provider for complete information about your health and treatment options. This information should not be used to decide whether or not to accept your health care provider's advice, instructions or recommendations. Only your health care provider has the knowledge and training to provide advice that is right for you. The use of this information is governed by the [employer] User License Agreement, available at https://[email or web address].Splyst/en/solutions/[employer]/about/norm.The use of [employer] content is governed by the [employer] Terms of Use. ©2021 [employer] All rights reserved.  Copyright   © 2021 UpToDate, Inc. and/or its affiliates. All rights reserved.

## 2023-03-21 ENCOUNTER — PES CALL (OUTPATIENT)
Dept: ADMINISTRATIVE | Facility: CLINIC | Age: 88
End: 2023-03-21
Payer: MEDICARE

## 2023-05-12 ENCOUNTER — OFFICE VISIT (OUTPATIENT)
Dept: OPHTHALMOLOGY | Facility: CLINIC | Age: 88
End: 2023-05-12
Payer: MEDICARE

## 2023-05-12 DIAGNOSIS — H02.831 DERMATOCHALASIS OF BOTH UPPER EYELIDS: ICD-10-CM

## 2023-05-12 DIAGNOSIS — H02.834 DERMATOCHALASIS OF BOTH UPPER EYELIDS: ICD-10-CM

## 2023-05-12 DIAGNOSIS — H40.1132 PRIMARY OPEN ANGLE GLAUCOMA (POAG) OF BOTH EYES, MODERATE STAGE: Primary | ICD-10-CM

## 2023-05-12 DIAGNOSIS — Z98.890 S/P YAG CAPSULOTOMY, RIGHT: ICD-10-CM

## 2023-05-12 DIAGNOSIS — Z96.1 PSEUDOPHAKIA OF BOTH EYES: ICD-10-CM

## 2023-05-12 DIAGNOSIS — H04.123 DRY EYES, BILATERAL: ICD-10-CM

## 2023-05-12 PROCEDURE — 3288F PR FALLS RISK ASSESSMENT DOCUMENTED: ICD-10-PCS | Mod: CPTII,S$GLB,, | Performed by: OPHTHALMOLOGY

## 2023-05-12 PROCEDURE — 1157F ADVNC CARE PLAN IN RCRD: CPT | Mod: CPTII,S$GLB,, | Performed by: OPHTHALMOLOGY

## 2023-05-12 PROCEDURE — 3288F FALL RISK ASSESSMENT DOCD: CPT | Mod: CPTII,S$GLB,, | Performed by: OPHTHALMOLOGY

## 2023-05-12 PROCEDURE — 1101F PR PT FALLS ASSESS DOC 0-1 FALLS W/OUT INJ PAST YR: ICD-10-PCS | Mod: CPTII,S$GLB,, | Performed by: OPHTHALMOLOGY

## 2023-05-12 PROCEDURE — 1160F PR REVIEW ALL MEDS BY PRESCRIBER/CLIN PHARMACIST DOCUMENTED: ICD-10-PCS | Mod: CPTII,S$GLB,, | Performed by: OPHTHALMOLOGY

## 2023-05-12 PROCEDURE — 1157F PR ADVANCE CARE PLAN OR EQUIV PRESENT IN MEDICAL RECORD: ICD-10-PCS | Mod: CPTII,S$GLB,, | Performed by: OPHTHALMOLOGY

## 2023-05-12 PROCEDURE — 99999 PR PBB SHADOW E&M-EST. PATIENT-LVL III: CPT | Mod: PBBFAC,,, | Performed by: OPHTHALMOLOGY

## 2023-05-12 PROCEDURE — 1126F AMNT PAIN NOTED NONE PRSNT: CPT | Mod: CPTII,S$GLB,, | Performed by: OPHTHALMOLOGY

## 2023-05-12 PROCEDURE — 1160F RVW MEDS BY RX/DR IN RCRD: CPT | Mod: CPTII,S$GLB,, | Performed by: OPHTHALMOLOGY

## 2023-05-12 PROCEDURE — 99999 PR PBB SHADOW E&M-EST. PATIENT-LVL III: ICD-10-PCS | Mod: PBBFAC,,, | Performed by: OPHTHALMOLOGY

## 2023-05-12 PROCEDURE — 1126F PR PAIN SEVERITY QUANTIFIED, NO PAIN PRESENT: ICD-10-PCS | Mod: CPTII,S$GLB,, | Performed by: OPHTHALMOLOGY

## 2023-05-12 PROCEDURE — 1159F MED LIST DOCD IN RCRD: CPT | Mod: CPTII,S$GLB,, | Performed by: OPHTHALMOLOGY

## 2023-05-12 PROCEDURE — 1101F PT FALLS ASSESS-DOCD LE1/YR: CPT | Mod: CPTII,S$GLB,, | Performed by: OPHTHALMOLOGY

## 2023-05-12 PROCEDURE — 1159F PR MEDICATION LIST DOCUMENTED IN MEDICAL RECORD: ICD-10-PCS | Mod: CPTII,S$GLB,, | Performed by: OPHTHALMOLOGY

## 2023-05-12 PROCEDURE — 99214 PR OFFICE/OUTPT VISIT, EST, LEVL IV, 30-39 MIN: ICD-10-PCS | Mod: S$GLB,,, | Performed by: OPHTHALMOLOGY

## 2023-05-12 PROCEDURE — 99214 OFFICE O/P EST MOD 30 MIN: CPT | Mod: S$GLB,,, | Performed by: OPHTHALMOLOGY

## 2023-05-12 NOTE — PROGRESS NOTES
HPI     Glaucoma     Additional comments: 4 month iop ck           Comments    DLS: 2/13/23    Pt states    Gtts: Cosopt BID, Blink BID, Pataday PRN OU          Last edited by Cheryle Quintana on 5/12/2023  1:19 PM.        ROS    Negative for: Constitutional, Gastrointestinal, Neurological, Skin,   Genitourinary, Musculoskeletal, HENT, Endocrine, Cardiovascular, Eyes,   Respiratory, Psychiatric, Allergic/Imm, Heme/Lymph  Last edited by Kvng Stern Jr., MD on 5/12/2023  1:32 PM.        Assessment /Plan     For exam results, see Encounter Report.    Primary open angle glaucoma (POAG) of both eyes, moderate stage    Dermatochalasis of both upper eyelids    Pseudophakia of both eyes    Dry eyes, bilateral    S/P YAG capsulotomy, right      HPI     Glaucoma     Additional comments: 4 month iop ck           Comments    DLS: 2/13/23    Pt states    Gtts: Cosopt BID, Blink BID, Pataday PRN OU          Last edited by Cheryle Quintana on 5/12/2023  1:19 PM.        ROS    Negative for: Constitutional, Gastrointestinal, Neurological, Skin,   Genitourinary, Musculoskeletal, HENT, Endocrine, Cardiovascular, Eyes,   Respiratory, Psychiatric, Allergic/Imm, Heme/Lymph  Last edited by Kvng Stern Jr., MD on 5/12/2023  1:32 PM.        Assessment /Plan     For exam results, see Encounter Report.    Primary open angle glaucoma (POAG) of both eyes, moderate stage    Dermatochalasis of both upper eyelids    Pseudophakia of both eyes    Dry eyes, bilateral    S/P YAG capsulotomy, right      Good IOP OD 15 OS 16, stable  Continue cosopt BID OU  Systane PF 3-4 x daily OU prn  Rx for glasses given to be remade  Follow up in about 4 months (around 9/12/2023) for IOP and Medication check.

## 2023-05-17 NOTE — PROGRESS NOTES
[facility]  Occupational Therapy Treatment Note     Date: 3/24/2022  Name: Brie Han  Clinic Number: [id number]    Therapy Diagnosis:   Encounter Diagnoses   Name Primary?    Tremor Yes    Impaired function of upper extremity     Left arm pain      Physician: Colten Gould MD     Physician Orders: Eval and Treat  Medical Diagnosis: essential tremor  Evaluation Date: 1/24/2022  Plan of Care Expiration: 4/1/2022  Progress Note Due: 4/1/2022   Insurance Authorization period Expiration: 2/24/2022  Date of Return to MD: 7/14/2022  Visit # / Visits Authorized: 11/ 20  Total Visits: 12    FOTO: 56/100 on 1/24/2022 (higher score=higher function)     Precautions:  Standard and Fall    Time In: 835  Time Out: 915  Total Billable Time: 40    SUBJECTIVE     Pt reports: She did not sleep well because it was cold.      She was compliant with home exercise program given last session.   Response to previous treatment: Less pain  Functional change: improving tolerance for handwriting    Pain: 0/10 at rest, but right elbow discomfort with ergometer  Location: bilateral arms sore from work out.     OBJECTIVE     Objective Measures updated at progress report unless specified.      Treatment     Mercedes received the treatments listed below:     Therapeutic exercises to increase ROM and flexibility for 25 minutes, including:  -for range of motion and pinch strength:   -ergometer no resistance, height 5 for 5 minutes switching directions as needed.   -clothes pin task with yellow and red clothes pin flipping cards with both hands.    -placed  And removed yellow and red resistance clips along shoulder height surface   - concentric wrist flexion and extension x 20 each wrist and each movement.   Therapeutic activities to improve functional performance for 15 minutes, including:   -handwriting activity with "clock Climber " letters.       Patient Education and Home Exercises      Education provided: "   -continue working on functional handwriting at home with pinch  (write notes, grocery list, etc)  -Progress towards goals   -reviewed benefits of built up handles on utensils, where to get them and how to make them at home.    Written Home Exercises Provided: yes  Exercises were reviewed and Brie was able to demonstrate them prior to the end of the session.  Brie demonstrated good understanding of the HEP provided.     See EMR under Patient Instructions for exercises provided 2/11/2022.    3/24/2022: Patient given packet of clock climber, kite strings, loop group, and hill/valley to practice hand writing.   Assessment   Patient had difficulty following directions and needed hand over hand demonstration with card flipping and handwriting tasks.   Pt would continue to benefit from skilled OT. Pt is aware that proximal strengthening and stabilization is important to aid with distal motor control and stabilization. Measurements and videos were taken previous session to obtain a redi steadi  for ease of tremors during Activities of Daily Living and IADLs. Pt was given handwriting homework today to also work on sustained attention to task, and visual attention/ perception. She continues to work hard during therapy but requires verbal cueing to slow down during tasks and focus on form and accuracy.     Brie is progressing slowly towards her goals and there are no updates to goals at this time. Pt prognosis is Good.     Pt will continue to benefit from skilled outpatient occupational therapy to address the deficits listed in the problem list on initial evaluation provide pt/family education and to maximize pt's level of independence in the home and community environment.     Pt's spiritual, cultural and educational needs considered and pt agreeable to plan of care and goals.    Anticipated barriers to occupational therapy: none    Goals:  Long Term Goals:  Time frame: 5 weeks/ 10 visits  I in Home  exercise program for tremor control and medial epicondylitis pain management. (progressing 3/24/2022)   Aware of adaptive techniques to help complete ADL and IADL safely. (progressing 3/24/2022)  Complete grocery list legibly. (MET 2/24/22 for a list of 10 items)    Short Term Goals:  Time frame: 2 weeks/ 5 visits  Demonstrate tremor control techniques daily. (progressing 3/24/2022)   I with Home exercise program . (progressing 3/24/2022)     PLAN     Certification Period/Plan of care expiration: 3/4/2022 to 4/01/2022     Outpatient Occupational Therapy 2 times weekly for 5 weeks to include the following interventions: Patient Education, Self Care, Therapeutic Activities and Therapeutic Exercise.    Updates/Grading for next session: proximal strength and stabilization of bilateral upper extremities, fine motor coordination (FMC), stretches of wrist, digits, concentric wrist movements,  elbows, handwriting, meal prep, and visual perceptual tasks.    Martha Dangelo, OT        O-Z Plasty Text: The defect edges were debeveled with a #15 scalpel blade. Given the location of the defect, shape of the defect and the proximity to free margins an O-Z plasty (double transposition flap) was deemed most appropriate. Using a sterile surgical marker, the appropriate transposition flaps were drawn incorporating the defect and placing the expected incisions within the relaxed skin tension lines where possible. The area thus outlined was incised deep to adipose tissue with a #15 scalpel blade. The skin margins were undermined to an appropriate distance in all directions utilizing iris scissors. Hemostasis was achieved with electrocautery. The flaps were then transposed and carried over into place, one clockwise and the other counterclockwise, and anchored with interrupted buried subcutaneous sutures.

## 2023-06-15 ENCOUNTER — NURSE TRIAGE (OUTPATIENT)
Dept: ADMINISTRATIVE | Facility: CLINIC | Age: 88
End: 2023-06-15
Payer: MEDICARE

## 2023-06-15 ENCOUNTER — HOSPITAL ENCOUNTER (EMERGENCY)
Facility: HOSPITAL | Age: 88
Discharge: HOME | End: 2023-06-15
Attending: EMERGENCY MEDICINE
Payer: MEDICARE

## 2023-06-15 VITALS
HEIGHT: 59 IN | HEART RATE: 62 BPM | BODY MASS INDEX: 23.39 KG/M2 | OXYGEN SATURATION: 98 % | DIASTOLIC BLOOD PRESSURE: 78 MMHG | SYSTOLIC BLOOD PRESSURE: 192 MMHG | WEIGHT: 116 LBS | RESPIRATION RATE: 17 BRPM | TEMPERATURE: 97 F

## 2023-06-15 DIAGNOSIS — I10 ASYMPTOMATIC HYPERTENSION: Primary | ICD-10-CM

## 2023-06-15 LAB
ALBUMIN SERPL BCP-MCNC: 3.6 G/DL (ref 3.5–5.2)
ALP SERPL-CCNC: 64 U/L (ref 55–135)
ALT SERPL W/O P-5'-P-CCNC: 17 U/L (ref 10–44)
ANION GAP SERPL CALC-SCNC: 10 MMOL/L (ref 8–16)
AST SERPL-CCNC: 22 U/L (ref 10–40)
BASOPHILS # BLD AUTO: 0.04 K/UL (ref 0–0.2)
BASOPHILS NFR BLD: 1.1 % (ref 0–1.9)
BILIRUB SERPL-MCNC: 0.4 MG/DL (ref 0.1–1)
BUN SERPL-MCNC: 21 MG/DL (ref 10–30)
CALCIUM SERPL-MCNC: 9.3 MG/DL (ref 8.7–10.5)
CHLORIDE SERPL-SCNC: 101 MMOL/L (ref 95–110)
CO2 SERPL-SCNC: 22 MMOL/L (ref 23–29)
CREAT SERPL-MCNC: 0.8 MG/DL (ref 0.5–1.4)
DIFFERENTIAL METHOD: ABNORMAL
EOSINOPHIL # BLD AUTO: 0.1 K/UL (ref 0–0.5)
EOSINOPHIL NFR BLD: 3 % (ref 0–8)
ERYTHROCYTE [DISTWIDTH] IN BLOOD BY AUTOMATED COUNT: 14 % (ref 11.5–14.5)
EST. GFR  (NO RACE VARIABLE): >60 ML/MIN/1.73 M^2
GLUCOSE SERPL-MCNC: 95 MG/DL (ref 70–110)
HCT VFR BLD AUTO: 35.3 % (ref 37–48.5)
HGB BLD-MCNC: 11.8 G/DL (ref 12–16)
IMM GRANULOCYTES # BLD AUTO: 0.01 K/UL (ref 0–0.04)
IMM GRANULOCYTES NFR BLD AUTO: 0.3 % (ref 0–0.5)
LYMPHOCYTES # BLD AUTO: 1.3 K/UL (ref 1–4.8)
LYMPHOCYTES NFR BLD: 34.2 % (ref 18–48)
MCH RBC QN AUTO: 32.1 PG (ref 27–31)
MCHC RBC AUTO-ENTMCNC: 33.4 G/DL (ref 32–36)
MCV RBC AUTO: 96 FL (ref 82–98)
MONOCYTES # BLD AUTO: 0.7 K/UL (ref 0.3–1)
MONOCYTES NFR BLD: 17.7 % (ref 4–15)
NEUTROPHILS # BLD AUTO: 1.6 K/UL (ref 1.8–7.7)
NEUTROPHILS NFR BLD: 43.7 % (ref 38–73)
NRBC BLD-RTO: 0 /100 WBC
PLATELET # BLD AUTO: 214 K/UL (ref 150–450)
PMV BLD AUTO: 10.5 FL (ref 9.2–12.9)
POTASSIUM SERPL-SCNC: 3.8 MMOL/L (ref 3.5–5.1)
PROT SERPL-MCNC: 7.1 G/DL (ref 6–8.4)
RBC # BLD AUTO: 3.68 M/UL (ref 4–5.4)
SODIUM SERPL-SCNC: 133 MMOL/L (ref 136–145)
WBC # BLD AUTO: 3.68 K/UL (ref 3.9–12.7)

## 2023-06-15 PROCEDURE — 36415 COLL VENOUS BLD VENIPUNCTURE: CPT | Performed by: EMERGENCY MEDICINE

## 2023-06-15 PROCEDURE — 25000003 PHARM REV CODE 250: Performed by: EMERGENCY MEDICINE

## 2023-06-15 PROCEDURE — 99284 EMERGENCY DEPT VISIT MOD MDM: CPT | Mod: 25

## 2023-06-15 PROCEDURE — 85025 COMPLETE CBC W/AUTO DIFF WBC: CPT | Performed by: EMERGENCY MEDICINE

## 2023-06-15 PROCEDURE — 80053 COMPREHEN METABOLIC PANEL: CPT | Performed by: EMERGENCY MEDICINE

## 2023-06-15 RX ORDER — ACETAMINOPHEN 325 MG/1
650 TABLET ORAL
Status: COMPLETED | OUTPATIENT
Start: 2023-06-15 | End: 2023-06-15

## 2023-06-15 RX ADMIN — ACETAMINOPHEN 650 MG: 325 TABLET ORAL at 01:06

## 2023-06-15 NOTE — TELEPHONE ENCOUNTER
Reason for Disposition   Systolic BP >= 160 OR Diastolic >= 100, and any cardiac or neurologic symptoms (e.g., chest pain, difficulty breathing, unsteady gait, blurred vision)    Additional Information   Negative: Sounds like a life-threatening emergency to the triager    Protocols used: Blood Pressure - High-A-OH  Spoke with pt who reports that she has been having HA for past few days. BP was taken a few minutes before speaking with triage nurse and it was 166/66. States BP taken after taking BP meds. Advised to be seen in ED now. Verbalized understanding

## 2023-06-15 NOTE — ED PROVIDER NOTES
"Encounter Date: 6/15/2023    SCRIBE #1 NOTE: I, Tata Gifford, am scribing for, and in the presence of,  Nura Weaver MD.     History     Chief Complaint   Patient presents with    Hypertension     Family reports BP of 172 systolic this morning -- on anti HTN meds    Head Pain     "Cyst" to left side      Time seen by provider: 1:42 PM on 06/15/2023    Brie Han is a 89 y.o. female who presents to the ED with an onset of headache for the past 2 weeks. She took Tylenol this morning around 5:30 AM with little relief. She also reports a cyst to the left side of her head that has been there for months. History obtained from independent historian. Patient's son also states she had high blood pressure last night at 179/70 which lowered to her usual blood pressure of 166/60 after taking her medications. The patient denies chest pain, abdominal pain, nausea, vomiting, fever or any other symptoms at this time. She has a PMHx of HTN, arthritis, HLD, and long-term use of anticoagulants. She has no pertinent PSHx.    The history is provided by the patient and a relative.   Review of patient's allergies indicates:   Allergen Reactions    Contrast media Other (See Comments)    Aspirin Other (See Comments)    Ciprofloxacin Other (See Comments)    Iron Other (See Comments)    Iodine Rash    Penicillins Rash     Past Medical History:   Diagnosis Date    Anticoagulant long-term use     Arthritis     Glaucoma     resolved    Hyperlipidemia     Hypertension     Stroke      Past Surgical History:   Procedure Laterality Date    APPENDECTOMY  1950's    BREAST SURGERY      CATARACT EXTRACTION W/  INTRAOCULAR LENS IMPLANT Bilateral     Dr Rojo      SECTION      3 c/s and d/c    EYE SURGERY      Glaucoma     HYSTERECTOMY      TONSILLECTOMY      Yag Capsulotomy Left 2021     Family History   Problem Relation Age of Onset    Early death Mother     Stroke Father     Hypertension Father     Diabetes Father "     Hypertension Sister     Thyroid disease Paternal Grandfather     No Known Problems Brother     No Known Problems Maternal Aunt     No Known Problems Maternal Uncle     No Known Problems Paternal Aunt     No Known Problems Paternal Uncle     No Known Problems Maternal Grandmother     No Known Problems Maternal Grandfather     No Known Problems Paternal Grandmother     Amblyopia Neg Hx     Blindness Neg Hx     Cancer Neg Hx     Cataracts Neg Hx     Glaucoma Neg Hx     Macular degeneration Neg Hx     Retinal detachment Neg Hx     Strabismus Neg Hx      Social History     Tobacco Use    Smoking status: Never    Smokeless tobacco: Never   Substance Use Topics    Alcohol use: No    Drug use: No     Review of Systems   Constitutional:  Negative for fever.   HENT:  Negative for sore throat.    Respiratory:  Negative for shortness of breath.    Cardiovascular:  Negative for chest pain.   Gastrointestinal:  Negative for abdominal pain, nausea and vomiting.   Genitourinary:  Negative for dysuria.   Musculoskeletal:  Negative for back pain.   Skin:  Negative for rash.        Positive for cyst.    Neurological:  Positive for headaches. Negative for weakness.   Hematological:  Does not bruise/bleed easily.     Physical Exam     Initial Vitals [06/15/23 1336]   BP Pulse Resp Temp SpO2   (!) 196/80 60 17 98 °F (36.7 °C) 95 %      MAP       --         Physical Exam    Nursing note and vitals reviewed.  Constitutional: She appears well-developed and well-nourished. She is not diaphoretic. No distress.   HENT:   Head: Normocephalic and atraumatic.   Eyes: EOM are normal. Pupils are equal, round, and reactive to light.   Neck: Neck supple.   Normal range of motion.  Cardiovascular:  Normal rate, regular rhythm, normal heart sounds and intact distal pulses.     Exam reveals no gallop and no friction rub.       No murmur heard.  Pulmonary/Chest: Breath sounds normal. No respiratory distress. She has no wheezes. She has no rhonchi.  She has no rales.   Abdominal: Abdomen is soft. Bowel sounds are normal. There is no abdominal tenderness. There is no rebound and no guarding.   Musculoskeletal:         General: Normal range of motion.      Cervical back: Normal range of motion and neck supple.     Neurological: She is alert and oriented to person, place, and time. She has normal strength. No cranial nerve deficit or sensory deficit. GCS score is 15. GCS eye subscore is 4. GCS verbal subscore is 5. GCS motor subscore is 6.   Skin: Skin is warm and dry.   Psychiatric: She has a normal mood and affect. Her behavior is normal. Judgment and thought content normal.       ED Course   Procedures  Labs Reviewed   COMPREHENSIVE METABOLIC PANEL - Abnormal; Notable for the following components:       Result Value    Sodium 133 (*)     CO2 22 (*)     All other components within normal limits    Narrative:     Release to patient->Immediate  Collection has been rescheduled by [facility] at 06/15/2023 14:34 Reason:   Unable to collect   CBC W/ AUTO DIFFERENTIAL - Abnormal; Notable for the following components:    WBC 3.68 (*)     RBC 3.68 (*)     Hemoglobin 11.8 (*)     Hematocrit 35.3 (*)     MCH 32.1 (*)     Gran # (ANC) 1.6 (*)     Mono % 17.7 (*)     All other components within normal limits    Narrative:     Release to patient->Immediate  Collection has been rescheduled by [facility] at 06/15/2023 14:34 Reason:   Unable to collect          Imaging Results              CT Head Without Contrast (Final result)  Result time 06/15/23 14:26:14      Final result by Saeid Jeffrey MD (06/15/23 14:26:14)                   Impression:      1. Negative noncontrast CT of the head for age.  No acute process.      Electronically signed by: Saeid Jeffrey  Date:    06/15/2023  Time:    14:26               Narrative:    EXAMINATION:  CT HEAD WITHOUT CONTRAST    CLINICAL HISTORY:  Headache, new or worsening (Age >= 50y);    TECHNIQUE:  Low dose axial CT images obtained throughout the head  without intravenous contrast. Sagittal and coronal reconstructions were performed.    COMPARISON:  Head CT 10/22/2017.    FINDINGS:  Brain: There is no evidence of a mass, edema, midline shift, or intracranial hemorrhage. No extra-axial fluid collection. No CT evidence of an acute major vascular territorial infarct.    Ventricles: The ventricles, sulci, and cisterns are within normal limits.    Skull: The osseous structures are unremarkable in appearance.    Extracranial soft tissues: Limited imaging is within normal limits.    Other: The visualized portions of the sinuses, orbits, and mastoid air cells are unremarkable.                                       Medications   acetaminophen tablet 650 mg (650 mg Oral Given 6/15/23 1356)     Medical Decision Making:   History:   Old Medical Records: I decided to obtain old medical records.  Initial Assessment:   89-year-old female presented with multiple complaints.  Differential Diagnosis:   Initial differential diagnosis included but not limited to intracranial mass, intracranial hemorrhage, and asymptomatic hypertension.  Independently Interpreted Test(s):   I have ordered and independently interpreted X-rays - see prior notes.  Clinical Tests:   Lab Tests: Ordered and Reviewed  Radiological Study: Ordered and Reviewed  ED Management:  The patient was emergently evaluated in the emergency department, her evaluation was significant for an elderly female with a normal neurologic exam.  The patient's labs showed no acute abnormalities.  The patient's headache did resolve with p.o. Tylenol given here in the emergency department.  The patient's CT scan of her head showed no acute processes per Radiology.  The patient's elevated blood pressure unresolved and she likely has asymptomatic hypertension.  There is no need for further workup treatment at this time.  The patient is stable for discharge to home.  She and her family member are instructed to follow-up with her primary  care physician for medication adjustments.  They will also keep an eye on her blood pressure at home.        Scribe Attestation:   Scribe #1: I performed the above scribed service and the documentation accurately describes the services I performed. I attest to the accuracy of the note.               I, Dr. Nura Weaver, personally performed the services described in this documentation. All medical record entries made by the scribe were at my direction and in my presence.  I have reviewed the chart and agree that the record reflects my personal performance and is accurate and complete. Nura Weaver MD.  5:28 PM 06/15/2023      Clinical Impression:   Final diagnoses:  [I10] Asymptomatic hypertension (Primary)        ED Disposition Condition    Discharge Stable          ED Prescriptions    None       Follow-up Information       Follow up With Specialties Details Why Contact Info    Colten Gould MD Family Medicine Schedule an appointment as soon as possible for a visit   [address] [facility]  [place] [address]  [phone]               Nura Weaver MD  06/15/23 [id number]

## 2023-06-18 DIAGNOSIS — I10 HTN (HYPERTENSION), BENIGN: ICD-10-CM

## 2023-06-18 NOTE — TELEPHONE ENCOUNTER
No care due was identified.  [facility] Embedded Care Due Messages. Reference number: [id number].   6/18/2023 9:40:31 AM CDT

## 2023-06-19 RX ORDER — LOSARTAN POTASSIUM 100 MG/1
100 TABLET ORAL DAILY
Qty: 90 TABLET | Refills: 3 | OUTPATIENT
Start: 2023-06-19

## 2023-08-07 ENCOUNTER — OFFICE VISIT (OUTPATIENT)
Dept: FAMILY MEDICINE | Facility: CLINIC | Age: 88
End: 2023-08-07
Payer: MEDICARE

## 2023-08-07 VITALS
HEART RATE: 63 BPM | OXYGEN SATURATION: 100 % | BODY MASS INDEX: 23.51 KG/M2 | DIASTOLIC BLOOD PRESSURE: 60 MMHG | TEMPERATURE: 99 F | WEIGHT: 116.63 LBS | HEIGHT: 59 IN | SYSTOLIC BLOOD PRESSURE: 136 MMHG

## 2023-08-07 DIAGNOSIS — E78.5 HYPERLIPIDEMIA WITH TARGET LDL LESS THAN 70: ICD-10-CM

## 2023-08-07 DIAGNOSIS — I10 HTN (HYPERTENSION), BENIGN: ICD-10-CM

## 2023-08-07 DIAGNOSIS — G60.3 IDIOPATHIC PROGRESSIVE NEUROPATHY: ICD-10-CM

## 2023-08-07 DIAGNOSIS — D70.8 OTHER NEUTROPENIA: ICD-10-CM

## 2023-08-07 DIAGNOSIS — Z23 NEED FOR PNEUMOCOCCAL VACCINATION: ICD-10-CM

## 2023-08-07 DIAGNOSIS — L30.8 OTHER ECZEMA: Primary | ICD-10-CM

## 2023-08-07 PROCEDURE — 3288F FALL RISK ASSESSMENT DOCD: CPT | Mod: HCNC,CPTII,S$GLB, | Performed by: FAMILY MEDICINE

## 2023-08-07 PROCEDURE — 1159F PR MEDICATION LIST DOCUMENTED IN MEDICAL RECORD: ICD-10-PCS | Mod: HCNC,CPTII,S$GLB, | Performed by: FAMILY MEDICINE

## 2023-08-07 PROCEDURE — 90677 PCV20 VACCINE IM: CPT | Mod: HCNC,S$GLB,, | Performed by: FAMILY MEDICINE

## 2023-08-07 PROCEDURE — 99214 OFFICE O/P EST MOD 30 MIN: CPT | Mod: HCNC,S$GLB,, | Performed by: FAMILY MEDICINE

## 2023-08-07 PROCEDURE — G0009 PR ADMIN PNEUMOCOCCAL VACCINE: ICD-10-PCS | Mod: HCNC,S$GLB,, | Performed by: FAMILY MEDICINE

## 2023-08-07 PROCEDURE — 99999 PR PBB SHADOW E&M-EST. PATIENT-LVL IV: ICD-10-PCS | Mod: PBBFAC,HCNC,, | Performed by: FAMILY MEDICINE

## 2023-08-07 PROCEDURE — 1126F PR PAIN SEVERITY QUANTIFIED, NO PAIN PRESENT: ICD-10-PCS | Mod: HCNC,CPTII,S$GLB, | Performed by: FAMILY MEDICINE

## 2023-08-07 PROCEDURE — 1101F PR PT FALLS ASSESS DOC 0-1 FALLS W/OUT INJ PAST YR: ICD-10-PCS | Mod: HCNC,CPTII,S$GLB, | Performed by: FAMILY MEDICINE

## 2023-08-07 PROCEDURE — 1157F PR ADVANCE CARE PLAN OR EQUIV PRESENT IN MEDICAL RECORD: ICD-10-PCS | Mod: HCNC,CPTII,S$GLB, | Performed by: FAMILY MEDICINE

## 2023-08-07 PROCEDURE — 3288F PR FALLS RISK ASSESSMENT DOCUMENTED: ICD-10-PCS | Mod: HCNC,CPTII,S$GLB, | Performed by: FAMILY MEDICINE

## 2023-08-07 PROCEDURE — 1160F RVW MEDS BY RX/DR IN RCRD: CPT | Mod: HCNC,CPTII,S$GLB, | Performed by: FAMILY MEDICINE

## 2023-08-07 PROCEDURE — 1159F MED LIST DOCD IN RCRD: CPT | Mod: HCNC,CPTII,S$GLB, | Performed by: FAMILY MEDICINE

## 2023-08-07 PROCEDURE — 1157F ADVNC CARE PLAN IN RCRD: CPT | Mod: HCNC,CPTII,S$GLB, | Performed by: FAMILY MEDICINE

## 2023-08-07 PROCEDURE — 99214 PR OFFICE/OUTPT VISIT, EST, LEVL IV, 30-39 MIN: ICD-10-PCS | Mod: HCNC,S$GLB,, | Performed by: FAMILY MEDICINE

## 2023-08-07 PROCEDURE — 90677 PR PNEUMOCOCCAL CONJ VACCINE, 20 VALENT, IM: ICD-10-PCS | Mod: HCNC,S$GLB,, | Performed by: FAMILY MEDICINE

## 2023-08-07 PROCEDURE — 1126F AMNT PAIN NOTED NONE PRSNT: CPT | Mod: HCNC,CPTII,S$GLB, | Performed by: FAMILY MEDICINE

## 2023-08-07 PROCEDURE — 1160F PR REVIEW ALL MEDS BY PRESCRIBER/CLIN PHARMACIST DOCUMENTED: ICD-10-PCS | Mod: HCNC,CPTII,S$GLB, | Performed by: FAMILY MEDICINE

## 2023-08-07 PROCEDURE — 99999 PR PBB SHADOW E&M-EST. PATIENT-LVL IV: CPT | Mod: PBBFAC,HCNC,, | Performed by: FAMILY MEDICINE

## 2023-08-07 PROCEDURE — 1101F PT FALLS ASSESS-DOCD LE1/YR: CPT | Mod: HCNC,CPTII,S$GLB, | Performed by: FAMILY MEDICINE

## 2023-08-07 PROCEDURE — G0009 ADMIN PNEUMOCOCCAL VACCINE: HCPCS | Mod: HCNC,S$GLB,, | Performed by: FAMILY MEDICINE

## 2023-08-07 RX ORDER — ALCOHOL 2.38 KG/3.79L
1 GEL TOPICAL DAILY
Qty: 90 CAPSULE | Refills: 3 | Status: SHIPPED | OUTPATIENT
Start: 2023-08-07 | End: 2024-02-12

## 2023-08-07 RX ORDER — HYDROCORTISONE 25 MG/G
CREAM TOPICAL NIGHTLY
Qty: 3.5 G | Refills: 2 | Status: SHIPPED | OUTPATIENT
Start: 2023-08-07

## 2023-08-09 ENCOUNTER — TELEPHONE (OUTPATIENT)
Dept: FAMILY MEDICINE | Facility: CLINIC | Age: 88
End: 2023-08-09
Payer: MEDICARE

## 2023-08-10 NOTE — PROGRESS NOTES
[facility]     Subjective:    Chief Complaint:   Chief Complaint   Patient presents with    Follow-up       History of Present Illness:  89 y.o. female presents for multiple issues.   SUBJECTIVE: Brie Han is a 89 y.o. female seen for a follow up visit; she has hypertension, hyperlipidemia, coronary artery disease and peripheral vascular disease.Patient denies any exertional chest pain, dyspnea, palpitations, syncope, orthopnea, edema or paroxysmal nocturnal dyspnea.  Neurological ROS: no TIA or stroke symptoms  positive for - tremors    Current Outpatient Medications   Medication Sig Dispense Refill    acetaminophen (TYLENOL) 325 MG tablet Take 2 tablets (650 mg total) by mouth every 8 (eight) hours as needed.  0    b complex vitamins tablet Take 1 tablet by mouth once daily.      calcium citrate-vitamin D3 315-200 mg (CITRACAL+D) 315 mg-5 mcg (200 unit) per tablet Every day      clopidogreL (PLAVIX) 75 mg tablet TAKE 1 TABLET EVERY DAY 90 tablet 0    dorzolamide-timolol 2-0.5% (COSOPT) 22.3-6.8 mg/mL ophthalmic solution Place 1 drop into both eyes 2 (two) times daily. 10 mL 5    KRILL OIL ORAL Take by mouth.      losartan (COZAAR) 100 MG tablet TAKE 1 TABLET ONE TIME DAILY 90 tablet 0    lovastatin (MEVACOR) 10 MG tablet TAKE 1 TABLET EVERY EVENING 90 tablet 0    multivitamin capsule Every day      NIFEdipine (PROCARDIA-XL) 30 MG (OSM) 24 hr tablet TAKE 1 TABLET ONE TIME DAILY 90 tablet 3    hydrocortisone 2.5 % cream Apply topically every evening. 3.5 g 2    [id number]-algal oil (METANX, ALGAL OIL,) 3 mg-35 mg-2 mg -90.314 mg Cap Take 1 tablet by mouth once daily. 90 capsule 3    pantoprazole (PROTONIX) 20 MG tablet Take 1 tablet (20 mg total) by mouth once daily. 30 tablet 11    triamcinolone acetonide 0.1% (KENALOG) 0.1 % ointment Use on AA BID x 14 days 30 g 0     No current facility-administered medications for this visit.     Patient Active Problem List   Diagnosis    Benign  essential HTN    PAD (peripheral artery disease)    CAD (coronary artery disease)    Hypercholesteremia    Refusal of blood transfusions as patient is Islam    Hyponatremia    DDD (degenerative disc disease), lumbosacral    Neurogenic pain of right foot    DDD (degenerative disc disease), cervical    Cervicalgia    Chronic left shoulder pain    Left shoulder pain    Stage 3a chronic kidney disease    Decreased functional mobility and endurance    Impairment of balance    Tremor    Impaired function of upper extremity    Left arm pain    Other neutropenia      reviewed the patients chart dating back for the past few appointments. See above.    The following portions of the patient's history were reviewed and updated as appropriate: allergies, current medications, past family history, past medical history, past social history, past surgical history and problem list.    She denies chest pain upon exertion, dyspnea, nausea, vomiting, diaphoresis, and syncope. No pleuritic chest pain, unilateral leg swelling, calf tenderness, or calf pain.      Past Medical History:   Diagnosis Date    Anticoagulant long-term use     Arthritis     Glaucoma     resolved    Hyperlipidemia     Hypertension     Stroke 2006       Past Surgical History:   Procedure Laterality Date    APPENDECTOMY  's    BREAST SURGERY      CATARACT EXTRACTION W/  INTRAOCULAR LENS IMPLANT Bilateral     Dr Rojo      SECTION      3 c/s and d/c    EYE SURGERY      Glaucoma     HYSTERECTOMY      TONSILLECTOMY      Yag Capsulotomy Left 2021       Social History  Social History     Tobacco Use    Smoking status: Never    Smokeless tobacco: Never   Substance Use Topics    Alcohol use: No    Drug use: No       Family History   Problem Relation Age of Onset    Early death Mother     Stroke Father     Hypertension Father     Diabetes Father     Hypertension Sister     Thyroid disease Paternal Grandfather     No Known Problems Brother   "   No Known Problems Maternal Aunt     No Known Problems Maternal Uncle     No Known Problems Paternal Aunt     No Known Problems Paternal Uncle     No Known Problems Maternal Grandmother     No Known Problems Maternal Grandfather     No Known Problems Paternal Grandmother     Amblyopia Neg Hx     Blindness Neg Hx     Cancer Neg Hx     Cataracts Neg Hx     Glaucoma Neg Hx     Macular degeneration Neg Hx     Retinal detachment Neg Hx     Strabismus Neg Hx      Review of patient's allergies indicates:   Allergen Reactions    Contrast media Other (See Comments)    Aspirin Other (See Comments)    Ciprofloxacin Other (See Comments)    Iron Other (See Comments)    Iodine Rash    Penicillins Rash       Review of Systems [Negative unless checked off]    General ROS: []fever, []chills, []weight loss, [x]malaise/fatigue.  ENT ROS: []congestion, []rhinorrhea,  []sore throat, []neck pain, [x]hearing loss.  Ophthalmic ROS:[]blurry vision, [] double vision, []photophobia, []eye pain.  Respiratory ROS: []cough, []pleuritic chest pain, []shortness of breath, []wheezing.  CVS ROS:[]chest pain, []dyspnea on exertion, []palpitations, []orthopnea, []leg swelling, []PND.   GI ROS: []nausea, []vomiting, [] epigastric pain, []abd pain, []diarrhea, []constipation, []blood/melena in stool.   Urology ROS:[]dysuria, []frequency, []flank pain,[] trouble voiding, [] hematuria.   MSK ROS: []myalgias, []joint pain, []muscular weakness,  []back pain, [] falls.   Derm ROS: []pruritis, []rash, []jaundice.  Neurological:[]dizziness,[]numbness,[]loss of consciousness, []weakness  []headaches.   Psych ROS: []hallucinations, []depression, []anxiety, []suicidal ideation.    Physical Examination  /60 (BP Location: Right arm, Patient Position: Sitting, BP Method: Medium (Manual))   Pulse 63   Temp 98.6 °F (37 °C) (Oral)   Ht 4' 11" (1.499 m)   Wt 52.9 kg (116 lb 10 oz)   LMP 12/07/1972   SpO2 100%   BMI 23.56 kg/m²   Wt Readings from Last 3 " "Encounters:   08/07/23 52.9 kg (116 lb 10 oz)   06/15/23 52.6 kg (116 lb)   03/08/23 52.9 kg (116 lb 10 oz)     BP Readings from Last 3 Encounters:   08/07/23 136/60   06/15/23 (!) 192/78   03/08/23 (!) 142/60     Estimated body mass index is 23.56 kg/m² as calculated from the following:    Height as of this encounter: 4' 11" (1.499 m).    Weight as of this encounter: 52.9 kg (116 lb 10 oz).     General appearance: alert, cooperative, no distress  Eyes: pupils equal and reactive, extraocular eye movements intact   Ears: bilateral TM's and external ear canals normal, Moderate hearing loss.    Nose: normal and patent, no erythema, discharge or polyps   Sinuses: Normal paranasal sinuses without tenderness   Throat: mucous membranes moist, pharynx normal without lesions   Neck: no JVD, no thyromegaly, trachea midline  Lungs: clear to auscultation, no wheezes, rales or rhonchi, symmetric air entry, no dullness to percussion bilaterally.  Heart: normal rate, regular rhythm, normal S1, S2, no murmurs, rubs, clicks or gallops, no displacement of the PMI.  Abdomen: soft, nontender, nondistended, no masses or organomegaly No rigidity, rebound, or guarding.   Back: full range of motion, no tenderness, palpable spasm or pain on motion   Extremities: peripheral pulses normal, no pedal edema, no clubbing or cyanosis   Feet: warm, good capillary refill.  Neurological:alert, oriented, normal speech, Positive focal findings or movement disorder noted   Psychiatric: alert, oriented to person, place, and time  Integument: normal coloration and turgor, no rashes, no suspicious skin lesions noted.    Data reviewed    Previous medical records reviewed and summarized above in HPI. 274}    Laboratory    I have reviewed old labs below:   274}  Lab Results   Component Value Date    WBC 3.68 (L) 06/15/2023    HGB 11.8 (L) 06/15/2023    HCT 35.3 (L) 06/15/2023    MCV 96 06/15/2023     06/15/2023     (L) 06/15/2023    K 3.8 " 06/15/2023     06/15/2023    CALCIUM 9.3 06/15/2023    PHOS 3.4 10/23/2017    CO2 22 (L) 06/15/2023    GLU 95 06/15/2023    BUN 21 06/15/2023    CREATININE 0.8 06/15/2023    ANIONGAP 10 06/15/2023    ESTGFRAFRICA >60.0 07/06/2022    EGFRNONAA >60.0 07/06/2022    PROT 7.1 06/15/2023    ALBUMIN 3.6 06/15/2023    BILITOT 0.4 06/15/2023    ALKPHOS 64 06/15/2023    ALT 17 06/15/2023    AST 22 06/15/2023    INR 1.0 05/06/2018    CHOL 182 02/03/2023    TRIG 78 02/03/2023    HDL 63 02/03/2023    LDLCALC 103.4 02/03/2023    TSH 1.532 10/22/2017    HGBA1C 5.4 10/23/2017       Imaging/Tracing: I have reviewed the pertinent results/findings and my personal findings are below:  274}    Assessment/Plan     274}    Brie Han is a 89 y.o. female who presents to clinic with:    1. Other eczema    2. Other neutropenia    3. Idiopathic progressive neuropathy    4. Need for pneumococcal vaccination    5. HTN (hypertension), benign    6. Hyperlipidemia with target LDL less than 70         Diagnostic impression remarks       1. Other eczema  - hydrocortisone 2.5 % cream; Apply topically every evening.  Dispense: 3.5 g; Refill: 2    2. Other neutropenia    3. Idiopathic progressive neuropathy  - pbqyztvan-[id number]-algal oil (METANX, ALGAL OIL,) 3 mg-35 mg-2 mg -90.314 mg Cap; Take 1 tablet by mouth once daily.  Dispense: 90 capsule; Refill: 3    4. Need for pneumococcal vaccination  - pneumoc 20-jonathon conj-dip cr(PF) (PREVNAR-20 (PF)) injection Syrg 0.5 mL    5. HTN (hypertension), benign  - Comprehensive metabolic panel; Future  - CBC W/ AUTO DIFFERENTIAL; Future  - Lipid Panel; Future    6. Hyperlipidemia with target LDL less than 70  - Comprehensive metabolic panel; Future  - CBC W/ AUTO DIFFERENTIAL; Future  - Lipid Panel; Future      Medication Monitoring: In today's visit, monitoring for drug toxicity was accomplished. Proper use of medications was discussed.     Counseling: Counseling included discussion regarding  imaging findings, diagnosis, possibilities, treatment options, medications, risks, and benefits. She had many questions regarding the options and long-term effects. All questions were answered. She expressed understanding after counseling regarding the diagnosis and recommendations. She was capable and demonstrated competence with understanding of these options. Shared decision making was performed resulting in her choosing the current treatment plan.     She was counseled about the importance of healthy dietary habits as well as routine physical activity and exercise for better health outcomes. I also discussed the importance of cancer screening.     I also discussed the importance of close follow up to discuss labs, change or modify her medications if needed, monitor side effects, and further evaluation of medical problems.     Additional workup planned: see labs ordered below.    See below for labs and meds ordered with associated diagnosis      Medication List with Changes/Refills   New Medications    HYDROCORTISONE 2.5 % CREAM    Apply topically every evening.    URFZENPHB-[id number]-ALGAL OIL (METANX, ALGAL OIL,) 3 MG-35 MG-2 MG -90.314 MG CAP    Take 1 tablet by mouth once daily.   Current Medications    ACETAMINOPHEN (TYLENOL) 325 MG TABLET    Take 2 tablets (650 mg total) by mouth every 8 (eight) hours as needed.    B COMPLEX VITAMINS TABLET    Take 1 tablet by mouth once daily.    CALCIUM CITRATE-VITAMIN D3 315-200 MG (CITRACAL+D) 315 MG-5 MCG (200 UNIT) PER TABLET    Every day    CLOPIDOGREL (PLAVIX) 75 MG TABLET    TAKE 1 TABLET EVERY DAY    DORZOLAMIDE-TIMOLOL 2-0.5% (COSOPT) 22.3-6.8 MG/ML OPHTHALMIC SOLUTION    Place 1 drop into both eyes 2 (two) times daily.    KRILL OIL ORAL    Take by mouth.    LOSARTAN (COZAAR) 100 MG TABLET    TAKE 1 TABLET ONE TIME DAILY    LOVASTATIN (MEVACOR) 10 MG TABLET    TAKE 1 TABLET EVERY EVENING    MULTIVITAMIN CAPSULE    Every day    NIFEDIPINE (PROCARDIA-XL) 30 MG (OSM)  "24 HR TABLET    TAKE 1 TABLET ONE TIME DAILY    PANTOPRAZOLE (PROTONIX) 20 MG TABLET    Take 1 tablet (20 mg total) by mouth once daily.    TRIAMCINOLONE ACETONIDE 0.1% (KENALOG) 0.1 % OINTMENT    Use on AA BID x 14 days     Modified Medications    No medications on file       Follow up in about 6 months (around 2/7/2024) for labs before next visit. for further workup and reassessment if labs and tests obtained are stable or sooner as needed. She was instructed to call the clinic or go to the emergency department if her symptoms do not improve, worsens, or if new symptoms develop. Patient knows to call any time if an emergency arises. Shared decision making occurred and she verbalized understanding in agreement with this plan.     Documentation entered by me for this encounter may have been done in part using speech-recognition technology. Although I have made an effort to ensure accuracy, "sound like" errors may exist and should be interpreted in context.       Colten Gould MD     Discussed health maintenance guidelines appropriate for age.  Discussed health maintenance guidelines appropriate for age.    "

## 2023-11-01 ENCOUNTER — TELEPHONE (OUTPATIENT)
Dept: SURGERY | Facility: CLINIC | Age: 88
End: 2023-11-01
Payer: MEDICARE

## 2023-11-06 ENCOUNTER — OFFICE VISIT (OUTPATIENT)
Dept: OPHTHALMOLOGY | Facility: CLINIC | Age: 88
End: 2023-11-06
Payer: MEDICARE

## 2023-11-06 DIAGNOSIS — Z96.1 PSEUDOPHAKIA OF BOTH EYES: ICD-10-CM

## 2023-11-06 DIAGNOSIS — H40.1113 PRIMARY OPEN ANGLE GLAUCOMA (POAG) OF RIGHT EYE, SEVERE STAGE: Primary | ICD-10-CM

## 2023-11-06 DIAGNOSIS — H40.1122 PRIMARY OPEN-ANGLE GLAUCOMA, LEFT EYE, MODERATE STAGE: ICD-10-CM

## 2023-11-06 PROCEDURE — 1159F PR MEDICATION LIST DOCUMENTED IN MEDICAL RECORD: ICD-10-PCS | Mod: HCNC,CPTII,S$GLB, | Performed by: OPHTHALMOLOGY

## 2023-11-06 PROCEDURE — 1160F RVW MEDS BY RX/DR IN RCRD: CPT | Mod: HCNC,CPTII,S$GLB, | Performed by: OPHTHALMOLOGY

## 2023-11-06 PROCEDURE — 1101F PR PT FALLS ASSESS DOC 0-1 FALLS W/OUT INJ PAST YR: ICD-10-PCS | Mod: HCNC,CPTII,S$GLB, | Performed by: OPHTHALMOLOGY

## 2023-11-06 PROCEDURE — 99214 OFFICE O/P EST MOD 30 MIN: CPT | Mod: HCNC,S$GLB,, | Performed by: OPHTHALMOLOGY

## 2023-11-06 PROCEDURE — 3288F PR FALLS RISK ASSESSMENT DOCUMENTED: ICD-10-PCS | Mod: HCNC,CPTII,S$GLB, | Performed by: OPHTHALMOLOGY

## 2023-11-06 PROCEDURE — 1159F MED LIST DOCD IN RCRD: CPT | Mod: HCNC,CPTII,S$GLB, | Performed by: OPHTHALMOLOGY

## 2023-11-06 PROCEDURE — 1126F PR PAIN SEVERITY QUANTIFIED, NO PAIN PRESENT: ICD-10-PCS | Mod: HCNC,CPTII,S$GLB, | Performed by: OPHTHALMOLOGY

## 2023-11-06 PROCEDURE — 92133 POSTERIOR SEGMENT OCT OPTIC NERVE(OCULAR COHERENCE TOMOGRAPHY) - OU - BOTH EYES: ICD-10-PCS | Mod: HCNC,S$GLB,, | Performed by: OPHTHALMOLOGY

## 2023-11-06 PROCEDURE — 1126F AMNT PAIN NOTED NONE PRSNT: CPT | Mod: HCNC,CPTII,S$GLB, | Performed by: OPHTHALMOLOGY

## 2023-11-06 PROCEDURE — 92133 CPTRZD OPH DX IMG PST SGM ON: CPT | Mod: HCNC,S$GLB,, | Performed by: OPHTHALMOLOGY

## 2023-11-06 PROCEDURE — 1160F PR REVIEW ALL MEDS BY PRESCRIBER/CLIN PHARMACIST DOCUMENTED: ICD-10-PCS | Mod: HCNC,CPTII,S$GLB, | Performed by: OPHTHALMOLOGY

## 2023-11-06 PROCEDURE — 99214 PR OFFICE/OUTPT VISIT, EST, LEVL IV, 30-39 MIN: ICD-10-PCS | Mod: HCNC,S$GLB,, | Performed by: OPHTHALMOLOGY

## 2023-11-06 PROCEDURE — 1101F PT FALLS ASSESS-DOCD LE1/YR: CPT | Mod: HCNC,CPTII,S$GLB, | Performed by: OPHTHALMOLOGY

## 2023-11-06 PROCEDURE — 3288F FALL RISK ASSESSMENT DOCD: CPT | Mod: HCNC,CPTII,S$GLB, | Performed by: OPHTHALMOLOGY

## 2023-11-06 PROCEDURE — 1157F PR ADVANCE CARE PLAN OR EQUIV PRESENT IN MEDICAL RECORD: ICD-10-PCS | Mod: HCNC,CPTII,S$GLB, | Performed by: OPHTHALMOLOGY

## 2023-11-06 PROCEDURE — 1157F ADVNC CARE PLAN IN RCRD: CPT | Mod: HCNC,CPTII,S$GLB, | Performed by: OPHTHALMOLOGY

## 2023-11-06 PROCEDURE — 99999 PR PBB SHADOW E&M-EST. PATIENT-LVL III: ICD-10-PCS | Mod: PBBFAC,HCNC,, | Performed by: OPHTHALMOLOGY

## 2023-11-06 PROCEDURE — 99999 PR PBB SHADOW E&M-EST. PATIENT-LVL III: CPT | Mod: PBBFAC,HCNC,, | Performed by: OPHTHALMOLOGY

## 2023-11-06 RX ORDER — LATANOPROST 50 UG/ML
1 SOLUTION/ DROPS OPHTHALMIC DAILY
Qty: 7.5 ML | Refills: 4 | Status: SHIPPED | OUTPATIENT
Start: 2023-11-06 | End: 2024-03-08 | Stop reason: ALTCHOICE

## 2023-11-06 RX ORDER — DORZOLAMIDE HYDROCHLORIDE AND TIMOLOL MALEATE 20; 5 MG/ML; MG/ML
1 SOLUTION/ DROPS OPHTHALMIC EVERY 12 HOURS
Qty: 30 ML | Refills: 4 | Status: SHIPPED | OUTPATIENT
Start: 2023-11-06 | End: 2024-03-08 | Stop reason: ALTCHOICE

## 2023-11-06 NOTE — PROGRESS NOTES
HPI    Pt presents for IOP check and to transfer care from Dr Stern.    States no current ocular complaints.     Cosopt BID OU     Last edited by Angélica Kearns on 11/6/2023 10:06 AM.            Assessment /Plan     For exam results, see Encounter Report.    Primary open angle glaucoma (POAG) of right eye, severe stage    Primary open-angle glaucoma, left eye, moderate stage    Pseudophakia of both eyes      1. Primary open angle glaucoma (POAG) of right eye, severe stage  2. Primary open-angle glaucoma, left eye, moderate stage  Unk famhx  Thick pachy  Hx of LPI OU  Hx of CEIOL OU    Tmax 23/23  Brim allergy  +HVF progression 2008 to 2021  +OCT progression OU  I think her IOP needs to be low/mid teens    D/w patient and family  Start  Latan qhs OU  Continue  Dorz/leonel bid OU    F/u 4 months, DFE, standalone HVF when available    3. Pseudophakia of both eyes  Stable

## 2023-12-26 DIAGNOSIS — E78.5 HYPERLIPIDEMIA WITH TARGET LDL LESS THAN 70: ICD-10-CM

## 2023-12-26 DIAGNOSIS — I25.84 CORONARY ARTERY DISEASE DUE TO CALCIFIED CORONARY LESION: ICD-10-CM

## 2023-12-26 DIAGNOSIS — I10 HTN (HYPERTENSION), BENIGN: ICD-10-CM

## 2023-12-26 DIAGNOSIS — I25.10 CORONARY ARTERY DISEASE DUE TO CALCIFIED CORONARY LESION: ICD-10-CM

## 2023-12-27 RX ORDER — LOVASTATIN 10 MG/1
10 TABLET ORAL NIGHTLY
Qty: 90 TABLET | Refills: 0 | Status: SHIPPED | OUTPATIENT
Start: 2023-12-27

## 2023-12-27 RX ORDER — CLOPIDOGREL BISULFATE 75 MG/1
75 TABLET ORAL DAILY
Qty: 90 TABLET | Refills: 1 | Status: ON HOLD | OUTPATIENT
Start: 2023-12-27 | End: 2024-02-05 | Stop reason: HOSPADM

## 2023-12-27 RX ORDER — LOSARTAN POTASSIUM 100 MG/1
100 TABLET ORAL DAILY
Qty: 90 TABLET | Refills: 1 | Status: SHIPPED | OUTPATIENT
Start: 2023-12-27 | End: 2024-02-12

## 2023-12-27 NOTE — TELEPHONE ENCOUNTER
Refill Decision Note   Brie Han  is requesting a refill authorization.  Brief Assessment and Rationale for Refill:  Approve     Medication Therapy Plan:  FLOS 2.1.2024      Comments:     Note composed:3:36 PM 12/27/2023

## 2023-12-27 NOTE — TELEPHONE ENCOUNTER
Care Due:                  Date            Visit Type   Department     Provider  --------------------------------------------------------------------------------                                EP -                              PRIMARY      [facility]  Last Visit: 08-      CARE ([facility])   MEDICINE       Colten Gould  Next Visit: None Scheduled  None         None Found                                                            Last  Test          Frequency    Reason                     Performed    Due Date  --------------------------------------------------------------------------------    Lipid Panel.  12 months..  lovastatin...............  02- 01-    [facility] Embedded Care Due Messages. Reference number: [id number].   12/26/2023 11:59:55 PM CST

## 2024-01-05 ENCOUNTER — CLINICAL SUPPORT (OUTPATIENT)
Dept: OPHTHALMOLOGY | Facility: CLINIC | Age: 89
End: 2024-01-05
Payer: MEDICARE

## 2024-01-05 DIAGNOSIS — H40.1113 PRIMARY OPEN ANGLE GLAUCOMA (POAG) OF RIGHT EYE, SEVERE STAGE: ICD-10-CM

## 2024-01-05 DIAGNOSIS — H40.1122 PRIMARY OPEN-ANGLE GLAUCOMA, LEFT EYE, MODERATE STAGE: ICD-10-CM

## 2024-01-05 NOTE — PROGRESS NOTES
HVF 24-2 and OCT RNFL done today           Assessment /Plan     For exam results, see Encounter Report.

## 2024-01-25 ENCOUNTER — PATIENT MESSAGE (OUTPATIENT)
Dept: OPHTHALMOLOGY | Facility: CLINIC | Age: 89
End: 2024-01-25
Payer: MEDICARE

## 2024-01-27 ENCOUNTER — LAB VISIT (OUTPATIENT)
Dept: LAB | Facility: HOSPITAL | Age: 89
End: 2024-01-27
Attending: FAMILY MEDICINE
Payer: MEDICARE

## 2024-01-27 DIAGNOSIS — I10 HTN (HYPERTENSION), BENIGN: ICD-10-CM

## 2024-01-27 DIAGNOSIS — E78.5 HYPERLIPIDEMIA WITH TARGET LDL LESS THAN 70: ICD-10-CM

## 2024-01-27 LAB
ALBUMIN SERPL BCP-MCNC: 3.8 G/DL (ref 3.5–5.2)
ALP SERPL-CCNC: 70 U/L (ref 55–135)
ALT SERPL W/O P-5'-P-CCNC: 13 U/L (ref 10–44)
ANION GAP SERPL CALC-SCNC: 7 MMOL/L (ref 8–16)
AST SERPL-CCNC: 22 U/L (ref 10–40)
BASOPHILS # BLD AUTO: 0.06 K/UL (ref 0–0.2)
BASOPHILS NFR BLD: 2 % (ref 0–1.9)
BILIRUB SERPL-MCNC: 0.5 MG/DL (ref 0.1–1)
BUN SERPL-MCNC: 16 MG/DL (ref 10–30)
CALCIUM SERPL-MCNC: 9.4 MG/DL (ref 8.7–10.5)
CHLORIDE SERPL-SCNC: 107 MMOL/L (ref 95–110)
CHOLEST SERPL-MCNC: 158 MG/DL (ref 120–199)
CHOLEST/HDLC SERPL: 2.5 {RATIO} (ref 2–5)
CO2 SERPL-SCNC: 21 MMOL/L (ref 23–29)
CREAT SERPL-MCNC: 0.8 MG/DL (ref 0.5–1.4)
DIFFERENTIAL METHOD BLD: ABNORMAL
EOSINOPHIL # BLD AUTO: 0.1 K/UL (ref 0–0.5)
EOSINOPHIL NFR BLD: 3.3 % (ref 0–8)
ERYTHROCYTE [DISTWIDTH] IN BLOOD BY AUTOMATED COUNT: 16.3 % (ref 11.5–14.5)
EST. GFR  (NO RACE VARIABLE): >60 ML/MIN/1.73 M^2
GLUCOSE SERPL-MCNC: 83 MG/DL (ref 70–110)
HCT VFR BLD AUTO: 24.8 % (ref 37–48.5)
HDLC SERPL-MCNC: 62 MG/DL (ref 40–75)
HDLC SERPL: 39.2 % (ref 20–50)
HGB BLD-MCNC: 7.3 G/DL (ref 12–16)
IMM GRANULOCYTES # BLD AUTO: 0.01 K/UL (ref 0–0.04)
IMM GRANULOCYTES NFR BLD AUTO: 0.3 % (ref 0–0.5)
LDLC SERPL CALC-MCNC: 79.8 MG/DL (ref 63–159)
LYMPHOCYTES # BLD AUTO: 0.9 K/UL (ref 1–4.8)
LYMPHOCYTES NFR BLD: 30.7 % (ref 18–48)
MCH RBC QN AUTO: 24.4 PG (ref 27–31)
MCHC RBC AUTO-ENTMCNC: 29.4 G/DL (ref 32–36)
MCV RBC AUTO: 83 FL (ref 82–98)
MONOCYTES # BLD AUTO: 0.5 K/UL (ref 0.3–1)
MONOCYTES NFR BLD: 17.8 % (ref 4–15)
NEUTROPHILS # BLD AUTO: 1.4 K/UL (ref 1.8–7.7)
NEUTROPHILS NFR BLD: 45.9 % (ref 38–73)
NONHDLC SERPL-MCNC: 96 MG/DL
NRBC BLD-RTO: 0 /100 WBC
PLATELET # BLD AUTO: 374 K/UL (ref 150–450)
PMV BLD AUTO: 10.7 FL (ref 9.2–12.9)
POTASSIUM SERPL-SCNC: 4.1 MMOL/L (ref 3.5–5.1)
PROT SERPL-MCNC: 7.4 G/DL (ref 6–8.4)
RBC # BLD AUTO: 2.99 M/UL (ref 4–5.4)
SODIUM SERPL-SCNC: 135 MMOL/L (ref 136–145)
TRIGL SERPL-MCNC: 81 MG/DL (ref 30–150)
WBC # BLD AUTO: 3.03 K/UL (ref 3.9–12.7)

## 2024-01-27 PROCEDURE — 85025 COMPLETE CBC W/AUTO DIFF WBC: CPT | Mod: HCNC | Performed by: FAMILY MEDICINE

## 2024-01-27 PROCEDURE — 80061 LIPID PANEL: CPT | Mod: HCNC | Performed by: FAMILY MEDICINE

## 2024-01-27 PROCEDURE — 80053 COMPREHEN METABOLIC PANEL: CPT | Mod: HCNC | Performed by: FAMILY MEDICINE

## 2024-01-27 PROCEDURE — 36415 COLL VENOUS BLD VENIPUNCTURE: CPT | Mod: HCNC,PO | Performed by: FAMILY MEDICINE

## 2024-01-30 ENCOUNTER — PATIENT MESSAGE (OUTPATIENT)
Dept: PRIMARY CARE CLINIC | Facility: CLINIC | Age: 89
End: 2024-01-30
Payer: MEDICARE

## 2024-01-30 DIAGNOSIS — R79.89 ABNORMAL CBC: Primary | ICD-10-CM

## 2024-01-30 DIAGNOSIS — D50.0 IRON DEFICIENCY ANEMIA DUE TO CHRONIC BLOOD LOSS: ICD-10-CM

## 2024-01-31 ENCOUNTER — TELEPHONE (OUTPATIENT)
Dept: PRIMARY CARE CLINIC | Facility: CLINIC | Age: 89
End: 2024-01-31
Payer: MEDICARE

## 2024-01-31 ENCOUNTER — TELEPHONE (OUTPATIENT)
Dept: GASTROENTEROLOGY | Facility: CLINIC | Age: 89
End: 2024-01-31
Payer: MEDICARE

## 2024-01-31 NOTE — TELEPHONE ENCOUNTER
"Call placed to Mr. Han (pts grandson) I informed him that Dr. Lafleur and Dr. Gould would like Ms. Han seen this week by one of our nurse practitioners. He stated he was not offered that earlier when he spoke with someone, I informed him that he was but he declined due to wanting to speak with Dr. Gould first. He said "who will this appt this week be with", I advised it would be with Hyun Young NP on Friday 2/2 at 11a. He requested a MD at another facility, I advised him that I do not know the access for other GI facilities. He stated to schedule his grand mother for Friday 2/2 at 11a and he will try to get her here. I confirmed appt. No further issues noted.   "

## 2024-01-31 NOTE — TELEPHONE ENCOUNTER
Spoke to patient grandson via phone. Advised him of need for labs ASAP. He states that he will not be able to take her until Friday. I inform him the provider states this is very important and they should be done first thing this morning. He states understanding.

## 2024-01-31 NOTE — TELEPHONE ENCOUNTER
Lab Results   Component Value Date    WBC 3.03 (L) 01/27/2024    HGB 7.3 (L) 01/27/2024    HCT 24.8 (L) 01/27/2024    MCV 83 01/27/2024     01/27/2024   _ Please call her and ask GI for urgent endoscopy and labs in AM at the hospital.. This very important.

## 2024-01-31 NOTE — TELEPHONE ENCOUNTER
----- Message from Colten Gould MD sent at 1/30/2024  9:36 PM CST -----  Blood count is quite low. Do you have any symptoms? Such as weakness, dizziness, and extreme fatigue or shortness of breath.? Also are you having black stools> Stop any blood thinners or over the counter NSAID, alcohol, or supplements such as Omega 3, Garlic supplements, Fish oil or garlic tablets. We need to schedule a repeat  ASAP labsblood count and additional iron levels, and stool blood test.

## 2024-02-01 ENCOUNTER — LAB VISIT (OUTPATIENT)
Dept: LAB | Facility: HOSPITAL | Age: 89
End: 2024-02-01
Attending: FAMILY MEDICINE
Payer: MEDICARE

## 2024-02-01 ENCOUNTER — PATIENT MESSAGE (OUTPATIENT)
Dept: FAMILY MEDICINE | Facility: CLINIC | Age: 89
End: 2024-02-01
Payer: MEDICARE

## 2024-02-01 DIAGNOSIS — D50.0 IRON DEFICIENCY ANEMIA DUE TO CHRONIC BLOOD LOSS: ICD-10-CM

## 2024-02-01 LAB
BASOPHILS # BLD AUTO: 0.07 K/UL (ref 0–0.2)
BASOPHILS NFR BLD: 1.8 % (ref 0–1.9)
DIFFERENTIAL METHOD BLD: ABNORMAL
EOSINOPHIL # BLD AUTO: 0.1 K/UL (ref 0–0.5)
EOSINOPHIL NFR BLD: 3.1 % (ref 0–8)
ERYTHROCYTE [DISTWIDTH] IN BLOOD BY AUTOMATED COUNT: 16.4 % (ref 11.5–14.5)
HCT VFR BLD AUTO: 22.4 % (ref 37–48.5)
HGB BLD-MCNC: 6.6 G/DL (ref 12–16)
IMM GRANULOCYTES # BLD AUTO: 0 K/UL (ref 0–0.04)
IMM GRANULOCYTES NFR BLD AUTO: 0 % (ref 0–0.5)
LYMPHOCYTES # BLD AUTO: 1.1 K/UL (ref 1–4.8)
LYMPHOCYTES NFR BLD: 28 % (ref 18–48)
MCH RBC QN AUTO: 23.9 PG (ref 27–31)
MCHC RBC AUTO-ENTMCNC: 29.5 G/DL (ref 32–36)
MCV RBC AUTO: 81 FL (ref 82–98)
MONOCYTES # BLD AUTO: 0.8 K/UL (ref 0.3–1)
MONOCYTES NFR BLD: 20.9 % (ref 4–15)
NEUTROPHILS # BLD AUTO: 1.8 K/UL (ref 1.8–7.7)
NEUTROPHILS NFR BLD: 46.2 % (ref 38–73)
NRBC BLD-RTO: 0 /100 WBC
PLATELET # BLD AUTO: 306 K/UL (ref 150–450)
PMV BLD AUTO: 9.5 FL (ref 9.2–12.9)
RBC # BLD AUTO: 2.76 M/UL (ref 4–5.4)
RETICS/RBC NFR AUTO: 2.7 % (ref 0.5–2.5)
WBC # BLD AUTO: 3.93 K/UL (ref 3.9–12.7)

## 2024-02-01 PROCEDURE — 85025 COMPLETE CBC W/AUTO DIFF WBC: CPT | Mod: HCNC,PO | Performed by: FAMILY MEDICINE

## 2024-02-01 PROCEDURE — 82728 ASSAY OF FERRITIN: CPT | Mod: HCNC | Performed by: FAMILY MEDICINE

## 2024-02-01 PROCEDURE — 83540 ASSAY OF IRON: CPT | Mod: HCNC | Performed by: FAMILY MEDICINE

## 2024-02-01 PROCEDURE — 36415 COLL VENOUS BLD VENIPUNCTURE: CPT | Mod: HCNC,PO | Performed by: FAMILY MEDICINE

## 2024-02-01 PROCEDURE — 85045 AUTOMATED RETICULOCYTE COUNT: CPT | Mod: HCNC | Performed by: FAMILY MEDICINE

## 2024-02-01 NOTE — TELEPHONE ENCOUNTER
Blood counts lower than previous check. Hemoglobin 6.6 Hematocrit 22.4. Patient grandson called and notified that patient needs to go to the ER for further evaluation. He verbalized understanding.   Valerie Malcolm NP

## 2024-02-02 ENCOUNTER — HOSPITAL ENCOUNTER (INPATIENT)
Facility: HOSPITAL | Age: 89
LOS: 5 days | Discharge: HOME HEALTH | DRG: 374 | End: 2024-02-08
Attending: EMERGENCY MEDICINE | Admitting: HOSPITALIST
Payer: MEDICARE

## 2024-02-02 DIAGNOSIS — Z74.09 DECREASED FUNCTIONAL MOBILITY AND ENDURANCE: ICD-10-CM

## 2024-02-02 DIAGNOSIS — R68.89 IMPAIRED FUNCTION OF UPPER EXTREMITY: ICD-10-CM

## 2024-02-02 DIAGNOSIS — N18.31 STAGE 3A CHRONIC KIDNEY DISEASE: ICD-10-CM

## 2024-02-02 DIAGNOSIS — R26.89 IMPAIRMENT OF BALANCE: ICD-10-CM

## 2024-02-02 DIAGNOSIS — K63.89 COLONIC MASS: ICD-10-CM

## 2024-02-02 DIAGNOSIS — D64.9 SYMPTOMATIC ANEMIA: Primary | ICD-10-CM

## 2024-02-02 DIAGNOSIS — R07.9 CHEST PAIN: ICD-10-CM

## 2024-02-02 DIAGNOSIS — M25.512 ACUTE PAIN OF LEFT SHOULDER: ICD-10-CM

## 2024-02-02 PROBLEM — H40.2230 CHRONIC PRIMARY ANGLE-CLOSURE GLAUCOMA OF BOTH EYES: Status: ACTIVE | Noted: 2024-02-02

## 2024-02-02 LAB
ALBUMIN SERPL BCP-MCNC: 3.6 G/DL (ref 3.5–5.2)
ALP SERPL-CCNC: 65 U/L (ref 55–135)
ALT SERPL W/O P-5'-P-CCNC: 15 U/L (ref 10–44)
ANION GAP SERPL CALC-SCNC: 6 MMOL/L (ref 8–16)
AST SERPL-CCNC: 23 U/L (ref 10–40)
BASOPHILS # BLD AUTO: 0.06 K/UL (ref 0–0.2)
BASOPHILS NFR BLD: 1.2 % (ref 0–1.9)
BILIRUB SERPL-MCNC: 0.4 MG/DL (ref 0.1–1)
BILIRUB UR QL STRIP: NEGATIVE
BUN SERPL-MCNC: 20 MG/DL (ref 10–30)
CALCIUM SERPL-MCNC: 9.3 MG/DL (ref 8.7–10.5)
CHLORIDE SERPL-SCNC: 104 MMOL/L (ref 95–110)
CLARITY UR: CLEAR
CO2 SERPL-SCNC: 25 MMOL/L (ref 23–29)
COLOR UR: COLORLESS
CREAT SERPL-MCNC: 0.8 MG/DL (ref 0.5–1.4)
DIFFERENTIAL METHOD BLD: ABNORMAL
EOSINOPHIL # BLD AUTO: 0.1 K/UL (ref 0–0.5)
EOSINOPHIL NFR BLD: 1.8 % (ref 0–8)
ERYTHROCYTE [DISTWIDTH] IN BLOOD BY AUTOMATED COUNT: 16 % (ref 11.5–14.5)
EST. GFR  (NO RACE VARIABLE): >60 ML/MIN/1.73 M^2
FERRITIN SERPL-MCNC: 7 NG/ML (ref 20–300)
GLUCOSE SERPL-MCNC: 97 MG/DL (ref 70–110)
GLUCOSE UR QL STRIP: NEGATIVE
HCT VFR BLD AUTO: 22 % (ref 37–48.5)
HGB BLD-MCNC: 6.5 G/DL (ref 12–16)
HGB UR QL STRIP: NEGATIVE
IMM GRANULOCYTES # BLD AUTO: 0.01 K/UL (ref 0–0.04)
IMM GRANULOCYTES NFR BLD AUTO: 0.2 % (ref 0–0.5)
IRON SERPL-MCNC: 23 UG/DL (ref 30–160)
IRON SERPL-MCNC: 23 UG/DL (ref 30–160)
KETONES UR QL STRIP: NEGATIVE
LEUKOCYTE ESTERASE UR QL STRIP: NEGATIVE
LYMPHOCYTES # BLD AUTO: 0.9 K/UL (ref 1–4.8)
LYMPHOCYTES NFR BLD: 18.5 % (ref 18–48)
MCH RBC QN AUTO: 23.9 PG (ref 27–31)
MCHC RBC AUTO-ENTMCNC: 29.5 G/DL (ref 32–36)
MCV RBC AUTO: 81 FL (ref 82–98)
MONOCYTES # BLD AUTO: 0.8 K/UL (ref 0.3–1)
MONOCYTES NFR BLD: 15.1 % (ref 4–15)
NEUTROPHILS # BLD AUTO: 3.2 K/UL (ref 1.8–7.7)
NEUTROPHILS NFR BLD: 63.2 % (ref 38–73)
NITRITE UR QL STRIP: NEGATIVE
NRBC BLD-RTO: 0 /100 WBC
PH UR STRIP: 7 [PH] (ref 5–8)
PLATELET # BLD AUTO: 208 K/UL (ref 150–450)
PMV BLD AUTO: 10.4 FL (ref 9.2–12.9)
POTASSIUM SERPL-SCNC: 3.9 MMOL/L (ref 3.5–5.1)
PROT SERPL-MCNC: 7 G/DL (ref 6–8.4)
PROT UR QL STRIP: NEGATIVE
RBC # BLD AUTO: 2.72 M/UL (ref 4–5.4)
SATURATED IRON: 4 % (ref 20–50)
SODIUM SERPL-SCNC: 135 MMOL/L (ref 136–145)
SP GR UR STRIP: 1.01 (ref 1–1.03)
TOTAL IRON BINDING CAPACITY: 617 UG/DL (ref 250–450)
TRANSFERRIN SERPL-MCNC: 417 MG/DL (ref 200–375)
URN SPEC COLLECT METH UR: ABNORMAL
UROBILINOGEN UR STRIP-ACNC: NEGATIVE EU/DL
WBC # BLD AUTO: 4.98 K/UL (ref 3.9–12.7)

## 2024-02-02 PROCEDURE — G0378 HOSPITAL OBSERVATION PER HR: HCPCS

## 2024-02-02 PROCEDURE — 25000003 PHARM REV CODE 250

## 2024-02-02 PROCEDURE — 85025 COMPLETE CBC W/AUTO DIFF WBC: CPT | Performed by: EMERGENCY MEDICINE

## 2024-02-02 PROCEDURE — 99222 1ST HOSP IP/OBS MODERATE 55: CPT | Mod: ,,, | Performed by: INTERNAL MEDICINE

## 2024-02-02 PROCEDURE — 99285 EMERGENCY DEPT VISIT HI MDM: CPT

## 2024-02-02 PROCEDURE — 80053 COMPREHEN METABOLIC PANEL: CPT | Performed by: EMERGENCY MEDICINE

## 2024-02-02 PROCEDURE — 81003 URINALYSIS AUTO W/O SCOPE: CPT

## 2024-02-02 PROCEDURE — 36415 COLL VENOUS BLD VENIPUNCTURE: CPT | Performed by: EMERGENCY MEDICINE

## 2024-02-02 RX ORDER — IBUPROFEN 200 MG
16 TABLET ORAL
Status: DISCONTINUED | OUTPATIENT
Start: 2024-02-02 | End: 2024-02-08 | Stop reason: HOSPADM

## 2024-02-02 RX ORDER — NIFEDIPINE 30 MG/1
30 TABLET, EXTENDED RELEASE ORAL NIGHTLY
Status: DISCONTINUED | OUTPATIENT
Start: 2024-02-02 | End: 2024-02-08 | Stop reason: HOSPADM

## 2024-02-02 RX ORDER — LOSARTAN POTASSIUM 25 MG/1
100 TABLET ORAL DAILY
Status: DISCONTINUED | OUTPATIENT
Start: 2024-02-03 | End: 2024-02-08 | Stop reason: HOSPADM

## 2024-02-02 RX ORDER — LATANOPROST 50 UG/ML
1 SOLUTION/ DROPS OPHTHALMIC DAILY
Status: CANCELLED | OUTPATIENT
Start: 2024-02-02

## 2024-02-02 RX ORDER — NALOXONE HCL 0.4 MG/ML
0.02 VIAL (ML) INJECTION
Status: DISCONTINUED | OUTPATIENT
Start: 2024-02-02 | End: 2024-02-08 | Stop reason: HOSPADM

## 2024-02-02 RX ORDER — SODIUM,POTASSIUM PHOSPHATES 280-250MG
2 POWDER IN PACKET (EA) ORAL
Status: DISCONTINUED | OUTPATIENT
Start: 2024-02-02 | End: 2024-02-08 | Stop reason: HOSPADM

## 2024-02-02 RX ORDER — IBUPROFEN 200 MG
24 TABLET ORAL
Status: DISCONTINUED | OUTPATIENT
Start: 2024-02-02 | End: 2024-02-08 | Stop reason: HOSPADM

## 2024-02-02 RX ORDER — PANTOPRAZOLE SODIUM 40 MG/1
40 TABLET, DELAYED RELEASE ORAL DAILY
Status: DISCONTINUED | OUTPATIENT
Start: 2024-02-03 | End: 2024-02-08 | Stop reason: HOSPADM

## 2024-02-02 RX ORDER — LANOLIN ALCOHOL/MO/W.PET/CERES
800 CREAM (GRAM) TOPICAL
Status: DISCONTINUED | OUTPATIENT
Start: 2024-02-02 | End: 2024-02-08 | Stop reason: HOSPADM

## 2024-02-02 RX ORDER — ONDANSETRON HYDROCHLORIDE 2 MG/ML
4 INJECTION, SOLUTION INTRAVENOUS EVERY 8 HOURS PRN
Status: DISCONTINUED | OUTPATIENT
Start: 2024-02-02 | End: 2024-02-08 | Stop reason: HOSPADM

## 2024-02-02 RX ORDER — HYDROCODONE BITARTRATE AND ACETAMINOPHEN 500; 5 MG/1; MG/1
TABLET ORAL
Status: CANCELLED | OUTPATIENT
Start: 2024-02-02

## 2024-02-02 RX ORDER — DORZOLAMIDE HYDROCHLORIDE AND TIMOLOL MALEATE 20; 5 MG/ML; MG/ML
1 SOLUTION/ DROPS OPHTHALMIC EVERY 12 HOURS
Status: DISCONTINUED | OUTPATIENT
Start: 2024-02-02 | End: 2024-02-08 | Stop reason: HOSPADM

## 2024-02-02 RX ORDER — SODIUM CHLORIDE 0.9 % (FLUSH) 0.9 %
10 SYRINGE (ML) INJECTION EVERY 12 HOURS PRN
Status: DISCONTINUED | OUTPATIENT
Start: 2024-02-02 | End: 2024-02-08 | Stop reason: HOSPADM

## 2024-02-02 RX ORDER — GLUCAGON 1 MG
1 KIT INJECTION
Status: DISCONTINUED | OUTPATIENT
Start: 2024-02-02 | End: 2024-02-08 | Stop reason: HOSPADM

## 2024-02-02 RX ORDER — ACETAMINOPHEN 325 MG/1
650 TABLET ORAL EVERY 8 HOURS PRN
Status: DISCONTINUED | OUTPATIENT
Start: 2024-02-02 | End: 2024-02-08 | Stop reason: HOSPADM

## 2024-02-02 RX ORDER — TALC
6 POWDER (GRAM) TOPICAL NIGHTLY PRN
Status: DISCONTINUED | OUTPATIENT
Start: 2024-02-02 | End: 2024-02-08 | Stop reason: HOSPADM

## 2024-02-02 RX ORDER — ATORVASTATIN CALCIUM 10 MG/1
10 TABLET, FILM COATED ORAL DAILY
Status: DISCONTINUED | OUTPATIENT
Start: 2024-02-02 | End: 2024-02-08 | Stop reason: HOSPADM

## 2024-02-02 RX ADMIN — NIFEDIPINE 30 MG: 30 TABLET, FILM COATED, EXTENDED RELEASE ORAL at 08:02

## 2024-02-02 RX ADMIN — DORZOLAMIDE HYDROCHLORIDE AND TIMOLOL MALEATE 1 DROP: 22.3; 6.8 SOLUTION/ DROPS OPHTHALMIC at 08:02

## 2024-02-02 RX ADMIN — ATORVASTATIN CALCIUM 10 MG: 10 TABLET, FILM COATED ORAL at 08:02

## 2024-02-02 NOTE — ED PROVIDER NOTES
Encounter Date: 2024       History     Chief Complaint   Patient presents with    abnormal labs       89-year-old female past medical history of glaucoma, hypertension, hyperlipidemia , anemia  presents today with worsening anemia. Patient is followed by Dr. Gould and  being worked up for anemia outpatient.  Plan was for GI referral and further workup however she hadhad routine lab that showed worsening hemoglobin of 6.6 yesterday.   Therefore she was sent to the ER for further evaluation.  Patient reports worsening fatigue and dyspnea on exertion.  Denies any syncope or near-syncope.  Denies any shortness of breath at rest.  Patient currently asymptomatic while lying in bed.  Denies any blood in her stools or black stools.  Denies any hematemesis.  Patient does not on any anticoagulation but is taking Plavix but was told to stop 2 days ago by  her PCP which she has done. Patient is a [employer] therefore does not want a blood transfusion.    The history is provided by the patient and a relative (Grandson at bedside able to provide history). No  was used.     Review of patient's allergies indicates:   Allergen Reactions    Contrast media Other (See Comments)    Aspirin Other (See Comments)    Ciprofloxacin Other (See Comments)    Iron Other (See Comments)    Iodine Rash    Penicillins Rash     Past Medical History:   Diagnosis Date    Amaurosis fugax     Bilateral left eye worse    Anticoagulant long-term use     Arthritis     Glaucoma     resolved    Hyperlipidemia     Hypertension     Stroke 2006     Past Surgical History:   Procedure Laterality Date    APPENDECTOMY  's    BREAST SURGERY      CATARACT EXTRACTION W/  INTRAOCULAR LENS IMPLANT Bilateral     Dr Rojo      SECTION      3 c/s and d/c    EYE SURGERY      Glaucoma     HYSTERECTOMY      RIGHT OOPHORECTOMY      With postop hemorrhage    TONSILLECTOMY      Yag Capsulotomy Left 2021     Family History    Problem Relation Age of Onset    Early death Mother     Stroke Father     Hypertension Father     Diabetes Father     Hypertension Sister     Thyroid disease Paternal Grandfather     No Known Problems Brother     No Known Problems Maternal Aunt     No Known Problems Maternal Uncle     No Known Problems Paternal Aunt     No Known Problems Paternal Uncle     No Known Problems Maternal Grandmother     No Known Problems Maternal Grandfather     No Known Problems Paternal Grandmother     Amblyopia Neg Hx     Blindness Neg Hx     Cancer Neg Hx     Cataracts Neg Hx     Glaucoma Neg Hx     Macular degeneration Neg Hx     Retinal detachment Neg Hx     Strabismus Neg Hx      Social History     Tobacco Use    Smoking status: Never    Smokeless tobacco: Never   Substance Use Topics    Alcohol use: No    Drug use: No     Review of Systems    Physical Exam     Initial Vitals [02/02/24 1040]   BP Pulse Resp Temp SpO2   (!) 173/70 68 18 98.2 °F (36.8 °C) 100 %      MAP       --         Physical Exam    Nursing note and vitals reviewed.  Constitutional: She appears well-developed. She is not diaphoretic. No distress.     Elderly appearing   HENT:   Head: Normocephalic and atraumatic.   Nose: Nose normal.   Eyes: EOM are normal. Pupils are equal, round, and reactive to light. No scleral icterus.   Neck: Neck supple.   Normal range of motion.  Cardiovascular:  Normal rate, regular rhythm, normal heart sounds and intact distal pulses.     Exam reveals no gallop and no friction rub.       No murmur heard.  Pulmonary/Chest: Breath sounds normal. No stridor. No respiratory distress. She has no wheezes. She has no rhonchi. She has no rales.   Abdominal: Abdomen is soft. Bowel sounds are normal. She exhibits no distension. There is no abdominal tenderness. There is no rebound and no guarding.   Musculoskeletal:         General: Normal range of motion.      Cervical back: Normal range of motion and neck supple.     Neurological: She is  alert and oriented to person, place, and time. She has normal strength. No cranial nerve deficit or sensory deficit. GCS score is 15. GCS eye subscore is 4. GCS verbal subscore is 5. GCS motor subscore is 6.   Skin: Skin is warm and dry. Capillary refill takes less than 2 seconds. No rash noted.   Psychiatric: She has a normal mood and affect.         ED Course   Critical Care    Date/Time: 2/2/2024 10:21 AM    Performed by: Hector Cabrera MD  Authorized by: Hector Cabrera MD  Direct patient critical care time: 30 minutes  Total critical care time (exclusive of procedural time) : 30 minutes  Critical care time was exclusive of separately billable procedures and treating other patients and teaching time.  Critical care was necessary to treat or prevent imminent or life-threatening deterioration of the following conditions: symptomatic anemia.  Critical care was time spent personally by me on the following activities: blood draw for specimens, development of treatment plan with patient or surrogate, discussions with consultants, examination of patient, obtaining history from patient or surrogate, ordering and performing treatments and interventions, ordering and review of laboratory studies, ordering and review of radiographic studies, pulse oximetry, re-evaluation of patient's condition and review of old charts.        Labs Reviewed   CBC W/ AUTO DIFFERENTIAL - Abnormal; Notable for the following components:       Result Value    RBC 2.72 (*)     Hemoglobin 6.5 (*)     Hematocrit 22.0 (*)     MCV 81 (*)     MCH 23.9 (*)     MCHC 29.5 (*)     RDW 16.0 (*)     Lymph # 0.9 (*)     Mono % 15.1 (*)     All other components within normal limits    Narrative:     HGB   critical result(s) called and verbal readback obtained from OMAYRA HOLCOMB by TN3 02/02/2024 11:59   COMPREHENSIVE METABOLIC PANEL - Abnormal; Notable for the following components:    Sodium 135 (*)     Anion Gap 6 (*)     All other components  within normal limits          Imaging Results    None          Medications   dorzolamide-timolol 2-0.5% ophthalmic solution 1 drop (has no administration in time range)   losartan tablet 100 mg (has no administration in time range)   atorvastatin tablet 10 mg (has no administration in time range)   NIFEdipine 24 hr tablet 30 mg (has no administration in time range)   sodium chloride 0.9% flush 10 mL (has no administration in time range)   naloxone 0.4 mg/mL injection 0.02 mg (has no administration in time range)   glucose chewable tablet 16 g (has no administration in time range)   glucose chewable tablet 24 g (has no administration in time range)   dextrose 50% injection 12.5 g (has no administration in time range)   dextrose 50% injection 25 g (has no administration in time range)   glucagon (human recombinant) injection 1 mg (has no administration in time range)   pantoprazole EC tablet 40 mg (has no administration in time range)   potassium bicarbonate disintegrating tablet 50 mEq (has no administration in time range)   potassium bicarbonate disintegrating tablet 35 mEq (has no administration in time range)   potassium bicarbonate disintegrating tablet 60 mEq (has no administration in time range)   magnesium oxide tablet 800 mg (has no administration in time range)   magnesium oxide tablet 800 mg (has no administration in time range)   potassium, sodium phosphates 280-160-250 mg packet 2 packet (has no administration in time range)   potassium, sodium phosphates 280-160-250 mg packet 2 packet (has no administration in time range)   potassium, sodium phosphates 280-160-250 mg packet 2 packet (has no administration in time range)   ondansetron injection 4 mg (has no administration in time range)   acetaminophen tablet 650 mg (has no administration in time range)   melatonin tablet 6 mg (has no administration in time range)     Medical Decision Making  Discussed case with GI on-call, Dr. Lafleur, who recommends  holding Plavix and getting patient prepped on Sunday for endoscopy and colonoscopy on Monday.  Will admit to Hospital Medicine for further management and trending hemoglobin.    Amount and/or Complexity of Data Reviewed  Labs: ordered. Decision-making details documented in ED Course.               ED Course as of 02/02/24 1808 Fri Feb 02, 2024   1226 Hemoglobin(!!): 6.5 [BD]   1229 Had long discussion with patient and her son who is at bedside.  Patient is a Sikh and  does not want blood transfusion despite knowing the risks which include but are not limited to death. [BD]   1241 89-year-old female past medical history of hypertension, hyperlipidemia, coronary artery disease and peripheral vascular disease pw worsening  symptomatic anemia. [BD]      ED Course User Index  [BD] Hector Cabrera MD                           Clinical Impression:  Final diagnoses:  [D64.9] Symptomatic anemia (Primary)          ED Disposition Condition    Observation Stable                Hector Cabrera MD  02/02/24 1808

## 2024-02-02 NOTE — HPI
Brie Han is a 89 y.o. female with past medical history significant for gerd, hypertension, hyperlipidemia, glaucoma, stroke,and arthritis, who presents to the emergency department with symptomatic anemia. Patient was seen outpatient by Dr. Gould for anemia outpatient. Patient had routine labs that showed her hemoglobin had decreased on yesterday.  Patient was sent to the emergency room for evaluation of abnormal labs.  Patient reported that for several days she was dyspneic on exertion and fatigue.  Patient denies any shortness a breath at rest.  Patient ambulating in the hallway without difficulty.   Patient states that she lives at home alone.  Patient denies smoking, drinking, in the use of illicit drugs.  Patient denies hematuria, black tarry stools, or keyla blood in stools. Patient endorsed she has hemorrhoids occasionally bleed and she has a use a pad for protection.  Patient endorses that bleeding has significantly decreased since stopping the use of her blood thinner.  Patient is on chronic anticoagulation therapy and has been off of her Plavix for 2 days per request of her PCP.  Patient is a Christian therefore does not want a blood transfusion.  GI consulted.  ED workup:  CBC shows anemia with hemoglobin of 6.5 hematocrit of 22.  CMP hyponatremia 135 with decreased anion gap-6. Review of outpatient labs revealed ferritin-7, Iron-23, TIBC-617, Transferrin-417, and saturated 4.  Patient will be admitted to Hospital Medicine for observation and evaluation.      
Spine appears normal, range of motion is not limited, no muscle or joint tenderness

## 2024-02-02 NOTE — H&P
formerly [facility] Medicine  History & Physical    Patient Name: Brie Han  MRN: [id number]  Patient Class: OP- Observation  Admission Date: 2/2/2024  Attending Physician: Danii Elliott MD   Primary Care Provider: Colten Gould MD         Patient information was obtained from patient, ER records, and Grandson .     Subjective:     Principal Problem:Symptomatic anemia    Chief Complaint:   Chief Complaint   Patient presents with    abnormal labs         HPI: Brie Han is a 89 y.o. female with past medical history significant for gerd, hypertension, hyperlipidemia, glaucoma, stroke,and arthritis, who presents to the emergency department with symptomatic anemia. Patient was seen outpatient by Dr. Gould for anemia outpatient. Patient had routine labs that showed her hemoglobin had decreased on yesterday.  Patient was sent to the emergency room for evaluation of abnormal labs.  Patient reported that for several days she was dyspneic on exertion and fatigue.  Patient denies any shortness a breath at rest.  Patient ambulating in the hallway without difficulty.   Patient states that she lives at home alone.  Patient denies smoking, drinking, in the use of illicit drugs.  Patient denies hematuria, black tarry stools, or keyla blood in stools. Patient endorsed she has hemorrhoids occasionally bleed and she has a use a pad for protection.  Patient endorses that bleeding has significantly decreased since stopping the use of her blood thinner.  Patient is on chronic anticoagulation therapy and has been off of her Plavix for 2 days per request of her PCP.  Patient is a Taoism therefore does not want a blood transfusion.  GI consulted.  ED workup:  CBC shows anemia with hemoglobin of 6.5 hematocrit of 22.  CMP hyponatremia 135 with decreased anion gap-6. Review of outpatient labs revealed ferritin-7, Iron-23, TIBC-617, Transferrin-417, and saturated 4.  Patient will be admitted to Hospital  Medicine for observation and evaluation.        No new subjective & objective note has been filed under this hospital service since the last note was generated.    Assessment/Plan:     * Symptomatic anemia  GI consult  Patient is a Anabaptist and does not wish to have any blood transfusions  Patient's anemia is currently controlled. Has not received any PRBCs to date. Etiology likely d/t acute blood loss which was from PUD  Current CBC reviewed-   Lab Results   Component Value Date    HGB 6.5 (LL) 02/02/2024    HCT 22.0 (L) 02/02/2024     Monitor serial CBC and transfuse if patient becomes hemodynamically unstable, symptomatic or H/H drops below 7/21.    CAD (coronary artery disease)  Patient with known CAD s/p  n/a , which is controlled Will continue Statin and monitor for S/Sx of angina/ACS. Continue to monitor on telemetry.     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  Chronic.  Continue home eyedrops        Hypertension  Chronic, controlled. Latest blood pressure and vitals reviewed-     Temp:  [98.2 °F (36.8 °C)]   Pulse:  [68-76]   Resp:  [18]   BP: (155-182)/(70-77)   SpO2:  [100 %] .   Home meds for hypertension were reviewed and noted below.   Hypertension Medications               losartan (COZAAR) 100 MG tablet Take 1 tablet (100 mg total) by mouth once daily.    NIFEdipine (PROCARDIA-XL) 30 MG (OSM) 24 hr tablet Take 1 tablet (30 mg total) by mouth once daily.            While in the hospital, will manage blood pressure as follows; Continue home antihypertensive regimen    Will utilize p.r.n. blood pressure medication only if patient's blood pressure greater than 180/110 and she develops symptoms such as worsening chest pain or shortness of breath.      VTE Risk Mitigation (From admission, onward)      None                 On 02/02/2024, patient should be placed in hospital observation services under my care in collaboration with Dr. Elliott.           Justin Proctor,  NP  Department of Hospital Medicine  [facility]

## 2024-02-02 NOTE — PHARMACY MED REC
"Admission Medication History     The home medication history was taken by Juan Miguel Smart.    You may go to "Admission" then "Reconcile Home Medications" tabs to review and/or act upon these items.     The home medication list has been updated by the Pharmacy department.   Please read ALL comments highlighted in yellow.   Please address this information as you see fit.    Feel free to contact us if you have any questions or require assistance.      The medications listed below were removed from the home medication list. Please reorder if appropriate:  Patient reports no longer taking the following medication(s):  Krill Oil    Medications listed below were obtained from: Patient/family and [employer]- [employer]  No current facility-administered medications on file prior to encounter.     Current Outpatient Medications on File Prior to Encounter   Medication Sig Dispense Refill    acetaminophen (TYLENOL) 325 MG tablet Take 2 tablets (650 mg total) by mouth every 8 (eight) hours as needed. (Patient taking differently: Take 650 mg by mouth every 8 (eight) hours as needed for Pain.)  0    b complex vitamins tablet Take 1 tablet by mouth once daily.      calcium citrate-vitamin D3 315-200 mg (CITRACAL+D) 315 mg-5 mcg (200 unit) per tablet Take 1 tablet by mouth once daily.      dorzolamide-timolol 2-0.5% (COSOPT) 22.3-6.8 mg/mL ophthalmic solution Place 1 drop into both eyes every 12 (twelve) hours. 30 mL 4    latanoprost 0.005 % ophthalmic solution Place 1 drop into both eyes once daily. (Patient taking differently: Place 1 drop into both eyes every evening.) 7.5 mL 4    losartan (COZAAR) 100 MG tablet Take 1 tablet (100 mg total) by mouth once daily. 90 tablet 1    lovastatin (MEVACOR) 10 MG tablet Take 1 tablet (10 mg total) by mouth every evening. 90 tablet 0    multivitamin capsule Take 1 capsule by mouth once daily.      NIFEdipine (PROCARDIA-XL) 30 MG (OSM) 24 hr tablet Take 1 tablet (30 mg total) by mouth once " daily. (Patient taking differently: Take 30 mg by mouth every evening.) 90 tablet 3    clopidogreL (PLAVIX) 75 mg tablet Take 1 tablet (75 mg total) by mouth once daily. (Patient not taking: Reported on 2/2/2024) 90 tablet 1    hydrocortisone 2.5 % cream Apply topically every evening. (Patient taking differently: Apply 1 g topically every evening.) 3.5 g 2    [id number]-algal oil (METANX, ALGAL OIL,) 3 mg-35 mg-2 mg -90.314 mg Cap Take 1 tablet by mouth once daily. 90 capsule 3    [DISCONTINUED] KRILL OIL ORAL Take by mouth.           Juan Miguel Smart  EXT 1924                .

## 2024-02-02 NOTE — ED NOTES
Two IV's attempted using US guidance. Difficult vasculature. Will re-attempt pending new orders requiring IV access. Patient is a devout [employer] and has not ever and will not ever receive a blood transfusion.

## 2024-02-02 NOTE — ASSESSMENT & PLAN NOTE
Chronic, controlled. Latest blood pressure and vitals reviewed-     Temp:  [98.2 °F (36.8 °C)]   Pulse:  [68-76]   Resp:  [18]   BP: (155-182)/(70-77)   SpO2:  [100 %] .   Home meds for hypertension were reviewed and noted below.   Hypertension Medications               losartan (COZAAR) 100 MG tablet Take 1 tablet (100 mg total) by mouth once daily.    NIFEdipine (PROCARDIA-XL) 30 MG (OSM) 24 hr tablet Take 1 tablet (30 mg total) by mouth once daily.            While in the hospital, will manage blood pressure as follows; Continue home antihypertensive regimen    Will utilize p.r.n. blood pressure medication only if patient's blood pressure greater than 180/110 and she develops symptoms such as worsening chest pain or shortness of breath.

## 2024-02-02 NOTE — ASSESSMENT & PLAN NOTE
GI consult  Patient is a Yazidi and does not wish to have any blood transfusions  Patient's anemia is currently controlled. Has not received any PRBCs to date. Etiology likely d/t acute blood loss which was from PUD  Current CBC reviewed-   Lab Results   Component Value Date    HGB 6.5 (LL) 02/02/2024    HCT 22.0 (L) 02/02/2024     Monitor serial CBC and transfuse if patient becomes hemodynamically unstable, symptomatic or H/H drops below 7/21.

## 2024-02-02 NOTE — ASSESSMENT & PLAN NOTE
Patient with known CAD s/p  n/a , which is controlled Will continue Statin and monitor for S/Sx of angina/ACS. Continue to monitor on telemetry.

## 2024-02-02 NOTE — CONSULTS
[facility]     CC: Anemia    HPI 89 y.o. female medical history of glaucoma, hypertension, hyperlipidemia , anemia  presents today with worsening anemia. Patient is followed by Dr. Gould and  being worked up for anemia outpatient.  Plan was for GI referral and further workup however she hadhad routine lab that showed worsening hemoglobin of 6.6 yesterday.   Therefore she was sent to the ER for further evaluation.  Patient reports worsening fatigue and dyspnea on exertion.  Denies any syncope or near-syncope.  Denies any shortness of breath at rest.  Patient currently asymptomatic while lying in bed.  Denies any blood in her stools or black stools.  Denies any hematemesis.  Patient does not on any anticoagulation but is taking Plavix but was told to stop 2 days ago by  her PCP which she has done. Patient is a Anglican therefore does not want a blood transfusion.     The history is provided by the patient and a relative (Grandson at bedside able to provide history). No  was used.    FURTHER HISTORY:  Above obtained from independent review of records from admitting provider as well as from direct discussion with patient's grandson who states that she is a Pentecostalism.  In addition, on my interview, I note the following:  Patient has anemia, severe, acute on chronic, associated with occasional minor BRBPR but no other GI symptoms.  States that she has had endoscopy in the past but it has been many years.  She has been on Plavix, last dose 1-2 days ago.    Past Medical History:   Diagnosis Date    Anticoagulant long-term use     Arthritis     Glaucoma     resolved    Hyperlipidemia     Hypertension     Stroke        Past Surgical History:   Procedure Laterality Date    APPENDECTOMY  1950's    BREAST SURGERY      CATARACT EXTRACTION W/  INTRAOCULAR LENS IMPLANT Bilateral     Dr Rojo      SECTION      3 c/s and d/c    EYE SURGERY      Glaucoma      "HYSTERECTOMY      TONSILLECTOMY      Yag Capsulotomy Left 11/11/2021       Social History     Tobacco Use    Smoking status: Never    Smokeless tobacco: Never   Substance Use Topics    Alcohol use: No    Drug use: No       Family History   Problem Relation Age of Onset    Early death Mother     Stroke Father     Hypertension Father     Diabetes Father     Hypertension Sister     Thyroid disease Paternal Grandfather     No Known Problems Brother     No Known Problems Maternal Aunt     No Known Problems Maternal Uncle     No Known Problems Paternal Aunt     No Known Problems Paternal Uncle     No Known Problems Maternal Grandmother     No Known Problems Maternal Grandfather     No Known Problems Paternal Grandmother     Amblyopia Neg Hx     Blindness Neg Hx     Cancer Neg Hx     Cataracts Neg Hx     Glaucoma Neg Hx     Macular degeneration Neg Hx     Retinal detachment Neg Hx     Strabismus Neg Hx        Review of Systems  General ROS: negative for - chills, fever or weight loss  Psychological ROS: negative for - hallucination, depression or suicidal ideation  Ophthalmic ROS: negative for - blurry vision, photophobia or eye pain  ENT ROS: negative for - epistaxis, sore throat or rhinorrhea  Respiratory ROS: no cough, shortness of breath, or wheezing  Cardiovascular ROS: no chest pain or dyspnea on exertion  Gastrointestinal ROS: as above  Genito-Urinary ROS: no dysuria, trouble voiding, or hematuria  Musculoskeletal ROS: negative for - arthralgia, myalgia, weakness  Neurological ROS: no syncope or seizures; no ataxia  Dermatological ROS: negative for pruritis, rash and jaundice    Physical Examination  BP (!) 182/77   Pulse 76   Temp 98.2 °F (36.8 °C) (Oral)   Resp 18   Ht 4' 11" (1.499 m)   Wt 52.6 kg (116 lb)   LMP 12/07/1972   SpO2 100%   Breastfeeding No   BMI 23.43 kg/m²   General appearance: alert, cooperative, no distress  HENT: Normocephalic, atraumatic, neck symmetrical, no nasal discharge   Eyes: " conjunctivae/corneas clear, PERRL, EOM's intact, sclera anicteric  Lungs: clear to auscultation bilaterally, no dullness to percussion bilaterally, symmetric expansion, breathing unlabored  Heart: regular rate and rhythm without rub; no displacement of the PMI   Abdomen: soft, NT  Extremities: extremities symmetric; no clubbing, cyanosis, or edema  Integument: Skin color, texture, turgor normal; no rashes; hair distrubution normal, no jaundice  Neurologic: Alert and oriented X 3, no focal sensory or motor neurologic deficits  Psychiatric: no pressured speech; normal affect; no evidence of impaired cognition, no anxiety/depression     Labs:  Lab Results   Component Value Date    WBC 4.98 02/02/2024    HGB 6.5 (LL) 02/02/2024    HCT 22.0 (L) 02/02/2024    MCV 81 (L) 02/02/2024     02/02/2024       CMP  Sodium   Date Value Ref Range Status   02/02/2024 135 (L) 136 - 145 mmol/L Final     Potassium   Date Value Ref Range Status   02/02/2024 3.9 3.5 - 5.1 mmol/L Final     Chloride   Date Value Ref Range Status   02/02/2024 104 95 - 110 mmol/L Final     CO2   Date Value Ref Range Status   02/02/2024 25 23 - 29 mmol/L Final     Glucose   Date Value Ref Range Status   02/02/2024 97 70 - 110 mg/dL Final     BUN   Date Value Ref Range Status   02/02/2024 20 10 - 30 mg/dL Final     Creatinine   Date Value Ref Range Status   02/02/2024 0.8 0.5 - 1.4 mg/dL Final   05/23/2013 0.7 0.5 - 1.4 mg/dL Final     Calcium   Date Value Ref Range Status   02/02/2024 9.3 8.7 - 10.5 mg/dL Final   05/23/2013 9.9 8.7 - 10.5 mg/dL Final     Total Protein   Date Value Ref Range Status   02/02/2024 7.0 6.0 - 8.4 g/dL Final     Albumin   Date Value Ref Range Status   02/02/2024 3.6 3.5 - 5.2 g/dL Final     Total Bilirubin   Date Value Ref Range Status   02/02/2024 0.4 0.1 - 1.0 mg/dL Final     Comment:     For infants and newborns, interpretation of results should be based  on gestational age, weight and in agreement with  clinical  observations.    Premature Infant recommended reference ranges:  Up to 24 hours.............<8.0 mg/dL  Up to 48 hours............<12.0 mg/dL  3-5 days..................<15.0 mg/dL  6-29 days.................<15.0 mg/dL       Alkaline Phosphatase   Date Value Ref Range Status   02/02/2024 65 55 - 135 U/L Final   04/04/2013 101 55 - 135 U/L Final     AST   Date Value Ref Range Status   02/02/2024 23 10 - 40 U/L Final   04/04/2013 24 10 - 40 U/L Final     ALT   Date Value Ref Range Status   02/02/2024 15 10 - 44 U/L Final     Anion Gap   Date Value Ref Range Status   02/02/2024 6 (L) 8 - 16 mmol/L Final   05/23/2013 10 5 - 15 meq/L Final     eGFR   Date Value Ref Range Status   02/02/2024 >60 >60 mL/min/1.73 m^2 Final         Imaging:  CT scan was independently visualized and reviewed by me and showed no acute process.    I have personally reviewed these images    Case discussed as above    Assessment:   Anemia  Plavix use  Adventism      Plan:  Patient not accepting of blood products.  Supportive care per primary team  Hold Plavix  EGD/colonoscopy on Monday once plavix has been held  Further recommendations to follow after above.  Communication will be sent to the referring provider regarding my assessment and plan on this patient via EPIC.      Trev Varela MD  [facility]  [address], [address]  [place] [address]  Office: [phone]  Fax: [phone]

## 2024-02-03 LAB
ALBUMIN SERPL BCP-MCNC: 3.6 G/DL (ref 3.5–5.2)
ALP SERPL-CCNC: 65 U/L (ref 55–135)
ALT SERPL W/O P-5'-P-CCNC: 16 U/L (ref 10–44)
ANION GAP SERPL CALC-SCNC: 12 MMOL/L (ref 8–16)
AST SERPL-CCNC: 42 U/L (ref 10–40)
BASOPHILS # BLD AUTO: 0.07 K/UL (ref 0–0.2)
BASOPHILS NFR BLD: 2.3 % (ref 0–1.9)
BILIRUB SERPL-MCNC: 0.3 MG/DL (ref 0.1–1)
BUN SERPL-MCNC: 17 MG/DL (ref 10–30)
CALCIUM SERPL-MCNC: 9.6 MG/DL (ref 8.7–10.5)
CHLORIDE SERPL-SCNC: 107 MMOL/L (ref 95–110)
CO2 SERPL-SCNC: 19 MMOL/L (ref 23–29)
CREAT SERPL-MCNC: 0.9 MG/DL (ref 0.5–1.4)
DIFFERENTIAL METHOD BLD: ABNORMAL
EOSINOPHIL # BLD AUTO: 0.1 K/UL (ref 0–0.5)
EOSINOPHIL NFR BLD: 4.2 % (ref 0–8)
ERYTHROCYTE [DISTWIDTH] IN BLOOD BY AUTOMATED COUNT: 16.3 % (ref 11.5–14.5)
EST. GFR  (NO RACE VARIABLE): 59 ML/MIN/1.73 M^2
GLUCOSE SERPL-MCNC: 144 MG/DL (ref 70–110)
HCT VFR BLD AUTO: 25 % (ref 37–48.5)
HGB BLD-MCNC: 7.4 G/DL (ref 12–16)
IMM GRANULOCYTES # BLD AUTO: 0.02 K/UL (ref 0–0.04)
IMM GRANULOCYTES NFR BLD AUTO: 0.7 % (ref 0–0.5)
LYMPHOCYTES # BLD AUTO: 0.8 K/UL (ref 1–4.8)
LYMPHOCYTES NFR BLD: 26.1 % (ref 18–48)
MAGNESIUM SERPL-MCNC: 2.2 MG/DL (ref 1.6–2.6)
MCH RBC QN AUTO: 23.9 PG (ref 27–31)
MCHC RBC AUTO-ENTMCNC: 29.6 G/DL (ref 32–36)
MCV RBC AUTO: 81 FL (ref 82–98)
MONOCYTES # BLD AUTO: 0.6 K/UL (ref 0.3–1)
MONOCYTES NFR BLD: 19.9 % (ref 4–15)
NEUTROPHILS # BLD AUTO: 1.4 K/UL (ref 1.8–7.7)
NEUTROPHILS NFR BLD: 46.8 % (ref 38–73)
NRBC BLD-RTO: 0 /100 WBC
PHOSPHATE SERPL-MCNC: 3.2 MG/DL (ref 2.7–4.5)
PLATELET # BLD AUTO: 211 K/UL (ref 150–450)
PLATELET BLD QL SMEAR: ABNORMAL
PMV BLD AUTO: 11.8 FL (ref 9.2–12.9)
POTASSIUM SERPL-SCNC: 4.6 MMOL/L (ref 3.5–5.1)
PROT SERPL-MCNC: 7.4 G/DL (ref 6–8.4)
RBC # BLD AUTO: 3.1 M/UL (ref 4–5.4)
SODIUM SERPL-SCNC: 138 MMOL/L (ref 136–145)
WBC # BLD AUTO: 3.07 K/UL (ref 3.9–12.7)

## 2024-02-03 PROCEDURE — 84100 ASSAY OF PHOSPHORUS: CPT

## 2024-02-03 PROCEDURE — 25000003 PHARM REV CODE 250

## 2024-02-03 PROCEDURE — 80053 COMPREHEN METABOLIC PANEL: CPT

## 2024-02-03 PROCEDURE — 36415 COLL VENOUS BLD VENIPUNCTURE: CPT

## 2024-02-03 PROCEDURE — 83735 ASSAY OF MAGNESIUM: CPT

## 2024-02-03 PROCEDURE — 11000001 HC ACUTE MED/SURG PRIVATE ROOM

## 2024-02-03 PROCEDURE — 85025 COMPLETE CBC W/AUTO DIFF WBC: CPT

## 2024-02-03 RX ADMIN — ATORVASTATIN CALCIUM 10 MG: 10 TABLET, FILM COATED ORAL at 08:02

## 2024-02-03 RX ADMIN — PANTOPRAZOLE SODIUM 40 MG: 40 TABLET, DELAYED RELEASE ORAL at 09:02

## 2024-02-03 RX ADMIN — HYPROMELLOSE 2910 1 DROP: 5 SOLUTION/ DROPS OPHTHALMIC at 09:02

## 2024-02-03 RX ADMIN — DORZOLAMIDE HYDROCHLORIDE AND TIMOLOL MALEATE 1 DROP: 22.3; 6.8 SOLUTION/ DROPS OPHTHALMIC at 08:02

## 2024-02-03 RX ADMIN — HYPROMELLOSE 2910 1 DROP: 5 SOLUTION/ DROPS OPHTHALMIC at 04:02

## 2024-02-03 RX ADMIN — LOSARTAN POTASSIUM 100 MG: 25 TABLET, FILM COATED ORAL at 09:02

## 2024-02-03 RX ADMIN — NIFEDIPINE 30 MG: 30 TABLET, FILM COATED, EXTENDED RELEASE ORAL at 08:02

## 2024-02-03 RX ADMIN — DORZOLAMIDE HYDROCHLORIDE AND TIMOLOL MALEATE 1 DROP: 22.3; 6.8 SOLUTION/ DROPS OPHTHALMIC at 09:02

## 2024-02-03 NOTE — SUBJECTIVE & OBJECTIVE
Interval History:   Patient seen and examined.  Patient in no acute distress at time of exam.  CBC revealed improved in hemoglobin.  Patient is scheduled for EGD on Monday. Friends and family at bedside. Patient educated on treatment plan.        Review of Systems   Constitutional:  Negative for activity change, appetite change, chills and fever.   HENT:  Negative for congestion and sore throat.    Respiratory:  Negative for cough, chest tightness and shortness of breath.    Cardiovascular:  Negative for chest pain.   Gastrointestinal:  Negative for constipation, diarrhea, nausea and vomiting.   Endocrine: Negative.    Genitourinary:  Negative for decreased urine volume, difficulty urinating, dysuria, hematuria and urgency.   Musculoskeletal:  Negative for gait problem and myalgias.   Skin: Negative.    Allergic/Immunologic: Negative.    Neurological: Negative.  Negative for weakness.   Psychiatric/Behavioral: Negative.       Objective:     Vital Signs (Most Recent):  Temp: 98 °F (36.7 °C) (02/03/24 1149)  Pulse: 64 (02/03/24 1149)  Resp: 17 (02/03/24 1149)  BP: (!) 141/60 (02/03/24 1149)  SpO2: 95 % (02/03/24 1149) Vital Signs (24h Range):  Temp:  [97.9 °F (36.6 °C)-98.5 °F (36.9 °C)] 98 °F (36.7 °C)  Pulse:  [60-75] 64  Resp:  [16-18] 17  SpO2:  [95 %-100 %] 95 %  BP: (129-164)/(58-83) 141/60     Weight: 52.6 kg (116 lb)  Body mass index is 23.43 kg/m².  No intake or output data in the 24 hours ending 02/03/24 1455      Physical Exam  Vitals and nursing note reviewed.   Constitutional:       Appearance: Normal appearance. She is well-developed and normal weight.   HENT:      Head: Normocephalic and atraumatic.   Neck:      Thyroid: No thyromegaly.      Vascular: No JVD.   Cardiovascular:      Rate and Rhythm: Normal rate and regular rhythm.   Pulmonary:      Effort: Pulmonary effort is normal.      Breath sounds: Normal breath sounds.   Abdominal:      General: Bowel sounds are normal. There is no distension.       Palpations: Abdomen is soft. There is no mass.      Tenderness: There is no abdominal tenderness.   Musculoskeletal:         General: Normal range of motion.      Cervical back: Neck supple.   Skin:     General: Skin is warm and dry.   Neurological:      Mental Status: She is alert and oriented to person, place, and time. Mental status is at baseline.   Psychiatric:         Behavior: Behavior normal.             Significant Labs: All pertinent labs within the past 24 hours have been reviewed.  CBC:   Recent Labs   Lab 02/02/24  1144 02/03/24  1020   WBC 4.98 3.07*   HGB 6.5* 7.4*   HCT 22.0* 25.0*    211     CMP:   Recent Labs   Lab 02/02/24  1144 02/03/24  1020   * 138   K 3.9 4.6    107   CO2 25 19*   GLU 97 144*   BUN 20 17   CREATININE 0.8 0.9   CALCIUM 9.3 9.6   PROT 7.0 7.4   ALBUMIN 3.6 3.6   BILITOT 0.4 0.3   ALKPHOS 65 65   AST 23 42*   ALT 15 16   ANIONGAP 6* 12       Significant Imaging: I have reviewed all pertinent imaging results/findings within the past 24 hours.

## 2024-02-03 NOTE — PLAN OF CARE
[facility] - Med/Surg  Initial Discharge Assessment       Primary Care Provider: Colten Gould MD    Admission Diagnosis: Symptomatic anemia [D64.9]    Admission Date: 2/2/2024  Expected Discharge Date:     Assessment completed at pt's bedside with pt. Pt's AAOx4. Verified demographics, insurance, PCP and pharmacy. Pt denies HH, DME, Dialysis or Coumadin Therapy. Pt is independent with all ADL's. Pt does not have POA or Living Will in place. Discharge plan is home with grandson to or neighbor to transport.     CM will continue to follow for further discharge planning needs.    Transition of Care Barriers: None    Payor: HUMANA MANAGED MEDICARE / Plan: HUMANA MEDICARE HMO / Product Type: Capitation /     Extended Emergency Contact Information  Primary Emergency Contact: Prabhjot Han  Address: unknown           [place] [address] [place]  Mobile Phone: [phone]  Relation: Grandchild  Preferred language: English   needed? No  Secondary Emergency Contact: Anais Aguiar  Address: UNKNOWN           [place] [address] [place]  Mobile Phone: [phone]  Relation: Friend  Preferred language: English   needed? No    Discharge Plan A: Home  Discharge Plan B: Home      [facility] [address] [place], [place] - [address]  [address] [address] [address]  Phone: [phone] Fax: [phone]    [facility] - [facility] [address]  [address] [address]  Phone: [phone] Fax: [phone]    [facility] [address]  [place] - [address]  [address] [address]  Phone: [phone] Fax: [phone]      Initial Assessment (most recent)       Adult Discharge Assessment - 02/03/24 0943          Discharge Assessment    Assessment Type Discharge Planning Assessment     Confirmed/corrected address, phone number and insurance Yes      Confirmed Demographics Correct on Facesheet     Source of Information patient;health record     When was your last doctors appointment? 01/30/24     Does patient/caregiver understand observation status Yes     Reason For Admission Symptomatic anemia     People in Home alone     Facility Arrived From: Home     Do you expect to return to your current living situation? Yes     Do you have help at home or someone to help you manage your care at home? Yes     Who are your caregiver(s) and their phone number(s)? Prabhjot fuentes  [phone]    2  Anais Aguiar  Friend  [phone]    3  Kenzie Burns  [facility]  [phone]     Prior to hospitilization cognitive status: Alert/Oriented     Current cognitive status: Alert/Oriented     Walking or Climbing Stairs Difficulty no     Dressing/Bathing Difficulty no     Home Accessibility not wheelchair accessible;stairs to enter home     Number of Stairs, Main Entrance three     Stair Railings, Main Entrance none     Home Layout Able to live on 1st floor     Equipment Currently Used at Home none     Readmission within 30 days? No     Patient currently being followed by outpatient case management? No     Do you currently have service(s) that help you manage your care at home? No     Do you take prescription medications? Yes     Do you have prescription coverage? Yes     Coverage HUMANA MANAGED MEDICARE - HUMANA MEDICARE HMO -     Do you have any problems affording any of your prescribed medications? No     Is the patient taking medications as prescribed? yes     Who is going to help you get home at discharge? Prabhjot fuentes [phone] 2 Anais Aguiar Friend [phone] 3 Kenzie Burns [facility] [phone]     How do you get to doctors appointments? family or friend will provide     Are you on dialysis? No     Do you take coumadin? No     Discharge Plan A Home     Discharge Plan B Home     DME Needed Upon Discharge  none     Discharge Plan discussed with:  Patient     Transition of Care Barriers None

## 2024-02-03 NOTE — ASSESSMENT & PLAN NOTE
GI consult  Patient is a Denominational and does not wish to have any blood transfusions  Patient's anemia is currently controlled. Has not received any PRBCs to date. Etiology likely d/t acute blood loss which was from PUD  Current CBC reviewed-   Lab Results   Component Value Date    HGB 7.4 (L) 02/03/2024    HCT 25.0 (L) 02/03/2024     Monitor serial CBC and transfuse if patient becomes hemodynamically unstable, symptomatic or H/H drops below 7/21.

## 2024-02-03 NOTE — PROGRESS NOTES
[place] [facility] Medicine  Progress Note    Patient Name: Brie Han  MRN: [id number]  Patient Class: IP- Inpatient   Admission Date: 2/2/2024  Length of Stay: 0 days  Attending Physician: Rowena Caicedo MD  Primary Care Provider: Colten Gould MD        Subjective:     Principal Problem:Symptomatic anemia        HPI:  Brie Han is a 89 y.o. female with past medical history significant for gerd, hypertension, hyperlipidemia, glaucoma, stroke,and arthritis, who presents to the emergency department with symptomatic anemia. Patient was seen outpatient by Dr. Gould for anemia outpatient. Patient had routine labs that showed her hemoglobin had decreased on yesterday.  Patient was sent to the emergency room for evaluation of abnormal labs.  Patient reported that for several days she was dyspneic on exertion and fatigue.  Patient denies any shortness a breath at rest.  Patient ambulating in the hallway without difficulty.   Patient states that she lives at home alone.  Patient denies smoking, drinking, in the use of illicit drugs.  Patient denies hematuria, black tarry stools, or keyla blood in stools. Patient endorsed she has hemorrhoids occasionally bleed and she has a use a pad for protection.  Patient endorses that bleeding has significantly decreased since stopping the use of her blood thinner.  Patient is on chronic anticoagulation therapy and has been off of her Plavix for 2 days per request of her PCP.  Patient is a Adventism therefore does not want a blood transfusion.  GI consulted.  ED workup:  CBC shows anemia with hemoglobin of 6.5 hematocrit of 22.  CMP hyponatremia 135 with decreased anion gap-6. Review of outpatient labs revealed ferritin-7, Iron-23, TIBC-617, Transferrin-417, and saturated 4.  Patient will be admitted to Hospital Medicine for observation and evaluation.        Overview/Hospital Course:  No notes on file    Interval History:   Patient seen and  examined.  Patient in no acute distress at time of exam.  CBC revealed improved in hemoglobin.  Patient is scheduled for EGD on Monday. Friends and family at bedside. Patient educated on treatment plan.        Review of Systems   Constitutional:  Negative for activity change, appetite change, chills and fever.   HENT:  Negative for congestion and sore throat.    Respiratory:  Negative for cough, chest tightness and shortness of breath.    Cardiovascular:  Negative for chest pain.   Gastrointestinal:  Negative for constipation, diarrhea, nausea and vomiting.   Endocrine: Negative.    Genitourinary:  Negative for decreased urine volume, difficulty urinating, dysuria, hematuria and urgency.   Musculoskeletal:  Negative for gait problem and myalgias.   Skin: Negative.    Allergic/Immunologic: Negative.    Neurological: Negative.  Negative for weakness.   Psychiatric/Behavioral: Negative.       Objective:     Vital Signs (Most Recent):  Temp: 98 °F (36.7 °C) (02/03/24 1149)  Pulse: 64 (02/03/24 1149)  Resp: 17 (02/03/24 1149)  BP: (!) 141/60 (02/03/24 1149)  SpO2: 95 % (02/03/24 1149) Vital Signs (24h Range):  Temp:  [97.9 °F (36.6 °C)-98.5 °F (36.9 °C)] 98 °F (36.7 °C)  Pulse:  [60-75] 64  Resp:  [16-18] 17  SpO2:  [95 %-100 %] 95 %  BP: (129-164)/(58-83) 141/60     Weight: 52.6 kg (116 lb)  Body mass index is 23.43 kg/m².  No intake or output data in the 24 hours ending 02/03/24 1455      Physical Exam  Vitals and nursing note reviewed.   Constitutional:       Appearance: Normal appearance. She is well-developed and normal weight.   HENT:      Head: Normocephalic and atraumatic.   Neck:      Thyroid: No thyromegaly.      Vascular: No JVD.   Cardiovascular:      Rate and Rhythm: Normal rate and regular rhythm.   Pulmonary:      Effort: Pulmonary effort is normal.      Breath sounds: Normal breath sounds.   Abdominal:      General: Bowel sounds are normal. There is no distension.      Palpations: Abdomen is soft. There  is no mass.      Tenderness: There is no abdominal tenderness.   Musculoskeletal:         General: Normal range of motion.      Cervical back: Neck supple.   Skin:     General: Skin is warm and dry.   Neurological:      Mental Status: She is alert and oriented to person, place, and time. Mental status is at baseline.   Psychiatric:         Behavior: Behavior normal.             Significant Labs: All pertinent labs within the past 24 hours have been reviewed.  CBC:   Recent Labs   Lab 02/02/24  1144 02/03/24  1020   WBC 4.98 3.07*   HGB 6.5* 7.4*   HCT 22.0* 25.0*    211     CMP:   Recent Labs   Lab 02/02/24  1144 02/03/24  1020   * 138   K 3.9 4.6    107   CO2 25 19*   GLU 97 144*   BUN 20 17   CREATININE 0.8 0.9   CALCIUM 9.3 9.6   PROT 7.0 7.4   ALBUMIN 3.6 3.6   BILITOT 0.4 0.3   ALKPHOS 65 65   AST 23 42*   ALT 15 16   ANIONGAP 6* 12       Significant Imaging: I have reviewed all pertinent imaging results/findings within the past 24 hours.    Assessment/Plan:      * Symptomatic anemia  GI consult  Patient is a Jewish and does not wish to have any blood transfusions  Patient's anemia is currently controlled. Has not received any PRBCs to date. Etiology likely d/t acute blood loss which was from PUD  Current CBC reviewed-   Lab Results   Component Value Date    HGB 7.4 (L) 02/03/2024    HCT 25.0 (L) 02/03/2024     Monitor serial CBC and transfuse if patient becomes hemodynamically unstable, symptomatic or H/H drops below 7/21.    CAD (coronary artery disease)  Patient with known CAD s/p  n/a , which is controlled Will continue Statin and monitor for S/Sx of angina/ACS. Continue to monitor on telemetry.     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  Chronic.  Continue home eyedrops        Hypertension  Chronic, controlled. Latest blood pressure and vitals reviewed-     Temp:  [98.2 °F (36.8 °C)]   Pulse:  [68-76]   Resp:  [18]   BP: (155-182)/(70-77)   SpO2:  [100 %]  .   Home meds for hypertension were reviewed and noted below.   Hypertension Medications               losartan (COZAAR) 100 MG tablet Take 1 tablet (100 mg total) by mouth once daily.    NIFEdipine (PROCARDIA-XL) 30 MG (OSM) 24 hr tablet Take 1 tablet (30 mg total) by mouth once daily.            While in the hospital, will manage blood pressure as follows; Continue home antihypertensive regimen    Will utilize p.r.n. blood pressure medication only if patient's blood pressure greater than 180/110 and she develops symptoms such as worsening chest pain or shortness of breath.      VTE Risk Mitigation (From admission, onward)           Ordered     IP VTE HIGH RISK PATIENT  Once         02/02/24 1758     Place sequential compression device  Until discontinued         02/02/24 1758     Reason for No Pharmacological VTE Prophylaxis  Once        Question:  Reasons:  Answer:  Risk of Bleeding    02/02/24 1758                    Discharge Planning   VINITA: 2/6/2024     Code Status: Full Code   Is the patient medically ready for discharge?:     Reason for patient still in hospital (select all that apply): Treatment and Consult recommendations  Discharge Plan A: Home                  Justin Proctor NP  Department of Hospital Medicine   [facility]/Surg

## 2024-02-03 NOTE — PLAN OF CARE
02/03/24 0820   ROLON Message   Medicare Outpatient and Observation Notification regarding financial responsibility Given to patient/caregiver;Explained to patient/caregiver;Signed/date by patient/caregiver   Date ROLON was signed 02/03/24   Time ROLON was signed 0820

## 2024-02-04 LAB
ALBUMIN SERPL BCP-MCNC: 3.3 G/DL (ref 3.5–5.2)
ALP SERPL-CCNC: 59 U/L (ref 55–135)
ALT SERPL W/O P-5'-P-CCNC: 14 U/L (ref 10–44)
ANION GAP SERPL CALC-SCNC: 8 MMOL/L (ref 8–16)
AST SERPL-CCNC: 22 U/L (ref 10–40)
BASOPHILS # BLD AUTO: 0.07 K/UL (ref 0–0.2)
BASOPHILS NFR BLD: 1.6 % (ref 0–1.9)
BILIRUB SERPL-MCNC: 0.3 MG/DL (ref 0.1–1)
BUN SERPL-MCNC: 17 MG/DL (ref 10–30)
CALCIUM SERPL-MCNC: 8.6 MG/DL (ref 8.7–10.5)
CHLORIDE SERPL-SCNC: 110 MMOL/L (ref 95–110)
CO2 SERPL-SCNC: 20 MMOL/L (ref 23–29)
CREAT SERPL-MCNC: 0.7 MG/DL (ref 0.5–1.4)
DIFFERENTIAL METHOD BLD: ABNORMAL
EOSINOPHIL # BLD AUTO: 0.2 K/UL (ref 0–0.5)
EOSINOPHIL NFR BLD: 3.5 % (ref 0–8)
ERYTHROCYTE [DISTWIDTH] IN BLOOD BY AUTOMATED COUNT: 16.2 % (ref 11.5–14.5)
EST. GFR  (NO RACE VARIABLE): >60 ML/MIN/1.73 M^2
GLUCOSE SERPL-MCNC: 91 MG/DL (ref 70–110)
HCT VFR BLD AUTO: 22.1 % (ref 37–48.5)
HGB BLD-MCNC: 6.5 G/DL (ref 12–16)
IMM GRANULOCYTES # BLD AUTO: 0.02 K/UL (ref 0–0.04)
IMM GRANULOCYTES NFR BLD AUTO: 0.5 % (ref 0–0.5)
LYMPHOCYTES # BLD AUTO: 1.5 K/UL (ref 1–4.8)
LYMPHOCYTES NFR BLD: 34.4 % (ref 18–48)
MAGNESIUM SERPL-MCNC: 2 MG/DL (ref 1.6–2.6)
MCH RBC QN AUTO: 23.6 PG (ref 27–31)
MCHC RBC AUTO-ENTMCNC: 29.4 G/DL (ref 32–36)
MCV RBC AUTO: 80 FL (ref 82–98)
MONOCYTES # BLD AUTO: 1 K/UL (ref 0.3–1)
MONOCYTES NFR BLD: 23 % (ref 4–15)
NEUTROPHILS # BLD AUTO: 1.6 K/UL (ref 1.8–7.7)
NEUTROPHILS NFR BLD: 37 % (ref 38–73)
NRBC BLD-RTO: 0 /100 WBC
PHOSPHATE SERPL-MCNC: 2.8 MG/DL (ref 2.7–4.5)
PLATELET # BLD AUTO: 354 K/UL (ref 150–450)
PMV BLD AUTO: 9.8 FL (ref 9.2–12.9)
POTASSIUM SERPL-SCNC: 3.8 MMOL/L (ref 3.5–5.1)
PROT SERPL-MCNC: 6.5 G/DL (ref 6–8.4)
RBC # BLD AUTO: 2.76 M/UL (ref 4–5.4)
SODIUM SERPL-SCNC: 138 MMOL/L (ref 136–145)
WBC # BLD AUTO: 4.3 K/UL (ref 3.9–12.7)

## 2024-02-04 PROCEDURE — 25000003 PHARM REV CODE 250: Performed by: INTERNAL MEDICINE

## 2024-02-04 PROCEDURE — 25000003 PHARM REV CODE 250

## 2024-02-04 PROCEDURE — 85025 COMPLETE CBC W/AUTO DIFF WBC: CPT

## 2024-02-04 PROCEDURE — 80053 COMPREHEN METABOLIC PANEL: CPT

## 2024-02-04 PROCEDURE — 84100 ASSAY OF PHOSPHORUS: CPT

## 2024-02-04 PROCEDURE — 36415 COLL VENOUS BLD VENIPUNCTURE: CPT

## 2024-02-04 PROCEDURE — 11000001 HC ACUTE MED/SURG PRIVATE ROOM

## 2024-02-04 PROCEDURE — 83735 ASSAY OF MAGNESIUM: CPT

## 2024-02-04 RX ORDER — POLYETHYLENE GLYCOL 3350, SODIUM CHLORIDE, SODIUM BICARBONATE, POTASSIUM CHLORIDE 420; 11.2; 5.72; 1.48 G/4L; G/4L; G/4L; G/4L
4000 POWDER, FOR SOLUTION ORAL ONCE
Status: COMPLETED | OUTPATIENT
Start: 2024-02-04 | End: 2024-02-04

## 2024-02-04 RX ADMIN — NIFEDIPINE 30 MG: 30 TABLET, FILM COATED, EXTENDED RELEASE ORAL at 09:02

## 2024-02-04 RX ADMIN — HYPROMELLOSE 2910 1 DROP: 5 SOLUTION/ DROPS OPHTHALMIC at 09:02

## 2024-02-04 RX ADMIN — ATORVASTATIN CALCIUM 10 MG: 10 TABLET, FILM COATED ORAL at 09:02

## 2024-02-04 RX ADMIN — LOSARTAN POTASSIUM 100 MG: 25 TABLET, FILM COATED ORAL at 09:02

## 2024-02-04 RX ADMIN — PANTOPRAZOLE SODIUM 40 MG: 40 TABLET, DELAYED RELEASE ORAL at 09:02

## 2024-02-04 RX ADMIN — DORZOLAMIDE HYDROCHLORIDE AND TIMOLOL MALEATE 1 DROP: 22.3; 6.8 SOLUTION/ DROPS OPHTHALMIC at 09:02

## 2024-02-04 RX ADMIN — POLYETHYLENE GLYCOL 3350, SODIUM CHLORIDE, SODIUM BICARBONATE AND POTASSIUM CHLORIDE WITH LEMON FLAVOR 4000 ML: 420; 11.2; 5.72; 1.48 POWDER, FOR SOLUTION ORAL at 11:02

## 2024-02-04 NOTE — SUBJECTIVE & OBJECTIVE
Interval History:   Patient seen and examined.  Patient in no acute distress at time of exam.  Patient's hemoglobin dropped to 6.5 this morning.  Patient denies shortness or breath, dizziness, lightheadedness, hematemesis, hematuria, tarry stools.  Patient just finished shower when I entered the room.   Patient educated on current treatment plan.  Caterina was called prior to rounding at his request. He was educated on treatment plan.  Caterina was very adamant that patient have no further blood draws without him present.  He was asked when he was available to be here so that I can time blood draws appropriately.  He requested that patient had blood draws and pediatric.  After discussion with lab, I was advised that short draws will be the best option as blood made clot if drawn in a pediatric lab tube. Caterina was educated on the need to monitor patient's hemoglobin level so that we can ensure that blood levels do not continue to decrease.  Caterina stated that they would just like to have study done by GI tomorrow so that they can go home.  He continued to become more agitated and raised his voice, to the point that he was no longer capable of therapeutically communicating, and into the call appropriate.  Plan for EGD with GI on tomorrow as Eliquis has to be held for 5 days prior to procedure being performed.  GI following recommendations appreciated.    Review of Systems  Objective:     Vital Signs (Most Recent):  Temp: 98.1 °F (36.7 °C) (02/04/24 0742)  Pulse: 66 (02/04/24 0742)  Resp: 17 (02/04/24 0742)  BP: (!) 129/58 (02/04/24 0926)  SpO2: 99 % (02/04/24 0742) Vital Signs (24h Range):  Temp:  [97.6 °F (36.4 °C)-98.1 °F (36.7 °C)] 98.1 °F (36.7 °C)  Pulse:  [64-70] 66  Resp:  [17-18] 17  SpO2:  [95 %-100 %] 99 %  BP: (129-152)/(58-67) 129/58     Weight: 52.6 kg (116 lb)  Body mass index is 23.43 kg/m².    Intake/Output Summary (Last 24 hours) at 2/4/2024 1123  Last data filed at 2/4/2024 0459  Gross per 24  hour   Intake 480 ml   Output 600 ml   Net -120 ml         Physical Exam        Significant Labs: All pertinent labs within the past 24 hours have been reviewed.  CBC:   Recent Labs   Lab 02/03/24  1020 02/04/24  0425   WBC 3.07* 4.30   HGB 7.4* 6.5*   HCT 25.0* 22.1*    354     CMP:   Recent Labs   Lab 02/03/24  1020 02/04/24  0425    138   K 4.6 3.8    110   CO2 19* 20*   * 91   BUN 17 17   CREATININE 0.9 0.7   CALCIUM 9.6 8.6*   PROT 7.4 6.5   ALBUMIN 3.6 3.3*   BILITOT 0.3 0.3   ALKPHOS 65 59   AST 42* 22   ALT 16 14   ANIONGAP 12 8       Significant Imaging: I have reviewed all pertinent imaging results/findings within the past 24 hours.

## 2024-02-04 NOTE — PLAN OF CARE
Problem: Adult Inpatient Plan of Care  Goal: Plan of Care Review  Outcome: Ongoing, Progressing  Goal: Patient-Specific Goal (Individualized)  Outcome: Ongoing, Progressing  Goal: Optimal Comfort and Wellbeing  Outcome: Ongoing, Progressing     Problem: Fall Injury Risk  Goal: Absence of Fall and Fall-Related Injury  Outcome: Ongoing, Progressing   POC has been reviewed with pt and grandson.  No falls during this shift.  No pain voiced during this shift.  Pt's lab this am was hemoglobin 7.4.  Pt is refusing to receive blood d/t being jevovahs witness.  Pt is a possible discharge for tomorrow.  No replacements needed today.

## 2024-02-04 NOTE — CARE UPDATE
Caterina at bedside.  Olive watkins supervisor at bedside.  I discussed patient's plan of care with patient and grandson.  Grandson wishes that patient does not have any further blood draws unless he is at bedside.  He stated that he would not like blood draw to be done until 2 hours prior to procedure.

## 2024-02-04 NOTE — ASSESSMENT & PLAN NOTE
GI consult  Patient is a Taoist and does not wish to have any blood transfusions  Clear Liquid diet   Prep for EGD tomorrow     Patient's anemia is currently controlled. Has not received any PRBCs to date. Etiology likely d/t acute blood loss which was from PUD  Current CBC reviewed-   Lab Results   Component Value Date    HGB 6.5 (LL) 02/04/2024    HCT 22.1 (L) 02/04/2024     Monitor serial CBC and transfuse if patient becomes hemodynamically unstable, symptomatic or H/H drops below 7/21.

## 2024-02-04 NOTE — PROGRESS NOTES
GI note.    Remains stable so far, but blood count down a little further (see below).  On clear liquid diet.  Beginning bowel prep now.       Latest Reference Range & Units 01/27/24 12:05 02/01/24 13:00 02/02/24 11:44 02/03/24 10:20 02/04/24 04:25   Hemoglobin 12.0 - 16.0 g/dL 7.3 (L) 6.6 (L) 6.5 (LL) 7.4 (L) 6.5 (LL)   Hematocrit 37.0 - 48.5 % 24.8 (L) 22.4 (L) 22.0 (L) 25.0 (L) 22.1 (L)   MCV 82 - 98 fL 83 81 (L) 81 (L) 81 (L) 80 (L)   (LL): Data is critically low  (L): Data is abnormally low      IMP:  Severe anemia.  Mormonism.    PLAN:  Proceed with bowel prep.  EGD/Colon tomorrow.

## 2024-02-04 NOTE — PLAN OF CARE
Problem: Adult Inpatient Plan of Care  Goal: Plan of Care Review  Outcome: Ongoing, Progressing  Goal: Patient-Specific Goal (Individualized)  Outcome: Ongoing, Progressing  Goal: Optimal Comfort and Wellbeing  Outcome: Ongoing, Progressing     Problem: Fall Injury Risk  Goal: Absence of Fall and Fall-Related Injury  Outcome: Ongoing, Progressing   POC has been reviewed with pt.  Pt is to be NPO after midnight for EGD and colonoscopy tomorrow.  Pt's labs were discontinued d/t her being johovahs witness and because her grandson asked for her to not have any more labs drawn unless absolutely necessary.  No falls during this shift.  No c/o pain voiced during this shift.  Pt was started on go-lytely prep this am.  Based on results they find from these tests; will indicate if hematology needs to be consulted or not for this active blood loss.  Grandson would like to be notified before any lab draws are done.  Pt is on clear liquid diet until midnight.  No possible discharge at this time.

## 2024-02-04 NOTE — PROGRESS NOTES
[facility] Medicine  Progress Note    Patient Name: Brie Han  MRN: [id number]  Patient Class: IP- Inpatient   Admission Date: 2/2/2024  Length of Stay: 1 days  Attending Physician: Rowena Caicedo MD  Primary Care Provider: Colten Gould MD        Subjective:     Principal Problem:Symptomatic anemia        HPI:  Brie Han is a 89 y.o. female with past medical history significant for gerd, hypertension, hyperlipidemia, glaucoma, stroke,and arthritis, who presents to the emergency department with symptomatic anemia. Patient was seen outpatient by Dr. Gould for anemia outpatient. Patient had routine labs that showed her hemoglobin had decreased on yesterday.  Patient was sent to the emergency room for evaluation of abnormal labs.  Patient reported that for several days she was dyspneic on exertion and fatigue.  Patient denies any shortness a breath at rest.  Patient ambulating in the hallway without difficulty.   Patient states that she lives at home alone.  Patient denies smoking, drinking, in the use of illicit drugs.  Patient denies hematuria, black tarry stools, or keyla blood in stools. Patient endorsed she has hemorrhoids occasionally bleed and she has a use a pad for protection.  Patient endorses that bleeding has significantly decreased since stopping the use of her blood thinner.  Patient is on chronic anticoagulation therapy and has been off of her Plavix for 2 days per request of her PCP.  Patient is a Roman Catholic therefore does not want a blood transfusion.  GI consulted.  ED workup:  CBC shows anemia with hemoglobin of 6.5 hematocrit of 22.  CMP hyponatremia 135 with decreased anion gap-6. Review of outpatient labs revealed ferritin-7, Iron-23, TIBC-617, Transferrin-417, and saturated 4.  Patient will be admitted to Hospital Medicine for observation and evaluation.        Overview/Hospital Course:  No notes on file    Interval History:   Patient seen and  examined.  Patient in no acute distress at time of exam.  Patient's hemoglobin dropped to 6.5 this morning.  Patient denies shortness or breath, dizziness, lightheadedness, hematemesis, hematuria, tarry stools.  Patient just finished shower when I entered the room.   Patient educated on current treatment plan.  Caterina was called prior to rounding at his request. He was educated on treatment plan.  Caterina was very adamant that patient have no further blood draws without him present.  He was asked when he was available to be here so that I can time blood draws appropriately.  He requested that patient had blood draws and pediatric.  After discussion with lab, I was advised that short draws will be the best option as blood made clot if drawn in a pediatric lab tube. Caterina was educated on the need to monitor patient's hemoglobin level so that we can ensure that blood levels do not continue to decrease.  Caterina stated that they would just like to have study done by GI tomorrow so that they can go home.  He continued to become more agitated and raised his voice, to the point that he was no longer capable of therapeutically communicating, and into the call appropriate.  Plan for EGD with GI on tomorrow as Eliquis has to be held for 5 days prior to procedure being performed.  GI following recommendations appreciated.    Review of Systems  Objective:     Vital Signs (Most Recent):  Temp: 98.1 °F (36.7 °C) (02/04/24 0742)  Pulse: 66 (02/04/24 0742)  Resp: 17 (02/04/24 0742)  BP: (!) 129/58 (02/04/24 0926)  SpO2: 99 % (02/04/24 0742) Vital Signs (24h Range):  Temp:  [97.6 °F (36.4 °C)-98.1 °F (36.7 °C)] 98.1 °F (36.7 °C)  Pulse:  [64-70] 66  Resp:  [17-18] 17  SpO2:  [95 %-100 %] 99 %  BP: (129-152)/(58-67) 129/58     Weight: 52.6 kg (116 lb)  Body mass index is 23.43 kg/m².    Intake/Output Summary (Last 24 hours) at 2/4/2024 1123  Last data filed at 2/4/2024 0459  Gross per 24 hour   Intake 480 ml   Output 600 ml    Net -120 ml         Physical Exam        Significant Labs: All pertinent labs within the past 24 hours have been reviewed.  CBC:   Recent Labs   Lab 02/03/24  1020 02/04/24  0425   WBC 3.07* 4.30   HGB 7.4* 6.5*   HCT 25.0* 22.1*    354     CMP:   Recent Labs   Lab 02/03/24  1020 02/04/24  0425    138   K 4.6 3.8    110   CO2 19* 20*   * 91   BUN 17 17   CREATININE 0.9 0.7   CALCIUM 9.6 8.6*   PROT 7.4 6.5   ALBUMIN 3.6 3.3*   BILITOT 0.3 0.3   ALKPHOS 65 59   AST 42* 22   ALT 16 14   ANIONGAP 12 8       Significant Imaging: I have reviewed all pertinent imaging results/findings within the past 24 hours.    Assessment/Plan:      * Symptomatic anemia  GI consult  Patient is a Nondenominational and does not wish to have any blood transfusions  Clear Liquid diet   Prep for EGD tomorrow     Patient's anemia is currently controlled. Has not received any PRBCs to date. Etiology likely d/t acute blood loss which was from PUD  Current CBC reviewed-   Lab Results   Component Value Date    HGB 6.5 (LL) 02/04/2024    HCT 22.1 (L) 02/04/2024     Monitor serial CBC and transfuse if patient becomes hemodynamically unstable, symptomatic or H/H drops below 7/21.    CAD (coronary artery disease)  Patient with known CAD s/p  n/a , which is controlled Will continue Statin and monitor for S/Sx of angina/ACS. Continue to monitor on telemetry.     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  Chronic.  Continue home eyedrops        Hypertension  Chronic, controlled. Latest blood pressure and vitals reviewed-     Temp:  [98.2 °F (36.8 °C)]   Pulse:  [68-76]   Resp:  [18]   BP: (155-182)/(70-77)   SpO2:  [100 %] .   Home meds for hypertension were reviewed and noted below.   Hypertension Medications               losartan (COZAAR) 100 MG tablet Take 1 tablet (100 mg total) by mouth once daily.    NIFEdipine (PROCARDIA-XL) 30 MG (OSM) 24 hr tablet Take 1 tablet (30 mg total) by mouth once daily.             While in the hospital, will manage blood pressure as follows; Continue home antihypertensive regimen    Will utilize p.r.n. blood pressure medication only if patient's blood pressure greater than 180/110 and she develops symptoms such as worsening chest pain or shortness of breath.      VTE Risk Mitigation (From admission, onward)           Ordered     IP VTE HIGH RISK PATIENT  Once         02/02/24 1758     Place sequential compression device  Until discontinued         02/02/24 1758     Reason for No Pharmacological VTE Prophylaxis  Once        Question:  Reasons:  Answer:  Risk of Bleeding    02/02/24 1758                    Discharge Planning   VINITA: 2/6/2024     Code Status: Full Code   Is the patient medically ready for discharge?:     Reason for patient still in hospital (select all that apply): Treatment and Consult recommendations  Discharge Plan A: Home                  Justin Proctor NP  Department of Hospital Medicine   [facility]/Surg

## 2024-02-05 ENCOUNTER — ANESTHESIA EVENT (OUTPATIENT)
Dept: ENDOSCOPY | Facility: HOSPITAL | Age: 89
DRG: 374 | End: 2024-02-05
Payer: MEDICARE

## 2024-02-05 ENCOUNTER — TELEPHONE (OUTPATIENT)
Dept: GASTROENTEROLOGY | Facility: CLINIC | Age: 89
End: 2024-02-05
Payer: MEDICARE

## 2024-02-05 ENCOUNTER — ANESTHESIA (OUTPATIENT)
Dept: ENDOSCOPY | Facility: HOSPITAL | Age: 89
DRG: 374 | End: 2024-02-05
Payer: MEDICARE

## 2024-02-05 DIAGNOSIS — C18.0 CECAL CANCER: Primary | ICD-10-CM

## 2024-02-05 LAB
BASOPHILS # BLD AUTO: 0.07 K/UL (ref 0–0.2)
BASOPHILS NFR BLD: 2 % (ref 0–1.9)
DIFFERENTIAL METHOD BLD: ABNORMAL
EOSINOPHIL # BLD AUTO: 0.1 K/UL (ref 0–0.5)
EOSINOPHIL NFR BLD: 2.5 % (ref 0–8)
ERYTHROCYTE [DISTWIDTH] IN BLOOD BY AUTOMATED COUNT: 16 % (ref 11.5–14.5)
HCT VFR BLD AUTO: 26.5 % (ref 37–48.5)
HGB BLD-MCNC: 7.7 G/DL (ref 12–16)
IMM GRANULOCYTES # BLD AUTO: 0.01 K/UL (ref 0–0.04)
IMM GRANULOCYTES NFR BLD AUTO: 0.3 % (ref 0–0.5)
LYMPHOCYTES # BLD AUTO: 1 K/UL (ref 1–4.8)
LYMPHOCYTES NFR BLD: 27 % (ref 18–48)
MCH RBC QN AUTO: 23.4 PG (ref 27–31)
MCHC RBC AUTO-ENTMCNC: 29.1 G/DL (ref 32–36)
MCV RBC AUTO: 81 FL (ref 82–98)
MONOCYTES # BLD AUTO: 0.5 K/UL (ref 0.3–1)
MONOCYTES NFR BLD: 12.7 % (ref 4–15)
NEUTROPHILS # BLD AUTO: 2 K/UL (ref 1.8–7.7)
NEUTROPHILS NFR BLD: 55.5 % (ref 38–73)
NRBC BLD-RTO: 0 /100 WBC
PLATELET # BLD AUTO: 350 K/UL (ref 150–450)
PMV BLD AUTO: 9.3 FL (ref 9.2–12.9)
RBC # BLD AUTO: 3.29 M/UL (ref 4–5.4)
WBC # BLD AUTO: 3.55 K/UL (ref 3.9–12.7)

## 2024-02-05 PROCEDURE — 0DBH8ZX EXCISION OF CECUM, VIA NATURAL OR ARTIFICIAL OPENING ENDOSCOPIC, DIAGNOSTIC: ICD-10-PCS | Performed by: INTERNAL MEDICINE

## 2024-02-05 PROCEDURE — 25000003 PHARM REV CODE 250

## 2024-02-05 PROCEDURE — 11000001 HC ACUTE MED/SURG PRIVATE ROOM

## 2024-02-05 PROCEDURE — 37000009 HC ANESTHESIA EA ADD 15 MINS: Performed by: INTERNAL MEDICINE

## 2024-02-05 PROCEDURE — 63600175 PHARM REV CODE 636 W HCPCS: Performed by: NURSE ANESTHETIST, CERTIFIED REGISTERED

## 2024-02-05 PROCEDURE — 45380 COLONOSCOPY AND BIOPSY: CPT | Performed by: INTERNAL MEDICINE

## 2024-02-05 PROCEDURE — 0DJ08ZZ INSPECTION OF UPPER INTESTINAL TRACT, VIA NATURAL OR ARTIFICIAL OPENING ENDOSCOPIC: ICD-10-PCS | Performed by: INTERNAL MEDICINE

## 2024-02-05 PROCEDURE — D9220A PRA ANESTHESIA: Mod: CRNA,,, | Performed by: NURSE ANESTHETIST, CERTIFIED REGISTERED

## 2024-02-05 PROCEDURE — 25000003 PHARM REV CODE 250: Performed by: NURSE ANESTHETIST, CERTIFIED REGISTERED

## 2024-02-05 PROCEDURE — 25000003 PHARM REV CODE 250: Performed by: INTERNAL MEDICINE

## 2024-02-05 PROCEDURE — 88305 TISSUE EXAM BY PATHOLOGIST: CPT | Mod: TC | Performed by: PATHOLOGY

## 2024-02-05 PROCEDURE — 37000008 HC ANESTHESIA 1ST 15 MINUTES: Performed by: INTERNAL MEDICINE

## 2024-02-05 PROCEDURE — 27201012 HC FORCEPS, HOT/COLD, DISP: Performed by: INTERNAL MEDICINE

## 2024-02-05 PROCEDURE — 45380 COLONOSCOPY AND BIOPSY: CPT | Mod: 22,,, | Performed by: INTERNAL MEDICINE

## 2024-02-05 PROCEDURE — 43235 EGD DIAGNOSTIC BRUSH WASH: CPT | Performed by: INTERNAL MEDICINE

## 2024-02-05 PROCEDURE — 85025 COMPLETE CBC W/AUTO DIFF WBC: CPT

## 2024-02-05 PROCEDURE — 36415 COLL VENOUS BLD VENIPUNCTURE: CPT

## 2024-02-05 PROCEDURE — 43235 EGD DIAGNOSTIC BRUSH WASH: CPT | Mod: 51,,, | Performed by: INTERNAL MEDICINE

## 2024-02-05 PROCEDURE — D9220A PRA ANESTHESIA: Mod: ANES,,, | Performed by: ANESTHESIOLOGY

## 2024-02-05 RX ORDER — SODIUM CHLORIDE 9 MG/ML
INJECTION, SOLUTION INTRAVENOUS CONTINUOUS
Status: DISCONTINUED | OUTPATIENT
Start: 2024-02-05 | End: 2024-02-06

## 2024-02-05 RX ORDER — PHENYLEPHRINE HYDROCHLORIDE 10 MG/ML
INJECTION INTRAVENOUS
Status: DISCONTINUED | OUTPATIENT
Start: 2024-02-05 | End: 2024-02-05

## 2024-02-05 RX ORDER — PROPOFOL 10 MG/ML
VIAL (ML) INTRAVENOUS
Status: DISCONTINUED | OUTPATIENT
Start: 2024-02-05 | End: 2024-02-05

## 2024-02-05 RX ORDER — LIDOCAINE HYDROCHLORIDE 20 MG/ML
INJECTION INTRAVENOUS
Status: DISCONTINUED | OUTPATIENT
Start: 2024-02-05 | End: 2024-02-05

## 2024-02-05 RX ADMIN — ACETAMINOPHEN 325MG 650 MG: 325 TABLET ORAL at 11:02

## 2024-02-05 RX ADMIN — PROPOFOL 80 MG: 10 INJECTION, EMULSION INTRAVENOUS at 01:02

## 2024-02-05 RX ADMIN — HYPROMELLOSE 2910 1 DROP: 5 SOLUTION/ DROPS OPHTHALMIC at 08:02

## 2024-02-05 RX ADMIN — PROPOFOL 20 MG: 10 INJECTION, EMULSION INTRAVENOUS at 01:02

## 2024-02-05 RX ADMIN — PHENYLEPHRINE HYDROCHLORIDE 200 MCG: 10 INJECTION INTRAVENOUS at 01:02

## 2024-02-05 RX ADMIN — PROPOFOL 10 MG: 10 INJECTION, EMULSION INTRAVENOUS at 01:02

## 2024-02-05 RX ADMIN — DORZOLAMIDE HYDROCHLORIDE AND TIMOLOL MALEATE 1 DROP: 22.3; 6.8 SOLUTION/ DROPS OPHTHALMIC at 09:02

## 2024-02-05 RX ADMIN — SODIUM CHLORIDE: 9 INJECTION, SOLUTION INTRAVENOUS at 12:02

## 2024-02-05 RX ADMIN — ATORVASTATIN CALCIUM 10 MG: 10 TABLET, FILM COATED ORAL at 09:02

## 2024-02-05 RX ADMIN — LIDOCAINE HYDROCHLORIDE 100 MG: 20 INJECTION, SOLUTION INTRAVENOUS at 01:02

## 2024-02-05 RX ADMIN — NIFEDIPINE 30 MG: 30 TABLET, FILM COATED, EXTENDED RELEASE ORAL at 09:02

## 2024-02-05 RX ADMIN — PROPOFOL 30 MG: 10 INJECTION, EMULSION INTRAVENOUS at 01:02

## 2024-02-05 RX ADMIN — PHENYLEPHRINE HYDROCHLORIDE 100 MCG: 10 INJECTION INTRAVENOUS at 01:02

## 2024-02-05 RX ADMIN — HYPROMELLOSE 2910 1 DROP: 5 SOLUTION/ DROPS OPHTHALMIC at 09:02

## 2024-02-05 RX ADMIN — GLYCOPYRROLATE 0.1 MG: 0.2 INJECTION, SOLUTION INTRAMUSCULAR; INTRAVITREAL at 01:02

## 2024-02-05 RX ADMIN — DORZOLAMIDE HYDROCHLORIDE AND TIMOLOL MALEATE 1 DROP: 22.3; 6.8 SOLUTION/ DROPS OPHTHALMIC at 08:02

## 2024-02-05 NOTE — DISCHARGE INSTRUCTIONS
Continue to hold Plavix until evaluated by General surgery outpatient.  Ambulatory referral to Hematology-oncology and general surgery has been placed they should be contacting you with a follow up appointment.

## 2024-02-05 NOTE — HOSPITAL COURSE
Brie Han was closely monitored while in the hospital for symptomatic anemia.  GI was consulted.  Eliquis placed on hold for EGD to be performed on 02/05/2024.  CBC was trended.  Patient received bowel prep.  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  Biopsies taken. General surgery consulted and will follow up once hgb stabilizes. Hgb 5.5 and patient does not wish for blood transition due to [employer] preference. and hematology-oncologic ambulatory referral per GI recommendations.  Family requested that referral be sent to  Dr. Oneill at [facility]. Patient was able to ambulate in room without assist while in the hospital.  Family requested patient be sent home with home health, due to age and other risk factors.  Case management consulted for for discharge planning.  Patient was cleared by GI to be discharged from the hospital. Patient deemed appropriate for discharge.  Patient and grandson educated on discharge planning, both verbalized understanding.  Patient is to follow with primary care provider within 1 week.  Patient is to follow up outpatient with General surgery and Hematology-Oncology.     Discharge was appealed. The following morning patient had an elevated temperature of 103°.  Patient met sepsis criteria.  Patient initiated IV Zosyn and pharmacy to dose vancomycin.  Blood cultures negative.  CBC, CMP, and procalcitonin ordered.  Chest x-ray was unremarkable.    Physical therapy ordered for evaluation and treatment.  Antibiotics deescalated. She was noted to have iron saturation 4% and initiated on IV iron. Hematology consulted and recommends IV iron x 10 doses. She underwent CT chest/abd/pelvis demonstrated  6 mm soft tissue density nodule in the left lower lobe which could represent a single metastasis. She is recommended to follow up with hematology/oncology outpatient. She is medically stable for DC with outpatient follow up. Iron  infusions arranged at [facility].    Discussed Goals of Care. Patient and son wish for hospice information visit. She will dc home with hospice.

## 2024-02-05 NOTE — PLAN OF CARE
Pt has a pcp appt with Dr. Gould 2/8/24 at 10:00 am - AVS updated.    02/05/24 0455   Discharge Assessment   Assessment Type Discharge Planning Reassessment

## 2024-02-05 NOTE — PROGRESS NOTES
EGD/colonoscopy done.  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  Biopsies taken.  Recommend surgical and oncologic consultations.  Will need to discuss risk versus benefit of treatment in this patient.  Consider imaging for staging but will also need to discuss with patient/family given documented iodine allergy.

## 2024-02-05 NOTE — OR NURSING
Valerie unable to take pt because Macrina reports grandson stepped out and we cannot take pt until he returns.  He should be back in 15 min.  Macrina spoke to pt son again and he is OK for us to bring pt to endoscopy unit and he will meet her in the unit.

## 2024-02-05 NOTE — ASSESSMENT & PLAN NOTE
GI consult  Patient is a Quaker and does not wish to have any blood transfusions  NPO  EGD 02/05/2024     Patient's anemia is currently controlled. Has not received any PRBCs to date. Etiology likely d/t acute blood loss which was from PUD  Current CBC reviewed-   Lab Results   Component Value Date    HGB 7.7 (L) 02/05/2024    HCT 26.5 (L) 02/05/2024     Monitor serial CBC and transfuse if patient becomes hemodynamically unstable, symptomatic or H/H drops below 7/21.

## 2024-02-05 NOTE — PROVATION PATIENT INSTRUCTIONS
Discharge Summary/Instructions after an Endoscopic Procedure  Patient Name: Brie Han  Patient MRN: [id number]  Patient YOB: 1927 Monday, February 5, 2024  Trev Varela MD  Dear patient,  As a result of recent federal legislation (The Federal Cures Act), you may   receive lab or pathology results from your procedure in your [facility]   account before your physician is able to contact you. Your physician or   their representative will relay the results to you with their   recommendations at their soonest availability.  Thank you,  RESTRICTIONS:  During your procedure today, you received medications for sedation.  These   medications may affect your judgment, balance and coordination.  Therefore,   for 24 hours, you have the following restrictions:   - DO NOT drive a car, operate machinery, make legal/financial decisions,   sign important papers or drink alcohol.    ACTIVITY:  Today: no heavy lifting, straining or running due to procedural   sedation/anesthesia.  The following day: return to full activity including work.  DIET:  Eat and drink normally unless instructed otherwise.     TREATMENT FOR COMMON SIDE EFFECTS:  - Mild abdominal pain, nausea, belching, bloating or excessive gas:  rest,   eat lightly and use a heating pad.  - Sore Throat: treat with throat lozenges and/or gargle with warm salt   water.  - Because air was used during the procedure, expelling large amounts of air   from your rectum or belching is normal.  - If a bowel prep was taken, you may not have a bowel movement for 1-3 days.    This is normal.  SYMPTOMS TO WATCH FOR AND REPORT TO YOUR PHYSICIAN:  1. Abdominal pain or bloating, other than gas cramps.  2. Chest pain.  3. Back pain.  4. Signs of infection such as: chills or fever occurring within 24 hours   after the procedure.  5. Rectal bleeding, which would show as bright red, maroon, or black stools.   (A tablespoon of blood from the rectum is not serious, especially  if   hemorrhoids are present.)  6. Vomiting.  7. Weakness or dizziness.  GO DIRECTLY TO THE NEAREST EMERGENCY ROOM IF YOU HAVE ANY OF THE FOLLOWING:      Difficulty breathing              Chills and/or fever over 101 F   Persistent vomiting and/or vomiting blood   Severe abdominal pain   Severe chest pain   Black, tarry stools   Bleeding- more than one tablespoon   Any other symptom or condition that you feel may need urgent attention  Your doctor recommends these additional instructions:  If any biopsies were taken, your doctors clinic will contact you in 1 to 2   weeks with any results.  - Return patient to hospital byrd for ongoing care.   - Resume previous diet.   - Continue present medications.   - No aspirin, ibuprofen, naproxen, or other non-steroidal anti-inflammatory   drugs.   - Perform a colonoscopy today.   - Return to GI office after studies are complete.  For questions, problems or results please call your physician - Trev Varela MD at Work:  [phone].  [facility], EMERGENCY ROOM PHONE NUMBER: [phone]  IF A COMPLICATION OR EMERGENCY SITUATION ARISES AND YOU ARE UNABLE TO REACH   YOUR PHYSICIAN - GO DIRECTLY TO THE EMERGENCY ROOM.  Trev Varela MD  2/5/2024 1:22:44 PM  This report has been verified and signed electronically.  Dear patient,  As a result of recent federal legislation (The Federal Cures Act), you may   receive lab or pathology results from your procedure in your [facility]   account before your physician is able to contact you. Your physician or   their representative will relay the results to you with their   recommendations at their soonest availability.  Thank you,  PROVATION

## 2024-02-05 NOTE — PROVATION PATIENT INSTRUCTIONS
Discharge Summary/Instructions after an Endoscopic Procedure  Patient Name: Brie Han  Patient MRN: [id number]  Patient YOB: 1927 Monday, February 5, 2024  Trev Varela MD  Dear patient,  As a result of recent federal legislation (The Federal Cures Act), you may   receive lab or pathology results from your procedure in your [facility]   account before your physician is able to contact you. Your physician or   their representative will relay the results to you with their   recommendations at their soonest availability.  Thank you,  RESTRICTIONS:  During your procedure today, you received medications for sedation.  These   medications may affect your judgment, balance and coordination.  Therefore,   for 24 hours, you have the following restrictions:   - DO NOT drive a car, operate machinery, make legal/financial decisions,   sign important papers or drink alcohol.    ACTIVITY:  Today: no heavy lifting, straining or running due to procedural   sedation/anesthesia.  The following day: return to full activity including work.  DIET:  Eat and drink normally unless instructed otherwise.     TREATMENT FOR COMMON SIDE EFFECTS:  - Mild abdominal pain, nausea, belching, bloating or excessive gas:  rest,   eat lightly and use a heating pad.  - Sore Throat: treat with throat lozenges and/or gargle with warm salt   water.  - Because air was used during the procedure, expelling large amounts of air   from your rectum or belching is normal.  - If a bowel prep was taken, you may not have a bowel movement for 1-3 days.    This is normal.  SYMPTOMS TO WATCH FOR AND REPORT TO YOUR PHYSICIAN:  1. Abdominal pain or bloating, other than gas cramps.  2. Chest pain.  3. Back pain.  4. Signs of infection such as: chills or fever occurring within 24 hours   after the procedure.  5. Rectal bleeding, which would show as bright red, maroon, or black stools.   (A tablespoon of blood from the rectum is not serious, especially  if   hemorrhoids are present.)  6. Vomiting.  7. Weakness or dizziness.  GO DIRECTLY TO THE NEAREST EMERGENCY ROOM IF YOU HAVE ANY OF THE FOLLOWING:      Difficulty breathing              Chills and/or fever over 101 F   Persistent vomiting and/or vomiting blood   Severe abdominal pain   Severe chest pain   Black, tarry stools   Bleeding- more than one tablespoon   Any other symptom or condition that you feel may need urgent attention  Your doctor recommends these additional instructions:  If any biopsies were taken, your doctors clinic will contact you in 1 to 2   weeks with any results.  - Return patient to hospital byrd for ongoing care.   - Resume previous diet.   - Continue present medications.   - Await pathology results.   - No repeat colonoscopy due to age.   - Refer to an oncologist at appointment to be scheduled.   - Refer to a colo-rectal surgeon at appointment to be scheduled.  For questions, problems or results please call your physician - Trev Varela MD at Work:  [phone].  [facility], EMERGENCY ROOM PHONE NUMBER: [phone]  IF A COMPLICATION OR EMERGENCY SITUATION ARISES AND YOU ARE UNABLE TO REACH   YOUR PHYSICIAN - GO DIRECTLY TO THE EMERGENCY ROOM.  Trev Varela MD  2/5/2024 1:56:21 PM  This report has been verified and signed electronically.  Dear patient,  As a result of recent federal legislation (The Federal Cures Act), you may   receive lab or pathology results from your procedure in your [facility]   account before your physician is able to contact you. Your physician or   their representative will relay the results to you with their   recommendations at their soonest availability.  Thank you,  PROVATION

## 2024-02-05 NOTE — PLAN OF CARE
Spoke with pt's grandson, Dalton regarding the home health preference; he asking to send the referral to [facility] as their first choice.   Referral sent via [employer].    02/05/24 1791   Post-Acute Status   Post-Acute Authorization Home Health   Home Health Status Referrals Sent

## 2024-02-05 NOTE — OR NURSING
CBC results returned and Valerie Nunes RN is going to transport pt with grandson to endoscopy for EGD/Colonoscopy.  Macrina no answer at 1270.  Spoke to Dianne and she is aware of Valeire transporting pt and Valerie to speak to nurse on floor for further report.

## 2024-02-05 NOTE — TRANSFER OF CARE
"Anesthesia Transfer of Care Note    Patient: Brie Han    Procedure(s) Performed: Procedure(s) (LRB):  EGD (ESOPHAGOGASTRODUODENOSCOPY) (N/A)  COLONOSCOPY (N/A)    Patient location: PACU    Anesthesia Type: general    Transport from OR: Transported from OR on room air with adequate spontaneous ventilation    Post pain: adequate analgesia    Post assessment: no apparent anesthetic complications and tolerated procedure well    Post vital signs: stable    Level of consciousness: awake, alert and oriented    Nausea/Vomiting: no nausea/vomiting    Complications: none    Transfer of care protocol was followed      Last vitals: Visit Vitals  BP (!) 154/67 (BP Location: Left arm, Patient Position: Lying)   Pulse 70   Temp 36.5 °C (97.7 °F) (Skin)   Resp 18   Ht 4' 11" (1.499 m)   Wt 52.6 kg (116 lb)   LMP 12/07/1972   SpO2 100%   Breastfeeding No   BMI 23.43 kg/m²     "

## 2024-02-05 NOTE — ANESTHESIA POSTPROCEDURE EVALUATION
Anesthesia Post Evaluation    Patient: Brie Han    Procedure(s) Performed: Procedure(s) (LRB):  EGD (ESOPHAGOGASTRODUODENOSCOPY) (N/A)  COLONOSCOPY (N/A)    Final Anesthesia Type: general      Patient location during evaluation: PACU  Patient participation: Yes- Able to Participate  Level of consciousness: awake and alert  Post-procedure vital signs: reviewed and stable  Pain management: adequate  Airway patency: patent    PONV status at discharge: No PONV  Anesthetic complications: no      Cardiovascular status: hemodynamically stable  Respiratory status: unassisted and room air  Hydration status: euvolemic  Follow-up not needed.              Vitals Value Taken Time   /72 02/05/24 1425   Temp 36.1 °C (97 °F) 02/05/24 1405   Pulse 73 02/05/24 1425   Resp 17 02/05/24 1425   SpO2 98 % 02/05/24 1425         No case tracking events are documented in the log.      Pain/Josee Score: Josee Score: 10 (2/5/2024  2:25 PM)

## 2024-02-05 NOTE — ANESTHESIA PREPROCEDURE EVALUATION
02/05/2024  Brie Han is a 89 y.o., female.      Pre-op Assessment    I have reviewed the Patient Summary Reports.     I have reviewed the Nursing Notes. I have reviewed the NPO Status.   I have reviewed the Medications.     Review of Systems  Anesthesia Hx:  No problems with previous Anesthesia                Social:  Non-Smoker       Cardiovascular:     Hypertension   CAD                  Coronary Artery Disease:                            Hypertension         Renal/:  Chronic Renal Disease        Kidney Function/Disease             Hepatic/GI:  Bowel Prep.                Musculoskeletal:  Arthritis        Arthritis          Neurological:  TIA, CVA            Arthritis              CVA - Cerebrovasular Accident     TIA - Transient Ischemic Attack                   Physical Exam  General: Well nourished, Cooperative, Alert and Oriented    Airway:  Mallampati: II   Mouth Opening: Normal  TM Distance: Normal  Tongue: Normal    Dental:  Intact    Chest/Lungs:  Normal Respiratory Rate    Heart:  Rate: Normal        Anesthesia Plan  Type of Anesthesia, risks & benefits discussed:    Anesthesia Type: Gen Natural Airway  Intra-op Monitoring Plan: Standard ASA Monitors  Induction:  IV  Informed Consent: Informed consent signed with the Patient and all parties understand the risks and agree with anesthesia plan.  All questions answered.   ASA Score: 3  Anesthesia Plan Notes: [employer]     Ready For Surgery From Anesthesia Perspective.     .

## 2024-02-05 NOTE — OR NURSING
Spoke to Macrina nurse on floor ext [phone]  1100 to let her know we were able to take the patient earlier than 2 pm and to please get lab drawn as discussed earlier per son's request to have CBC prior to procedure.  Macrina reports pt family member wanted results of CBC before proceeding with EGD/Colonoscopy.  Macrina aware to call when this is complete.

## 2024-02-05 NOTE — PROGRESS NOTES
[facility] Medicine  Progress Note    Patient Name: Brie Han  MRN: [id number]  Patient Class: IP- Inpatient   Admission Date: 2/2/2024  Length of Stay: 2 days  Attending Physician: Rowena Caicedo MD  Primary Care Provider: Colten Gould MD        Subjective:     Principal Problem:Symptomatic anemia        HPI:  Brie Han is a 89 y.o. female with past medical history significant for gerd, hypertension, hyperlipidemia, glaucoma, stroke,and arthritis, who presents to the emergency department with symptomatic anemia. Patient was seen outpatient by Dr. Gould for anemia outpatient. Patient had routine labs that showed her hemoglobin had decreased on yesterday.  Patient was sent to the emergency room for evaluation of abnormal labs.  Patient reported that for several days she was dyspneic on exertion and fatigue.  Patient denies any shortness a breath at rest.  Patient ambulating in the hallway without difficulty.   Patient states that she lives at home alone.  Patient denies smoking, drinking, in the use of illicit drugs.  Patient denies hematuria, black tarry stools, or keyla blood in stools. Patient endorsed she has hemorrhoids occasionally bleed and she has a use a pad for protection.  Patient endorses that bleeding has significantly decreased since stopping the use of her blood thinner.  Patient is on chronic anticoagulation therapy and has been off of her Plavix for 2 days per request of her PCP.  Patient is a [employer] therefore does not want a blood transfusion.  GI consulted.  ED workup:  CBC shows anemia with hemoglobin of 6.5 hematocrit of 22.  CMP hyponatremia 135 with decreased anion gap-6. Review of outpatient labs revealed ferritin-7, Iron-23, TIBC-617, Transferrin-417, and saturated 4.  Patient will be admitted to Hospital Medicine for observation and evaluation.        Overview/Hospital Course:  Brie Han was closely monitored while in the  hospital for symptomatic anemia.  GI was consulted.  Eliquis placed on hold for EGD to be performed on 02/05/2024.  CBC was trended.  Clear liquid diet initiated on 02/04/2024.  Patient received bowel prep.  Patient went for EGD 02/05/2024.     Interval History:   Patient seen and examined.  Patient in no acute distress at time of exam.  CBC revealed improved in hemoglobin.  Patient is scheduled for EGD today. Friends and family at bedside.  Grandson inpatient educated on patient's treatment plan.     Review of Systems   Constitutional:  Negative for activity change, appetite change, chills and fever.   HENT:  Negative for congestion and sore throat.    Respiratory:  Negative for cough, chest tightness and shortness of breath.    Cardiovascular:  Negative for chest pain.   Gastrointestinal:  Negative for constipation, diarrhea, nausea and vomiting.   Endocrine: Negative.    Genitourinary:  Negative for decreased urine volume, difficulty urinating, dysuria, hematuria and urgency.   Musculoskeletal:  Negative for gait problem and myalgias.   Skin: Negative.    Allergic/Immunologic: Negative.    Neurological: Negative.  Negative for weakness.   Psychiatric/Behavioral: Negative.       Objective:     Vital Signs (Most Recent):  Temp: 97.7 °F (36.5 °C) (02/05/24 1218)  Pulse: 70 (02/05/24 1218)  Resp: 18 (02/05/24 1218)  BP: (!) 154/67 (02/05/24 1218)  SpO2: 100 % (02/05/24 1218) Vital Signs (24h Range):  Temp:  [97.5 °F (36.4 °C)-98.1 °F (36.7 °C)] 97.7 °F (36.5 °C)  Pulse:  [62-76] 70  Resp:  [16-18] 18  SpO2:  [98 %-100 %] 100 %  BP: (124-154)/(55-67) 154/67     Weight: 52.6 kg (116 lb)  Body mass index is 23.43 kg/m².    Intake/Output Summary (Last 24 hours) at 2/5/2024 1344  Last data filed at 2/5/2024 0614  Gross per 24 hour   Intake 3650 ml   Output --   Net 3650 ml         Physical Exam  Vitals and nursing note reviewed.   Constitutional:       Appearance: Normal appearance. She is well-developed and normal  weight.   HENT:      Head: Normocephalic and atraumatic.   Neck:      Thyroid: No thyromegaly.      Vascular: No JVD.   Cardiovascular:      Rate and Rhythm: Normal rate and regular rhythm.   Pulmonary:      Effort: Pulmonary effort is normal.      Breath sounds: Normal breath sounds.   Abdominal:      General: Bowel sounds are normal. There is no distension.      Palpations: Abdomen is soft. There is no mass.      Tenderness: There is no abdominal tenderness.   Musculoskeletal:         General: Normal range of motion.      Cervical back: Neck supple.   Skin:     General: Skin is warm and dry.   Neurological:      Mental Status: She is alert and oriented to person, place, and time. Mental status is at baseline.   Psychiatric:         Behavior: Behavior normal.             Significant Labs: All pertinent labs within the past 24 hours have been reviewed.  CBC:   Recent Labs   Lab 02/04/24 0425 02/05/24  1129   WBC 4.30 3.55*   HGB 6.5* 7.7*   HCT 22.1* 26.5*    350       CMP:   Recent Labs   Lab 02/04/24  0425      K 3.8      CO2 20*   GLU 91   BUN 17   CREATININE 0.7   CALCIUM 8.6*   PROT 6.5   ALBUMIN 3.3*   BILITOT 0.3   ALKPHOS 59   AST 22   ALT 14   ANIONGAP 8         Significant Imaging: I have reviewed all pertinent imaging results/findings within the past 24 hours.    Assessment/Plan:      * Symptomatic anemia  GI consult  Patient is a [employer] and does not wish to have any blood transfusions  NPO  EGD 02/05/2024     Patient's anemia is currently controlled. Has not received any PRBCs to date. Etiology likely d/t acute blood loss which was from PUD  Current CBC reviewed-   Lab Results   Component Value Date    HGB 7.7 (L) 02/05/2024    HCT 26.5 (L) 02/05/2024     Monitor serial CBC and transfuse if patient becomes hemodynamically unstable, symptomatic or H/H drops below 7/21.    CAD (coronary artery disease)  Patient with known CAD s/p  n/a , which is controlled Will continue  Statin and monitor for S/Sx of angina/ACS. Continue to monitor on telemetry.     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  Chronic.  Continue home eyedrops        Hypertension  Chronic, controlled. Latest blood pressure and vitals reviewed-     Temp:  [97.5 °F (36.4 °C)-98.1 °F (36.7 °C)]   Pulse:  [62-76]   Resp:  [16-18]   BP: (124-154)/(55-67)   SpO2:  [98 %-100 %] .   Home meds for hypertension were reviewed and noted below.   Hypertension Medications               losartan (COZAAR) 100 MG tablet Take 1 tablet (100 mg total) by mouth once daily.    NIFEdipine (PROCARDIA-XL) 30 MG (OSM) 24 hr tablet Take 1 tablet (30 mg total) by mouth once daily.            While in the hospital, will manage blood pressure as follows; Continue home antihypertensive regimen    Will utilize p.r.n. blood pressure medication only if patient's blood pressure greater than 180/110 and she develops symptoms such as worsening chest pain or shortness of breath.      VTE Risk Mitigation (From admission, onward)           Ordered     IP VTE HIGH RISK PATIENT  Once         02/02/24 1758     Place sequential compression device  Until discontinued         02/02/24 1758     Reason for No Pharmacological VTE Prophylaxis  Once        Question:  Reasons:  Answer:  Risk of Bleeding    02/02/24 1758                    Discharge Planning   VINITA: 2/5/2024     Code Status: Full Code   Is the patient medically ready for discharge?:     Reason for patient still in hospital (select all that apply): Patient trending condition and Consult recommendations  Discharge Plan A: Home                  Justin Proctor NP  Department of Hospital Medicine   [facility]

## 2024-02-05 NOTE — TELEPHONE ENCOUNTER
----- Message from Kristan Chu sent at 2/3/2024  2:30 PM CST -----  Regarding: Pt advise  Contact: pt's Caterina / Rico  Pt's son / Rico calling to inform you that Pt missed Appointment due to being Admitted into Hospital    Please advise    Phone [phone]    Thank You

## 2024-02-05 NOTE — TELEPHONE ENCOUNTER
----- Message from Rl De Jesus sent at 2/5/2024  8:51 AM CST -----  Contact: Self  Type: Needs Medical Advice  Who Called:  Son/Prabhjot Chris Call Back Number: [phone]   Additional Information:  Called to speak with office regarding pt/ 2/5 EGD. Please call.

## 2024-02-05 NOTE — DISCHARGE SUMMARY
[facility] Medicine  Discharge Summary      Patient Name: Brie Han  MRN: [id number]  RAFAEL: [id number]  Patient Class: IP- Inpatient  Admission Date: 2/2/2024  Hospital Length of Stay: 2 days  Discharge Date and Time: No discharge date for patient encounter.  Attending Physician: Rowena Caicedo MD   Discharging Provider: Justin Proctor NP  Primary Care Provider: Colten Gould MD    Primary Care Team: Networked reference to record PCT     HPI:   Brie Han is a 89 y.o. female with past medical history significant for gerd, hypertension, hyperlipidemia, glaucoma, stroke,and arthritis, who presents to the emergency department with symptomatic anemia. Patient was seen outpatient by Dr. Gould for anemia outpatient. Patient had routine labs that showed her hemoglobin had decreased on yesterday.  Patient was sent to the emergency room for evaluation of abnormal labs.  Patient reported that for several days she was dyspneic on exertion and fatigue.  Patient denies any shortness a breath at rest.  Patient ambulating in the hallway without difficulty.   Patient states that she lives at home alone.  Patient denies smoking, drinking, in the use of illicit drugs.  Patient denies hematuria, black tarry stools, or keyla blood in stools. Patient endorsed she has hemorrhoids occasionally bleed and she has a use a pad for protection.  Patient endorses that bleeding has significantly decreased since stopping the use of her blood thinner.  Patient is on chronic anticoagulation therapy and has been off of her Plavix for 2 days per request of her PCP.  Patient is a [employer] therefore does not want a blood transfusion.  GI consulted.  ED workup:  CBC shows anemia with hemoglobin of 6.5 hematocrit of 22.  CMP hyponatremia 135 with decreased anion gap-6. Review of outpatient labs revealed ferritin-7, Iron-23, TIBC-617, Transferrin-417, and saturated 4.  Patient will be admitted to  Hospital Medicine for observation and evaluation.        Procedure(s) (LRB):  EGD (ESOPHAGOGASTRODUODENOSCOPY) (N/A)  COLONOSCOPY (N/A)      Hospital Course:   Brie Han was closely monitored while in the hospital for symptomatic anemia.  GI was consulted.  Eliquis placed on hold for EGD to be performed on 02/05/2024.  CBC was trended.  Clear liquid diet initiated on 02/04/2024.  Patient received bowel prep.  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  Biopsies taken. Outpatient general surgery and hematology-oncologic ambulatory referral.  Family requested that referral be sent to  Dr. Oneill at [facility]. Patient was able to ambulate in room without assist while in the hospital.  Family requested patient be sent home with home health, due to age and other risk factors.  Case management consulted for for discharge planning.  Patient was cleared by GI to be discharged from the hospital.    Patient deemed appropriate for discharge.  Patient and grandson educated on discharge planning, both verbalized understanding.  Patient is to follow with primary care provider within 1 week.  Patient is to follow up outpatient with General surgery and Hematology-Oncology.      Goals of Care Treatment Preferences:  Code Status: Full Code    Living Will: Yes              Consults:   Consults (From admission, onward)          Status Ordering Provider     Inpatient consult to Social Work/Case Management  Once        Provider:  (Not yet assigned)    Ordered AJKE LAROSE     Inpatient consult to Gastroenterology  Once        Provider:  Trev Lafleur MD    Completed ARMIN CHIU            No new Assessment & Plan notes have been filed under this hospital service since the last note was generated.  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    Specialty: Family Medicine  Why: 10:00 Hospital follow up  Contact information:  [address]  [facility] [id number]  [phone]                           Patient Instructions:      Ambulatory referral/consult to Home Health   Standing Status: Future   Referral Priority: Routine Referral Type: Home Health   Referral Reason: Specialty Services Required   Requested Specialty: Home Health Services   Number of Visits Requested: 1     Ambulatory referral/consult to Hematology / Oncology   Standing Status: Future   Referral Priority: Routine Referral Type: Consultation   Referral Reason: Specialty Services Required   Referred to Provider: SARAI DAWSON Requested Specialty: Hematology and Oncology   Number of Visits Requested: 1     Ambulatory referral/consult to General Surgery   Standing Status: Future   Referral Priority: Routine Referral Type: Consultation   Referral Reason: Specialty Services Required   Requested Specialty: General Surgery   Number of Visits Requested: 1     Diet Adult Regular     Activity as tolerated       Significant Diagnostic Studies: Labs: CMP   Recent Labs   Lab 02/04/24  0425      K 3.8      CO2 20*   GLU 91   BUN 17   CREATININE 0.7   CALCIUM 8.6*   PROT 6.5   ALBUMIN 3.3*   BILITOT 0.3   ALKPHOS 59   AST 22   ALT 14   ANIONGAP 8    and CBC   Recent Labs   Lab 02/04/24  0425 02/05/24  1129   WBC 4.30 3.55*   HGB 6.5* 7.7*   HCT 22.1* 26.5*    350       Pending Diagnostic Studies:       Procedure Component Value Units Date/Time    Specimen to Pathology - Surgery [[id number]] Collected: 02/05/24 1355    Order Status: Sent Lab  Status: No result     Specimen: Tissue            Medications:  Reconciled Home Medications:      Medication List        CHANGE how you take these medications      latanoprost 0.005 % ophthalmic solution  Place 1 drop into both eyes once daily.  What changed: when to take this     NIFEdipine 30 MG (OSM) 24 hr tablet  Commonly known as: PROCARDIA-XL  Take 1 tablet (30 mg total) by mouth once daily.  What changed: when to take this            CONTINUE taking these medications      acetaminophen 325 MG tablet  Commonly known as: TYLENOL  Take 2 tablets (650 mg total) by mouth every 8 (eight) hours as needed.     b complex vitamins tablet  Take 1 tablet by mouth once daily.     calcium citrate-vitamin D3 315-200 mg 315 mg-5 mcg (200 unit) per tablet  Commonly known as: CITRACAL+D  Take 1 tablet by mouth once daily.     dorzolamide-timolol 2-0.5% 22.3-6.8 mg/mL ophthalmic solution  Commonly known as: COSOPT  Place 1 drop into both eyes every 12 (twelve) hours.     hydrocortisone 2.5 % cream  Apply topically every evening.     housdrkmy-H0-qkA01-algal oil 3 mg-35 mg-2 mg -90.314 mg Cap  Commonly known as: METANX (ALGAL OIL)  Take 1 tablet by mouth once daily.     losartan 100 MG tablet  Commonly known as: COZAAR  Take 1 tablet (100 mg total) by mouth once daily.     lovastatin 10 MG tablet  Commonly known as: MEVACOR  Take 1 tablet (10 mg total) by mouth every evening.     multivitamin capsule  Take 1 capsule by mouth once daily.            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  Biopsies taken  Indwelling Lines/Drains at time of discharge:   Lines/Drains/Airways       None                   Time spent on the discharge of patient: 35 minutes         Justin Proctor NP  Department of Hospital Medicine  [facility]/Surg

## 2024-02-05 NOTE — ASSESSMENT & PLAN NOTE
Chronic, controlled. Latest blood pressure and vitals reviewed-     Temp:  [97.5 °F (36.4 °C)-98.1 °F (36.7 °C)]   Pulse:  [62-76]   Resp:  [16-18]   BP: (124-154)/(55-67)   SpO2:  [98 %-100 %] .   Home meds for hypertension were reviewed and noted below.   Hypertension Medications               losartan (COZAAR) 100 MG tablet Take 1 tablet (100 mg total) by mouth once daily.    NIFEdipine (PROCARDIA-XL) 30 MG (OSM) 24 hr tablet Take 1 tablet (30 mg total) by mouth once daily.            While in the hospital, will manage blood pressure as follows; Continue home antihypertensive regimen    Will utilize p.r.n. blood pressure medication only if patient's blood pressure greater than 180/110 and she develops symptoms such as worsening chest pain or shortness of breath.

## 2024-02-05 NOTE — SUBJECTIVE & OBJECTIVE
Interval History:   Patient seen and examined.  Patient in no acute distress at time of exam.  CBC revealed improved in hemoglobin.  Patient is scheduled for EGD today. Friends and family at bedside.  Grandson inpatient educated on patient's treatment plan.     Review of Systems   Constitutional:  Negative for activity change, appetite change, chills and fever.   HENT:  Negative for congestion and sore throat.    Respiratory:  Negative for cough, chest tightness and shortness of breath.    Cardiovascular:  Negative for chest pain.   Gastrointestinal:  Negative for constipation, diarrhea, nausea and vomiting.   Endocrine: Negative.    Genitourinary:  Negative for decreased urine volume, difficulty urinating, dysuria, hematuria and urgency.   Musculoskeletal:  Negative for gait problem and myalgias.   Skin: Negative.    Allergic/Immunologic: Negative.    Neurological: Negative.  Negative for weakness.   Psychiatric/Behavioral: Negative.       Objective:     Vital Signs (Most Recent):  Temp: 97.7 °F (36.5 °C) (02/05/24 1218)  Pulse: 70 (02/05/24 1218)  Resp: 18 (02/05/24 1218)  BP: (!) 154/67 (02/05/24 1218)  SpO2: 100 % (02/05/24 1218) Vital Signs (24h Range):  Temp:  [97.5 °F (36.4 °C)-98.1 °F (36.7 °C)] 97.7 °F (36.5 °C)  Pulse:  [62-76] 70  Resp:  [16-18] 18  SpO2:  [98 %-100 %] 100 %  BP: (124-154)/(55-67) 154/67     Weight: 52.6 kg (116 lb)  Body mass index is 23.43 kg/m².    Intake/Output Summary (Last 24 hours) at 2/5/2024 1344  Last data filed at 2/5/2024 0614  Gross per 24 hour   Intake 3650 ml   Output --   Net 3650 ml         Physical Exam  Vitals and nursing note reviewed.   Constitutional:       Appearance: Normal appearance. She is well-developed and normal weight.   HENT:      Head: Normocephalic and atraumatic.   Neck:      Thyroid: No thyromegaly.      Vascular: No JVD.   Cardiovascular:      Rate and Rhythm: Normal rate and regular rhythm.   Pulmonary:      Effort: Pulmonary effort is normal.       Breath sounds: Normal breath sounds.   Abdominal:      General: Bowel sounds are normal. There is no distension.      Palpations: Abdomen is soft. There is no mass.      Tenderness: There is no abdominal tenderness.   Musculoskeletal:         General: Normal range of motion.      Cervical back: Neck supple.   Skin:     General: Skin is warm and dry.   Neurological:      Mental Status: She is alert and oriented to person, place, and time. Mental status is at baseline.   Psychiatric:         Behavior: Behavior normal.             Significant Labs: All pertinent labs within the past 24 hours have been reviewed.  CBC:   Recent Labs   Lab 02/04/24  0425 02/05/24  1129   WBC 4.30 3.55*   HGB 6.5* 7.7*   HCT 22.1* 26.5*    350       CMP:   Recent Labs   Lab 02/04/24  0425      K 3.8      CO2 20*   GLU 91   BUN 17   CREATININE 0.7   CALCIUM 8.6*   PROT 6.5   ALBUMIN 3.3*   BILITOT 0.3   ALKPHOS 59   AST 22   ALT 14   ANIONGAP 8         Significant Imaging: I have reviewed all pertinent imaging results/findings within the past 24 hours.

## 2024-02-05 NOTE — TELEPHONE ENCOUNTER
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  Also needs referral to oncology.  Patient family asking for referral to Dr. Oneill at [facility].

## 2024-02-05 NOTE — TELEPHONE ENCOUNTER
Returned call to patient's grandson to advise that the clinic does not control procedure time when the patient is admitted.

## 2024-02-05 NOTE — PLAN OF CARE
POC discussed with patient, verbalized understanding. Patient with uneventful night, slept off and on between care. VS stable. Tolerated all of [place] with clear results. Brief on. Bed alarm in use. Up multiple times to bathroom with standby assist, states slightly weak, good results. NSR. Denies pain. Call light at bedside, bed alarm in use.

## 2024-02-06 PROBLEM — A41.9 SEPSIS: Status: ACTIVE | Noted: 2024-02-06

## 2024-02-06 LAB
ALBUMIN SERPL BCP-MCNC: 3.2 G/DL (ref 3.5–5.2)
ALP SERPL-CCNC: 60 U/L (ref 55–135)
ALT SERPL W/O P-5'-P-CCNC: 27 U/L (ref 10–44)
ANION GAP SERPL CALC-SCNC: 9 MMOL/L (ref 8–16)
AST SERPL-CCNC: 50 U/L (ref 10–40)
BASOPHILS # BLD AUTO: 0.05 K/UL (ref 0–0.2)
BASOPHILS NFR BLD: 0.5 % (ref 0–1.9)
BILIRUB SERPL-MCNC: 0.6 MG/DL (ref 0.1–1)
BILIRUB UR QL STRIP: NEGATIVE
BUN SERPL-MCNC: 16 MG/DL (ref 10–30)
CALCIUM SERPL-MCNC: 8.7 MG/DL (ref 8.7–10.5)
CHLORIDE SERPL-SCNC: 103 MMOL/L (ref 95–110)
CLARITY UR: CLEAR
CO2 SERPL-SCNC: 19 MMOL/L (ref 23–29)
COLOR UR: YELLOW
CREAT SERPL-MCNC: 1.3 MG/DL (ref 0.5–1.4)
DIFFERENTIAL METHOD BLD: ABNORMAL
EOSINOPHIL # BLD AUTO: 0.2 K/UL (ref 0–0.5)
EOSINOPHIL NFR BLD: 1.4 % (ref 0–8)
ERYTHROCYTE [DISTWIDTH] IN BLOOD BY AUTOMATED COUNT: 15.9 % (ref 11.5–14.5)
EST. GFR  (NO RACE VARIABLE): 38 ML/MIN/1.73 M^2
GLUCOSE SERPL-MCNC: 137 MG/DL (ref 70–110)
GLUCOSE UR QL STRIP: NEGATIVE
HCT VFR BLD AUTO: 21.6 % (ref 37–48.5)
HGB BLD-MCNC: 6.6 G/DL (ref 12–16)
HGB UR QL STRIP: NEGATIVE
IMM GRANULOCYTES # BLD AUTO: 0.11 K/UL (ref 0–0.04)
IMM GRANULOCYTES NFR BLD AUTO: 1.1 % (ref 0–0.5)
KETONES UR QL STRIP: ABNORMAL
LACTATE SERPL-SCNC: 1.1 MMOL/L (ref 0.5–2.2)
LEUKOCYTE ESTERASE UR QL STRIP: ABNORMAL
LYMPHOCYTES # BLD AUTO: 0.2 K/UL (ref 1–4.8)
LYMPHOCYTES NFR BLD: 2.1 % (ref 18–48)
MCH RBC QN AUTO: 23.9 PG (ref 27–31)
MCHC RBC AUTO-ENTMCNC: 30.6 G/DL (ref 32–36)
MCV RBC AUTO: 78 FL (ref 82–98)
MICROSCOPIC COMMENT: ABNORMAL
MONOCYTES # BLD AUTO: 0.5 K/UL (ref 0.3–1)
MONOCYTES NFR BLD: 5.2 % (ref 4–15)
NEUTROPHILS # BLD AUTO: 9.3 K/UL (ref 1.8–7.7)
NEUTROPHILS NFR BLD: 89.7 % (ref 38–73)
NITRITE UR QL STRIP: NEGATIVE
NRBC BLD-RTO: 0 /100 WBC
PH UR STRIP: 6 [PH] (ref 5–8)
PLATELET # BLD AUTO: 299 K/UL (ref 150–450)
PMV BLD AUTO: 10.1 FL (ref 9.2–12.9)
POTASSIUM SERPL-SCNC: 3.5 MMOL/L (ref 3.5–5.1)
PROCALCITONIN SERPL IA-MCNC: 5.85 NG/ML (ref 0–0.5)
PROT SERPL-MCNC: 6.3 G/DL (ref 6–8.4)
PROT UR QL STRIP: ABNORMAL
RBC # BLD AUTO: 2.76 M/UL (ref 4–5.4)
RBC #/AREA URNS HPF: 4 /HPF (ref 0–4)
SODIUM SERPL-SCNC: 131 MMOL/L (ref 136–145)
SP GR UR STRIP: 1.01 (ref 1–1.03)
SQUAMOUS #/AREA URNS HPF: 0 /HPF
URN SPEC COLLECT METH UR: ABNORMAL
UROBILINOGEN UR STRIP-ACNC: NEGATIVE EU/DL
WBC # BLD AUTO: 10.37 K/UL (ref 3.9–12.7)
WBC #/AREA URNS HPF: 11 /HPF (ref 0–5)

## 2024-02-06 PROCEDURE — 63600175 PHARM REV CODE 636 W HCPCS: Performed by: HOSPITALIST

## 2024-02-06 PROCEDURE — C1751 CATH, INF, PER/CENT/MIDLINE: HCPCS

## 2024-02-06 PROCEDURE — 25000003 PHARM REV CODE 250

## 2024-02-06 PROCEDURE — 63600175 PHARM REV CODE 636 W HCPCS

## 2024-02-06 PROCEDURE — 25000003 PHARM REV CODE 250: Performed by: HOSPITALIST

## 2024-02-06 PROCEDURE — 87086 URINE CULTURE/COLONY COUNT: CPT

## 2024-02-06 PROCEDURE — 87040 BLOOD CULTURE FOR BACTERIA: CPT

## 2024-02-06 PROCEDURE — 80053 COMPREHEN METABOLIC PANEL: CPT

## 2024-02-06 PROCEDURE — 81000 URINALYSIS NONAUTO W/SCOPE: CPT

## 2024-02-06 PROCEDURE — 76937 US GUIDE VASCULAR ACCESS: CPT

## 2024-02-06 PROCEDURE — 83605 ASSAY OF LACTIC ACID: CPT

## 2024-02-06 PROCEDURE — 11000001 HC ACUTE MED/SURG PRIVATE ROOM

## 2024-02-06 PROCEDURE — 36410 VNPNXR 3YR/> PHY/QHP DX/THER: CPT

## 2024-02-06 PROCEDURE — 85025 COMPLETE CBC W/AUTO DIFF WBC: CPT

## 2024-02-06 PROCEDURE — 36415 COLL VENOUS BLD VENIPUNCTURE: CPT

## 2024-02-06 PROCEDURE — 84145 PROCALCITONIN (PCT): CPT

## 2024-02-06 PROCEDURE — 87154 CUL TYP ID BLD PTHGN 6+ TRGT: CPT

## 2024-02-06 PROCEDURE — 97161 PT EVAL LOW COMPLEX 20 MIN: CPT

## 2024-02-06 RX ORDER — SODIUM CHLORIDE 9 MG/ML
INJECTION, SOLUTION INTRAVENOUS CONTINUOUS
Status: DISCONTINUED | OUTPATIENT
Start: 2024-02-06 | End: 2024-02-07

## 2024-02-06 RX ADMIN — HYPROMELLOSE 2910 1 DROP: 5 SOLUTION/ DROPS OPHTHALMIC at 06:02

## 2024-02-06 RX ADMIN — HYPROMELLOSE 2910 1 DROP: 5 SOLUTION/ DROPS OPHTHALMIC at 08:02

## 2024-02-06 RX ADMIN — ACETAMINOPHEN 325MG 650 MG: 325 TABLET ORAL at 08:02

## 2024-02-06 RX ADMIN — DEXTROSE MONOHYDRATE 1000 MG: 50 INJECTION, SOLUTION INTRAVENOUS at 07:02

## 2024-02-06 RX ADMIN — ATORVASTATIN CALCIUM 10 MG: 10 TABLET, FILM COATED ORAL at 09:02

## 2024-02-06 RX ADMIN — CEFEPIME 1 G: 1 INJECTION, POWDER, FOR SOLUTION INTRAMUSCULAR; INTRAVENOUS at 03:02

## 2024-02-06 RX ADMIN — DORZOLAMIDE HYDROCHLORIDE AND TIMOLOL MALEATE 1 DROP: 22.3; 6.8 SOLUTION/ DROPS OPHTHALMIC at 09:02

## 2024-02-06 RX ADMIN — HYPROMELLOSE 2910 1 DROP: 5 SOLUTION/ DROPS OPHTHALMIC at 09:02

## 2024-02-06 RX ADMIN — PANTOPRAZOLE SODIUM 40 MG: 40 TABLET, DELAYED RELEASE ORAL at 08:02

## 2024-02-06 RX ADMIN — NIFEDIPINE 30 MG: 30 TABLET, FILM COATED, EXTENDED RELEASE ORAL at 09:02

## 2024-02-06 RX ADMIN — DORZOLAMIDE HYDROCHLORIDE AND TIMOLOL MALEATE 1 DROP: 22.3; 6.8 SOLUTION/ DROPS OPHTHALMIC at 08:02

## 2024-02-06 RX ADMIN — SODIUM CHLORIDE: 9 INJECTION, SOLUTION INTRAVENOUS at 01:02

## 2024-02-06 NOTE — PT/OT/SLP PROGRESS
Physical Therapy      Patient Name:  Brie Han   MRN:  [id number]    Patient not seen today secondary to Nurse/ PATIENCE hold (nurse hold therapy on AM due to patient temperature being 103 degrees.). Will follow-up 2/6/24 in afternoon.

## 2024-02-06 NOTE — PLAN OF CARE
Problem: Adult Inpatient Plan of Care  Goal: Plan of Care Review  Outcome: Ongoing, Progressing     Problem: Adult Inpatient Plan of Care  Goal: Optimal Comfort and Wellbeing  Outcome: Ongoing, Progressing     Pt rested in bed after EGD/colonoscopy. No complaints of pain voiced, NAD noted. Tele in place. Up to BR with assistance. Safety maintained throughout shift. Needs attended to.

## 2024-02-06 NOTE — SUBJECTIVE & OBJECTIVE
Interval History:   Patient seen and examined.  Patient in no acute distress at time of exam.  Patient went for EGD 2/05/2024.  Patient endorses weakness, fever, and chills that began after returning from procedure.  Grandson at bedside.  Patient and grandson educated on current treatment plan.  CBC, CMP, and blood cultures pending.  Procalcitonin elevated.  Lactate unremarkable.  Patient initiated on IV fluids, cefepime, and vanc.  Will continue to trend patient's clinical progression.    Review of Systems   Constitutional:  Negative for activity change, appetite change, chills and fever.   HENT:  Negative for congestion and sore throat.    Respiratory:  Negative for cough, chest tightness and shortness of breath.    Cardiovascular:  Negative for chest pain.   Gastrointestinal:  Negative for constipation, diarrhea, nausea and vomiting.   Endocrine: Negative.    Genitourinary:  Negative for decreased urine volume, difficulty urinating, dysuria, hematuria and urgency.   Musculoskeletal:  Negative for gait problem and myalgias.   Skin: Negative.    Allergic/Immunologic: Negative.    Neurological: Negative.  Negative for weakness.   Psychiatric/Behavioral: Negative.       Objective:     Vital Signs (Most Recent):  Temp: 98.6 °F (37 °C) (02/06/24 1135)  Pulse: 78 (02/06/24 1135)  Resp: 18 (02/06/24 1135)  BP: (!) 98/47 (OPAL Proctor NP) (02/06/24 1135)  SpO2: 99 % (02/06/24 1135) Vital Signs (24h Range):  Temp:  [96.2 °F (35.7 °C)-103.2 °F (39.6 °C)] 98.6 °F (37 °C)  Pulse:  [75-95] 78  Resp:  [16-20] 18  SpO2:  [92 %-99 %] 99 %  BP: ()/(47-64) 98/47     Weight: 52.6 kg (116 lb)  Body mass index is 23.42 kg/m².    Intake/Output Summary (Last 24 hours) at 2/6/2024 1896  Last data filed at 2/6/2024 0620  Gross per 24 hour   Intake 240 ml   Output 800 ml   Net -560 ml           Physical Exam  Vitals and nursing note reviewed.   Constitutional:       Appearance: Normal appearance. She is well-developed and normal  weight.   HENT:      Head: Normocephalic and atraumatic.   Neck:      Thyroid: No thyromegaly.      Vascular: No JVD.   Cardiovascular:      Rate and Rhythm: Normal rate and regular rhythm.   Pulmonary:      Effort: Pulmonary effort is normal.      Breath sounds: Normal breath sounds.   Abdominal:      General: Bowel sounds are normal. There is no distension.      Palpations: Abdomen is soft. There is no mass.      Tenderness: There is no abdominal tenderness.   Musculoskeletal:         General: Normal range of motion.      Cervical back: Neck supple.   Skin:     General: Skin is warm and dry.   Neurological:      Mental Status: She is alert and oriented to person, place, and time. Mental status is at baseline.   Psychiatric:         Behavior: Behavior normal.             Significant Labs: All pertinent labs within the past 24 hours have been reviewed.  CBC:   Recent Labs   Lab 02/05/24  1129 02/06/24  1248   WBC 3.55* 10.37   HGB 7.7* 6.6*   HCT 26.5* 21.6*    299       CMP:   Recent Labs   Lab 02/06/24  1248   *   K 3.5      CO2 19*   *   BUN 16   CREATININE 1.3   CALCIUM 8.7   PROT 6.3   ALBUMIN 3.2*   BILITOT 0.6   ALKPHOS 60   AST 50*   ALT 27   ANIONGAP 9         Significant Imaging: I have reviewed all pertinent imaging results/findings within the past 24 hours.  CXR: No acute findings

## 2024-02-06 NOTE — CONSULTS
18 Gx 10cm PowerGlide Midline placed to pts RIGHT basilic vein with the use of ultrasound guidance.    Ultrasound guidance: yes  Vessel Caliber: large and patent, compressibility normal  Needle advanced into vessel with real time Ultrasound guidance.  Guidewire confirmed in vessel.  Image recorded and saved.  Sterile sheath used.  Sterile dressing applied  Arm circumference- 27 cm  Dressing dated   Education provided to patient re: proper maintenance of line- pt verbalized understanding  Limb alert applied.

## 2024-02-06 NOTE — PT/OT/SLP EVAL
Physical Therapy Evaluation    Patient Name:  Brie Han   MRN:  [id number]    Recommendations:     Discharge Recommendations: Low Intensity Therapy   Discharge Equipment Recommendations: walker, rolling   Barriers to discharge:  patient will probably not need a RW at discharge but need it today for gait so may need it at DC    Assessment:     Brie Han is a 89 y.o. female admitted with a medical diagnosis of Symptomatic anemia.  She presents with the following impairments/functional limitations: weakness, impaired endurance, impaired functional mobility, gait instability, impaired balance, decreased lower extremity function .  Patient agreeable to PT evaluation this afternoon but was fatigued from having a busy day.  Patient presented supine in bed and was able to transfer to sitting EOB with sBA and then stood with CGA.  Patient then ambulated x 150 feet RW CGA.    Rehab Prognosis: Good; patient would benefit from acute skilled PT services to address these deficits and reach maximum level of function.    Recent Surgery: Procedure(s) (LRB):  EGD (ESOPHAGOGASTRODUODENOSCOPY) (N/A)  COLONOSCOPY (N/A) 1 Day Post-Op    Plan:     During this hospitalization, patient to be seen 6 x/week to address the identified rehab impairments via gait training, therapeutic activities, therapeutic exercises and progress toward the following goals:    Plan of Care Expires:  03/06/24    Subjective     Chief Complaint: fatigue  Patient/Family Comments/goals: get stronger  Pain/Comfort:       Patients cultural, spiritual, Evangelical conflicts given the current situation:      Living Environment:  Currently lives alone in a 1 story trailer with 3 step entry with left hand rail.  Prior to admission, patients level of function was independent.  Equipment used at home: none.  DME owned (not currently used): none.  Upon discharge, patient will have assistance from family.    Objective:     Communicated with nurse prior to session.   Patient found supine with bed alarm  upon PT entry to room.    General Precautions: Standard, fall  Orthopedic Precautions:N/A   Braces:    Respiratory Status: Room air    Exams:  RLE ROM: WFL  RLE Strength: Deficits: 4/5 overall  LLE ROM: WFL  LLE Strength: Deficits: 4/5 overall    Functional Mobility:  Bed Mobility:     Supine to Sit: stand by assistance  Sit to Supine: stand by assistance  Transfers:     Sit to Stand:  contact guard assistance with no AD  Gait: x 150 feet RW CGA      AM-PAC 6 CLICK MOBILITY  Total Score:20       Treatment & Education:  None given    Patient left supine with call button in reach, bed alarm on, and nurse notified.    GOALS:   Multidisciplinary Problems       Physical Therapy Goals          Problem: Physical Therapy    Goal Priority Disciplines Outcome Goal Variances Interventions   Physical Therapy Goal     PT, PT/OT Ongoing, Progressing     Description: Goals to be met by: 24     Patient will increase functional independence with mobility by performin. Supine to sit with Clear  2. Sit to supine with Clear  3. Sit to stand transfer with Stand-by Assistance  4. Bed to chair transfer with Stand-by Assistance using Rolling Walker  5. Gait  x 150 feet with Stand-by Assistance using Rolling Walker.                          History:     Past Medical History:   Diagnosis Date    Amaurosis fugax     Bilateral left eye worse    Anticoagulant long-term use     Arthritis     Glaucoma     resolved    Hyperlipidemia     Hypertension     Stroke 2006       Past Surgical History:   Procedure Laterality Date    APPENDECTOMY  's    BREAST SURGERY      CATARACT EXTRACTION W/  INTRAOCULAR LENS IMPLANT Bilateral     Dr Rojo      SECTION      3 c/s and d/c    COLONOSCOPY N/A 2024    Procedure: COLONOSCOPY;  Surgeon: Trev Lafleur MD;  Location: [facility];  Service: Endoscopy;  Laterality: N/A;    ESOPHAGOGASTRODUODENOSCOPY N/A 2024    Procedure:  EGD (ESOPHAGOGASTRODUODENOSCOPY);  Surgeon: Trev Lafleur MD;  Location: [facility];  Service: Endoscopy;  Laterality: N/A;    EYE SURGERY      Glaucoma     HYSTERECTOMY      RIGHT OOPHORECTOMY      With postop hemorrhage    TONSILLECTOMY      Yag Capsulotomy Left 11/11/2021       Time Tracking:     PT Received On: 02/06/24  PT Start Time: 1334     PT Stop Time: 1346  PT Total Time (min): 12 min     Billable Minutes: Evaluation 12      02/06/2024

## 2024-02-06 NOTE — PROGRESS NOTES
[facility]  Progress Note    Patient Name: Brie Han  MRN: [id number]  Patient Class: IP- Inpatient   Admission Date: 2/2/2024  Length of Stay: 3 days  Attending Physician: Rowena Caicedo MD  Primary Care Provider: Colten Gould MD        Subjective:     Principal Problem:Symptomatic anemia        HPI:  Brie Han is a 89 y.o. female with past medical history significant for gerd, hypertension, hyperlipidemia, glaucoma, stroke,and arthritis, who presents to the emergency department with symptomatic anemia. Patient was seen outpatient by Dr. Gould for anemia outpatient. Patient had routine labs that showed her hemoglobin had decreased on yesterday.  Patient was sent to the emergency room for evaluation of abnormal labs.  Patient reported that for several days she was dyspneic on exertion and fatigue.  Patient denies any shortness a breath at rest.  Patient ambulating in the hallway without difficulty.   Patient states that she lives at home alone.  Patient denies smoking, drinking, in the use of illicit drugs.  Patient denies hematuria, black tarry stools, or keyla blood in stools. Patient endorsed she has hemorrhoids occasionally bleed and she has a use a pad for protection.  Patient endorses that bleeding has significantly decreased since stopping the use of her blood thinner.  Patient is on chronic anticoagulation therapy and has been off of her Plavix for 2 days per request of her PCP.  Patient is a Alevism therefore does not want a blood transfusion.  GI consulted.  ED workup:  CBC shows anemia with hemoglobin of 6.5 hematocrit of 22.  CMP hyponatremia 135 with decreased anion gap-6. Review of outpatient labs revealed ferritin-7, Iron-23, TIBC-617, Transferrin-417, and saturated 4.  Patient will be admitted to Hospital Medicine for observation and evaluation.        Overview/Hospital Course:  Brie Han was closely monitored while in the  hospital for symptomatic anemia.  GI was consulted.  Eliquis placed on hold for EGD to be performed on 02/05/2024.  CBC was trended.  Patient received bowel prep.  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  Biopsies taken. Outpatient general surgery and hematology-oncologic ambulatory referral per GI recommendations.  Family requested that referral be sent to  Dr. Oneill at [facility]. Patient was able to ambulate in room without assist while in the hospital.  Family requested patient be sent home with home health, due to age and other risk factors.  Case management consulted for for discharge planning.  Patient was cleared by GI to be discharged from the hospital. Patient deemed appropriate for discharge.  Patient and grandson educated on discharge planning, both verbalized understanding.  Patient is to follow with primary care provider within 1 week.  Patient is to follow up outpatient with General surgery and Hematology-Oncology.     Discharge was appealed. The following morning patient had an elevated temperature of 103°.  Patient met sepsis criteria.  Patient initiated IV Zosyn and pharmacy to dose vancomycin.  Blood cultures pending.  CBC, CMP, and procalcitonin ordered.  Chest x-ray was unremarkable.  Sepsis order set placed we will continue to monitor patient.  Physical therapy ordered for evaluation and treatment.    Interval History:   Patient seen and examined.  Patient in no acute distress at time of exam.  Patient went for EGD 2/05/2024.  Patient endorses weakness, fever, and chills that began after returning from procedure.  Grandson at bedside.  Patient and grandson educated on current treatment plan.  CBC, CMP, and blood cultures pending.  Procalcitonin elevated.  Lactate unremarkable.  Patient initiated on IV fluids, cefepime, and vanc.  Will continue to trend patient's clinical progression.    Review of Systems   Constitutional:  Negative for  activity change, appetite change, chills and fever.   HENT:  Negative for congestion and sore throat.    Respiratory:  Negative for cough, chest tightness and shortness of breath.    Cardiovascular:  Negative for chest pain.   Gastrointestinal:  Negative for constipation, diarrhea, nausea and vomiting.   Endocrine: Negative.    Genitourinary:  Negative for decreased urine volume, difficulty urinating, dysuria, hematuria and urgency.   Musculoskeletal:  Negative for gait problem and myalgias.   Skin: Negative.    Allergic/Immunologic: Negative.    Neurological: Negative.  Negative for weakness.   Psychiatric/Behavioral: Negative.       Objective:     Vital Signs (Most Recent):  Temp: 98.6 °F (37 °C) (02/06/24 1135)  Pulse: 78 (02/06/24 1135)  Resp: 18 (02/06/24 1135)  BP: (!) 98/47 (OPAL Proctor, LUIS FELIPE) (02/06/24 1135)  SpO2: 99 % (02/06/24 1135) Vital Signs (24h Range):  Temp:  [96.2 °F (35.7 °C)-103.2 °F (39.6 °C)] 98.6 °F (37 °C)  Pulse:  [75-95] 78  Resp:  [16-20] 18  SpO2:  [92 %-99 %] 99 %  BP: ()/(47-64) 98/47     Weight: 52.6 kg (116 lb)  Body mass index is 23.42 kg/m².    Intake/Output Summary (Last 24 hours) at 2/6/2024 1429  Last data filed at 2/6/2024 0620  Gross per 24 hour   Intake 240 ml   Output 800 ml   Net -560 ml           Physical Exam  Vitals and nursing note reviewed.   Constitutional:       Appearance: Normal appearance. She is well-developed and normal weight.   HENT:      Head: Normocephalic and atraumatic.   Neck:      Thyroid: No thyromegaly.      Vascular: No JVD.   Cardiovascular:      Rate and Rhythm: Normal rate and regular rhythm.   Pulmonary:      Effort: Pulmonary effort is normal.      Breath sounds: Normal breath sounds.   Abdominal:      General: Bowel sounds are normal. There is no distension.      Palpations: Abdomen is soft. There is no mass.      Tenderness: There is no abdominal tenderness.   Musculoskeletal:         General: Normal range of motion.      Cervical back: Neck  supple.   Skin:     General: Skin is warm and dry.   Neurological:      Mental Status: She is alert and oriented to person, place, and time. Mental status is at baseline.   Psychiatric:         Behavior: Behavior normal.             Significant Labs: All pertinent labs within the past 24 hours have been reviewed.  CBC:   Recent Labs   Lab 02/05/24  1129 02/06/24  1248   WBC 3.55* 10.37   HGB 7.7* 6.6*   HCT 26.5* 21.6*    299       CMP:   Recent Labs   Lab 02/06/24  1248   *   K 3.5      CO2 19*   *   BUN 16   CREATININE 1.3   CALCIUM 8.7   PROT 6.3   ALBUMIN 3.2*   BILITOT 0.6   ALKPHOS 60   AST 50*   ALT 27   ANIONGAP 9         Significant Imaging: I have reviewed all pertinent imaging results/findings within the past 24 hours.  CXR: No acute findings      Assessment/Plan:      * Symptomatic anemia  GI consult  Patient is a Buddhist and does not wish to have any blood transfusions  NPO  EGD 02/05/2024     Patient's anemia is currently controlled. Has not received any PRBCs to date. Etiology likely d/t acute blood loss which was from PUD  Current CBC reviewed-   Lab Results   Component Value Date    HGB 7.7 (L) 02/05/2024    HCT 26.5 (L) 02/05/2024     Monitor serial CBC and transfuse if patient becomes hemodynamically unstable, symptomatic or H/H drops below 7/21.    CAD (coronary artery disease)  Patient with known CAD s/p  n/a , which is controlled Will continue Statin and monitor for S/Sx of angina/ACS. Continue to monitor on telemetry.     Sepsis  This patient does have evidence of infective focus  My overall impression is sepsis.  Source: Unknown  Antibiotics given-   Antibiotics (72h ago, onward)      Start     Stop Route Frequency Ordered    02/06/24 1430  vancomycin (VANCOCIN) 1,000 mg in dextrose 5 % (D5W) 250 mL IVPB (Vial-Mate)         -- IV Once 02/06/24 1305    02/06/24 1405  vancomycin - pharmacy to dose  (vancomycin IVPB (PEDS and ADULTS))        See Hyperspace for  full Linked Orders Report.    -- IV pharmacy to manage frequency 02/06/24 1306    02/06/24 1345  ceFEPIme (MAXIPIME) 1 g in dextrose 5 % in water (D5W) 100 mL IVPB (MB+)         -- IV Every 12 hours (non-standard times) 02/06/24 1244          Latest lactate reviewed-  Recent Labs   Lab 02/06/24  1248   LACTATE 1.1       Fluid challenge Actual Body weight- Patient will receive 30ml/kg actual body weight to calculate fluid bolus for treatment of septic shock.     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  Chronic.  Continue home eyedrops        Refusal of blood transfusions as patient is Religion  Noted      Hypertension  Chronic, controlled. Latest blood pressure and vitals reviewed-     Temp:  [97.5 °F (36.4 °C)-98.1 °F (36.7 °C)]   Pulse:  [62-76]   Resp:  [16-18]   BP: (124-154)/(55-67)   SpO2:  [98 %-100 %] .   Home meds for hypertension were reviewed and noted below.   Hypertension Medications               losartan (COZAAR) 100 MG tablet Take 1 tablet (100 mg total) by mouth once daily.    NIFEdipine (PROCARDIA-XL) 30 MG (OSM) 24 hr tablet Take 1 tablet (30 mg total) by mouth once daily.            While in the hospital, will manage blood pressure as follows; Continue home antihypertensive regimen    Will utilize p.r.n. blood pressure medication only if patient's blood pressure greater than 180/110 and she develops symptoms such as worsening chest pain or shortness of breath.      VTE Risk Mitigation (From admission, onward)           Ordered     IP VTE HIGH RISK PATIENT  Once         02/02/24 1758     Place sequential compression device  Until discontinued         02/02/24 1758     Reason for No Pharmacological VTE Prophylaxis  Once        Question:  Reasons:  Answer:  Risk of Bleeding    02/02/24  1758                    Discharge Planning   VINITA: 2/7/2024     Code Status: Full Code   Is the patient medically ready for discharge?:     Reason for patient still in hospital (select all that apply): Patient trending condition, Treatment, and PT / OT recommendations  Discharge Plan A: Home Health                  Justin Proctor NP  Department of Hospital Medicine   [facility]/Surg

## 2024-02-06 NOTE — PLAN OF CARE
[facility] received notification this morning that pt filed and appeal.  Appeal ID: [id number].    [employer] upload confirmation #[id number]    [facility] Following.

## 2024-02-06 NOTE — PROGRESS NOTES
Pharmacokinetic Initial Assessment: IV Vancomycin    Assessment/Plan:    Initiate intravenous vancomycin with loading dose of 1000 mg once with subsequent doses when random concentrations are less than 20 mcg/mL  Desired empiric serum trough concentration is 15 to 20 mcg/mL  Draw vancomycin random level on 2/7/24 at 0730.  Pharmacy will continue to follow and monitor vancomycin.      Please contact pharmacy at extension [phone] with any questions regarding this assessment.     Thank you for the consult,   Laureen Quezada       Patient brief summary:  Brie Han is a 89 y.o. female initiated on antimicrobial therapy with IV Vancomycin for treatment of suspected sepsis    Drug Allergies:   Review of patient's allergies indicates:   Allergen Reactions    Contrast media Other (See Comments)    Aspirin Other (See Comments)    Ciprofloxacin Other (See Comments)    Iron Other (See Comments)    Iodine Rash    Penicillins Rash       Actual Body Weight:   52.6 kg    Renal Function:   Estimated Creatinine Clearance: 36.2 mL/min (based on SCr of 0.7 mg/dL).,     Dialysis Method (if applicable):  N/A    CBC (last 72 hours):  Recent Labs   Lab Result Units 02/04/24  0425 02/05/24  1129 02/06/24  1248   WBC K/uL 4.30 3.55* 10.37   Hemoglobin g/dL 6.5* 7.7* 6.6*   Hematocrit % 22.1* 26.5* 21.6*   Platelets K/uL 354 350 299   Gran % % 37.0* 55.5 89.7*   Lymph % % 34.4 27.0 2.1*   Mono % % 23.0* 12.7 5.2   Eosinophil % % 3.5 2.5 1.4   Basophil % % 1.6 2.0* 0.5   Differential Method  Automated Automated Automated       Metabolic Panel (last 72 hours):  Recent Labs   Lab Result Units 02/04/24  0425 02/06/24  1210   Sodium mmol/L 138  --    Potassium mmol/L 3.8  --    Chloride mmol/L 110  --    CO2 mmol/L 20*  --    Glucose mg/dL 91  --    Glucose, UA   --  Negative   BUN mg/dL 17  --    Creatinine mg/dL 0.7  --    Albumin g/dL 3.3*  --    Total Bilirubin mg/dL 0.3  --    Alkaline Phosphatase U/L 59  --    AST U/L 22  --    ALT U/L 14  " --    Magnesium mg/dL 2.0  --    Phosphorus mg/dL 2.8  --        Drug levels (last 3 results):  No results for input(s): "VANCOMYCINRA", "VANCORANDOM", "VANCOMYCINPE", "VANCOPEAK", "VANCOMYCINTR", "VANCOTROUGH" in the last 72 hours.    Microbiologic Results:  Microbiology Results (last 7 days)       Procedure Component Value Units Date/Time    Urine culture [[id number]] Collected: 02/06/24 1210    Order Status: No result Specimen: Urine Updated: 02/06/24 1258    Blood culture [[id number]] Collected: 02/06/24 1256    Order Status: Sent Specimen: Blood Updated: 02/06/24 1256    Blood culture [[id number]] Collected: 02/06/24 1248    Order Status: Sent Specimen: Blood from Antecubital, Right Arm Updated: 02/06/24 1248            "

## 2024-02-06 NOTE — PLAN OF CARE
Spoke with Janette at [facility] (ph#[phone]); they have accepted the pt and need to be notified when the pt is discharging.        02/06/24 0852   Post-Acute Status   Post-Acute Authorization Home Health   Home Health Status Pending medical clearance/testing

## 2024-02-06 NOTE — PLAN OF CARE
Problem: Physical Therapy  Goal: Physical Therapy Goal  Description: Goals to be met by: 24     Patient will increase functional independence with mobility by performin. Supine to sit with [place]  2. Sit to supine with [place]  3. Sit to stand transfer with Stand-by Assistance  4. Bed to chair transfer with Stand-by Assistance using Rolling Walker  5. Gait  x 150 feet with Stand-by Assistance using Rolling Walker.     Outcome: Ongoing, Progressing

## 2024-02-06 NOTE — PLAN OF CARE
L.E- Spoke with pt's grandson, Dalton he states the pt does not feel she is ready to discharge home today due to feeling unsteady on her feet from being up all night completing the prep and having the procedure today.   I explained that the Provider did not feel she had a medical reason to keep the pt but that she did have the right to appeal her discharge.   The Charge nurse, Kathy provided Dalton with the IMM to call for an appeal, I did inform him that he needed to do so by midnight; he verbalized understanding and agreement.   CM will send documents required for appeal once requested by Earnest.

## 2024-02-06 NOTE — PLAN OF CARE
Problem: Fall Injury Risk  Goal: Absence of Fall and Fall-Related Injury  Outcome: Ongoing, Progressing     Problem: Fatigue  Goal: Improved Activity Tolerance  Outcome: Ongoing, Progressing     Problem: Infection  Goal: Absence of Infection Signs and Symptoms  Outcome: Ongoing, Progressing

## 2024-02-06 NOTE — ASSESSMENT & PLAN NOTE
This patient does have evidence of infective focus  My overall impression is sepsis.  Source: Unknown  Antibiotics given-   Antibiotics (72h ago, onward)      Start     Stop Route Frequency Ordered    02/06/24 1430  vancomycin (VANCOCIN) 1,000 mg in dextrose 5 % (D5W) 250 mL IVPB (Vial-Mate)         -- IV Once 02/06/24 1305    02/06/24 1405  vancomycin - pharmacy to dose  (vancomycin IVPB (PEDS and ADULTS))        See Hyperspace for full Linked Orders Report.    -- IV pharmacy to manage frequency 02/06/24 1306    02/06/24 1345  ceFEPIme (MAXIPIME) 1 g in dextrose 5 % in water (D5W) 100 mL IVPB (MB+)         -- IV Every 12 hours (non-standard times) 02/06/24 1244          Latest lactate reviewed-  Recent Labs   Lab 02/06/24  1248   LACTATE 1.1       Fluid challenge Actual Body weight- Patient will receive 30ml/kg actual body weight to calculate fluid bolus for treatment of septic shock.     Post- resuscitation assessment No - Post resuscitation assessment not needed       Will Not start Pressors- Levophed for MAP of 65  Source control achieved by: IV vancomycin and cefepime

## 2024-02-07 ENCOUNTER — TELEPHONE (OUTPATIENT)
Dept: GASTROENTEROLOGY | Facility: CLINIC | Age: 89
End: 2024-02-07
Payer: MEDICARE

## 2024-02-07 PROBLEM — Z71.89 ACP (ADVANCE CARE PLANNING): Status: ACTIVE | Noted: 2024-02-07

## 2024-02-07 PROBLEM — C18.9 ADENOCARCINOMA OF COLON: Status: ACTIVE | Noted: 2024-02-07

## 2024-02-07 LAB
ACINETOBACTER CALCOACETICUS/BAUMANNII COMPLEX: NOT DETECTED
ALBUMIN SERPL BCP-MCNC: 2.7 G/DL (ref 3.5–5.2)
ALP SERPL-CCNC: 52 U/L (ref 55–135)
ALT SERPL W/O P-5'-P-CCNC: 158 U/L (ref 10–44)
ANION GAP SERPL CALC-SCNC: 7 MMOL/L (ref 8–16)
AST SERPL-CCNC: 203 U/L (ref 10–40)
BACTEROIDES FRAGILIS: NOT DETECTED
BASOPHILS # BLD AUTO: 0.06 K/UL (ref 0–0.2)
BASOPHILS NFR BLD: 0.8 % (ref 0–1.9)
BILIRUB SERPL-MCNC: 0.4 MG/DL (ref 0.1–1)
BUN SERPL-MCNC: 17 MG/DL (ref 10–30)
CALCIUM SERPL-MCNC: 7.8 MG/DL (ref 8.7–10.5)
CANDIDA ALBICANS: NOT DETECTED
CANDIDA AURIS: NOT DETECTED
CANDIDA GLABRATA: NOT DETECTED
CANDIDA KRUSEI: NOT DETECTED
CANDIDA PARAPSILOSIS: NOT DETECTED
CANDIDA TROPICALIS: NOT DETECTED
CHLORIDE SERPL-SCNC: 103 MMOL/L (ref 95–110)
CO2 SERPL-SCNC: 20 MMOL/L (ref 23–29)
CREAT SERPL-MCNC: 0.9 MG/DL (ref 0.5–1.4)
CRYPTOCOCCUS NEOFORMANS/GATTII: NOT DETECTED
CTX-M GENE (ESBL PRODUCER): ABNORMAL
DIFFERENTIAL METHOD BLD: ABNORMAL
ENTEROBACTER CLOACAE COMPLEX: NOT DETECTED
ENTEROBACTERALES: NOT DETECTED
ENTEROCOCCUS FAECALIS: NOT DETECTED
ENTEROCOCCUS FAECIUM: NOT DETECTED
EOSINOPHIL # BLD AUTO: 0.1 K/UL (ref 0–0.5)
EOSINOPHIL NFR BLD: 1.6 % (ref 0–8)
ERYTHROCYTE [DISTWIDTH] IN BLOOD BY AUTOMATED COUNT: 15.9 % (ref 11.5–14.5)
ESCHERICHIA COLI: NOT DETECTED
EST. GFR  (NO RACE VARIABLE): 59 ML/MIN/1.73 M^2
GLUCOSE SERPL-MCNC: 97 MG/DL (ref 70–110)
HAEMOPHILUS INFLUENZAE: NOT DETECTED
HCT VFR BLD AUTO: 18.3 % (ref 37–48.5)
HGB BLD-MCNC: 5.5 G/DL (ref 12–16)
IMM GRANULOCYTES # BLD AUTO: 0.04 K/UL (ref 0–0.04)
IMM GRANULOCYTES NFR BLD AUTO: 0.5 % (ref 0–0.5)
IMP GENE (CARBAPENEM RESISTANT): ABNORMAL
KLEBSIELLA AEROGENES: NOT DETECTED
KLEBSIELLA OXYTOCA: NOT DETECTED
KLEBSIELLA PNEUMONIAE GROUP: NOT DETECTED
KPC RESISTANCE GENE (CARBAPENEM): ABNORMAL
LISTERIA MONOCYTOGENES: NOT DETECTED
LYMPHOCYTES # BLD AUTO: 0.9 K/UL (ref 1–4.8)
LYMPHOCYTES NFR BLD: 12.3 % (ref 18–48)
MAGNESIUM SERPL-MCNC: 1.7 MG/DL (ref 1.6–2.6)
MCH RBC QN AUTO: 23.7 PG (ref 27–31)
MCHC RBC AUTO-ENTMCNC: 30.1 G/DL (ref 32–36)
MCR-1: ABNORMAL
MCV RBC AUTO: 79 FL (ref 82–98)
MEC A/C AND MREJ (MRSA): ABNORMAL
MEC A/C: ABNORMAL
MONOCYTES # BLD AUTO: 1.2 K/UL (ref 0.3–1)
MONOCYTES NFR BLD: 15.3 % (ref 4–15)
NDM GENE (CARBAPENEM RESISTANT): ABNORMAL
NEISSERIA MENINGITIDIS: NOT DETECTED
NEUTROPHILS # BLD AUTO: 5.3 K/UL (ref 1.8–7.7)
NEUTROPHILS NFR BLD: 69.5 % (ref 38–73)
NRBC BLD-RTO: 0 /100 WBC
OXA-48-LIKE (CARBAPENEM RESISTANT): ABNORMAL
PHOSPHATE SERPL-MCNC: 2.3 MG/DL (ref 2.7–4.5)
PLATELET # BLD AUTO: 230 K/UL (ref 150–450)
PMV BLD AUTO: 9.6 FL (ref 9.2–12.9)
POTASSIUM SERPL-SCNC: 3.4 MMOL/L (ref 3.5–5.1)
PROT SERPL-MCNC: 5.5 G/DL (ref 6–8.4)
PROTEUS SPECIES: NOT DETECTED
PSEUDOMONAS AERUGINOSA: NOT DETECTED
RBC # BLD AUTO: 2.32 M/UL (ref 4–5.4)
SALMONELLA SP: NOT DETECTED
SERRATIA MARCESCENS: NOT DETECTED
SODIUM SERPL-SCNC: 130 MMOL/L (ref 136–145)
STAPHYLOCOCCUS AUREUS: NOT DETECTED
STAPHYLOCOCCUS EPIDERMIDIS: NOT DETECTED
STAPHYLOCOCCUS LUGDUNESIS: NOT DETECTED
STAPHYLOCOCCUS SPECIES: DETECTED
STENOTROPHOMONAS MALTOPHILIA: NOT DETECTED
STREPTOCOCCUS AGALACTIAE: NOT DETECTED
STREPTOCOCCUS PNEUMONIAE: NOT DETECTED
STREPTOCOCCUS PYOGENES: NOT DETECTED
STREPTOCOCCUS SPECIES: NOT DETECTED
VAN A/B (VRE GENE): ABNORMAL
VANCOMYCIN SERPL-MCNC: 12.4 UG/ML
VIM GENE (CARBAPENEM RESISTANT): ABNORMAL
WBC # BLD AUTO: 7.64 K/UL (ref 3.9–12.7)

## 2024-02-07 PROCEDURE — 25000003 PHARM REV CODE 250

## 2024-02-07 PROCEDURE — 36415 COLL VENOUS BLD VENIPUNCTURE: CPT | Performed by: HOSPITALIST

## 2024-02-07 PROCEDURE — 63600175 PHARM REV CODE 636 W HCPCS: Performed by: HOSPITALIST

## 2024-02-07 PROCEDURE — 99223 1ST HOSP IP/OBS HIGH 75: CPT | Mod: ,,, | Performed by: INTERNAL MEDICINE

## 2024-02-07 PROCEDURE — 25000003 PHARM REV CODE 250: Performed by: HOSPITALIST

## 2024-02-07 PROCEDURE — 85025 COMPLETE CBC W/AUTO DIFF WBC: CPT

## 2024-02-07 PROCEDURE — 25000003 PHARM REV CODE 250: Performed by: NURSE PRACTITIONER

## 2024-02-07 PROCEDURE — 63600175 PHARM REV CODE 636 W HCPCS: Performed by: NURSE PRACTITIONER

## 2024-02-07 PROCEDURE — 84100 ASSAY OF PHOSPHORUS: CPT

## 2024-02-07 PROCEDURE — 97110 THERAPEUTIC EXERCISES: CPT | Mod: CQ

## 2024-02-07 PROCEDURE — 80053 COMPREHEN METABOLIC PANEL: CPT

## 2024-02-07 PROCEDURE — 11000001 HC ACUTE MED/SURG PRIVATE ROOM

## 2024-02-07 PROCEDURE — 83735 ASSAY OF MAGNESIUM: CPT

## 2024-02-07 PROCEDURE — 63600175 PHARM REV CODE 636 W HCPCS

## 2024-02-07 PROCEDURE — 97116 GAIT TRAINING THERAPY: CPT | Mod: CQ

## 2024-02-07 PROCEDURE — 80202 ASSAY OF VANCOMYCIN: CPT | Performed by: HOSPITALIST

## 2024-02-07 RX ORDER — METRONIDAZOLE 500 MG/1
500 TABLET ORAL EVERY 8 HOURS
Status: DISCONTINUED | OUTPATIENT
Start: 2024-02-07 | End: 2024-02-08 | Stop reason: HOSPADM

## 2024-02-07 RX ADMIN — ATORVASTATIN CALCIUM 10 MG: 10 TABLET, FILM COATED ORAL at 08:02

## 2024-02-07 RX ADMIN — CEFEPIME 1 G: 1 INJECTION, POWDER, FOR SOLUTION INTRAMUSCULAR; INTRAVENOUS at 03:02

## 2024-02-07 RX ADMIN — DORZOLAMIDE HYDROCHLORIDE AND TIMOLOL MALEATE 1 DROP: 22.3; 6.8 SOLUTION/ DROPS OPHTHALMIC at 09:02

## 2024-02-07 RX ADMIN — ACETAMINOPHEN 325MG 650 MG: 325 TABLET ORAL at 01:02

## 2024-02-07 RX ADMIN — HYPROMELLOSE 2910 1 DROP: 5 SOLUTION/ DROPS OPHTHALMIC at 08:02

## 2024-02-07 RX ADMIN — POTASSIUM BICARBONATE 35 MEQ: 391 TABLET, EFFERVESCENT ORAL at 10:02

## 2024-02-07 RX ADMIN — METRONIDAZOLE 500 MG: 500 TABLET ORAL at 09:02

## 2024-02-07 RX ADMIN — POTASSIUM & SODIUM PHOSPHATES POWDER PACK 280-160-250 MG 2 PACKET: 280-160-250 PACK at 08:02

## 2024-02-07 RX ADMIN — POTASSIUM BICARBONATE 35 MEQ: 391 TABLET, EFFERVESCENT ORAL at 08:02

## 2024-02-07 RX ADMIN — IRON SUCROSE 200 MG: 20 INJECTION, SOLUTION INTRAVENOUS at 12:02

## 2024-02-07 RX ADMIN — VANCOMYCIN HYDROCHLORIDE 750 MG: 750 INJECTION, POWDER, LYOPHILIZED, FOR SOLUTION INTRAVENOUS at 08:02

## 2024-02-07 RX ADMIN — POTASSIUM & SODIUM PHOSPHATES POWDER PACK 280-160-250 MG 2 PACKET: 280-160-250 PACK at 12:02

## 2024-02-07 RX ADMIN — DORZOLAMIDE HYDROCHLORIDE AND TIMOLOL MALEATE 1 DROP: 22.3; 6.8 SOLUTION/ DROPS OPHTHALMIC at 08:02

## 2024-02-07 RX ADMIN — LOSARTAN POTASSIUM 100 MG: 25 TABLET, FILM COATED ORAL at 08:02

## 2024-02-07 RX ADMIN — Medication 800 MG: at 12:02

## 2024-02-07 RX ADMIN — SODIUM CHLORIDE: 9 INJECTION, SOLUTION INTRAVENOUS at 01:02

## 2024-02-07 RX ADMIN — HYPROMELLOSE 2910 1 DROP: 5 SOLUTION/ DROPS OPHTHALMIC at 03:02

## 2024-02-07 RX ADMIN — NIFEDIPINE 30 MG: 30 TABLET, FILM COATED, EXTENDED RELEASE ORAL at 08:02

## 2024-02-07 RX ADMIN — Medication 800 MG: at 08:02

## 2024-02-07 RX ADMIN — METRONIDAZOLE 500 MG: 500 TABLET ORAL at 03:02

## 2024-02-07 NOTE — PLAN OF CARE
Problem: Physical Therapy  Goal: Physical Therapy Goal  Description: Goals to be met by: 24     Patient will increase functional independence with mobility by performin. Supine to sit with [place]  2. Sit to supine with [place]  3. Sit to stand transfer with Stand-by Assistance  4. Bed to chair transfer with Stand-by Assistance using Rolling Walker  5. Gait  x 150 feet with Stand-by Assistance using Rolling Walker.     Outcome: Ongoing, Progressing   Ambulate with rw and assistance for safety.

## 2024-02-07 NOTE — CONSULTS
[facility]/Surg  Hematology/Oncology  Consult Note    Patient Name: Brie Han  MRN: [id number]  Admission Date: 2/2/2024  Hospital Length of Stay: 4 days  Code Status: DNR   Attending Provider: Rowena Caicedo MD  Consulting Provider: Jodi Page MD  Primary Care Physician: Colten Gould MD  Principal Problem:Symptomatic anemia    Consults  Subjective:     HPI:  89-year-old female with a history significant for hypertension dyslipidemia CVA DJD presented to the emergency room with anemia.  Patient is Hinduism and does not take blood transfusions.  She was severely shortness of breath at home for several days.  She lives at home alone.  Has not noticed any bleeding per urine or rectum.  Occasionally hemorrhoidal bleeding has been noted.  She used to be on a blood thinner which was stopped in the emergency room she was found to have a hemoglobin of 6.5 hyponatremia at 135.  Patient had colonoscopy done for evaluation which showed a medium-sized cecal mass biopsies have been done  C/w adenoca of cecum  Family has expressed interest in seeing Dr. Oneill at [place]  Oncology Treatment Plan:   [No matching plan found]    Medications:  Continuous Infusions:  Scheduled Meds:   artificial tears  1 drop Both Eyes TID    atorvastatin  10 mg Oral Daily    ceFEPime (MAXIPIME) IVPB EXTENDED INFUSION  1 g Intravenous Q12H    dorzolamide-timolol 2-0.5%  1 drop Both Eyes Q12H    IRON SUCROSE IV ORDERABLE  200 mg Intravenous Daily    losartan  100 mg Oral Daily    metroNIDAZOLE  500 mg Oral Q8H    NIFEdipine  30 mg Oral QHS    pantoprazole  40 mg Oral Daily     PRN Meds:acetaminophen, dextrose 10%, dextrose 10%, glucagon (human recombinant), glucose, glucose, magnesium oxide, magnesium oxide, melatonin, naloxone, ondansetron, potassium bicarbonate, potassium bicarbonate, potassium bicarbonate, potassium, sodium phosphates, potassium, sodium phosphates, potassium, sodium phosphates, sodium  chloride 0.9%     Review of patient's allergies indicates:   Allergen Reactions    Contrast media Other (See Comments)    Aspirin Other (See Comments)    Ciprofloxacin Other (See Comments)    Iron Other (See Comments)    Iodine Rash    Penicillins Rash        Past Medical History:   Diagnosis Date    Amaurosis fugax     Bilateral left eye worse    Anticoagulant long-term use     Arthritis     Glaucoma     resolved    Hyperlipidemia     Hypertension     Stroke      Past Surgical History:   Procedure Laterality Date    APPENDECTOMY      BREAST SURGERY      CATARACT EXTRACTION W/  INTRAOCULAR LENS IMPLANT Bilateral     Dr Rojo      SECTION      3 c/s and d/c    COLONOSCOPY N/A 2024    Procedure: COLONOSCOPY;  Surgeon: Trev Lafleur MD;  Location: [facility] ENDO;  Service: Endoscopy;  Laterality: N/A;    ESOPHAGOGASTRODUODENOSCOPY N/A 2024    Procedure: EGD (ESOPHAGOGASTRODUODENOSCOPY);  Surgeon: Trev Lafleur MD;  Location: [facility] ENDO;  Service: Endoscopy;  Laterality: N/A;    EYE SURGERY      Glaucoma     HYSTERECTOMY      RIGHT OOPHORECTOMY      With postop hemorrhage    TONSILLECTOMY      Yag Capsulotomy Left 2021     Family History       Problem Relation (Age of Onset)    Diabetes Father    Early death Mother    Hypertension Father, Sister    No Known Problems Brother, Maternal Aunt, Maternal Uncle, Paternal Aunt, Paternal Uncle, Maternal Grandmother, Maternal Grandfather, Paternal Grandmother    Stroke Father    Thyroid disease Paternal Grandfather          Tobacco Use    Smoking status: Never    Smokeless tobacco: Never   Substance and Sexual Activity    Alcohol use: No    Drug use: No    Sexual activity: Never       Review of Systems     Poor appetite,t weight change.  + generalized weakness .  +fatigue over above what is normally experienced with day-to-day activities  +fever, no chills, rigors  Denies issues with ambulation  Denies generalized swelling or new lumps and  bumps felt in any part  of body  Denies visual or hearing loss  Denies issues with congestion, sinus issues, cough, sputum production runny nose or itching eyes  Denies chest pain or palpitations, or passing out  Denies abdominal pain, reflux symptoms, nausea vomiting loose stools or constipation  Denies seizure activity or focal weaknesses or symptoms related to TIA, no head aches or blurred vision reported  Denies issues with skin rash or bruising  Denies issues with swelling of feet, tingling or numbness   No issues with sleep,   No recent foreign travel   Good family support reported   Objective:     Vital Signs (Most Recent):  Temp: 98 °F (36.7 °C) (02/07/24 1152)  Pulse: 73 (02/07/24 1152)  Resp: 18 (02/07/24 1152)  BP: (!) 123/58 (02/07/24 1152)  SpO2: 100 % (02/07/24 1152) Vital Signs (24h Range):  Temp:  [97.7 °F (36.5 °C)-98.9 °F (37.2 °C)] 98 °F (36.7 °C)  Pulse:  [62-77] 73  Resp:  [18] 18  SpO2:  [96 %-100 %] 100 %  BP: (102-146)/(45-65) 123/58     Weight: 52.6 kg (116 lb)  Body mass index is 23.42 kg/m².  Body surface area is 1.48 meters squared.      Intake/Output Summary (Last 24 hours) at 2/7/2024 1237  Last data filed at 2/7/2024 0620  Gross per 24 hour   Intake 2435.53 ml   Output --   Net 2435.53 ml       Physical Exam  VITAL SIGNS:  as above   GENERAL: appears well-built, well-nourished.  No anxiety, no agitation, and in no distress.  Patient is awake, alert, oriented and cooperative.  HEENT:  Showed no congestion. Trachea is central no obvious icterus ++ pallor noted. no hoarseness. no obvious JVD   NECK:  Supple.  No JVD. No obvious cervical submental or supraclavicular adenopathy.  RS:the visualized portion of  Chest expands well. chest appears symmetric, no audible wheezes.  No dyspnea recognized  ABDOMEN:  abdomen appears undistended.  EXTREMITIES:  Without edema.  NEUROLOGICAL:  The patient is appropriate, higher functions are normal.  No  obvious neurological deficits.  normal judgement  normal thought content  No confusion, no speech impediment. Cranial nerves are intact and show no deficit. No gross motor deficits noted   SKIN MUSCULOSKELETAL: no joint or skeletal deformity, no clubbing of nails.  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 [H40.2230] 02/02/2024 Yes    CAD (coronary artery disease) [I25.10] 02/14/2013 Yes    Hypercholesteremia [E78.00] 02/14/2013 Yes    Refusal of blood transfusions as patient is Spiritism [Z53.1] 02/14/2013 Not Applicable    Hypertension [I10] 09/21/2012 Yes      Problems Resolved During this Admission:     Severe anemia; patient is a Spiritism would initiate erythropoietin growth factors as soon as possible  To cecal mass/colon cancer  Also iron studies low iron and ferritin of 7 replace IV iron as soon as possible  orders are in  for 2 doses will increase that to 10 total, and pt should be set up for OP iron prior to discharge  Retacrit can be initiated after iron infusion is completed    Colon ca:  New diagnosis considering her age and her ashvin treatment options need to be discussed.  She will need full body scan CT chest abdomen pelvis to stage patient 1st  Family has expressed wishes to continue follow-up with Dr. Marvin in [place].  At discharge please make sure this appointment scheduled  Thank you for your consult. dorinda Page MD  Hematology/Oncology  [facility]/Surg

## 2024-02-07 NOTE — SUBJECTIVE & OBJECTIVE
Interval History:   Patient seen and examined.  Patient in no acute distress at time of exam.  Patient went for EGD 2/05/2024.   She was noted to have fever 103.2 post procedure. Afebrile this am.    Lab reviewed. WBC 6.4, hgb 5.5    Worsening anemia. Patient agreeable for IV iron. Mild rash over 20 years with oral iron. She does not wish for blood products. Understands risk. Discussed with patient and grandson at bedside.     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 Awaiting MRI for staging as she is allergic to iodine.     See ACP conversation below      Review of Systems   Constitutional:  Positive for fever. Negative for activity change, appetite change and chills.   Respiratory:  Negative for cough and shortness of breath.    Cardiovascular:  Negative for chest pain.   Gastrointestinal:  Negative for nausea and vomiting.   Musculoskeletal:  Negative for gait problem and myalgias.   Skin: Negative.    Neurological:  Negative for weakness.     Objective:     Vital Signs (Most Recent):  Temp: 98 °F (36.7 °C) (02/07/24 1152)  Pulse: 73 (02/07/24 1152)  Resp: 18 (02/07/24 1152)  BP: (!) 123/58 (02/07/24 1152)  SpO2: 100 % (02/07/24 1152) Vital Signs (24h Range):  Temp:  [97.7 °F (36.5 °C)-98.9 °F (37.2 °C)] 98 °F (36.7 °C)  Pulse:  [62-77] 73  Resp:  [18] 18  SpO2:  [96 %-100 %] 100 %  BP: (102-146)/(45-65) 123/58     Weight: 52.6 kg (116 lb)  Body mass index is 23.42 kg/m².    Intake/Output Summary (Last 24 hours) at 2/7/2024 1233  Last data filed at 2/7/2024 0620  Gross per 24 hour   Intake 2435.53 ml   Output --   Net 2435.53 ml           Physical Exam  Vitals and nursing note reviewed.   Constitutional:       Appearance: Normal appearance. She is well-developed and normal weight.   Eyes:      Comments: Pale conjunctivae    Neck:      Thyroid: No thyromegaly.      Vascular: No JVD.   Cardiovascular:      Rate and Rhythm: Normal rate and regular rhythm.      Heart sounds: Murmur heard.    Pulmonary:      Effort: Pulmonary effort is normal.      Breath sounds: Normal breath sounds.   Abdominal:      General: There is no distension.      Palpations: Abdomen is soft.      Tenderness: There is no abdominal tenderness.   Skin:     General: Skin is warm and dry.      Comments: Bruising to ext. Mild erythema to right wrist.    Neurological:      Mental Status: She is alert and oriented to person, place, and time. Mental status is at baseline.   Psychiatric:         Behavior: Behavior normal.             Significant Labs: All pertinent labs within the past 24 hours have been reviewed.  CBC:   Recent Labs   Lab 02/06/24  1248 02/07/24  0515   WBC 10.37 7.64   HGB 6.6* 5.5*   HCT 21.6* 18.3*    230       CMP:   Recent Labs   Lab 02/06/24  1248 02/07/24  0515   * 130*   K 3.5 3.4*    103   CO2 19* 20*   * 97   BUN 16 17   CREATININE 1.3 0.9   CALCIUM 8.7 7.8*   PROT 6.3 5.5*   ALBUMIN 3.2* 2.7*   BILITOT 0.6 0.4   ALKPHOS 60 52*   AST 50* 203*   ALT 27 158*   ANIONGAP 9 7*         Significant Imaging: I have reviewed all pertinent imaging results/findings within the past 24 hours.  CXR: No acute findings

## 2024-02-07 NOTE — ASSESSMENT & PLAN NOTE
Advance Care Planning     Date: 02/07/2024    [facility]  I engaged the patient and family in a voluntary conversation about advance care planning and we specifically addressed what the goals of care would be moving forward, in light of the patient's change in clinical status, specifically colon cancer with severe anemia.  Refuses PRBC due to [employer]. We did specifically address the patient's likely prognosis, which is poor.  We explored the patient's values and preferences for future care.  The patient and family endorses that what is most important right now is to focus on remaining as independent as possible and symptom/pain control    Accordingly, we have decided that the best plan to meet the patient's goals includes continuing with treatment and enrolling in hospice care    I did explain the role for hospice care at this stage of the patient's illness, including its ability to help the patient live with the best quality of life possible.  We will be making a hospice referral.      Discussed Living will. She wishes for DNR.    I spent a total of 30  minutes engaging the patient in this advance care planning discussion.

## 2024-02-07 NOTE — TELEPHONE ENCOUNTER
----- Message from Felix Coles sent at 2/7/2024  8:46 AM CST -----  Contact: self  Type: Sooner Appointment Request        Caller is requesting a sooner appointment. Caller declined first available appointment listed below. Caller will not accept being placed on the waitlist and is requesting a message be sent to doctor.        Name of Caller: Patient   Best Call Back Number: [phone] (home) [phone] (work)  Additional Information: Plz call pt to confirm Dr. Oneill referral was put in. Thanks

## 2024-02-07 NOTE — PROGRESS NOTES
[facility] Medicine  Progress Note    Patient Name: Brie Han  MRN: [id number]  Patient Class: IP- Inpatient   Admission Date: 2/2/2024  Length of Stay: 4 days  Attending Physician: Rowena Caicedo MD  Primary Care Provider: Colten Gould MD        Subjective:     Principal Problem:Symptomatic anemia        HPI:  Brie Han is a 89 y.o. female with past medical history significant for gerd, hypertension, hyperlipidemia, glaucoma, stroke,and arthritis, who presents to the emergency department with symptomatic anemia. Patient was seen outpatient by Dr. Gould for anemia outpatient. Patient had routine labs that showed her hemoglobin had decreased on yesterday.  Patient was sent to the emergency room for evaluation of abnormal labs.  Patient reported that for several days she was dyspneic on exertion and fatigue.  Patient denies any shortness a breath at rest.  Patient ambulating in the hallway without difficulty.   Patient states that she lives at home alone.  Patient denies smoking, drinking, in the use of illicit drugs.  Patient denies hematuria, black tarry stools, or keyla blood in stools. Patient endorsed she has hemorrhoids occasionally bleed and she has a use a pad for protection.  Patient endorses that bleeding has significantly decreased since stopping the use of her blood thinner.  Patient is on chronic anticoagulation therapy and has been off of her Plavix for 2 days per request of her PCP.  Patient is a [employer] therefore does not want a blood transfusion.  GI consulted.  ED workup:  CBC shows anemia with hemoglobin of 6.5 hematocrit of 22.  CMP hyponatremia 135 with decreased anion gap-6. Review of outpatient labs revealed ferritin-7, Iron-23, TIBC-617, Transferrin-417, and saturated 4.  Patient will be admitted to Hospital Medicine for observation and evaluation.        Overview/Hospital Course:  Brie Han was closely monitored while in the  hospital for symptomatic anemia.  GI was consulted.  Eliquis placed on hold for EGD to be performed on 02/05/2024.  CBC was trended.  Patient received bowel prep.  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  Biopsies taken. Outpatient general surgery and hematology-oncologic ambulatory referral per GI recommendations.  Family requested that referral be sent to  Dr. Oneill at [facility]. Patient was able to ambulate in room without assist while in the hospital.  Family requested patient be sent home with home health, due to age and other risk factors.  Case management consulted for for discharge planning.  Patient was cleared by GI to be discharged from the hospital. Patient deemed appropriate for discharge.  Patient and grandson educated on discharge planning, both verbalized understanding.  Patient is to follow with primary care provider within 1 week.  Patient is to follow up outpatient with General surgery and Hematology-Oncology.     Discharge was appealed. The following morning patient had an elevated temperature of 103°.  Patient met sepsis criteria.  Patient initiated IV Zosyn and pharmacy to dose vancomycin.  Blood cultures pending.  CBC, CMP, and procalcitonin ordered.  Chest x-ray was unremarkable.  Sepsis order set placed we will continue to monitor patient.  Physical therapy ordered for evaluation and treatment.    No new subjective & objective note has been filed under this hospital service since the last note was generated.      Assessment/Plan:      * Symptomatic anemia  GI consult  Patient is a [employer] and does not wish to have any blood transfusions  EGD 02/05/2024     Patient's anemia is currently controlled. Has not received any PRBCs to date. Etiology likely d/t acute blood loss which was from PUD  Current CBC reviewed-   Lab Results   Component Value Date    HGB 5.5 (LL) 02/07/2024    HCT 18.3 (LL) 02/07/2024     Monitor serial CBC  and transfuse if patient becomes hemodynamically unstable, symptomatic or H/H drops below 7/21.    Iron saturation 4%. Add IV venofer 200 mg daily 10. Consult hematology.     ACP (advance care planning)  Advance Care Planning    Date: 02/07/2024    [facility]  I engaged the patient and family in a voluntary conversation about advance care planning and we specifically addressed what the goals of care would be moving forward, in light of the patient's change in clinical status, specifically colon cancer with severe anemia.  Refuses PRBC due to [employer]. We did specifically address the patient's likely prognosis, which is poor.  We explored the patient's values and preferences for future care.  The patient and family endorses that what is most important right now is to focus on remaining as independent as possible and symptom/pain control    Accordingly, we have decided that the best plan to meet the patient's goals includes continuing with treatment and enrolling in hospice care    I did explain the role for hospice care at this stage of the patient's illness, including its ability to help the patient live with the best quality of life possible.  We will be making a hospice referral.      Discussed Living will. She wishes for DNR.    I spent a total of 30  minutes engaging the patient in this advance care planning discussion.                Adenocarcinoma of colon  Noted on colonoscope.  Consult hematology/oncology. CT chest/abd/pelvis ordered.     Family wishes to see Dr. Oneill outpatient      Sepsis  This patient does not have evidence of infective focus  My overall impression is sepsis.  Source: Unknown  Antibiotics given-   Antibiotics (72h ago, onward)      Start     Stop Route Frequency Ordered    02/07/24 1400  metroNIDAZOLE tablet 500 mg         -- Oral Every 8 hours 02/07/24 1232    02/06/24 1345  ceFEPIme (MAXIPIME) 1 g in dextrose 5 % in water (D5W) 100 mL IVPB (MB+)         -- IV Every 12 hours (non-standard  times) 02/06/24 1244          Latest lactate reviewed-  Recent Labs   Lab 02/06/24  1248   LACTATE 1.1         Fluid challenge Actual Body weight- Patient will receive 30ml/kg actual body weight to calculate fluid bolus for treatment of septic shock.     Post- resuscitation assessment No - Post resuscitation assessment not needed       Will Not start Pressors- Levophed for MAP of 65  Source control achieved by: IV vancomycin and cefepime    Patient does not meet criteria for sepsis. No leukocytosis. Tachycardia, hypotension, -only temp noted.   Deescalate abx. Possible reactive due to procedure vs GI etiology. Dc vanco. 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  Chronic.  Continue home eyedrops        Refusal of blood transfusions as patient is Buddhist  Noted      CAD (coronary artery disease)  Patient with known CAD s/p  n/a , which is controlled Will continue Statin and monitor for S/Sx of angina/ACS. Continue to monitor on telemetry.     Hypertension  Chronic, controlled. Latest blood pressure and vitals reviewed-     Temp:  [97.5 °F (36.4 °C)-98.1 °F (36.7 °C)]   Pulse:  [62-76]   Resp:  [16-18]   BP: (124-154)/(55-67)   SpO2:  [98 %-100 %] .   Home meds for hypertension were reviewed and noted below.   Hypertension Medications               losartan (COZAAR) 100 MG tablet Take 1 tablet (100 mg total) by mouth once daily.    NIFEdipine (PROCARDIA-XL) 30 MG (OSM) 24 hr tablet Take 1 tablet (30 mg total) by mouth once daily.            While in the hospital, will manage blood pressure as follows; Continue home antihypertensive regimen    Will utilize p.r.n. blood pressure medication only if patient's blood pressure greater than 180/110 and she develops symptoms such as worsening chest pain or shortness of breath.      VTE Risk Mitigation (From admission, onward)           Ordered     IP VTE HIGH RISK PATIENT  Once         02/02/24 0959     [facility] sequential  compression device  Until discontinued         02/02/24 1758     Reason for No Pharmacological VTE Prophylaxis  Once        Question:  Reasons:  Answer:  Risk of Bleeding    02/02/24 1758                    Discharge Planning   VINITA: 2/9/2024     Code Status: DNR   Is the patient medically ready for discharge?:     Reason for patient still in hospital (select all that apply): Treatment, Imaging, and Consult recommendations  Discharge Plan A: Home Health                  Rody Alfaro NP  Department of Hospital Medicine   [facility]/Surg

## 2024-02-07 NOTE — PROGRESS NOTES
[facility] Medicine  Progress Note    Patient Name: Brie Han  MRN: [id number]  Patient Class: IP- Inpatient   Admission Date: 2/2/2024  Length of Stay: 4 days  Attending Physician: Rowena Caicedo MD  Primary Care Provider: Colten Gould MD        Subjective:     Principal Problem:Symptomatic anemia        HPI:  Brie Han is a 89 y.o. female with past medical history significant for gerd, hypertension, hyperlipidemia, glaucoma, stroke,and arthritis, who presents to the emergency department with symptomatic anemia. Patient was seen outpatient by Dr. Gould for anemia outpatient. Patient had routine labs that showed her hemoglobin had decreased on yesterday.  Patient was sent to the emergency room for evaluation of abnormal labs.  Patient reported that for several days she was dyspneic on exertion and fatigue.  Patient denies any shortness a breath at rest.  Patient ambulating in the hallway without difficulty.   Patient states that she lives at home alone.  Patient denies smoking, drinking, in the use of illicit drugs.  Patient denies hematuria, black tarry stools, or keyla blood in stools. Patient endorsed she has hemorrhoids occasionally bleed and she has a use a pad for protection.  Patient endorses that bleeding has significantly decreased since stopping the use of her blood thinner.  Patient is on chronic anticoagulation therapy and has been off of her Plavix for 2 days per request of her PCP.  Patient is a Yazdanism therefore does not want a blood transfusion.  GI consulted.  ED workup:  CBC shows anemia with hemoglobin of 6.5 hematocrit of 22.  CMP hyponatremia 135 with decreased anion gap-6. Review of outpatient labs revealed ferritin-7, Iron-23, TIBC-617, Transferrin-417, and saturated 4.  Patient will be admitted to Hospital Medicine for observation and evaluation.        Overview/Hospital Course:  Brie Han was closely monitored while in the  hospital for symptomatic anemia.  GI was consulted.  Eliquis placed on hold for EGD to be performed on 02/05/2024.  CBC was trended.  Patient received bowel prep.  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  Biopsies taken. Outpatient general surgery and hematology-oncologic ambulatory referral per GI recommendations.  Family requested that referral be sent to  Dr. Oneill at [facility]. Patient was able to ambulate in room without assist while in the hospital.  Family requested patient be sent home with home health, due to age and other risk factors.  Case management consulted for for discharge planning.  Patient was cleared by GI to be discharged from the hospital. Patient deemed appropriate for discharge.  Patient and grandson educated on discharge planning, both verbalized understanding.  Patient is to follow with primary care provider within 1 week.  Patient is to follow up outpatient with General surgery and Hematology-Oncology.     Discharge was appealed. The following morning patient had an elevated temperature of 103°.  Patient met sepsis criteria.  Patient initiated IV Zosyn and pharmacy to dose vancomycin.  Blood cultures pending.  CBC, CMP, and procalcitonin ordered.  Chest x-ray was unremarkable.  Sepsis order set placed we will continue to monitor patient.  Physical therapy ordered for evaluation and treatment.    Interval History:   Patient seen and examined.  Patient in no acute distress at time of exam.  Patient went for EGD 2/05/2024.   She was noted to have fever 103.2 post procedure. Afebrile this am.    Lab reviewed. WBC 6.4, hgb 5.5    Worsening anemia. Patient agreeable for IV iron. Mild rash over 20 years with oral iron. She does not wish for blood products. Understands risk. Discussed with patient and grandson at bedside.     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 Awaiting MRI for staging as she is allergic to iodine.      See ACP conversation below      Review of Systems   Constitutional:  Positive for fever. Negative for activity change, appetite change and chills.   Respiratory:  Negative for cough and shortness of breath.    Cardiovascular:  Negative for chest pain.   Gastrointestinal:  Negative for nausea and vomiting.   Musculoskeletal:  Negative for gait problem and myalgias.   Skin: Negative.    Neurological:  Negative for weakness.     Objective:     Vital Signs (Most Recent):  Temp: 98 °F (36.7 °C) (02/07/24 1152)  Pulse: 73 (02/07/24 1152)  Resp: 18 (02/07/24 1152)  BP: (!) 123/58 (02/07/24 1152)  SpO2: 100 % (02/07/24 1152) Vital Signs (24h Range):  Temp:  [97.7 °F (36.5 °C)-98.9 °F (37.2 °C)] 98 °F (36.7 °C)  Pulse:  [62-77] 73  Resp:  [18] 18  SpO2:  [96 %-100 %] 100 %  BP: (102-146)/(45-65) 123/58     Weight: 52.6 kg (116 lb)  Body mass index is 23.42 kg/m².    Intake/Output Summary (Last 24 hours) at 2/7/2024 1233  Last data filed at 2/7/2024 0620  Gross per 24 hour   Intake 2435.53 ml   Output --   Net 2435.53 ml           Physical Exam  Vitals and nursing note reviewed.   Constitutional:       Appearance: Normal appearance. She is well-developed and normal weight.   Eyes:      Comments: Pale conjunctivae    Neck:      Thyroid: No thyromegaly.      Vascular: No JVD.   Cardiovascular:      Rate and Rhythm: Normal rate and regular rhythm.      Heart sounds: Murmur heard.   Pulmonary:      Effort: Pulmonary effort is normal.      Breath sounds: Normal breath sounds.   Abdominal:      General: There is no distension.      Palpations: Abdomen is soft.      Tenderness: There is no abdominal tenderness.   Skin:     General: Skin is warm and dry.      Comments: Bruising to ext. Mild erythema to right wrist.    Neurological:      Mental Status: She is alert and oriented to person, place, and time. Mental status is at baseline.   Psychiatric:         Behavior: Behavior normal.             Significant Labs: All pertinent  labs within the past 24 hours have been reviewed.  CBC:   Recent Labs   Lab 02/06/24  1248 02/07/24  0515   WBC 10.37 7.64   HGB 6.6* 5.5*   HCT 21.6* 18.3*    230       CMP:   Recent Labs   Lab 02/06/24  1248 02/07/24  0515   * 130*   K 3.5 3.4*    103   CO2 19* 20*   * 97   BUN 16 17   CREATININE 1.3 0.9   CALCIUM 8.7 7.8*   PROT 6.3 5.5*   ALBUMIN 3.2* 2.7*   BILITOT 0.6 0.4   ALKPHOS 60 52*   AST 50* 203*   ALT 27 158*   ANIONGAP 9 7*         Significant Imaging: I have reviewed all pertinent imaging results/findings within the past 24 hours.  CXR: No acute findings      Assessment/Plan:      * Symptomatic anemia  GI consult  Patient is a Lutheran and does not wish to have any blood transfusions  EGD 02/05/2024     Patient's anemia is currently controlled. Has not received any PRBCs to date. Etiology likely d/t acute blood loss which was from PUD  Current CBC reviewed-   Lab Results   Component Value Date    HGB 5.5 (LL) 02/07/2024    HCT 18.3 (LL) 02/07/2024     Monitor serial CBC and transfuse if patient becomes hemodynamically unstable, symptomatic or H/H drops below 7/21.    Iron saturation 4%. Add IV venofer 200 mg daily 10. Consult hematology.     Adenocarcinoma of colon  Noted on colonoscope.  Consult hematology/oncology. CT chest/abd/pelvis ordered.     Family wishes to see Dr. Oneill outpatient      Sepsis  This patient does not have evidence of infective focus  My overall impression is sepsis.  Source: Unknown  Antibiotics given-   Antibiotics (72h ago, onward)      Start     Stop Route Frequency Ordered    02/07/24 1400  metroNIDAZOLE tablet 500 mg         -- Oral Every 8 hours 02/07/24 1232    02/06/24 1345  ceFEPIme (MAXIPIME) 1 g in dextrose 5 % in water (D5W) 100 mL IVPB (MB+)         -- IV Every 12 hours (non-standard times) 02/06/24 1244          Latest lactate reviewed-  Recent Labs   Lab 02/06/24  1248   LACTATE 1.1         Fluid challenge Actual Body weight-  Patient will receive 30ml/kg actual body weight to calculate fluid bolus for treatment of septic shock.     Post- resuscitation assessment No - Post resuscitation assessment not needed       Will Not start Pressors- Levophed for MAP of 65  Source control achieved by: IV vancomycin and cefepime    Patient does not meet criteria for sepsis. No leukocytosis. Tachycardia, hypotension, -only temp noted.   Deescalate abx. Possible reactive due to procedure vs GI etiology. Dc vanco. 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  Chronic.  Continue home eyedrops        Refusal of blood transfusions as patient is Bahai  Noted      CAD (coronary artery disease)  Patient with known CAD s/p  n/a , which is controlled Will continue Statin and monitor for S/Sx of angina/ACS. Continue to monitor on telemetry.     Hypertension  Chronic, controlled. Latest blood pressure and vitals reviewed-     Temp:  [97.5 °F (36.4 °C)-98.1 °F (36.7 °C)]   Pulse:  [62-76]   Resp:  [16-18]   BP: (124-154)/(55-67)   SpO2:  [98 %-100 %] .   Home meds for hypertension were reviewed and noted below.   Hypertension Medications               losartan (COZAAR) 100 MG tablet Take 1 tablet (100 mg total) by mouth once daily.    NIFEdipine (PROCARDIA-XL) 30 MG (OSM) 24 hr tablet Take 1 tablet (30 mg total) by mouth once daily.            While in the hospital, will manage blood pressure as follows; Continue home antihypertensive regimen    Will utilize p.r.n. blood pressure medication only if patient's blood pressure greater than 180/110 and she develops symptoms such as worsening chest pain or shortness of breath.      VTE Risk Mitigation (From admission, onward)           Ordered     IP VTE HIGH RISK PATIENT  Once         02/02/24 1758     Place sequential compression device  Until discontinued         02/02/24 1758     Reason for No Pharmacological VTE Prophylaxis  Once        Question:  Reasons:  Answer:   Risk of Bleeding    02/02/24 1758                    Discharge Planning   VINITA: 2/9/2024     Code Status: DNR   Is the patient medically ready for discharge?:     Reason for patient still in hospital (select all that apply): Laboratory test, Treatment, Imaging, and Consult recommendations  Discharge Plan A: Home Health                  Rody Alfaro NP  Department of Hospital Medicine   [facility]/Surg

## 2024-02-07 NOTE — PROGRESS NOTES
Pharmacokinetic Assessment Follow Up: IV Vancomycin    Vancomycin serum concentration assessment(s):    The random level was drawn correctly and can be used to guide therapy at this time. The measurement is below the desired definitive target range of 15 to 20 mcg/mL.    Vancomycin Regimen Plan:    Change regimen to Vancomycin 750 mg IV x1 with next serum trough concentration measured at 0730 prior to next dose on 2/8/24.    Drug levels (last 3 results):  Recent Labs   Lab Result Units 02/07/24  0515   Vancomycin, Random ug/mL 12.4       Pharmacy will continue to follow and monitor vancomycin.    Please contact pharmacy at extension [phone] for questions regarding this assessment.    Thank you for the consult,   Dustin Giraldo, PharmD  [phone]         Patient brief summary:  Brie Han is a 89 y.o. female initiated on antimicrobial therapy with IV Vancomycin for treatment of sepsis    The patient's current regimen is vancomycin pulse dosing.    Drug Allergies:   Review of patient's allergies indicates:   Allergen Reactions    Contrast media Other (See Comments)    Aspirin Other (See Comments)    Ciprofloxacin Other (See Comments)    Iron Other (See Comments)    Iodine Rash    Penicillins Rash       Actual Body Weight:   52.6 kg (116 lb)    Renal Function:   Estimated Creatinine Clearance: 28.2 mL/min (based on SCr of 0.9 mg/dL).,     Dialysis Method (if applicable):  N/A    CBC (last 72 hours):  Recent Labs   Lab Result Units 02/05/24  1129 02/06/24  1248 02/07/24  0515   WBC K/uL 3.55* 10.37 7.64   Hemoglobin g/dL 7.7* 6.6* 5.5*   Hematocrit % 26.5* 21.6* 18.3*   Platelets K/uL 350 299 230   Gran % % 55.5 89.7* 69.5   Lymph % % 27.0 2.1* 12.3*   Mono % % 12.7 5.2 15.3*   Eosinophil % % 2.5 1.4 1.6   Basophil % % 2.0* 0.5 0.8   Differential Method  Automated Automated Automated       Metabolic Panel (last 72 hours):  Recent Labs   Lab Result Units 02/06/24  1210 02/06/24  1248 02/07/24  0515   Sodium mmol/L   --  131* 130*   Potassium mmol/L  --  3.5 3.4*   Chloride mmol/L  --  103 103   CO2 mmol/L  --  19* 20*   Glucose mg/dL  --  137* 97   Glucose, UA  Negative  --   --    BUN mg/dL  --  16 17   Creatinine mg/dL  --  1.3 0.9   Albumin g/dL  --  3.2* 2.7*   Total Bilirubin mg/dL  --  0.6 0.4   Alkaline Phosphatase U/L  --  60 52*   AST U/L  --  50* 203*   ALT U/L  --  27 158*   Magnesium mg/dL  --   --  1.7   Phosphorus mg/dL  --   --  2.3*       Vancomycin Administrations:  vancomycin given in the last 96 hours                     vancomycin (VANCOCIN) 1,000 mg in dextrose 5 % (D5W) 250 mL IVPB (Vial-Mate) (mg) 1,000 mg New Bag 02/06/24 1958                    Microbiologic Results:  Microbiology Results (last 7 days)       Procedure Component Value Units Date/Time    Blood culture [[id number]] Collected: 02/06/24 1248    Order Status: Completed Specimen: Blood from Antecubital, Right Arm Updated: 02/07/24 0317     Blood Culture, Routine No Growth to date    Blood culture [[id number]] Collected: 02/06/24 1256    Order Status: Completed Specimen: Blood Updated: 02/07/24 0317     Blood Culture, Routine No Growth to date    Urine culture [[id number]] Collected: 02/06/24 1210    Order Status: No result Specimen: Urine Updated: 02/06/24 1258

## 2024-02-07 NOTE — ASSESSMENT & PLAN NOTE
GI consult  Patient is a [employer] and does not wish to have any blood transfusions  EGD 02/05/2024     Patient's anemia is currently controlled. Has not received any PRBCs to date. Etiology likely d/t acute blood loss which was from PUD  Current CBC reviewed-   Lab Results   Component Value Date    HGB 5.5 (LL) 02/07/2024    HCT 18.3 (LL) 02/07/2024     Monitor serial CBC and transfuse if patient becomes hemodynamically unstable, symptomatic or H/H drops below 7/21.    Iron saturation 4%. Add IV venofer 200 mg daily 10. Consult hematology.

## 2024-02-07 NOTE — ASSESSMENT & PLAN NOTE
This patient does not have evidence of infective focus  My overall impression is sepsis.  Source: Unknown  Antibiotics given-   Antibiotics (72h ago, onward)      Start     Stop Route Frequency Ordered    02/07/24 1400  metroNIDAZOLE tablet 500 mg         -- Oral Every 8 hours 02/07/24 1232    02/06/24 1345  ceFEPIme (MAXIPIME) 1 g in dextrose 5 % in water (D5W) 100 mL IVPB (MB+)         -- IV Every 12 hours (non-standard times) 02/06/24 1244          Latest lactate reviewed-  Recent Labs   Lab 02/06/24  1248   LACTATE 1.1         Fluid challenge Actual Body weight- Patient will receive 30ml/kg actual body weight to calculate fluid bolus for treatment of septic shock.     Post- resuscitation assessment No - Post resuscitation assessment not needed       Will Not start Pressors- Levophed for MAP of 65  Source control achieved by: IV vancomycin and cefepime    Patient does not meet criteria for sepsis. No leukocytosis. Tachycardia, hypotension, -only temp noted.   Deescalate abx. Possible reactive due to procedure vs GI etiology. Dc vanco. Add flagyl and reeval

## 2024-02-07 NOTE — PLAN OF CARE
Problem: Adult Inpatient Plan of Care  Goal: Plan of Care Review  Outcome: Ongoing, Progressing   Sitting in bedside chair. POC and medications reviewed with pt. Verbalized understanding. Tele 8618 in use. Midline in right upper arm without difficulty. DDI. No redness or swelling at site. Call light in reach. Will continue to monitor.   Problem: Adult Inpatient Plan of Care  Goal: Optimal Comfort and Wellbeing  Outcome: Ongoing, Progressing   Q 2 hourly rounds made through out shift and IV site, pain and position monitored.   Problem: Fatigue  Goal: Improved Activity Tolerance  Outcome: Ongoing, Progressing   Ambulated in fonseca around nsg station with PT. Did well.

## 2024-02-07 NOTE — ASSESSMENT & PLAN NOTE
Noted on colonoscope.  Consult hematology/oncology. CT chest/abd/pelvis ordered.     Family wishes to see Dr. Oneill outpatient

## 2024-02-07 NOTE — PT/OT/SLP PROGRESS
Physical Therapy Treatment    Patient Name:  Brie Han   MRN:  [id number]    Recommendations:     Discharge Recommendations: Low Intensity Therapy  Discharge Equipment Recommendations: walker, rolling  Barriers to discharge: None    Assessment:     Brie Han is a 89 y.o. female admitted with a medical diagnosis of Symptomatic anemia.  She presents with the following impairments/functional limitations: weakness, impaired functional mobility, gait instability . Supine in bed just finished breakfast and grooming with grandson.  Pleasant , cooperative, very talkative ( great historian).  Agreed to mobilize and reports some weakness. Sup > sit EOB with Min A. Able to scoot forward to feet flat with CGA.  Sat extended due to long distance phone call.  Sit > stand with rw and CGA.  Ambulated 250' with rw and CGA for safety.     Rehab Prognosis: Good; patient would benefit from acute skilled PT services to address these deficits and reach maximum level of function.    Recent Surgery: Procedure(s) (LRB):  EGD (ESOPHAGOGASTRODUODENOSCOPY) (N/A)  COLONOSCOPY (N/A) 2 Days Post-Op    Plan:     During this hospitalization, patient to be seen 6 x/week to address the identified rehab impairments via gait training, therapeutic activities, therapeutic exercises and progress toward the following goals:    Plan of Care Expires:  03/06/24    Subjective     Chief Complaint: weakness  Patient/Family Comments/goals: to return home .  Pain/Comfort:  Pain Rating 1: 0/10      Objective:     Communicated with nurse Chavez prior to session.  Patient found supine with bed alarm, telemetry upon PT entry to room.     General Precautions: Standard, fall  Orthopedic Precautions: N/A  Braces: N/A  Respiratory Status: Room air     Functional Mobility:  Bed Mobility:     Supine to Sit: minimum assistance  Transfers:     Sit to Stand:  contact guard assistance with rolling walker  Gait: 250' with rw and CGA.      AM-PAC 6 CLICK MOBILITY           Treatment & Education:  Ambulated with rw and assistance for safety.  BLE exercises seated : TKE's, hip abd/add /flexion, AP's.    Patient left up in chair with all lines intact, call button in reach, chair alarm on, nurse Kathy notified, and grandson present..    GOALS:   Multidisciplinary Problems       Physical Therapy Goals          Problem: Physical Therapy    Goal Priority Disciplines Outcome Goal Variances Interventions   Physical Therapy Goal     PT, PT/OT Ongoing, Progressing     Description: Goals to be met by: 24     Patient will increase functional independence with mobility by performin. Supine to sit with [place]  2. Sit to supine with [place]  3. Sit to stand transfer with Stand-by Assistance  4. Bed to chair transfer with Stand-by Assistance using Rolling Walker  5. Gait  x 150 feet with Stand-by Assistance using Rolling Walker.                          Time Tracking:     PT Received On: 24  PT Start Time: 1005     PT Stop Time: 1030  PT Total Time (min): 25 min     Billable Minutes: Gait Training 10min and Therapeutic Exercise 15min    Treatment Type: Treatment  PT/PTA: PTA     Number of PTA visits since last PT visit: 2024

## 2024-02-07 NOTE — CONSULTS
General Surgery  Consult received for myself to evaluate the patient per family request. Chart reviewed. 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 Patient is a [employer] and therefore can not receive blood products.  Currently her hemoglobin is 5.5.  Has been seen by Hematology who is recommending iron transfusion and Epogen. Any surgical intervention would ideally be deferred until hemoglobin can improve with iron transfusion and Epogen which may take a few weeks.  If however the patient has evidence of ongoing bleeding, and it is her and the family's desire, we would have to proceed with urgent surgery with associated high-risk. Will follow-up lab work in the morning and discuss with patient and her family at that time. Also will follow-up results of CT chest abdomen pelvis which was obtained earlier but has not been officially read.    Please call with any questions or concerns.    Kulwant Seth MD  General Surgery  [phone]

## 2024-02-07 NOTE — PROGRESS NOTES
Brie Han [id number] is a 89 y.o. female who has been consulted for vancomycin dosing.    Pharmacy consult for vancomycin dosing in no longer required.  Vancomycin was discontinued.    Thank you for allowing us to participate in this patient's care.     April Simpson, PharmD  [phone]

## 2024-02-07 NOTE — PLAN OF CARE
Spoke with pt and grandson at bedside regarding hospice informational, they are unsure of hospice companies and have no preference. Referral sent to Chuy Wh[facility] with [facility] aware of pt and will come speak to them.      02/07/24 8650   Post-Acute Status   Post-Acute Authorization Hospice   Hospice Status Referrals Sent   Patient choice form signed by patient/caregiver List from [facility]Acute [facility]

## 2024-02-08 ENCOUNTER — TELEPHONE (OUTPATIENT)
Dept: HEMATOLOGY/ONCOLOGY | Facility: CLINIC | Age: 89
End: 2024-02-08
Payer: MEDICARE

## 2024-02-08 VITALS
HEART RATE: 79 BPM | DIASTOLIC BLOOD PRESSURE: 58 MMHG | SYSTOLIC BLOOD PRESSURE: 124 MMHG | RESPIRATION RATE: 18 BRPM | HEIGHT: 59 IN | BODY MASS INDEX: 23.39 KG/M2 | WEIGHT: 116 LBS | OXYGEN SATURATION: 98 % | TEMPERATURE: 98 F

## 2024-02-08 PROBLEM — K63.89 COLONIC MASS: Status: ACTIVE | Noted: 2024-02-08

## 2024-02-08 PROBLEM — D50.9 IRON DEFICIENCY ANEMIA: Status: ACTIVE | Noted: 2024-02-08

## 2024-02-08 LAB
ALBUMIN SERPL BCP-MCNC: 2.8 G/DL (ref 3.5–5.2)
ALP SERPL-CCNC: 55 U/L (ref 55–135)
ALT SERPL W/O P-5'-P-CCNC: 104 U/L (ref 10–44)
ANION GAP SERPL CALC-SCNC: 7 MMOL/L (ref 8–16)
AST SERPL-CCNC: 87 U/L (ref 10–40)
BACTERIA UR CULT: NORMAL
BACTERIA UR CULT: NORMAL
BASOPHILS # BLD AUTO: 0.05 K/UL (ref 0–0.2)
BASOPHILS NFR BLD: 1 % (ref 0–1.9)
BILIRUB SERPL-MCNC: 0.3 MG/DL (ref 0.1–1)
BUN SERPL-MCNC: 10 MG/DL (ref 10–30)
CALCIUM SERPL-MCNC: 8 MG/DL (ref 8.7–10.5)
CHLORIDE SERPL-SCNC: 108 MMOL/L (ref 95–110)
CO2 SERPL-SCNC: 22 MMOL/L (ref 23–29)
CREAT SERPL-MCNC: 0.7 MG/DL (ref 0.5–1.4)
DIFFERENTIAL METHOD BLD: ABNORMAL
EOSINOPHIL # BLD AUTO: 0.1 K/UL (ref 0–0.5)
EOSINOPHIL NFR BLD: 2.4 % (ref 0–8)
ERYTHROCYTE [DISTWIDTH] IN BLOOD BY AUTOMATED COUNT: 15.9 % (ref 11.5–14.5)
EST. GFR  (NO RACE VARIABLE): >60 ML/MIN/1.73 M^2
GLUCOSE SERPL-MCNC: 93 MG/DL (ref 70–110)
HCT VFR BLD AUTO: 19.1 % (ref 37–48.5)
HGB BLD-MCNC: 5.9 G/DL (ref 12–16)
IMM GRANULOCYTES # BLD AUTO: 0.02 K/UL (ref 0–0.04)
IMM GRANULOCYTES NFR BLD AUTO: 0.4 % (ref 0–0.5)
LYMPHOCYTES # BLD AUTO: 0.7 K/UL (ref 1–4.8)
LYMPHOCYTES NFR BLD: 13.9 % (ref 18–48)
MAGNESIUM SERPL-MCNC: 1.9 MG/DL (ref 1.6–2.6)
MCH RBC QN AUTO: 23.7 PG (ref 27–31)
MCHC RBC AUTO-ENTMCNC: 30.9 G/DL (ref 32–36)
MCV RBC AUTO: 77 FL (ref 82–98)
MONOCYTES # BLD AUTO: 1 K/UL (ref 0.3–1)
MONOCYTES NFR BLD: 20.8 % (ref 4–15)
NEUTROPHILS # BLD AUTO: 3 K/UL (ref 1.8–7.7)
NEUTROPHILS NFR BLD: 61.5 % (ref 38–73)
NRBC BLD-RTO: 0 /100 WBC
PHOSPHATE SERPL-MCNC: 2.2 MG/DL (ref 2.7–4.5)
PLATELET # BLD AUTO: 251 K/UL (ref 150–450)
PMV BLD AUTO: 10.2 FL (ref 9.2–12.9)
POTASSIUM SERPL-SCNC: 3.7 MMOL/L (ref 3.5–5.1)
PROT SERPL-MCNC: 5.6 G/DL (ref 6–8.4)
RBC # BLD AUTO: 2.49 M/UL (ref 4–5.4)
SODIUM SERPL-SCNC: 137 MMOL/L (ref 136–145)
WBC # BLD AUTO: 4.95 K/UL (ref 3.9–12.7)

## 2024-02-08 PROCEDURE — 25000003 PHARM REV CODE 250

## 2024-02-08 PROCEDURE — 99222 1ST HOSP IP/OBS MODERATE 55: CPT | Mod: ,,, | Performed by: SURGERY

## 2024-02-08 PROCEDURE — 25000003 PHARM REV CODE 250: Performed by: HOSPITALIST

## 2024-02-08 PROCEDURE — 25000003 PHARM REV CODE 250: Performed by: NURSE PRACTITIONER

## 2024-02-08 PROCEDURE — 80053 COMPREHEN METABOLIC PANEL: CPT

## 2024-02-08 PROCEDURE — 63600175 PHARM REV CODE 636 W HCPCS: Performed by: INTERNAL MEDICINE

## 2024-02-08 PROCEDURE — 97116 GAIT TRAINING THERAPY: CPT | Mod: CQ

## 2024-02-08 PROCEDURE — 85025 COMPLETE CBC W/AUTO DIFF WBC: CPT

## 2024-02-08 PROCEDURE — 84100 ASSAY OF PHOSPHORUS: CPT

## 2024-02-08 PROCEDURE — 63600175 PHARM REV CODE 636 W HCPCS: Performed by: HOSPITALIST

## 2024-02-08 PROCEDURE — 83735 ASSAY OF MAGNESIUM: CPT

## 2024-02-08 PROCEDURE — 97530 THERAPEUTIC ACTIVITIES: CPT | Mod: CQ

## 2024-02-08 RX ORDER — HEPARIN 100 UNIT/ML
500 SYRINGE INTRAVENOUS
Status: CANCELLED | OUTPATIENT
Start: 2024-02-09

## 2024-02-08 RX ORDER — SODIUM CHLORIDE 0.9 % (FLUSH) 0.9 %
10 SYRINGE (ML) INJECTION
Status: CANCELLED | OUTPATIENT
Start: 2024-02-09

## 2024-02-08 RX ORDER — DIPHENHYDRAMINE HYDROCHLORIDE 50 MG/ML
50 INJECTION INTRAMUSCULAR; INTRAVENOUS ONCE AS NEEDED
Status: CANCELLED | OUTPATIENT
Start: 2024-02-09

## 2024-02-08 RX ORDER — EPINEPHRINE 0.3 MG/.3ML
0.3 INJECTION SUBCUTANEOUS ONCE AS NEEDED
Status: CANCELLED | OUTPATIENT
Start: 2024-02-09

## 2024-02-08 RX ADMIN — POTASSIUM & SODIUM PHOSPHATES POWDER PACK 280-160-250 MG 2 PACKET: 280-160-250 PACK at 09:02

## 2024-02-08 RX ADMIN — POTASSIUM & SODIUM PHOSPHATES POWDER PACK 280-160-250 MG 2 PACKET: 280-160-250 PACK at 04:02

## 2024-02-08 RX ADMIN — METRONIDAZOLE 500 MG: 500 TABLET ORAL at 01:02

## 2024-02-08 RX ADMIN — POTASSIUM & SODIUM PHOSPHATES POWDER PACK 280-160-250 MG 2 PACKET: 280-160-250 PACK at 01:02

## 2024-02-08 RX ADMIN — POTASSIUM BICARBONATE 50 MEQ: 977.5 TABLET, EFFERVESCENT ORAL at 09:02

## 2024-02-08 RX ADMIN — METRONIDAZOLE 500 MG: 500 TABLET ORAL at 05:02

## 2024-02-08 RX ADMIN — CEFEPIME 1 G: 1 INJECTION, POWDER, FOR SOLUTION INTRAMUSCULAR; INTRAVENOUS at 04:02

## 2024-02-08 RX ADMIN — DORZOLAMIDE HYDROCHLORIDE AND TIMOLOL MALEATE 1 DROP: 22.3; 6.8 SOLUTION/ DROPS OPHTHALMIC at 09:02

## 2024-02-08 RX ADMIN — PANTOPRAZOLE SODIUM 40 MG: 40 TABLET, DELAYED RELEASE ORAL at 09:02

## 2024-02-08 RX ADMIN — LOSARTAN POTASSIUM 100 MG: 25 TABLET, FILM COATED ORAL at 09:02

## 2024-02-08 RX ADMIN — IRON SUCROSE 200 MG: 20 INJECTION, SOLUTION INTRAVENOUS at 09:02

## 2024-02-08 RX ADMIN — HYPROMELLOSE 2910 1 DROP: 5 SOLUTION/ DROPS OPHTHALMIC at 09:02

## 2024-02-08 RX ADMIN — HYPROMELLOSE 2910 1 DROP: 5 SOLUTION/ DROPS OPHTHALMIC at 04:02

## 2024-02-08 NOTE — PLAN OF CARE
Problem: Adult Inpatient Plan of Care  Goal: Plan of Care Review  Outcome: Met  Goal: Patient-Specific Goal (Individualized)  Outcome: Met  Goal: Absence of Hospital-Acquired Illness or Injury  Outcome: Met  Goal: Optimal Comfort and Wellbeing  Outcome: Met  Goal: Readiness for Transition of Care  Outcome: Met     Problem: Fall Injury Risk  Goal: Absence of Fall and Fall-Related Injury  Outcome: Met     Problem: Fatigue  Goal: Improved Activity Tolerance  Outcome: Met     Problem: Infection  Goal: Absence of Infection Signs and Symptoms  Outcome: Met     Problem: Adjustment to Illness (Sepsis/Septic Shock)  Goal: Optimal Coping  Outcome: Met     Problem: Bleeding (Sepsis/Septic Shock)  Goal: Absence of Bleeding  Outcome: Met     Problem: Glycemic Control Impaired (Sepsis/Septic Shock)  Goal: Blood Glucose Level Within Desired Range  Outcome: Met     Problem: Infection Progression (Sepsis/Septic Shock)  Goal: Absence of Infection Signs and Symptoms  Outcome: Met     Problem: Nutrition Impaired (Sepsis/Septic Shock)  Goal: Optimal Nutrition Intake  Outcome: Met

## 2024-02-08 NOTE — PLAN OF CARE
Spoke to Kathy with [facility], she states she can see the order for iron infusions but it is not authorized yet, after its authorized she will call the pt to schedule. Pt and grandson, Prabhjot, aware of this and ok with this plan. Added phone number to infusion to AVS.      02/08/24 1820   Post-Acute Status   Post-Acute Authorization [facility]

## 2024-02-08 NOTE — PLAN OF CARE
Spoke to Janette with [facility] , updated her on DC and sent updated clinicals. Confirmed they received all needed orders. SOC 2/9.     02/08/24 1542   Post-Acute Status   Post-Acute Authorization Home Health   Home Health Status Set-up Complete/Auth obtained

## 2024-02-08 NOTE — PLAN OF CARE
Problem: Physical Therapy  Goal: Physical Therapy Goal  Description: Goals to be met by: 24     Patient will increase functional independence with mobility by performin. Supine to sit with [place]  2. Sit to supine with [place]  3. Sit to stand transfer with Stand-by Assistance  4. Bed to chair transfer with Stand-by Assistance using Rolling Walker  5. Gait  x 150 feet with Stand-by Assistance using Rolling Walker.     Outcome: Ongoing, Progressing   Ambulate with rw and assistance for safety.

## 2024-02-08 NOTE — PLAN OF CARE
Met with pt at bedside to discuss discharge planning, pt asking about SNF, ok with referrals being sent to [facility] and [facility]. Also asked to call grandson to discuss, CM will call.      02/08/24 0857   Discharge Reassessment   Assessment Type Discharge Planning Reassessment   Did the patient's condition or plan change since previous assessment? No   Discharge Plan discussed with: Patient   Communicated VINITA with patient/caregiver Yes   Discharge Plan A Skilled Nursing Facility

## 2024-02-08 NOTE — PLAN OF CARE
Pt clear for DC from CM standpoint. Discharging home with [facility] .      02/08/24 1602   Final Note   Assessment Type Final Discharge Note   Anticipated Discharge Disposition Home-Health   Hospital Resources/Appts/Education Provided Appointments scheduled and added to AVS

## 2024-02-08 NOTE — PLAN OF CARE
SNF referrals sent to [facility] and [facility] for review via [facility].      02/08/24 1138   Post-Acute Status   Post-Acute Authorization Placement   Post-Acute Placement Status Referrals Sent   Patient choice form signed by patient/caregiver List from [facility]

## 2024-02-08 NOTE — PLAN OF CARE
Problem: Adult Inpatient Plan of Care  Goal: Plan of Care Review  Outcome: Ongoing, Progressing  Goal: Patient-Specific Goal (Individualized)  Outcome: Ongoing, Progressing  Goal: Absence of Hospital-Acquired Illness or Injury  Outcome: Ongoing, Progressing  Goal: Optimal Comfort and Wellbeing  Outcome: Ongoing, Progressing  Goal: Readiness for Transition of Care  Outcome: Ongoing, Progressing     Problem: Fall Injury Risk  Goal: Absence of Fall and Fall-Related Injury  Outcome: Ongoing, Progressing     Problem: Fatigue  Goal: Improved Activity Tolerance  Outcome: Ongoing, Progressing     Problem: Infection  Goal: Absence of Infection Signs and Symptoms  Outcome: Ongoing, Progressing     Problem: Adjustment to Illness (Sepsis/Septic Shock)  Goal: Optimal Coping  Outcome: Ongoing, Progressing     Problem: Bleeding (Sepsis/Septic Shock)  Goal: Absence of Bleeding  Outcome: Ongoing, Progressing     Problem: Glycemic Control Impaired (Sepsis/Septic Shock)  Goal: Blood Glucose Level Within Desired Range  Outcome: Ongoing, Progressing     Problem: Infection Progression (Sepsis/Septic Shock)  Goal: Absence of Infection Signs and Symptoms  Outcome: Ongoing, Progressing     Problem: Nutrition Impaired (Sepsis/Septic Shock)  Goal: Optimal Nutrition Intake  Outcome: Ongoing, Progressing

## 2024-02-08 NOTE — TELEPHONE ENCOUNTER
Message sent to wrong provider. Pt's grandson able to reach [facility] staff.       ----- Message from Erica Rodriguez sent at 2/8/2024 11:38 AM CST -----  Type:  Needs Medical Advice    Who Called: devan Weaver  Symptoms (please be specific):  How long has patient had these symptoms:    Pharmacy name and phone #:    Would the patient rather a call back or a response via [facility]? call back  Best Call Back Number: [phone]   Additional Information: Pt would like to speak to staff asap in regards to pt  Thanks

## 2024-02-08 NOTE — PROGRESS NOTES
Please advise patient/family that biopsy did indeed show colon cancer as we explained.  Recommend follow up with surgery if not already scheduled.  (Referral to [facility]).

## 2024-02-08 NOTE — PT/OT/SLP PROGRESS
Physical Therapy Treatment    Patient Name:  Brie Han   MRN:  [id number]    Recommendations:     Discharge Recommendations: Low Intensity Therapy  Discharge Equipment Recommendations: walker, rolling  Barriers to discharge: None    Assessment:     Brie Han is a 89 y.o. female admitted with a medical diagnosis of Symptomatic anemia.  She presents with the following impairments/functional limitations: weakness, impaired endurance, impaired functional mobility, gait instability . Supine in bed. Agreed to mobilize.  Requested to use restroom prior and brush teeth following.  Sup > sit with Min A. Sit > stand with rw and CGA. Ambulated to restroom, on / off commode with SBA.  Performed hygiene post BM with SBA.  Ambulated 250' with rw and CGA, IV in tow.   Returned to room to chair.  Ambulated to sink, brushed teeth and washed face with SBA.  Returned to sit in chair.     Rehab Prognosis: Good; patient would benefit from acute skilled PT services to address these deficits and reach maximum level of function.    Recent Surgery: Procedure(s) (LRB):  EGD (ESOPHAGOGASTRODUODENOSCOPY) (N/A)  COLONOSCOPY (N/A) 3 Days Post-Op    Plan:     During this hospitalization, patient to be seen 6 x/week to address the identified rehab impairments via gait training, therapeutic activities, therapeutic exercises and progress toward the following goals:    Plan of Care Expires:  03/06/24    Subjective     Chief Complaint: being in hospital  Patient/Family Comments/goals: possible facility for further treatment  Pain/Comfort:  Pain Rating 1: 0/10      Objective:     Communicated with nurse Chavez prior to session.  Patient found supine with bed alarm, peripheral IV, telemetry upon PT entry to room.     General Precautions: Standard, fall  Orthopedic Precautions: N/A  Braces: N/A  Respiratory Status: Room air     Functional Mobility:  Bed Mobility:     Rolling Left:  minimum assistance  Supine to Sit: minimum assistance  Transfers:      Sit to Stand:  contact guard assistance with rolling walker  Gait: 250' with rw and CGA      AM-PAC 6 CLICK MOBILITY          Treatment & Education:  Ambulated to restroom , on / off commode with SBA.  Ambulated 250' in hallway with CGA, IV in tow.  Stood at sink to brush teeth and wash face with SBA.    Patient left up in chair with all lines intact, call button in reach, chair alarm on, and nurse Kathy notified..    GOALS:   Multidisciplinary Problems       Physical Therapy Goals          Problem: Physical Therapy    Goal Priority Disciplines Outcome Goal Variances Interventions   Physical Therapy Goal     PT, PT/OT Ongoing, Progressing     Description: Goals to be met by: 24     Patient will increase functional independence with mobility by performin. Supine to sit with [place]  2. Sit to supine with [place]  3. Sit to stand transfer with Stand-by Assistance  4. Bed to chair transfer with Stand-by Assistance using Rolling Walker  5. Gait  x 150 feet with Stand-by Assistance using Rolling Walker.                          Time Tracking:     PT Received On: 24  PT Start Time: 1020     PT Stop Time: 1043  PT Total Time (min): 23 min     Billable Minutes: Gait Training 10min and Therapeutic Activity 13min    Treatment Type: Treatment  PT/PTA: PTA     Number of PTA visits since last PT visit: 2     2024

## 2024-02-08 NOTE — PROGRESS NOTES
General Surgery Progress Note    Admit Date: 2/2/2024  S/P: Procedure(s) (LRB):  EGD (ESOPHAGOGASTRODUODENOSCOPY) (N/A)  COLONOSCOPY (N/A)    Post-operative Day: 3 Days Post-Op    Hospital Day: 7    SUBJECTIVE:   Patient seen at bedside sitting in chair.  Patient requested that I speak with her grandson. Patient reports no significant complaints at this time.    OBJECTIVE:     Vital Signs (Most Recent)  Temp:  [97.3 °F (36.3 °C)-98.7 °F (37.1 °C)] 98 °F (36.7 °C)  Pulse:  [72-79] 79  Resp:  [17-18] 18  SpO2:  [96 %-100 %] 98 %  BP: (124-165)/(56-72) 124/58    I&Os:  I/O last 3 completed shifts:  In: 2635.5 [P.O.:360; I.V.:2191.2; IV Piggyback:84.4]  Out: -     Physical Exam:  Gen: NAD, AAOx3  HEENT: Anicteric sclera  Pulm: unlabored, symmetrical   Abd: Soft    Laboratory:  CBC:   Recent Labs   Lab 02/08/24  0421   WBC 4.95   RBC 2.49*   HGB 5.9*   HCT 19.1*      MCV 77*   MCH 23.7*   MCHC 30.9*     BMP:   Recent Labs   Lab 02/08/24  0421   GLU 93      K 3.7      CO2 22*   BUN 10   CREATININE 0.7   CALCIUM 8.0*     Labs within the past 24 hours have been reviewed.    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 02/02/2024    Other neutropenia 08/07/2023    Decreased functional mobility and endurance 01/24/2022    Impairment of balance 01/24/2022    Tremor 01/24/2022    Impaired function of upper extremity 01/24/2022    Left arm pain 01/24/2022    Stage 3a chronic kidney disease 01/17/2022    Left shoulder pain 09/24/2019    DDD (degenerative disc disease), cervical 08/27/2019    Cervicalgia 08/27/2019    Chronic left shoulder pain 08/27/2019    Neurogenic pain of right foot 04/17/2015    DDD (degenerative disc disease), lumbosacral 04/08/2014    Hyponatremia 05/20/2013    PAD  (peripheral artery disease) 02/14/2013    CAD (coronary artery disease) 02/14/2013    Hypercholesteremia 02/14/2013    Refusal of blood transfusions as patient is Taoism 02/14/2013    Hypertension 09/21/2012         89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, cecal adenocarcinoma  -current situation discussed with the patient's grandson   -after reviewing chart is likely she had slow blood loss from cecal lesion over the course of the past year leading to iron-deficiency anemia  -at this time there has no evidence of active or clinically significant bleeding and no immediate intervention is needed  -reasonable to replenish iron through transfusions and stimulate production with Epogen then reassess her anemia  -once blood levels have rebounded she could consider options which include surgical resection versus endoscopic resection versus observation/hospice   -noted definitive decisions made today and patient and family will consider options

## 2024-02-09 ENCOUNTER — TELEPHONE (OUTPATIENT)
Dept: PRIMARY CARE CLINIC | Facility: CLINIC | Age: 89
End: 2024-02-09
Payer: MEDICARE

## 2024-02-09 ENCOUNTER — TELEPHONE (OUTPATIENT)
Dept: ADMINISTRATIVE | Facility: CLINIC | Age: 89
End: 2024-02-09
Payer: MEDICARE

## 2024-02-09 ENCOUNTER — PATIENT MESSAGE (OUTPATIENT)
Dept: ADMINISTRATIVE | Facility: CLINIC | Age: 89
End: 2024-02-09
Payer: MEDICARE

## 2024-02-09 ENCOUNTER — PATIENT OUTREACH (OUTPATIENT)
Dept: ADMINISTRATIVE | Facility: CLINIC | Age: 89
End: 2024-02-09
Payer: MEDICARE

## 2024-02-09 LAB
BACTERIA BLD CULT: ABNORMAL

## 2024-02-09 NOTE — PROGRESS NOTES
C3 nurse attempted to contact Brie aHn  for a TCC post hospital discharge follow up call. No answer. Left voicemail with callback information. The patient has a scheduled HOSFU appointment with Colten Gould MD on 2/12/2024 at 1 PM.

## 2024-02-09 NOTE — TELEPHONE ENCOUNTER
Call placed to patient's grandson (Prabhjot) who states this matter has been taken care of.  No further assistance needed.

## 2024-02-09 NOTE — PROGRESS NOTES
Per C3 nurse, Ella, Ms. Han's grandson/caregiver, Prabhjot, asked for clarification regarding the dosing of iron sucrose. He also wanted to know who is responsible for flushing the midline. Upon review, Rody Alfaro NP, sent an order to [facility] for iron sucrose 200 mg once daily X 8 days. But the treatment plan under Active Therapy Plans, has dosing as QW X 8 weeks. Sent secure chat message to LUIS FELIPE Alfaro for dosing clarification, but there was no reply. Tried reaching her at [facility], but the  stated she was not there. Instead, asked to be connected to Dr. Caicedo the Attending Physician. She clarified that the order should read iron sucrose 200 mg once weekly X 8 weeks. She also stated that the infusion center will flush the midline. Phoned Mr. Han to relay this information. Informed that the correct dosing instructions are 200 mg IV once weekly X 8 weeks. Also told him that the midline will be flushed at the infusion center. He verbalized understanding.

## 2024-02-09 NOTE — PROGRESS NOTES
C3 nurse spoke with Brie Han, patient's grandson, Dalton, for a TCC post hospital discharge follow up call. The patient has a scheduled HOSFU appointment with Colten Gould MD on 2/12/2024 at 1 PM.    `

## 2024-02-09 NOTE — PROGRESS NOTES
"Reached out to [facility] for medication clarification of Iron Sucrose 200 mg / 10 ml Solution injection.  Inject 10 mls (200 mg total) into the vein once daily for 8 days or once a week for 8 weeks.  In-basket message sent to patient's grandson via patient's [facility] active portal, stating "We are waiting for a response to the message that has been sent to the ordering provider for the medication clarification."  "

## 2024-02-09 NOTE — TELEPHONE ENCOUNTER
Laurel Vargas Staff     ----- Message from Laurel Vargas sent at 2/9/2024  1:06 PM CST -----  Regarding: home health  Contact: Prabhjot  Type:  Needs Medical Advice    Who Called: Prabhjot  Would the patient rather a call back or a response via [facility]? Call back  Best Call Back Number: [phone] or [phone]  Additional Information: sts the home health nurse has some questions

## 2024-02-09 NOTE — TELEPHONE ENCOUNTER
Ashia Snow Staff     ----- Message from Ashia Snow sent at 2/9/2024  3:04 PM CST -----  Regarding: ret call, urgent. wants call back today  Contact: Dalton [phone]  Type:  Patient Returning Call    Who Called:  Dalton / devan    Who Left Message for Patient:  Ella    Does the patient know what this is regarding?:  yes    Best Call Back Number:  [phone]    Additional Information:

## 2024-02-11 LAB — BACTERIA BLD CULT: NORMAL

## 2024-02-12 ENCOUNTER — TELEPHONE (OUTPATIENT)
Dept: PRIMARY CARE CLINIC | Facility: CLINIC | Age: 89
End: 2024-02-12

## 2024-02-12 ENCOUNTER — OFFICE VISIT (OUTPATIENT)
Dept: PRIMARY CARE CLINIC | Facility: CLINIC | Age: 89
End: 2024-02-12
Payer: MEDICARE

## 2024-02-12 VITALS
OXYGEN SATURATION: 98 % | TEMPERATURE: 98 F | WEIGHT: 110.81 LBS | HEIGHT: 59 IN | HEART RATE: 68 BPM | BODY MASS INDEX: 22.34 KG/M2 | SYSTOLIC BLOOD PRESSURE: 110 MMHG | DIASTOLIC BLOOD PRESSURE: 60 MMHG

## 2024-02-12 DIAGNOSIS — N18.31 STAGE 3A CHRONIC KIDNEY DISEASE: ICD-10-CM

## 2024-02-12 DIAGNOSIS — A41.9 SEPSIS, DUE TO UNSPECIFIED ORGANISM, UNSPECIFIED WHETHER ACUTE ORGAN DYSFUNCTION PRESENT: ICD-10-CM

## 2024-02-12 DIAGNOSIS — D50.0 IRON DEFICIENCY ANEMIA DUE TO CHRONIC BLOOD LOSS: ICD-10-CM

## 2024-02-12 DIAGNOSIS — I10 HTN (HYPERTENSION), BENIGN: ICD-10-CM

## 2024-02-12 DIAGNOSIS — D70.8 OTHER NEUTROPENIA: ICD-10-CM

## 2024-02-12 DIAGNOSIS — B00.9 HERPES SIMPLEX: ICD-10-CM

## 2024-02-12 DIAGNOSIS — I73.9 PAD (PERIPHERAL ARTERY DISEASE): ICD-10-CM

## 2024-02-12 DIAGNOSIS — R74.01 TRANSAMINITIS: ICD-10-CM

## 2024-02-12 DIAGNOSIS — C18.0 ADENOCARCINOMA OF CECUM: Primary | ICD-10-CM

## 2024-02-12 DIAGNOSIS — M25.512 ACUTE PAIN OF LEFT SHOULDER: ICD-10-CM

## 2024-02-12 DIAGNOSIS — D50.0 IRON DEFICIENCY ANEMIA DUE TO CHRONIC BLOOD LOSS: Primary | ICD-10-CM

## 2024-02-12 DIAGNOSIS — R68.89 IMPAIRED FUNCTION OF UPPER EXTREMITY: ICD-10-CM

## 2024-02-12 DIAGNOSIS — K63.89 COLONIC MASS: ICD-10-CM

## 2024-02-12 DIAGNOSIS — C18.9 ADENOCARCINOMA OF COLON: ICD-10-CM

## 2024-02-12 DIAGNOSIS — Z74.09 DECREASED FUNCTIONAL MOBILITY AND ENDURANCE: ICD-10-CM

## 2024-02-12 DIAGNOSIS — R26.89 IMPAIRMENT OF BALANCE: ICD-10-CM

## 2024-02-12 PROCEDURE — 1111F DSCHRG MED/CURRENT MED MERGE: CPT | Mod: HCNC,CPTII,S$GLB, | Performed by: FAMILY MEDICINE

## 2024-02-12 PROCEDURE — 1159F MED LIST DOCD IN RCRD: CPT | Mod: HCNC,CPTII,S$GLB, | Performed by: FAMILY MEDICINE

## 2024-02-12 PROCEDURE — 1160F RVW MEDS BY RX/DR IN RCRD: CPT | Mod: HCNC,CPTII,S$GLB, | Performed by: FAMILY MEDICINE

## 2024-02-12 PROCEDURE — 1157F ADVNC CARE PLAN IN RCRD: CPT | Mod: HCNC,CPTII,S$GLB, | Performed by: FAMILY MEDICINE

## 2024-02-12 PROCEDURE — 1101F PT FALLS ASSESS-DOCD LE1/YR: CPT | Mod: HCNC,CPTII,S$GLB, | Performed by: FAMILY MEDICINE

## 2024-02-12 PROCEDURE — 96372 THER/PROPH/DIAG INJ SC/IM: CPT | Mod: HCNC,S$GLB,, | Performed by: FAMILY MEDICINE

## 2024-02-12 PROCEDURE — 99999 PR PBB SHADOW E&M-EST. PATIENT-LVL IV: CPT | Mod: PBBFAC,HCNC,, | Performed by: FAMILY MEDICINE

## 2024-02-12 PROCEDURE — 99495 TRANSJ CARE MGMT MOD F2F 14D: CPT | Mod: HCNC,25,S$GLB, | Performed by: FAMILY MEDICINE

## 2024-02-12 PROCEDURE — 3288F FALL RISK ASSESSMENT DOCD: CPT | Mod: HCNC,CPTII,S$GLB, | Performed by: FAMILY MEDICINE

## 2024-02-12 RX ORDER — CYANOCOBALAMIN 1000 UG/ML
INJECTION, SOLUTION INTRAMUSCULAR; SUBCUTANEOUS
Qty: 10 ML | Refills: 2 | Status: SHIPPED | OUTPATIENT
Start: 2024-02-12 | End: 2024-03-15

## 2024-02-12 RX ORDER — LOSARTAN POTASSIUM 50 MG/1
50 TABLET ORAL DAILY
Qty: 90 TABLET | Refills: 3 | Status: SHIPPED | OUTPATIENT
Start: 2024-02-12 | End: 2024-03-06 | Stop reason: SDUPTHER

## 2024-02-12 RX ORDER — ACYCLOVIR 400 MG/1
400 TABLET ORAL 2 TIMES DAILY
Qty: 10 TABLET | Refills: 0 | Status: SHIPPED | OUTPATIENT
Start: 2024-02-12 | End: 2024-02-21

## 2024-02-12 RX ORDER — CYANOCOBALAMIN 1000 UG/ML
1000 INJECTION, SOLUTION INTRAMUSCULAR; SUBCUTANEOUS
Status: COMPLETED | OUTPATIENT
Start: 2024-02-12 | End: 2024-02-12

## 2024-02-12 RX ADMIN — CYANOCOBALAMIN 1000 MCG: 1000 INJECTION, SOLUTION INTRAMUSCULAR; SUBCUTANEOUS at 02:02

## 2024-02-12 NOTE — PATIENT INSTRUCTIONS
DASH diet for Hypertension and Healthy Eating  Provided by the [facility]    Healthy Lifestyle   Nutrition and healthy eating  The DASH diet emphasizes portion size, eating a variety of foods and getting the right amount of nutrients. Discover how DASH can improve your health and lower your blood pressure.  By [facility] Staff   DASH stands for Dietary Approaches to Stop Hypertension. The DASH diet is a lifelong approach to healthy eating that's designed to help treat or prevent high blood pressure (hypertension). The DASH diet encourages you to reduce the sodium in your diet and eat a variety of foods rich in nutrients that help lower blood pressure, such as potassium, calcium and magnesium.  By following the DASH diet, you may be able to reduce your blood pressure by a few points in just two weeks. Over time, your systolic blood pressure could drop by eight to 14 points, which can make a significant difference in your health risks.  Because the DASH diet is a healthy way of eating, it offers health benefits besides just lowering blood pressure. The DASH diet is also in line with dietary recommendations to prevent osteoporosis, cancer, heart disease, stroke and diabetes.  The DASH diet emphasizes vegetables, fruits and low-fat dairy foods -- and moderate amounts of whole grains, fish, poultry and nuts.  In addition to the standard DASH diet, there is also a lower sodium version of the diet. You can choose the version of the diet that meets your health needs:  Standard DASH diet. You can consume up to 2,300 milligrams (mg) of sodium a day.   Lower sodium DASH diet. You can consume up to 1,500 mg of sodium a day.  Both versions of the DASH diet aim to reduce the amount of sodium in your diet compared with what you might get in a typical American diet, which can amount to a whopping 3,400 mg of sodium a day or more.  The standard DASH diet meets the recommendation from the Dietary Guidelines for Americans to keep  "daily sodium intake to less than 2,300 mg a day.  The American Heart Association recommends 1,500 mg a day of sodium as an upper limit for all adults. If you aren't sure what sodium level is right for you, talk to your doctor.  Both versions of the DASH diet include lots of whole grains, fruits, vegetables and low-fat dairy products. The DASH diet also includes some fish, poultry and legumes, and encourages a small amount of nuts and seeds a few times a week.   You can eat red meat, sweets and fats in small amounts. The DASH diet is low in saturated fat, cholesterol and total fat.  Here's a look at the recommended servings from each food group for the 2,[id number]-day DASH diet.  Grains: 6 to 8 servings a day  Grains include bread, cereal, rice and pasta. Examples of one serving of grains include 1 slice whole-wheat bread, 1 ounce dry cereal, or 1/2 cup cooked cereal, rice or pasta.  Focus on whole grains because they have more fiber and nutrients than do refined grains. For instance, use brown rice instead of white rice, whole-wheat pasta instead of regular pasta and whole-grain bread instead of white bread. Look for products labeled "100 percent whole grain" or "100 percent whole wheat."   Grains are naturally low in fat. Keep them this way by avoiding butter, cream and cheese sauces.  Vegetables: 4 to 5 servings a day  Tomatoes, carrots, broccoli, sweet potatoes, greens and other vegetables are full of fiber, vitamins, and such minerals as potassium and magnesium. Examples of one serving include 1 cup raw leafy green vegetables or 1/2 cup cut-up raw or cooked vegetables.  Don't think of vegetables only as side dishes -- a hearty blend of vegetables served over brown rice or whole-wheat noodles can serve as the main dish for a meal.   Fresh and frozen vegetables are both good choices. When buying frozen and canned vegetables, choose those labeled as low sodium or without added salt.   To increase the number of " servings you fit in daily, be creative. In a stir-richardson, for instance, cut the amount of meat in half and double up on the vegetables.  Fruits: 4 to 5 servings a day  Many fruits need little preparation to become a healthy part of a meal or snack. Like vegetables, they're packed with fiber, potassium and magnesium and are typically low in fat -- coconuts are an exception. Examples of one serving include one medium fruit, 1/2 cup fresh, frozen or canned fruit, or 4 ounces of juice.  Have a piece of fruit with meals and one as a snack, then round out your day with a dessert of fresh fruits topped with a dollop of low-fat yogurt.   Leave on edible peels whenever possible. The peels of apples, pears and most fruits with pits add interesting texture to recipes and contain healthy nutrients and fiber.   Remember that citrus fruits and juices, such as grapefruit, can interact with certain medications, so check with your doctor or pharmacist to see if they're OK for you.   If you choose canned fruit or juice, make sure no sugar is added.  Dairy: 2 to 3 servings a day  Milk, yogurt, cheese and other dairy products are major sources of calcium, vitamin D and protein. But the key is to make sure that you choose dairy products that are low fat or fat-free because otherwise they can be a major source of fat -- and most of it is saturated. Examples of one serving include 1 cup skim or 1 percent milk, 1 cup low fat yogurt, or 1 1/2 ounces part-skim cheese.  Low-fat or fat-free frozen yogurt can help you boost the amount of dairy products you eat while offering a sweet treat. Add fruit for a healthy twist.   If you have trouble digesting dairy products, choose lactose-free products or consider taking an over-the-counter product that contains the enzyme lactase, which can reduce or prevent the symptoms of lactose intolerance.   Go easy on regular and even fat-free cheeses because they are typically high in sodium.  Lean meat, poultry  and fish: 6 servings or fewer a day  Meat can be a rich source of protein, B vitamins, iron and zinc. Choose lean varieties and aim for no more than 6 ounces a day. Cutting back on your meat portion will allow room for more vegetables.  Trim away skin and fat from poultry and meat and then bake, broil, grill or roast instead of frying in fat.   Eat heart-healthy fish, such as salmon, herring and tuna. These types of fish are high in omega-3 fatty acids, which can help lower your total cholesterol.  Nuts, seeds and legumes: 4 to 5 servings a week  Almonds, sunflower seeds, kidney beans, peas, lentils and other foods in this family are good sources of magnesium, potassium and protein. They're also full of fiber and phytochemicals, which are plant compounds that may protect against some cancers and cardiovascular disease.  Serving sizes are small and are intended to be consumed only a few times a week because these foods are high in calories. Examples of one serving include 1/3 cup nuts, 2 tablespoons seeds, or 1/2 cup cooked beans or peas.   Nuts sometimes get a bad rap because of their fat content, but they contain healthy types of fat -- monounsaturated fat and omega-3 fatty acids. They're high in calories, however, so eat them in moderation. Try adding them to stir-fries, salads or cereals.   Soybean-based products, such as tofu and tempeh, can be a good alternative to meat because they contain all of the amino acids your body needs to make a complete protein, just like meat.  Fats and oils: 2 to 3 servings a day  Fat helps your body absorb essential vitamins and helps your body's immune system. But too much fat increases your risk of heart disease, diabetes and obesity. The DASH diet strives for a healthy balance by limiting total fat to less than 30 percent of daily calories from fat, with a focus on the healthier monounsaturated fats.  Examples of one serving include 1 teaspoon soft margarine, 1 tablespoon  mayonnaise or 2 tablespoons salad dressing.  Saturated fat and trans fat are the main dietary culprits in increasing your risk of coronary artery disease. DASH helps keep your daily saturated fat to less than 6 percent of your total calories by limiting use of meat, butter, cheese, whole milk, cream and eggs in your diet, along with foods made from lard, solid shortenings, and palm and coconut oils.   Avoid trans fat, commonly found in such processed foods as crackers, baked goods and fried items.   Read food labels on margarine and salad dressing so that you can choose those that are lowest in saturated fat and free of trans fat.  Sweets: 5 servings or fewer a week  You don't have to banish sweets entirely while following the DASH diet -- just go easy on them. Examples of one serving include 1 tablespoon sugar, jelly or jam, 1/2 cup sorbet, or 1 cup lemonade.  When you eat sweets, choose those that are fat-free or low-fat, such as sorbets, fruit ices, jelly beans, hard candy, aaliyah crackers or low-fat cookies.   Artificial sweeteners such as aspartame (NutraSweet, Equal) and sucralose (Splenda) may help satisfy your sweet tooth while sparing the sugar. But remember that you still must use them sensibly. It's OK to swap a diet cola for a regular cola, but not in place of a more nutritious beverage such as low-fat milk or even plain water.   Cut back on added sugar, which has no nutritional value but can pack on calories.  Drinking too much alcohol can increase blood pressure. The Dietary Guidelines for Americans recommends that men limit alcohol to no more than two drinks a day and women to one or less.  The DASH diet doesn't address caffeine consumption. The influence of caffeine on blood pressure remains unclear. But caffeine can cause your blood pressure to rise at least temporarily. If you already have high blood pressure or if you think caffeine is affecting your blood pressure, talk to your doctor about your  "caffeine consumption.  While the DASH diet is not a weight-loss program, you may indeed lose unwanted pounds because it can help guide you toward healthier food choices.  The DASH diet generally includes about 2,000 calories a day. If you're trying to lose weight, you may need to eat fewer calories. You may also need to adjust your serving goals based on your individual circumstances -- something your health care team can help you decide.  The foods at the core of the DASH diet are naturally low in sodium. So just by following the DASH diet, you're likely to reduce your sodium intake. You also reduce sodium further by:  Using sodium-free spices or flavorings with your food instead of salt   Not adding salt when cooking rice, pasta or hot cereal   Rinsing canned foods to remove some of the sodium   Buying foods labeled "no salt added," "sodium-free," "low sodium" or "very low sodium"  One teaspoon of table salt has 2,325 mg of sodium. When you read food labels, you may be surprised at just how much sodium some processed foods contain. Even low-fat soups, canned vegetables, ready-to-eat cereals and sliced turkey from the local deli -- foods you may have considered healthy -- often have lots of sodium.  You may notice a difference in taste when you choose low-sodium food and beverages. If things seem too bland, gradually introduce low-sodium foods and cut back on table salt until you reach your sodium goal. That'll give your palate time to adjust.  Using salt-free seasoning blends or herbs and spices may also ease the transition. It can take several weeks for your taste buds to get used to less salty foods.  Try these strategies to get started on the DASH diet:   Change gradually. If you now eat only one or two servings of fruits or vegetables a day, try to add a serving at lunch and one at dinner. Rather than switching to all whole grains, start by making one or two of your grain servings whole grains. Increasing " fruits, vegetables and whole grains gradually can also help prevent bloating or diarrhea that may occur if you aren't used to eating a diet with lots of fiber. You can also try over-the-counter products to help reduce gas from beans and vegetables.   Reward successes and forgive slip-ups. Reward yourself with a nonfood treat for your accomplishments -- rent a movie, purchase a book or get together with a friend. Everyone slips, especially when learning something new. Remember that changing your lifestyle is a long-term process. Find out what triggered your setback and then just  where you left off with the DASH diet.   Add physical activity. To boost your blood pressure lowering efforts even more, consider increasing your physical activity in addition to following the DASH diet. Combining both the DASH diet and physical activity makes it more likely that you'll reduce your blood pressure.   Get support if you need it. If you're having trouble sticking to your diet, talk to your doctor or dietitian about it. You might get some tips that will help you stick to the DASH diet.  Remember, healthy eating isn't an all-or-nothing proposition. What's most important is that, on average, you eat healthier foods with plenty of variety -- both to keep your diet nutritious and to avoid boredom or extremes. And with the DASH diet, you can have both.

## 2024-02-12 NOTE — PROGRESS NOTES
SUBJECTIVE:   89 y.o. female for annual routine Pap and checkup.  Transitional Care Note    Family and/or Caretaker present at visit?  Yes.  Diagnostic tests reviewed/disposition: I have reviewed all completed as well as pending diagnostic tests at the time of discharge.  Disease/illness education: anemia/ adenocarcinoma cecum  Home health/community services discussion/referrals: Patient has home health established at d/c .   Establishment or re-establishment of referral orders for community resources: No other necessary community resources.   Discussion with other health care providers: No discussion with other health care providers necessary.   Anemia: Patient presents for presents evaluation of anemia. Anemia was found by routine CBC.  It has been present for a few months.  Associated signs & symptoms: fatigue, hematochezia, history of renal disease, and numbness/paresthesias.        Current Outpatient Medications   Medication Sig Dispense Refill    b complex vitamins tablet Take 1 tablet by mouth once daily.      calcium citrate-vitamin D3 315-200 mg (CITRACAL+D) 315 mg-5 mcg (200 unit) per tablet Take 1 tablet by mouth once daily.      dorzolamide-timolol 2-0.5% (COSOPT) 22.3-6.8 mg/mL ophthalmic solution Place 1 drop into both eyes every 12 (twelve) hours. 30 mL 4    lovastatin (MEVACOR) 10 MG tablet Take 1 tablet (10 mg total) by mouth every evening. 90 tablet 0    multivitamin capsule Take 1 capsule by mouth once daily.      acetaminophen (TYLENOL) 325 MG tablet Take 2 tablets (650 mg total) by mouth every 8 (eight) hours as needed. (Patient not taking: Reported on 2/9/2024)  0    acyclovir (ZOVIRAX) 400 MG tablet Take 1 tablet (400 mg total) by mouth 2 (two) times daily. for 5 days 10 tablet 0    cyanocobalamin 1,000 mcg/mL injection 1,000 mg I'm weekly x 2 then monthly 10 mL 2    hydrocortisone 2.5 % cream Apply topically every evening. (Patient not taking: Reported on 2/9/2024) 3.5 g 2    iron sucrose 200  "mg iron/10 mL Soln injection Inject 10 mLs (200 mg total) into the vein once daily. for 8 days 80 mL 0    losartan (COZAAR) 50 MG tablet Take 1 tablet (50 mg total) by mouth once daily. 90 tablet 3     No current facility-administered medications for this visit.     Facility-Administered Medications Ordered in Other Visits   Medication Dose Route Frequency Provider Last Rate Last Admin    sodium chloride 0.9% flush 10 mL  10 mL Intravenous PRN Rody Alfaro NP   10 mL at 02/14/24 1315     Allergies: Contrast media, Aspirin, Ciprofloxacin, Iron, Iodine, and Penicillins   Patient's last menstrual period was 12/07/1972.    ROS:  Feeling well. No dyspnea or chest pain on exertion.  No abdominal pain, change in bowel habits, black or bloody stools.  No urinary tract symptoms. GYN ROS: no breast pain or new or enlarging lumps on self exam, no vaginal bleeding, no discharge or pelvic pain. No neurological complaints.    OBJECTIVE:   The patient appears well, pale weak, thin, alert, oriented x 3, in no distress.  /60 (BP Location: Left forearm, Patient Position: Sitting, BP Method: Small (Manual))   Pulse 68   Temp 98.3 °F (36.8 °C) (Oral)   Ht 4' 11.02" (1.499 m)   Wt 50.3 kg (110 lb 12.5 oz)   LMP 12/07/1972   SpO2 98%   BMI 22.36 kg/m²   ENT normal.  Neck supple. No adenopathy or thyromegaly. Left upper lips herpetic lesion.DAVID. Lungs are clear, good air entry, no wheezes, rhonchi or rales. S1 and S2 normal, no murmurs, regular rate and rhythm. Abdomen soft without tenderness, guarding, mass or organomegaly. Extremities show no edema, normal peripheral pulses. Neurological is normal, no focal findings.    BREAST EXAM: not examined    PELVIC EXAM: examination not indicated    ASSESSMENT: Brie was seen today for hospital follow up.    Diagnoses and all orders for this visit:    Iron deficiency anemia due to chronic blood loss  -     Cancel: Ferritin; Future  -     Cancel: Iron and TIBC; Future  -  "    Cancel: COMPREHENSIVE METABOLIC PANEL; Future  -     Cancel: CBC W/ AUTO DIFFERENTIAL; Future  -     cyanocobalamin injection 1,000 mcg  -     Prior authorization Order  -     cyanocobalamin 1,000 mcg/mL injection; 1,000 mg I'm weekly x 2 then monthly  -     Ferritin; Future  -     Iron and TIBC; Future  -     COMPREHENSIVE METABOLIC PANEL; Future  -     CBC W/ AUTO DIFFERENTIAL; Future    Transaminitis  -     Cancel: COMPREHENSIVE METABOLIC PANEL; Future  -     COMPREHENSIVE METABOLIC PANEL; Future    Other neutropenia    PAD (peripheral artery disease)    Colonic mass  -     iron sucrose 200 mg iron/10 mL Soln injection; Inject 10 mLs (200 mg total) into the vein once daily. for 8 days    Impaired function of upper extremity  -     iron sucrose 200 mg iron/10 mL Soln injection; Inject 10 mLs (200 mg total) into the vein once daily. for 8 days    Impairment of balance  -     iron sucrose 200 mg iron/10 mL Soln injection; Inject 10 mLs (200 mg total) into the vein once daily. for 8 days    Decreased functional mobility and endurance  -     iron sucrose 200 mg iron/10 mL Soln injection; Inject 10 mLs (200 mg total) into the vein once daily. for 8 days    Stage 3a chronic kidney disease  -     iron sucrose 200 mg iron/10 mL Soln injection; Inject 10 mLs (200 mg total) into the vein once daily. for 8 days    Acute pain of left shoulder  -     iron sucrose 200 mg iron/10 mL Soln injection; Inject 10 mLs (200 mg total) into the vein once daily. for 8 days    HTN (hypertension), benign  -     losartan (COZAAR) 50 MG tablet; Take 1 tablet (50 mg total) by mouth once daily.    Herpes simplex  -     acyclovir (ZOVIRAX) 400 MG tablet; Take 1 tablet (400 mg total) by mouth 2 (two) times daily. for 5 days    Adenocarcinoma of colon      Ht,.Stable and controlled. Continue current treatment plan as previously prescribed with your PCP.   She wishes to also address some pain in the shoulders at today's visit. These have been  mild-to-moderate in nature, gradual in onset. Exam shows mild general joint tenderness. These pains seem benign and are likely related to arthralgias. OTC or prescription NSAID's are recommended for PRN use, side effects are discussed. Return for further discussion if these persist or worsen.    well woman    PLAN:   .DNR      Discussed health maintenance guidelines appropriate for age.    Tracey Zacarias nurse [phone].

## 2024-02-12 NOTE — TELEPHONE ENCOUNTER
Patient seen in office by Dr. Gould today for hospital follow up.  Call placed to patient's grandson (Prabhjot) who [facility] to have [facility] drawn.  The staff at the [facility] advised they did not have lab orders for patient.  Mr. Rick advised the nurse at Dr. Gould's office scheduled the lab appointment at the [facility] for patient.  Lab orders for Ferritin, Iron, CMP and CBC noted to have been placed on today's date by Dr. Gould. Writer is not able to schedule appointments for labs at the [facility].  Will forward message to Dr. Gould's nurse for further assistance.

## 2024-02-12 NOTE — DISCHARGE SUMMARY
[facility] Medicine  Discharge Summary      Patient Name: Brie Han  MRN: [id number]  RAFAEL: [id number]  Patient Class: IP- Inpatient  Admission Date: 2/2/2024  Hospital Length of Stay: 5 days  Discharge Date and Time: 2/8/2024  4:58 PM  Attending Physician: Yanet att. providers found   Discharging Provider: Rody Alfaro NP  Primary Care Provider: Colten Gould MD    Primary Care Team: Networked reference to record PCT     HPI:   Brie Han is a 89 y.o. female with past medical history significant for gerd, hypertension, hyperlipidemia, glaucoma, stroke,and arthritis, who presents to the emergency department with symptomatic anemia. Patient was seen outpatient by Dr. Gould for anemia outpatient. Patient had routine labs that showed her hemoglobin had decreased on yesterday.  Patient was sent to the emergency room for evaluation of abnormal labs.  Patient reported that for several days she was dyspneic on exertion and fatigue.  Patient denies any shortness a breath at rest.  Patient ambulating in the hallway without difficulty.   Patient states that she lives at home alone.  Patient denies smoking, drinking, in the use of illicit drugs.  Patient denies hematuria, black tarry stools, or keyla blood in stools. Patient endorsed she has hemorrhoids occasionally bleed and she has a use a pad for protection.  Patient endorses that bleeding has significantly decreased since stopping the use of her blood thinner.  Patient is on chronic anticoagulation therapy and has been off of her Plavix for 2 days per request of her PCP.  Patient is a Zoroastrian therefore does not want a blood transfusion.  GI consulted.  ED workup:  CBC shows anemia with hemoglobin of 6.5 hematocrit of 22.  CMP hyponatremia 135 with decreased anion gap-6. Review of outpatient labs revealed ferritin-7, Iron-23, TIBC-617, Transferrin-417, and saturated 4.  Patient will be admitted to Hospital Medicine for  observation and evaluation.        Procedure(s) (LRB):  EGD (ESOPHAGOGASTRODUODENOSCOPY) (N/A)  COLONOSCOPY (N/A)      Hospital Course:   Brie Han was closely monitored while in the hospital for symptomatic anemia.  GI was consulted.  Eliquis placed on hold for EGD to be performed on 02/05/2024.  CBC was trended.  Patient received bowel prep.  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  Biopsies taken. General surgery consulted and will follow up once hgb stabilizes. Hgb 5.5 and patient does not wish for blood transition due to Hinduism preference. and hematology-oncologic ambulatory referral per GI recommendations.  Family requested that referral be sent to  Dr. Oneill at [facility]. Patient was able to ambulate in room without assist while in the hospital.  Family requested patient be sent home with home health, due to age and other risk factors.  Case management consulted for for discharge planning.  Patient was cleared by GI to be discharged from the hospital. Patient deemed appropriate for discharge.  Patient and grandson educated on discharge planning, both verbalized understanding.  Patient is to follow with primary care provider within 1 week.  Patient is to follow up outpatient with General surgery and Hematology-Oncology.     Discharge was appealed. The following morning patient had an elevated temperature of 103°.  Patient met sepsis criteria.  Patient initiated IV Zosyn and pharmacy to dose vancomycin.  Blood cultures negative.  CBC, CMP, and procalcitonin ordered.  Chest x-ray was unremarkable.    Physical therapy ordered for evaluation and treatment.  Antibiotics deescalated. She was noted to have iron saturation 4% and initiated on IV iron. Hematology consulted and recommends IV iron x 10 doses. She underwent CT chest/abd/pelvis demonstrated  6 mm soft tissue density nodule in the left lower lobe which could represent a single metastasis.  She is recommended to follow up with hematology/oncology outpatient. She is medically stable for DC with outpatient follow up. Iron infusions arranged at [facility].    Discussed Goals of Care. Patient and son wish for hospice information visit. She will dc home with hospice.     Goals of Care Treatment Preferences:  Code Status: DNR    Living Will: Yes     What is most important right now is to focus on remaining as independent as possible, symptom/pain control.  Accordingly, we have decided that the best plan to meet the patient's goals includes continuing with treatment, enrolling in hospice care.      Consults:   Consults (From admission, onward)          Status Ordering Provider     Inpatient consult to General Surgery  Once        Provider:  Tomy Ji MD    Completed BINTA CANTRELL     Inpatient consult to Hematology/Oncology  Once        Provider:  (Not yet assigned)    Completed BINTA CANTRELL     Inpatient consult to Midline team  Once        Provider:  (Not yet assigned)    Completed JAKE LAROSE     Inpatient consult to Social Work/Case Management  Once        Provider:  (Not yet assigned)    Completed JAKE LAROSE     Inpatient consult to Gastroenterology  Once        Provider:  Trev Lafleur MD    Completed ARMIN CHIU            No new Assessment & Plan notes have been filed under this hospital service since the last note was generated.  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 Go on 2/12/2024.    Specialty: Family Medicine  Why: Hospital follow up @ 1:00  Contact information:  [address] [facility] [address]  [phone]               Kulwant Seth Jr., MD Follow up in 2 week(s).    Specialty: General Surgery  Contact information:  [address]  [address]  [place] [facility] [address]  [phone]               Sarai Dawson MD Follow up in 1 week(s).    Specialty: Hematology and Oncology  Contact information:  [address]  [address]  [facility] [address]  [phone]               [facility] Follow up.    Specialty: Infusion Therapy  Why: phone number- [phone]  Contact information:  [address] [place] [address] [address] [place] [address] [phone]  Additional information:  2nd Floor             [facility] [place] - Follow up.    Specialty: Home Health Services  Contact information:  [address] [facility]  [place] [address]  [phone]                           Patient Instructions:      Ambulatory referral/consult to Home Health   Standing Status: Future   Referral Priority: Routine Referral Type: Home Health   Referral Reason: Specialty Services Required   Requested Specialty: Home Health Services   Number of Visits Requested: 1     Ambulatory referral/consult to Hematology / Oncology   Standing Status: Future   Referral Priority: Routine Referral Type: Consultation   Referral Reason: Specialty Services Required   Referred to Provider: SARAI DAWSON Requested Specialty: Hematology and Oncology   Number of Visits Requested: 1     Ambulatory referral/consult to  Infusion Dept   Standing Status: Future   Referral Priority: Routine Referral Type: Consultation   Referral Reason: Specialty Services Required   Number of Visits Requested: 1     Diet Adult Regular     Diet Adult Regular     Notify your health care provider if you experience any of the following:  temperature >100.4     Notify your health care provider if you experience any of the following:  persistent nausea and vomiting or diarrhea     Activity as tolerated     Activity as tolerated       Significant Diagnostic Studies: N/A    Pending Diagnostic Studies:       None           Medications:  Reconciled Home Medications:      Medication List        START taking these medications      iron sucrose 200 mg iron/10 mL Soln injection  Inject 10 mLs (200 mg total) into the vein once daily. for 8 days            CHANGE how you take these medications      latanoprost 0.005 % ophthalmic solution  Place 1 drop into both eyes once daily.  What changed: when to take this     NIFEdipine 30 MG (OSM) 24 hr tablet  Commonly known as: PROCARDIA-XL  Take 1 tablet (30 mg total) by mouth once daily.  What changed: when to take this            CONTINUE taking these medications      b complex vitamins tablet  Take 1 tablet by mouth once daily.     calcium citrate-vitamin D3 315-200 mg 315 mg-5 mcg (200 unit) per tablet  Commonly known as: CITRACAL+D  Take 1 tablet by mouth once daily.     dorzolamide-timolol 2-0.5% 22.3-6.8 mg/mL ophthalmic solution  Commonly known as: COSOPT  Place 1 drop into both eyes every 12 (twelve) hours.     qlsiwkvxp-V1-zvN60-algal oil 3 mg-35 mg-2 mg -90.314 mg Cap  Commonly known as: METANX (ALGAL OIL)  Take 1 tablet by mouth once daily.     losartan 100 MG tablet  Commonly known as: COZAAR  Take 1 tablet (100 mg total) by mouth once daily.     lovastatin 10 MG tablet  Commonly known as: MEVACOR  Take 1 tablet (10 mg total) by mouth every evening.     multivitamin capsule  Take 1 capsule by mouth once daily.             STOP taking these medications      clopidogreL 75 mg tablet  Commonly known as: PLAVIX            ASK your doctor about these medications      acetaminophen 325 MG tablet  Commonly known as: TYLENOL  Take 2 tablets (650 mg total) by mouth every 8 (eight) hours as needed.     hydrocortisone 2.5 % cream  Apply topically every evening.              Indwelling Lines/Drains at time of discharge:   Lines/Drains/Airways       None                   Time spent on the discharge of patient:  70 minutes         Rody Alfaro NP  Department of Hospital Medicine  [facility]urg

## 2024-02-12 NOTE — TELEPHONE ENCOUNTER
Tammy Sanderson Staff     ----- Message from Tammy Sanderson sent at 2/12/2024  4:23 PM CST -----  Contact: pt devan  .Type: Needs Medical Advice         Who Called: pt devan- Prabhjot Chris Call Back Number: [phone]    Additional Information: Requesting a call back regarding  pt unable to have stat labs infusion center were unable to draw labs they said they don't have orders and pt devan said they lady was acting like they didn't want to do it.   Please Advise- Thank you

## 2024-02-14 ENCOUNTER — INFUSION (OUTPATIENT)
Dept: INFUSION THERAPY | Facility: HOSPITAL | Age: 89
End: 2024-02-14
Attending: NURSE PRACTITIONER
Payer: MEDICARE

## 2024-02-14 VITALS
SYSTOLIC BLOOD PRESSURE: 156 MMHG | TEMPERATURE: 98 F | RESPIRATION RATE: 16 BRPM | BODY MASS INDEX: 22.23 KG/M2 | WEIGHT: 110.13 LBS | HEART RATE: 60 BPM | OXYGEN SATURATION: 100 % | DIASTOLIC BLOOD PRESSURE: 65 MMHG

## 2024-02-14 DIAGNOSIS — D50.8 OTHER IRON DEFICIENCY ANEMIA: Primary | ICD-10-CM

## 2024-02-14 DIAGNOSIS — D64.9 SYMPTOMATIC ANEMIA: ICD-10-CM

## 2024-02-14 PROCEDURE — A4216 STERILE WATER/SALINE, 10 ML: HCPCS | Performed by: NURSE PRACTITIONER

## 2024-02-14 PROCEDURE — 96374 THER/PROPH/DIAG INJ IV PUSH: CPT

## 2024-02-14 PROCEDURE — 99211 OFF/OP EST MAY X REQ PHY/QHP: CPT | Mod: 25

## 2024-02-14 PROCEDURE — 25000003 PHARM REV CODE 250: Performed by: NURSE PRACTITIONER

## 2024-02-14 PROCEDURE — 63600175 PHARM REV CODE 636 W HCPCS: Performed by: NURSE PRACTITIONER

## 2024-02-14 RX ORDER — SODIUM CHLORIDE 0.9 % (FLUSH) 0.9 %
10 SYRINGE (ML) INJECTION
Status: DISCONTINUED | OUTPATIENT
Start: 2024-02-14 | End: 2024-02-14 | Stop reason: HOSPADM

## 2024-02-14 RX ORDER — EPINEPHRINE 0.3 MG/.3ML
0.3 INJECTION SUBCUTANEOUS ONCE AS NEEDED
OUTPATIENT
Start: 2024-02-22

## 2024-02-14 RX ORDER — SODIUM CHLORIDE 0.9 % (FLUSH) 0.9 %
10 SYRINGE (ML) INJECTION
OUTPATIENT
Start: 2024-02-22

## 2024-02-14 RX ORDER — DIPHENHYDRAMINE HYDROCHLORIDE 50 MG/ML
50 INJECTION INTRAMUSCULAR; INTRAVENOUS ONCE AS NEEDED
OUTPATIENT
Start: 2024-02-22

## 2024-02-14 RX ORDER — HEPARIN 100 UNIT/ML
500 SYRINGE INTRAVENOUS
OUTPATIENT
Start: 2024-02-22

## 2024-02-14 RX ADMIN — Medication 10 ML: at 01:02

## 2024-02-14 RX ADMIN — IRON SUCROSE 100 MG: 20 INJECTION, SOLUTION INTRAVENOUS at 01:02

## 2024-02-14 NOTE — PLAN OF CARE
Problem: Adult Inpatient Plan of Care  Goal: Optimal Comfort and Wellbeing  Outcome: Ongoing, Progressing  Intervention: Provide Person-Centered Care  Flowsheets (Taken 2/14/2024 0731)  Trust Relationship/Rapport:   care explained   choices provided   emotional support provided   empathic listening provided   questions answered   questions encouraged   reassurance provided   thoughts/feelings acknowledged

## 2024-02-16 ENCOUNTER — PATIENT MESSAGE (OUTPATIENT)
Dept: HEMATOLOGY/ONCOLOGY | Facility: CLINIC | Age: 89
End: 2024-02-16
Payer: MEDICARE

## 2024-02-16 DIAGNOSIS — D50.9 IRON DEFICIENCY ANEMIA, UNSPECIFIED IRON DEFICIENCY ANEMIA TYPE: Primary | ICD-10-CM

## 2024-02-19 ENCOUNTER — PATIENT MESSAGE (OUTPATIENT)
Dept: PRIMARY CARE CLINIC | Facility: CLINIC | Age: 89
End: 2024-02-19
Payer: MEDICARE

## 2024-02-20 ENCOUNTER — TELEPHONE (OUTPATIENT)
Dept: PRIMARY CARE CLINIC | Facility: CLINIC | Age: 89
End: 2024-02-20
Payer: MEDICARE

## 2024-02-20 DIAGNOSIS — B02.9 HERPES ZOSTER WITHOUT COMPLICATION: Primary | ICD-10-CM

## 2024-02-20 NOTE — TELEPHONE ENCOUNTER
Jaime Lazcano Staff     ----- Message from Jaime Lazcano sent at 2/20/2024  3:17 PM CST -----  Contact: TILA  Type: Needs Medical Advice  Who Called:  Deann/TILA    Best Call Back Number: [phone]  Additional Information: States she would like to speak with office regarding pt stopped acyclovir (ZOVIRAX) 400 MG tablet taking due to diarrhea.Please call and advise

## 2024-02-20 NOTE — TELEPHONE ENCOUNTER
Deann with [facility] reports patient stopped taking Acyclovir related to experiencing diarrhea.  Patient only took one dose.  Mrs. Zacarias is requesting to send message to Dr. Gould for notification.

## 2024-02-21 RX ORDER — VALACYCLOVIR HYDROCHLORIDE 1 G/1
1000 TABLET, FILM COATED ORAL 2 TIMES DAILY
Qty: 6 TABLET | Refills: 0 | Status: SHIPPED | OUTPATIENT
Start: 2024-02-21 | End: 2024-02-24

## 2024-02-21 NOTE — TELEPHONE ENCOUNTER
D/c Zovirax and try Valtrex x 3 days. We have to avoid nerve pain. Important to start this medications early. Spoken with Deann.

## 2024-02-22 NOTE — TELEPHONE ENCOUNTER
Noted.   Attached message indicates Dr. Gould spoke to home health nurse (Deann) for notification of new instructions.

## 2024-02-27 ENCOUNTER — TELEPHONE (OUTPATIENT)
Dept: SURGERY | Facility: CLINIC | Age: 89
End: 2024-02-27
Payer: MEDICARE

## 2024-02-27 NOTE — TELEPHONE ENCOUNTER
I called and spoke to patients grandson to schedule an appointment with Dr. Del Rio.  He said to cancel it.  I'll inform Dr. Lafleur and Dr. Del Rio.  Rodger

## 2024-03-05 ENCOUNTER — TELEPHONE (OUTPATIENT)
Dept: PRIMARY CARE CLINIC | Facility: CLINIC | Age: 89
End: 2024-03-05
Payer: MEDICARE

## 2024-03-05 DIAGNOSIS — I10 HTN (HYPERTENSION), BENIGN: ICD-10-CM

## 2024-03-05 RX ORDER — TERAZOSIN 2 MG/1
2 CAPSULE ORAL NIGHTLY
Qty: 30 CAPSULE | Refills: 11 | Status: CANCELLED | OUTPATIENT
Start: 2024-03-05 | End: 2025-03-05

## 2024-03-05 NOTE — TELEPHONE ENCOUNTER
Laurel Vargas Staff     ----- Message from Laurel Vargas sent at 3/5/2024  3:19 PM CST -----  Regarding: high BP  Contact: meri  Type:  Needs Medical Advice    Who Called: Meri  Would the patient rather a call back or a response via [facility]? Call back  Best Call Back Number: [phone]  Additional Information: Tanesha the home health nurse sts the pt BP was 179/81 and complaining of headaches at night--please advise

## 2024-03-06 RX ORDER — LOSARTAN POTASSIUM 100 MG/1
100 TABLET ORAL DAILY
Qty: 90 TABLET | Refills: 3 | Status: SHIPPED | OUTPATIENT
Start: 2024-03-06

## 2024-03-06 RX ORDER — NIFEDIPINE 30 MG/1
30 TABLET, EXTENDED RELEASE ORAL NIGHTLY
Qty: 90 TABLET | Refills: 3 | Status: SHIPPED | OUTPATIENT
Start: 2024-03-06 | End: 2025-03-06

## 2024-03-06 NOTE — TELEPHONE ENCOUNTER
Spoken with her son. The latest CBC with hemoglobin at 10. /80 will go back to Losartan 100 daily and Procardia XL 30 night.

## 2024-03-07 ENCOUNTER — TELEPHONE (OUTPATIENT)
Dept: PRIMARY CARE CLINIC | Facility: CLINIC | Age: 89
End: 2024-03-07
Payer: MEDICARE

## 2024-03-07 DIAGNOSIS — D63.8 ANEMIA, CHRONIC DISEASE: Primary | ICD-10-CM

## 2024-03-07 DIAGNOSIS — C18.9 ADENOCARCINOMA OF COLON: ICD-10-CM

## 2024-03-07 DIAGNOSIS — I10 HTN (HYPERTENSION), MALIGNANT: ICD-10-CM

## 2024-03-07 NOTE — TELEPHONE ENCOUNTER
Spoke to home health nurse via phone advised patient has appointment scheduled tomorrow with NP. Called patient grandson to confirm.

## 2024-03-07 NOTE — TELEPHONE ENCOUNTER
----- Message from Jaime Lazcano sent at 3/6/2024 12:29 PM CST -----  Contact:   Type: Needs Medical Advice  Who [facility]:  [facility]     [facility] [address] [place], [place] [address] - [address] [address]  [id number] [address]  [facility] [address]  Phone: [phone] Fax: [phone]    Best Call Back Number: [phone]  Additional Information: States she would like to speak with office regarding bp medications losartan (COZAAR) 50 MG tablet says pt grandson want to know if she can start back nefedipine 30MG.Please call back and advise

## 2024-03-08 ENCOUNTER — OFFICE VISIT (OUTPATIENT)
Dept: PRIMARY CARE CLINIC | Facility: CLINIC | Age: 89
End: 2024-03-08
Payer: MEDICARE

## 2024-03-08 ENCOUNTER — PATIENT MESSAGE (OUTPATIENT)
Dept: PRIMARY CARE CLINIC | Facility: CLINIC | Age: 89
End: 2024-03-08

## 2024-03-08 ENCOUNTER — OFFICE VISIT (OUTPATIENT)
Dept: OPHTHALMOLOGY | Facility: CLINIC | Age: 89
End: 2024-03-08
Payer: MEDICARE

## 2024-03-08 ENCOUNTER — LAB VISIT (OUTPATIENT)
Dept: LAB | Facility: HOSPITAL | Age: 89
End: 2024-03-08
Attending: NURSE PRACTITIONER
Payer: MEDICARE

## 2024-03-08 VITALS
BODY MASS INDEX: 21.07 KG/M2 | OXYGEN SATURATION: 100 % | WEIGHT: 104.5 LBS | TEMPERATURE: 98 F | HEIGHT: 59 IN | HEART RATE: 60 BPM | SYSTOLIC BLOOD PRESSURE: 136 MMHG | DIASTOLIC BLOOD PRESSURE: 58 MMHG

## 2024-03-08 DIAGNOSIS — H10.13 ALLERGIC CONJUNCTIVITIS OF BOTH EYES: ICD-10-CM

## 2024-03-08 DIAGNOSIS — I10 PRIMARY HYPERTENSION: Primary | ICD-10-CM

## 2024-03-08 DIAGNOSIS — H40.1122 PRIMARY OPEN-ANGLE GLAUCOMA, LEFT EYE, MODERATE STAGE: ICD-10-CM

## 2024-03-08 DIAGNOSIS — R26.81 UNSTEADY GAIT: ICD-10-CM

## 2024-03-08 DIAGNOSIS — L30.9 PERIORBITAL DERMATITIS: ICD-10-CM

## 2024-03-08 DIAGNOSIS — H40.1113 PRIMARY OPEN ANGLE GLAUCOMA (POAG) OF RIGHT EYE, SEVERE STAGE: ICD-10-CM

## 2024-03-08 DIAGNOSIS — D50.0 IRON DEFICIENCY ANEMIA DUE TO CHRONIC BLOOD LOSS: ICD-10-CM

## 2024-03-08 DIAGNOSIS — I10 PRIMARY HYPERTENSION: ICD-10-CM

## 2024-03-08 DIAGNOSIS — Z96.1 PSEUDOPHAKIA OF BOTH EYES: ICD-10-CM

## 2024-03-08 DIAGNOSIS — C18.0 ADENOCARCINOMA OF CECUM: ICD-10-CM

## 2024-03-08 DIAGNOSIS — H40.1113 PRIMARY OPEN ANGLE GLAUCOMA (POAG) OF RIGHT EYE, SEVERE STAGE: Primary | ICD-10-CM

## 2024-03-08 LAB
ALBUMIN SERPL BCP-MCNC: 3.9 G/DL (ref 3.5–5.2)
ALP SERPL-CCNC: 65 U/L (ref 55–135)
ALT SERPL W/O P-5'-P-CCNC: 18 U/L (ref 10–44)
ANION GAP SERPL CALC-SCNC: 8 MMOL/L (ref 8–16)
ANISOCYTOSIS BLD QL SMEAR: SLIGHT
AST SERPL-CCNC: 23 U/L (ref 10–40)
BASOPHILS # BLD AUTO: 0.06 K/UL (ref 0–0.2)
BASOPHILS NFR BLD: 2.2 % (ref 0–1.9)
BILIRUB SERPL-MCNC: 0.4 MG/DL (ref 0.1–1)
BUN SERPL-MCNC: 12 MG/DL (ref 10–30)
CALCIUM SERPL-MCNC: 9.3 MG/DL (ref 8.7–10.5)
CHLORIDE SERPL-SCNC: 102 MMOL/L (ref 95–110)
CO2 SERPL-SCNC: 25 MMOL/L (ref 23–29)
CREAT SERPL-MCNC: 0.8 MG/DL (ref 0.5–1.4)
DACRYOCYTES BLD QL SMEAR: ABNORMAL
DIFFERENTIAL METHOD BLD: ABNORMAL
EOSINOPHIL # BLD AUTO: 0.2 K/UL (ref 0–0.5)
EOSINOPHIL NFR BLD: 5.5 % (ref 0–8)
ERYTHROCYTE [DISTWIDTH] IN BLOOD BY AUTOMATED COUNT: ABNORMAL % (ref 11.5–14.5)
EST. GFR  (NO RACE VARIABLE): >60 ML/MIN/1.73 M^2
GLUCOSE SERPL-MCNC: 95 MG/DL (ref 70–110)
HCT VFR BLD AUTO: 37.8 % (ref 37–48.5)
HGB BLD-MCNC: 11.7 G/DL (ref 12–16)
HYPOCHROMIA BLD QL SMEAR: ABNORMAL
IMM GRANULOCYTES # BLD AUTO: 0.01 K/UL (ref 0–0.04)
IMM GRANULOCYTES NFR BLD AUTO: 0.4 % (ref 0–0.5)
LYMPHOCYTES # BLD AUTO: 1 K/UL (ref 1–4.8)
LYMPHOCYTES NFR BLD: 37.3 % (ref 18–48)
MCH RBC QN AUTO: 29.3 PG (ref 27–31)
MCHC RBC AUTO-ENTMCNC: 31 G/DL (ref 32–36)
MCV RBC AUTO: 95 FL (ref 82–98)
MONOCYTES # BLD AUTO: 0.4 K/UL (ref 0.3–1)
MONOCYTES NFR BLD: 15.5 % (ref 4–15)
NEUTROPHILS # BLD AUTO: 1.1 K/UL (ref 1.8–7.7)
NEUTROPHILS NFR BLD: 39.1 % (ref 38–73)
NRBC BLD-RTO: 0 /100 WBC
PLATELET # BLD AUTO: 239 K/UL (ref 150–450)
PLATELET BLD QL SMEAR: ABNORMAL
PMV BLD AUTO: 10.1 FL (ref 9.2–12.9)
POIKILOCYTOSIS BLD QL SMEAR: SLIGHT
POTASSIUM SERPL-SCNC: 4.1 MMOL/L (ref 3.5–5.1)
PROT SERPL-MCNC: 7.4 G/DL (ref 6–8.4)
RBC # BLD AUTO: 4 M/UL (ref 4–5.4)
SODIUM SERPL-SCNC: 135 MMOL/L (ref 136–145)
TARGETS BLD QL SMEAR: ABNORMAL
WBC # BLD AUTO: 2.71 K/UL (ref 3.9–12.7)

## 2024-03-08 PROCEDURE — G2211 COMPLEX E/M VISIT ADD ON: HCPCS | Mod: HCNC,S$GLB,, | Performed by: OPHTHALMOLOGY

## 2024-03-08 PROCEDURE — 85025 COMPLETE CBC W/AUTO DIFF WBC: CPT | Performed by: NURSE PRACTITIONER

## 2024-03-08 PROCEDURE — 92020 GONIOSCOPY: CPT | Mod: HCNC,S$GLB,, | Performed by: OPHTHALMOLOGY

## 2024-03-08 PROCEDURE — 99214 OFFICE O/P EST MOD 30 MIN: CPT | Mod: HCNC,S$GLB,, | Performed by: OPHTHALMOLOGY

## 2024-03-08 PROCEDURE — 99214 OFFICE O/P EST MOD 30 MIN: CPT | Mod: HCNC,S$GLB,, | Performed by: NURSE PRACTITIONER

## 2024-03-08 PROCEDURE — 3288F FALL RISK ASSESSMENT DOCD: CPT | Mod: HCNC,CPTII,S$GLB, | Performed by: NURSE PRACTITIONER

## 2024-03-08 PROCEDURE — 99999 PR PBB SHADOW E&M-EST. PATIENT-LVL III: CPT | Mod: PBBFAC,HCNC,, | Performed by: OPHTHALMOLOGY

## 2024-03-08 PROCEDURE — 1159F MED LIST DOCD IN RCRD: CPT | Mod: HCNC,CPTII,S$GLB, | Performed by: NURSE PRACTITIONER

## 2024-03-08 PROCEDURE — 3288F FALL RISK ASSESSMENT DOCD: CPT | Mod: HCNC,CPTII,S$GLB, | Performed by: OPHTHALMOLOGY

## 2024-03-08 PROCEDURE — 80053 COMPREHEN METABOLIC PANEL: CPT | Performed by: NURSE PRACTITIONER

## 2024-03-08 PROCEDURE — 36415 COLL VENOUS BLD VENIPUNCTURE: CPT | Performed by: NURSE PRACTITIONER

## 2024-03-08 PROCEDURE — 1101F PT FALLS ASSESS-DOCD LE1/YR: CPT | Mod: HCNC,CPTII,S$GLB, | Performed by: OPHTHALMOLOGY

## 2024-03-08 PROCEDURE — 1157F ADVNC CARE PLAN IN RCRD: CPT | Mod: HCNC,CPTII,S$GLB, | Performed by: OPHTHALMOLOGY

## 2024-03-08 PROCEDURE — 1159F MED LIST DOCD IN RCRD: CPT | Mod: HCNC,CPTII,S$GLB, | Performed by: OPHTHALMOLOGY

## 2024-03-08 PROCEDURE — 99999 PR PBB SHADOW E&M-EST. PATIENT-LVL V: CPT | Mod: PBBFAC,HCNC,, | Performed by: NURSE PRACTITIONER

## 2024-03-08 PROCEDURE — 1160F RVW MEDS BY RX/DR IN RCRD: CPT | Mod: HCNC,CPTII,S$GLB, | Performed by: OPHTHALMOLOGY

## 2024-03-08 PROCEDURE — 1157F ADVNC CARE PLAN IN RCRD: CPT | Mod: HCNC,CPTII,S$GLB, | Performed by: NURSE PRACTITIONER

## 2024-03-08 PROCEDURE — 1101F PT FALLS ASSESS-DOCD LE1/YR: CPT | Mod: HCNC,CPTII,S$GLB, | Performed by: NURSE PRACTITIONER

## 2024-03-08 PROCEDURE — 1160F RVW MEDS BY RX/DR IN RCRD: CPT | Mod: HCNC,CPTII,S$GLB, | Performed by: NURSE PRACTITIONER

## 2024-03-08 PROCEDURE — 1111F DSCHRG MED/CURRENT MED MERGE: CPT | Mod: HCNC,CPTII,S$GLB, | Performed by: OPHTHALMOLOGY

## 2024-03-08 PROCEDURE — 1111F DSCHRG MED/CURRENT MED MERGE: CPT | Mod: HCNC,CPTII,S$GLB, | Performed by: NURSE PRACTITIONER

## 2024-03-08 PROCEDURE — 1126F AMNT PAIN NOTED NONE PRSNT: CPT | Mod: HCNC,CPTII,S$GLB, | Performed by: NURSE PRACTITIONER

## 2024-03-08 PROCEDURE — 1126F AMNT PAIN NOTED NONE PRSNT: CPT | Mod: HCNC,CPTII,S$GLB, | Performed by: OPHTHALMOLOGY

## 2024-03-08 RX ORDER — ERYTHROMYCIN 5 MG/G
OINTMENT OPHTHALMIC NIGHTLY
Qty: 3.5 G | Refills: 1 | Status: SHIPPED | OUTPATIENT
Start: 2024-03-08 | End: 2024-03-15

## 2024-03-08 RX ORDER — FOLIC ACID 1 MG/1
1000 TABLET ORAL
COMMUNITY
Start: 2024-02-17

## 2024-03-08 NOTE — PROGRESS NOTES
"Subjective:       Patient ID: Brie Han is a 89 y.o. female.    Chief Complaint: Follow-up (1 month follow up)    HPI    Patient presents today for follow up visit. H/H elevated home BP readings. Last visit  on 2/12/2024-lower the losartan to 50 mg, stopped nifedipine-/60.   Was seem by  today-stop all glaucoma drops -3/5/24  "Spoken with her son. The latest CBC with hemoglobin at 10. /80 will go back to Losartan 100 daily and Procardia XL 30 night."  Son request outpatient PT/OT-I inform his that patient has PT/OT with hospice-he states "I don't want those people my house"  Labs reviewed 2/14/24: kidney function in normal range; H/H 8.0/27.7    Seen by me on 3/8/23  Iron infusion on 2/14/24 2/2/24 Hospital stay symptomatic anemia and new diagnosis colon care-Jehova witness-no blood-Hospic referral-DNR  Past Medical History:   Diagnosis Date    ACP (advance care planning) 2/7/2024    Amaurosis fugax     Bilateral left eye worse    Anticoagulant long-term use     Arthritis     Glaucoma     resolved    Hyperlipidemia     Hypertension     Stroke 2006       Review of patient's allergies indicates:   Allergen Reactions    Contrast media Other (See Comments)    Aspirin Other (See Comments)    Ciprofloxacin Other (See Comments)    Iron Other (See Comments)     Small rash with po iron.  Tolerated IV iron    Iodine Rash    Penicillins Rash         Current Outpatient Medications:     acetaminophen (TYLENOL) 325 MG tablet, Take 2 tablets (650 mg total) by mouth every 8 (eight) hours as needed., Disp: , Rfl: 0    b complex vitamins tablet, Take 1 tablet by mouth once daily., Disp: , Rfl:     calcium citrate-vitamin D3 315-200 mg (CITRACAL+D) 315 mg-5 mcg (200 unit) per tablet, Take 1 tablet by mouth once daily., Disp: , Rfl:     cyanocobalamin 1,000 mcg/mL injection, 1,000 mg I'm weekly x 2 then monthly, Disp: 10 mL, Rfl: 2    folic acid (FOLVITE) 1 MG tablet, Take 1,000 mcg by " "mouth., Disp: , Rfl:     hydrocortisone 2.5 % cream, Apply topically every evening., Disp: 3.5 g, Rfl: 2    losartan (COZAAR) 100 MG tablet, Take 1 tablet (100 mg total) by mouth once daily., Disp: 90 tablet, Rfl: 3    lovastatin (MEVACOR) 10 MG tablet, Take 1 tablet (10 mg total) by mouth every evening., Disp: 90 tablet, Rfl: 0    multivitamin capsule, Take 1 capsule by mouth once daily., Disp: , Rfl:     NIFEdipine (PROCARDIA-XL) 30 MG (OSM) 24 hr tablet, Take 1 tablet (30 mg total) by mouth every evening., Disp: 90 tablet, Rfl: 3    erythromycin (ROMYCIN) ophthalmic ointment, Place into both eyes every evening. for 7 days, Disp: 3.5 g, Rfl: 1    valACYclovir (VALTREX) 1000 MG tablet, Take 1 tablet (1,000 mg total) by mouth 2 (two) times daily. for 3 days, Disp: 6 tablet, Rfl: 0    Review of Systems   Constitutional:  Negative for unexpected weight change.   HENT:  Negative for trouble swallowing.    Eyes:  Negative for visual disturbance.   Respiratory:  Negative for shortness of breath.    Cardiovascular:  Negative for chest pain, palpitations and leg swelling.   Gastrointestinal:  Negative for blood in stool.   Genitourinary:  Negative for hematuria.   Skin:  Negative for rash.   Allergic/Immunologic: Negative for immunocompromised state.   Neurological:  Negative for headaches.   Hematological:  Does not bruise/bleed easily.   Psychiatric/Behavioral:  Negative for agitation. The patient is not nervous/anxious.        Objective:      BP (!) 136/58 (BP Location: Left arm, Patient Position: Sitting, BP Method: Small (Manual))   Pulse 60   Temp 97.6 °F (36.4 °C) (Oral)   Ht 4' 11" (1.499 m)   Wt 47.4 kg (104 lb 8 oz)   LMP 12/07/1972   SpO2 100%   BMI 21.11 kg/m²   Physical Exam  Constitutional:       Appearance: She is well-developed.   HENT:      Head: Normocephalic.   Eyes:        Comments: Red rash   Cardiovascular:      Rate and Rhythm: Normal rate and regular rhythm.      Heart sounds: Normal heart " sounds.   Pulmonary:      Effort: Pulmonary effort is normal.   Musculoskeletal:         General: Normal range of motion.   Skin:     General: Skin is warm and dry.   Neurological:      Mental Status: She is alert and oriented to person, place, and time.   Psychiatric:         Behavior: Behavior normal.         Thought Content: Thought content normal.         Judgment: Judgment normal.         Assessment:       1. Primary hypertension    2. Adenocarcinoma of cecum    3. Iron deficiency anemia due to chronic blood loss    4. Unsteady gait    5. Primary open angle glaucoma (POAG) of right eye, severe stage    6. Primary open-angle glaucoma, left eye, moderate stage    7. Periorbital dermatitis    8. BMI 21.0-21.9, adult        Plan:       Primary hypertension  -     CBC W/ AUTO DIFFERENTIAL; Future; Expected date: 03/08/2024  -     COMPREHENSIVE METABOLIC PANEL; Future; Expected date: 03/08/2024  Stable, continue medication  Low sodium diet  BP Readings from Last 3 Encounters:   03/08/24 (!) 136/58   02/14/24 (!) 156/65   02/12/24 110/60      Adenocarcinoma of cecum  Stable, continue management  Iron deficiency anemia due to chronic blood loss  -     CBC W/ AUTO DIFFERENTIAL; Future; Expected date: 03/08/2024  -     COMPREHENSIVE METABOLIC PANEL; Future; Expected date: 03/08/2024    Unsteady gait  -     Ambulatory referral/consult to Physical/Occupational Therapy; Future; Expected date: 03/15/2024    Primary open angle glaucoma (POAG) of right eye, severe stage  Stable, continue follow up  Primary open-angle glaucoma, left eye, moderate stage  Stable, continue follow uip  Periorbital dermatitis  -     erythromycin (ROMYCIN) ophthalmic ointment; Place into both eyes every evening. for 7 days  Dispense: 3.5 g; Refill: 1    BMI 21.0-21.9, adult  Stable, continue management        Patient readiness: acceptance and barriers:none    During the course of the visit the patient was educated and counseled about the following:      Hypertension:   Dietary sodium restriction.  Regular aerobic exercise.    Goals: Hypertension: Reduce Blood Pressure    Did patient meet goals/outcomes: Yes    The following self management tools provided: declined    Patient Instructions (the written plan) was given to the patient/family.     Time spent with patient: 30 minutes    Barriers to medications present (no )    Adverse reactions to current medications (no)    Over the counter medications reviewed (Yes)

## 2024-03-08 NOTE — PROGRESS NOTES
HPI    Pt presents for DFE     States itchiness and redness has not gone away despite D/C Latanoprost     Dorz/leonel BID OU     Last edited by Angélica Kearns on 3/8/2024 10:07 AM.            Assessment /Plan     For exam results, see Encounter Report.    Primary open angle glaucoma (POAG) of right eye, severe stage    Primary open-angle glaucoma, left eye, moderate stage    Allergic conjunctivitis of both eyes    Pseudophakia of both eyes      1. Primary open angle glaucoma (POAG) of right eye, severe stage  2. Primary open-angle glaucoma, left eye, moderate stage  Unk famhx  Thick pachy  Hx of LPI OU  Hx of CEIOL OU    Tmax 23/23  +HVF progression 2008 to 2021  +OCT progression OU  Brim allergy  I suspect allergy to preservatives    For now, stop all glaucoma drops    Discussed SLT as an option to lower IOP, pt a family interested    Schedule SLT OD then OS    3. Allergic conjunctivitis of both eyes  I suspect related to preservatives  Stop all glauccoma drops  Use only NPATs    4. Pseudophakia of both eyes  stable    Visit today is associated with current or anticipated ongoing medical care related to this patients single serious and complex condition, glaucoma.

## 2024-03-08 NOTE — PATIENT INSTRUCTIONS
Kelton Baird,     If you are due for any health screening(s) below please notify me so we can arrange them to be ordered and scheduled to maintain your health. Most healthy patients complete it. Don't lose out on improving your health.     All of your core healthy metrics are met.

## 2024-03-11 ENCOUNTER — TELEPHONE (OUTPATIENT)
Dept: PRIMARY CARE CLINIC | Facility: CLINIC | Age: 89
End: 2024-03-11
Payer: MEDICARE

## 2024-03-11 ENCOUNTER — OUTPATIENT CASE MANAGEMENT (OUTPATIENT)
Dept: ADMINISTRATIVE | Facility: OTHER | Age: 89
End: 2024-03-11
Payer: MEDICARE

## 2024-03-11 NOTE — TELEPHONE ENCOUNTER
----- Message from Leida Guerrier NP sent at 3/8/2024  4:39 PM CST -----  Please inform that the anemia has improved significantly. The kidney function is in normal range.The other labs are normal or in acceptable range.

## 2024-03-11 NOTE — TELEPHONE ENCOUNTER
Call placed to patient. Call answered by patient's grandson (Prabhjot) who states he received notification of results online.

## 2024-03-11 NOTE — PROGRESS NOTES
Outpatient Care Management   - Low Risk Patient Assessment    Patient: Brie Han  MRN:  [id number]  Date of Service:  3/11/2024  Completed by:  Pratima Miller LMSW  Referral Date: 02/12/2024    Reason for Visit   Patient presents with    Social Work Assessment - Low/Mod Risk    Plan Of Care    Case Closure       Brief Summary:SW received referral from patient PCP . SW completed assessment with patient grandson. Grandson reports patient resides alone but he and or friends check in on her. Grandson reports patient requires assistance with IADL's and ADL's.  Grandson reports since last hospital stay patient has been declining . HH is in the home [facility] but has request agency be changed to [facility]. Grandson reports working with LUIS FELIPE Guerrier regarding the switch of HH agency. Grandson reports patient ambulates with a walker. Grandson denied patient needs assistance with food, shelter, medication , medical and or  utilities. Patient grandson provides transportation to and from appointment. Per chart review. Grandson is patient health care agent. Grandson seeking additional assistance with caring for patient.      . Care plan was created in collaboration with patient/caregiver input.

## 2024-03-12 ENCOUNTER — OUTPATIENT CASE MANAGEMENT (OUTPATIENT)
Dept: ADMINISTRATIVE | Facility: OTHER | Age: 89
End: 2024-03-12
Payer: MEDICARE

## 2024-03-12 ENCOUNTER — TELEPHONE (OUTPATIENT)
Dept: PRIMARY CARE CLINIC | Facility: CLINIC | Age: 89
End: 2024-03-12
Payer: MEDICARE

## 2024-03-12 NOTE — TELEPHONE ENCOUNTER
Call placed to number in attached message below to clarify request.  No answer received. Voicemail unavailable.

## 2024-03-12 NOTE — PROGRESS NOTES
SW received a follow-up call from patient grandson Prabhjot. Prabhjot reports patient has been down lately due to cancer . Prabhjot denied patient is a danger to herself or others. Prabhjot seeking a support group for colon and or a referral to Psychiatry for counseling. Prabhjot will further discuss needs with patient and follow-up with this SW.

## 2024-03-12 NOTE — TELEPHONE ENCOUNTER
Felix Coles [facility] Staff     ----- Message from Felix Coles sent at 3/12/2024  3:03 PM CDT -----  Contact: self  Type: Sooner Appointment Request        Caller is requesting a sooner appointment. Caller declined first available appointment listed below. Caller will not accept being placed on the waitlist and is requesting a message be sent to doctor.        Name of Caller: [facility]   Best Call Back Number: ([phone] call number)  Additional Information: Pt needs release signed order needs to be sent to [place] and also what medicines the pt is currently on. Plz call to verify this. Pt needs home health to he end of the episode. Thanks

## 2024-03-13 NOTE — TELEPHONE ENCOUNTER
Follow up call placed to number in attached message.  Spoke to Itzel with [facility] who states patient has completed treatment physical and occupational therapy via [facility] and these services has been discontinued.   Patient was seen in office by LUIS FELIPE Guerrier on 3-8-24 related to elevated blood pressure and medication changes were made at that time.  [facility] is requesting a new order to continue home andres services related to headaches, recent medication change, and elevated blood pressure.  A new order will need to be placed and faxed to [phone].  Please advise. Thank you.

## 2024-03-13 NOTE — TELEPHONE ENCOUNTER
"Patient's son does not want home health -he states "I don't want those people my house". He was out patient PT/OT for patient.   "

## 2024-03-13 NOTE — TELEPHONE ENCOUNTER
Follow up call placed to Itzel with [facility]. No answer received. Voicemail full. Unable to leave message.

## 2024-03-14 ENCOUNTER — PATIENT MESSAGE (OUTPATIENT)
Dept: PRIMARY CARE CLINIC | Facility: CLINIC | Age: 89
End: 2024-03-14
Payer: MEDICARE

## 2024-03-14 DIAGNOSIS — D50.0 IRON DEFICIENCY ANEMIA DUE TO CHRONIC BLOOD LOSS: Primary | ICD-10-CM

## 2024-03-14 DIAGNOSIS — C18.9 ADENOCARCINOMA OF COLON: ICD-10-CM

## 2024-03-14 NOTE — TELEPHONE ENCOUNTER
Order for B-12 injection noted to have been forwarded to pharmacy.  I will attach a copy of your prescription below for your reference.  Please see attached portal message. Thank you.      Outpatient Medication Detail     Disp Refills Start End RAMOS   cyanocobalamin 1,000 mcg/mL injection 10 mL 2 2024 -- No   Si,000 mg I'm weekly x 2 then monthly   Sent to pharmacy as: cyanocobalamin 1,000 mcg/mL injection   Class: Normal   Order: [id number]   Date/Time Signed: 2024 13:43       E-Prescribing Status: Receipt confirmed by pharmacy (2024  1:43 PM CST)     Pharmacy    [facility] [address] [place] [id number] [facility], [facility]

## 2024-03-14 NOTE — TELEPHONE ENCOUNTER
Follow up call placed to Itzel with [facility] for notification of information in attached message from LUIS FELIPE Guerrier.   Itzel verbalized understanding.

## 2024-03-15 RX ORDER — CYANOCOBALAMIN 1000 UG/ML
1000 INJECTION, SOLUTION INTRAMUSCULAR; SUBCUTANEOUS
Status: SHIPPED | OUTPATIENT
Start: 2024-03-25 | End: 2024-06-23

## 2024-03-15 NOTE — TELEPHONE ENCOUNTER
I have changed the other to in office injection -please schedule nurse visit for next injection on 3/25/24.    Patient has an history of CKD- Please avoid or minimize all NSAIDS (ibuprofen, motrin, aleve, indocin, naprosyn) to minimize the risk to your kidneys.  acetaminophen (Tylenol) is the safer than NSAIDs with regards to kidney function but she has an history of elevated liver levels-so no tylenol.    I will like her to try OTC coenzyme Q10 (Co Q10) this is a  dietary supplements know to help with muscle pain that can be cause by her statin medication (lovastatin). I hope it works

## 2024-03-19 ENCOUNTER — CLINICAL SUPPORT (OUTPATIENT)
Dept: REHABILITATION | Facility: HOSPITAL | Age: 89
End: 2024-03-19
Payer: MEDICARE

## 2024-03-19 DIAGNOSIS — Z74.09 DECREASED FUNCTIONAL MOBILITY AND ENDURANCE: ICD-10-CM

## 2024-03-19 DIAGNOSIS — R26.89 IMPAIRMENT OF BALANCE: Primary | ICD-10-CM

## 2024-03-19 DIAGNOSIS — R26.81 UNSTEADY GAIT: ICD-10-CM

## 2024-03-19 PROCEDURE — 97162 PT EVAL MOD COMPLEX 30 MIN: CPT | Mod: HCNC,PO

## 2024-03-19 NOTE — PLAN OF CARE
[facility]  Physical Therapy Neurological Rehabilitation Initial Evaluation    Name: Brie Han  Clinic Number: [id number]    Therapy Diagnosis:   Encounter Diagnosis   Name Primary?    Unsteady gait      Physician: Leida Guerrier, *    Physician Orders: PT Eval and Treat  Medical Diagnosis from Referral: R26.81 (ICD-10-CM) - Unsteady gait   Evaluation Date: 3/19/2024  Authorization Period Expiration: 3/8/20224-3/8/2024  Plan of Care Expiration: 24  Visit # / Visits authorized:     FOTO 1: 3/19/24 -   FOTO 2:  FOTO 3:    Time In: 1100  Time Out: 1200  Total Billable Time: 60 minutes    Precautions: Standard and Fall    Subjective   Date of onset: 1 month ago hospitalization due to anemia  History of current condition - Brie reports: within last month she reports having reduced balance in community. She was diagnosed with adenocarcinoma of cecum during hospitalization. She reports its been difficult getting back to her normal self and activities since hospital stay.   Independent iADLs. Independent with managing medicines and budget.she reports slow progress, but she is determined to get better.      Medical History:   Past Medical History:   Diagnosis Date    ACP (advance care planning) 2024    Amaurosis fugax     Bilateral left eye worse    Anticoagulant long-term use     Arthritis     Glaucoma     resolved    Hyperlipidemia     Hypertension     Stroke        Surgical History:   Brie Han  has a past surgical history that includes Hysterectomy;  section; Eye surgery; Tonsillectomy; Appendectomy (s); Breast surgery; Cataract extraction w/  intraocular lens implant (Bilateral); Yag Capsulotomy (Left, 2021); Right oophorectomy; Esophagogastroduodenoscopy (N/A, 2024); and Colonoscopy (N/A, 2024).    Medications:   Brie has a current medication list which includes the following prescription(s): acetaminophen, b complex  vitamins, calcium citrate-vitamin d3 315-200 mg, folic acid, hydrocortisone, losartan, lovastatin, multivitamin, nifedipine, and valacyclovir, and the following Facility-Administered Medications: [START ON 3/25/2024] cyanocobalamin.    Allergies:   Review of patient's allergies indicates:   Allergen Reactions    Contrast media Other (See Comments)    Aspirin Other (See Comments)    Ciprofloxacin Other (See Comments)    Iron Other (See Comments)     Small rash with po iron.  Tolerated IV iron    Iodine Rash    Penicillins Rash        Prior Therapy: HH  Social History: lives alone, 1SH 4STE   Falls: none   DME: shower chair, grab bars, walker. Furniture walker.  Home Environment: grandson lives in [place]. Here 4-5days week   Exercise Routine / History: none   Family Present at time of Eval: son   Occupation: retired  Driving: no  Sleeping: poor due to right flank pain  Appetite: decreased  Hand Dominance: Left handed  Hobbies: none stated  Prior Level of Function: independent  Current Level of Function: modified independent    Pain:  Right flank pain; worse at night but does not bother during the day.   Feet, hip and low back pain bother her when trying to sleep    Patient's goals: she reports wanting to get better, like she was before hospitalization    Objective   Patient received from waiting room. Patient ambulated to treatment room with son  Mental status: alert, oriented to person, place, and time, normal mood, behavior, speech, dress, motor activity, and thought processes, affect appropriate to mood  Appearance: Casually dressed  Behavior:  calm, cooperative, and adequate rapport can be established  Attention Span and Concentration:  Normal    Vitals:   Blood Pressure: 150/66mmHg  Heart Rate: 67bpm  O2: 98%    Posture:   Sitting: Rounded shoulder, Head forward, and Posterior pelvic tilt  Standing: Lateral weight shift of hips and Increased kyphosis    Transfers:     Transfers Level of Assistance  Comments   Sit to  "Stand Supervision or Set-up Assistance    Stand to Sit Supervision or Set-up Assistance      Sensation:   Left upper extremity  Left lower extremity  Right upper extremity  Right lower extremity    Light Touch Intact Intact Intact Intact     Visual/Auditory:   Patient denies changes.    ROM:   GROSS AROM/PROM  UPPER EXTREMITY  (R) UE: WFLs  (L) UE: WFLs  LOWER EXTREMITY  (R) LE: WFLs  (L) LE: WFLs    Strength:   RLE Strength Grade LLE Strength Grade   Hip Flexion 4+/5 Hip Flexion 4+/5   Hip Extension 4+/5 Hip Extension 4+/5   Hip Abduction 4+/5 Hip Abduction 4+/5   Knee Flexion 4+/5 Knee Flexion 4+/5   Knee Extension 4+/5 Knee Extension 4+/5   Ankle Dorsiflexion 4+/5 Ankle Dorsiflexion 4+/5   Ankle Plantarflexion 4+/5 Ankle Plantarflexion 4+/5     Gait Assessment:  - Assistive Device used: No Assistive Device  - Assistance: Stand-by Assistance  - Distance:  feet  - Stairs: Stand-by Assistance Ascending  Reciprocal and Descending  Reciprocal with hand rail  Gait Analysis:  Upon observation of gait, patient demonstrates deviations including but not limited to: occasional unsteady gait, decreased step length, narrow base of support, flexed posture, and decreased katya  Impairments contributing to deviations:  impaired balance, impaired sensory feedback, and decreased strength      APTA Core Set Outcome Measures for Adults with Neurologic Conditions    Evaluation  Reference values    Gait Speed  0.87m/s    "Limited community ambulator" speed category 0-0.4 m/s = household walker  0.4-0.8 m/s = limited community ambulator   0.8-1.4 m/s = community ambultor  >1.4 m/s = can cross street safely    Functional Gait Assessment  14/30 <22/30 = identifies fall risk in community dwelling older adults   (MCID = 4)   5 times sit-stand   21 seconds with hands   >12 sec= fall risk for general elderly  >16 sec= fall risk for Parkinson's disease  >10 sec= balance/vestibular dysfunction (<61 y/o)  >14.2 sec= balance/vestibular " dysfunction (>59 y/o)  >12 sec= fall risk for CVA     Functional Gait Assessment:   1. Gait on level surface =  2   (3) Normal: less than 5.5 sec, no A.D., no imbalance, normal gait pattern, deviates< 6in   (2) Mild impairment: 7-5.6 sec, uses A.D., mild gait deviations, or deviates 6-10 in   (1) Moderate impairment: > 7 sec, slow speed, imbalance, deviates 10-15 in.   (0) Severe impairment: needs assist, deviates >15 in, reach/touch wall  2. Change in Gait Speed = 2   (3) Normal: smooth change w/o loss of balance or gait deviation, deviates < 6 in, significant difference between speeds   (2) Mild impairment: changes speed, but demonstrates mild gait deviations, deviates 6-10 in, OR no deviations but unable to significantly speed, OR uses A.D.   (1) Moderate impairment: minor changes to speed, OR changes speed w/ significant deviations, deviates 10-15 in, OR  Changes speed , but loses balance & recovers   (0) Severe impairment: cannot change speed, deviates >15 in, or loses balance & needs assist  3. Gait with horizontal head turns  = 1   (3) Normal: no change in gait, deviates <6 in   (2) Mild impairment: slight change in speed, deviates 6-10 in, OR uses A.D.   (1) Moderate impairment: moderate change in speed, deviates 10-15 in   (0) Severe impairment: severe disruption of gait, deviates >15in  4. Gait with vertical head turns = 1   (3) Normal: no change in gait, deviates <6 in   (2) Mild impairment: slight change in speed, deviates 6-10 in OR uses A.D.   (1) Moderate impairment: moderate change in speed, deviates 10-15 in   (0) Severe impairment: severe disruption of gait, deviates >15 in  5. Gait with pivot turns = 2   (3) Normal: performs safely in 3 sec, no LOB   (2) Mild impairment: performs in >3 sec & no LOB, OR turns safely & requires several steps to regain LOB   (1) Moderate impairment: turns slow, OR requires several small steps for balance following turn & stop   (0) Severe impairment: cannot turn  safely, needs assist  6. Step over obstacle = 1   (3) Normal: steps over 2 stacked boxes w/o change in speed or LOB   (2) Mild impairment: able to step over 1 box w/o change in speed or LOB   (1) Moderate impairment: steps over 1 box but must slow down, may require VC   (0) Severe impairment: cannot perform w/o assist  7. Gait with Narrow AMELIA = 1   (3) Normal: 10 steps no staggering   (2) Mild impairment: 7-9 steps   (1) Moderate impairment: 4-7 steps   (0) Severe impairment: < 4 steps or cannot perform w/o assist  8. Gait with eyes closed = 1   (3) Normal: < 7 sec, no A.D., no LOB, normal gait pattern, deviates <6 in   (2) Mild impairment: 7.1-9 sec, mild gait deviations, deviates 6-10 in   (1) Moderate impairment: > 9 sec, abnormal pattern, LOB, deviates 10-15 in   (0) Severe impairment: cannot perform w/o assist, LOB, deviates >15in  9. Ambulating Backwards = 1   (3) Normal: no A.D., no LOB, normal gait pattern, deviates <6in   (2) Mild impairment: uses A.D., slower speed, mild gait deviations, deviates 6-10 in   (1) Moderate impairment: slow speed, abnormal gait pattern, LOB, deviates 10-15 in   (0) Severe impairment: severe gait deviations or LOB, deviates >15in  10. Steps = 2   (3) Normal: alternating feet, no rail   (2) Mild Impairment: alternating feet, uses rail   (1) Moderate impairment: step-to, uses rail   (0) Severe impairment: cannot perform safely    Score 14/30     Score:   <22/30 fall risk   <20/30 fall risk in older adults   <18/30 fall risk in Parkinsons       TREATMENT   Total Treatment time separate from Evaluation: 0 minutes    Home Exercises and Patient Education Provided    Education provided:   - role of PT, Pt plan of care  - need for balance training    Written Home Exercises Provided:  no, will provide at first visit .  Exercises were reviewed and Brie was able to demonstrate them prior to the end of the session.  Brie demonstrated good  understanding of the education provided.      Assessment   Brie is a 89 y.o. female referred to outpatient Physical Therapy with a medical diagnosis of unsteady gait. Patient presents with decreased dynamic balance, strength and endurance. Patient recently hospitalized which has affected her endurance and home mobility. Patient is highly motivated to return to independent PLOF.    Patient prognosis is Good.   Patient will benefit from skilled outpatient Physical Therapy to address the deficits stated above and in the chart below, provide patient/family education, and to maximize patient's level of independence.     Plan of care discussed with patient: Yes  Patient's spiritual, cultural and educational needs considered and patient is agreeable to the plan of care and goals as stated below:     Anticipated Barriers for therapy: none    Medical Necessity is demonstrated by the following  History  Co-morbidities and personal factors that may impact the plan of care [] LOW: no personal factors / co-morbidities  [x] MODERATE: 1-2 personal factors / co-morbidities  [] HIGH: 3+ personal factors / co-morbidities    Moderate / High Support Documentation:   Co-morbidities affecting plan of care: prior TIA, HTN, glaucoma    Personal Factors:   age     Examination  Body Structures and Functions, activity limitations and participation restrictions that may impact the plan of care [] LOW: addressing 1-2 elements  [x] MODERATE: 3+ elements  [] HIGH: 4+ elements (please support below)    Moderate / High Support Documentation: strength, balance, endurance, gait speed, posture     Clinical Presentation [] LOW: stable  [x] MODERATE: Evolving  [] HIGH: Unstable     Decision Making/ Complexity Score: moderate         Goals:  Short Term Goals: 4 weeks  Date Last Assessed Status   Patient to be independent with home exercise program.   New   2. Patient to tolerate cardiorespiratory activity for at least 10 minutes with RPE of </= 3/10.   New   3. Patient to participate in  dynamic balance activities to increase balance with supervision.   New      Long Term Goals: 6 weeks Date Last Assessed Status   Patient to participate in strengthening exercises of low back and bilateral lower extremities to decrease fall risk.   New   2. Patient to improve 5x sit to stand test to reduce fall risk.    New   3.Patient to improve Functional Gait Assessment score by at least 5 points to reduce fall risk.  New      Plan   Plan of care Certification: 3/19/2024 to 5/30/24.    Outpatient Physical Therapy 2 times weekly for 6 weeks to include the following interventions: Gait Training, Manual Therapy, Neuromuscular Re-ed, Patient Education, Self Care, Therapeutic Activities, and Therapeutic Exercise.     Rivka Martinez PT, DPT, CLT  3/19/2024    I CERTIFY THE NEED FOR THESE SERVICES FURNISHED UNDER THIS PLAN OF TREATMENT AND WHILE UNDER MY CARE        Physician Name: _______________________________     Physician Signature: ____________________________

## 2024-03-19 NOTE — TELEPHONE ENCOUNTER
I reviewed the pictures in the not below-this looks like possible cellulitis- She needs an office visit for further evaluation and treatment

## 2024-03-19 NOTE — PROGRESS NOTES
Thank you for consult. Please refer to Plan of Care for complete note and goals.  Rivka Martinez PT, DPT, CLT  3/19/2024

## 2024-03-19 NOTE — TELEPHONE ENCOUNTER
Call placed to patient.  Call answered by patient's grandchild (Prabhjot).  Advised of information in attached message from LUIS FELIPE Guerrier.  Offered appointment today with LUIS FELIPE Guerrier at 3:30 PM, Mr. Rick advised patient has another appointment today at 3:15 PM.  Offered to schedule appointment with a different provider, Marjorie Rick verbalized he would like to schedule with LUIS FELIPE Guerrier. Offered appointment with LUIS FELIPE Guerrier on tomorrow's date,  Prabhjot advised next available date for appointment is on Friday 3-22-24.  Appointment scheduled for the date of 3-22-24.  Mr. Rick agreed to appointment date, time, and location. Will send follow up message to Mrs. Guerrier for notification.

## 2024-03-20 ENCOUNTER — OFFICE VISIT (OUTPATIENT)
Dept: PRIMARY CARE CLINIC | Facility: CLINIC | Age: 89
End: 2024-03-20
Payer: MEDICARE

## 2024-03-20 VITALS
BODY MASS INDEX: 21.4 KG/M2 | HEIGHT: 59 IN | WEIGHT: 106.13 LBS | TEMPERATURE: 99 F | SYSTOLIC BLOOD PRESSURE: 128 MMHG | OXYGEN SATURATION: 100 % | HEART RATE: 70 BPM | DIASTOLIC BLOOD PRESSURE: 52 MMHG

## 2024-03-20 DIAGNOSIS — M79.604 PAIN IN BOTH LOWER EXTREMITIES: Primary | ICD-10-CM

## 2024-03-20 DIAGNOSIS — M51.37 DDD (DEGENERATIVE DISC DISEASE), LUMBOSACRAL: Chronic | ICD-10-CM

## 2024-03-20 DIAGNOSIS — I10 PRIMARY HYPERTENSION: ICD-10-CM

## 2024-03-20 DIAGNOSIS — I73.9 PAD (PERIPHERAL ARTERY DISEASE): ICD-10-CM

## 2024-03-20 DIAGNOSIS — L03.116 CELLULITIS OF LEFT LOWER EXTREMITY: ICD-10-CM

## 2024-03-20 DIAGNOSIS — D50.0 IRON DEFICIENCY ANEMIA DUE TO CHRONIC BLOOD LOSS: ICD-10-CM

## 2024-03-20 DIAGNOSIS — M79.605 PAIN IN BOTH LOWER EXTREMITIES: Primary | ICD-10-CM

## 2024-03-20 PROCEDURE — 1159F MED LIST DOCD IN RCRD: CPT | Mod: HCNC,CPTII,S$GLB, | Performed by: NURSE PRACTITIONER

## 2024-03-20 PROCEDURE — 1125F AMNT PAIN NOTED PAIN PRSNT: CPT | Mod: HCNC,CPTII,S$GLB, | Performed by: NURSE PRACTITIONER

## 2024-03-20 PROCEDURE — 99999 PR PBB SHADOW E&M-EST. PATIENT-LVL IV: CPT | Mod: PBBFAC,HCNC,, | Performed by: NURSE PRACTITIONER

## 2024-03-20 PROCEDURE — 1160F RVW MEDS BY RX/DR IN RCRD: CPT | Mod: HCNC,CPTII,S$GLB, | Performed by: NURSE PRACTITIONER

## 2024-03-20 PROCEDURE — 3288F FALL RISK ASSESSMENT DOCD: CPT | Mod: HCNC,CPTII,S$GLB, | Performed by: NURSE PRACTITIONER

## 2024-03-20 PROCEDURE — 1157F ADVNC CARE PLAN IN RCRD: CPT | Mod: HCNC,CPTII,S$GLB, | Performed by: NURSE PRACTITIONER

## 2024-03-20 PROCEDURE — 99214 OFFICE O/P EST MOD 30 MIN: CPT | Mod: HCNC,S$GLB,, | Performed by: NURSE PRACTITIONER

## 2024-03-20 PROCEDURE — 1101F PT FALLS ASSESS-DOCD LE1/YR: CPT | Mod: HCNC,CPTII,S$GLB, | Performed by: NURSE PRACTITIONER

## 2024-03-20 RX ORDER — DOXYCYCLINE 100 MG/1
100 CAPSULE ORAL EVERY 12 HOURS
Qty: 14 CAPSULE | Refills: 0 | Status: SHIPPED | OUTPATIENT
Start: 2024-03-20 | End: 2024-03-27

## 2024-03-20 NOTE — PROGRESS NOTES
Subjective:       Patient ID: Brie Han is a 89 y.o. female.    Chief Complaint: Recurrent Skin Infections    Leg Pain   The incident occurred more than 1 week ago. The incident occurred at home. There was no injury mechanism. The pain is present in the right foot, right leg, left foot and left leg. The quality of the pain is described as shooting and cramping. The pain is at a severity of 5/10. The pain has been Intermittent since onset. Pertinent negatives include no loss of sensation, numbness or tingling. She reports no foreign bodies present. Nothing aggravates the symptoms. She has tried nothing for the symptoms.        Patient presents today with c/o lower extremity swelling and pain that wakes her up at night .                Past Medical History:   Diagnosis Date    ACP (advance care planning) 2/7/2024    Amaurosis fugax     Bilateral left eye worse    Anticoagulant long-term use     Arthritis     Glaucoma     resolved    Hyperlipidemia     Hypertension     Stroke 2006       Review of patient's allergies indicates:   Allergen Reactions    Contrast media Other (See Comments)    Aspirin Other (See Comments)    Ciprofloxacin Other (See Comments)    Iron Other (See Comments)     Small rash with po iron.  Tolerated IV iron    Iodine Rash    Penicillins Rash         Current Outpatient Medications:     acetaminophen (TYLENOL) 325 MG tablet, Take 2 tablets (650 mg total) by mouth every 8 (eight) hours as needed., Disp: , Rfl: 0    b complex vitamins tablet, Take 1 tablet by mouth once daily., Disp: , Rfl:     calcium citrate-vitamin D3 315-200 mg (CITRACAL+D) 315 mg-5 mcg (200 unit) per tablet, Take 1 tablet by mouth once daily., Disp: , Rfl:     folic acid (FOLVITE) 1 MG tablet, Take 1,000 mcg by mouth., Disp: , Rfl:     hydrocortisone 2.5 % cream, Apply topically every evening., Disp: 3.5 g, Rfl: 2    losartan (COZAAR) 100 MG tablet, Take 1 tablet (100 mg total) by mouth once daily., Disp: 90 tablet, Rfl:  "3    lovastatin (MEVACOR) 10 MG tablet, Take 1 tablet (10 mg total) by mouth every evening., Disp: 90 tablet, Rfl: 0    multivitamin capsule, Take 1 capsule by mouth once daily., Disp: , Rfl:     NIFEdipine (PROCARDIA-XL) 30 MG (OSM) 24 hr tablet, Take 1 tablet (30 mg total) by mouth every evening., Disp: 90 tablet, Rfl: 3    doxycycline (MONODOX) 100 MG capsule, Take 1 capsule (100 mg total) by mouth every 12 (twelve) hours. for 7 days, Disp: 14 capsule, Rfl: 0    valACYclovir (VALTREX) 1000 MG tablet, Take 1 tablet (1,000 mg total) by mouth 2 (two) times daily. for 3 days, Disp: 6 tablet, Rfl: 0    Current Facility-Administered Medications:     [START ON 3/25/2024] cyanocobalamin injection 1,000 mcg, 1,000 mcg, Subcutaneous, Q30 Days, Leida Guerrier, NP    Review of Systems   Constitutional:  Negative for unexpected weight change.   HENT:  Negative for trouble swallowing.    Eyes:  Negative for visual disturbance.   Respiratory:  Negative for shortness of breath.    Cardiovascular:  Negative for chest pain, palpitations and leg swelling.   Gastrointestinal:  Negative for blood in stool.   Genitourinary:  Negative for hematuria.   Skin:  Negative for rash.   Allergic/Immunologic: Negative for immunocompromised state.   Neurological:  Negative for tingling, numbness and headaches.   Hematological:  Does not bruise/bleed easily.   Psychiatric/Behavioral:  Negative for agitation. The patient is not nervous/anxious.        Objective:      BP (!) 128/52 (BP Location: Left arm, Patient Position: Sitting, BP Method: Small (Manual))   Pulse 70   Temp 98.5 °F (36.9 °C) (Oral)   Ht 4' 11" (1.499 m)   Wt 48.1 kg (106 lb 2.4 oz)   LMP 12/07/1972   SpO2 100%   BMI 21.44 kg/m²   Physical Exam  Constitutional:       Appearance: She is well-developed.   HENT:      Head: Normocephalic.   Cardiovascular:      Rate and Rhythm: Normal rate.   Pulmonary:      Effort: Pulmonary effort is normal.   Musculoskeletal:         " General: Normal range of motion.      Right lower le+ Edema present.      Left lower le+ Edema present.      Comments: redness   Skin:     General: Skin is warm and dry.   Neurological:      Mental Status: She is alert and oriented to person, place, and time.   Psychiatric:         Behavior: Behavior normal.         Thought Content: Thought content normal.         Judgment: Judgment normal.         Assessment:       1. Pain in both lower extremities    2. PAD (peripheral artery disease)    3. Cellulitis of left lower extremity    4. Primary hypertension    5. DDD (degenerative disc disease), lumbosacral    6. Iron deficiency anemia due to chronic blood loss    7. BMI 21.0-21.9, adult        Plan:       Pain in both lower extremities  -     Ambulatory referral/consult to Vascular Surgery; Future; Expected date: 2024  -     doxycycline (MONODOX) 100 MG capsule; Take 1 capsule (100 mg total) by mouth every 12 (twelve) hours. for 7 days  Dispense: 14 capsule; Refill: 0  -      Lower Extremity Arteries Bilateral; Future; Expected date: 2024    PAD (peripheral artery disease)  -     Ambulatory referral/consult to Vascular Surgery; Future; Expected date: 2024    Cellulitis of left lower extremity  -     doxycycline (MONODOX) 100 MG capsule; Take 1 capsule (100 mg total) by mouth every 12 (twelve) hours. for 7 days  Dispense: 14 capsule; Refill: 0  One week follow up  Primary hypertension  Stable, continue medication  Low sodium diet  BP Readings from Last 3 Encounters:   24 (!) 128/52   24 (!) 136/58   24 (!) 156/65      DDD (degenerative disc disease), lumbosacral  Stable, continue management  Iron deficiency anemia due to chronic blood loss  Stable, continue Hem/onc follow up  Last iron injection 24  BMI 21.0-21.9, adult  Stable, continue management        Patient readiness: acceptance and barriers:none    During the course of the visit the patient was educated and  counseled about the following:     Hypertension:   Dietary sodium restriction.  Regular aerobic exercise.    Goals: Hypertension: Reduce Blood Pressure    Did patient meet goals/outcomes: Yes    The following self management tools provided: declined    Patient Instructions (the written plan) was given to the patient/family.     Time spent with patient: 30 minutes    Barriers to medications present (no )    Adverse reactions to current medications (no)    Over the counter medications reviewed (Yes)

## 2024-03-25 ENCOUNTER — CLINICAL SUPPORT (OUTPATIENT)
Dept: PRIMARY CARE CLINIC | Facility: CLINIC | Age: 89
End: 2024-03-25
Payer: MEDICARE

## 2024-03-25 ENCOUNTER — HOSPITAL ENCOUNTER (OUTPATIENT)
Dept: RADIOLOGY | Facility: HOSPITAL | Age: 89
Discharge: HOME | End: 2024-03-25
Attending: NURSE PRACTITIONER
Payer: MEDICARE

## 2024-03-25 ENCOUNTER — CLINICAL SUPPORT (OUTPATIENT)
Dept: REHABILITATION | Facility: HOSPITAL | Age: 89
End: 2024-03-25
Payer: MEDICARE

## 2024-03-25 DIAGNOSIS — M79.605 PAIN IN BOTH LOWER EXTREMITIES: ICD-10-CM

## 2024-03-25 DIAGNOSIS — Z74.09 DECREASED FUNCTIONAL MOBILITY AND ENDURANCE: Primary | ICD-10-CM

## 2024-03-25 DIAGNOSIS — M79.604 PAIN IN BOTH LOWER EXTREMITIES: ICD-10-CM

## 2024-03-25 DIAGNOSIS — E53.8 B12 DEFICIENCY: Primary | ICD-10-CM

## 2024-03-25 DIAGNOSIS — R26.89 IMPAIRMENT OF BALANCE: ICD-10-CM

## 2024-03-25 PROCEDURE — 97110 THERAPEUTIC EXERCISES: CPT | Mod: HCNC,PO

## 2024-03-25 PROCEDURE — 96372 THER/PROPH/DIAG INJ SC/IM: CPT | Mod: HCNC,S$GLB,, | Performed by: NURSE PRACTITIONER

## 2024-03-25 PROCEDURE — 99499 UNLISTED E&M SERVICE: CPT | Mod: HCNC,S$GLB,, | Performed by: FAMILY MEDICINE

## 2024-03-25 PROCEDURE — 93925 LOWER EXTREMITY STUDY: CPT | Mod: TC

## 2024-03-25 RX ADMIN — CYANOCOBALAMIN 1000 MCG: 1000 INJECTION, SOLUTION INTRAMUSCULAR; SUBCUTANEOUS at 02:03

## 2024-03-25 NOTE — PROGRESS NOTES
[facility]   Physical Therapy Treatment Note      Name: Brie Han  Clinic Number: [id number]    Therapy Diagnosis:   Encounter Diagnoses   Name Primary?    Decreased functional mobility and endurance Yes    Impairment of balance      Physician: Leida Guerrier, *    Visit Date: 3/25/2024    Physician Orders: PT Eval and Treat  Medical Diagnosis from Referral: R26.81 (ICD-10-CM) - Unsteady gait   Evaluation Date: 3/19/2024  Authorization Period Expiration: 3/18/2024-12/31/2024  Plan of Care Expiration: 5/30/24  Visit # / Visits authorized: 1/ 10     FOTO 1: 3/19/24 - 42  FOTO 2:  FOTO 3:     Time In: 1515  Time Out: 1600  Total Billable Time: 45 minutes    PTA Visit #: 0/5       Subjective     Patient reports: having a shot in her left shoulder this morning..  She was not compliant with home exercise program, will provide home exercise program today  Response to previous treatment: ongoing  Functional change: ongoing    Pain: 0/10  Objective      Objective Measures updated at progress report unless specified.     Treatment     Brie received the treatments listed below:      therapeutic exercises to develop strength, endurance, flexibility, and posture for 45 minutes including:  NuStep bilateral upper extremities and bilateral lower extremities x8 minutes level 2  Supine bridges: 2x10  Supine hip abduction  Supine single leg raise bilaterally x10 reps      Patient Education and Home Exercises       Education provided:   - proper form of exercises.     Written Home Exercises Provided: yes. Exercises were reviewed and Brie was able to demonstrate them prior to the end of the session.  Brie demonstrated good  understanding of the education provided. See Electronic Medical Record under Patient Instructions for exercises provided during therapy sessions    Assessment     Ms. Han tolerated session well with focus on lower body strengthening. Patient reporting pain on left  side flank, cues for reducing height of single leg raise. Will continue to focus on strengthening and balance.    Brie Is progressing well towards her goals.   Patient prognosis is Good.     Patient will continue to benefit from skilled outpatient physical therapy to address the deficits listed in the problem list box on initial evaluation, provide pt/family education and to maximize pt's level of independence in the home and community environment.     Patient's spiritual, cultural and educational needs considered and pt agreeable to plan of care and goals.     Anticipated barriers to physical therapy: none    Goals:  Short Term Goals: 4 weeks  Date Last Assessed Status   Patient to be independent with home exercise program.   New   2. Patient to tolerate cardiorespiratory activity for at least 10 minutes with RPE of </= 3/10.   New   3. Patient to participate in dynamic balance activities to increase balance with supervision.   New      Long Term Goals: 6 weeks Date Last Assessed Status   Patient to participate in strengthening exercises of low back and bilateral lower extremities to decrease fall risk.   New   2. Patient to improve 5x sit to stand test to reduce fall risk.    New   3.Patient to improve Functional Gait Assessment score by at least 5 points to reduce fall risk.   New      Plan   Plan of care Certification: 3/19/2024 to 5/30/24.     Outpatient Physical Therapy 2 times weekly for 6 weeks to include the following interventions: Gait Training, Manual Therapy, Neuromuscular Re-ed, Patient Education, Self Care, Therapeutic Activities, and Therapeutic Exercise.      Rivka Martinez PT, DPT, CLT  3/26/2024

## 2024-03-25 NOTE — PROGRESS NOTES
Identified name and . Administered 1000 mcg B12, IM. Patient tolerated well, aseptic technique maintained. Pain scale 0/10 with injection. No residual bleeding at insertion site noted.

## 2024-03-26 ENCOUNTER — CLINICAL SUPPORT (OUTPATIENT)
Dept: REHABILITATION | Facility: HOSPITAL | Age: 89
End: 2024-03-26
Payer: MEDICARE

## 2024-03-26 DIAGNOSIS — R26.89 IMPAIRMENT OF BALANCE: ICD-10-CM

## 2024-03-26 DIAGNOSIS — Z74.09 DECREASED FUNCTIONAL MOBILITY AND ENDURANCE: Primary | ICD-10-CM

## 2024-03-26 PROCEDURE — 97110 THERAPEUTIC EXERCISES: CPT | Mod: HCNC,PO

## 2024-03-26 NOTE — PROGRESS NOTES
[facility]   Physical Therapy Treatment Note      Name: Brie Han  [facility] Number: [id number]    Therapy Diagnosis:   Encounter Diagnoses   Name Primary?    Decreased functional mobility and endurance Yes    Impairment of balance      Physician: Leida Guerrier, *    Visit Date: 3/26/2024    Physician Orders: PT Eval and Treat  Medical Diagnosis from Referral: R26.81 (ICD-10-CM) - Unsteady gait   Evaluation Date: 3/19/2024  Authorization Period Expiration: 3/18/2024-12/31/2024  Plan of Care Expiration: 5/30/24  Visit # / Visits authorized: 2/ 10 (3)     FOTO 1: 3/19/24 - 42  FOTO 2:  FOTO 3:     Time In: 1345  Time Out: 1430  Total Billable Time: 45 minutes    PTA Visit #: 0/5       Subjective     Patient reports: Right hip pain, had ultrasound yesterday.  She was not compliant with home exercise program, will provide home exercise program today  Response to previous treatment: ongoing  Functional change: ongoing    Pain: complains of right hip and bilateral feet pain stated pain in feet keeps her from sleeping at night.  Objective      Objective Measures updated at progress report unless specified.     Treatment     Brie received the treatments listed below:      therapeutic exercises to develop strength, endurance, flexibility, and posture for 45 minutes including:  NuStep bilateral upper extremities and bilateral lower extremities x8 minutes level 3  Seated LAQs 2x10 reciprocal pattern  Seated marches 2x1'  Seated bicep curls #2 dowel; 2x10  Seated chest press #2 dowel; 2x10  Seated shoulder press #2: 2x10    Sit to stand x10  Patient Education and Home Exercises       Education provided:   - proper form of exercises.     Written Home Exercises Provided: yes. Exercises were reviewed and Brie was able to demonstrate them prior to the end of the session.  Brie demonstrated good  understanding of the education provided. See Electronic Medical Record under Patient  Instructions for exercises provided during therapy sessions    Assessment     Ms. Han tolerated session well with focus on lower body strengthening. Patient with minimal increase in pain with strengthening exercises. She continues to progress as anticipated.    Brie Is progressing well towards her goals.   Patient prognosis is Good.     Patient will continue to benefit from skilled outpatient physical therapy to address the deficits listed in the problem list box on initial evaluation, provide pt/family education and to maximize pt's level of independence in the home and community environment.     Patient's spiritual, cultural and educational needs considered and pt agreeable to plan of care and goals.     Anticipated barriers to physical therapy: none    Goals:  Short Term Goals: 4 weeks  Date Last Assessed Status   Patient to be independent with home exercise program.   New   2. Patient to tolerate cardiorespiratory activity for at least 10 minutes with RPE of </= 3/10.   New   3. Patient to participate in dynamic balance activities to increase balance with supervision.   New      Long Term Goals: 6 weeks Date Last Assessed Status   Patient to participate in strengthening exercises of low back and bilateral lower extremities to decrease fall risk.   New   2. Patient to improve 5x sit to stand test to reduce fall risk.    New   3.Patient to improve Functional Gait Assessment score by at least 5 points to reduce fall risk.   New      Plan   Plan of care Certification: 3/19/2024 to 5/30/24.     Outpatient Physical Therapy 2 times weekly for 6 weeks to include the following interventions: Gait Training, Manual Therapy, Neuromuscular Re-ed, Patient Education, Self Care, Therapeutic Activities, and Therapeutic Exercise.      Rivka Martinez PT, DPT, CLT  3/26/2024

## 2024-03-27 ENCOUNTER — TELEPHONE (OUTPATIENT)
Dept: PRIMARY CARE CLINIC | Facility: CLINIC | Age: 89
End: 2024-03-27
Payer: MEDICARE

## 2024-03-27 DIAGNOSIS — M79.89 LEG SWELLING: ICD-10-CM

## 2024-03-27 DIAGNOSIS — M79.605 PAIN IN BOTH LOWER EXTREMITIES: Primary | ICD-10-CM

## 2024-03-27 DIAGNOSIS — M79.604 PAIN IN BOTH LOWER EXTREMITIES: Primary | ICD-10-CM

## 2024-03-27 NOTE — TELEPHONE ENCOUNTER
Please see attached portal message related to lower extremity ultrasound results.  Vascular surgery referral noted. Please advise. Thank you.

## 2024-03-27 NOTE — TELEPHONE ENCOUNTER
----- Message from Leida Guerrier NP sent at 3/26/2024 11:08 AM CDT -----  Please inform patient that the US shows no narrowing or occlusion in th legs-ask patient has the redness and pain improved with the antibiotic

## 2024-03-27 NOTE — TELEPHONE ENCOUNTER
Inform patient  vascular referral  placed on 3/20/24-please give patient phone number to call vascular surgery

## 2024-03-27 NOTE — TELEPHONE ENCOUNTER
Patient notified via e-mail due to this being initial point of contact from patient regarding this matter.  Referral lists the following provider as the provider patient is being referred to:    Ian Casillas MD  [address] [place] [address]  Phone: [phone]  Fax: [phone]      This information has been provided to patient via [facility] portal message.

## 2024-03-28 ENCOUNTER — PATIENT MESSAGE (OUTPATIENT)
Dept: FAMILY MEDICINE | Facility: CLINIC | Age: 89
End: 2024-03-28
Payer: MEDICARE

## 2024-04-02 ENCOUNTER — CLINICAL SUPPORT (OUTPATIENT)
Dept: REHABILITATION | Facility: HOSPITAL | Age: 89
End: 2024-04-02
Payer: MEDICARE

## 2024-04-02 ENCOUNTER — TELEPHONE (OUTPATIENT)
Dept: VASCULAR SURGERY | Facility: CLINIC | Age: 89
End: 2024-04-02
Payer: MEDICARE

## 2024-04-02 DIAGNOSIS — Z74.09 DECREASED FUNCTIONAL MOBILITY AND ENDURANCE: Primary | ICD-10-CM

## 2024-04-02 DIAGNOSIS — R26.89 IMPAIRMENT OF BALANCE: ICD-10-CM

## 2024-04-02 PROCEDURE — 97110 THERAPEUTIC EXERCISES: CPT | Mod: HCNC,PO

## 2024-04-02 NOTE — PROGRESS NOTES
"[facility]   Physical Therapy Treatment Note      Name: Brie Han  Clinic Number: [id number]    Therapy Diagnosis:   Encounter Diagnoses   Name Primary?    Decreased functional mobility and endurance Yes    Impairment of balance      Physician: Leida Guerrier, *    Visit Date: 4/2/2024    Physician Orders: PT Eval and Treat  Medical Diagnosis from Referral: R26.81 (ICD-10-CM) - Unsteady gait   Evaluation Date: 3/19/2024  Authorization Period Expiration: 3/18/2024-12/31/2024  Plan of Care Expiration: 5/30/24  Visit # / Visits authorized: 3 /10 (4)     FOTO 1: 3/19/24 - 42  FOTO 2:  FOTO 3:    Time In: 1435  Time Out: 1515  Total Billable Time: 40 minutes    PTA Visit #: 0/5       Subjective     Patient reports:c/o of increased right hip pain, believes she slept on that hip.  She was not compliant with home exercise program, will provide home exercise program today  Response to previous treatment: ongoing  Functional change: ongoing    Pain: complains of right hip and bilateral feet pain stated pain in feet keeps her from sleeping at night.  Objective      Objective Measures updated at progress report unless specified.     Treatment     Brie received the treatments listed below:      therapeutic exercises to develop strength, endurance, flexibility, and posture for 45 minutes including:  NuStep bilateral upper extremities and bilateral lower extremities x10 minutes level 2  Standing on Airex eyes open/eyes closed 30" x 4 trials  Standing on Airex tandem stance eyes open x30" x 4 trails  Step up on 4" step reciprocal stepping with unilateral upper extremity support x 15 reps    6MWT: 901ft    Patient Education and Home Exercises       Education provided:   - proper form of exercises.     Written Home Exercises Provided: yes. Exercises were reviewed and Brie was able to demonstrate them prior to the end of the session.  Brie demonstrated good  understanding of the " education provided. See Electronic Medical Record under Patient Instructions for exercises provided during therapy sessions    Assessment     Ms. Han tolerated session well with focus on lower body strengthening. Ms. Baird was able to ambulate 900ft during 6MWT with no LOB noted or complaints of fatigue. She is making good progress to strengthening and endurance goals.    Brie Is progressing well towards her goals.   Patient prognosis is Good.     Patient will continue to benefit from skilled outpatient physical therapy to address the deficits listed in the problem list box on initial evaluation, provide pt/family education and to maximize pt's level of independence in the home and community environment.     Patient's spiritual, cultural and educational needs considered and pt agreeable to plan of care and goals.     Anticipated barriers to physical therapy: none    Goals:  Short Term Goals: 4 weeks  Date Last Assessed Status   Patient to be independent with home exercise program.   New   2. Patient to tolerate cardiorespiratory activity for at least 10 minutes with RPE of </= 3/10.   New   3. Patient to participate in dynamic balance activities to increase balance with supervision.   New      Long Term Goals: 6 weeks Date Last Assessed Status   Patient to participate in strengthening exercises of low back and bilateral lower extremities to decrease fall risk.   New   2. Patient to improve 5x sit to stand test to reduce fall risk.    New   3.Patient to improve Functional Gait Assessment score by at least 5 points to reduce fall risk.   New      Plan   Plan of care Certification: 3/19/2024 to 5/30/24.     Outpatient Physical Therapy 2 times weekly for 6 weeks to include the following interventions: Gait Training, Manual Therapy, Neuromuscular Re-ed, Patient Education, Self Care, Therapeutic Activities, and Therapeutic Exercise.      Rivka Martinez PT, DPT, CLT  4/2/2024

## 2024-04-02 NOTE — TELEPHONE ENCOUNTER
----- Message from Alberta Galo sent at 4/2/2024  2:41 PM CDT -----  Regarding: appointment  Contact: Prabhjot rosen  Type:  Sooner Appointment Request    Caller is requesting a sooner appointment.  Caller declined first available appointment listed below.  Caller will not accept being placed on the waitlist and is requesting a message be sent to doctor.    Name of Caller:  Prabhjot rosen  When is the first available appointment?    Symptoms:  PVD  Would the patient rather a call back or a response via [facility]? call  Best Call Back Number:  [phone]  Additional Information:  Please call son to advise.  Thanks!

## 2024-04-08 ENCOUNTER — TELEPHONE (OUTPATIENT)
Dept: VASCULAR SURGERY | Facility: CLINIC | Age: 89
End: 2024-04-08
Payer: MEDICARE

## 2024-04-08 ENCOUNTER — CLINICAL SUPPORT (OUTPATIENT)
Dept: REHABILITATION | Facility: HOSPITAL | Age: 89
End: 2024-04-08
Payer: MEDICARE

## 2024-04-08 DIAGNOSIS — Z74.09 DECREASED FUNCTIONAL MOBILITY AND ENDURANCE: Primary | ICD-10-CM

## 2024-04-08 DIAGNOSIS — R26.89 IMPAIRMENT OF BALANCE: ICD-10-CM

## 2024-04-08 PROCEDURE — 97110 THERAPEUTIC EXERCISES: CPT | Mod: HCNC,PO

## 2024-04-08 PROCEDURE — 97112 NEUROMUSCULAR REEDUCATION: CPT | Mod: HCNC,PO

## 2024-04-08 NOTE — TELEPHONE ENCOUNTER
----- Message from Saundra Proctor sent at 4/8/2024 11:08 AM CDT -----  Type:  Patient Returning Call    Who Called:  pt son   Who Left Message for Patient:  nurse   Does the patient know what this is regarding?:  yes   Best Call Back Number:  [phone]   Additional Information:  please advise

## 2024-04-08 NOTE — PROGRESS NOTES
[facility]   Physical Therapy Treatment Note      Name: Brie HOLLOWAY Farin  [facility] Number: [id number]    Therapy Diagnosis:   Encounter Diagnoses   Name Primary?    Decreased functional mobility and endurance Yes    Impairment of balance      Physician: Leida Guerrier, *    Visit Date: 4/8/2024    Physician Orders: PT Eval and Treat  Medical Diagnosis from Referral: R26.81 (ICD-10-CM) - Unsteady gait   Evaluation Date: 3/19/2024  Authorization Period Expiration: 3/18/2024-12/31/2024  Plan of Care Expiration: 5/30/24  Visit # / Visits authorized: 4 /10 (5)     FOTO 1: 3/19/24 - 42  FOTO 2:  FOTO 3:    Time In: 1345  Time Out: 1430  Total Billable Time: 45 minutes    PTA Visit #: 0/5       Subjective     Patient reports:having a busy morning, feeling fatigue and pain in knee  She was not compliant with home exercise program, will provide home exercise program today  Response to previous treatment: ongoing  Functional change: ongoing    Pain: complains of right hip and bilateral feet pain stated pain in feet keeps her from sleeping at night.  Objective      Objective Measures updated at progress report unless specified.     BP: 159/63mmHg post exercise  Treatment     Brie received the treatments listed below:      therapeutic exercises to develop strength, endurance, flexibility, and posture for 15 minutes including:    NuStep bilateral upper extremities and bilateral lower extremities x10 minutes level 2    Seated hip abd/add green theraband 3x10  Seated external rotation with green theraband 3x10    neuromuscular re-education activities to improve: Balance and Posture for 30 minutes. The following activities were included:    Seated unsupported LAQs with #1lbs 3x10 bilateral, reciprocal gait pattern.  Seated unsupported marching #1lbs x 1'  Sit to stand x10 on Airex from mat.    Patient Education and Home Exercises       Education provided:   - proper form of exercises.     Written  Home Exercises Provided: yes. Exercises were reviewed and Brie was able to demonstrate them prior to the end of the session.  Brie demonstrated good  understanding of the education provided. See Electronic Medical Record under Patient Instructions for exercises provided during therapy sessions    Assessment     Ms. Han tolerated session well with focus on lower body strengthening. Patient continues to require verbal cues for posture, incorporated more exercises to increase thoracic extension and anterior pelvic tilt. Patient limited by fatigue of the day, however highly motivated to continue therex.    Brie Is progressing well towards her goals.   Patient prognosis is Good.     Patient will continue to benefit from skilled outpatient physical therapy to address the deficits listed in the problem list box on initial evaluation, provide pt/family education and to maximize pt's level of independence in the home and community environment.     Patient's spiritual, cultural and educational needs considered and pt agreeable to plan of care and goals.     Anticipated barriers to physical therapy: none    Goals:  Short Term Goals: 4 weeks  Date Last Assessed Status   Patient to be independent with home exercise program.   New   2. Patient to tolerate cardiorespiratory activity for at least 10 minutes with RPE of </= 3/10.   New   3. Patient to participate in dynamic balance activities to increase balance with supervision.   New      Long Term Goals: 6 weeks Date Last Assessed Status   Patient to participate in strengthening exercises of low back and bilateral lower extremities to decrease fall risk.   New   2. Patient to improve 5x sit to stand test to reduce fall risk.    New   3.Patient to improve Functional Gait Assessment score by at least 5 points to reduce fall risk.   New      Plan   Plan of care Certification: 3/19/2024 to 5/30/24.     Outpatient Physical Therapy 2 times weekly for 6 weeks to include  the following interventions: Gait Training, Manual Therapy, Neuromuscular Re-ed, Patient Education, Self Care, Therapeutic Activities, and Therapeutic Exercise.      Rivka Martinez PT, DPT, CLT  4/8/2024

## 2024-04-08 NOTE — TELEPHONE ENCOUNTER
Spoke with Prabhjot and scheduled Ms Han on 4/24 @ 12:30-Prabhjot verbalized appt information, as well as location of provider's office.

## 2024-04-12 ENCOUNTER — CLINICAL SUPPORT (OUTPATIENT)
Dept: REHABILITATION | Facility: HOSPITAL | Age: 89
End: 2024-04-12
Payer: MEDICARE

## 2024-04-12 DIAGNOSIS — R26.89 IMPAIRMENT OF BALANCE: ICD-10-CM

## 2024-04-12 DIAGNOSIS — Z74.09 DECREASED FUNCTIONAL MOBILITY AND ENDURANCE: Primary | ICD-10-CM

## 2024-04-12 PROCEDURE — 97110 THERAPEUTIC EXERCISES: CPT | Mod: HCNC,PO

## 2024-04-12 NOTE — PROGRESS NOTES
[facility]   Physical Therapy Treatment Note      Name: Brie Han  Clinic Number: [id number]    Therapy Diagnosis:   Encounter Diagnoses   Name Primary?    Decreased functional mobility and endurance Yes    Impairment of balance        Physician: Leida Guerrier, *    Visit Date: 4/12/2024    Physician Orders: PT Eval and Treat  Medical Diagnosis from Referral: R26.81 (ICD-10-CM) - Unsteady gait   Evaluation Date: 3/19/2024  Authorization Period Expiration: 3/18/2024-12/31/2024  Plan of Care Expiration: 5/30/24  Visit # / Visits authorized: 5 /10 (6)     FOTO 1: 3/19/24 - 42  FOTO 2:  FOTO 3:    Time In: 1020  Time Out: 1100  Total Billable Time: 40 minutes    PTA Visit #: 0/5       Subjective     Patient reports: no new complaints.  She was not compliant with home exercise program, will provide home exercise program today  Response to previous treatment: ongoing  Functional change: ongoing    Pain: complains of right hip and bilateral feet pain stated pain in feet keeps her from sleeping at night.  Objective      Objective Measures updated at progress report unless specified.     Treatment     Brie received the treatments listed below:      therapeutic exercises to develop strength, endurance, flexibility, and posture for 45 minutes including:    Scifit bilateral upper extremities and bilateral lower extremities x10 minutes level 2    Seated LAQs 2lbs 3x10  Standing hamstring curls 1x10 with 2lbs, with upper extremity support  Seated marching 3 x1'  Sit to stand x10 with 2lb dumb bells.    Patient Education and Home Exercises       Education provided:   - proper form of exercises.     Written Home Exercises Provided: yes. Exercises were reviewed and Brie was able to demonstrate them prior to the end of the session.  Brie demonstrated good  understanding of the education provided. See Electronic Medical Record under Patient Instructions for exercises provided during  therapy sessions    Assessment     Ms. Han tolerated session well with focus on lower body strengthening. Ms. Han did well with increased resistance with all activities, however required increased rest breaks. Will continue to increase strengthening as appropriate.    Brie Is progressing well towards her goals.   Patient prognosis is Good.     Patient will continue to benefit from skilled outpatient physical therapy to address the deficits listed in the problem list box on initial evaluation, provide pt/family education and to maximize pt's level of independence in the home and community environment.     Patient's spiritual, cultural and educational needs considered and pt agreeable to plan of care and goals.     Anticipated barriers to physical therapy: none    Goals:  Short Term Goals: 4 weeks  Date Last Assessed Status   Patient to be independent with home exercise program.   New   2. Patient to tolerate cardiorespiratory activity for at least 10 minutes with RPE of </= 3/10.   New   3. Patient to participate in dynamic balance activities to increase balance with supervision.   New      Long Term Goals: 6 weeks Date Last Assessed Status   Patient to participate in strengthening exercises of low back and bilateral lower extremities to decrease fall risk.   New   2. Patient to improve 5x sit to stand test to reduce fall risk.    New   3.Patient to improve Functional Gait Assessment score by at least 5 points to reduce fall risk.   New      Plan   Plan of care Certification: 3/19/2024 to 5/30/24.     Outpatient Physical Therapy 2 times weekly for 6 weeks to include the following interventions: Gait Training, Manual Therapy, Neuromuscular Re-ed, Patient Education, Self Care, Therapeutic Activities, and Therapeutic Exercise.      Rivka Martinez PT, DPT, CLT  4/14/2024

## 2024-04-15 ENCOUNTER — LAB VISIT (OUTPATIENT)
Dept: LAB | Facility: HOSPITAL | Age: 89
End: 2024-04-15
Attending: INTERNAL MEDICINE
Payer: MEDICARE

## 2024-04-15 ENCOUNTER — CLINICAL SUPPORT (OUTPATIENT)
Dept: REHABILITATION | Facility: HOSPITAL | Age: 89
End: 2024-04-15
Payer: MEDICARE

## 2024-04-15 DIAGNOSIS — D50.0 IRON DEFICIENCY ANEMIA SECONDARY TO BLOOD LOSS (CHRONIC): Primary | ICD-10-CM

## 2024-04-15 DIAGNOSIS — Z74.09 DECREASED FUNCTIONAL MOBILITY AND ENDURANCE: Primary | ICD-10-CM

## 2024-04-15 DIAGNOSIS — R26.89 IMPAIRMENT OF BALANCE: ICD-10-CM

## 2024-04-15 DIAGNOSIS — C18.9 COLON CANCER: ICD-10-CM

## 2024-04-15 LAB
ALBUMIN SERPL BCP-MCNC: 3.8 G/DL (ref 3.5–5.2)
ALP SERPL-CCNC: 70 U/L (ref 55–135)
ALT SERPL W/O P-5'-P-CCNC: 15 U/L (ref 10–44)
ANION GAP SERPL CALC-SCNC: 9 MMOL/L (ref 8–16)
AST SERPL-CCNC: 26 U/L (ref 10–40)
BILIRUB SERPL-MCNC: 0.3 MG/DL (ref 0.1–1)
BUN SERPL-MCNC: 21 MG/DL (ref 10–30)
CALCIUM SERPL-MCNC: 10 MG/DL (ref 8.7–10.5)
CHLORIDE SERPL-SCNC: 100 MMOL/L (ref 95–110)
CO2 SERPL-SCNC: 25 MMOL/L (ref 23–29)
CREAT SERPL-MCNC: 0.8 MG/DL (ref 0.5–1.4)
ERYTHROCYTE [DISTWIDTH] IN BLOOD BY AUTOMATED COUNT: 21.8 % (ref 11.5–14.5)
EST. GFR  (NO RACE VARIABLE): >60 ML/MIN/1.73 M^2
FERRITIN SERPL-MCNC: 464 NG/ML (ref 20–300)
GLUCOSE SERPL-MCNC: 95 MG/DL (ref 70–110)
HCT VFR BLD AUTO: 34.8 % (ref 37–48.5)
HGB BLD-MCNC: 11.1 G/DL (ref 12–16)
IRON SERPL-MCNC: 66 UG/DL (ref 30–160)
MCH RBC QN AUTO: 30.5 PG (ref 27–31)
MCHC RBC AUTO-ENTMCNC: 31.9 G/DL (ref 32–36)
MCV RBC AUTO: 96 FL (ref 82–98)
PLATELET # BLD AUTO: 234 K/UL (ref 150–450)
PMV BLD AUTO: 9.1 FL (ref 9.2–12.9)
POTASSIUM SERPL-SCNC: 4.4 MMOL/L (ref 3.5–5.1)
PROT SERPL-MCNC: 7.5 G/DL (ref 6–8.4)
RBC # BLD AUTO: 3.64 M/UL (ref 4–5.4)
SATURATED IRON: 18 % (ref 20–50)
SODIUM SERPL-SCNC: 134 MMOL/L (ref 136–145)
TOTAL IRON BINDING CAPACITY: 364 UG/DL (ref 250–450)
TRANSFERRIN SERPL-MCNC: 260 MG/DL (ref 200–375)
WBC # BLD AUTO: 3.85 K/UL (ref 3.9–12.7)

## 2024-04-15 PROCEDURE — 36415 COLL VENOUS BLD VENIPUNCTURE: CPT | Performed by: INTERNAL MEDICINE

## 2024-04-15 PROCEDURE — 97110 THERAPEUTIC EXERCISES: CPT | Mod: HCNC,PO

## 2024-04-15 PROCEDURE — 83540 ASSAY OF IRON: CPT | Performed by: INTERNAL MEDICINE

## 2024-04-15 PROCEDURE — 80053 COMPREHEN METABOLIC PANEL: CPT | Performed by: INTERNAL MEDICINE

## 2024-04-15 PROCEDURE — 82728 ASSAY OF FERRITIN: CPT | Performed by: INTERNAL MEDICINE

## 2024-04-15 PROCEDURE — 97530 THERAPEUTIC ACTIVITIES: CPT | Mod: HCNC,PO

## 2024-04-15 PROCEDURE — 85027 COMPLETE CBC AUTOMATED: CPT | Performed by: INTERNAL MEDICINE

## 2024-04-15 NOTE — PROGRESS NOTES
"[facility]   Physical Therapy Treatment Note      Name: Brie Han  Clinic Number: [id number]    Therapy Diagnosis:   Encounter Diagnoses   Name Primary?    Decreased functional mobility and endurance Yes    Impairment of balance        Physician: Leida Guerrier, *    Visit Date: 4/15/2024    Physician Orders: PT Eval and Treat  Medical Diagnosis from Referral: R26.81 (ICD-10-CM) - Unsteady gait   Evaluation Date: 3/19/2024  Authorization Period Expiration: 3/18/2024-12/31/2024  Plan of Care Expiration: 5/30/24  Visit # / Visits authorized: 6 /10 (7)     FOTO 1: 3/19/24 - 42  FOTO 2:  FOTO 3:    Time In: 1400  Time Out: 1430  Total Billable Time: 30 minutes    PTA Visit #: 0/5       Subjective     Patient reports: not feeling well today. Feels like her heart is fluttering and has a chill. No other pain, SOB or headaches. "I'd like to see what I can do, but I just don't feel well."  She was compliant with home exercise program, will provide home exercise program today  Response to previous treatment: ongoing  Functional change: ongoing    Pain: did not grade  Objective      Objective Measures updated at progress report unless specified.   Prior to therex:  BP: 160/61  SaO2: 97%  HR: 67bpm  Treatment     Brie received the treatments listed below:      therapeutic exercises to develop strength, endurance, flexibility, and posture for 15 minutes including:    Scifit bilateral upper extremities and bilateral lower extremities x10 minutes level 2  Post exercise BP:  160/64mmHg  Patient Education and Home Exercises       Education provided:   - proper form of exercises.     Written Home Exercises Provided: yes. Exercises were reviewed and Brie was able to demonstrate them prior to the end of the session.  Brie demonstrated good  understanding of the education provided. See Electronic Medical Record under Patient Instructions for exercises provided during therapy " sessions    Assessment     Ms. Han with reports of not feeling well this session. She presented with higher BP than at initial eval (150/60mmHg) and symptomatic complaints of feeling her heart flutter. Patient interested in continuing therapy, able to participate in bike activity with no change to BP. Therapist informed patient and grandson if she continues to feel bad with higher blood pressure to seek medical attention; verbalized agreement.    Brie Is progressing well towards her goals.   Patient prognosis is Good.     Patient will continue to benefit from skilled outpatient physical therapy to address the deficits listed in the problem list box on initial evaluation, provide pt/family education and to maximize pt's level of independence in the home and community environment.     Patient's spiritual, cultural and educational needs considered and pt agreeable to plan of care and goals.     Anticipated barriers to physical therapy: none    Goals:  Short Term Goals: 4 weeks  Date Last Assessed Status   Patient to be independent with home exercise program.   New   2. Patient to tolerate cardiorespiratory activity for at least 10 minutes with RPE of </= 3/10.   New   3. Patient to participate in dynamic balance activities to increase balance with supervision.   New      Long Term Goals: 6 weeks Date Last Assessed Status   Patient to participate in strengthening exercises of low back and bilateral lower extremities to decrease fall risk.   New   2. Patient to improve 5x sit to stand test to reduce fall risk.    New   3.Patient to improve Functional Gait Assessment score by at least 5 points to reduce fall risk.   New      Plan   Plan of care Certification: 3/19/2024 to 5/30/24.     Outpatient Physical Therapy 2 times weekly for 6 weeks to include the following interventions: Gait Training, Manual Therapy, Neuromuscular Re-ed, Patient Education, Self Care, Therapeutic Activities, and Therapeutic Exercise.       Rivka Martinez PT, DPT, CLT  4/15/2024

## 2024-04-19 ENCOUNTER — CLINICAL SUPPORT (OUTPATIENT)
Dept: REHABILITATION | Facility: HOSPITAL | Age: 89
End: 2024-04-19
Payer: MEDICARE

## 2024-04-19 ENCOUNTER — PATIENT MESSAGE (OUTPATIENT)
Dept: PRIMARY CARE CLINIC | Facility: CLINIC | Age: 89
End: 2024-04-19
Payer: MEDICARE

## 2024-04-19 DIAGNOSIS — Z74.09 DECREASED FUNCTIONAL MOBILITY AND ENDURANCE: Primary | ICD-10-CM

## 2024-04-19 DIAGNOSIS — I73.9 PAD (PERIPHERAL ARTERY DISEASE): ICD-10-CM

## 2024-04-19 DIAGNOSIS — R26.89 IMPAIRMENT OF BALANCE: ICD-10-CM

## 2024-04-19 DIAGNOSIS — E53.8 B12 DEFICIENCY: Primary | ICD-10-CM

## 2024-04-19 DIAGNOSIS — R79.9 ABNORMAL FINDING OF BLOOD CHEMISTRY, UNSPECIFIED: ICD-10-CM

## 2024-04-19 PROCEDURE — 97530 THERAPEUTIC ACTIVITIES: CPT | Mod: HCNC,PO

## 2024-04-19 NOTE — TELEPHONE ENCOUNTER
Please inform patient that the scheduled labs for 5/1/24 is for Hem/Onc-nothing for [facility]-I will add Vit b12 lab to 5/1/24- to see if Vit B12 injection is still needed

## 2024-04-19 NOTE — PROGRESS NOTES
"[facility]   Physical Therapy Treatment Note    Progress Note    Name: Brie Han  Clinic Number: [id number]    Therapy Diagnosis:   Encounter Diagnoses   Name Primary?    Decreased functional mobility and endurance Yes    Impairment of balance        Physician: Leida Guerrier, *    Visit Date: 4/19/2024    Physician Orders: PT Eval and Treat  Medical Diagnosis from Referral: R26.81 (ICD-10-CM) - Unsteady gait   Evaluation Date: 3/19/2024  Authorization Period Expiration: 3/18/2024-12/31/2024  Plan of Care Expiration: 5/30/24  Visit # / Visits authorized: 7 /10 (8)     FOTO 1: 3/19/24 - 42  FOTO 2: 4/19/24 - 52  FOTO 3:    Time In: 1015  Time Out: 1110  Total Billable Time: 55 minutes    PTA Visit #: 0/5       Subjective     Patient reports: feeling better today and improved from Monday's session. She states she continues to feel a little wobbly when in the community compared to being in her homr.  She was compliant with home exercise program, will provide home exercise program today  Response to previous treatment: ongoing  Functional change: improved balance    Pain: did not grade  Objective    Therapeutic Activity x 55 minutes  Objective Measures updated at progress report unless specified.   Prior to therex:  BP: 136/57mmHg    APTA Core Set Outcome Measures for Adults with Neurologic Conditions     Evaluation  4/19/24   Reference values    Gait Speed  0.87m/s    "Limited community ambulator" speed category 0.76m/s seconds self-selected  0.87m/s  seconds fast paced 0-0.4 m/s = household walker  0.4-0.8 m/s = limited community ambulator   0.8-1.4 m/s = community ambultor  >1.4 m/s = can cross street safely    Functional Gait Assessment  14/30 20/30 <22/30 = identifies fall risk in community dwelling older adults   (MCID = 4)   5 times sit-stand    21 seconds with hands    15 seconds with no hands. >12 sec= fall risk for general elderly  >16 sec= fall risk for Parkinson's " disease  >10 sec= balance/vestibular dysfunction (<59 y/o)  >14.2 sec= balance/vestibular dysfunction (>59 y/o)  >12 sec= fall risk for CVA      Functional Gait Assessment:   1. Gait on level surface =  2   (3) Normal: less than 5.5 sec, no A.D., no imbalance, normal gait pattern, deviates< 6in   (2) Mild impairment: 7-5.6 sec, uses A.D., mild gait deviations, or deviates 6-10 in   (1) Moderate impairment: > 7 sec, slow speed, imbalance, deviates 10-15 in.   (0) Severe impairment: needs assist, deviates >15 in, reach/touch wall  2. Change in Gait Speed = 2   (3) Normal: smooth change w/o loss of balance or gait deviation, deviates < 6 in, significant difference between speeds   (2) Mild impairment: changes speed, but demonstrates mild gait deviations, deviates 6-10 in, OR no deviations but unable to significantly speed, OR uses A.D.   (1) Moderate impairment: minor changes to speed, OR changes speed w/ significant deviations, deviates 10-15 in, OR  Changes speed , but loses balance & recovers   (0) Severe impairment: cannot change speed, deviates >15 in, or loses balance & needs assist  3. Gait with horizontal head turns  = 2   (3) Normal: no change in gait, deviates <6 in   (2) Mild impairment: slight change in speed, deviates 6-10 in, OR uses A.D.   (1) Moderate impairment: moderate change in speed, deviates 10-15 in   (0) Severe impairment: severe disruption of gait, deviates >15in  4. Gait with vertical head turns = 2   (3) Normal: no change in gait, deviates <6 in   (2) Mild impairment: slight change in speed, deviates 6-10 in OR uses A.D.   (1) Moderate impairment: moderate change in speed, deviates 10-15 in   (0) Severe impairment: severe disruption of gait, deviates >15 in  5. Gait with pivot turns = 2   (3) Normal: performs safely in 3 sec, no LOB   (2) Mild impairment: performs in >3 sec & no LOB, OR turns safely & requires several steps to regain LOB   (1) Moderate impairment: turns slow, OR requires  several small steps for balance following turn & stop   (0) Severe impairment: cannot turn safely, needs assist  6. Step over obstacle = 2   (3) Normal: steps over 2 stacked boxes w/o change in speed or LOB   (2) Mild impairment: able to step over 1 box w/o change in speed or LOB   (1) Moderate impairment: steps over 1 box but must slow down, may require VC   (0) Severe impairment: cannot perform w/o assist  7. Gait with Narrow AMELIA = 2   (3) Normal: 10 steps no staggering   (2) Mild impairment: 7-9 steps   (1) Moderate impairment: 4-7 steps   (0) Severe impairment: < 4 steps or cannot perform w/o assist  8. Gait with eyes closed = 2   (3) Normal: < 7 sec, no A.D., no LOB, normal gait pattern, deviates <6 in   (2) Mild impairment: 7.1-9 sec, mild gait deviations, deviates 6-10 in   (1) Moderate impairment: > 9 sec, abnormal pattern, LOB, deviates 10-15 in   (0) Severe impairment: cannot perform w/o assist, LOB, deviates >15in  9. Ambulating Backwards = 2   (3) Normal: no A.D., no LOB, normal gait pattern, deviates <6in   (2) Mild impairment: uses A.D., slower speed, mild gait deviations, deviates 6-10 in   (1) Moderate impairment: slow speed, abnormal gait pattern, LOB, deviates 10-15 in   (0) Severe impairment: severe gait deviations or LOB, deviates >15in  10. Steps = 2   (3) Normal: alternating feet, no rail   (2) Mild Impairment: alternating feet, uses rail   (1) Moderate impairment: step-to, uses rail   (0) Severe impairment: cannot perform safely    Score 20/30     Score:   <22/30 fall risk   <20/30 fall risk in older adults   <18/30 fall risk in Parkinsons       Treatment     Brie received the treatments listed below:      therapeutic exercises to develop strength, endurance, flexibility, and posture for 15 minutes including:    Nustep bilateral upper extremities and bilateral lower extremities x10 minutes level 4    Patient Education and Home Exercises       Education provided:   - proper form of  exercises.     Written Home Exercises Provided: yes. Exercises were reviewed and Brie was able to demonstrate them prior to the end of the session.  Brie demonstrated good  understanding of the education provided. See Electronic Medical Record under Patient Instructions for exercises provided during therapy sessions    Assessment     Ms. Han with reports of not feeling well this session. Patient with improved balance and endurance evidenced by 5x sit to stand test and Functional Gait Assessment. She has also met 3/6 goals. Plan for least 8 more sessions to continue to improve functional mobility and endurance.    Brie Is progressing well towards her goals.   Patient prognosis is Good.     Patient will continue to benefit from skilled outpatient physical therapy to address the deficits listed in the problem list box on initial evaluation, provide pt/family education and to maximize pt's level of independence in the home and community environment.     Patient's spiritual, cultural and educational needs considered and pt agreeable to plan of care and goals.     Anticipated barriers to physical therapy: none    Goals:  Short Term Goals: 4 weeks  Date Last Assessed Status   Patient to be independent with home exercise program.  4/19/24 MET   2. Patient to tolerate cardiorespiratory activity for at least 10 minutes with RPE of </= 3/10.   New   3. Patient to participate in dynamic balance activities to increase balance with supervision.   New      Long Term Goals: 6 weeks Date Last Assessed Status   Patient to participate in strengthening exercises of low back and bilateral lower extremities to decrease fall risk.   New   2. Patient to improve 5x sit to stand test to reduce fall risk.   4/19/24 MET   3.Patient to improve Functional Gait Assessment score by at least 5 points to reduce fall risk.  4/19/24 MET      Plan   Plan of care Certification: 3/19/2024 to 5/30/24.     Outpatient Physical Therapy 2 times  weekly for 6 weeks to include the following interventions: Gait Training, Manual Therapy, Neuromuscular Re-ed, Patient Education, Self Care, Therapeutic Activities, and Therapeutic Exercise.      Rivka Martinez PT, DPT, CLT  4/19/2024

## 2024-04-22 ENCOUNTER — CLINICAL SUPPORT (OUTPATIENT)
Dept: REHABILITATION | Facility: HOSPITAL | Age: 89
End: 2024-04-22
Payer: MEDICARE

## 2024-04-22 DIAGNOSIS — R26.89 IMPAIRMENT OF BALANCE: ICD-10-CM

## 2024-04-22 DIAGNOSIS — Z74.09 DECREASED FUNCTIONAL MOBILITY AND ENDURANCE: Primary | ICD-10-CM

## 2024-04-22 PROCEDURE — 97530 THERAPEUTIC ACTIVITIES: CPT | Mod: HCNC,PO

## 2024-04-23 NOTE — PROGRESS NOTES
[facility]   Physical Therapy Treatment Note      Name: Brie Han  Clinic Number: [id number]    Therapy Diagnosis:   Encounter Diagnoses   Name Primary?    Decreased functional mobility and endurance Yes    Impairment of balance      Physician: Leida Guerrier, *    Visit Date: 4/22/2024    Physician Orders: PT Eval and Treat  Medical Diagnosis from Referral: R26.81 (ICD-10-CM) - Unsteady gait   Evaluation Date: 3/19/2024  Authorization Period Expiration: 3/18/2024-12/31/2024  Plan of Care Expiration: 5/30/24  Visit # / Visits authorized: 8 /22 (9)     FOTO 1: 3/19/24 - 42  FOTO 2: 4/19/24 - 52  FOTO 3:    Time In: 1345  Time Out: 1430  Total Billable Time: 45 minutes    PTA Visit #: 0/5       Subjective     Patient reports:No new complaints, feeling okay.  She was compliant with home exercise program, will provide home exercise program today  Response to previous treatment: ongoing  Functional change: improved balance    Pain: did not grade  Objective    Objective Measures updated at progress report unless specified.     Treatment     Brie received the treatments listed below:      therapeutic activity to develop strength, endurance, flexibility, and posture for 45 minutes including:    Functional ambulation on varying surfaces outside in courtyard, up/down ramps. Ambulation with 1lb weights in both hands. Patient required 2 seated rest breaks. No LOB or missteps noted.     Patient Education and Home Exercises       Education provided:   - proper form of exercises.     Written Home Exercises Provided: yes. Exercises were reviewed and Brie was able to demonstrate them prior to the end of the session.  Brie demonstrated good  understanding of the education provided. See Electronic Medical Record under Patient Instructions for exercises provided during therapy sessions    Assessment     Ms. Han tolerated session well. Patient able to ambulate greater than 80% of  session with no LOB. She was able to carry conversation, ambulate on various surfaces, ambulate with weights in hands with no difficulty. Patient continues to be motivated to return to independent prior level of function    Brie Is progressing well towards her goals.   Patient prognosis is Good.     Patient will continue to benefit from skilled outpatient physical therapy to address the deficits listed in the problem list box on initial evaluation, provide pt/family education and to maximize pt's level of independence in the home and community environment.     Patient's spiritual, cultural and educational needs considered and pt agreeable to plan of care and goals.     Anticipated barriers to physical therapy: none    Goals:  Short Term Goals: 4 weeks  Date Last Assessed Status   Patient to be independent with home exercise program.  4/19/24 MET   2. Patient to tolerate cardiorespiratory activity for at least 10 minutes with RPE of </= 3/10.   Progressing   3. Patient to participate in dynamic balance activities to increase balance with supervision.   Progressing      Long Term Goals: 6 weeks Date Last Assessed Status   Patient to participate in strengthening exercises of low back and bilateral lower extremities to decrease fall risk.   Progressing   2. Patient to improve 5x sit to stand test to reduce fall risk.   4/19/24 MET   3.Patient to improve Functional Gait Assessment score by at least 5 points to reduce fall risk.  4/19/24 MET      Plan   Plan of care Certification: 3/19/2024 to 5/30/24.     Outpatient Physical Therapy 2 times weekly for 6 weeks to include the following interventions: Gait Training, Manual Therapy, Neuromuscular Re-ed, Patient Education, Self Care, Therapeutic Activities, and Therapeutic Exercise.      Rivka Martinez PT, DPT, CLT  4/23/2024

## 2024-04-24 ENCOUNTER — OFFICE VISIT (OUTPATIENT)
Dept: VASCULAR SURGERY | Facility: CLINIC | Age: 89
End: 2024-04-24
Payer: MEDICARE

## 2024-04-24 VITALS
BODY MASS INDEX: 21.44 KG/M2 | HEART RATE: 69 BPM | SYSTOLIC BLOOD PRESSURE: 136 MMHG | HEIGHT: 59 IN | DIASTOLIC BLOOD PRESSURE: 60 MMHG

## 2024-04-24 DIAGNOSIS — I73.9 PAD (PERIPHERAL ARTERY DISEASE): Primary | ICD-10-CM

## 2024-04-24 PROCEDURE — 1157F ADVNC CARE PLAN IN RCRD: CPT | Mod: HCNC,CPTII,S$GLB, | Performed by: THORACIC SURGERY (CARDIOTHORACIC VASCULAR SURGERY)

## 2024-04-24 PROCEDURE — 1159F MED LIST DOCD IN RCRD: CPT | Mod: HCNC,CPTII,S$GLB, | Performed by: THORACIC SURGERY (CARDIOTHORACIC VASCULAR SURGERY)

## 2024-04-24 PROCEDURE — 3288F FALL RISK ASSESSMENT DOCD: CPT | Mod: HCNC,CPTII,S$GLB, | Performed by: THORACIC SURGERY (CARDIOTHORACIC VASCULAR SURGERY)

## 2024-04-24 PROCEDURE — 1126F AMNT PAIN NOTED NONE PRSNT: CPT | Mod: HCNC,CPTII,S$GLB, | Performed by: THORACIC SURGERY (CARDIOTHORACIC VASCULAR SURGERY)

## 2024-04-24 PROCEDURE — 99203 OFFICE O/P NEW LOW 30 MIN: CPT | Mod: HCNC,S$GLB,, | Performed by: THORACIC SURGERY (CARDIOTHORACIC VASCULAR SURGERY)

## 2024-04-24 PROCEDURE — 1101F PT FALLS ASSESS-DOCD LE1/YR: CPT | Mod: HCNC,CPTII,S$GLB, | Performed by: THORACIC SURGERY (CARDIOTHORACIC VASCULAR SURGERY)

## 2024-04-24 PROCEDURE — 99999 PR PBB SHADOW E&M-EST. PATIENT-LVL III: CPT | Mod: PBBFAC,HCNC,, | Performed by: THORACIC SURGERY (CARDIOTHORACIC VASCULAR SURGERY)

## 2024-04-24 PROCEDURE — 1160F RVW MEDS BY RX/DR IN RCRD: CPT | Mod: HCNC,CPTII,S$GLB, | Performed by: THORACIC SURGERY (CARDIOTHORACIC VASCULAR SURGERY)

## 2024-04-24 NOTE — PROGRESS NOTES
This patient was referred to the office with peripheral arterial disease.  She has mild chronic hypertension.  She denies any other major concurrent medical illnesses.  Her problem list was reviewed.  She has a history of anemia.  She has had no recent surgeries.    She is not a smoker.    She has no other pertinent family social history.    On exam vital signs are stable.  Pupils are equal and round reactive to light.  Neck is supple.  Chest is equal breath sounds.  Heart is in a regular rate and rhythm.  Abdomen is benign.  Perfusion to the legs and feet seems to be satisfactory.    She does have palpable pedal pulses.    She may have an element of neuropathy.  She does have pain primarily at night that can wake her up and describes it as significant and involving the feet and calves.  Recommendation is for Neurontin for perhaps a mild neuropathy.  I do not think she would need any intervention for her mild vascular disease.

## 2024-04-29 ENCOUNTER — CLINICAL SUPPORT (OUTPATIENT)
Dept: REHABILITATION | Facility: HOSPITAL | Age: 89
End: 2024-04-29
Payer: MEDICARE

## 2024-04-29 ENCOUNTER — LAB VISIT (OUTPATIENT)
Dept: LAB | Facility: HOSPITAL | Age: 89
End: 2024-04-29
Attending: INTERNAL MEDICINE
Payer: MEDICARE

## 2024-04-29 DIAGNOSIS — E53.8 B12 DEFICIENCY: ICD-10-CM

## 2024-04-29 DIAGNOSIS — D50.9 IRON DEFICIENCY ANEMIA, UNSPECIFIED IRON DEFICIENCY ANEMIA TYPE: ICD-10-CM

## 2024-04-29 DIAGNOSIS — Z74.09 DECREASED FUNCTIONAL MOBILITY AND ENDURANCE: Primary | ICD-10-CM

## 2024-04-29 DIAGNOSIS — R26.89 IMPAIRMENT OF BALANCE: ICD-10-CM

## 2024-04-29 DIAGNOSIS — I73.9 PAD (PERIPHERAL ARTERY DISEASE): ICD-10-CM

## 2024-04-29 DIAGNOSIS — R79.9 ABNORMAL FINDING OF BLOOD CHEMISTRY, UNSPECIFIED: ICD-10-CM

## 2024-04-29 LAB
ALBUMIN SERPL BCP-MCNC: 3.7 G/DL (ref 3.5–5.2)
ALP SERPL-CCNC: 64 U/L (ref 55–135)
ALT SERPL W/O P-5'-P-CCNC: 15 U/L (ref 10–44)
ANION GAP SERPL CALC-SCNC: 10 MMOL/L (ref 8–16)
AST SERPL-CCNC: 23 U/L (ref 10–40)
BASOPHILS # BLD AUTO: 0.05 K/UL (ref 0–0.2)
BASOPHILS NFR BLD: 1.3 % (ref 0–1.9)
BILIRUB SERPL-MCNC: 0.3 MG/DL (ref 0.1–1)
BUN SERPL-MCNC: 26 MG/DL (ref 10–30)
CALCIUM SERPL-MCNC: 9.7 MG/DL (ref 8.7–10.5)
CHLORIDE SERPL-SCNC: 98 MMOL/L (ref 95–110)
CHOLEST SERPL-MCNC: 172 MG/DL (ref 120–199)
CHOLEST/HDLC SERPL: 2.6 {RATIO} (ref 2–5)
CO2 SERPL-SCNC: 24 MMOL/L (ref 23–29)
CREAT SERPL-MCNC: 0.8 MG/DL (ref 0.5–1.4)
DIFFERENTIAL METHOD BLD: ABNORMAL
EOSINOPHIL # BLD AUTO: 0.1 K/UL (ref 0–0.5)
EOSINOPHIL NFR BLD: 1.9 % (ref 0–8)
ERYTHROCYTE [DISTWIDTH] IN BLOOD BY AUTOMATED COUNT: 19.7 % (ref 11.5–14.5)
EST. GFR  (NO RACE VARIABLE): >60 ML/MIN/1.73 M^2
FERRITIN SERPL-MCNC: 311 NG/ML (ref 20–300)
GLUCOSE SERPL-MCNC: 85 MG/DL (ref 70–110)
HCT VFR BLD AUTO: 30.9 % (ref 37–48.5)
HDLC SERPL-MCNC: 67 MG/DL (ref 40–75)
HDLC SERPL: 39 % (ref 20–50)
HGB BLD-MCNC: 9.9 G/DL (ref 12–16)
IMM GRANULOCYTES # BLD AUTO: 0 K/UL (ref 0–0.04)
IMM GRANULOCYTES NFR BLD AUTO: 0 % (ref 0–0.5)
IRON SERPL-MCNC: 75 UG/DL (ref 30–160)
LDLC SERPL CALC-MCNC: 88.2 MG/DL (ref 63–159)
LYMPHOCYTES # BLD AUTO: 1.2 K/UL (ref 1–4.8)
LYMPHOCYTES NFR BLD: 31.4 % (ref 18–48)
MCH RBC QN AUTO: 30.7 PG (ref 27–31)
MCHC RBC AUTO-ENTMCNC: 32 G/DL (ref 32–36)
MCV RBC AUTO: 96 FL (ref 82–98)
MONOCYTES # BLD AUTO: 0.5 K/UL (ref 0.3–1)
MONOCYTES NFR BLD: 13.4 % (ref 4–15)
NEUTROPHILS # BLD AUTO: 1.9 K/UL (ref 1.8–7.7)
NEUTROPHILS NFR BLD: 52 % (ref 38–73)
NONHDLC SERPL-MCNC: 105 MG/DL
NRBC BLD-RTO: 0 /100 WBC
PLATELET # BLD AUTO: 264 K/UL (ref 150–450)
PMV BLD AUTO: 9.7 FL (ref 9.2–12.9)
POTASSIUM SERPL-SCNC: 4.5 MMOL/L (ref 3.5–5.1)
PROT SERPL-MCNC: 7 G/DL (ref 6–8.4)
RBC # BLD AUTO: 3.22 M/UL (ref 4–5.4)
SATURATED IRON: 21 % (ref 20–50)
SODIUM SERPL-SCNC: 132 MMOL/L (ref 136–145)
TOTAL IRON BINDING CAPACITY: 353 UG/DL (ref 250–450)
TRANSFERRIN SERPL-MCNC: 252 MG/DL (ref 200–375)
TRIGL SERPL-MCNC: 84 MG/DL (ref 30–150)
VIT B12 SERPL-MCNC: >2000 PG/ML (ref 210–950)
WBC # BLD AUTO: 3.73 K/UL (ref 3.9–12.7)

## 2024-04-29 PROCEDURE — 80061 LIPID PANEL: CPT | Performed by: NURSE PRACTITIONER

## 2024-04-29 PROCEDURE — 82728 ASSAY OF FERRITIN: CPT | Performed by: INTERNAL MEDICINE

## 2024-04-29 PROCEDURE — 80053 COMPREHEN METABOLIC PANEL: CPT | Performed by: INTERNAL MEDICINE

## 2024-04-29 PROCEDURE — 97530 THERAPEUTIC ACTIVITIES: CPT | Mod: HCNC,PO

## 2024-04-29 PROCEDURE — 36415 COLL VENOUS BLD VENIPUNCTURE: CPT | Performed by: NURSE PRACTITIONER

## 2024-04-29 PROCEDURE — 85025 COMPLETE CBC W/AUTO DIFF WBC: CPT | Performed by: INTERNAL MEDICINE

## 2024-04-29 PROCEDURE — 83540 ASSAY OF IRON: CPT | Performed by: INTERNAL MEDICINE

## 2024-04-29 PROCEDURE — 82607 VITAMIN B-12: CPT | Performed by: NURSE PRACTITIONER

## 2024-04-29 NOTE — PROGRESS NOTES
"[facility]   Physical Therapy Treatment Note      Name: Brie Han  Clinic Number: [id number]    Therapy Diagnosis:   Encounter Diagnoses   Name Primary?    Decreased functional mobility and endurance Yes    Impairment of balance      Physician: Leida Guerrier, *    Visit Date: 4/29/2024    Physician Orders: PT Eval and Treat  Medical Diagnosis from Referral: R26.81 (ICD-10-CM) - Unsteady gait   Evaluation Date: 3/19/2024  Authorization Period Expiration: 3/18/2024-12/31/2024  Plan of Care Expiration: 5/30/24  Visit # / Visits authorized: 9 /22 (10)     FOTO 1: 3/19/24 - 42  FOTO 2: 4/19/24 - 52  FOTO 3:    Time In: 1350  Time Out: 1430  Total Billable Time: 40 minutes    PTA Visit #: 0/5       Subjective     Patient reports: "I've been busy all day. I move everyday."  She was compliant with home exercise program, will provide home exercise program today  Response to previous treatment: ongoing  Functional change: improved balance    Pain: did not grade  Objective    Objective Measures updated at progress report unless specified.     Treatment     Brie received the treatments listed below:      therapeutic activity to develop strength, endurance, flexibility, and posture for 45 minutes including:    SciFit bilateral lower extremities x10' level 2.0    Sit to stand x10  Sit to stand 2x10 on Airex    Step up on 6" step 2x10  Step up on 6" with 2lb 2x10    Standing green theraband scap retractions 2x10  Standing bicep curls #5lbs dowel 2x15    Patient Education and Home Exercises       Education provided:   - proper form of exercises.     Written Home Exercises Provided: yes. Exercises were reviewed and Brie was able to demonstrate them prior to the end of the session.  Brie demonstrated good  understanding of the education provided. See Electronic Medical Record under Patient Instructions for exercises provided during therapy sessions    Assessment     Ms. Han " tolerated session well. Patient with good mobility in all therex. No complaints of pain or fatigue. Plan for reassessments next session.    Brie Is progressing well towards her goals.   Patient prognosis is Good.     Patient will continue to benefit from skilled outpatient physical therapy to address the deficits listed in the problem list box on initial evaluation, provide pt/family education and to maximize pt's level of independence in the home and community environment.     Patient's spiritual, cultural and educational needs considered and pt agreeable to plan of care and goals.     Anticipated barriers to physical therapy: none    Goals:  Short Term Goals: 4 weeks  Date Last Assessed Status   Patient to be independent with home exercise program.  4/19/24 MET   2. Patient to tolerate cardiorespiratory activity for at least 10 minutes with RPE of </= 3/10.   Progressing   3. Patient to participate in dynamic balance activities to increase balance with supervision.   Progressing      Long Term Goals: 6 weeks Date Last Assessed Status   Patient to participate in strengthening exercises of low back and bilateral lower extremities to decrease fall risk.   Progressing   2. Patient to improve 5x sit to stand test to reduce fall risk.   4/19/24 MET   3.Patient to improve Functional Gait Assessment score by at least 5 points to reduce fall risk.  4/19/24 MET      Plan   Plan of care Certification: 3/19/2024 to 5/30/24.     Outpatient Physical Therapy 2 times weekly for 6 weeks to include the following interventions: Gait Training, Manual Therapy, Neuromuscular Re-ed, Patient Education, Self Care, Therapeutic Activities, and Therapeutic Exercise.      Rivka Martinez PT, DPT, CLT  4/29/2024

## 2024-05-02 ENCOUNTER — OFFICE VISIT (OUTPATIENT)
Dept: PRIMARY CARE CLINIC | Facility: CLINIC | Age: 89
End: 2024-05-02
Payer: MEDICARE

## 2024-05-02 ENCOUNTER — PATIENT MESSAGE (OUTPATIENT)
Dept: PRIMARY CARE CLINIC | Facility: CLINIC | Age: 89
End: 2024-05-02

## 2024-05-02 VITALS
HEIGHT: 59 IN | DIASTOLIC BLOOD PRESSURE: 58 MMHG | WEIGHT: 108.56 LBS | OXYGEN SATURATION: 97 % | SYSTOLIC BLOOD PRESSURE: 132 MMHG | HEART RATE: 64 BPM | TEMPERATURE: 98 F | BODY MASS INDEX: 21.88 KG/M2

## 2024-05-02 DIAGNOSIS — M79.2 NEUROGENIC PAIN OF RIGHT FOOT: Chronic | ICD-10-CM

## 2024-05-02 DIAGNOSIS — D50.8 IRON DEFICIENCY ANEMIA SECONDARY TO INADEQUATE DIETARY IRON INTAKE: ICD-10-CM

## 2024-05-02 DIAGNOSIS — I73.9 PAD (PERIPHERAL ARTERY DISEASE): ICD-10-CM

## 2024-05-02 DIAGNOSIS — C18.9 ADENOCARCINOMA OF COLON: ICD-10-CM

## 2024-05-02 DIAGNOSIS — K21.00 GASTROESOPHAGEAL REFLUX DISEASE WITH ESOPHAGITIS WITHOUT HEMORRHAGE: Primary | ICD-10-CM

## 2024-05-02 DIAGNOSIS — N18.31 STAGE 3A CHRONIC KIDNEY DISEASE: ICD-10-CM

## 2024-05-02 PROCEDURE — 1159F MED LIST DOCD IN RCRD: CPT | Mod: HCNC,CPTII,S$GLB, | Performed by: FAMILY MEDICINE

## 2024-05-02 PROCEDURE — 99999 PR PBB SHADOW E&M-EST. PATIENT-LVL IV: CPT | Mod: PBBFAC,HCNC,, | Performed by: FAMILY MEDICINE

## 2024-05-02 PROCEDURE — 1101F PT FALLS ASSESS-DOCD LE1/YR: CPT | Mod: HCNC,CPTII,S$GLB, | Performed by: FAMILY MEDICINE

## 2024-05-02 PROCEDURE — 1125F AMNT PAIN NOTED PAIN PRSNT: CPT | Mod: HCNC,CPTII,S$GLB, | Performed by: FAMILY MEDICINE

## 2024-05-02 PROCEDURE — 1160F RVW MEDS BY RX/DR IN RCRD: CPT | Mod: HCNC,CPTII,S$GLB, | Performed by: FAMILY MEDICINE

## 2024-05-02 PROCEDURE — 99214 OFFICE O/P EST MOD 30 MIN: CPT | Mod: HCNC,S$GLB,, | Performed by: FAMILY MEDICINE

## 2024-05-02 PROCEDURE — 3288F FALL RISK ASSESSMENT DOCD: CPT | Mod: HCNC,CPTII,S$GLB, | Performed by: FAMILY MEDICINE

## 2024-05-02 PROCEDURE — 1157F ADVNC CARE PLAN IN RCRD: CPT | Mod: HCNC,CPTII,S$GLB, | Performed by: FAMILY MEDICINE

## 2024-05-02 RX ORDER — FAMOTIDINE 20 MG/1
20 TABLET, FILM COATED ORAL 2 TIMES DAILY
Qty: 60 TABLET | Refills: 11 | Status: SHIPPED | OUTPATIENT
Start: 2024-05-02 | End: 2025-05-02

## 2024-05-02 RX ORDER — SUCRALFATE 1 G/1
1 TABLET ORAL 2 TIMES DAILY
Qty: 180 TABLET | Refills: 3 | Status: SHIPPED | OUTPATIENT
Start: 2024-05-02

## 2024-05-02 RX ORDER — CEPHRADINE 500 MG
CAPSULE ORAL
Qty: 90 EACH | Refills: 3 | Status: SHIPPED | OUTPATIENT
Start: 2024-05-02

## 2024-05-02 NOTE — PATIENT INSTRUCTIONS
DASH diet for Hypertension and Healthy Eating  Provided by the [facility]    Healthy Lifestyle   Nutrition and healthy eating  The DASH diet emphasizes portion size, eating a variety of foods and getting the right amount of nutrients. Discover how DASH can improve your health and lower your blood pressure.  By [facility] Staff   DASH stands for Dietary Approaches to Stop Hypertension. The DASH diet is a lifelong approach to healthy eating that's designed to help treat or prevent high blood pressure (hypertension). The DASH diet encourages you to reduce the sodium in your diet and eat a variety of foods rich in nutrients that help lower blood pressure, such as potassium, calcium and magnesium.  By following the DASH diet, you may be able to reduce your blood pressure by a few points in just two weeks. Over time, your systolic blood pressure could drop by eight to 14 points, which can make a significant difference in your health risks.  Because the DASH diet is a healthy way of eating, it offers health benefits besides just lowering blood pressure. The DASH diet is also in line with dietary recommendations to prevent osteoporosis, cancer, heart disease, stroke and diabetes.  The DASH diet emphasizes vegetables, fruits and low-fat dairy foods -- and moderate amounts of whole grains, fish, poultry and nuts.  In addition to the standard DASH diet, there is also a lower sodium version of the diet. You can choose the version of the diet that meets your health needs:  Standard DASH diet. You can consume up to 2,300 milligrams (mg) of sodium a day.   Lower sodium DASH diet. You can consume up to 1,500 mg of sodium a day.  Both versions of the DASH diet aim to reduce the amount of sodium in your diet compared with what you might get in a typical American diet, which can amount to a whopping 3,400 mg of sodium a day or more.  The standard DASH diet meets the recommendation from the Dietary Guidelines for Americans to keep  "daily sodium intake to less than 2,300 mg a day.  The American Heart Association recommends 1,500 mg a day of sodium as an upper limit for all adults. If you aren't sure what sodium level is right for you, talk to your doctor.  Both versions of the DASH diet include lots of whole grains, fruits, vegetables and low-fat dairy products. The DASH diet also includes some fish, poultry and legumes, and encourages a small amount of nuts and seeds a few times a week.   You can eat red meat, sweets and fats in small amounts. The DASH diet is low in saturated fat, cholesterol and total fat.  Here's a look at the recommended servings from each food group for the 2,244-[id number]-k-day DASH diet.  Grains: 6 to 8 servings a day  Grains include bread, cereal, rice and pasta. Examples of one serving of grains include 1 slice whole-wheat bread, 1 ounce dry cereal, or 1/2 cup cooked cereal, rice or pasta.  Focus on whole grains because they have more fiber and nutrients than do refined grains. For instance, use brown rice instead of white rice, whole-wheat pasta instead of regular pasta and whole-grain bread instead of white bread. Look for products labeled "100 percent whole grain" or "100 percent whole wheat."   Grains are naturally low in fat. Keep them this way by avoiding butter, cream and cheese sauces.  Vegetables: 4 to 5 servings a day  Tomatoes, carrots, broccoli, sweet potatoes, greens and other vegetables are full of fiber, vitamins, and such minerals as potassium and magnesium. Examples of one serving include 1 cup raw leafy green vegetables or 1/2 cup cut-up raw or cooked vegetables.  Don't think of vegetables only as side dishes -- a hearty blend of vegetables served over brown rice or whole-wheat noodles can serve as the main dish for a meal.   Fresh and frozen vegetables are both good choices. When buying frozen and canned vegetables, choose those labeled as low sodium or without added salt.   To increase the number of " servings you fit in daily, be creative. In a stir-richardson, for instance, cut the amount of meat in half and double up on the vegetables.  Fruits: 4 to 5 servings a day  Many fruits need little preparation to become a healthy part of a meal or snack. Like vegetables, they're packed with fiber, potassium and magnesium and are typically low in fat -- coconuts are an exception. Examples of one serving include one medium fruit, 1/2 cup fresh, frozen or canned fruit, or 4 ounces of juice.  Have a piece of fruit with meals and one as a snack, then round out your day with a dessert of fresh fruits topped with a dollop of low-fat yogurt.   Leave on edible peels whenever possible. The peels of apples, pears and most fruits with pits add interesting texture to recipes and contain healthy nutrients and fiber.   Remember that citrus fruits and juices, such as grapefruit, can interact with certain medications, so check with your doctor or pharmacist to see if they're OK for you.   If you choose canned fruit or juice, make sure no sugar is added.  Dairy: 2 to 3 servings a day  Milk, yogurt, cheese and other dairy products are major sources of calcium, vitamin D and protein. But the key is to make sure that you choose dairy products that are low fat or fat-free because otherwise they can be a major source of fat -- and most of it is saturated. Examples of one serving include 1 cup skim or 1 percent milk, 1 cup low fat yogurt, or 1 1/2 ounces part-skim cheese.  Low-fat or fat-free frozen yogurt can help you boost the amount of dairy products you eat while offering a sweet treat. Add fruit for a healthy twist.   If you have trouble digesting dairy products, choose lactose-free products or consider taking an over-the-counter product that contains the enzyme lactase, which can reduce or prevent the symptoms of lactose intolerance.   Go easy on regular and even fat-free cheeses because they are typically high in sodium.  Lean meat, poultry  and fish: 6 servings or fewer a day  Meat can be a rich source of protein, B vitamins, iron and zinc. Choose lean varieties and aim for no more than 6 ounces a day. Cutting back on your meat portion will allow room for more vegetables.  Trim away skin and fat from poultry and meat and then bake, broil, grill or roast instead of frying in fat.   Eat heart-healthy fish, such as salmon, herring and tuna. These types of fish are high in omega-3 fatty acids, which can help lower your total cholesterol.  Nuts, seeds and legumes: 4 to 5 servings a week  Almonds, sunflower seeds, kidney beans, peas, lentils and other foods in this family are good sources of magnesium, potassium and protein. They're also full of fiber and phytochemicals, which are plant compounds that may protect against some cancers and cardiovascular disease.  Serving sizes are small and are intended to be consumed only a few times a week because these foods are high in calories. Examples of one serving include 1/3 cup nuts, 2 tablespoons seeds, or 1/2 cup cooked beans or peas.   Nuts sometimes get a bad rap because of their fat content, but they contain healthy types of fat -- monounsaturated fat and omega-3 fatty acids. They're high in calories, however, so eat them in moderation. Try adding them to stir-fries, salads or cereals.   Soybean-based products, such as tofu and tempeh, can be a good alternative to meat because they contain all of the amino acids your body needs to make a complete protein, just like meat.  Fats and oils: 2 to 3 servings a day  Fat helps your body absorb essential vitamins and helps your body's immune system. But too much fat increases your risk of heart disease, diabetes and obesity. The DASH diet strives for a healthy balance by limiting total fat to less than 30 percent of daily calories from fat, with a focus on the healthier monounsaturated fats.  Examples of one serving include 1 teaspoon soft margarine, 1 tablespoon  mayonnaise or 2 tablespoons salad dressing.  Saturated fat and trans fat are the main dietary culprits in increasing your risk of coronary artery disease. DASH helps keep your daily saturated fat to less than 6 percent of your total calories by limiting use of meat, butter, cheese, whole milk, cream and eggs in your diet, along with foods made from lard, solid shortenings, and palm and coconut oils.   Avoid trans fat, commonly found in such processed foods as crackers, baked goods and fried items.   Read food labels on margarine and salad dressing so that you can choose those that are lowest in saturated fat and free of trans fat.  Sweets: 5 servings or fewer a week  You don't have to banish sweets entirely while following the DASH diet -- just go easy on them. Examples of one serving include 1 tablespoon sugar, jelly or jam, 1/2 cup sorbet, or 1 cup lemonade.  When you eat sweets, choose those that are fat-free or low-fat, such as sorbets, fruit ices, jelly beans, hard candy, aaliyah crackers or low-fat cookies.   Artificial sweeteners such as aspartame (NutraSweet, Equal) and sucralose (Splenda) may help satisfy your sweet tooth while sparing the sugar. But remember that you still must use them sensibly. It's OK to swap a diet cola for a regular cola, but not in place of a more nutritious beverage such as low-fat milk or even plain water.   Cut back on added sugar, which has no nutritional value but can pack on calories.  Drinking too much alcohol can increase blood pressure. The Dietary Guidelines for Americans recommends that men limit alcohol to no more than two drinks a day and women to one or less.  The DASH diet doesn't address caffeine consumption. The influence of caffeine on blood pressure remains unclear. But caffeine can cause your blood pressure to rise at least temporarily. If you already have high blood pressure or if you think caffeine is affecting your blood pressure, talk to your doctor about your  "caffeine consumption.  While the DASH diet is not a weight-loss program, you may indeed lose unwanted pounds because it can help guide you toward healthier food choices.  The DASH diet generally includes about 2,000 calories a day. If you're trying to lose weight, you may need to eat fewer calories. You may also need to adjust your serving goals based on your individual circumstances -- something your health care team can help you decide.  The foods at the core of the DASH diet are naturally low in sodium. So just by following the DASH diet, you're likely to reduce your sodium intake. You also reduce sodium further by:  Using sodium-free spices or flavorings with your food instead of salt   Not adding salt when cooking rice, pasta or hot cereal   Rinsing canned foods to remove some of the sodium   Buying foods labeled "no salt added," "sodium-free," "low sodium" or "very low sodium"  One teaspoon of table salt has 2,325 mg of sodium. When you read food labels, you may be surprised at just how much sodium some processed foods contain. Even low-fat soups, canned vegetables, ready-to-eat cereals and sliced turkey from the local deli -- foods you may have considered healthy -- often have lots of sodium.  You may notice a difference in taste when you choose low-sodium food and beverages. If things seem too bland, gradually introduce low-sodium foods and cut back on table salt until you reach your sodium goal. That'll give your palate time to adjust.  Using salt-free seasoning blends or herbs and spices may also ease the transition. It can take several weeks for your taste buds to get used to less salty foods.  Try these strategies to get started on the DASH diet:   Change gradually. If you now eat only one or two servings of fruits or vegetables a day, try to add a serving at lunch and one at dinner. Rather than switching to all whole grains, start by making one or two of your grain servings whole grains. Increasing " fruits, vegetables and whole grains gradually can also help prevent bloating or diarrhea that may occur if you aren't used to eating a diet with lots of fiber. You can also try over-the-counter products to help reduce gas from beans and vegetables.   Reward successes and forgive slip-ups. Reward yourself with a nonfood treat for your accomplishments -- rent a movie, purchase a book or get together with a friend. Everyone slips, especially when learning something new. Remember that changing your lifestyle is a long-term process. Find out what triggered your setback and then just  where you left off with the DASH diet.   Add physical activity. To boost your blood pressure lowering efforts even more, consider increasing your physical activity in addition to following the DASH diet. Combining both the DASH diet and physical activity makes it more likely that you'll reduce your blood pressure.   Get support if you need it. If you're having trouble sticking to your diet, talk to your doctor or dietitian about it. You might get some tips that will help you stick to the DASH diet.  Remember, healthy eating isn't an all-or-nothing proposition. What's most important is that, on average, you eat healthier foods with plenty of variety -- both to keep your diet nutritious and to avoid boredom or extremes. And with the DASH diet, you can have both.

## 2024-05-03 ENCOUNTER — CLINICAL SUPPORT (OUTPATIENT)
Dept: REHABILITATION | Facility: HOSPITAL | Age: 89
End: 2024-05-03
Payer: MEDICARE

## 2024-05-03 DIAGNOSIS — R26.89 IMPAIRMENT OF BALANCE: ICD-10-CM

## 2024-05-03 DIAGNOSIS — Z74.09 DECREASED FUNCTIONAL MOBILITY AND ENDURANCE: Primary | ICD-10-CM

## 2024-05-03 PROCEDURE — 97110 THERAPEUTIC EXERCISES: CPT | Mod: HCNC,PO

## 2024-05-03 NOTE — PROGRESS NOTES
"[facility]   Physical Therapy Treatment Note    Discharge Summary  Name: Brie Han  Clinic Number: [id number]    Therapy Diagnosis:   Encounter Diagnoses   Name Primary?    Decreased functional mobility and endurance Yes    Impairment of balance      Physician: Leida Guerrier, *    Visit Date: 5/3/2024    Physician Orders: PT Eval and Treat  Medical Diagnosis from Referral: R26.81 (ICD-10-CM) - Unsteady gait   Evaluation Date: 3/19/2024  Authorization Period Expiration: 3/18/2024-12/31/2024  Plan of Care Expiration: 5/30/24  Visit # / Visits authorized: 9 /22 (10)     FOTO 1: 3/19/24 - 42  FOTO 2: 4/19/24 - 52  FOTO 3: 5/3/24 - 44    Time In: 1020  Time Out: 1100  Total Billable Time: 40 minutes    PTA Visit #: 0/5       Subjective     Patient reports: "I'm feeling okay. I keep moving everyday."  She was compliant with home exercise program, will provide home exercise program today  Response to previous treatment: ongoing  Functional change: improved balance    Pain: did not grade  Objective    Objective Measures updated at progress report unless specified.     Treatment     Brie received the treatments listed below:      therapeutic activity to develop strength, endurance, flexibility, and posture for 40 minutes including:    SciFit bilateral lower extremities x10' level 2.0    Squat x30  Heel raises x30  Standing marches 2 x1'  Standing bicep curls with orange theraband 2x10 bilaterally    Patient Education and Home Exercises       Education provided:   - walking program and written home exercise program of above exercises.    Written Home Exercises Provided: yes. Exercises were reviewed and Brie was able to demonstrate them prior to the end of the session.  Brie demonstrated good  understanding of the education provided. See Electronic Medical Record under Patient Instructions for exercises provided during therapy sessions    Assessment     Ms. Han tolerated " session well. Patient has met all goals. Provided updated home exercise program. Patient is discharged from therapy.    Brie Is progressing well towards her goals.   Patient prognosis is Good.     Patient will continue to benefit from skilled outpatient physical therapy to address the deficits listed in the problem list box on initial evaluation, provide pt/family education and to maximize pt's level of independence in the home and community environment.     Patient's spiritual, cultural and educational needs considered and pt agreeable to plan of care and goals.     Anticipated barriers to physical therapy: none    Goals:  Short Term Goals: 4 weeks  Date Last Assessed Status   Patient to be independent with home exercise program.  4/19/24 MET   2. Patient to tolerate cardiorespiratory activity for at least 10 minutes with RPE of </= 3/10.  5/3/24 MET   3. Patient to participate in dynamic balance activities to increase balance with supervision.  5/3/24 MET      Long Term Goals: 6 weeks Date Last Assessed Status   Patient to participate in strengthening exercises of low back and bilateral lower extremities to decrease fall risk.  5/3/24 MET   2. Patient to improve 5x sit to stand test to reduce fall risk.   4/19/24 MET   3.Patient to improve Functional Gait Assessment score by at least 5 points to reduce fall risk.  4/19/24 MET      Plan   Plan of care Certification: 3/19/2024 to 5/30/24.     Discharge Summary     Rivka Martinez PT, DPT, CLT  5/3/2024

## 2024-05-10 NOTE — PROGRESS NOTES
SUBJECTIVE:   89 y.o. female for annual routine  and checkup.  Severe anemia due to chronic loss  Family and/or Caretaker present at visit?  Yes.  Diagnostic tests reviewed/disposition: I have reviewed all completed as well as pending diagnostic tests at the time of discharge.  Disease/illness education: anemia/ adenocarcinoma cecum  Home health/community services discussion/referrals: Patient has home health established at d/c .   Establishment or re-establishment of referral orders for community resources: No other necessary community resources.   Discussion with other health care providers: No discussion with other health care providers necessary.   Anemia: Patient presents for presents evaluation of anemia. Anemia was found by routine CBC.  It has been present for a few months.  Associated signs & symptoms: fatigue, hematochezia, history of renal disease, and numbness/paresthesias.        Current Outpatient Medications   Medication Sig Dispense Refill    acetaminophen (TYLENOL) 325 MG tablet Take 2 tablets (650 mg total) by mouth every 8 (eight) hours as needed.  0    alpha lipoic acid 200 mg Cap 1 tab at night 90 each 3    b complex vitamins tablet Take 1 tablet by mouth once daily.      calcium citrate-vitamin D3 315-200 mg (CITRACAL+D) 315 mg-5 mcg (200 unit) per tablet Take 1 tablet by mouth once daily.      famotidine (PEPCID) 20 MG tablet Take 1 tablet (20 mg total) by mouth 2 (two) times daily. 60 tablet 11    folic acid (FOLVITE) 1 MG tablet Take 1,000 mcg by mouth.      gabapentin (NEURONTIN) 100 MG capsule Take 1 capsule twice a day as needed 60 capsule 1    hydrocortisone 2.5 % cream Apply topically every evening. 3.5 g 2    losartan (COZAAR) 100 MG tablet Take 1 tablet (100 mg total) by mouth once daily. 90 tablet 3    lovastatin (MEVACOR) 10 MG tablet Take 1 tablet (10 mg total) by mouth every evening. 90 tablet 0    multivitamin capsule Take 1 capsule by mouth once daily.      NIFEdipine  "(PROCARDIA-XL) 30 MG (OSM) 24 hr tablet Take 1 tablet (30 mg total) by mouth every evening. 90 tablet 3    sucralfate (CARAFATE) 1 gram tablet Take 1 tablet (1 g total) by mouth 2 (two) times daily. 180 tablet 3    valACYclovir (VALTREX) 1000 MG tablet Take 1 tablet (1,000 mg total) by mouth 2 (two) times daily. for 3 days 6 tablet 0     Current Facility-Administered Medications   Medication Dose Route Frequency Provider Last Rate Last Admin    cyanocobalamin injection 1,000 mcg  1,000 mcg Subcutaneous Q30 Days Leida Guerrier, NP   1,000 mcg at 03/25/24 1426     Allergies: Contrast media, Aspirin, Ciprofloxacin, Iron, Iodine, and Penicillins   Patient's last menstrual period was 12/07/1972.    ROS:  Feeling well. No dyspnea or chest pain on exertion.  No abdominal pain, change in bowel habits, black or bloody stools.  No urinary tract symptoms. GYN ROS: no breast pain or new or enlarging lumps on self exam, no vaginal bleeding, no discharge or pelvic pain. No neurological complaints.    OBJECTIVE:   The patient appears well, pale weak, thin, alert, oriented x 3, in no distress.  BP (!) 132/58 (BP Location: Left arm, Patient Position: Sitting, BP Method: Medium (Manual))   Pulse 64   Temp 97.9 °F (36.6 °C) (Oral)   Ht 4' 11" (1.499 m)   Wt 49.2 kg (108 lb 9.2 oz)   LMP 12/07/1972   SpO2 97%   BMI 21.93 kg/m²   ENT normal.  Neck supple. No adenopathy or thyromegaly. Left upper lips herpetic lesion.DAVID. Lungs are clear, good air entry, no wheezes, rhonchi or rales. S1 and S2 normal, no murmurs, regular rate and rhythm. Abdomen soft without tenderness, guarding, mass or organomegaly. Extremities show no edema, normal peripheral pulses. Neurological is normal, no focal findings.    Lab Results   Component Value Date    WBC 3.73 (L) 04/29/2024    HGB 9.9 (L) 04/29/2024    HCT 30.9 (L) 04/29/2024     04/29/2024    CHOL 172 04/29/2024    TRIG 84 04/29/2024    HDL 67 04/29/2024    ALT 15 04/29/2024    " AST 23 04/29/2024     (L) 04/29/2024    K 4.5 04/29/2024    CL 98 04/29/2024    CREATININE 0.8 04/29/2024    BUN 26 04/29/2024    CO2 24 04/29/2024    TSH 1.532 10/22/2017    INR 1.0 05/06/2018    HGBA1C 5.4 10/23/2017     Cancer will observe only. No invasive therapy  CKD stable  PAD under medications.     ASSESSMENT: Brie was seen today for follow-up.    Diagnoses and all orders for this visit:    Gastroesophageal reflux disease with esophagitis without hemorrhage  -     sucralfate (CARAFATE) 1 gram tablet; Take 1 tablet (1 g total) by mouth 2 (two) times daily.  -     famotidine (PEPCID) 20 MG tablet; Take 1 tablet (20 mg total) by mouth 2 (two) times daily.    Iron deficiency anemia secondary to inadequate dietary iron intake  -     Ferritin; Future  -     Iron and TIBC; Future  -     CBC W/ AUTO DIFFERENTIAL; Future  -     COMPREHENSIVE METABOLIC PANEL; Future    Neurogenic pain of right foot    Stage 3a chronic kidney disease    Adenocarcinoma of colon    PAD (peripheral artery disease)    Other orders  -     alpha lipoic acid 200 mg Cap; 1 tab at night      This has been fully explained to the patient, who indicates understanding.    PLAN:   .DNR      Discussed health maintenance guidelines appropriate for age.    Tracey Zacarias nurse [phone]. Discussed health maintenance guidelines appropriate for age.

## 2024-05-13 PROBLEM — A41.9 SEPSIS: Status: RESOLVED | Noted: 2024-02-06 | Resolved: 2024-05-13

## 2024-05-21 ENCOUNTER — OFFICE VISIT (OUTPATIENT)
Dept: OPHTHALMOLOGY | Facility: CLINIC | Age: 89
End: 2024-05-21
Payer: MEDICARE

## 2024-05-21 DIAGNOSIS — H40.1113 PRIMARY OPEN ANGLE GLAUCOMA (POAG) OF RIGHT EYE, SEVERE STAGE: Primary | ICD-10-CM

## 2024-05-21 PROCEDURE — 99499 UNLISTED E&M SERVICE: CPT | Mod: HCNC,S$GLB,, | Performed by: OPHTHALMOLOGY

## 2024-05-21 PROCEDURE — 65855 TRABECULOPLASTY LASER SURG: CPT | Mod: HCNC,RT,S$GLB, | Performed by: OPHTHALMOLOGY

## 2024-05-21 PROCEDURE — 99999 PR PBB SHADOW E&M-EST. PATIENT-LVL I: CPT | Mod: PBBFAC,HCNC,, | Performed by: OPHTHALMOLOGY

## 2024-05-21 RX ORDER — PREDNISOLONE ACETATE 10 MG/ML
1 SUSPENSION/ DROPS OPHTHALMIC 4 TIMES DAILY
Qty: 10 ML | Refills: 0 | Status: SHIPPED | OUTPATIENT
Start: 2024-05-21 | End: 2024-05-31

## 2024-05-21 NOTE — PROGRESS NOTES
HPI    SLT OD     Pt states only using AT's PRN     Denies pain/ FOL/ floaters.   No new complaints.   Last edited by Toma Greer on 5/21/2024  2:43 PM.            Assessment /Plan     For exam results, see Encounter Report.    Primary open angle glaucoma (POAG) of right eye, severe stage    Other orders  -     prednisoLONE acetate (PRED FORTE) 1 % DrpS; Place 1 drop into the right eye 4 (four) times daily. for 4 days  Dispense: 10 mL; Refill: 0      SLT OD today  No complications    Post op precs reviewed with pt and family    Use pred QID OD x 4 days    F/u 1 month as scheduled, IOP check OU, possible SLT OS

## 2024-05-31 DIAGNOSIS — E78.5 HYPERLIPIDEMIA WITH TARGET LDL LESS THAN 70: ICD-10-CM

## 2024-05-31 RX ORDER — LOVASTATIN 10 MG/1
10 TABLET ORAL NIGHTLY
Qty: 90 TABLET | Refills: 3 | Status: SHIPPED | OUTPATIENT
Start: 2024-05-31

## 2024-05-31 NOTE — TELEPHONE ENCOUNTER
No care due was identified.  [facility] Embedded Care Due Messages. Reference number: [id number].   5/31/2024 1:52:29 AM CDT

## 2024-05-31 NOTE — TELEPHONE ENCOUNTER
Refill Decision Note   Brie Han  is requesting a refill authorization.  Brief Assessment and Rationale for Refill:  Approve     Medication Therapy Plan:         Comments:     Note composed:6:40 AM 05/31/2024

## 2024-06-21 ENCOUNTER — OFFICE VISIT (OUTPATIENT)
Dept: OPHTHALMOLOGY | Facility: CLINIC | Age: 89
End: 2024-06-21
Payer: MEDICARE

## 2024-06-21 DIAGNOSIS — H40.1122 PRIMARY OPEN-ANGLE GLAUCOMA, LEFT EYE, MODERATE STAGE: Primary | ICD-10-CM

## 2024-06-21 PROCEDURE — 99999 PR PBB SHADOW E&M-EST. PATIENT-LVL III: CPT | Mod: PBBFAC,HCNC,, | Performed by: OPHTHALMOLOGY

## 2024-06-21 NOTE — PROGRESS NOTES
HPI    89 YO female presents today for a SLT OS. Patient states that she has some   FB sensation OD and has to rinse her eye. Notes no other issues.     OTC PF tears OU QID     Last edited by Margot Amor on 6/21/2024 11:32 AM.            Assessment /Plan     For exam results, see Encounter Report.    Primary open-angle glaucoma, left eye, moderate stage      Adequate response to SLT OD    SLT OS today, no complications    Post op precs reviewed with pt and family    Use pred QID OS x 4 days    F/u 3 months, IOP check OU

## 2024-07-10 ENCOUNTER — PATIENT MESSAGE (OUTPATIENT)
Dept: OPHTHALMOLOGY | Facility: CLINIC | Age: 89
End: 2024-07-10
Payer: MEDICARE

## 2024-07-17 ENCOUNTER — OFFICE VISIT (OUTPATIENT)
Dept: FAMILY MEDICINE | Facility: CLINIC | Age: 89
End: 2024-07-17
Payer: MEDICARE

## 2024-07-17 DIAGNOSIS — U07.1 COVID-19: Primary | ICD-10-CM

## 2024-07-17 PROCEDURE — 99213 OFFICE O/P EST LOW 20 MIN: CPT | Mod: HCNC,95,, | Performed by: PHYSICIAN ASSISTANT

## 2024-07-17 PROCEDURE — 1157F ADVNC CARE PLAN IN RCRD: CPT | Mod: HCNC,CPTII,95, | Performed by: PHYSICIAN ASSISTANT

## 2024-07-17 PROCEDURE — 1159F MED LIST DOCD IN RCRD: CPT | Mod: HCNC,CPTII,95, | Performed by: PHYSICIAN ASSISTANT

## 2024-07-17 PROCEDURE — 1160F RVW MEDS BY RX/DR IN RCRD: CPT | Mod: HCNC,CPTII,95, | Performed by: PHYSICIAN ASSISTANT

## 2024-07-17 RX ORDER — CYANOCOBALAMIN 1000 UG/ML
INJECTION, SOLUTION INTRAMUSCULAR; SUBCUTANEOUS
Status: ON HOLD | COMMUNITY
Start: 2024-03-25

## 2024-07-17 RX ORDER — BENZONATATE 100 MG/1
100 CAPSULE ORAL 3 TIMES DAILY PRN
Qty: 30 CAPSULE | Refills: 0 | Status: ON HOLD | OUTPATIENT
Start: 2024-07-17 | End: 2024-07-27

## 2024-07-17 NOTE — PROGRESS NOTES
The patient location is: [place]  The chief complaint leading to consultation is: Covid    Visit type: audiovisual    Face to Face time with patient: 15 minutes of total time spent on the encounter, which includes face to face time and non-face to face time preparing to see the patient (eg, review of tests), Obtaining and/or reviewing separately obtained history, Documenting clinical information in the electronic or other health record, Independently interpreting results (not separately reported) and communicating results to the patient/family/caregiver, or Care coordination (not separately reported).         Each patient to whom he or she provides medical services by telemedicine is:  (1) informed of the relationship between the physician and patient and the respective role of any other health care provider with respect to management of the patient; and (2) notified that he or she may decline to receive medical services by telemedicine and may withdraw from such care at any time.    Notes:  Presents for a telemedicine visit with her son.  They state the patient started with symptoms about 4 days ago.  Patient states initially she had chills and could not get warm but this has resolved.  She states she started with some diarrhea as well as nasal congestion, sore throat, postnasal drip.  She states she started last night with feeling weak and slightly short of breath.  She does not want to go to the hospital and they are requesting Paxlovid.  Patient appears stable on the phone and is not short of breath visually.  She is able to speak in full sentences without feeling or sounding winded.  Patient is oriented to person place and time and able to engage in conversation.    Brie was seen today for covid-19 concerns.    Diagnoses and all orders for this visit:    COVID-19  -     nirmatrelvir-ritonavir 300 mg (150 mg x 2)-100 mg copackaged tablets (EUA); Take 3 tablets by mouth 2 (two) times daily for 5 days. Each  dose contains 2 nirmatrelvir (pink tablets) and 1 ritonavir (white tablet). Take all 3 tablets together  -     benzonatate (TESSALON PERLES) 100 MG capsule; Take 1 capsule (100 mg total) by mouth 3 (three) times daily as needed for Cough.     Encouraged patient and her son to watch for any further development of symptoms and patient is to go to the emergency room immediately if she notices any change in symptoms, worsening of symptoms or is not starting to improve.  They both expressed understanding.  They would like to try to treat this at home 1st.  Patient told to stop her lovastatin while she is on the Paxlovid and to not restart the medication until 3 days after she has finished the Paxlovid.  They both expressed understanding    Answers submitted by the patient for this visit:  Review of Systems Questionnaire (Submitted on 7/17/2024)  activity change: Yes  unexpected weight change: No  neck pain: No  hearing loss: No  rhinorrhea: Yes  trouble swallowing: No  eye discharge: No  visual disturbance: No  chest tightness: Yes  wheezing: Yes  chest pain: Yes  palpitations: No  blood in stool: No  constipation: No  vomiting: No  diarrhea: Yes  polydipsia: Yes  polyuria: No  difficulty urinating: No  hematuria: No  menstrual problem: No  dysuria: No  joint swelling: No  arthralgias: No  headaches: No  weakness: Yes  confusion: No  dysphoric mood: No

## 2024-07-21 ENCOUNTER — HOSPITAL ENCOUNTER (INPATIENT)
Facility: OTHER | Age: 89
LOS: 4 days | Discharge: HOME HEALTH | DRG: 640 | End: 2024-07-25
Attending: EMERGENCY MEDICINE | Admitting: HOSPITALIST
Payer: MEDICARE

## 2024-07-21 DIAGNOSIS — E87.1 HYPONATREMIA: Primary | ICD-10-CM

## 2024-07-21 DIAGNOSIS — U07.1 COVID: ICD-10-CM

## 2024-07-21 DIAGNOSIS — R53.81 DEBILITY: ICD-10-CM

## 2024-07-21 DIAGNOSIS — R06.02 SHORTNESS OF BREATH: ICD-10-CM

## 2024-07-21 DIAGNOSIS — K21.00 GASTROESOPHAGEAL REFLUX DISEASE WITH ESOPHAGITIS WITHOUT HEMORRHAGE: ICD-10-CM

## 2024-07-21 DIAGNOSIS — I10 HTN (HYPERTENSION), BENIGN: ICD-10-CM

## 2024-07-21 DIAGNOSIS — E78.5 HYPERLIPIDEMIA WITH TARGET LDL LESS THAN 70: ICD-10-CM

## 2024-07-21 DIAGNOSIS — C18.9 ADENOCARCINOMA OF COLON: ICD-10-CM

## 2024-07-21 DIAGNOSIS — M51.37 DDD (DEGENERATIVE DISC DISEASE), LUMBOSACRAL: Chronic | ICD-10-CM

## 2024-07-21 PROBLEM — R33.8 ACUTE URINARY RETENTION: Status: ACTIVE | Noted: 2024-07-21

## 2024-07-21 LAB
ALBUMIN SERPL BCP-MCNC: 3.3 G/DL (ref 3.5–5.2)
ALP SERPL-CCNC: 64 U/L (ref 55–135)
ALT SERPL W/O P-5'-P-CCNC: 23 U/L (ref 10–44)
ANION GAP SERPL CALC-SCNC: 11 MMOL/L (ref 8–16)
ANION GAP SERPL CALC-SCNC: 7 MMOL/L (ref 8–16)
ANION GAP SERPL CALC-SCNC: 8 MMOL/L (ref 8–16)
AST SERPL-CCNC: 40 U/L (ref 10–40)
BASOPHILS # BLD AUTO: 0.02 K/UL (ref 0–0.2)
BASOPHILS NFR BLD: 0.6 % (ref 0–1.9)
BILIRUB SERPL-MCNC: 0.4 MG/DL (ref 0.1–1)
BILIRUB UR QL STRIP: NEGATIVE
BNP SERPL-MCNC: 148 PG/ML (ref 0–99)
BUN SERPL-MCNC: 10 MG/DL (ref 10–30)
BUN SERPL-MCNC: 15 MG/DL (ref 10–30)
BUN SERPL-MCNC: 9 MG/DL (ref 10–30)
CALCIUM SERPL-MCNC: 8.7 MG/DL (ref 8.7–10.5)
CALCIUM SERPL-MCNC: 8.7 MG/DL (ref 8.7–10.5)
CALCIUM SERPL-MCNC: 8.9 MG/DL (ref 8.7–10.5)
CHLORIDE SERPL-SCNC: 92 MMOL/L (ref 95–110)
CHLORIDE SERPL-SCNC: 98 MMOL/L (ref 95–110)
CHLORIDE SERPL-SCNC: 99 MMOL/L (ref 95–110)
CK SERPL-CCNC: 84 U/L (ref 20–180)
CLARITY UR: CLEAR
CO2 SERPL-SCNC: 14 MMOL/L (ref 23–29)
CO2 SERPL-SCNC: 16 MMOL/L (ref 23–29)
CO2 SERPL-SCNC: 18 MMOL/L (ref 23–29)
COLOR UR: COLORLESS
CREAT SERPL-MCNC: 0.7 MG/DL (ref 0.5–1.4)
CREAT SERPL-MCNC: 0.7 MG/DL (ref 0.5–1.4)
CREAT SERPL-MCNC: 0.8 MG/DL (ref 0.5–1.4)
CREAT UR-MCNC: 11.3 MG/DL (ref 15–325)
CTP QC/QA: YES
CTP QC/QA: YES
DIFFERENTIAL METHOD BLD: ABNORMAL
EOSINOPHIL # BLD AUTO: 0 K/UL (ref 0–0.5)
EOSINOPHIL NFR BLD: 0.6 % (ref 0–8)
ERYTHROCYTE [DISTWIDTH] IN BLOOD BY AUTOMATED COUNT: 13.3 % (ref 11.5–14.5)
ERYTHROCYTE [SEDIMENTATION RATE] IN BLOOD BY PHOTOMETRIC METHOD: 12 MM/HR (ref 0–36)
EST. GFR  (NO RACE VARIABLE): >60 ML/MIN/1.73 M^2
GLUCOSE SERPL-MCNC: 104 MG/DL (ref 70–110)
GLUCOSE SERPL-MCNC: 106 MG/DL (ref 70–110)
GLUCOSE SERPL-MCNC: 122 MG/DL (ref 70–110)
GLUCOSE UR QL STRIP: NEGATIVE
HCT VFR BLD AUTO: 28.9 % (ref 37–48.5)
HCT VFR BLD CALC: 29 %PCV (ref 36–54)
HGB BLD-MCNC: 10 G/DL
HGB BLD-MCNC: 10 G/DL (ref 12–16)
HGB UR QL STRIP: NEGATIVE
IMM GRANULOCYTES # BLD AUTO: 0.06 K/UL (ref 0–0.04)
IMM GRANULOCYTES NFR BLD AUTO: 1.7 % (ref 0–0.5)
KETONES UR QL STRIP: NEGATIVE
LACTATE SERPL-SCNC: 0.6 MMOL/L (ref 0.5–2.2)
LDH SERPL L TO P-CCNC: 149 U/L (ref 110–260)
LEUKOCYTE ESTERASE UR QL STRIP: NEGATIVE
LIPASE SERPL-CCNC: 55 U/L (ref 4–60)
LYMPHOCYTES # BLD AUTO: 0.6 K/UL (ref 1–4.8)
LYMPHOCYTES NFR BLD: 17.9 % (ref 18–48)
MCH RBC QN AUTO: 32.1 PG (ref 27–31)
MCHC RBC AUTO-ENTMCNC: 34.6 G/DL (ref 32–36)
MCV RBC AUTO: 93 FL (ref 82–98)
MONOCYTES # BLD AUTO: 0.5 K/UL (ref 0.3–1)
MONOCYTES NFR BLD: 14 % (ref 4–15)
NEUTROPHILS # BLD AUTO: 2.3 K/UL (ref 1.8–7.7)
NEUTROPHILS NFR BLD: 65.2 % (ref 38–73)
NITRITE UR QL STRIP: NEGATIVE
NRBC BLD-RTO: 0 /100 WBC
OSMOLALITY SERPL: 256 MOSM/KG (ref 275–295)
OSMOLALITY UR: 229 MOSM/KG (ref 50–1200)
PH UR STRIP: 6 [PH] (ref 5–8)
PLATELET # BLD AUTO: 150 K/UL (ref 150–450)
PLATELET BLD QL SMEAR: ABNORMAL
PMV BLD AUTO: ABNORMAL FL (ref 9.2–12.9)
POC IONIZED CALCIUM: 1.23 MMOL/L (ref 1.06–1.42)
POC MOLECULAR INFLUENZA A AGN: NEGATIVE
POC MOLECULAR INFLUENZA B AGN: NEGATIVE
POTASSIUM BLD-SCNC: 4.3 MMOL/L (ref 3.5–5.1)
POTASSIUM SERPL-SCNC: 4.2 MMOL/L (ref 3.5–5.1)
POTASSIUM SERPL-SCNC: 4.3 MMOL/L (ref 3.5–5.1)
POTASSIUM SERPL-SCNC: 5.4 MMOL/L (ref 3.5–5.1)
PROCALCITONIN SERPL IA-MCNC: 0.07 NG/ML
PROT SERPL-MCNC: 7 G/DL (ref 6–8.4)
PROT UR QL STRIP: NEGATIVE
RBC # BLD AUTO: 3.12 M/UL (ref 4–5.4)
SAMPLE: ABNORMAL
SARS-COV-2 RDRP RESP QL NAA+PROBE: POSITIVE
SODIUM BLD-SCNC: 122 MMOL/L (ref 136–145)
SODIUM SERPL-SCNC: 117 MMOL/L (ref 136–145)
SODIUM SERPL-SCNC: 123 MMOL/L (ref 136–145)
SODIUM SERPL-SCNC: 123 MMOL/L (ref 136–145)
SODIUM UR-SCNC: 44 MMOL/L (ref 20–250)
SODIUM UR-SCNC: <20 MMOL/L (ref 20–250)
SP GR UR STRIP: 1.01 (ref 1–1.03)
TROPONIN I SERPL DL<=0.01 NG/ML-MCNC: 0.01 NG/ML (ref 0–0.03)
TSH SERPL DL<=0.005 MIU/L-ACNC: 2.48 UIU/ML (ref 0.4–4)
URN SPEC COLLECT METH UR: ABNORMAL
UROBILINOGEN UR STRIP-ACNC: NEGATIVE EU/DL
WBC # BLD AUTO: 3.57 K/UL (ref 3.9–12.7)

## 2024-07-21 PROCEDURE — 25000003 PHARM REV CODE 250: Mod: HCNC

## 2024-07-21 PROCEDURE — 83690 ASSAY OF LIPASE: CPT | Mod: HCNC

## 2024-07-21 PROCEDURE — 83615 LACTATE (LD) (LDH) ENZYME: CPT | Mod: HCNC | Performed by: NURSE PRACTITIONER

## 2024-07-21 PROCEDURE — 85025 COMPLETE CBC W/AUTO DIFF WBC: CPT | Mod: HCNC

## 2024-07-21 PROCEDURE — 93005 ELECTROCARDIOGRAM TRACING: CPT | Mod: HCNC

## 2024-07-21 PROCEDURE — 84300 ASSAY OF URINE SODIUM: CPT | Mod: HCNC

## 2024-07-21 PROCEDURE — 80048 BASIC METABOLIC PNL TOTAL CA: CPT | Mod: 91,HCNC,XB | Performed by: NURSE PRACTITIONER

## 2024-07-21 PROCEDURE — 81003 URINALYSIS AUTO W/O SCOPE: CPT | Mod: HCNC

## 2024-07-21 PROCEDURE — 25000003 PHARM REV CODE 250: Mod: HCNC | Performed by: NURSE PRACTITIONER

## 2024-07-21 PROCEDURE — 84145 PROCALCITONIN (PCT): CPT | Mod: HCNC | Performed by: NURSE PRACTITIONER

## 2024-07-21 PROCEDURE — 84443 ASSAY THYROID STIM HORMONE: CPT | Mod: HCNC

## 2024-07-21 PROCEDURE — 85652 RBC SED RATE AUTOMATED: CPT | Mod: HCNC | Performed by: NURSE PRACTITIONER

## 2024-07-21 PROCEDURE — 84300 ASSAY OF URINE SODIUM: CPT | Mod: 91,HCNC | Performed by: NURSE PRACTITIONER

## 2024-07-21 PROCEDURE — 83930 ASSAY OF BLOOD OSMOLALITY: CPT | Mod: HCNC

## 2024-07-21 PROCEDURE — 80053 COMPREHEN METABOLIC PANEL: CPT | Mod: HCNC

## 2024-07-21 PROCEDURE — 63600175 PHARM REV CODE 636 W HCPCS: Mod: HCNC | Performed by: NURSE PRACTITIONER

## 2024-07-21 PROCEDURE — 27000207 HC ISOLATION: Mod: HCNC

## 2024-07-21 PROCEDURE — 83935 ASSAY OF URINE OSMOLALITY: CPT | Mod: HCNC

## 2024-07-21 PROCEDURE — 94761 N-INVAS EAR/PLS OXIMETRY MLT: CPT | Mod: HCNC

## 2024-07-21 PROCEDURE — 93010 ELECTROCARDIOGRAM REPORT: CPT | Mod: HCNC,,, | Performed by: INTERNAL MEDICINE

## 2024-07-21 PROCEDURE — 83605 ASSAY OF LACTIC ACID: CPT | Mod: HCNC | Performed by: NURSE PRACTITIONER

## 2024-07-21 PROCEDURE — 36415 COLL VENOUS BLD VENIPUNCTURE: CPT | Mod: HCNC | Performed by: NURSE PRACTITIONER

## 2024-07-21 PROCEDURE — 82550 ASSAY OF CK (CPK): CPT | Mod: HCNC | Performed by: NURSE PRACTITIONER

## 2024-07-21 PROCEDURE — 20000000 HC ICU ROOM: Mod: HCNC

## 2024-07-21 PROCEDURE — 87635 SARS-COV-2 COVID-19 AMP PRB: CPT | Mod: HCNC

## 2024-07-21 PROCEDURE — 82728 ASSAY OF FERRITIN: CPT | Mod: HCNC | Performed by: NURSE PRACTITIONER

## 2024-07-21 PROCEDURE — 25000003 PHARM REV CODE 250: Mod: HCNC | Performed by: EMERGENCY MEDICINE

## 2024-07-21 PROCEDURE — 84484 ASSAY OF TROPONIN QUANT: CPT | Mod: HCNC

## 2024-07-21 PROCEDURE — 99285 EMERGENCY DEPT VISIT HI MDM: CPT | Mod: 25,HCNC

## 2024-07-21 PROCEDURE — 83880 ASSAY OF NATRIURETIC PEPTIDE: CPT | Mod: HCNC

## 2024-07-21 PROCEDURE — 36415 COLL VENOUS BLD VENIPUNCTURE: CPT | Mod: HCNC

## 2024-07-21 PROCEDURE — 82570 ASSAY OF URINE CREATININE: CPT | Mod: HCNC | Performed by: NURSE PRACTITIONER

## 2024-07-21 RX ORDER — SODIUM CHLORIDE 0.9 % (FLUSH) 0.9 %
10 SYRINGE (ML) INJECTION
Status: DISCONTINUED | OUTPATIENT
Start: 2024-07-21 | End: 2024-07-25 | Stop reason: HOSPADM

## 2024-07-21 RX ORDER — ASCORBIC ACID 500 MG
500 TABLET ORAL 2 TIMES DAILY
Status: DISCONTINUED | OUTPATIENT
Start: 2024-07-21 | End: 2024-07-25 | Stop reason: HOSPADM

## 2024-07-21 RX ORDER — ACETAMINOPHEN 325 MG/1
650 TABLET ORAL EVERY 4 HOURS PRN
Status: DISCONTINUED | OUTPATIENT
Start: 2024-07-21 | End: 2024-07-24

## 2024-07-21 RX ORDER — IPRATROPIUM BROMIDE AND ALBUTEROL SULFATE 2.5; .5 MG/3ML; MG/3ML
3 SOLUTION RESPIRATORY (INHALATION) EVERY 4 HOURS PRN
Status: DISCONTINUED | OUTPATIENT
Start: 2024-07-21 | End: 2024-07-25 | Stop reason: HOSPADM

## 2024-07-21 RX ORDER — GUAIFENESIN AND DEXTROMETHORPHAN HYDROBROMIDE 10; 100 MG/5ML; MG/5ML
10 SYRUP ORAL EVERY 4 HOURS PRN
Status: DISCONTINUED | OUTPATIENT
Start: 2024-07-21 | End: 2024-07-25 | Stop reason: HOSPADM

## 2024-07-21 RX ORDER — DEXAMETHASONE 4 MG/1
4 TABLET ORAL DAILY
Status: DISCONTINUED | OUTPATIENT
Start: 2024-07-21 | End: 2024-07-22

## 2024-07-21 RX ORDER — MORPHINE SULFATE 2 MG/ML
1 INJECTION, SOLUTION INTRAMUSCULAR; INTRAVENOUS EVERY 4 HOURS PRN
Status: DISCONTINUED | OUTPATIENT
Start: 2024-07-21 | End: 2024-07-24

## 2024-07-21 RX ORDER — ONDANSETRON HYDROCHLORIDE 2 MG/ML
4 INJECTION, SOLUTION INTRAVENOUS EVERY 8 HOURS PRN
Status: DISCONTINUED | OUTPATIENT
Start: 2024-07-21 | End: 2024-07-25 | Stop reason: HOSPADM

## 2024-07-21 RX ORDER — ONDANSETRON 8 MG/1
8 TABLET, ORALLY DISINTEGRATING ORAL EVERY 8 HOURS PRN
Status: DISCONTINUED | OUTPATIENT
Start: 2024-07-21 | End: 2024-07-25 | Stop reason: HOSPADM

## 2024-07-21 RX ORDER — ENOXAPARIN SODIUM 100 MG/ML
1 INJECTION SUBCUTANEOUS 2 TIMES DAILY
Status: DISCONTINUED | OUTPATIENT
Start: 2024-07-21 | End: 2024-07-21

## 2024-07-21 RX ORDER — ENOXAPARIN SODIUM 100 MG/ML
40 INJECTION SUBCUTANEOUS EVERY 24 HOURS
Status: DISCONTINUED | OUTPATIENT
Start: 2024-07-22 | End: 2024-07-25 | Stop reason: HOSPADM

## 2024-07-21 RX ORDER — TRAMADOL HYDROCHLORIDE 50 MG/1
50 TABLET ORAL EVERY 6 HOURS PRN
Status: DISCONTINUED | OUTPATIENT
Start: 2024-07-21 | End: 2024-07-24

## 2024-07-21 RX ORDER — SUCRALFATE 1 G/10ML
1 SUSPENSION ORAL
Status: COMPLETED | OUTPATIENT
Start: 2024-07-21 | End: 2024-07-21

## 2024-07-21 RX ORDER — POLYETHYLENE GLYCOL 3350 17 G/17G
17 POWDER, FOR SOLUTION ORAL 2 TIMES DAILY PRN
Status: DISCONTINUED | OUTPATIENT
Start: 2024-07-21 | End: 2024-07-25 | Stop reason: HOSPADM

## 2024-07-21 RX ORDER — ENOXAPARIN SODIUM 100 MG/ML
40 INJECTION SUBCUTANEOUS EVERY 24 HOURS
Status: DISCONTINUED | OUTPATIENT
Start: 2024-07-21 | End: 2024-07-21

## 2024-07-21 RX ORDER — TALC
6 POWDER (GRAM) TOPICAL NIGHTLY PRN
Status: DISCONTINUED | OUTPATIENT
Start: 2024-07-21 | End: 2024-07-25 | Stop reason: HOSPADM

## 2024-07-21 RX ORDER — SUCRALFATE 1 G/1
1 TABLET ORAL 2 TIMES DAILY
Status: DISCONTINUED | OUTPATIENT
Start: 2024-07-21 | End: 2024-07-25 | Stop reason: HOSPADM

## 2024-07-21 RX ORDER — SODIUM CHLORIDE 9 MG/ML
1000 INJECTION, SOLUTION INTRAVENOUS
Status: COMPLETED | OUTPATIENT
Start: 2024-07-21 | End: 2024-07-21

## 2024-07-21 RX ORDER — AMOXICILLIN 250 MG
1 CAPSULE ORAL 2 TIMES DAILY
Status: DISCONTINUED | OUTPATIENT
Start: 2024-07-21 | End: 2024-07-25 | Stop reason: HOSPADM

## 2024-07-21 RX ADMIN — SUCRALFATE 1 G: 1 SUSPENSION ORAL at 03:07

## 2024-07-21 RX ADMIN — OXYCODONE HYDROCHLORIDE AND ACETAMINOPHEN 500 MG: 500 TABLET ORAL at 08:07

## 2024-07-21 RX ADMIN — Medication 6 MG: at 08:07

## 2024-07-21 RX ADMIN — SODIUM CHLORIDE 1000 ML: 9 INJECTION, SOLUTION INTRAVENOUS at 04:07

## 2024-07-21 RX ADMIN — SUCRALFATE 1 G: 1 TABLET ORAL at 08:07

## 2024-07-21 RX ADMIN — DEXAMETHASONE 4 MG: 4 TABLET ORAL at 08:07

## 2024-07-21 RX ADMIN — TRAMADOL HYDROCHLORIDE 50 MG: 50 TABLET, COATED ORAL at 08:07

## 2024-07-21 RX ADMIN — DOCUSATE SODIUM AND SENNOSIDES 1 TABLET: 8.6; 5 TABLET, FILM COATED ORAL at 08:07

## 2024-07-21 RX ADMIN — SODIUM CHLORIDE 500 ML: 9 INJECTION, SOLUTION INTRAVENOUS at 03:07

## 2024-07-21 NOTE — LETTER
2024    {enter consultant's name}  {enter consultant's address}                [address]  [facility] [address]  Phone: [phone]  Fax: [phone]   Patient: Brie Han   MR Number: [id number]   YOB: 1927   Date of Visit: 2024       Dear ***:    I am referring my patient, Brie Han, to you for evaluation of ***.    She  has a past medical history of ACP (advance care planning) (2024), Amaurosis fugax, Anticoagulant long-term use, Arthritis, Glaucoma, Hyperlipidemia, Hypertension, and Stroke (). Her  has a past surgical history that includes Hysterectomy;  section; Eye surgery; Tonsillectomy; Appendectomy (); Breast surgery; Cataract extraction w/  intraocular lens implant (Bilateral); Yag Capsulotomy (Left, 2021); Right oophorectomy; Esophagogastroduodenoscopy (N/A, 2024); and Colonoscopy (N/A, 2024). She  reports that she has never smoked. She has never used smokeless tobacco. She reports that she does not drink alcohol and does not use drugs.    She has a current medication list which includes the following prescription(s): acetaminophen, alpha lipoic acid, b complex vitamins, benzonatate, calcium citrate-vitamin d3 315-200 mg, cyanocobalamin, folic acid, hydrocortisone, losartan, lovastatin, multivitamin, nifedipine, and sucralfate, and the following Facility-Administered Medications: acetaminophen, albuterol-ipratropium, artificial tears, ascorbic acid (vitamin c), dextromethorphan-guaifenesin  mg/5 ml, enoxaparin, lactobacillus acidophilus & bulgar, loperamide, melatonin, morphine, multivitamin, mupirocin, nifedipine, ondansetron, ondansetron, polyethylene glycol, pravastatin, senna-docusate 8.6-50 mg, sodium chloride 0.9%, sucralfate, and tramadol. She is allergic to contrast media, aspirin, ciprofloxacin, iron, iodine, and penicillins.    I appreciate your assistance in her care and look forward to your findings  and recommendations.    Sincerely,                           No name on file

## 2024-07-21 NOTE — ED PROVIDER NOTES
Encounter Date: 2024       History     Chief Complaint   Patient presents with    Leg Swelling     Pt. Complains of generalized weakness x 5 days.  Pt. Has lower extremity edema x 3 days. Pt.'s grandson had covid last week and he thinks she may have caught it. Pt. Also complains of unable to swallow x 4 days. Pt. Is alert and ABC's are intact.     Pt is a 89 year old female with PMHx significant for HTN, stroke, CKD, PAD, CAD, anemia who presents for evaluation of multiple complaints. She reports fatigue and generalized weakness that have persisted for the last 5 days. Associated shortness of breath, bilateral leg swelling, nausea, epigastric abdominal pain, and dysuria. No fever, chills, chest pain, numbness, or focal weakness. Recent sick contact with grandson who tested positive for covid. She was started on paxlovid by her PCP due to her concern for matias covid 1 week ago. States symptoms began afterward    The history is provided by the patient.     Review of patient's allergies indicates:   Allergen Reactions    Contrast media Other (See Comments)    Aspirin Other (See Comments)    Ciprofloxacin Other (See Comments)    Iron Other (See Comments)     Small rash with po iron.  Tolerated IV iron    Iodine Rash    Penicillins Rash     Past Medical History:   Diagnosis Date    ACP (advance care planning) 2024    Amaurosis fugax     Bilateral left eye worse    Anticoagulant long-term use     Arthritis     Glaucoma     resolved    Hyperlipidemia     Hypertension     Stroke      Past Surgical History:   Procedure Laterality Date    APPENDECTOMY      BREAST SURGERY      CATARACT EXTRACTION W/  INTRAOCULAR LENS IMPLANT Bilateral     Dr Rojo      SECTION      3 c/s and d/c    COLONOSCOPY N/A 2024    Procedure: COLONOSCOPY;  Surgeon: Trev Lafleur MD;  Location: [facility];  Service: Endoscopy;  Laterality: N/A;    ESOPHAGOGASTRODUODENOSCOPY N/A 2024    Procedure: EGD  (ESOPHAGOGASTRODUODENOSCOPY);  Surgeon: Trev Lafleur MD;  Location: [facility];  Service: Endoscopy;  Laterality: N/A;    EYE SURGERY      Glaucoma     HYSTERECTOMY      RIGHT OOPHORECTOMY      With postop hemorrhage    TONSILLECTOMY      Yag Capsulotomy Left 11/11/2021     Family History   Problem Relation Name Age of Onset    Early death Mother      Stroke Father      Hypertension Father      Diabetes Father      Hypertension Sister      Thyroid disease Paternal Grandfather      No Known Problems Brother      No Known Problems Maternal Aunt      No Known Problems Maternal Uncle      No Known Problems Paternal Aunt      No Known Problems Paternal Uncle      No Known Problems Maternal Grandmother      No Known Problems Maternal Grandfather      No Known Problems Paternal Grandmother      Amblyopia Neg Hx      Blindness Neg Hx      Cancer Neg Hx      Cataracts Neg Hx      Glaucoma Neg Hx      Macular degeneration Neg Hx      Retinal detachment Neg Hx      Strabismus Neg Hx       Social History     Tobacco Use    Smoking status: Never    Smokeless tobacco: Never   Substance Use Topics    Alcohol use: No    Drug use: No     Review of Systems    Physical Exam     Initial Vitals [07/21/24 1349]   BP Pulse Resp Temp SpO2   135/61 77 20 98.2 °F (36.8 °C) 98 %      MAP       --         Physical Exam    Nursing note and vitals reviewed.  Constitutional: She appears well-developed and well-nourished. No distress.   HENT:   Head: Normocephalic and atraumatic.   Eyes: EOM are normal. Pupils are equal, round, and reactive to light.   Neck: Neck supple.   Normal range of motion.  Cardiovascular:  Normal rate, regular rhythm and intact distal pulses.           No murmur heard.  Pulmonary/Chest: Breath sounds normal. No respiratory distress. She has no wheezes. She has no rales.   Abdominal: Abdomen is soft. She exhibits no distension. There is abdominal tenderness.   Musculoskeletal:         General: Edema present. Normal  range of motion.      Cervical back: Normal range of motion and neck supple.     Neurological: She is alert and oriented to person, place, and time.   Skin: Skin is warm and dry. Capillary refill takes less than 2 seconds.         ED Course   Procedures  Labs Reviewed   CBC W/ AUTO DIFFERENTIAL - Abnormal       Result Value    WBC 3.57 (*)     RBC 3.12 (*)     Hemoglobin 10.0 (*)     Hematocrit 28.9 (*)     MCV 93      MCH 32.1 (*)     MCHC 34.6      RDW 13.3      Platelets 150      MPV SEE COMMENT      Immature Granulocytes 1.7 (*)     Gran # (ANC) 2.3      Immature Grans (Abs) 0.06 (*)     Lymph # 0.6 (*)     Mono # 0.5      Eos # 0.0      Baso # 0.02      nRBC 0      Gran % 65.2      Lymph % 17.9 (*)     Mono % 14.0      Eosinophil % 0.6      Basophil % 0.6      Platelet Estimate Clumped (*)     Differential Method Automated     B-TYPE NATRIURETIC PEPTIDE - Abnormal     (*)    URINALYSIS, REFLEX TO URINE CULTURE - Abnormal    Specimen UA Urine, Catheterized      Color, UA Colorless (*)     Appearance, UA Clear      pH, UA 6.0      Specific Gravity, UA 1.010      Protein, UA Negative      Glucose, UA Negative      Ketones, UA Negative      Bilirubin (UA) Negative      Occult Blood UA Negative      Nitrite, UA Negative      Urobilinogen, UA Negative      Leukocytes, UA Negative      Narrative:     Specimen Source->Urine   COMPREHENSIVE METABOLIC PANEL - Abnormal    Sodium 117 (*)     Potassium 5.4 (*)     Chloride 92 (*)     CO2 14 (*)     Glucose 122 (*)     BUN 15      Creatinine 0.8      Calcium 8.9      Total Protein 7.0      Albumin 3.3 (*)     Total Bilirubin 0.4      Alkaline Phosphatase 64      AST 40      ALT 23      eGFR >60      Anion Gap 11      Narrative:       Sodium critical result(s) called and verbal readback obtained from   Lashonda Arreguin RN by VENITA 07/21/2024 15:34   SARS-COV-2 RDRP GENE - Abnormal    POC Rapid COVID Positive (*)      Acceptable Yes     ISTAT PROCEDURE -  Abnormal    POC Sodium 122 (*)     POC Potassium 4.3      POC Ionized Calcium 1.23      POC Hematocrit 29 (*)     POC HEMOGLOBIN 10      Sample VENOUS     TROPONIN I    Troponin I 0.007     TSH    TSH 2.484     LIPASE   LIPASE    Lipase 55     POCT INFLUENZA A/B MOLECULAR    POC Molecular Influenza A Ag Negative      POC Molecular Influenza B Ag Negative       Acceptable Yes       EKG Readings: (Independently Interpreted)   Initial Reading: No STEMI. Rhythm: Normal Sinus Rhythm. Heart Rate: 73. Ectopy: No Ectopy. Conduction: 1st Degree AV Block.       Imaging Results               CT Abdomen Pelvis  Without Contrast (Final result)  Result time 07/21/24 15:20:20      Final result by Jax Hutson MD (07/21/24 15:20:20)                   Impression:      This report was flagged in Epic as abnormal.    1. Evaluation of the abdomen and pelvis is significantly limited given extensive motion artifact.  2. Allowing for the above, no findings to suggest obstructive uropathy.  3. There is diffuse colonic diverticulosis.  Indistinctness about the descending colon is likely related to motion artifact rather than acute infectious or inflammatory process however correlation with any focal tenderness in the region and current symptomatology advised.  4. The urinary bladder is distended, correlation with any history of outlet obstruction or urinary retention.  5. Small bilateral pleural effusions with associated compressive atelectasis of the bilateral lower lobes.  6. Note, in this patient with reported history of colon malignancy, please see examination 02/07/2024 as there is suboptimal evaluation of the bowel given motion artifact.  7. Please see above for several additional findings.      Electronically signed by: Jax Hutson MD  Date:    07/21/2024  Time:    15:20               Narrative:    EXAMINATION:  CT ABDOMEN PELVIS WITHOUT CONTRAST    CLINICAL HISTORY:  Abdominal pain, acute,  nonlocalized;    TECHNIQUE:  Low dose axial images, sagittal and coronal reformations were obtained from the lung bases to the pubic symphysis.  Oral contrast was not administered.    COMPARISON:  CT chest abdomen and pelvis 02/07/2024    FINDINGS:  Images of the lower thorax are remarkable for bilateral pleural effusions, mild, noting compressive atelectasis of the bilateral lower lobes.    Motion artifact limits evaluation of the abdomen and pelvis.    Allowing for this, the liver, spleen, pancreas and adrenal glands are grossly unremarkable.  The gallbladder is not effectively evaluated.  The stomach is decompressed without wall thickening.  No convincing significant abdominal lymphadenopathy.    There is no hydronephrosis or nephrolithiasis.  There is a cyst arising from the interpolar region of the left kidney measuring 1.9 cm.  An additional cyst arises from the interpolar region of the left kidney measuring 8 mm.  There is a cyst arising from the upper pole of the right kidney measuring 4.5 cm.  The bilateral ureters are unable to be followed in their entirety the urinary bladder, no definite calculi seen or secondary findings to suggest obstructive uropathy.  The urinary bladder is distended without wall thickening.  The uterus is absent the adnexa is unremarkable.    There are several scattered colonic diverticula.  Evaluation for adjacent inflammation is limited given motion artifact.  There is questionable wall thickening and indistinctness about a few diverticula involving the distal descending colon however correlation is needed given motion artifact in the region.  The terminal ileum is unremarkable.  The appendix is not confidently identified, no pericecal inflammation.  The small bowel is grossly unremarkable.  There are several scattered shotty periaortic, pericaval, and mesenteric lymph nodes.  There is atherosclerotic calcification of the aorta and its branches.    There is osteopenia.  There are  degenerative changes of the spine.  There is minimal grade 1 anterolisthesis of L4 on L5.  No significant inguinal lymphadenopathy.                                       X-Ray Chest AP Portable (Final result)  Result time 07/21/24 15:02:26      Final result by Frantz Watts MD (07/21/24 15:02:26)                   Impression:      No evidence of acute cardiopulmonary disease      Electronically signed by: Frantz Watts MD  Date:    07/21/2024  Time:    15:02               Narrative:    EXAMINATION:  XR CHEST AP PORTABLE    CLINICAL HISTORY:  shortness of breath;    TECHNIQUE:  Single frontal view of the chest was performed.    COMPARISON:  February 6, 2024    FINDINGS:  Granulomatous calcifications overlie the right chest.  Degenerative changes are seen in the spine and shoulders.  The heart is mildly enlarged.  No confluent infiltrates are identified.  The reader is referred to recent CT of the chest                                       Medications   sodium chloride 0.9% bolus 500 mL 500 mL (500 mLs Intravenous New Bag 7/21/24 1519)   0.9%  NaCl infusion (has no administration in time range)   sucralfate 100 mg/mL suspension 1 g (1 g Oral Given 7/21/24 1526)     Medical Decision Making  Pt presents for fatigue and shortness of breath. Ddx: covid, flu, pneumonia, electrolyte abnormality, ACS, CHF, COPD, PTX, gastritis, PUD, thyroid dysfunction, colitis, diverticulitis, UTI, pyelonephritis. Pt afebrule and hemodynamically stable. Labs without leukocytosis and anemia at baseline. EKG without ST elevation or arrhythmia. Trop negative. CXR without acute process. CT abdomen/pelv with evidence of bladder distension without signs of infection. CT abdomen/pelv not consistent with acute process although it does demonstrate bladder distension. UA without evidence of infection. CMP with mild hypokalemia and significant hyponatremia at 117. Pt also covid positive. Discussed with hospital medicine, plan for admission for  further management of hyponatremia and covid     Amount and/or Complexity of Data Reviewed  Labs: ordered. Decision-making details documented in ED Course.  Radiology: ordered.    Risk  Prescription drug management.                                       Clinical Impression:  Final diagnoses:  [R06.02] Shortness of breath  [E87.1] Hyponatremia (Primary)  [U07.1] Miguelangel Stratton Jr., MD  Resident  07/21/24 0923

## 2024-07-21 NOTE — HPI
"Per Lesly Johnson NP:    "Ms. 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 She is a Mu-ism and does not consent to blood transfusions.    She presents to ED with complaints of fatigue, malaise, generalized weakness, tremor onset for the last several days since exposure to and positive Covid test about 5-6 days ago. She also notes some diarrhea earlier in course, followed by constipation requiring laxative use with return of diarrhea. She reports decreased UOP, difficulty urinating, decreased appetite, and nausea as well. She also notes bilateral LE swelling which is abnormal for her. She took several doses of Paxlovid (prescribed by PCP) but discontinued due to side effects and felt it was not alleviating her symptoms.     She denies fever, chills, SOB, HOLT, CP, abdominal pain, vomiting, constipation, light headiness, dizziness, or headache. She lives alone however Grandson stays with her 4-5 days a week, she is independent in ADLs, and uses no ambulatory aides.    In the ED she is HDS and afebrile.  CBC with WBC 4, H/H 10/29, platelets 150.  Chemistry was  (baseline 130-135), K 5.4, chloride 92, CO2 14, BUN 15, SCr 0.8, glucose 122.  LFTs unremarkable.  Lipase 55.  .  Troponin 0.07.  TSH 2.484.  UA noninfectious. Covid +.  Flu negative.  CXR without acute findings.  CT AP degraded D/T motion artifact, see full report.      The patient was admitted to the Hospital Medicine Service for further evaluation and management. "  "

## 2024-07-21 NOTE — LETTER
2024    {enter consultant's name}  {enter consultant's address}                [address]  [place] [address]  Phone: [phone]  Fax: [phone]   Patient: Brie Han   MR Number: [id number]   YOB: 1927   Date of Visit: 2024       Dear ***:    I am referring my patient, Brie Han, to you for evaluation of ***.    She  has a past medical history of ACP (advance care planning) (2024), Amaurosis fugax, Anticoagulant long-term use, Arthritis, Glaucoma, Hyperlipidemia, Hypertension, and Stroke (). Her  has a past surgical history that includes Hysterectomy;  section; Eye surgery; Tonsillectomy; Appendectomy (); Breast surgery; Cataract extraction w/  intraocular lens implant (Bilateral); Yag Capsulotomy (Left, 2021); Right oophorectomy; Esophagogastroduodenoscopy (N/A, 2024); and Colonoscopy (N/A, 2024). She  reports that she has never smoked. She has never used smokeless tobacco. She reports that she does not drink alcohol and does not use drugs.    She has a current medication list which includes the following prescription(s): acetaminophen, alpha lipoic acid, b complex vitamins, benzonatate, calcium citrate-vitamin d3 315-200 mg, cyanocobalamin, folic acid, hydrocortisone, losartan, lovastatin, multivitamin, nifedipine, and sucralfate, and the following Facility-Administered Medications: acetaminophen, albuterol-ipratropium, artificial tears, ascorbic acid (vitamin c), dextromethorphan-guaifenesin  mg/5 ml, enoxaparin, lactobacillus acidophilus & bulgar, loperamide, melatonin, morphine, multivitamin, mupirocin, nifedipine, ondansetron, ondansetron, polyethylene glycol, pravastatin, senna-docusate 8.6-50 mg, sodium chloride 0.9%, sucralfate, and tramadol. She is allergic to contrast media, aspirin, ciprofloxacin, iron, iodine, and penicillins.    I appreciate your assistance in her care and look forward to your  findings and recommendations.    Sincerely,                           No name on file

## 2024-07-22 PROBLEM — R13.10 DYSPHAGIA: Status: ACTIVE | Noted: 2024-07-22

## 2024-07-22 PROBLEM — R53.81 DEBILITY: Status: ACTIVE | Noted: 2024-07-22

## 2024-07-22 LAB
ALBUMIN SERPL BCP-MCNC: 2.9 G/DL (ref 3.5–5.2)
ALBUMIN SERPL BCP-MCNC: 2.9 G/DL (ref 3.5–5.2)
ANION GAP SERPL CALC-SCNC: 10 MMOL/L (ref 8–16)
ANION GAP SERPL CALC-SCNC: 10 MMOL/L (ref 8–16)
ANION GAP SERPL CALC-SCNC: 7 MMOL/L (ref 8–16)
BASOPHILS # BLD AUTO: 0.01 K/UL (ref 0–0.2)
BASOPHILS NFR BLD: 0.5 % (ref 0–1.9)
BUN SERPL-MCNC: 11 MG/DL (ref 10–30)
BUN SERPL-MCNC: 13 MG/DL (ref 10–30)
BUN SERPL-MCNC: 9 MG/DL (ref 10–30)
CALCIUM SERPL-MCNC: 8.7 MG/DL (ref 8.7–10.5)
CALCIUM SERPL-MCNC: 9.1 MG/DL (ref 8.7–10.5)
CALCIUM SERPL-MCNC: 9.1 MG/DL (ref 8.7–10.5)
CHLORIDE SERPL-SCNC: 100 MMOL/L (ref 95–110)
CHLORIDE SERPL-SCNC: 99 MMOL/L (ref 95–110)
CHLORIDE SERPL-SCNC: 99 MMOL/L (ref 95–110)
CO2 SERPL-SCNC: 16 MMOL/L (ref 23–29)
CO2 SERPL-SCNC: 17 MMOL/L (ref 23–29)
CO2 SERPL-SCNC: 19 MMOL/L (ref 23–29)
CREAT SERPL-MCNC: 0.7 MG/DL (ref 0.5–1.4)
CREAT SERPL-MCNC: 0.7 MG/DL (ref 0.5–1.4)
CREAT SERPL-MCNC: 0.8 MG/DL (ref 0.5–1.4)
DIFFERENTIAL METHOD BLD: ABNORMAL
EOSINOPHIL # BLD AUTO: 0 K/UL (ref 0–0.5)
EOSINOPHIL NFR BLD: 0.5 % (ref 0–8)
ERYTHROCYTE [DISTWIDTH] IN BLOOD BY AUTOMATED COUNT: 13.1 % (ref 11.5–14.5)
EST. GFR  (NO RACE VARIABLE): >60 ML/MIN/1.73 M^2
FERRITIN SERPL-MCNC: 97 NG/ML (ref 20–300)
GLUCOSE SERPL-MCNC: 111 MG/DL (ref 70–110)
GLUCOSE SERPL-MCNC: 137 MG/DL (ref 70–110)
GLUCOSE SERPL-MCNC: 176 MG/DL (ref 70–110)
HCT VFR BLD AUTO: 28 % (ref 37–48.5)
HGB BLD-MCNC: 9.5 G/DL (ref 12–16)
IMM GRANULOCYTES # BLD AUTO: 0.02 K/UL (ref 0–0.04)
IMM GRANULOCYTES NFR BLD AUTO: 1 % (ref 0–0.5)
LYMPHOCYTES # BLD AUTO: 0.5 K/UL (ref 1–4.8)
LYMPHOCYTES NFR BLD: 22.4 % (ref 18–48)
MAGNESIUM SERPL-MCNC: 2 MG/DL (ref 1.6–2.6)
MCH RBC QN AUTO: 31.8 PG (ref 27–31)
MCHC RBC AUTO-ENTMCNC: 33.9 G/DL (ref 32–36)
MCV RBC AUTO: 94 FL (ref 82–98)
MONOCYTES # BLD AUTO: 0.1 K/UL (ref 0.3–1)
MONOCYTES NFR BLD: 5.7 % (ref 4–15)
NEUTROPHILS # BLD AUTO: 1.5 K/UL (ref 1.8–7.7)
NEUTROPHILS NFR BLD: 69.9 % (ref 38–73)
NRBC BLD-RTO: 0 /100 WBC
OHS QRS DURATION: 82 MS
OHS QTC CALCULATION: 403 MS
PHOSPHATE SERPL-MCNC: 3.2 MG/DL (ref 2.7–4.5)
PHOSPHATE SERPL-MCNC: 3.3 MG/DL (ref 2.7–4.5)
PLATELET # BLD AUTO: 264 K/UL (ref 150–450)
PMV BLD AUTO: 9.3 FL (ref 9.2–12.9)
POTASSIUM SERPL-SCNC: 4.9 MMOL/L (ref 3.5–5.1)
POTASSIUM SERPL-SCNC: 5.3 MMOL/L (ref 3.5–5.1)
POTASSIUM SERPL-SCNC: 5.6 MMOL/L (ref 3.5–5.1)
RBC # BLD AUTO: 2.99 M/UL (ref 4–5.4)
SODIUM SERPL-SCNC: 125 MMOL/L (ref 136–145)
SODIUM SERPL-SCNC: 125 MMOL/L (ref 136–145)
SODIUM SERPL-SCNC: 127 MMOL/L (ref 136–145)
WBC # BLD AUTO: 2.1 K/UL (ref 3.9–12.7)

## 2024-07-22 PROCEDURE — 80069 RENAL FUNCTION PANEL: CPT | Mod: 91,HCNC | Performed by: NURSE PRACTITIONER

## 2024-07-22 PROCEDURE — 85025 COMPLETE CBC W/AUTO DIFF WBC: CPT | Mod: HCNC | Performed by: NURSE PRACTITIONER

## 2024-07-22 PROCEDURE — 63600175 PHARM REV CODE 636 W HCPCS: Mod: JZ,TB,HCNC | Performed by: HOSPITALIST

## 2024-07-22 PROCEDURE — 25000003 PHARM REV CODE 250: Mod: HCNC | Performed by: HOSPITALIST

## 2024-07-22 PROCEDURE — XW033E5 INTRODUCTION OF REMDESIVIR ANTI-INFECTIVE INTO PERIPHERAL VEIN, PERCUTANEOUS APPROACH, NEW TECHNOLOGY GROUP 5: ICD-10-PCS | Performed by: HOSPITALIST

## 2024-07-22 PROCEDURE — 80048 BASIC METABOLIC PNL TOTAL CA: CPT | Mod: HCNC | Performed by: NURSE PRACTITIONER

## 2024-07-22 PROCEDURE — 94761 N-INVAS EAR/PLS OXIMETRY MLT: CPT | Mod: HCNC

## 2024-07-22 PROCEDURE — 36415 COLL VENOUS BLD VENIPUNCTURE: CPT | Mod: HCNC,XB | Performed by: NURSE PRACTITIONER

## 2024-07-22 PROCEDURE — 27000207 HC ISOLATION: Mod: HCNC

## 2024-07-22 PROCEDURE — 99223 1ST HOSP IP/OBS HIGH 75: CPT | Mod: HCNC,,, | Performed by: FAMILY MEDICINE

## 2024-07-22 PROCEDURE — 11000001 HC ACUTE MED/SURG PRIVATE ROOM: Mod: HCNC

## 2024-07-22 PROCEDURE — 83735 ASSAY OF MAGNESIUM: CPT | Mod: HCNC | Performed by: NURSE PRACTITIONER

## 2024-07-22 PROCEDURE — 63600175 PHARM REV CODE 636 W HCPCS: Mod: HCNC | Performed by: NURSE PRACTITIONER

## 2024-07-22 PROCEDURE — 99497 ADVNCD CARE PLAN 30 MIN: CPT | Mod: HCNC,25,, | Performed by: FAMILY MEDICINE

## 2024-07-22 PROCEDURE — 94799 UNLISTED PULMONARY SVC/PX: CPT | Mod: HCNC

## 2024-07-22 PROCEDURE — 25000003 PHARM REV CODE 250: Mod: HCNC | Performed by: NURSE PRACTITIONER

## 2024-07-22 PROCEDURE — 36415 COLL VENOUS BLD VENIPUNCTURE: CPT | Mod: HCNC | Performed by: NURSE PRACTITIONER

## 2024-07-22 PROCEDURE — 92610 EVALUATE SWALLOWING FUNCTION: CPT | Mod: HCNC

## 2024-07-22 RX ORDER — MUPIROCIN 20 MG/G
OINTMENT TOPICAL 2 TIMES DAILY
Status: DISCONTINUED | OUTPATIENT
Start: 2024-07-22 | End: 2024-07-25 | Stop reason: HOSPADM

## 2024-07-22 RX ORDER — PRAVASTATIN SODIUM 10 MG/1
10 TABLET ORAL DAILY
Status: DISCONTINUED | OUTPATIENT
Start: 2024-07-22 | End: 2024-07-25 | Stop reason: HOSPADM

## 2024-07-22 RX ORDER — BENZONATATE 100 MG/1
100 CAPSULE ORAL 3 TIMES DAILY PRN
Status: CANCELLED | OUTPATIENT
Start: 2024-07-22

## 2024-07-22 RX ORDER — L. ACIDOPHILUS/L.BULGARICUS 100MM CELL
1 GRANULES IN PACKET (EA) ORAL 2 TIMES DAILY
Status: DISCONTINUED | OUTPATIENT
Start: 2024-07-22 | End: 2024-07-25 | Stop reason: HOSPADM

## 2024-07-22 RX ORDER — LOPERAMIDE HYDROCHLORIDE 2 MG/1
2 CAPSULE ORAL 4 TIMES DAILY PRN
Status: DISCONTINUED | OUTPATIENT
Start: 2024-07-22 | End: 2024-07-25 | Stop reason: HOSPADM

## 2024-07-22 RX ADMIN — SUCRALFATE 1 G: 1 TABLET ORAL at 08:07

## 2024-07-22 RX ADMIN — MORPHINE SULFATE 1 MG: 2 INJECTION, SOLUTION INTRAMUSCULAR; INTRAVENOUS at 11:07

## 2024-07-22 RX ADMIN — OXYCODONE HYDROCHLORIDE AND ACETAMINOPHEN 500 MG: 500 TABLET ORAL at 08:07

## 2024-07-22 RX ADMIN — MORPHINE SULFATE 1 MG: 2 INJECTION, SOLUTION INTRAMUSCULAR; INTRAVENOUS at 03:07

## 2024-07-22 RX ADMIN — OXYCODONE HYDROCHLORIDE AND ACETAMINOPHEN 500 MG: 500 TABLET ORAL at 09:07

## 2024-07-22 RX ADMIN — DOCUSATE SODIUM AND SENNOSIDES 1 TABLET: 8.6; 5 TABLET, FILM COATED ORAL at 08:07

## 2024-07-22 RX ADMIN — PRAVASTATIN SODIUM 10 MG: 10 TABLET ORAL at 01:07

## 2024-07-22 RX ADMIN — ENOXAPARIN SODIUM 40 MG: 40 INJECTION SUBCUTANEOUS at 04:07

## 2024-07-22 RX ADMIN — REMDESIVIR 200 MG: 100 INJECTION, POWDER, LYOPHILIZED, FOR SOLUTION INTRAVENOUS at 08:07

## 2024-07-22 RX ADMIN — MUPIROCIN: 20 OINTMENT TOPICAL at 09:07

## 2024-07-22 RX ADMIN — Medication 1 EACH: at 12:07

## 2024-07-22 RX ADMIN — MUPIROCIN: 20 OINTMENT TOPICAL at 08:07

## 2024-07-22 RX ADMIN — SODIUM ZIRCONIUM CYCLOSILICATE 10 G: 5 POWDER, FOR SUSPENSION ORAL at 08:07

## 2024-07-22 RX ADMIN — THERA TABS 1 TABLET: TAB at 09:07

## 2024-07-22 NOTE — ASSESSMENT & PLAN NOTE
-Cr on admit 0.8 and now 0.7 and with mild normal anion-gap metabolic acidosis  -Avoid nephrotoxic agents and renally dose meds  -Nephrology consulted and input appreciated  -Monitor BMP

## 2024-07-22 NOTE — ASSESSMENT & PLAN NOTE
-BP well controlled  -Home regimen includes losartan and nifedipine  -Holding home medications to avoid hypotension at this time.

## 2024-07-22 NOTE — ASSESSMENT & PLAN NOTE
- Patient has been primarily independent at home of ADLs  - Patient reports she is able to cook, do her own groceries and clean her house  - It does sound like patient needs more help at home, which she does not have  - Recommend PT/OT evaluation

## 2024-07-22 NOTE — ASSESSMENT & PLAN NOTE
Hb 10.0 on admit and now 9.5  -No evidence of bleeding presently  -Noted she is Spiritism and would not be agreeable to blood transfusion if it were indicated  -Repeat cbc in AM

## 2024-07-22 NOTE — ASSESSMENT & PLAN NOTE
-acute on chronic hyponatremia in setting of dehydration related to acute viral and diarrheal illness  -baseline SCr 130-135  -at admission HDS and afebrile  - >>> 122  -s/p approx. 2 liters fluid in ED  -Nephrology consulted   -discussed via secure chat with on call provider, no further fluids recommended at current   -continue sodium trending q4H  -bilateral LE US pending to r/o DVT due to new onset LE edema >>> please follow  -strict I&O monitoring  -fall and delirium precautions  -trend labs, address/replete electrolytes as indicated  -monitor

## 2024-07-22 NOTE — CONSULTS
"[facility] ([place])  Palliative Medicine  Consult Note    Patient Name: Brie Han  MRN: [id number]  Admission Date: 7/21/2024  Hospital Length of Stay: 1 days  Code Status: DNR   Attending Provider: Saleem Mcdaniel MD  Consulting Provider: Beth Monroy DNP  Primary Care Physician: Colten Gould MD  Principal Problem:Adenocarcinoma of colon    Patient information was obtained from patient, relative(s), and primary team.      Inpatient consult to Palliative Care  Consult performed by: Beth Monroy DNP  Consult ordered by: Saleem Mcdaniel MD        Assessment/Plan:     Renal/  Stage 3a chronic kidney disease  - Noted; nephrology consulted  - Na improving      ID  COVID-19 virus infection  - Noted; management per primary team    Oncology  * Adenocarcinoma of colon  - Noted; patient recently diagnosed in Feb 2024  - Patient is aware of cancer diagnosis; she reiterated several times her desire to not pursue workup/ treatment. Mrs. Han stated "at my age what is the point". Fortunately, it seems that currently she is rather asymptomatic. However, I did discuss with her and her grandson that given her wishes of comfort/ symptom management and remaining at home, I would recommend hospice support. Her grandson stated hospice was discussed in Feb 2024 and he felt this was slightly "preemptive". Would recommend starting with home based pall care and if Mrs. Han begins to become symptomatic then I would recommend transitioning to home hospice. I will place a referral for home based pall care.     Palliative Care  ACP (advance care planning)  - Consult for advance care planning/ goals of care in elderly patient with recent diagnosis of colon cancer. Of note, patient appears much younger than stated age. I can see from previous documentation that Mrs. Han stated she did not desire workup/ treatment for cancer and desired to remain at home. Extensive chart review performed.  - Along " "with Carole Renteria RN, visited with patient in the ICU. She is much younger appearing than stated age. She reports feeling well today. We reflected on Mrs. Han outside of the hospital. She lives primarily alone and is independent of ADLs. She has 3 children, however 1 is , 1 lives in [place] and "doesn't care about me" and her daughter " me long ago". She reports very little family support besides her grandson, Prabhjot, who lives with her part of the week. Prabhjot lives in [place], but works locally and will stay with Mrs. Han. 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 She reports being very independent at home and "busy all day". She is Catholic and spoke at length about her ashvin and her beliefs.   - She is very insightful regarding cancer diagnosis and wishes to not pursue treatment/ workup as she feels this will not improve/ promote her quality of life given her age. She is currently receiving iron infusions and reports this is making her feel better.   - She spoke about her last living sister who passed away on Saturday at the age of 98. Emotional support provided.  - Mrs. Han is hopeful she is able to feel better, remain at home independently and remain comfortable.  - I will place a referral for home based pall care  - I did discuss HCPOA with Mrs. Han and she stated that she desires for her grandson, Prabhjot, to be HCPOA. Will complete formal HCPOA  - Also discussed code status with Mrs. Han, which was previously discussed in Feb and she reiterated her wishes for a peaceful passing, consistent with DNR.  - Prabhjot then presented to the ICU and we recapped our conversation with him.     Refusal of blood transfusions as patient is Catholic  - Noted    Other  Debility  - Patient has been primarily independent at home of ADLs  - Patient reports she is able to cook, do her own groceries and clean her house  - It " "does sound like patient needs more help at home, which she does not have  - Recommend PT/OT evaluation         Thank you for your consult.     Subjective:     HPI:   Per H&P: "HPI: Ms. 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 She is a [employer]ology and does not consent to blood transfusions.     She presents to ED with complaints of fatigue, malaise, generalized weakness, tremor onset for the last several days since exposure to and positive Covid test about 5-6 days ago. She also notes some diarrhea earlier in course, followed by constipation requiring laxative use with return of diarrhea. She reports decreased UOP, difficulty urinating, decreased appetite, and nausea as well. She also notes bilateral LE swelling which is abnormal for her. She took several doses of Paxlovid (prescribed by PCP) but discontinued due to side effects and felt it was not alleviating her symptoms.      She denies fever, chills, SOB, HOLT, CP, abdominal pain, vomiting, constipation, light headiness, dizziness, or headache. She lives alone however Grandson stays with her 4-5 days a week, she is independent in ADLs, and uses no ambulatory aides.     In the ED she is HDS and afebrile.  CBC with WBC 4, H/H 10/29, platelets 150.  Chemistry was  (baseline 130-135), K 5.4, chloride 92, CO2 14, BUN 15, SCr 0.8, glucose 122.  LFTs unremarkable.  Lipase 55.  .  Troponin 0.07.  TSH 2.484.  UA noninfectious. Covid +.  Flu negative.  CXR without acute findings.  CT AP degraded D/T motion artifact, see full report.       The patient was admitted to the Hospital Medicine Service for further evaluation and management."    At time of initial consult, patient seen " in the ICU. Palliative medicine consulted for goals of care/ advance care planning.         Hospital Course:  No notes on file    Interval History: Patient seen in the ICU, much younger appearing than stated age. Does not report or appear SOB     Past Medical History:   Diagnosis Date    ACP (advance care planning) 2024    Amaurosis fugax     Bilateral left eye worse    Anticoagulant long-term use     Arthritis     Glaucoma     resolved    Hyperlipidemia     Hypertension     Stroke        Past Surgical History:   Procedure Laterality Date    APPENDECTOMY  's    BREAST SURGERY      CATARACT EXTRACTION W/  INTRAOCULAR LENS IMPLANT Bilateral     Dr Rojo      SECTION      3 c/s and d/c    COLONOSCOPY N/A 2024    Procedure: COLONOSCOPY;  Surgeon: Trev Lafleur MD;  Location: [facility];  Service: Endoscopy;  Laterality: N/A;    ESOPHAGOGASTRODUODENOSCOPY N/A 2024    Procedure: EGD (ESOPHAGOGASTRODUODENOSCOPY);  Surgeon: Trev Lafleur MD;  Location: [facility];  Service: Endoscopy;  Laterality: N/A;    EYE SURGERY      Glaucoma     HYSTERECTOMY      RIGHT OOPHORECTOMY      With postop hemorrhage    TONSILLECTOMY      Yag Capsulotomy Left 2021       Review of patient's allergies indicates:   Allergen Reactions    Contrast media Other (See Comments)    Aspirin Other (See Comments)    Ciprofloxacin Other (See Comments)    Iron Other (See Comments)     Small rash with po iron.  Tolerated IV iron    Iodine Rash    Penicillins Rash       Medications:  Continuous Infusions:  Scheduled Meds:   ascorbic acid (vitamin C)  500 mg Oral BID    enoxparin  40 mg Subcutaneous Q24H (prophylaxis, 1700)    multivitamin  1 tablet Oral Daily    mupirocin   Nasal BID    pravastatin  10 mg Oral Daily    [START ON 2024] remdesivir infusion  100 mg Intravenous Daily    senna-docusate 8.6-50 mg  1 tablet Oral BID    sucralfate  1 g Oral BID     PRN Meds:  Current Facility-Administered  Medications:     acetaminophen, 650 mg, Oral, Q4H PRN    albuterol-ipratropium, 3 mL, Nebulization, Q4H PRN    artificial tears, 1 drop, Both Eyes, QID PRN    dextromethorphan-guaiFENesin  mg/5 ml, 10 mL, Oral, Q4H PRN    melatonin, 6 mg, Oral, Nightly PRN    morphine, 1 mg, Intravenous, Q4H PRN    ondansetron, 8 mg, Oral, Q8H PRN    ondansetron, 4 mg, Intravenous, Q8H PRN    polyethylene glycol, 17 g, Oral, BID PRN    sodium chloride 0.9%, 10 mL, Intravenous, PRN    traMADoL, 50 mg, Oral, Q6H PRN    Family History       Problem Relation (Age of Onset)    Diabetes Father    Early death Mother    Hypertension Father, Sister    No Known Problems Brother, Maternal Aunt, Maternal Uncle, Paternal Aunt, Paternal Uncle, Maternal Grandmother, Maternal Grandfather, Paternal Grandmother    Stroke Father    Thyroid disease Paternal Grandfather          Tobacco Use    Smoking status: Never    Smokeless tobacco: Never   Substance and Sexual Activity    Alcohol use: No    Drug use: No    Sexual activity: Never       Review of Systems   Constitutional:  Positive for activity change, appetite change and fatigue.   Respiratory:  Negative for cough and shortness of breath.    Neurological:  Positive for weakness.     Objective:     Vital Signs (Most Recent):  Temp: 97.6 °F (36.4 °C) (07/22/24 1100)  Pulse: 83 (07/22/24 1100)  Resp: 19 (07/22/24 1123)  BP: (!) 126/58 (07/22/24 1100)  SpO2: 100 % (07/22/24 1100) Vital Signs (24h Range):  Temp:  [96.6 °F (35.9 °C)-98.6 °F (37 °C)] 97.6 °F (36.4 °C)  Pulse:  [58-84] 83  Resp:  [19-47] 19  SpO2:  [93 %-100 %] 100 %  BP: (110-144)/() 126/58     Weight: 49.9 kg (110 lb)  Body mass index is 20.12 kg/m².       Physical Exam  Constitutional:       Appearance: She is not ill-appearing.      Comments: Appears younger than stated age    Cardiovascular:      Rate and Rhythm: Normal rate.   Pulmonary:      Effort: No respiratory distress.   Neurological:      Mental Status: She is alert  and oriented to person, place, and time.      Comments: Alert, awake, conversational, very pleasant             Review of Symptoms      Symptom Assessment (ESAS 0-10 Scale)  Dyspnea:  0  Anorexia:  3  Fatigue:  3         Living Arrangements:  Lives alone    Psychosocial/Cultural:   See Palliative Psychosocial Note: Yes  - Worked as a   - Primarily lives alone  - Her grandson, Prabhjot, stays with her a few days per week  **Primary  to Follow**  Palliative Care  Consult: No        Advance Care Planning  Advance Directives:   Living Will: Yes        Copy on chart: Yes    Do Not Resuscitate Status: Yes    Medical Power of : Yes      Decision Making:  Patient answered questions  Goals of Care: What is most important right now is to focus on remaining as independent as possible, symptom/pain control. Accordingly, we have decided that the best plan to meet the patient's goals includes continuing with treatment.         Significant Labs: All pertinent labs within the past 24 hours have been reviewed.  CBC:   Recent Labs   Lab 07/22/24 0415   WBC 2.10*   HGB 9.5*   HCT 28.0*   MCV 94        BMP:  Recent Labs   Lab 07/22/24 0415   *   *   K 4.9   CL 99   CO2 19*   BUN 9*   CREATININE 0.7   CALCIUM 8.7   MG 2.0     LFT:  Lab Results   Component Value Date    AST 40 07/21/2024    ALKPHOS 64 07/21/2024    BILITOT 0.4 07/21/2024     Albumin:   Albumin   Date Value Ref Range Status   07/21/2024 3.3 (L) 3.5 - 5.2 g/dL Final     Protein:   Total Protein   Date Value Ref Range Status   07/21/2024 7.0 6.0 - 8.4 g/dL Final     Lactic acid:   Lab Results   Component Value Date    LACTATE 0.6 07/21/2024    LACTATE 1.1 02/06/2024       Significant Imaging: I have reviewed all pertinent imaging results/findings within the past 24 hours.    Discussed with primary team     Total visit time: 86 minutes   > 50% of 70 min visit spent in chart review, face to face  discussion of symptom assessment, coordination of care with other specialists, and d/c planning  16 minutes ACP time spent: goals of care, emotional support, formulating and communication prognosis and goals of care, exploring burden/ benefit of various approaches of treatment.       Beth Monroy, JEFFERSON  Palliative Medicine  [facility] ([place])

## 2024-07-22 NOTE — PT/OT/SLP EVAL
Speech Language Pathology Evaluation  Bedside Swallow    Patient Name:  Brie Han   MRN:  [id number]  Admitting Diagnosis: Adenocarcinoma of colon    Recommendations:     General Recommendations: SLP to follow 2-3x/week for ongoing assessment of oropharyngeal swallow function, to ensure diet tolerance and provide pt education.  Diet Recommendations:   SOLIDS: Regular Diet - IDDSI Level 7  LIQUIDS: Thin liquids - IDDSI Level 0   Aspiration Precautions:   Oral care before/after PO intake   Upright sitting position for all oral intake and up to 1hr after meal, as tolerated  1 bite/sip at a time  Small bites/sips   Slow rate of intake  Alternating bites/sips as needed  Meds whole, 1-2 at a time with sips of thin liquids   General Precautions: Standard, airborne, contact, droplet, hearing impaired  Communication Strategies: Pt reports being hard of hearing-- speak loudly     Assessment:     Brie Han is a 89 y.o. female with an SLP diagnosis of functional oropharyngeal swallow ability for regular solids with thin liquids given use of safe swallow strategies/aspiration precautions, including oral care before and after meals. Pt denies pain or difficulty swallowing this date but per chart review, pt with complaints of being unable to swallow at times and intermittent throat clearing observed following small bolus volume intake of thin liquids-- SLP follow up during mealtime next treatment date for ongoing assessment.     History:     HPI per most recent MD progress note: Ms. 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 She is a Oriental orthodox and does not consent to blood transfusions.     She presents to ED with complaints of fatigue,  malaise, generalized weakness, tremor onset for the last several days since exposure to and positive Covid test about 5-6 days ago. She also notes some diarrhea earlier in course, followed by constipation requiring laxative use with return of diarrhea. She reports decreased UOP, difficulty urinating, decreased appetite, and nausea as well. She also notes bilateral LE swelling which is abnormal for her. She took several doses of Paxlovid (prescribed by PCP) but discontinued due to side effects and felt it was not alleviating her symptoms.      She denies fever, chills, SOB, HOLT, CP, abdominal pain, vomiting, constipation, light headiness, dizziness, or headache. She lives alone however Grandson stays with her 4-5 days a week, she is independent in ADLs, and uses no ambulatory aides.     In the ED she is HDS and afebrile.  CBC with WBC 4, H/H 10/29, platelets 150.  Chemistry was  (baseline 130-135), K 5.4, chloride 92, CO2 14, BUN 15, SCr 0.8, glucose 122.  LFTs unremarkable.  Lipase 55.  .  Troponin 0.07.  TSH 2.484.  UA noninfectious. Covid +.  Flu negative.  CXR without acute findings.  CT AP degraded D/T motion artifact, see full report.       The patient was admitted to the Hospital Medicine Service for further evaluation and management.      Past Medical History:   Diagnosis Date    ACP (advance care planning) 2024    Amaurosis fugax     Bilateral left eye worse    Anticoagulant long-term use     Arthritis     Glaucoma     resolved    Hyperlipidemia     Hypertension     Stroke      Past Surgical History:   Procedure Laterality Date    APPENDECTOMY      BREAST SURGERY      CATARACT EXTRACTION W/  INTRAOCULAR LENS IMPLANT Bilateral     Dr Rojo      SECTION      3 c/s and d/c    COLONOSCOPY N/A 2024    Procedure: COLONOSCOPY;  Surgeon: Trev Lafleur MD;  Location: [facility];  Service: Endoscopy;  Laterality: N/A;    ESOPHAGOGASTRODUODENOSCOPY N/A 2024     Procedure: EGD (ESOPHAGOGASTRODUODENOSCOPY);  Surgeon: Trev Lafleur MD;  Location: [facility];  Service: Endoscopy;  Laterality: N/A;    EYE SURGERY      Glaucoma     HYSTERECTOMY      RIGHT OOPHORECTOMY      With postop hemorrhage    TONSILLECTOMY      Yag Capsulotomy Left 2021     Social History: Per chart review, pt's grandson lives with her for 4-5 days a week while he is in town; pt is alone the other days.     Prior Intubation HX: no history per chart review     Modified Barium Swallow: none per chart review     Chest X-Rays 24: FINDINGS: Granulomatous calcifications overlie the right chest.  Degenerative changes are seen in the spine and shoulders.  The heart is mildly enlarged.  No confluent infiltrates are identified.  The reader is referred to recent CT of the chest     Impression: No evidence of acute cardiopulmonary disease     Prior Diet: Pt reports eating a regular diet with thin liquids.     Subjective     RN provided clearance for pt to be seen in the ICU-- pt asleep in bed but able to wake upon SLP's entry to room.     RN entered room mid-eval to administer cholesterol medication-- SLP present.     Pain/Comfort: no pain/discomfort reported this date    Respiratory Status: Room air    Objective:     Cognitive-Linguistic Status: in today's evaluation, pt...  Asleep but able to wake upon SLP's entry to room  Alert, cooperative and conversational-- able to sustain alertness and attention to SLP throughout  Oriented to self, stating name and   Oriented to situation and place   Followed commands for OME with ~90% acc  Verbalized basic wants/ needs without difficulty   Engaged in brief conversation with SLP re: personal life and medical hx-- pt reports being originally from [place] but that she has lived in [place] for many years now; pt states she is hard of hearing but does not have hearing aids   Comments: Pt presents without signs of receptive-expressive language deficits; no  signs of anomia, perseveration, or paraphasias noted this date and no concern regarding comprehension of spoken language.     Speech and Voice: Pt's motor speech appears intact-- dysarthria not noted, pt's speech is judged to be 100% intelligible to this unfamiliar listener.     Oral Musculature Evaluation  Oral Musculature: general weakness  Dentition: functional natural dentition, though teeth and gums appear to  be in poor condition-- pt reports not brushing her teeth  Secretion Management: adequate  Mucosal Quality: coated tongue  Mandibular Strength and Mobility: adequate mandibular depression/elevation with functional strength to break solid bolus  Oral Labial Strength and Mobility: labial corners with functional retraction bilaterally; face grossly symmetric at rest and during speech/swallowing tasks-- no overt facial droop noted  Lingual Strength and Mobility: deficits noted with lingual range of motion, though appears functional for speech/swallowing tasks  Voice Prior to PO Intake: vocal quality clear and within functional limits    Bedside Swallow Eval  Consistencies Assessed:   Thin Liquids: singular and consecutive strawsips of ice water, consuming ~3 ounces  Puree: small bite of vanilla pudding via spoon x1  Regular Solids: small bite of aaliyah cracker x2   Oral Medications: cholesterol medication with thin liquids     Oral Phase:   Adequate labial seal to straw, demonstrating ability to siphon thin liquids without anterior loss observed   Functional mandibular depression and elevation for adequate bolus acceptance  Adequate labial seal to spoon for functional bolus stripping; no anterior loss of bolus  Functional mandibular depression and elevation for adequate initial biteforce of solid bolus  Adequate rotary chew pattern of mastication  Adequate bolus formation and A-P transfer  Oral cavity clear/without residue after the swallow     Pharyngeal Phase:   Single swallows per bolus noted  Laryngeal  rise noted in subjectively perceived timely manner-- unable to objectively assess at bedside  Intermittent throat clearing observed following initial trial of thin liquids-- none observed with thin liquids during oral medication administration   No signs of airway threat or overt s/s of aspiration observed with PO intake of puree or regular solids-- no coughing, choking, throat clearing, watery eyes or wet vocal quality noted   No signs of airway threat observed with oral medication-- no coughing or choking    IMPRESSION: Bedside swallow evaluation completed this date-- pt appears safe for a regular diet with thin liquids and oral medications. Pt denies pain and difficulty swallowing this date but per chart review, pt with complaints of being unable to swallow at times and intermittent throat clearing observed following small bolus volume intake of thin liquids-- SLP to follow up with meal next treatment date to ensure diet tolerance and to provide pt education re: oral care and importance of safe swallow strategies/ aspiration precautions.     Education: SLP educated pt re: therapist's role and purpose of evaluation; reviewed safe swallow strategies/ aspiration precautions and importance of use. SLP discussed with pt continuation of current diet and plan for SLP to follow up next treatment date-- pt verbalized understanding of all discussed this date.     SLP communicated with MD via secure chat immediately following evaluation; discussed PO intake consumed and plan to continue with regular diet and thin liquids for now and this SLP to follow up next treatment date for ongoing assessment-- MD confirmed message receive, RN updated in person.     Goals:   Multidisciplinary Problems       SLP Goals          Problem: SLP    Goal Priority Disciplines Outcome   SLP Goal     SLP Progressing   Description: 1. Patient will consume regular solids with thin liquids without any signs of airway threat or overt s/s of  aspiration.     2. Patient will demonstrate understanding and use of safe swallow strategies/ aspiration precautions in 100% of opportunities given min cues from clinician.                      Plan:     Patient to be seen:  2 x/week, 3 x/week   Plan of Care expires:  08/01/24  Plan of Care reviewed with:  patient (RN, MD)   SLP Follow-Up:  Yes       Discharge Recommendations:   (TBD)   Barriers to Discharge:  Decreased Care Giver Support and Level of Skilled Assistance Needed      Time Tracking:     SLP Treatment Date:   07/22/24  Speech Start Time:  1300  Speech Stop Time:  1316     Speech Total Time (min):  16 min    Billable Minutes: Eval Swallow and Oral Function 16    07/23/2024

## 2024-07-22 NOTE — ASSESSMENT & PLAN NOTE
"- Consult for advance care planning/ goals of care in elderly patient with recent diagnosis of colon cancer. Of note, patient appears much younger than stated age. I can see from previous documentation that Mrs. Han stated she did not desire workup/ treatment for cancer and desired to remain at home. Extensive chart review performed.  - Along with Carole Renteria RN, visited with patient in the ICU. She is much younger appearing than stated age. She reports feeling well today. We reflected on Mrs. Han outside of the hospital. She lives primarily alone and is independent of ADLs. She has 3 children, however 1 is , 1 lives in [place] and "doesn't care about me" and her daughter " me long ago". She reports very little family support besides her grandson, Prabhjot, who lives with her part of the week. Prabhjot lives in [place], but works locally and will stay with Mrs. Han. 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 She reports being very independent at home and "busy all day". She is [employer] and spoke at length about her ashvin and her beliefs.   - She is very insightful regarding cancer diagnosis and wishes to not pursue treatment/ workup as she feels this will not improve/ promote her quality of life given her age. She is currently receiving iron infusions and reports this is making her feel better.   - She spoke about her last living sister who passed away on Saturday at the age of 98. Emotional support provided.  - Mrs. Han is hopeful she is able to feel better, remain at home independently and remain comfortable.  - I will place a referral for home based pall care  - I did discuss HCPOA with Mrs. Han and she stated that she desires for her grandson, Prabhjot, to be HCPOA. Will complete formal HCPOA  - Also discussed code status with Mrs. Han, which was previously discussed in Feb and she reiterated her wishes for a peaceful " passing, consistent with DNR.  - Prabhjot then presented to the ICU and we recapped our conversation with him.

## 2024-07-22 NOTE — ASSESSMENT & PLAN NOTE
Advance Care Planning  Date: 07/22/2024   Palliative care consulted and input appreciated.  Noted patient wishes for full medical care but is DNR in case of cardiac arrest.  She does not wish for hospice care at this time.  Would strongly consider addition of home based palliative care at discharge.  I spent no time in ACP today.

## 2024-07-22 NOTE — SUBJECTIVE & OBJECTIVE
Interval History: No acute events overnight.  States she is feeling much better today.  Only complaint is of diffuse weakness and occasional difficulty swallowing foods.  All questions answered and patient had no further complaints.    Objective:     Vital Signs (Most Recent):  Temp: 97.6 °F (36.4 °C) (07/22/24 1100)  Pulse: 83 (07/22/24 1100)  Resp: 19 (07/22/24 1123)  BP: (!) 126/58 (07/22/24 1100)  SpO2: 100 % (07/22/24 1100) Vital Signs (24h Range):  Temp:  [96.6 °F (35.9 °C)-98.6 °F (37 °C)] 97.6 °F (36.4 °C)  Pulse:  [58-84] 83  Resp:  [19-47] 19  SpO2:  [93 %-100 %] 100 %  BP: (110-144)/() 126/58     Weight: 49.9 kg (110 lb)  Body mass index is 20.12 kg/m².    Intake/Output Summary (Last 24 hours) at 7/22/2024 1134  Last data filed at 7/22/2024 0600  Gross per 24 hour   Intake 300 ml   Output 2175 ml   Net -1875 ml         Physical Exam  Vitals and nursing note reviewed.   Constitutional:       General: She is not in acute distress.     Appearance: Normal appearance. She is well-developed and normal weight. She is not ill-appearing or toxic-appearing.   HENT:      Head: Normocephalic and atraumatic.      Mouth/Throat:      Mouth: Mucous membranes are dry.      Dentition: Normal dentition.   Eyes:      General: Lids are normal.      Extraocular Movements: Extraocular movements intact.   Cardiovascular:      Rate and Rhythm: Normal rate and regular rhythm.      Heart sounds: Normal heart sounds. No murmur heard.  Pulmonary:      Effort: Pulmonary effort is normal.      Breath sounds: Normal breath sounds.      Comments: Appears comfortable on RA, no conversational dyspnea or increased WOB. No cough during interview or exam.   Abdominal:      General: Bowel sounds are normal. There is no distension.      Palpations: Abdomen is soft.      Tenderness: There is no abdominal tenderness.   Musculoskeletal:      Cervical back: Normal range of motion.      Right lower leg: Right lower leg edema: 1+ mid shin to  foot.      Left lower leg: Left lower leg edema: 1+ mid shin to foot.      Comments: Trace edema at her ankles   Skin:     General: Skin is warm and dry.      Findings: No erythema or rash.   Neurological:      Mental Status: She is alert and oriented to person, place, and time.      Comments: Diffusely weak   Psychiatric:         Mood and Affect: Mood normal.             Significant Labs: All pertinent labs within the past 24 hours have been reviewed.    Significant Imaging: I have reviewed all pertinent imaging results/findings within the past 24 hours.

## 2024-07-22 NOTE — ASSESSMENT & PLAN NOTE
"- Noted; patient recently diagnosed in Feb 2024  - Patient is aware of cancer diagnosis; she reiterated several times her desire to not pursue workup/ treatment. Mrs. Han stated "at my age what is the point". Fortunately, it seems that currently she is rather asymptomatic. However, I did discuss with her and her grandson that given her wishes of comfort/ symptom management and remaining at home, I would recommend hospice support. Her grandson stated hospice was discussed in Feb 2024 and he felt this was slightly "preemptive". Would recommend starting with home based pall care and if Mrs. Han begins to become symptomatic then I would recommend transitioning to home hospice. I will place a referral for home based pall care.   "

## 2024-07-22 NOTE — SUBJECTIVE & OBJECTIVE
Interval History: Patient seen in the ICU, much younger appearing than stated age. Does not report or appear SOB     Past Medical History:   Diagnosis Date    ACP (advance care planning) 2024    Amaurosis fugax     Bilateral left eye worse    Anticoagulant long-term use     Arthritis     Glaucoma     resolved    Hyperlipidemia     Hypertension     Stroke        Past Surgical History:   Procedure Laterality Date    APPENDECTOMY      BREAST SURGERY      CATARACT EXTRACTION W/  INTRAOCULAR LENS IMPLANT Bilateral     Dr Rojo      SECTION      3 c/s and d/c    COLONOSCOPY N/A 2024    Procedure: COLONOSCOPY;  Surgeon: Trve Lafleur MD;  Location: [facility];  Service: Endoscopy;  Laterality: N/A;    ESOPHAGOGASTRODUODENOSCOPY N/A 2024    Procedure: EGD (ESOPHAGOGASTRODUODENOSCOPY);  Surgeon: Trev Lafleur MD;  Location: [facility];  Service: Endoscopy;  Laterality: N/A;    EYE SURGERY      Glaucoma     HYSTERECTOMY      RIGHT OOPHORECTOMY      With postop hemorrhage    TONSILLECTOMY      Yag Capsulotomy Left 2021       Review of patient's allergies indicates:   Allergen Reactions    Contrast media Other (See Comments)    Aspirin Other (See Comments)    Ciprofloxacin Other (See Comments)    Iron Other (See Comments)     Small rash with po iron.  Tolerated IV iron    Iodine Rash    Penicillins Rash       Medications:  Continuous Infusions:  Scheduled Meds:   ascorbic acid (vitamin C)  500 mg Oral BID    enoxparin  40 mg Subcutaneous Q24H (prophylaxis, 1700)    multivitamin  1 tablet Oral Daily    mupirocin   Nasal BID    pravastatin  10 mg Oral Daily    [START ON 2024] remdesivir infusion  100 mg Intravenous Daily    senna-docusate 8.6-50 mg  1 tablet Oral BID    sucralfate  1 g Oral BID     PRN Meds:  Current Facility-Administered Medications:     acetaminophen, 650 mg, Oral, Q4H PRN    albuterol-ipratropium, 3 mL, Nebulization, Q4H PRN    artificial tears, 1 drop, Both  Eyes, QID PRN    dextromethorphan-guaiFENesin  mg/5 ml, 10 mL, Oral, Q4H PRN    melatonin, 6 mg, Oral, Nightly PRN    morphine, 1 mg, Intravenous, Q4H PRN    ondansetron, 8 mg, Oral, Q8H PRN    ondansetron, 4 mg, Intravenous, Q8H PRN    polyethylene glycol, 17 g, Oral, BID PRN    sodium chloride 0.9%, 10 mL, Intravenous, PRN    traMADoL, 50 mg, Oral, Q6H PRN    Family History       Problem Relation (Age of Onset)    Diabetes Father    Early death Mother    Hypertension Father, Sister    No Known Problems Brother, Maternal Aunt, Maternal Uncle, Paternal Aunt, Paternal Uncle, Maternal Grandmother, Maternal Grandfather, Paternal Grandmother    Stroke Father    Thyroid disease Paternal Grandfather          Tobacco Use    Smoking status: Never    Smokeless tobacco: Never   Substance and Sexual Activity    Alcohol use: No    Drug use: No    Sexual activity: Never       Review of Systems   Constitutional:  Positive for activity change, appetite change and fatigue.   Respiratory:  Negative for cough and shortness of breath.    Neurological:  Positive for weakness.     Objective:     Vital Signs (Most Recent):  Temp: 97.6 °F (36.4 °C) (07/22/24 1100)  Pulse: 83 (07/22/24 1100)  Resp: 19 (07/22/24 1123)  BP: (!) 126/58 (07/22/24 1100)  SpO2: 100 % (07/22/24 1100) Vital Signs (24h Range):  Temp:  [96.6 °F (35.9 °C)-98.6 °F (37 °C)] 97.6 °F (36.4 °C)  Pulse:  [58-84] 83  Resp:  [19-47] 19  SpO2:  [93 %-100 %] 100 %  BP: (110-144)/() 126/58     Weight: 49.9 kg (110 lb)  Body mass index is 20.12 kg/m².       Physical Exam  Constitutional:       Appearance: She is not ill-appearing.      Comments: Appears younger than stated age    Cardiovascular:      Rate and Rhythm: Normal rate.   Pulmonary:      Effort: No respiratory distress.   Neurological:      Mental Status: She is alert and oriented to person, place, and time.      Comments: Alert, awake, conversational, very pleasant             Review of  Symptoms      Symptom Assessment (ESAS 0-10 Scale)  Dyspnea:  0  Anorexia:  3  Fatigue:  3         Living Arrangements:  Lives alone    Psychosocial/Cultural:   See Palliative Psychosocial Note: Yes  - Worked as a   - Primarily lives alone  - Her grandson, Prabhjot, stays with her a few days per week  **Primary  to Follow**  Palliative Care  Consult: No        Advance Care Planning   Advance Directives:   Living Will: Yes        Copy on chart: Yes    Do Not Resuscitate Status: Yes    Medical Power of : Yes      Decision Making:  Patient answered questions  Goals of Care: What is most important right now is to focus on remaining as independent as possible, symptom/pain control. Accordingly, we have decided that the best plan to meet the patient's goals includes continuing with treatment.         Significant Labs: All pertinent labs within the past 24 hours have been reviewed.  CBC:   Recent Labs   Lab 07/22/24 0415   WBC 2.10*   HGB 9.5*   HCT 28.0*   MCV 94        BMP:  Recent Labs   Lab 07/22/24 0415   *   *   K 4.9   CL 99   CO2 19*   BUN 9*   CREATININE 0.7   CALCIUM 8.7   MG 2.0     LFT:  Lab Results   Component Value Date    AST 40 07/21/2024    ALKPHOS 64 07/21/2024    BILITOT 0.4 07/21/2024     Albumin:   Albumin   Date Value Ref Range Status   07/21/2024 3.3 (L) 3.5 - 5.2 g/dL Final     Protein:   Total Protein   Date Value Ref Range Status   07/21/2024 7.0 6.0 - 8.4 g/dL Final     Lactic acid:   Lab Results   Component Value Date    LACTATE 0.6 07/21/2024    LACTATE 1.1 02/06/2024       Significant Imaging: I have reviewed all pertinent imaging results/findings within the past 24 hours.

## 2024-07-22 NOTE — HOSPITAL COURSE
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    Patient admitted to the hospital with worsening hyponatremia and urinary retention. Moyer catheter placed alleviated urinary retention.    Serum sodium 117 mmol/L on presentation.  Suspect hyponatremia due to hypovolemic hypotonic hyponatremia due to poor oral intake due to viral illness.  Patient was given isotonic intravenous resuscitation.  Serum sodium corrected at reasonable rate to within reasonable range.    Patient with mild COVID-19 in terms of her respiratory symptoms but with elevated risk score for clinical deterioration.  Patient treated with 3 days of intravenous remdesivir preventatively to decrease risk of progression to severe disease.    Urology service consult for evaluation of urinary retention.  Voiding trial recommended and she voided without issue without Moyer catheter.    Regarding her colon cancer patient is not interested in pursuing treatment.  Palliative care service consulted and recommended palliative services in the home setting.    Patient's oral intake has improved otherwise stable for discharge. Skilled nursing therapy initially advice however patient declined skilled nursing placement. Patient therefore discharged home with her son with home health with home-based palliative care.

## 2024-07-22 NOTE — PLAN OF CARE
CM spoke to pt's grandson for initial discharge planning assessment.    Saleem Diggs, [phone], lives with pt 4 or 5 days a week when he is in town.Pt is alone the other days. Prior to admit pt is able to provide her own care, meals etc.    Discharge plan to be determined, may need HH vs SNF.    CM to follow for plans and arrangements.   07/22/24 0825   Discharge Assessment   Assessment Type Discharge Planning Assessment   Confirmed/corrected address, phone number and insurance Yes   Confirmed Demographics Correct on Facesheet   Source of Information family   Communicated VINITA with patient/caregiver Date not available/Unable to determine   Reason For Admission hyponatremia, covid   People in Home alone  (Caterina is with her 4 or 5 days a week)   Facility Arrived From: home   Do you expect to return to your current living situation? Yes   Do you have help at home or someone to help you manage your care at home? Yes   Who are your caregiver(s) and their phone number(s)? Saleem Diggs, [phone], when he is home.   Prior to hospitilization cognitive status: Alert/Oriented   Current cognitive status: Alert/Oriented   Walking or Climbing Stairs Difficulty no   Dressing/Bathing Difficulty no   Home Layout Able to live on 1st floor   Equipment Currently Used at Home walker, rolling   Readmission within 30 days? No   Patient currently being followed by outpatient case management? No   Do you currently have service(s) that help you manage your care at home? No   Do you take prescription medications? Yes   Do you have prescription coverage? Yes   Coverage [employer]   Do you have any problems affording any of your prescribed medications? TBD   Is the patient taking medications as prescribed? yes   Who is going to help you get home at discharge? family   How do you get to doctors appointments? family or friend will provide   Are you on dialysis? No   Do you take coumadin? No   Discharge Plan A Home  Health   Discharge Plan B Home Health   DME Needed Upon Discharge  none   Discharge Plan discussed with: Caregiver   Name(s) and Number(s) devan Garcia, [phone]   Transition of Care Barriers None   Physical Activity   On average, how many days per week do you engage in moderate to strenuous exercise (like a brisk walk)? 0 days   Financial Resource Strain   How hard is it for you to pay for the very basics like food, housing, medical care, and heating? Not very   Housing Stability   In the last 12 months, was there a time when you were not able to pay the mortgage or rent on time? N   Transportation Needs   Has the lack of transportation kept you from medical appointments, meetings, work or from getting things needed for daily living? No   Food Insecurity   Within the past 12 months, you worried that your food would run out before you got the money to buy more. Never true   Within the past 12 months, the food you bought just didn't last and you didn't have money to get more. Never true   Stress   Do you feel stress - tense, restless, nervous, or anxious, or unable to sleep at night because your mind is troubled all the time - these days? Not at all   Social Isolation   How often do you feel lonely or isolated from those around you?  Never   Alcohol Use   Q1: How often do you have a drink containing alcohol? Never   Q2: How many drinks containing alcohol do you have on a typical day when you are drinking? None   Q3: How often do you have six or more drinks on one occasion? Never   Utilities   In the past 12 months has the electric, gas, oil, or water company threatened to shut off services in your home? No   Health Literacy   How often do you need to have someone help you when you read instructions, pamphlets, or other written material from your doctor or pharmacy? Never     Confucianism - Intensive Care (Annette)  Initial Discharge Assessment       Primary Care Provider: Colten Gould MD    Admission  Diagnosis: Shortness of breath [R06.02]  Hyponatremia [E87.1]  COVID [U07.1]    Admission Date: 7/21/2024  Expected Discharge Date:     Transition of Care Barriers: (P) None    Payor: [employer] MANAGED MEDICARE / Plan: [employer] MEDICARE HMO / Product Type: Capitation /     Extended Emergency Contact Information  Primary Emergency Contact: Prabhjot Han  Address: unknown           [place] [address] [place]  Mobile Phone: [phone]  Relation: Grandchild  Preferred language: English   needed? No  Secondary Emergency Contact: Anais Aguiar  Address: [place] [address] [place]  Mobile Phone: [phone]  Relation: Friend  Preferred language: English   needed? No    Discharge Plan A: (P) Home Health  Discharge Plan B: (P) Home Health      [facility] [address] [place] [facility] [address]  [address] [address] [address]  Phone: [phone] Fax: [phone]    [facility] - [facility] [address]  [address] [facility] [address]  Phone: [phone] Fax: [phone]    [facility] [address] [facility], [place] - [address]  [address] [facility] [address] [facility] [facility]  [place] [address]  Phone: [phone] Fax: [phone]      Initial Assessment (most recent)       Adult Discharge Assessment - 07/22/24 0825          Discharge Assessment    Assessment Type Discharge Planning Assessment (P)      Confirmed/corrected address, phone number and insurance Yes (P)      Confirmed Demographics Correct on Facesheet (P)      Source of Information family (P)      Communicated VINITA with patient/caregiver Date not available/Unable to determine (P)      Reason For Admission hyponatremia, covid (P)      People in Home alone (P)    Grandson is with her 4 or 5 days a week    Facility Arrived From: home (P)      Do you expect to return to your current living situation? Yes (P)      Do you have help at  home or someone to help you manage your care at home? Yes (P)      Who are your caregiver(s) and their phone number(s)? Saleem Diggs, [phone], when he is home. (P)      Prior to hospitilization cognitive status: Alert/Oriented (P)      Current cognitive status: Alert/Oriented (P)      Walking or Climbing Stairs Difficulty no (P)      Dressing/Bathing Difficulty no (P)      Home Layout Able to live on 1st floor (P)      Equipment Currently Used at Home walker, rolling (P)      Readmission within 30 days? No (P)      Patient currently being followed by outpatient case management? No (P)      Do you currently have service(s) that help you manage your care at home? No (P)      Do you take prescription medications? Yes (P)      Do you have prescription coverage? Yes (P)      Coverage [employer] (P)      Do you have any problems affording any of your prescribed medications? TBD (P)      Is the patient taking medications as prescribed? yes (P)      Who is going to help you get home at discharge? family (P)      How do you get to doctors appointments? family or friend will provide (P)      Are you on dialysis? No (P)      Do you take coumadin? No (P)      Discharge Plan A Home Health (P)      Discharge Plan B Home Health (P)      DME Needed Upon Discharge  none (P)      Discharge Plan discussed with: Caregiver (P)      Name(s) and Number(s) devan Garcia, [phone] (P)      Transition of Care Barriers None (P)         Physical Activity    On average, how many days per week do you engage in moderate to strenuous exercise (like a brisk walk)? 0 days (P)         Financial Resource Strain    How hard is it for you to pay for the very basics like food, housing, medical care, and heating? Not very hard (P)         Housing Stability    In the last 12 months, was there a time when you were not able to pay the mortgage or rent on time? No (P)         Transportation Needs    Has the lack of transportation  kept you from medical appointments, meetings, work or from getting things needed for daily living? No (P)         Food Insecurity    Within the past 12 months, you worried that your food would run out before you got the money to buy more. Never true (P)      Within the past 12 months, the food you bought just didn't last and you didn't have money to get more. Never true (P)         Stress    Do you feel stress - tense, restless, nervous, or anxious, or unable to sleep at night because your mind is troubled all the time - these days? Not at all (P)         Social Isolation    How often do you feel lonely or isolated from those around you?  Never (P)         Alcohol Use    Q1: How often do you have a drink containing alcohol? Never (P)      Q2: How many drinks containing alcohol do you have on a typical day when you are drinking? Patient does not drink (P)      Q3: How often do you have six or more drinks on one occasion? Never (P)         Utilities    In the past 12 months has the electric, gas, oil, or water company threatened to shut off services in your home? No (P)         Health Literacy    How often do you need to have someone help you when you read instructions, pamphlets, or other written material from your doctor or pharmacy? Never (P)

## 2024-07-22 NOTE — ASSESSMENT & PLAN NOTE
-covid + several days ago after known exposure and covid + today  -treated with Paxlovid for several doses at home however discontinued due to SEs and lack of improvement in symptoms  -high risk for severe complications of COVID 19 based on COVID risk score of 6   -initiated on standard COVID protocols and isolation- respiratory, contact and droplet per protocol  -begin dexamethasone  -no indication for remdesivir  -begin DVT prophylaxis dosing of lovenox  -duo nebs PRN  -incentive spirometer for pulmonary tolieting  -PRN supportive care as indicated  -oxygen supplementation PRN  -trend labs, address/replete electrolytes as indicated  -monitor

## 2024-07-22 NOTE — ASSESSMENT & PLAN NOTE
-Noted acute retention for 1-2 days prior to admit per patient and grandson  -Gloria placed in ED with good UOP  -Discussed with nephrology and will continue gloria for now  -Voiding trial tomorrow or next day.

## 2024-07-22 NOTE — ASSESSMENT & PLAN NOTE
-chronic   -admission SCr WNLs at 0.8  -trend labs, address/replete electrolytes as indicated  -avoid nephrotoxins and hypotension as able, renally dose medications

## 2024-07-22 NOTE — ASSESSMENT & PLAN NOTE
-chronic   -controlled   -continue home losartan and nifedipine  -dose/medication adjustment as appropriate

## 2024-07-22 NOTE — PROGRESS NOTES
[facility] Intensive Care [facility] Medicine  Progress Note    Patient Name: Brie Han  MRN: [id number]  Patient Class: IP- Inpatient   Admission Date: 7/21/2024  Length of Stay: 1 days  Attending Physician: Saleem Mcdaniel MD  Primary Care Provider: Colten Gould MD        Subjective:     Principal Problem:Hyponatremia        HPI:  Ms. 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 She is a Baptism and does not consent to blood transfusions.    She presents to ED with complaints of fatigue, malaise, generalized weakness, tremor onset for the last several days since exposure to and positive Covid test about 5-6 days ago. She also notes some diarrhea earlier in course, followed by constipation requiring laxative use with return of diarrhea. She reports decreased UOP, difficulty urinating, decreased appetite, and nausea as well. She also notes bilateral LE swelling which is abnormal for her. She took several doses of Paxlovid (prescribed by PCP) but discontinued due to side effects and felt it was not alleviating her symptoms.     She denies fever, chills, SOB, HOLT, CP, abdominal pain, vomiting, constipation, light headiness, dizziness, or headache. She lives alone however Grandson stays with her 4-5 days a week, she is independent in ADLs, and uses no ambulatory aides.    In the ED she is HDS and afebrile.  CBC with WBC 4, H/H 10/29, platelets 150.  Chemistry was  (baseline 130-135), K 5.4, chloride 92, CO2 14, BUN 15, SCr 0.8, glucose 122.  LFTs unremarkable.  Lipase 55.  .  Troponin 0.07.  TSH 2.484.  UA noninfectious. Covid +.  Flu negative.  CXR without acute findings.  CT AP degraded D/T motion artifact,  see full report.      The patient was admitted to the Hospital Medicine Service for further evaluation and management.     Overview/Hospital Course:  No notes on file    Interval History: No acute events overnight.  States she is feeling much better today.  Only complaint is of diffuse weakness and occasional difficulty swallowing foods.  All questions answered and patient had no further complaints.    Objective:     Vital Signs (Most Recent):  Temp: 97.6 °F (36.4 °C) (07/22/24 1100)  Pulse: 83 (07/22/24 1100)  Resp: 19 (07/22/24 1123)  BP: (!) 126/58 (07/22/24 1100)  SpO2: 100 % (07/22/24 1100) Vital Signs (24h Range):  Temp:  [96.6 °F (35.9 °C)-98.6 °F (37 °C)] 97.6 °F (36.4 °C)  Pulse:  [58-84] 83  Resp:  [19-47] 19  SpO2:  [93 %-100 %] 100 %  BP: (110-144)/() 126/58     Weight: 49.9 kg (110 lb)  Body mass index is 20.12 kg/m².    Intake/Output Summary (Last 24 hours) at 7/22/2024 1134  Last data filed at 7/22/2024 0600  Gross per 24 hour   Intake 300 ml   Output 2175 ml   Net -1875 ml         Physical Exam  Vitals and nursing note reviewed.   Constitutional:       General: She is not in acute distress.     Appearance: Normal appearance. She is well-developed and normal weight. She is not ill-appearing or toxic-appearing.   HENT:      Head: Normocephalic and atraumatic.      Mouth/Throat:      Mouth: Mucous membranes are dry.      Dentition: Normal dentition.   Eyes:      General: Lids are normal.      Extraocular Movements: Extraocular movements intact.   Cardiovascular:      Rate and Rhythm: Normal rate and regular rhythm.      Heart sounds: Normal heart sounds. No murmur heard.  Pulmonary:      Effort: Pulmonary effort is normal.      Breath sounds: Normal breath sounds.      Comments: Appears comfortable on RA, no conversational dyspnea or increased WOB. No cough during interview or exam.   Abdominal:      General: Bowel sounds are normal. There is no distension.      Palpations: Abdomen is soft.       Tenderness: There is no abdominal tenderness.   Musculoskeletal:      Cervical back: Normal range of motion.      Right lower leg: Right lower leg edema: 1+ mid shin to foot.      Left lower leg: Left lower leg edema: 1+ mid shin to foot.      Comments: Trace edema at her ankles   Skin:     General: Skin is warm and dry.      Findings: No erythema or rash.   Neurological:      Mental Status: She is alert and oriented to person, place, and time.      Comments: Diffusely weak   Psychiatric:         Mood and Affect: Mood normal.             Significant Labs: All pertinent labs within the past 24 hours have been reviewed.    Significant Imaging: I have reviewed all pertinent imaging results/findings within the past 24 hours.    Assessment/Plan:      * Hyponatremia  -Admitted to inpatient status  -Presented with weakness after acute viral diarrheal illness with dehydration and urinary retention.  -Baseline Na 130-135.  -On admit Na 117.  -Treated with  2L fluid in ED and improving now without further fluids  -Nephrology consulted and input appreciated.  Suspect volume depletion and not SIADH  -No further IV fluids  -Monitor BMP closely.  -Consult PT, OT, SLP and transfer out of ICU today.    Acute urinary retention  -Noted acute retention for 1-2 days prior to admit per patient and grandson  -Gloria placed in ED with good UOP  -Discussed with nephrology and will continue gloria for now  -Voiding trial tomorrow or next day.    COVID-19 virus infection  -Covid positive several days ago after known exposure.  Took several doses of paxlovid at home but discontinued du to lack of improvement  -CXR is clear and she has almost no cough and is breathing comfortably  -Noted elevated covid-19 risk score of 6  -Continue in covid isolation  -Treat with 3day course of remdesivir to prevent progression to severe covid.  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 Biopsied. The rectum, ileocecal valve and appendiceal orifice are normal. The examined portion of the ileum was normal.  -CECAL MASS, BIOPSY:  INVASIVE, MODERATELY DIFFERENTIATED COLONIC ADENOCARCINOMA   -Patient aware of diagnosis and given her age does not with to pursue treatment.    Iron deficiency anemia  Hb 10.0 on admit and now 9.5  -No evidence of bleeding presently  -Noted she is Bahai and would not be agreeable to blood transfusion if it were indicated  -Repeat cbc in AM    Refusal of blood transfusions as patient is Bahai  -Noted    Dysphagia  -Patient reports intermittent dysphagia  -Consult SLP for evaluation    Debility  -She appears diffusely weak.  -Consult PT/OT    ACP (advance care planning)  Advance Care Planning Date: 07/22/2024   Palliative care consulted and input appreciated.  Noted patient wishes for full medical care but is DNR in case of cardiac arrest.  She does not wish for hospice care at this time.  Would strongly consider addition of home based palliative care at discharge.  I spent no time in ACP today.    Hypercholesteremia  -Continue home lovastatin per pharmacy formulary     Hypertension  -BP well controlled  -Home regimen includes losartan and nifedipine  -Holding home medications to avoid hypotension at this time.      VTE Risk Mitigation (From admission, onward)           Ordered     enoxaparin injection 40 mg  Every 24 hours         07/21/24 2039      IP VTE HIGH RISK PATIENT  Once         07/21/24 1724     Place sequential compression device  Until discontinued         07/21/24 1724                    Discharge Planning   VINITA:      Code Status: DNR   Is the patient medically ready for discharge?:     Reason for patient still in hospital (select all that apply): Treatment  Discharge Plan A: Home Health                  Saleem Mcdaniel MD  Department of Hospital Medicine   [facility] - Intensive [facility] ([facility]

## 2024-07-22 NOTE — H&P
[facility], [place], operated by [facility] Intensive [facility] Medicine  History & Physical    Patient Name: Brie Han  MRN: [id number]  Patient Class: IP- Inpatient  Admission Date: 7/21/2024  Attending Physician: Saleem Mcdaniel MD   Primary Care Provider: Colten Gould MD    Patient information was obtained from patient, relative(s), past medical records, and ER records.     Subjective:     Principal Problem:Hyponatremia    Chief Complaint:   Chief Complaint   Patient presents with    Leg Swelling     Pt. Complains of generalized weakness x 5 days.  Pt. Has lower extremity edema x 3 days. Pt.'s grandson had covid last week and he thinks she may have caught it. Pt. Also complains of unable to swallow x 4 days. Pt. Is alert and ABC's are intact.        HPI: Ms. 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 She is a Rastafari and does not consent to blood transfusions.    She presents to ED with complaints of fatigue, malaise, generalized weakness, tremor onset for the last several days since exposure to and positive Covid test about 5-6 days ago. She also notes some diarrhea earlier in course, followed by constipation requiring laxative use with return of diarrhea. She reports decreased UOP, difficulty urinating, decreased appetite, and nausea as well. She also notes bilateral LE swelling which is abnormal for her. She took several doses of Paxlovid (prescribed by PCP) but discontinued due to side effects and felt it was not alleviating her symptoms.     She denies fever, chills, SOB, HOLT, CP, abdominal pain, vomiting, constipation, light headiness, dizziness, or headache. She lives alone however Grandson stays with her 4-5 days a week, she is  independent in ADLs, and uses no ambulatory aides.    In the ED she is HDS and afebrile.  CBC with WBC 4, H/H 10/29, platelets 150.  Chemistry was  (baseline 130-135), K 5.4, chloride 92, CO2 14, BUN 15, SCr 0.8, glucose 122.  LFTs unremarkable.  Lipase 55.  .  Troponin 0.07.  TSH 2.484.  UA noninfectious. Covid +.  Flu negative.  CXR without acute findings.  CT AP degraded D/T motion artifact, see full report.      The patient was admitted to the Hospital Medicine Service for further evaluation and management.     Past Medical History:   Diagnosis Date    ACP (advance care planning) 2024    Amaurosis fugax     Bilateral left eye worse    Anticoagulant long-term use     Arthritis     Glaucoma     resolved    Hyperlipidemia     Hypertension     Stroke        Past Surgical History:   Procedure Laterality Date    APPENDECTOMY      BREAST SURGERY      CATARACT EXTRACTION W/  INTRAOCULAR LENS IMPLANT Bilateral     Dr Rojo      SECTION      3 c/s and d/c    COLONOSCOPY N/A 2024    Procedure: COLONOSCOPY;  Surgeon: Trev Lafleur MD;  Location: [facility];  Service: Endoscopy;  Laterality: N/A;    ESOPHAGOGASTRODUODENOSCOPY N/A 2024    Procedure: EGD (ESOPHAGOGASTRODUODENOSCOPY);  Surgeon: Trev Lafleur MD;  Location: [facility];  Service: Endoscopy;  Laterality: N/A;    EYE SURGERY      Glaucoma     HYSTERECTOMY      RIGHT OOPHORECTOMY      With postop hemorrhage    TONSILLECTOMY      Yag Capsulotomy Left 2021       Review of patient's allergies indicates:   Allergen Reactions    Contrast media Other (See Comments)    Aspirin Other (See Comments)    Ciprofloxacin Other (See Comments)    Iron Other (See Comments)     Small rash with po iron.  Tolerated IV iron    Iodine Rash    Penicillins Rash       No current facility-administered medications on file prior to encounter.     Current Outpatient Medications on File Prior to Encounter   Medication Sig     acetaminophen (TYLENOL) 325 MG tablet Take 2 tablets (650 mg total) by mouth every 8 (eight) hours as needed.    alpha lipoic acid 200 mg Cap 1 tab at night    b complex vitamins tablet Take 1 tablet by mouth once daily.    benzonatate (TESSALON PERLES) 100 MG capsule Take 1 capsule (100 mg total) by mouth 3 (three) times daily as needed for Cough.    calcium citrate-vitamin D3 315-200 mg (CITRACAL+D) 315 mg-5 mcg (200 unit) per tablet Take 1 tablet by mouth once daily.    cyanocobalamin 1,000 mcg/mL injection     folic acid (FOLVITE) 1 MG tablet Take 1,000 mcg by mouth.    hydrocortisone 2.5 % cream Apply topically every evening.    losartan (COZAAR) 100 MG tablet Take 1 tablet (100 mg total) by mouth once daily.    lovastatin (MEVACOR) 10 MG tablet TAKE 1 TABLET EVERY EVENING    multivitamin capsule Take 1 capsule by mouth once daily.    NIFEdipine (PROCARDIA-XL) 30 MG (OSM) 24 hr tablet Take 1 tablet (30 mg total) by mouth every evening.    nirmatrelvir-ritonavir 300 mg (150 mg x 2)-100 mg copackaged tablets (EUA) Take 3 tablets by mouth 2 (two) times daily for 5 days. Each dose contains 2 nirmatrelvir (pink tablets) and 1 ritonavir (white tablet). Take all 3 tablets together    sucralfate (CARAFATE) 1 gram tablet Take 1 tablet (1 g total) by mouth 2 (two) times daily.     Family History       Problem Relation (Age of Onset)    Diabetes Father    Early death Mother    Hypertension Father, Sister    No Known Problems Brother, Maternal Aunt, Maternal Uncle, Paternal Aunt, Paternal Uncle, Maternal Grandmother, Maternal Grandfather, Paternal Grandmother    Stroke Father    Thyroid disease Paternal Grandfather          Tobacco Use    Smoking status: Never    Smokeless tobacco: Never   Substance and Sexual Activity    Alcohol use: No    Drug use: No    Sexual activity: Never     Review of Systems   Constitutional:  Positive for appetite change and fatigue. Negative for chills, diaphoresis and fever.   HENT:  Positive  for congestion.    Eyes:  Negative for visual disturbance.   Respiratory:  Negative for cough, chest tightness and shortness of breath.    Cardiovascular:  Positive for leg swelling. Negative for chest pain and palpitations.   Gastrointestinal:  Positive for diarrhea and nausea. Negative for abdominal pain, constipation and vomiting.   Genitourinary:  Positive for decreased urine volume and difficulty urinating.   Neurological:  Positive for weakness. Negative for dizziness, syncope, light-headedness and headaches.     Objective:     Vital Signs (Most Recent):  Temp: 98.6 °F (37 °C) (07/21/24 1915)  Pulse: 74 (07/21/24 1901)  Resp: 20 (07/21/24 1901)  BP: (!) 128/57 (07/21/24 1901)  SpO2: 99 % (07/21/24 1901) Vital Signs (24h Range):  Temp:  [97.9 °F (36.6 °C)-98.6 °F (37 °C)] 98.6 °F (37 °C)  Pulse:  [73-84] 74  Resp:  [20-26] 20  SpO2:  [93 %-100 %] 99 %  BP: (118-144)/() 128/57     Weight: 49.9 kg (110 lb)  Body mass index is 20.12 kg/m².     Physical Exam  Vitals and nursing note reviewed.   Constitutional:       General: She is not in acute distress.     Appearance: Normal appearance. She is well-developed and normal weight.   HENT:      Head: Normocephalic and atraumatic.      Mouth/Throat:      Dentition: Normal dentition.   Eyes:      General: Lids are normal.      Extraocular Movements: Extraocular movements intact.      Conjunctiva/sclera: Conjunctivae normal.   Cardiovascular:      Rate and Rhythm: Normal rate and regular rhythm.      Heart sounds: Normal heart sounds. No murmur heard.  Pulmonary:      Effort: Pulmonary effort is normal.      Breath sounds: Normal breath sounds.      Comments: Appears comfortable on RA, no conversational dyspnea or increased WOB. No cough during interview or exam.   Abdominal:      General: Bowel sounds are normal. There is no distension.      Palpations: Abdomen is soft.      Tenderness: There is no abdominal tenderness.   Musculoskeletal:      Cervical back: Neck  supple.      Right lower leg: Edema (1+ mid shin to foot) present.      Left lower leg: Edema (1+ mid shin to foot) present.   Skin:     General: Skin is warm and dry.      Findings: No erythema or rash.   Neurological:      Mental Status: She is alert and oriented to person, place, and time.             Significant Labs: All pertinent labs within the past 24 hours have been reviewed.  CBC:   Recent Labs   Lab 07/21/24  1415 07/21/24  1601   WBC 3.57*  --    HGB 10.0*  --    HCT 28.9* 29*     --      CMP:   Recent Labs   Lab 07/21/24  1500   *   K 5.4*   CL 92*   CO2 14*   *   BUN 15   CREATININE 0.8   CALCIUM 8.9   PROT 7.0   ALBUMIN 3.3*   BILITOT 0.4   ALKPHOS 64   AST 40   ALT 23   ANIONGAP 11     Lipase:   Recent Labs   Lab 07/21/24  1500   LIPASE 55     Troponin:   Recent Labs   Lab 07/21/24  1415   TROPONINI 0.007     TSH:   Recent Labs   Lab 07/21/24  1415   TSH 2.484     Urine Studies:   Recent Labs   Lab 07/21/24  1527   COLORU Colorless*   APPEARANCEUA Clear   PHUR 6.0   SPECGRAV 1.010   PROTEINUA Negative   GLUCUA Negative   KETONESU Negative   BILIRUBINUA Negative   OCCULTUA Negative   NITRITE Negative   UROBILINOGEN Negative   LEUKOCYTESUR Negative       Significant Imaging: I have reviewed all pertinent imaging results/findings within the past 24 hours.  Assessment/Plan:     * Hyponatremia  -acute on chronic hyponatremia in setting of dehydration related to acute viral and diarrheal illness  -baseline SCr 130-135  -at admission HDS and afebrile  - >>> 122  -s/p approx. 2 liters fluid in ED  -Nephrology consulted   -discussed via secure chat with on call provider, no further fluids recommended at current   -continue sodium trending q4H  -bilateral LE US pending to r/o DVT due to new onset LE edema >>> please follow  -strict I&O monitoring  -fall and delirium precautions  -trend labs, address/replete electrolytes as indicated  -monitor     Acute urinary retention  -acute  retention for last day or so per patient and grandson  -gloria placed in ED with good UOP  -continue gloria for now  -care per facility protocol  -consider voiding trial in AM if more mobile  -strict I&Os    COVID-19 virus infection  -covid + several days ago after known exposure and covid + today  -treated with Paxlovid for several doses at home however discontinued due to SEs and lack of improvement in symptoms  -high risk for severe complications of COVID 19 based on COVID risk score of 6   -initiated on standard COVID protocols and isolation- respiratory, contact and droplet per protocol  -begin 3 day course of remdesivir to prevent progression of to severe covid.  -no indication for dexamethasone at this time  -begin DVT prophylaxis dosing of lovenox  -duo nebs PRN  -incentive spirometer for pulmonary tolieting  -PRN supportive care as indicated  -oxygen supplementation PRN  -trend labs, address/replete electrolytes as indicated  -monitor     Hypertension  -chronic   -controlled   -continue home losartan and nifedipine  -dose/medication adjustment as appropriate     Hypercholesteremia  -chronic   -continue home lovastatin per pharmacy formulary alternative    Stage 3a chronic kidney disease  -chronic   -admission SCr WNLs at 0.8  -trend labs, address/replete electrolytes as indicated  -avoid nephrotoxins and hypotension as able, renally dose medications    Iron deficiency anemia  Refusal of blood transfusion as patient is Religion   -chronic, controlled   -hemoglobin 10, hematocrit 29 at admission   -no overt signs or symptoms of bleeding   -trend CBC over hospital course     04/29/24 15:03   Iron 75   TIBC 353   Saturated Iron 21   Transferrin 252   Ferritin 311 (H)   Vitamin B12 >2000 (H)       Adenocarcinoma of colon  -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 Z-line regular, 36 cm from the incisors. Small hiatal hernia. Normal stomach. Normal examined duodenum. No specimens collected.    Colonoscopy with non-bleeding internal hemorrhoids. Diverticulosis in the entire examined colon. Likely malignant tumor in the cecum. Biopsied. The rectum, ileocecal valve and appendiceal orifice are normal. The examined portion of the ileum was normal.    CECAL MASS, BIOPSY:   - INVASIVE, MODERATELY DIFFERENTIATED COLONIC ADENOCARCINOMA       VTE Risk Mitigation (From admission, onward)           Ordered     enoxaparin injection 40 mg  Every 24 hours         07/21/24 2039     IP VTE HIGH RISK PATIENT  Once         07/21/24 1724     Place sequential compression device  Until discontinued         07/21/24 1724                    Lesly Johnson, JEFFERSON, AG-ACNP, BC  Department of Hospital Medicine  [facility]

## 2024-07-22 NOTE — PLAN OF CARE
Problem: Adult Inpatient Plan of Care  Goal: Plan of Care Review  Outcome: Progressing     Problem: Infection  Goal: Absence of Infection Signs and Symptoms  Outcome: Progressing     Problem: Coping Ineffective  Goal: Effective Coping  Outcome: Progressing

## 2024-07-22 NOTE — ASSESSMENT & PLAN NOTE
-Covid positive several days ago after known exposure.  Took several doses of paxlovid at home but discontinued du to lack of improvement  -CXR is clear and she has almost no cough and is breathing comfortably  -Noted elevated covid-19 risk score of 6  -Continue in covid isolation  -Treat with 3day course of remdesivir to prevent progression to severe covid.  -No indication for dexamethasone at this time  -Continue DVT prophylaxis with lovenox  -Continue incentive spirometer for pulmonary tolieting

## 2024-07-22 NOTE — ASSESSMENT & PLAN NOTE
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 Z-line regular, 36 cm from the incisors. Small hiatal hernia. Normal stomach. Normal examined duodenum. No specimens collected.    Colonoscopy with non-bleeding internal hemorrhoids. Diverticulosis in the entire examined colon. Likely malignant tumor in the cecum. Biopsied. The rectum, ileocecal valve and appendiceal orifice are normal. The examined portion of the ileum was normal.    CECAL MASS, BIOPSY:   - INVASIVE, MODERATELY DIFFERENTIATED COLONIC ADENOCARCINOMA

## 2024-07-22 NOTE — PLAN OF CARE
Bedside swallow evaluation completed this date-- pt appears safe for a regular diet with thin liquids and oral medications. Pt denies pain and difficulty swallowing this date but per chart review, pt with complaints of being unable to swallow at times and intermittent throat clearing observed following small bolus volume intake of thin liquids-- SLP to follow up with meal next treatment date, 7/23 or 7/24 for ongoing assessment of oropharyngeal swallow function, to ensure diet tolerance and to provide pt education re: oral care and importance of safe swallow strategies/ aspiration precautions.     Problem: SLP  Goal: SLP Goal  Description: 1. Patient will consume regular solids with thin liquids without any signs of airway threat or overt s/s of aspiration.     2. Patient will demonstrate understanding and use of safe swallow strategies/ aspiration precautions in 100% of opportunities given min cues from clinician.   Outcome: Progressing

## 2024-07-22 NOTE — HPI
"Per H&P: "HPI: Ms. 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 She is a Christian and does not consent to blood transfusions.     She presents to ED with complaints of fatigue, malaise, generalized weakness, tremor onset for the last several days since exposure to and positive Covid test about 5-6 days ago. She also notes some diarrhea earlier in course, followed by constipation requiring laxative use with return of diarrhea. She reports decreased UOP, difficulty urinating, decreased appetite, and nausea as well. She also notes bilateral LE swelling which is abnormal for her. She took several doses of Paxlovid (prescribed by PCP) but discontinued due to side effects and felt it was not alleviating her symptoms.      She denies fever, chills, SOB, HOLT, CP, abdominal pain, vomiting, constipation, light headiness, dizziness, or headache. She lives alone however Grandson stays with her 4-5 days a week, she is independent in ADLs, and uses no ambulatory aides.     In the ED she is HDS and afebrile.  CBC with WBC 4, H/H 10/29, platelets 150.  Chemistry was  (baseline 130-135), K 5.4, chloride 92, CO2 14, BUN 15, SCr 0.8, glucose 122.  LFTs unremarkable.  Lipase 55.  .  Troponin 0.07.  TSH 2.484.  UA noninfectious. Covid +.  Flu negative.  CXR without acute findings.  CT AP degraded D/T motion artifact, see full report.       The patient was admitted to the Hospital Medicine Service for further evaluation and management."    At time of initial consult, patient seen in the ICU. Palliative medicine consulted for goals of care/ advance care planning.       "

## 2024-07-22 NOTE — CONSULTS
Consult Note  Nephrology    Consult Requested By: Saleem Mcdaniel MD  Reason for Consult: <Na    SUBJECTIVE:     History of Present Illness:  Patient is a 89 y.o. female presents with weakness/leg swelling/recent Covid.  W/up revealing hyponatremia and urine retention.  Moyer placed with 750 cc.  Initial labs with Na 117.  Admitted to ICU and monitored labs Q4 hrs.  Consulted for evaluation. Pt seen and examined this am.  Elderly/frail/[place] but states that she's feeling better.  Na trends noted and now 125.  Discussed with team at bedside.      EPIC reviewed.     Past Medical History:   Diagnosis Date    ACP (advance care planning) 2024    Amaurosis fugax     Bilateral left eye worse    Anticoagulant long-term use     Arthritis     Glaucoma     resolved    Hyperlipidemia     Hypertension     Stroke      Past Surgical History:   Procedure Laterality Date    APPENDECTOMY      BREAST SURGERY      CATARACT EXTRACTION W/  INTRAOCULAR LENS IMPLANT Bilateral     Dr Rojo      SECTION      3 c/s and d/c    COLONOSCOPY N/A 2024    Procedure: COLONOSCOPY;  Surgeon: Trev Lafleur MD;  Location: [facility];  Service: Endoscopy;  Laterality: N/A;    ESOPHAGOGASTRODUODENOSCOPY N/A 2024    Procedure: EGD (ESOPHAGOGASTRODUODENOSCOPY);  Surgeon: Trev Lafleur MD;  Location: [facility];  Service: Endoscopy;  Laterality: N/A;    EYE SURGERY      Glaucoma     HYSTERECTOMY      RIGHT OOPHORECTOMY      With postop hemorrhage    TONSILLECTOMY      Yag Capsulotomy Left 2021     Family History   Problem Relation Name Age of Onset    Early death Mother      Stroke Father      Hypertension Father      Diabetes Father      Hypertension Sister      Thyroid disease Paternal Grandfather      No Known Problems Brother      No Known Problems Maternal Aunt      No Known Problems Maternal Uncle      No Known Problems Paternal Aunt      No Known Problems Paternal Uncle      No Known Problems Maternal  Grandmother      No Known Problems Maternal Grandfather      No Known Problems Paternal Grandmother      Amblyopia Neg Hx      Blindness Neg Hx      Cancer Neg Hx      Cataracts Neg Hx      Glaucoma Neg Hx      Macular degeneration Neg Hx      Retinal detachment Neg Hx      Strabismus Neg Hx       Social History     Tobacco Use    Smoking status: Never    Smokeless tobacco: Never   Substance Use Topics    Alcohol use: No    Drug use: No       Review of patient's allergies indicates:   Allergen Reactions    Contrast media Other (See Comments)    Aspirin Other (See Comments)    Ciprofloxacin Other (See Comments)    Iron Other (See Comments)     Small rash with po iron.  Tolerated IV iron    Iodine Rash    Penicillins Rash        Review of Systems:  Constitutional: No fever or chills.  Mild HA  Respiratory: cough but better  Cardiovascular: No chest pain or palpitations  Gastrointestinal: No nausea or vomiting  Neurological: No confusion or weakness    OBJECTIVE:     Vital Signs (Most Recent)  Temp: 96.6 °F (35.9 °C) (07/22/24 0315)  Pulse: (!) 58 (07/22/24 0302)  Resp: 20 (07/22/24 0302)  BP: (!) 116/57 (07/22/24 0302)  SpO2: 99 % (07/22/24 0302)    Vital Signs Range (Last 24H):  Temp:  [96.6 °F (35.9 °C)-98.6 °F (37 °C)]   Pulse:  [58-84]   Resp:  [20-26]   BP: (110-144)/()   SpO2:  [93 %-100 %]       Intake/Output Summary (Last 24 hours) at 7/22/2024 0725  Last data filed at 7/22/2024 0600  Gross per 24 hour   Intake 300 ml   Output 2175 ml   Net -1875 ml       Physical Exam:  General appearance: Elderly/frail/[place]  Eyes:  Conjunctivae/corneas clear. PERRL.  Lungs: Normal respiratory effort,   clear to auscultation bilaterally   Heart: Regular rate and rhythm, S1, S2 normal, no murmur, rub or boy.  Abdomen: Soft, non-tender non-distended; bowel sounds normal; no masses,  no organomegaly  Extremities: No cyanosis or clubbing. trace edema.    Skin: Skin color, texture, turgor normal. No rashes or  "lesions  Neurologic: Normal strength and tone. No focal numbness or weakness   gloria      Laboratory:  Recent Labs   Lab 07/22/24  0415   WBC 2.10*   RBC 2.99*   HGB 9.5*   HCT 28.0*      MCV 94   MCH 31.8*   MCHC 33.9     BMP:   Recent Labs   Lab 07/22/24 0415   *   *   K 4.9   CL 99   CO2 19*   BUN 9*   CREATININE 0.7   CALCIUM 8.7   MG 2.0     Lab Results   Component Value Date    CALCIUM 8.7 07/22/2024    PHOS 2.2 (L) 02/08/2024     BNP  Recent Labs   Lab 07/21/24  1415   *   No results found for: "URICACID"  Lab Results   Component Value Date    IRON 75 04/29/2024    TIBC 353 04/29/2024    FERRITIN 311 (H) 04/29/2024     Lab Results   Component Value Date    CALCIUM 8.7 07/22/2024    PHOS 2.2 (L) 02/08/2024       Diagnostic Results:  US Lower Extremity Veins Bilateral   Final Result      No evidence of deep venous thrombosis in either lower extremity.         Electronically signed by: Giovanni Kat MD   Date:    07/22/2024   Time:    02:21      CT Abdomen Pelvis  Without Contrast   Final Result   Abnormal      This report was flagged in Epic as abnormal.      1. Evaluation of the abdomen and pelvis is significantly limited given extensive motion artifact.   2. Allowing for the above, no findings to suggest obstructive uropathy.   3. There is diffuse colonic diverticulosis.  Indistinctness about the descending colon is likely related to motion artifact rather than acute infectious or inflammatory process however correlation with any focal tenderness in the region and current symptomatology advised.   4. The urinary bladder is distended, correlation with any history of outlet obstruction or urinary retention.   5. Small bilateral pleural effusions with associated compressive atelectasis of the bilateral lower lobes.   6. Note, in this patient with reported history of colon malignancy, please see examination 02/07/2024 as there is suboptimal evaluation of the bowel given motion " artifact.   7. Please see above for several additional findings.         Electronically signed by: Jax Hutson MD   Date:    07/21/2024   Time:    15:20      X-Ray Chest AP Portable   Final Result      No evidence of acute cardiopulmonary disease         Electronically signed by: Frantz Watts MD   Date:    07/21/2024   Time:    15:02      US Retroperitoneal Complete    (Results Pending)       ASSESSMENT/PLAN:     Resolving Multifactorial Hyponatremia due to VOSS, Covid, Volume depletion, etc:  -trends noted.  -initial 117 and corrected to 125.  Hold further correction and monitor Q6 hrs.  Should auto-correct with gloria now.    -urine studies indicate volume depletion and no SIADH.   -allow some water today.     Covid w/ Leucopenia:  -defer    Mild NAGMA:  -improving.  -hold off on bicarb tabs for now.       Thanks for consult  See above  Will follow along.       High MDM complexity necessary to treat or prevent imminent or life-threatening deterioration of the following conditions: VOSS, <NA  Time spent personally by me on the following activities 50 min: development of treatment plan with patient or surrogate, discussions with consultants, interpretation of cardiac output measurements, examination of patient, ordering and performing treatments and interventions, evaluation of patient's response to treatment, obtaining history from patient or surrogate, ordering and review of laboratory studies, ordering and review of radiographic studies, re-evaluation of patient's condition, pulse oximetry and blood draw for specimens

## 2024-07-22 NOTE — SUBJECTIVE & OBJECTIVE
Past Medical History:   Diagnosis Date    ACP (advance care planning) 2024    Amaurosis fugax     Bilateral left eye worse    Anticoagulant long-term use     Arthritis     Glaucoma     resolved    Hyperlipidemia     Hypertension     Stroke 2006       Past Surgical History:   Procedure Laterality Date    APPENDECTOMY  's    BREAST SURGERY      CATARACT EXTRACTION W/  INTRAOCULAR LENS IMPLANT Bilateral     Dr Rojo      SECTION      3 c/s and d/c    COLONOSCOPY N/A 2024    Procedure: COLONOSCOPY;  Surgeon: Trev Lafleur MD;  Location: [facility];  Service: Endoscopy;  Laterality: N/A;    ESOPHAGOGASTRODUODENOSCOPY N/A 2024    Procedure: EGD (ESOPHAGOGASTRODUODENOSCOPY);  Surgeon: rTev Lafleur MD;  Location: [facility];  Service: Endoscopy;  Laterality: N/A;    EYE SURGERY      Glaucoma     HYSTERECTOMY      RIGHT OOPHORECTOMY      With postop hemorrhage    TONSILLECTOMY      Yag Capsulotomy Left 2021       Review of patient's allergies indicates:   Allergen Reactions    Contrast media Other (See Comments)    Aspirin Other (See Comments)    Ciprofloxacin Other (See Comments)    Iron Other (See Comments)     Small rash with po iron.  Tolerated IV iron    Iodine Rash    Penicillins Rash       No current facility-administered medications on file prior to encounter.     Current Outpatient Medications on File Prior to Encounter   Medication Sig    acetaminophen (TYLENOL) 325 MG tablet Take 2 tablets (650 mg total) by mouth every 8 (eight) hours as needed.    alpha lipoic acid 200 mg Cap 1 tab at night    b complex vitamins tablet Take 1 tablet by mouth once daily.    benzonatate (TESSALON PERLES) 100 MG capsule Take 1 capsule (100 mg total) by mouth 3 (three) times daily as needed for Cough.    calcium citrate-vitamin D3 315-200 mg (CITRACAL+D) 315 mg-5 mcg (200 unit) per tablet Take 1 tablet by mouth once daily.    cyanocobalamin 1,000 mcg/mL injection     folic acid (FOLVITE) 1  MG tablet Take 1,000 mcg by mouth.    hydrocortisone 2.5 % cream Apply topically every evening.    losartan (COZAAR) 100 MG tablet Take 1 tablet (100 mg total) by mouth once daily.    lovastatin (MEVACOR) 10 MG tablet TAKE 1 TABLET EVERY EVENING    multivitamin capsule Take 1 capsule by mouth once daily.    NIFEdipine (PROCARDIA-XL) 30 MG (OSM) 24 hr tablet Take 1 tablet (30 mg total) by mouth every evening.    nirmatrelvir-ritonavir 300 mg (150 mg x 2)-100 mg copackaged tablets (EUA) Take 3 tablets by mouth 2 (two) times daily for 5 days. Each dose contains 2 nirmatrelvir (pink tablets) and 1 ritonavir (white tablet). Take all 3 tablets together    sucralfate (CARAFATE) 1 gram tablet Take 1 tablet (1 g total) by mouth 2 (two) times daily.     Family History       Problem Relation (Age of Onset)    Diabetes Father    Early death Mother    Hypertension Father, Sister    No Known Problems Brother, Maternal Aunt, Maternal Uncle, Paternal Aunt, Paternal Uncle, Maternal Grandmother, Maternal Grandfather, Paternal Grandmother    Stroke Father    Thyroid disease Paternal Grandfather          Tobacco Use    Smoking status: Never    Smokeless tobacco: Never   Substance and Sexual Activity    Alcohol use: No    Drug use: No    Sexual activity: Never     Review of Systems   Constitutional:  Positive for appetite change and fatigue. Negative for chills, diaphoresis and fever.   HENT:  Positive for congestion.    Eyes:  Negative for visual disturbance.   Respiratory:  Negative for cough, chest tightness and shortness of breath.    Cardiovascular:  Positive for leg swelling. Negative for chest pain and palpitations.   Gastrointestinal:  Positive for diarrhea and nausea. Negative for abdominal pain, constipation and vomiting.   Genitourinary:  Positive for decreased urine volume and difficulty urinating.   Neurological:  Positive for weakness. Negative for dizziness, syncope, light-headedness and headaches.     Objective:      Vital Signs (Most Recent):  Temp: 98.6 °F (37 °C) (07/21/24 1915)  Pulse: 74 (07/21/24 1901)  Resp: 20 (07/21/24 1901)  BP: (!) 128/57 (07/21/24 1901)  SpO2: 99 % (07/21/24 1901) Vital Signs (24h Range):  Temp:  [97.9 °F (36.6 °C)-98.6 °F (37 °C)] 98.6 °F (37 °C)  Pulse:  [73-84] 74  Resp:  [20-26] 20  SpO2:  [93 %-100 %] 99 %  BP: (118-144)/() 128/57     Weight: 49.9 kg (110 lb)  Body mass index is 20.12 kg/m².     Physical Exam  Vitals and nursing note reviewed.   Constitutional:       General: She is not in acute distress.     Appearance: Normal appearance. She is well-developed and normal weight.   HENT:      Head: Normocephalic and atraumatic.      Mouth/Throat:      Dentition: Normal dentition.   Eyes:      General: Lids are normal.      Extraocular Movements: Extraocular movements intact.      Conjunctiva/sclera: Conjunctivae normal.   Cardiovascular:      Rate and Rhythm: Normal rate and regular rhythm.      Heart sounds: Normal heart sounds. No murmur heard.  Pulmonary:      Effort: Pulmonary effort is normal.      Breath sounds: Normal breath sounds.      Comments: Appears comfortable on RA, no conversational dyspnea or increased WOB. No cough during interview or exam.   Abdominal:      General: Bowel sounds are normal. There is no distension.      Palpations: Abdomen is soft.      Tenderness: There is no abdominal tenderness.   Musculoskeletal:      Cervical back: Neck supple.      Right lower leg: Edema (1+ mid shin to foot) present.      Left lower leg: Edema (1+ mid shin to foot) present.   Skin:     General: Skin is warm and dry.      Findings: No erythema or rash.   Neurological:      Mental Status: She is alert and oriented to person, place, and time.             Significant Labs: All pertinent labs within the past 24 hours have been reviewed.  CBC:   Recent Labs   Lab 07/21/24  1415 07/21/24  1601   WBC 3.57*  --    HGB 10.0*  --    HCT 28.9* 29*     --      CMP:   Recent Labs    Lab 07/21/24  1500   *   K 5.4*   CL 92*   CO2 14*   *   BUN 15   CREATININE 0.8   CALCIUM 8.9   PROT 7.0   ALBUMIN 3.3*   BILITOT 0.4   ALKPHOS 64   AST 40   ALT 23   ANIONGAP 11     Lipase:   Recent Labs   Lab 07/21/24  1500   LIPASE 55     Troponin:   Recent Labs   Lab 07/21/24  1415   TROPONINI 0.007     TSH:   Recent Labs   Lab 07/21/24  1415   TSH 2.484     Urine Studies:   Recent Labs   Lab 07/21/24  1527   COLORU Colorless*   APPEARANCEUA Clear   PHUR 6.0   SPECGRAV 1.010   PROTEINUA Negative   GLUCUA Negative   KETONESU Negative   BILIRUBINUA Negative   OCCULTUA Negative   NITRITE Negative   UROBILINOGEN Negative   LEUKOCYTESUR Negative       Significant Imaging: I have reviewed all pertinent imaging results/findings within the past 24 hours.

## 2024-07-22 NOTE — ASSESSMENT & PLAN NOTE
-Admitted to inpatient status  -Presented with weakness after acute viral diarrheal illness with dehydration and urinary retention.  -Baseline Na 130-135.  -On admit Na 117.  -Treated with  2L fluid in ED and improving now without further fluids  -Nephrology consulted and input appreciated.  Suspect volume depletion and not SIADH  -No further IV fluids  -Monitor BMP closely.  -Consult PT, OT, SLP and transfer out of ICU today.

## 2024-07-22 NOTE — ASSESSMENT & PLAN NOTE
-Recently diagnosed cecal adenocarcinoma per endoscopy biopsy in 2/2024 and has not followed up  -Colonoscopy 2/24 showed non-bleeding internal hemorrhoids, diverticulosis in the entire examined colon, likely malignant tumor in the cecum. Biopsied. The rectum, ileocecal valve and appendiceal orifice are normal. The examined portion of the ileum was normal.  -CECAL MASS, BIOPSY:  INVASIVE, MODERATELY DIFFERENTIATED COLONIC ADENOCARCINOMA   -Patient aware of diagnosis and given her age does not with to pursue treatment.

## 2024-07-22 NOTE — ASSESSMENT & PLAN NOTE
-acute retention for last day or so per patient and grandson  -gloria placed in ED with good UOP  -continue gloria for now  -care per facility protocol  -consider voiding trial in AM if more mobile  -strict I&Os

## 2024-07-23 PROBLEM — Z71.89 ACP (ADVANCE CARE PLANNING): Status: RESOLVED | Noted: 2024-02-07 | Resolved: 2024-07-23

## 2024-07-23 PROBLEM — R13.10 DYSPHAGIA: Status: RESOLVED | Noted: 2024-07-22 | Resolved: 2024-07-23

## 2024-07-23 PROBLEM — R53.81 DEBILITY: Status: RESOLVED | Noted: 2024-07-22 | Resolved: 2024-07-23

## 2024-07-23 LAB
ALBUMIN SERPL BCP-MCNC: 2.8 G/DL (ref 3.5–5.2)
ANION GAP SERPL CALC-SCNC: 9 MMOL/L (ref 8–16)
BASOPHILS # BLD AUTO: 0 K/UL (ref 0–0.2)
BASOPHILS NFR BLD: 0 % (ref 0–1.9)
BUN SERPL-MCNC: 13 MG/DL (ref 10–30)
CALCIUM SERPL-MCNC: 9 MG/DL (ref 8.7–10.5)
CHLORIDE SERPL-SCNC: 101 MMOL/L (ref 95–110)
CO2 SERPL-SCNC: 21 MMOL/L (ref 23–29)
CREAT SERPL-MCNC: 0.7 MG/DL (ref 0.5–1.4)
DIFFERENTIAL METHOD BLD: ABNORMAL
EOSINOPHIL # BLD AUTO: 0 K/UL (ref 0–0.5)
EOSINOPHIL NFR BLD: 0 % (ref 0–8)
ERYTHROCYTE [DISTWIDTH] IN BLOOD BY AUTOMATED COUNT: 13.2 % (ref 11.5–14.5)
EST. GFR  (NO RACE VARIABLE): >60 ML/MIN/1.73 M^2
GLUCOSE SERPL-MCNC: 94 MG/DL (ref 70–110)
HCT VFR BLD AUTO: 27.6 % (ref 37–48.5)
HGB BLD-MCNC: 9.5 G/DL (ref 12–16)
IMM GRANULOCYTES # BLD AUTO: 0.03 K/UL (ref 0–0.04)
IMM GRANULOCYTES NFR BLD AUTO: 0.5 % (ref 0–0.5)
LYMPHOCYTES # BLD AUTO: 0.7 K/UL (ref 1–4.8)
LYMPHOCYTES NFR BLD: 12 % (ref 18–48)
MAGNESIUM SERPL-MCNC: 1.9 MG/DL (ref 1.6–2.6)
MCH RBC QN AUTO: 31.5 PG (ref 27–31)
MCHC RBC AUTO-ENTMCNC: 34.4 G/DL (ref 32–36)
MCV RBC AUTO: 91 FL (ref 82–98)
MONOCYTES # BLD AUTO: 1 K/UL (ref 0.3–1)
MONOCYTES NFR BLD: 17.3 % (ref 4–15)
NEUTROPHILS # BLD AUTO: 3.9 K/UL (ref 1.8–7.7)
NEUTROPHILS NFR BLD: 70.2 % (ref 38–73)
NRBC BLD-RTO: 0 /100 WBC
OHS QRS DURATION: 90 MS
OHS QTC CALCULATION: 417 MS
PHOSPHATE SERPL-MCNC: 2.7 MG/DL (ref 2.7–4.5)
PLATELET # BLD AUTO: 342 K/UL (ref 150–450)
PMV BLD AUTO: 9.1 FL (ref 9.2–12.9)
POTASSIUM SERPL-SCNC: 3.9 MMOL/L (ref 3.5–5.1)
RBC # BLD AUTO: 3.02 M/UL (ref 4–5.4)
SODIUM SERPL-SCNC: 131 MMOL/L (ref 136–145)
WBC # BLD AUTO: 5.6 K/UL (ref 3.9–12.7)

## 2024-07-23 PROCEDURE — 25000003 PHARM REV CODE 250: Mod: HCNC | Performed by: HOSPITALIST

## 2024-07-23 PROCEDURE — 63600175 PHARM REV CODE 636 W HCPCS: Mod: HCNC | Performed by: NURSE PRACTITIONER

## 2024-07-23 PROCEDURE — 97535 SELF CARE MNGMENT TRAINING: CPT | Mod: HCNC

## 2024-07-23 PROCEDURE — 27000207 HC ISOLATION: Mod: HCNC

## 2024-07-23 PROCEDURE — 36415 COLL VENOUS BLD VENIPUNCTURE: CPT | Mod: HCNC | Performed by: NURSE PRACTITIONER

## 2024-07-23 PROCEDURE — 25000003 PHARM REV CODE 250: Mod: HCNC | Performed by: NURSE PRACTITIONER

## 2024-07-23 PROCEDURE — 97530 THERAPEUTIC ACTIVITIES: CPT | Mod: HCNC

## 2024-07-23 PROCEDURE — 85025 COMPLETE CBC W/AUTO DIFF WBC: CPT | Mod: HCNC | Performed by: HOSPITALIST

## 2024-07-23 PROCEDURE — 11000001 HC ACUTE MED/SURG PRIVATE ROOM: Mod: HCNC

## 2024-07-23 PROCEDURE — 97162 PT EVAL MOD COMPLEX 30 MIN: CPT | Mod: HCNC

## 2024-07-23 PROCEDURE — 94761 N-INVAS EAR/PLS OXIMETRY MLT: CPT | Mod: HCNC

## 2024-07-23 PROCEDURE — 97165 OT EVAL LOW COMPLEX 30 MIN: CPT | Mod: HCNC

## 2024-07-23 PROCEDURE — 83735 ASSAY OF MAGNESIUM: CPT | Mod: HCNC | Performed by: HOSPITALIST

## 2024-07-23 PROCEDURE — 63600175 PHARM REV CODE 636 W HCPCS: Mod: JZ,TB,HCNC | Performed by: HOSPITALIST

## 2024-07-23 PROCEDURE — 80069 RENAL FUNCTION PANEL: CPT | Mod: HCNC | Performed by: NURSE PRACTITIONER

## 2024-07-23 RX ORDER — NIFEDIPINE 30 MG/1
30 TABLET, EXTENDED RELEASE ORAL NIGHTLY
Status: DISCONTINUED | OUTPATIENT
Start: 2024-07-23 | End: 2024-07-25 | Stop reason: HOSPADM

## 2024-07-23 RX ADMIN — NIFEDIPINE 30 MG: 30 TABLET, FILM COATED, EXTENDED RELEASE ORAL at 08:07

## 2024-07-23 RX ADMIN — REMDESIVIR 100 MG: 100 INJECTION, POWDER, LYOPHILIZED, FOR SOLUTION INTRAVENOUS at 09:07

## 2024-07-23 RX ADMIN — OXYCODONE HYDROCHLORIDE AND ACETAMINOPHEN 500 MG: 500 TABLET ORAL at 08:07

## 2024-07-23 RX ADMIN — SUCRALFATE 1 G: 1 TABLET ORAL at 09:07

## 2024-07-23 RX ADMIN — SUCRALFATE 1 G: 1 TABLET ORAL at 08:07

## 2024-07-23 RX ADMIN — MUPIROCIN: 20 OINTMENT TOPICAL at 08:07

## 2024-07-23 RX ADMIN — TRAMADOL HYDROCHLORIDE 50 MG: 50 TABLET, COATED ORAL at 08:07

## 2024-07-23 RX ADMIN — Medication 1 EACH: at 08:07

## 2024-07-23 RX ADMIN — MORPHINE SULFATE 1 MG: 2 INJECTION, SOLUTION INTRAMUSCULAR; INTRAVENOUS at 09:07

## 2024-07-23 RX ADMIN — TRAMADOL HYDROCHLORIDE 50 MG: 50 TABLET, COATED ORAL at 01:07

## 2024-07-23 RX ADMIN — ENOXAPARIN SODIUM 40 MG: 40 INJECTION SUBCUTANEOUS at 05:07

## 2024-07-23 RX ADMIN — ACETAMINOPHEN 650 MG: 500 TABLET ORAL at 08:07

## 2024-07-23 RX ADMIN — THERA TABS 1 TABLET: TAB at 08:07

## 2024-07-23 RX ADMIN — DOCUSATE SODIUM AND SENNOSIDES 1 TABLET: 8.6; 5 TABLET, FILM COATED ORAL at 08:07

## 2024-07-23 RX ADMIN — PRAVASTATIN SODIUM 10 MG: 10 TABLET ORAL at 08:07

## 2024-07-23 NOTE — ASSESSMENT & PLAN NOTE
Recently diagnosed cecal adenocarcinoma hello this year.  Patient not interested in treatment at this time.

## 2024-07-23 NOTE — PROGRESS NOTES
"[facility] Medicine  Progress Note    Patient Name: Brie Han  MRN: [id number]  Patient Class: IP- Inpatient   Admission Date: 7/21/2024  Length of Stay: 2 days  Attending Physician: Saleem Mcdaniel MD  Primary Care Provider: Colten Gould MD        Subjective:     Principal Problem:Adenocarcinoma of colon        HPI:  Per Lesly Johnson, NP:    "Ms. 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 She is a [employer] and does not consent to blood transfusions.    She presents to ED with complaints of fatigue, malaise, generalized weakness, tremor onset for the last several days since exposure to and positive Covid test about 5-6 days ago. She also notes some diarrhea earlier in course, followed by constipation requiring laxative use with return of diarrhea. She reports decreased UOP, difficulty urinating, decreased appetite, and nausea as well. She also notes bilateral LE swelling which is abnormal for her. She took several doses of Paxlovid (prescribed by PCP) but discontinued due to side effects and felt it was not alleviating her symptoms.     She denies fever, chills, SOB, HOLT, CP, abdominal pain, vomiting, constipation, light headiness, dizziness, or headache. She lives alone however Grandson stays with her 4-5 days a week, she is independent in ADLs, and uses no ambulatory aides.    In the ED she is HDS and afebrile.  CBC with WBC 4, H/H 10/29, platelets 150.  Chemistry was  (baseline 130-135), K 5.4, chloride 92, CO2 14, BUN 15, SCr 0.8, glucose 122.  LFTs unremarkable.  Lipase 55.  .  Troponin 0.07.  TSH 2.484.  UA noninfectious. Covid +.  Flu negative.  CXR without acute findings.  CT " "AP degraded D/T motion artifact, see full report.      The patient was admitted to the Hospital Medicine Service for further evaluation and management. 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    Patient admitted to the hospital with worsening hyponatremia urinary retention.    Serum sodium 117 mmol/L on presentation.  Suspect some degree of hypovolemic hyponatremia.  Patient was given isotonic intravenous resuscitation.  Serum sodium correcting at reasonable rate towards normal.    Patient with COVID-19 with elevated risk score for clinical deterioration.  Patient receiving intravenous remdesivir preventatively to decrease risk of progression to severe disease.    Regarding her colon cancer patient is not interested in pursuing treatment.  Palliative care service consulted.     Interval History: No acute events overnight.    Review of Systems   Constitutional:  Positive for appetite change. Negative for chills and fever.   Respiratory:  Negative for shortness of breath.    Cardiovascular:  Negative for chest pain.   Gastrointestinal:  Negative for abdominal pain, nausea and vomiting.   Genitourinary:  Negative for dysuria and frequency.   Neurological:  Positive for weakness.     Objective:     Vital Signs (Most Recent):  Temp: 97.6 °F (36.4 °C) (07/23/24 1715)  Pulse: 61 (07/23/24 1715)  Resp: 18 (07/23/24 1715)  BP: (!) 158/69 (07/23/24 1715)  SpO2: 98 % (07/23/24 1715) Vital Signs (24h Range):  Temp:  [97.6 °F (36.4 °C)-98.2 °F (36.8 °C)] 97.6 °F (36.4 °C)  Pulse:  [55-75] 61  Resp:  [13-38] 18  SpO2:  [98 %-100 %] 98 %  BP: " (128-177)/(58-74) 158/69     Weight: 49.9 kg (110 lb)  Body mass index is 20.12 kg/m².    Intake/Output Summary (Last 24 hours) at 7/23/2024 1745  Last data filed at 7/23/2024 1537  Gross per 24 hour   Intake 246.31 ml   Output 1600 ml   Net -1353.69 ml         Physical Exam  Constitutional:       General: She is not in acute distress.  HENT:      Head: Atraumatic.   Eyes:      Conjunctiva/sclera: Conjunctivae normal.   Cardiovascular:      Rate and Rhythm: Normal rate and regular rhythm.      Heart sounds: Normal heart sounds. No murmur heard.  Pulmonary:      Effort: Pulmonary effort is normal.      Breath sounds: Normal breath sounds. No wheezing.   Abdominal:      General: Bowel sounds are normal. There is no distension.      Palpations: Abdomen is soft.      Tenderness: There is no abdominal tenderness.   Musculoskeletal:         General: No deformity. Normal range of motion.      Cervical back: Neck supple.   Neurological:      Mental Status: She is alert and oriented to person, place, and time.             Significant Labs: All pertinent labs within the past 24 hours have been reviewed.    Significant Imaging: I have reviewed all pertinent imaging results/findings within the past 24 hours.    Assessment/Plan:      * Adenocarcinoma of colon  Recently diagnosed cecal adenocarcinoma hello this year.  Patient not interested in treatment at this time.    COVID-19 virus infection  Mild and improving.  Continue remdesivir.    Hyponatremia  Improved with volume resuscitation.  Stable.  Monitor.    Acute urinary retention  Status post Moyer placement.  Continue Moyer catheterization.    Hypertension  Blood pressure trending up.  Will start low-dose nifedipine.    Iron deficiency anemia  Stable.    Stage 3a chronic kidney disease  Stable    Refusal of blood transfusions as patient is [employer]  Noted      VTE Risk Mitigation (From admission, onward)           Ordered     enoxaparin injection 40 mg  Every 24  hours         07/21/24 2039     IP VTE HIGH RISK PATIENT  Once         07/21/24 1724     Place sequential compression device  Until discontinued         07/21/24 1724                    Obi Galvan MD  Department of Hospital Medicine   [facility] ([place])

## 2024-07-23 NOTE — PLAN OF CARE
Problem: Occupational Therapy  Goal: Occupational Therapy Goal  Description: Goals to be met by: 8/3/24     Patient will increase functional independence with ADLs by performing:    Grooming standing at sink at Supervision level.  UB Dressing at Supervision level.  LB Dressing at Supervision level.  Toileting at Supervision level.  Toilet transfers at Supervision level.   Sponge bathing at Set up/Supervision level.      Outcome: Progressing  Will update discharge recs as pt progresses with therapy.   Pt is needing 24/7 assist at this time and pt reporting that she will not have that at home.  Anticipating Moderate Intensity Therapy at this time.

## 2024-07-23 NOTE — PT/OT/SLP EVAL
Physical Therapy Evaluation and Treatment    Patient Name:  Brie Han   MRN:  [id number]    Recommendations:     Discharge Recommendations:  (TBD, anticipate LIT with initial increased sueprvision)   Discharge Equipment Recommendations: walker, rolling, bedside commode (other DME per OT)   Barriers to discharge: Inaccessible home and Decreased caregiver support    Assessment:     Brie Han is a 89 y.o. female admitted with a medical diagnosis of Adenocarcinoma of colon.  She presents with the following impairments/functional limitations: weakness, impaired endurance, impaired self care skills, impaired sensation, impaired functional mobility, gait instability, impaired balance, decreased coordination, decreased upper extremity function, decreased lower extremity function, decreased safety awareness, pain, decreased ROM.    Patient evaluated by PT and goals established. Patient reports feeling improved since admission however continues to demo weakness and tremulous gait requiring Paco and HHA. Anticipate will continue to improve with time, increased OOB mobility, and use of RW. PT will continue to follow and progress as tolerated. Rec for dc to TBD, anticipate LIT with increased initial supervision.      Rehab Prognosis: Fair; patient would benefit from acute skilled PT services to address these deficits and reach maximum level of function.    Recent Surgery: * No surgery found *      Plan:     During this hospitalization, patient to be seen 5 x/week to address the identified rehab impairments via gait training, therapeutic activities, therapeutic exercises, neuromuscular re-education and progress toward the following goals:    Plan of Care Expires:  08/06/24    Subjective     Chief Complaint: Weakness, worried about falling, not having the finances to fix her multiple broken appliances including AC  Patient/Family Comments/goals: Reports her g.son live [facility] and she hasn't been considering moving  in with him; Patient agreeable to evaluation.  Pain/Comfort:  Pain Rating 1: 0/10  Pain Rating Post-Intervention 1: 0/10    Patients cultural, spiritual, Druze conflicts given the current situation: no    Living Environment:  Lives alone in [facility] with multiple [place].  Prior to admission, patients level of function was indep.  Equipment used at home: walker, standard (19 yo walker).   Upon discharge, patient will have assistance from her g.son, who stays with her when he's not in [place] (per EMR, in town 4-5 days per week).    Objective:     Communicated with RN  prior to session.  Patient found HOB elevated with peripheral IV, telemetry, blood pressure cuff, pulse ox (continuous)  upon PT entry to room.    General Precautions: Standard, fall, airborne, contact, droplet, hearing impaired  Orthopedic Precautions:N/A   Braces: N/A  Respiratory Status: Room air    Patient donned non slip socks and gait belt for OOB mobility.    Exams:  Cognitive Exam:  Patient is oriented to Person, Place, Time, and Situation  Gross Motor Coordination:  WFL  Postural Exam:  Patient presented with the following abnormalities:    -       Rounded shoulders  -       Forward head  Sensation:    -       Impaired  LLE neuropathic pain  RLE ROM: WFL  RLE Strength: WFL  LLE ROM: WFL  LLE Strength: Deficits: 2/5 DF, 4/5 knee extension    Functional Mobility:  Bed Mobility:     Supine to Sit: minimum assistance  Transfers:     Sit to Stand:  minimum assistance with no AD and hand-held assist  Gait: x20 ft with Paco and HHA with pt performing furniture cruising when able. Notable gait instability with tremulous extremities and impaired postural reactions. Slow gait with decreased katya, decreased step length, and minimal arm swing.       AM-PAC 6 CLICK MOBILITY  Total Score:17       Treatment & Education:  Extended time in sitting and standing for trunk and LE strengthening  PT educated patient re:   PT plan of care/role of PT  Safety with  OOB mobility  Use of RW  Discharge disposition    Pt verbalized understanding       Patient left up in chair with all lines intact, call button in reach, RN notified, and OT present.    GOALS:   Multidisciplinary Problems       Physical Therapy Goals          Problem: Physical Therapy    Goal Priority Disciplines Outcome Goal Variances Interventions   Physical Therapy Goal     PT, PT/OT Progressing     Description: Goals to be met by: 24    Patient will increase functional independence with mobility by performin. Supine<>sit with supervision without use of HB features.   2. Sit<>stand with supervision with RW.  3. Gait x 50 feet with supervision with RW.  4. Ascend/descend 4 step(s) with least restrictive assistive device and uni HR with CGA.                        History:     Past Medical History:   Diagnosis Date    ACP (advance care planning) 2024    Amaurosis fugax     Bilateral left eye worse    Anticoagulant long-term use     Arthritis     Glaucoma     resolved    Hyperlipidemia     Hypertension     Stroke        Past Surgical History:   Procedure Laterality Date    APPENDECTOMY      BREAST SURGERY      CATARACT EXTRACTION W/  INTRAOCULAR LENS IMPLANT Bilateral     Dr Rojo      SECTION      3 c/s and d/c    COLONOSCOPY N/A 2024    Procedure: COLONOSCOPY;  Surgeon: Trev Lafleru MD;  Location: [facility];  Service: Endoscopy;  Laterality: N/A;    ESOPHAGOGASTRODUODENOSCOPY N/A 2024    Procedure: EGD (ESOPHAGOGASTRODUODENOSCOPY);  Surgeon: Trev Lafleur MD;  Location: [facility];  Service: Endoscopy;  Laterality: N/A;    EYE SURGERY      Glaucoma     HYSTERECTOMY      RIGHT OOPHORECTOMY      With postop hemorrhage    TONSILLECTOMY      Yag Capsulotomy Left 2021       Time Tracking:     PT Received On: 24  PT Start Time: 1101     PT Stop Time: 1135  PT Total Time (min): 34 min     Overlap with OT for portions of session due to complex nature of  patient, tolerance for therapeutic modalities, and safety with mobility to decrease fall risk for patient and caregiver injury requiring two skilled therapists to provide interventions.    Billable Minutes: Evaluation 20 and Therapeutic Activity 10      07/23/2024

## 2024-07-23 NOTE — PROGRESS NOTES
"Nephrology  Progress Note    Admit Date: 7/21/2024   LOS: 2 days     SUBJECTIVE:     Follow-up For:  Adenocarcinoma of colon/<NA    History of Present Illness:  Patient is a 89 y.o. female presents with weakness/leg swelling/recent Covid.  W/up revealing hyponatremia and urine retention.  Moyer placed with 750 cc.  Initial labs with Na 117.  Admitted to ICU and monitored labs Q4 hrs.  Consulted for evaluation. Pt seen and examined this am.  Elderly/frail/[place] but states that she's feeling better.  Na trends noted and now 125.  Discussed with team at bedside.     Interval History:     Uneventful night.  Labs followed Q6.  Seen by palliative care yesterday.  Discussed with team.      Review of Systems:  Constitutional: No fever or chills  Respiratory: No cough or shortness of breath  Cardiovascular: No chest pain or palpitations  Gastrointestinal: No nausea or vomiting  Neurological: No confusion or weakness    OBJECTIVE:     Vital Signs Range (Last 24H):  BP (!) 143/65 (BP Location: Right arm, Patient Position: Lying)   Pulse 75   Temp 97.9 °F (36.6 °C) (Oral)   Resp 15   Ht 5' 2" (1.575 m)   Wt 49.9 kg (110 lb)   LMP 12/07/1972   SpO2 99%   BMI 20.12 kg/m²     Temp:  [97.6 °F (36.4 °C)-98.2 °F (36.8 °C)]   Pulse:  [58-83]   Resp:  [14-47]   BP: (103-149)/(49-73)   SpO2:  [97 %-100 %]     I & O (Last 24H):  Intake/Output Summary (Last 24 hours) at 7/23/2024 0802  Last data filed at 7/23/2024 0230  Gross per 24 hour   Intake --   Output 800 ml   Net -800 ml       Physical Exam:  General appearance: Elderly/frail/[place]  Eyes:  Conjunctivae/corneas clear. PERRL.  Lungs: Normal respiratory effort,   clear to auscultation bilaterally   Heart: Regular rate and rhythm, S1, S2 normal, no murmur, rub or boy.  Abdomen: Soft, non-tender non-distended; bowel sounds normal; no masses,  no organomegaly  Extremities: No cyanosis or clubbing. trace edema.    Skin: Skin color, texture, turgor normal. No rashes or " "lesions  Neurologic: Normal strength and tone. No focal numbness or weakness   gloria    Laboratory Data:  Recent Labs   Lab 07/23/24  0630   WBC 5.60   RBC 3.02*   HGB 9.5*   HCT 27.6*      MCV 91   MCH 31.5*   MCHC 34.4       BMP:   Recent Labs   Lab 07/23/24  0630   GLU 94   *   K 3.9      CO2 21*   BUN 13   CREATININE 0.7   CALCIUM 9.0   MG 1.9   PHOS 2.7     Lab Results   Component Value Date    CALCIUM 9.0 07/23/2024    PHOS 2.7 07/23/2024       Lab Results   Component Value Date    CALCIUM 9.0 07/23/2024    PHOS 2.7 07/23/2024       No results found for: "URICACID"    BNP  Recent Labs   Lab 07/21/24  1415   *       Medications:  Medication list was reviewed and changes noted under Assessment/Plan.    Diagnostic Results:        ASSESSMENT/PLAN:     Resolving Multifactorial Hyponatremia due to VOSS, Covid, Volume depletion, etc:  -trends noted.  -initial 117 and corrected to 125 over 1st 24 hrs.  Held further correction and monitored Q6 hrs.  -auto-correcting with gloria now.    -urine studies indicate volume depletion and no SIADH.   -trends noted and Na 131     Covid:  -defer     Mild NAGMA:  -improving.  -hold off on bicarb tabs for now.     Mild hyperkalemia:  -resolved after lokelma.         Thanks for consult  See above  Nothing else to offer.  Will sign off          High MDM complexity necessary to treat or prevent imminent or life-threatening deterioration of the following conditions: VOSS, <NA  Time spent personally by me on the following activities 50 min: development of treatment plan with patient or surrogate, discussions with consultants, interpretation of cardiac output measurements, examination of patient, ordering and performing treatments and interventions, evaluation of patient's response to treatment, obtaining history from patient or surrogate, ordering and review of laboratory studies, ordering and review of radiographic studies, re-evaluation of patient's condition, " pulse oximetry and blood draw for specimens

## 2024-07-23 NOTE — PT/OT/SLP EVAL
Occupational Therapy   Evaluation and Treatment    Name: Brie Han  MRN: [id number]  Admitting Diagnosis: Adenocarcinoma of colon  Recent Surgery: * No surgery found *      Recommendations:     Discharge Recommendations: Moderate Intensity Therapy (Unless pt has / assist at home, then Low Intensity Therapy.)  Discharge Equipment Recommendations:  bedside commode, walker, rolling, bath bench  Barriers to discharge:  Decreased caregiver support (Current functional level)    Assessment:     Brie Han is a 89 y.o. female with a medical diagnosis of Adenocarcinoma of colon.  She presents in ICU with the following performance deficits affecting function: weakness, impaired endurance, impaired functional mobility, impaired self care skills, gait instability, impaired balance, impaired cardiopulmonary response to activity, decreased safety awareness, decreased lower extremity function, impaired fine motor, decreased coordination, decreased ROM, impaired sensation.      Rehab Prognosis:  Good to return to OF ; patient would benefit from acute skilled OT services to address these deficits and reach maximum level of function.       Plan:     Patient to be seen 5 x/week to address the above listed problems via self-care/home management, therapeutic activities  Plan of Care Expires: 24  Plan of Care Reviewed with: patient    Subjective     Chief Complaint: A/C and refrigerator both breaking in her house recently and having concerns about replacements  Patient/Family Comments/goals: Pt reporting that her sister just .  Pt stating that her children do not assist/help her due to her Jehovah Witness ashvin.     Occupational Profile:  Living Environment: Lives alone in [facility], multiple steps to enter, grandson lives with her sometimes, reports her A/C unit and refrigerator are broken and not sure how she is going to financially afford to replace them  Previous level of function: Mod I without AD/DME  Roles and  Routines: Reporting her [employer] Witness ashvin is most important to her.   Equipment Used at Home:  (Pt stating she thinks she has a standard walker from 20 years ago.)  Assistance upon Discharge: Grandson at times but none from her children.    Pain/Comfort:  Pain Rating 1: 0/10  Pain Rating Post-Intervention 1: 0/10    Patients cultural, spiritual, Nondenominational conflicts given the current situation: yes (Jevivienh Witness)    Objective:     Communicated with: nurse prior to session.  Patient found HOB elevated with blood pressure cuff, telemetry, pulse ox (continuous), peripheral IV upon OT entry to room.    General Precautions: Standard, airborne, contact, droplet, fall, hearing impaired  Orthopedic Precautions: N/A  Braces: N/A  Respiratory Status: O2 via NC    Occupational Performance:    Bed Mobility:    Supine to sit: Min A    Functional Mobility/Transfers:  Sit to stand: Min A   Transfers: Min A  Functional Mobility: Min A with HHA and would reach out with other hand for furniture for extra support    Activities of Daily Living:  Grooming: Oral care with Min A in sitting  UB Dressing: Min A  Toileting: Using female external catheter to suction, safe to transfer to BSC or toilet with use of RW and assistance of nursing staff    Cognitive/Visual Perceptual:  Cognitive/Psychosocial Skills:     -       Oriented to: Person, Place, and Situation   -       Follows Commands/attention:Follows one-step commands  -       Communication: clear/fluent  -       Safety awareness/insight to disability: impaired   -       Mood/Affect/Coping skills/emotional control: Cooperative and Pleasant    Physical Exam:  UE Function: AROM is WFL for self care tasks, strength is grossly 3+/5, Impaired coordination  Tremors, weak, and poor activity tolerance.  Sitting Balance: SBA sitting EOB  Standing Balance: Fair/Fair-  Sensation: Pt reporting left LE neuropathic pain  Edema: None noted    AMPAC 6 Click ADL:  AMPAC Total Score:  15    Treatment & Education:  Role of OT, POC, ADL and ADL mobility training and safety, discharge recs     Patient left up in chair with all lines intact, call button in reach, and nurse notified    GOALS:   Multidisciplinary Problems       Occupational Therapy Goals          Problem: Occupational Therapy    Goal Priority Disciplines Outcome Interventions   Occupational Therapy Goal     OT, PT/OT Progressing    Description: Goals to be met by: 8/3/24     Patient will increase functional independence with ADLs by performing:    Grooming standing at sink at Supervision level.  UB Dressing at Supervision level.  LB Dressing at Supervision level.  Toileting at Supervision level.  Toilet transfers at Supervision level.   Sponge bathing at Set up/Supervision level.                           History:     Past Medical History:   Diagnosis Date    ACP (advance care planning) 2024    Amaurosis fugax     Bilateral left eye worse    Anticoagulant long-term use     Arthritis     Glaucoma     resolved    Hyperlipidemia     Hypertension     Stroke          Past Surgical History:   Procedure Laterality Date    APPENDECTOMY  's    BREAST SURGERY      CATARACT EXTRACTION W/  INTRAOCULAR LENS IMPLANT Bilateral     Dr Rojo      SECTION      3 c/s and d/c    COLONOSCOPY N/A 2024    Procedure: COLONOSCOPY;  Surgeon: Trev Lafleur MD;  Location: [facility];  Service: Endoscopy;  Laterality: N/A;    ESOPHAGOGASTRODUODENOSCOPY N/A 2024    Procedure: EGD (ESOPHAGOGASTRODUODENOSCOPY);  Surgeon: Trev Lafleur MD;  Location: [facility];  Service: Endoscopy;  Laterality: N/A;    EYE SURGERY      Glaucoma     HYSTERECTOMY      RIGHT OOPHORECTOMY      With postop hemorrhage    TONSILLECTOMY      Yag Capsulotomy Left 2021       Time Tracking:     OT Date of Treatment: 24  OT Start Time: 1108  OT Stop Time: 1150  OT Total Time (min): 42 min    Billable Minutes:Evaluation 15  Self Care/Home  Management 25    7/23/2024

## 2024-07-23 NOTE — PLAN OF CARE
Problem: Physical Therapy  Goal: Physical Therapy Goal  Description: Goals to be met by: 24    Patient will increase functional independence with mobility by performin. Supine<>sit with supervision without use of HB features.   2. Sit<>stand with supervision with RW.  3. Gait x 50 feet with supervision with RW.  4. Ascend/descend 4 step(s) with least restrictive assistive device and uni HR with CGA.   Outcome: Progressing     Patient evaluated by PT and goals established. Patient reports feeling improved since admission however continues to demo weakness and tremulous gait requiring Paco and HHA. Anticipate will continue to improve with time, increased OOB mobility, and use of RW. PT will continue to follow and progress as tolerated. Rec for dc to TBD, anticipate LIT with increased initial supervision. Please see progress note for detailed plan of care and recommendations.

## 2024-07-23 NOTE — SUBJECTIVE & OBJECTIVE
Interval History: No acute events overnight.    Review of Systems   Constitutional:  Positive for appetite change. Negative for chills and fever.   Respiratory:  Negative for shortness of breath.    Cardiovascular:  Negative for chest pain.   Gastrointestinal:  Negative for abdominal pain, nausea and vomiting.   Genitourinary:  Negative for dysuria and frequency.   Neurological:  Positive for weakness.     Objective:     Vital Signs (Most Recent):  Temp: 97.6 °F (36.4 °C) (07/23/24 1715)  Pulse: 61 (07/23/24 1715)  Resp: 18 (07/23/24 1715)  BP: (!) 158/69 (07/23/24 1715)  SpO2: 98 % (07/23/24 1715) Vital Signs (24h Range):  Temp:  [97.6 °F (36.4 °C)-98.2 °F (36.8 °C)] 97.6 °F (36.4 °C)  Pulse:  [55-75] 61  Resp:  [13-38] 18  SpO2:  [98 %-100 %] 98 %  BP: (128-177)/(58-74) 158/69     Weight: 49.9 kg (110 lb)  Body mass index is 20.12 kg/m².    Intake/Output Summary (Last 24 hours) at 7/23/2024 1745  Last data filed at 7/23/2024 1537  Gross per 24 hour   Intake 246.31 ml   Output 1600 ml   Net -1353.69 ml         Physical Exam  Constitutional:       General: She is not in acute distress.  HENT:      Head: Atraumatic.   Eyes:      Conjunctiva/sclera: Conjunctivae normal.   Cardiovascular:      Rate and Rhythm: Normal rate and regular rhythm.      Heart sounds: Normal heart sounds. No murmur heard.  Pulmonary:      Effort: Pulmonary effort is normal.      Breath sounds: Normal breath sounds. No wheezing.   Abdominal:      General: Bowel sounds are normal. There is no distension.      Palpations: Abdomen is soft.      Tenderness: There is no abdominal tenderness.   Musculoskeletal:         General: No deformity. Normal range of motion.      Cervical back: Neck supple.   Neurological:      Mental Status: She is alert and oriented to person, place, and time.             Significant Labs: All pertinent labs within the past 24 hours have been reviewed.    Significant Imaging: I have reviewed all pertinent imaging  results/findings within the past 24 hours.

## 2024-07-23 NOTE — NURSING
.Nurses Note -- 4 Eyes      7/23/2024   6:14 PM      Skin assessed during: Transfer      [x] No Altered Skin Integrity Present    []Prevention Measures Documented      [x] Yes- Altered Skin Integrity Present or Discovered   [] LDA Added if Not in Epic (Describe Wound)   [] New Altered Skin Integrity was Present on Admit and Documented in LDA   [] Wound Image Taken    Wound Care Consulted? No    Attending Nurse:  Radha Graves RN/Staff Member:  Micheal

## 2024-07-24 LAB
ALBUMIN SERPL BCP-MCNC: 2.6 G/DL (ref 3.5–5.2)
ANION GAP SERPL CALC-SCNC: 6 MMOL/L (ref 8–16)
BASOPHILS # BLD AUTO: 0.02 K/UL (ref 0–0.2)
BASOPHILS NFR BLD: 0.5 % (ref 0–1.9)
BUN SERPL-MCNC: 20 MG/DL (ref 10–30)
CALCIUM SERPL-MCNC: 8.7 MG/DL (ref 8.7–10.5)
CHLORIDE SERPL-SCNC: 101 MMOL/L (ref 95–110)
CO2 SERPL-SCNC: 23 MMOL/L (ref 23–29)
CREAT SERPL-MCNC: 0.8 MG/DL (ref 0.5–1.4)
DIFFERENTIAL METHOD BLD: ABNORMAL
EOSINOPHIL # BLD AUTO: 0.1 K/UL (ref 0–0.5)
EOSINOPHIL NFR BLD: 1.4 % (ref 0–8)
ERYTHROCYTE [DISTWIDTH] IN BLOOD BY AUTOMATED COUNT: 13.3 % (ref 11.5–14.5)
EST. GFR  (NO RACE VARIABLE): >60 ML/MIN/1.73 M^2
GLUCOSE SERPL-MCNC: 90 MG/DL (ref 70–110)
HCT VFR BLD AUTO: 25.7 % (ref 37–48.5)
HGB BLD-MCNC: 8.6 G/DL (ref 12–16)
IMM GRANULOCYTES # BLD AUTO: 0.02 K/UL (ref 0–0.04)
IMM GRANULOCYTES NFR BLD AUTO: 0.5 % (ref 0–0.5)
LYMPHOCYTES # BLD AUTO: 1.1 K/UL (ref 1–4.8)
LYMPHOCYTES NFR BLD: 26.8 % (ref 18–48)
MAGNESIUM SERPL-MCNC: 1.8 MG/DL (ref 1.6–2.6)
MCH RBC QN AUTO: 31.2 PG (ref 27–31)
MCHC RBC AUTO-ENTMCNC: 33.5 G/DL (ref 32–36)
MCV RBC AUTO: 93 FL (ref 82–98)
MONOCYTES # BLD AUTO: 0.8 K/UL (ref 0.3–1)
MONOCYTES NFR BLD: 18.7 % (ref 4–15)
NEUTROPHILS # BLD AUTO: 2.2 K/UL (ref 1.8–7.7)
NEUTROPHILS NFR BLD: 52.1 % (ref 38–73)
NRBC BLD-RTO: 0 /100 WBC
PHOSPHATE SERPL-MCNC: 2.8 MG/DL (ref 2.7–4.5)
PLATELET # BLD AUTO: 337 K/UL (ref 150–450)
PMV BLD AUTO: 9.1 FL (ref 9.2–12.9)
POCT GLUCOSE: 79 MG/DL (ref 70–110)
POTASSIUM SERPL-SCNC: 4.1 MMOL/L (ref 3.5–5.1)
RBC # BLD AUTO: 2.76 M/UL (ref 4–5.4)
SODIUM SERPL-SCNC: 130 MMOL/L (ref 136–145)
WBC # BLD AUTO: 4.22 K/UL (ref 3.9–12.7)

## 2024-07-24 PROCEDURE — 30200315 PPD INTRADERMAL TEST REV CODE 302: Mod: HCNC | Performed by: HOSPITALIST

## 2024-07-24 PROCEDURE — 83735 ASSAY OF MAGNESIUM: CPT | Mod: HCNC | Performed by: HOSPITALIST

## 2024-07-24 PROCEDURE — 25000003 PHARM REV CODE 250: Mod: HCNC | Performed by: HOSPITALIST

## 2024-07-24 PROCEDURE — 99222 1ST HOSP IP/OBS MODERATE 55: CPT | Mod: HCNC,,, | Performed by: UROLOGY

## 2024-07-24 PROCEDURE — 80069 RENAL FUNCTION PANEL: CPT | Mod: HCNC | Performed by: NURSE PRACTITIONER

## 2024-07-24 PROCEDURE — 63600175 PHARM REV CODE 636 W HCPCS: Mod: HCNC | Performed by: NURSE PRACTITIONER

## 2024-07-24 PROCEDURE — 11000001 HC ACUTE MED/SURG PRIVATE ROOM: Mod: HCNC

## 2024-07-24 PROCEDURE — 94799 UNLISTED PULMONARY SVC/PX: CPT | Mod: HCNC

## 2024-07-24 PROCEDURE — 27000207 HC ISOLATION: Mod: HCNC

## 2024-07-24 PROCEDURE — 97110 THERAPEUTIC EXERCISES: CPT | Mod: HCNC

## 2024-07-24 PROCEDURE — 36415 COLL VENOUS BLD VENIPUNCTURE: CPT | Mod: HCNC | Performed by: HOSPITALIST

## 2024-07-24 PROCEDURE — 94761 N-INVAS EAR/PLS OXIMETRY MLT: CPT | Mod: HCNC

## 2024-07-24 PROCEDURE — 25000003 PHARM REV CODE 250: Mod: HCNC | Performed by: NURSE PRACTITIONER

## 2024-07-24 PROCEDURE — 97116 GAIT TRAINING THERAPY: CPT | Mod: HCNC

## 2024-07-24 PROCEDURE — 86580 TB INTRADERMAL TEST: CPT | Mod: HCNC | Performed by: HOSPITALIST

## 2024-07-24 PROCEDURE — 63600175 PHARM REV CODE 636 W HCPCS: Mod: JZ,TB,HCNC | Performed by: HOSPITALIST

## 2024-07-24 PROCEDURE — 85025 COMPLETE CBC W/AUTO DIFF WBC: CPT | Mod: HCNC | Performed by: HOSPITALIST

## 2024-07-24 PROCEDURE — 92526 ORAL FUNCTION THERAPY: CPT | Mod: HCNC

## 2024-07-24 RX ORDER — AMOXICILLIN 250 MG
1 CAPSULE ORAL 2 TIMES DAILY PRN
Start: 2024-07-24

## 2024-07-24 RX ORDER — ACETAMINOPHEN 500 MG
1000 TABLET ORAL EVERY 8 HOURS PRN
COMMUNITY
Start: 2024-07-24

## 2024-07-24 RX ORDER — ACETAMINOPHEN 500 MG
1000 TABLET ORAL EVERY 8 HOURS PRN
Status: DISCONTINUED | OUTPATIENT
Start: 2024-07-24 | End: 2024-07-25 | Stop reason: HOSPADM

## 2024-07-24 RX ADMIN — Medication 6 MG: at 08:07

## 2024-07-24 RX ADMIN — NIFEDIPINE 30 MG: 30 TABLET, FILM COATED, EXTENDED RELEASE ORAL at 08:07

## 2024-07-24 RX ADMIN — Medication 1 EACH: at 09:07

## 2024-07-24 RX ADMIN — SUCRALFATE 1 G: 1 TABLET ORAL at 08:07

## 2024-07-24 RX ADMIN — PRAVASTATIN SODIUM 10 MG: 10 TABLET ORAL at 09:07

## 2024-07-24 RX ADMIN — OXYCODONE HYDROCHLORIDE AND ACETAMINOPHEN 500 MG: 500 TABLET ORAL at 09:07

## 2024-07-24 RX ADMIN — Medication 1 EACH: at 08:07

## 2024-07-24 RX ADMIN — REMDESIVIR 100 MG: 100 INJECTION, POWDER, LYOPHILIZED, FOR SOLUTION INTRAVENOUS at 09:07

## 2024-07-24 RX ADMIN — THERA TABS 1 TABLET: TAB at 09:07

## 2024-07-24 RX ADMIN — MUPIROCIN: 20 OINTMENT TOPICAL at 08:07

## 2024-07-24 RX ADMIN — DOCUSATE SODIUM AND SENNOSIDES 1 TABLET: 8.6; 5 TABLET, FILM COATED ORAL at 09:07

## 2024-07-24 RX ADMIN — OXYCODONE HYDROCHLORIDE AND ACETAMINOPHEN 500 MG: 500 TABLET ORAL at 08:07

## 2024-07-24 RX ADMIN — DOCUSATE SODIUM AND SENNOSIDES 1 TABLET: 8.6; 5 TABLET, FILM COATED ORAL at 08:07

## 2024-07-24 RX ADMIN — SUCRALFATE 1 G: 1 TABLET ORAL at 09:07

## 2024-07-24 RX ADMIN — ENOXAPARIN SODIUM 40 MG: 40 INJECTION SUBCUTANEOUS at 04:07

## 2024-07-24 RX ADMIN — MUPIROCIN: 20 OINTMENT TOPICAL at 09:07

## 2024-07-24 RX ADMIN — TUBERCULIN PURIFIED PROTEIN DERIVATIVE 5 UNITS: 5 INJECTION, SOLUTION INTRADERMAL at 09:07

## 2024-07-24 NOTE — PROGRESS NOTES
"[facility] Medicine  Progress Note    Patient Name: Brie Han  MRN: [id number]  Patient Class: IP- Inpatient   Admission Date: 7/21/2024  Length of Stay: 3 days  Attending Physician: Obi Galvan MD  Primary Care Provider: Colten Gould MD        Subjective:     Principal Problem:Adenocarcinoma of colon        HPI:  Per Lesly Johnson, NP:    "Ms. 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 She is a [employer] and does not consent to blood transfusions.    She presents to ED with complaints of fatigue, malaise, generalized weakness, tremor onset for the last several days since exposure to and positive Covid test about 5-6 days ago. She also notes some diarrhea earlier in course, followed by constipation requiring laxative use with return of diarrhea. She reports decreased UOP, difficulty urinating, decreased appetite, and nausea as well. She also notes bilateral LE swelling which is abnormal for her. She took several doses of Paxlovid (prescribed by PCP) but discontinued due to side effects and felt it was not alleviating her symptoms.     She denies fever, chills, SOB, HOLT, CP, abdominal pain, vomiting, constipation, light headiness, dizziness, or headache. She lives alone however Grandson stays with her 4-5 days a week, she is independent in ADLs, and uses no ambulatory aides.    In the ED she is HDS and afebrile.  CBC with WBC 4, H/H 10/29, platelets 150.  Chemistry was  (baseline 130-135), K 5.4, chloride 92, CO2 14, BUN 15, SCr 0.8, glucose 122.  LFTs unremarkable.  Lipase 55.  .  Troponin 0.07.  TSH 2.484.  UA noninfectious. Covid +.  Flu negative.  CXR without acute findings.  " "CT AP degraded D/T motion artifact, see full report.      The patient was admitted to the Hospital Medicine Service for further evaluation and management. 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    Patient admitted to the hospital with worsening hyponatremia urinary retention.    Serum sodium 117 mmol/L on presentation.  Suspect some degree of hypovolemic hyponatremia.  Patient was given isotonic intravenous resuscitation.  Serum sodium correcting at reasonable rate towards normal.    Patient with COVID-19 with elevated risk score for clinical deterioration.  Patient receiving intravenous remdesivir preventatively to decrease risk of progression to severe disease.    Regarding her colon cancer patient is not interested in pursuing treatment.  Palliative care service consulted.     Interval History: No acute events overnight.    Review of Systems   Constitutional:  Positive for appetite change. Negative for chills and fever.   Respiratory:  Negative for shortness of breath.    Cardiovascular:  Negative for chest pain.   Gastrointestinal:  Negative for abdominal pain, nausea and vomiting.   Genitourinary:  Negative for dysuria and frequency.   Neurological:  Positive for weakness.     Objective:     Vital Signs (Most Recent):  Temp: 98 °F (36.7 °C) (07/24/24 1223)  Pulse: 70 (07/24/24 1223)  Resp: 16 (07/24/24 1223)  BP: (!) 160/71 (07/24/24 1223)  SpO2: 99 % (07/24/24 1223) Vital Signs (24h Range):  Temp:  [97.6 °F (36.4 °C)-98.3 °F (36.8 °C)] 98 °F (36.7 °C)  Pulse:  [56-71] 70  Resp:  [16-38] 16  SpO2:  [96 %-99 %] 99 %  BP: " (122-177)/(58-74) 160/71     Weight: 49.9 kg (110 lb)  Body mass index is 20.12 kg/m².    Intake/Output Summary (Last 24 hours) at 7/24/2024 1230  Last data filed at 7/24/2024 1133  Gross per 24 hour   Intake 116.67 ml   Output 2450 ml   Net -2333.33 ml         Physical Exam  Constitutional:       General: She is not in acute distress.  HENT:      Head: Atraumatic.   Eyes:      Conjunctiva/sclera: Conjunctivae normal.   Cardiovascular:      Rate and Rhythm: Normal rate and regular rhythm.      Heart sounds: Normal heart sounds. No murmur heard.  Pulmonary:      Effort: Pulmonary effort is normal.      Breath sounds: Normal breath sounds. No wheezing.   Abdominal:      General: Bowel sounds are normal. There is no distension.      Palpations: Abdomen is soft.      Tenderness: There is no abdominal tenderness.   Musculoskeletal:         General: No deformity. Normal range of motion.      Cervical back: Neck supple.   Neurological:      Mental Status: She is alert and oriented to person, place, and time.             Significant Labs: All pertinent labs within the past 24 hours have been reviewed.    Significant Imaging: I have reviewed all pertinent imaging results/findings within the past 24 hours.    Assessment/Plan:      * Adenocarcinoma of colon  Recently diagnosed cecal adenocarcinoma hello this year.  Patient not interested in treatment at this time.    COVID-19 virus infection  Mild and improving.  Continue remdesivir (today is day 3/3).    Hyponatremia  Improved with volume resuscitation.  Stable.  Monitor.    Acute urinary retention  Status post Moyer placement.  Discontinue Moyer for voiding trial today.    Hypertension  Reasonably controlled with current regimen.  Will continue with current regimen and continue to monitor.    Debility  Discussed with patient and grandson/caregiver.  Following discussion patient now willing to be placed in skilled nursing facility for short term therapy with plan transition  with home.    Iron deficiency anemia  Stable.    Stage 3a chronic kidney disease  Stable    Refusal of blood transfusions as patient is Bahai  Noted      VTE Risk Mitigation (From admission, onward)           Ordered     enoxaparin injection 40 mg  Every 24 hours         07/21/24 2039     IP VTE HIGH RISK PATIENT  Once         07/21/24 1724     Place sequential compression device  Until discontinued         07/21/24 1724                  Obi Galvan MD  Department of Hospital Medicine   [facility] ([place])

## 2024-07-24 NOTE — SUBJECTIVE & OBJECTIVE
Interval History: No acute events overnight.    Review of Systems   Constitutional:  Positive for appetite change. Negative for chills and fever.   Respiratory:  Negative for shortness of breath.    Cardiovascular:  Negative for chest pain.   Gastrointestinal:  Negative for abdominal pain, nausea and vomiting.   Genitourinary:  Negative for dysuria and frequency.   Neurological:  Positive for weakness.     Objective:     Vital Signs (Most Recent):  Temp: 98 °F (36.7 °C) (07/24/24 1223)  Pulse: 70 (07/24/24 1223)  Resp: 16 (07/24/24 1223)  BP: (!) 160/71 (07/24/24 1223)  SpO2: 99 % (07/24/24 1223) Vital Signs (24h Range):  Temp:  [97.6 °F (36.4 °C)-98.3 °F (36.8 °C)] 98 °F (36.7 °C)  Pulse:  [56-71] 70  Resp:  [16-38] 16  SpO2:  [96 %-99 %] 99 %  BP: (122-177)/(58-74) 160/71     Weight: 49.9 kg (110 lb)  Body mass index is 20.12 kg/m².    Intake/Output Summary (Last 24 hours) at 7/24/2024 1230  Last data filed at 7/24/2024 1133  Gross per 24 hour   Intake 116.67 ml   Output 2450 ml   Net -2333.33 ml         Physical Exam  Constitutional:       General: She is not in acute distress.  HENT:      Head: Atraumatic.   Eyes:      Conjunctiva/sclera: Conjunctivae normal.   Cardiovascular:      Rate and Rhythm: Normal rate and regular rhythm.      Heart sounds: Normal heart sounds. No murmur heard.  Pulmonary:      Effort: Pulmonary effort is normal.      Breath sounds: Normal breath sounds. No wheezing.   Abdominal:      General: Bowel sounds are normal. There is no distension.      Palpations: Abdomen is soft.      Tenderness: There is no abdominal tenderness.   Musculoskeletal:         General: No deformity. Normal range of motion.      Cervical back: Neck supple.   Neurological:      Mental Status: She is alert and oriented to person, place, and time.             Significant Labs: All pertinent labs within the past 24 hours have been reviewed.    Significant Imaging: I have reviewed all pertinent imaging results/findings  within the past 24 hours.

## 2024-07-24 NOTE — PLAN OF CARE
NURSING HOME ORDERS    07/25/2024  [facility]ATION ([facility]  [facility] - MED SURG ([facility])  [address]  [place] [address]  Dept: [phone]  Loc: [phone]     Admit to Nursing Home:      Diagnoses:  Active Hospital Problems    Diagnosis  POA    *Adenocarcinoma of colon [C18.9]  Yes     Priority: 1 - High    COVID-19 virus infection [U07.1]  Yes     Priority: 3     Hyponatremia [E87.1]  Yes     Priority: 4     Acute urinary retention [R33.8]  Yes     Priority: 11     Hypertension [I10]  Yes     Priority: 12     Debility [R53.81]  Yes    Iron deficiency anemia [D50.9]  Yes    Stage 3a chronic kidney disease [N18.31]  Yes    Refusal of blood transfusions as patient is Christianity [Z53.1]  Not Applicable      Resolved Hospital Problems    Diagnosis Date Resolved POA    Dysphagia [R13.10] 07/23/2024 Yes    ACP (advance care planning) [Z71.89] 07/23/2024 Not Applicable    Hypercholesteremia [E78.00] 07/23/2024 Yes       Patient is homebound due to:  Adenocarcinoma of colon    Allergies:  Review of patient's allergies indicates:   Allergen Reactions    Contrast media Other (See Comments)    Aspirin Other (See Comments)    Ciprofloxacin Other (See Comments)    Iron Other (See Comments)     Small rash with po iron.  Tolerated IV iron    Iodine Rash    Penicillins Rash       Vitals:  Routine    Diet: regular diet    Activities:   Activity as tolerated    Positive COVID test 7/21/2024.  Continue COVID precautions until 8/5/2024    Goals of Care Treatment Preferences:  Code Status: DNR    Living Will: Yes     What is most important right now is to focus on remaining as independent as possible, symptom/pain control.  Accordingly, we have decided that the best plan to meet the patient's goals includes continuing with treatment, enrolling in hospice care.      Nursing Precautions:  Aspiration , Fall, and Pressure ulcer prevention    Consults:   PT to evaluate and treat- 5 times a week and OT to evaluate  and treat- 5 times a week     Miscellaneous Care: Routine Skin for Bedridden Patients:  Apply moisture barrier cream to all                       Medications: Discontinue all previous medication orders, if any. See new list below.     Medication List        START taking these medications      artificial tears 0.5 % ophthalmic solution  Commonly known as: ISOPTO TEARS  Place 1 drop into both eyes 4 (four) times daily as needed (Dry Eyes).     senna-docusate 8.6-50 mg 8.6-50 mg per tablet  Commonly known as: PERICOLACE  Take 1 tablet by mouth 2 (two) times daily as needed for Constipation.            CHANGE how you take these medications      acetaminophen 500 MG tablet  Commonly known as: TYLENOL  Take 2 tablets (1,000 mg total) by mouth every 8 (eight) hours as needed for Pain.  What changed:   medication strength  how much to take  reasons to take this            CONTINUE taking these medications      b complex vitamins tablet  Take 1 tablet by mouth once daily.     folic acid 1 MG tablet  Commonly known as: FOLVITE  Take 1,000 mcg by mouth.     losartan 100 MG tablet  Commonly known as: COZAAR  Take 1 tablet (100 mg total) by mouth once daily.     lovastatin 10 MG tablet  Commonly known as: MEVACOR  TAKE 1 TABLET EVERY EVENING     multivitamin capsule  Take 1 capsule by mouth once daily.     NIFEdipine 30 MG (OSM) 24 hr tablet  Commonly known as: PROCARDIA-XL  Take 1 tablet (30 mg total) by mouth every evening.     sucralfate 1 gram tablet  Commonly known as: CARAFATE  Take 1 tablet (1 g total) by mouth 2 (two) times daily.            STOP taking these medications      alpha lipoic acid 200 mg Cap     benzonatate 100 MG capsule  Commonly known as: TESSALON PERLES     calcium citrate-vitamin D3 315-200 mg 315 mg-5 mcg (200 unit) per tablet  Commonly known as: CITRACAL+D     cyanocobalamin 1,000 mcg/mL injection     hydrocortisone 2.5 % cream     PAXLOVID 300 mg (150 mg x 2)-100 mg copackaged tablets (EUA)  Generic  drug: nirmatrelvir-ritonavir                Immunizations Administered as of 7/24/2024       Name Date Dose VIS Date Route Exp Date    COVID-19, MRNA, LN-S, PF (Pfizer) (Purple Cap) 10/29/2021 10:37 AM 0.3 mL 12/12/2020 Intramuscular 10/31/2021    Site: Right deltoid     Given By: Samira Rothman     : [employer]     Lot: [id number]     COVID-19, MRNA, LN-S, PF (Pfizer) (Purple Cap) 3/19/2021  9:33 AM 0.3 mL 12/12/2020 Intramuscular 6/30/2021    Site: Right deltoid     Given By: Ella Buckley RN     : [employer]     Lot: [id number]     COVID-19, MRNA, LN-S, PF (Pfizer) (Purple Cap) 2/26/2021  9:23 AM 0.3 mL 12/12/2020 Intramuscular 6/30/2021    Site: Right deltoid     Given By: Rosemarie Corona MA     : [employer]     Lot: [id number]             Obi Galvan MD  07/25/2024

## 2024-07-24 NOTE — PLAN OF CARE
POC reviewed with patient. AAOx4. Stable on room air. No complain of pain during this shift. Received IV remdesivir according to MAR. BSC utilized for voiding. Positions self x 1 assist. Low air loss mattress in use. Instructed to call for help ambulating. No injuries, falls, or trauma occurred during shift. Purposeful rounding completed. Bed low and locked with side rails up x 3 and call light within reach.   Problem: Adult Inpatient Plan of Care  Goal: Plan of Care Review  Outcome: Progressing  Goal: Patient-Specific Goal (Individualized)  Outcome: Progressing  Goal: Absence of Hospital-Acquired Illness or Injury  Outcome: Progressing  Goal: Optimal Comfort and Wellbeing  Outcome: Progressing  Goal: Readiness for Transition of Care  Outcome: Progressing     Problem: Infection  Goal: Absence of Infection Signs and Symptoms  Outcome: Progressing     Problem: Coping Ineffective  Goal: Effective Coping  Outcome: Progressing     Problem: Skin Injury Risk Increased  Goal: Skin Health and Integrity  Outcome: Progressing     Problem: Fall Injury Risk  Goal: Absence of Fall and Fall-Related Injury  Outcome: Progressing

## 2024-07-24 NOTE — PT/OT/SLP PROGRESS
Speech Language Pathology Treatment    Patient Name:  Brie Han   MRN:  [id number]  Admitting Diagnosis: Adenocarcinoma of colon    Recommendations:     General Recommendations: Pt appears to be performing at her baseline, POC complete-- SLP to sign off this date.   Diet Recommendations:   SOLIDS: Regular Diet - IDDSI Level 7  LIQUIDS: Liquid Diet Level: Thin liquids - IDDSI Level 0   Aspiration Precautions:   Oral care before/after PO intake   Upright sitting position for all oral intake and up to 1hr after meal, as tolerated  1 bite/sip at a time  Small bites/sips   Slow rate of intake  Alternating bites/sips as needed  Meds whole, 1-2 at a time with sips of thin liquids   General Precautions: Standard, airborne, contact, droplet, hearing impaired  Communication Strategies: Pt reports being hard of hearing-- speak loudly     Assessment:     Brie Han is a 89 y.o. female with an SLP diagnosis of functional oropharyngeal swallow ability for regular solids with thin liquids given use of safe swallow strategies/aspiration precautions, including oral care before and after meals. Pt denies pain or difficulty swallowing this date and demonstrates understanding and use of safe swallow strategies/aspiration precautions.     Subjective     RN provided clearance for pt to be seen-- pt awake, sitting upright in bed completing oral care routine; pt's breakfast meal tray present at bedside.     Pain/Comfort: no pain/ discomfort reported this date     Respiratory Status: Room air    Objective:     Oral Care: Pt siting upright in bed brushing her teeth as this SLP entered the room; SLP provided verbal praise and reviewed with pt the importance of oral care routine before/after PO intake-- pt verbalized understanding and agreement with all discussed this date.     Ongoing Bedside Swallow Eval  Consistencies Assessed:   Thin Liquids: singular and consecutive strawsips of ice water, consuming ~1.5 ounce  Regular Solids:  bites of ham and egg crust-less quiche via fork x5; one piece of melon via fork      Oral Phase:   Adequate labial seal to straw, demonstrating ability to siphon thin liquids without anterior loss observed   Functional mandibular depression and elevation for adequate bolus acceptance  Adequate labial contact to fork to retrieve bolus; no anterior loss of bolus observed   Functional mandibular depression and elevation for adequate initial biteforce of solid bolus  Adequate rotary chew pattern of mastication  Adequate bolus formation and A-P transfer  Oral cavity clear/without residue after the swallow      Pharyngeal Phase:   Single swallows per bolus noted  Laryngeal rise noted in subjectively perceived timely manner-- unable to objectively assess at bedside  No signs of airway threat or overt s/s of aspiration observed with PO intake of thin liquids or regular solids-- no coughing, choking, throat clearing, watery eyes or wet vocal quality noted     IMPRESSION: POC updated this date re: goals met-- pt denies pain or difficulty with swallowing. Pt able to consume a diet of regular solids with thin liquids without any signs of airway threat or overt s/s of aspiration and pt demonstrates understanding and use of safe swallow strategies/aspiration precautions. Pt appears to be performing at her baseline; therefore, skilled SLP services in the acute care setting are no longer warranted-- SLP to sign off.     Education: SLP educated pt re: therapist's role and purpose of skilled services; reviewed safe swallow strategies/ aspiration precautions and importance of use. SLP discussed with pt continuation of current diet and plan for SLP to signs off at this time d/t all goals being met and POC complete-- pt verbalized understanding and agreement of all discussed this date.     Goals:   Multidisciplinary Problems       SLP Goals       Not on file              Multidisciplinary Problems (Resolved)          Problem: SLP     Goal Priority Disciplines Outcome   SLP Goal   (Resolved)     SLP Met   Description: 1. Patient will consume regular solids with thin liquids without any signs of airway threat or overt s/s of aspiration.-- MET 7/24/24    2. Patient will demonstrate understanding and use of safe swallow strategies/ aspiration precautions in 100% of opportunities given min cues from clinician.-- MET 7/24/24                     Plan:     Patient to be seen:  2 x/week, 3 x/week   Plan of Care expires:  08/01/24  Plan of Care reviewed with:  patient   SLP Follow-Up:  Yes       Discharge Recommendations:  No Therapy Indicated   Barriers to Discharge:  Decreased Care Giver Support and Level of Skilled Assistance Needed      Time Tracking:     SLP Treatment Date:   07/24/24  Speech Start Time:  0840  Speech Stop Time:  0850     Speech Total Time (min):  10 min    Billable Minutes: Treatment Swallowing Dysfunction 10    07/24/2024

## 2024-07-24 NOTE — PLAN OF CARE
Problem: Adult Inpatient Plan of Care  Goal: Plan of Care Review  Outcome: Progressing  Goal: Patient-Specific Goal (Individualized)  Outcome: Progressing  Goal: Absence of Hospital-Acquired Illness or Injury  Outcome: Progressing  Goal: Optimal Comfort and Wellbeing  Outcome: Progressing  Goal: Readiness for Transition of Care  Outcome: Progressing     Problem: Infection  Goal: Absence of Infection Signs and Symptoms  Outcome: Progressing     Problem: Coping Ineffective  Goal: Effective Coping  Outcome: Progressing     Problem: Skin Injury Risk Increased  Goal: Skin Health and Integrity  Outcome: Progressing     Problem: Fall Injury Risk  Goal: Absence of Fall and Fall-Related Injury  Outcome: Progressing

## 2024-07-24 NOTE — ASSESSMENT & PLAN NOTE
Discussed with patient and grandson/caregiver.  Following discussion patient now willing to be placed in skilled nursing facility for short term therapy with plan transition with home.

## 2024-07-24 NOTE — PLAN OF CARE
CM spoke to pt's grandson, Prabhjot, [phone], to discuss SNF placement. Prabhjot states his grandmother told him she wants to go home with home PC and outpt therapy.    CM related that therapy states she agreed to SNF. Prabhjot to call his grandmother to confimr dc plan and will notify this CM.    If choice is for PC at home with either HH or outpt therapy, pt could discharge this afternoon.    CM to follow for plans and arrangements.   07/24/24 9775   Discharge Reassessment   Assessment Type Discharge Planning Reassessment   Did the patient's condition or plan change since previous assessment? No   Discharge Plan discussed with: Patient;Caregiver   Communicated VINITA with patient/caregiver Yes   Discharge Plan A   (Home Palliative care)   Discharge Plan B Skilled Nursing Facility   DME Needed Upon Discharge  none   Transition of Care Barriers Other (see comments)   Why the patient remains in the hospital Requires continued medical care   Post-Acute Status   Patient choice form signed by patient/caregiver List with quality metrics by geographic area provided;List from CMS Compare;List from System Post-Acute Care

## 2024-07-24 NOTE — ASSESSMENT & PLAN NOTE
- Gloria removed 7/24/24, patient subsequently voided 250 ml  - They may follow up with urology prn in [place]    Stable for discharge without gloria catheter. Urology will sign off, please reach out with any further questions or concerns.

## 2024-07-24 NOTE — PLAN OF CARE
MOON Message    Medicare Outpatient and Observation Notification regarding financial responsibility--Date ROLON was signed7/24/2024Time ROLON was [id number]

## 2024-07-24 NOTE — PLAN OF CARE
POC updated this date re: goals met-- pt denies pain or difficulty with swallowing. Pt able to consume a diet of regular solids with thin liquids without any signs of airway threat or overt s/s of aspiration and pt demonstrates understanding and use of safe swallow strategies/aspiration precautions. Pt appears to be performing at her baseline; therefore, skilled SLP services in the acute care setting are no longer warranted-- SLP to sign off.     Problem: SLP  Goal: SLP Goal  Description: 1. Patient will consume regular solids with thin liquids without any signs of airway threat or overt s/s of aspiration.-- MET 7/24/24    2. Patient will demonstrate understanding and use of safe swallow strategies/ aspiration precautions in 100% of opportunities given min cues from clinician.-- MET 7/24/24  Outcome: Met

## 2024-07-24 NOTE — PLAN OF CARE
D/C Rec: Moderate Intensity Therapy  DME Rec: TBD at next level of care, currently needs RW     Pt progressing well with therapy, increased gait distance today and requiring less assist with OOB mobility. Goals updated to reflect progress. However, pt continuing to c/o dizziness and subjective weakness and reports she will have minimal assist at home. Post acute therapy services for moderate intensity therapy is recommended upon D/C to further address deficits and increase functional independence.     Problem: Physical Therapy  Goal: Physical Therapy Goal  Description: Goals to be met by: 24    Patient will increase functional independence with mobility by performin. Supine<>sit Mod I without use of HB features.   2. Sit<>stand with Mod I with LRAD.  3. Gait x 100 feet with supervision and LRAD.  4. Ascend/descend 4 step(s) with least restrictive assistive device and uni HR with CGA.   Outcome: Progressing

## 2024-07-24 NOTE — HPI
Ms. Han is a 89 yo female with CAD, HTN, HLD, CKD, CAD, CVA, Adventism, chronic neutropenia, DDD, and adenocarcinoma of the cecum. Urology consulted for gloria management after urinary retention.    Patient has not been previously seen by urology. Gloria inserted approximately 3 days ago for urinary retention. It had been draining well. Admitted for fatigue and Covid+. She reported new decreased urine output with difficulty urinating with nausea.    Gloria catheter was subsequently removed and patient could void 250 ml of clear yellow urine.    Labs: WBC 4.2 Hgb 8.6 Cr 0.7, which is her baseline  UA 7/21/24: completely negative    CT 7/21/24 in ED: no hydronephrosis or urolithiasis bilaterally, bladder distended with adjacent rectal inflammation/mass possibly due to motion artifact vs diverticulosis.

## 2024-07-24 NOTE — NURSING
Moyer catheter out without any complications.Patient made aware to let us know when she has urge to urinate.Patient verbalized understanding.

## 2024-07-24 NOTE — PLAN OF CARE
Medicare Message     Important Message from Medicare regarding Discharge Appeal Rights Explained to patient/caregiver; Other (comments)Important Message from Medicare regarding Discharge Appeal Rights. Explained to patient/caregiver; Other (comments). The comment is Verbal Consent from Prabhjot Gallegos. Taken on 7/24/24 1003 Explained to patient/caregiver; Other (comments)Important Message from Medicare regarding Discharge Appeal Rights. Explained to patient/caregiver; Other (comments). The comment is Verbal consent - Caterina Han. Taken on 7/24/24 1023   Date IMM was signed -- 7/24/2024   Time IMM was signed -- 0959

## 2024-07-24 NOTE — CONSULTS
[facility])  Urology  Consult Note    Patient Name: Brie Han  MRN: [id number]  Admission Date: 2024  Hospital Length of Stay: 3   Code Status: DNR   Attending Provider: Obi Galvan MD   Consulting Provider: Conner Hernandez MD  Primary Care Physician: Colten Gould MD  Principal Problem:Adenocarcinoma of colon    Inpatient consult to Urology  Consult performed by: Conner Hernandez MD  Consult ordered by: Obi Galvan MD  Reason for consult: Gloria management after AUR          Subjective:     HPI:  Ms. Han is a 89 yo female with CAD, HTN, HLD, CKD, CAD, CVA, Scientologist, chronic neutropenia, DDD, and adenocarcinoma of the cecum. Urology consulted for gloria management after urinary retention.    Patient has not been previously seen by urology. Gloria inserted approximately 3 days ago for urinary retention. It had been draining well. Admitted for fatigue and Covid+. She reported new decreased urine output with difficulty urinating with nausea.    Gloria catheter was subsequently removed and patient could void 250 ml of clear yellow urine.    Labs: WBC 4.2 Hgb 8.6 Cr 0.7, which is her baseline  UA 24: completely negative    CT 24 in ED: no hydronephrosis or urolithiasis bilaterally, bladder distended with adjacent rectal inflammation/mass possibly due to motion artifact vs diverticulosis.    Past Medical History:   Diagnosis Date    ACP (advance care planning) 2024    Amaurosis fugax     Bilateral left eye worse    Anticoagulant long-term use     Arthritis     Glaucoma     resolved    Hyperlipidemia     Hypertension     Stroke        Past Surgical History:   Procedure Laterality Date    APPENDECTOMY  's    BREAST SURGERY      CATARACT EXTRACTION W/  INTRAOCULAR LENS IMPLANT Bilateral     Dr Rojo      SECTION      3 c/s and d/c    COLONOSCOPY N/A 2024    Procedure: COLONOSCOPY;  Surgeon: Trev Lafleur MD;  Location: [facility];   Service: Endoscopy;  Laterality: N/A;    ESOPHAGOGASTRODUODENOSCOPY N/A 2/5/2024    Procedure: EGD (ESOPHAGOGASTRODUODENOSCOPY);  Surgeon: Trev Lafleur MD;  Location: [facility];  Service: Endoscopy;  Laterality: N/A;    EYE SURGERY      Glaucoma     HYSTERECTOMY      RIGHT OOPHORECTOMY      With postop hemorrhage    TONSILLECTOMY      Yag Capsulotomy Left 11/11/2021       Review of patient's allergies indicates:   Allergen Reactions    Contrast media Other (See Comments)    Aspirin Other (See Comments)    Ciprofloxacin Other (See Comments)    Iron Other (See Comments)     Small rash with po iron.  Tolerated IV iron    Iodine Rash    Penicillins Rash       Family History       Problem Relation (Age of Onset)    Diabetes Father    Early death Mother    Hypertension Father, Sister    No Known Problems Brother, Maternal Aunt, Maternal Uncle, Paternal Aunt, Paternal Uncle, Maternal Grandmother, Maternal Grandfather, Paternal Grandmother    Stroke Father    Thyroid disease Paternal Grandfather            Tobacco Use    Smoking status: Never    Smokeless tobacco: Never   Substance and Sexual Activity    Alcohol use: No    Drug use: No    Sexual activity: Never       Review of Systems   Constitutional:  Negative for activity change, chills, diaphoresis and fever.   HENT: Negative.     Eyes: Negative.    Respiratory: Negative.  Negative for shortness of breath.    Cardiovascular: Negative.  Negative for chest pain.   Gastrointestinal:  Negative for abdominal pain, constipation, diarrhea, nausea and vomiting.   Genitourinary:  Positive for decreased urine volume and difficulty urinating. Negative for flank pain, frequency, hematuria and urgency.   Musculoskeletal: Negative.    Skin: Negative.    Neurological: Negative.    Psychiatric/Behavioral: Negative.         Objective:     Temp:  [97.6 °F (36.4 °C)-98.3 °F (36.8 °C)] 97.8 °F (36.6 °C)  Pulse:  [56-71] 70  Resp:  [13-38] 16  SpO2:  [96 %-100 %] 98 %  BP:  "(122-177)/(58-74) 150/63  Weight: 49.9 kg (110 lb)  Body mass index is 20.12 kg/m².      Bladder Scan Volume (mL): 141 mL (07/21/24 1609)    Drains       Drain  Duration                  Urethral Catheter 07/21/24 1527 Coude 14 Fr. 2 days                     Physical Exam  Vitals and nursing note reviewed.   Constitutional:       General: She is not in acute distress.  HENT:      Head: Atraumatic.      Nose: Nose normal.   Eyes:      Extraocular Movements: Extraocular movements intact.   Cardiovascular:      Rate and Rhythm: Normal rate.   Pulmonary:      Effort: Pulmonary effort is normal.   Abdominal:      General: Abdomen is flat. There is no distension.      Tenderness: There is no abdominal tenderness. There is no right CVA tenderness or left CVA tenderness.   Musculoskeletal:         General: Normal range of motion.      Cervical back: Normal range of motion.   Skin:     Coloration: Skin is not jaundiced.   Neurological:      General: No focal deficit present.      Mental Status: She is alert and oriented to person, place, and time.   Psychiatric:         Mood and Affect: Mood normal.         Behavior: Behavior normal.          Significant Labs:    BMP:  Recent Labs   Lab 07/22/24  1750 07/23/24  0630 07/24/24  0420   * 131* 130*   K 5.6* 3.9 4.1    101 101   CO2 17* 21* 23   BUN 13 13 20   CREATININE 0.8 0.7 0.8   CALCIUM 9.1 9.0 8.7       CBC:  Recent Labs   Lab 07/22/24  0415 07/23/24  0630 07/24/24  0420   WBC 2.10* 5.60 4.22   HGB 9.5* 9.5* 8.6*   HCT 28.0* 27.6* 25.7*    342 337       Blood Culture: No results for input(s): "LABBLOO" in the last 168 hours.  Urine Culture: No results for input(s): "LABURIN" in the last 168 hours.  Urine Studies:   Recent Labs   Lab 07/21/24  1527   COLORU Colorless*   APPEARANCEUA Clear   PHUR 6.0   SPECGRAV 1.010   PROTEINUA Negative   GLUCUA Negative   KETONESU Negative   BILIRUBINUA Negative   OCCULTUA Negative   NITRITE Negative   UROBILINOGEN " Negative   LEUKOCYTESUR Negative       Significant Imaging:  CT: I have reviewed all results within the past 24 hours and my personal findings are:  as per HPI.  DVT US negative per report.      Assessment and Plan:     Acute urinary retention  - Gloria removed 7/24/24, patient subsequently voided 250 ml  - They may follow up with urology prn in [place]    Stable for discharge without gloria catheter. Urology will sign off, please reach out with any further questions or concerns.        VTE Risk Mitigation (From admission, onward)           Ordered     enoxaparin injection 40 mg  Every 24 hours         07/21/24 2039     IP VTE HIGH RISK PATIENT  Once         07/21/24 1724     Place sequential compression device  Until discontinued         07/21/24 1724                    Thank you for your consult. I will sign off. Please contact us if you have any additional questions.    Conner Hernandez MD  Urology  [facility])

## 2024-07-24 NOTE — PT/OT/SLP PROGRESS
Physical Therapy Treatment    Patient Name:  Brie Han   MRN:  [id number]    Recommendations:     Discharge Recommendations: Moderate Intensity Therapy  Discharge Equipment Recommendations: TBD at next level of care, currently needs a RW  Barriers to discharge: Inaccessible home and Decreased caregiver support    Assessment:     Brie Han is a 89 y.o. female admitted with a medical diagnosis of Adenocarcinoma of colon.  She presents with the following impairments/functional limitations: weakness, impaired endurance, impaired functional mobility, gait instability, impaired balance, impaired cardiopulmonary response to activity.    Pt progressing well with therapy, increased gait distance today and requiring less assist with OOB mobility. Goals updated to reflect progress. However, pt continuing to c/o dizziness and subjective weakness and reports she will have minimal assist at home. Post acute therapy services for moderate intensity therapy is recommended upon D/C to further address deficits and increase functional independence.     The mobility limitation cannot be sufficiently resolved by the use of a cane.   Patient's functional mobility deficit can be sufficiently resolved with the use of a rolling walker. Patient's mobility limitation significantly impairs their ability to participate in one of more activities of daily living. The use of a rolling walker will significantly improve the patient's ability to participate in MRADLS and the patient will use it on regular basis in the home.     Rehab Prognosis: Good; patient would benefit from acute skilled PT services to address these deficits and reach maximum level of function.    Recent Surgery: * No surgery found *      Plan:     During this hospitalization, patient to be seen 5 x/week to address the identified rehab impairments via therapeutic exercises, therapeutic activities, gait training and progress toward the following goals:    Plan of Care  "Expires:  24    Subjective     Chief Complaint: Pt c/o dizziness, states she thinks it's from taking so many morning meds  Patient/Family Comments/goals: Pt eager to participate in therapy, states "This is what I need to get stronger"  Pain/Comfort:  Pain Rating 1: 0/10      Objective:     Communicated with OMAYRA Vázquez prior to session.  Patient found supine with peripheral IV upon PT entry to room.     General Precautions: Standard, fall, airborne, droplet  Orthopedic Precautions: N/A  Braces: N/A  Respiratory Status: Room air     Functional Mobility, Treatment and Education:  Pt t/f to sit at EOB SBA. Seated rest taken at EOB 2/2 c/o dizziness. Performed seated B LE therex, 1 set of 10 ea as follows: heel/toe raises, LAQ, march, hip abduction/adduction. Sit to stand with RW CGA. Gait 30' x 2 with RW CGA, noted slight tremoring with gait but no LOB noted, no c/o SOB or dizziness with gait. T/F to sit in bedside chair CGA.       AM-PAC 6 CLICK MOBILITY  Turning over in bed (including adjusting bedclothes, sheets and blankets)?: 4  Sitting down on and standing up from a chair with arms (e.g., wheelchair, bedside commode, etc.): 3  Moving from lying on back to sitting on the side of the bed?: 4  Moving to and from a bed to a chair (including a wheelchair)?: 3  Need to walk in hospital room?: 3  Climbing 3-5 steps with a railing?: 3  Basic Mobility Total Score: 20     Patient left up in chair with all lines intact and call button in reach..    GOALS:   Multidisciplinary Problems       Physical Therapy Goals          Problem: Physical Therapy    Goal Priority Disciplines Outcome Goal Variances Interventions   Physical Therapy Goal     PT, PT/OT Progressing     Description: Goals to be met by: 24    Patient will increase functional independence with mobility by performin. Supine<>sit Mod I without use of HB features.   2. Sit<>stand with Mod I with LRAD.  3. Gait x 100 feet with supervision and LRAD.  4. " Ascend/descend 4 step(s) with least restrictive assistive device and uni HR with CGA.                        Time Tracking:     PT Received On: 07/24/24  PT Start Time: 1013     PT Stop Time: 1042  PT Total Time (min): 29 min     Billable Minutes: Gait Training 15 and Therapeutic Exercise 11    Treatment Type: Treatment  PT/PTA: PT     Number of PTA visits since last PT visit: 0     07/24/2024

## 2024-07-24 NOTE — PLAN OF CARE
SNF referrals sent via  right fax to [facility], and [facility] in [place], [place].    CM to follow for plans and arrangements.    PPD ordered, malia faxed to state agency for 142.

## 2024-07-24 NOTE — SUBJECTIVE & OBJECTIVE
Past Medical History:   Diagnosis Date    ACP (advance care planning) 2024    Amaurosis fugax     Bilateral left eye worse    Anticoagulant long-term use     Arthritis     Glaucoma     resolved    Hyperlipidemia     Hypertension     Stroke 2006       Past Surgical History:   Procedure Laterality Date    APPENDECTOMY  1950's    BREAST SURGERY      CATARACT EXTRACTION W/  INTRAOCULAR LENS IMPLANT Bilateral     Dr Rojo      SECTION      3 c/s and d/c    COLONOSCOPY N/A 2024    Procedure: COLONOSCOPY;  Surgeon: Trev Lafleur MD;  Location: [facility];  Service: Endoscopy;  Laterality: N/A;    ESOPHAGOGASTRODUODENOSCOPY N/A 2024    Procedure: EGD (ESOPHAGOGASTRODUODENOSCOPY);  Surgeon: Trev Lafleur MD;  Location: [facility];  Service: Endoscopy;  Laterality: N/A;    EYE SURGERY      Glaucoma     HYSTERECTOMY      RIGHT OOPHORECTOMY      With postop hemorrhage    TONSILLECTOMY      Yag Capsulotomy Left 2021       Review of patient's allergies indicates:   Allergen Reactions    Contrast media Other (See Comments)    Aspirin Other (See Comments)    Ciprofloxacin Other (See Comments)    Iron Other (See Comments)     Small rash with po iron.  Tolerated IV iron    Iodine Rash    Penicillins Rash       Family History       Problem Relation (Age of Onset)    Diabetes Father    Early death Mother    Hypertension Father, Sister    No Known Problems Brother, Maternal Aunt, Maternal Uncle, Paternal Aunt, Paternal Uncle, Maternal Grandmother, Maternal Grandfather, Paternal Grandmother    Stroke Father    Thyroid disease Paternal Grandfather            Tobacco Use    Smoking status: Never    Smokeless tobacco: Never   Substance and Sexual Activity    Alcohol use: No    Drug use: No    Sexual activity: Never       Review of Systems   Constitutional:  Negative for activity change, chills, diaphoresis and fever.   HENT: Negative.     Eyes: Negative.    Respiratory: Negative.  Negative for shortness  of breath.    Cardiovascular: Negative.  Negative for chest pain.   Gastrointestinal:  Negative for abdominal pain, constipation, diarrhea, nausea and vomiting.   Genitourinary:  Positive for decreased urine volume and difficulty urinating. Negative for flank pain, frequency, hematuria and urgency.   Musculoskeletal: Negative.    Skin: Negative.    Neurological: Negative.    Psychiatric/Behavioral: Negative.         Objective:     Temp:  [97.6 °F (36.4 °C)-98.3 °F (36.8 °C)] 97.8 °F (36.6 °C)  Pulse:  [56-71] 70  Resp:  [13-38] 16  SpO2:  [96 %-100 %] 98 %  BP: (122-177)/(58-74) 150/63  Weight: 49.9 kg (110 lb)  Body mass index is 20.12 kg/m².      Bladder Scan Volume (mL): 141 mL (07/21/24 1609)    Drains       Drain  Duration                  Urethral Catheter 07/21/24 1527 Coude 14 Fr. 2 days                     Physical Exam  Vitals and nursing note reviewed.   Constitutional:       General: She is not in acute distress.  HENT:      Head: Atraumatic.      Nose: Nose normal.   Eyes:      Extraocular Movements: Extraocular movements intact.   Cardiovascular:      Rate and Rhythm: Normal rate.   Pulmonary:      Effort: Pulmonary effort is normal.   Abdominal:      General: Abdomen is flat. There is no distension.      Tenderness: There is no abdominal tenderness. There is no right CVA tenderness or left CVA tenderness.   Musculoskeletal:         General: Normal range of motion.      Cervical back: Normal range of motion.   Skin:     Coloration: Skin is not jaundiced.   Neurological:      General: No focal deficit present.      Mental Status: She is alert and oriented to person, place, and time.   Psychiatric:         Mood and Affect: Mood normal.         Behavior: Behavior normal.          Significant Labs:    BMP:  Recent Labs   Lab 07/22/24  1750 07/23/24  0630 07/24/24  0420   * 131* 130*   K 5.6* 3.9 4.1    101 101   CO2 17* 21* 23   BUN 13 13 20   CREATININE 0.8 0.7 0.8   CALCIUM 9.1 9.0 8.7  "      CBC:  Recent Labs   Lab 07/22/24  0415 07/23/24  0630 07/24/24  0420   WBC 2.10* 5.60 4.22   HGB 9.5* 9.5* 8.6*   HCT 28.0* 27.6* 25.7*    342 337       Blood Culture: No results for input(s): "LABBLOO" in the last 168 hours.  Urine Culture: No results for input(s): "LABURIN" in the last 168 hours.  Urine Studies:   Recent Labs   Lab 07/21/24  1527   COLORU Colorless*   APPEARANCEUA Clear   PHUR 6.0   SPECGRAV 1.010   PROTEINUA Negative   GLUCUA Negative   KETONESU Negative   BILIRUBINUA Negative   OCCULTUA Negative   NITRITE Negative   UROBILINOGEN Negative   LEUKOCYTESUR Negative       Significant Imaging:  CT: I have reviewed all results within the past 24 hours and my personal findings are:  as per HPI.  DVT US negative per report.    "

## 2024-07-24 NOTE — PLAN OF CARE
Pt has been accepted at [facility], submitting for auth today.     CM to notify pt's grandson, Prabhjot,to inform him of acceptance.    Pt states she would rather be in [facility] than in [place] or [place].    CM to follow for placement.

## 2024-07-25 ENCOUNTER — PATIENT MESSAGE (OUTPATIENT)
Dept: PRIMARY CARE CLINIC | Facility: CLINIC | Age: 89
End: 2024-07-25
Payer: MEDICARE

## 2024-07-25 VITALS
TEMPERATURE: 99 F | DIASTOLIC BLOOD PRESSURE: 63 MMHG | HEIGHT: 62 IN | WEIGHT: 110 LBS | OXYGEN SATURATION: 97 % | SYSTOLIC BLOOD PRESSURE: 133 MMHG | BODY MASS INDEX: 20.24 KG/M2 | HEART RATE: 77 BPM | RESPIRATION RATE: 16 BRPM

## 2024-07-25 DIAGNOSIS — U07.1 COVID-19 VIRUS DETECTED: ICD-10-CM

## 2024-07-25 LAB
OHS QRS DURATION: 138 MS
OHS QTC CALCULATION: 449 MS

## 2024-07-25 PROCEDURE — 97116 GAIT TRAINING THERAPY: CPT | Mod: HCNC,CQ

## 2024-07-25 PROCEDURE — 94761 N-INVAS EAR/PLS OXIMETRY MLT: CPT | Mod: HCNC

## 2024-07-25 PROCEDURE — 25000003 PHARM REV CODE 250: Mod: HCNC | Performed by: HOSPITALIST

## 2024-07-25 PROCEDURE — 25000003 PHARM REV CODE 250: Mod: HCNC | Performed by: NURSE PRACTITIONER

## 2024-07-25 RX ORDER — SUCRALFATE 1 G/1
1 TABLET ORAL 2 TIMES DAILY
Qty: 60 TABLET | Refills: 0 | Status: SHIPPED | OUTPATIENT
Start: 2024-07-25

## 2024-07-25 RX ORDER — LOSARTAN POTASSIUM 100 MG/1
100 TABLET ORAL DAILY
Qty: 30 TABLET | Refills: 0 | Status: SHIPPED | OUTPATIENT
Start: 2024-07-25 | End: 2024-08-01 | Stop reason: SDUPTHER

## 2024-07-25 RX ORDER — LOVASTATIN 10 MG/1
10 TABLET ORAL NIGHTLY
Qty: 30 TABLET | Refills: 0 | Status: SHIPPED | OUTPATIENT
Start: 2024-07-25

## 2024-07-25 RX ORDER — NIFEDIPINE 30 MG/1
30 TABLET, EXTENDED RELEASE ORAL NIGHTLY
Qty: 30 TABLET | Refills: 0 | Status: SHIPPED | OUTPATIENT
Start: 2024-07-25

## 2024-07-25 RX ORDER — FOLIC ACID 1 MG/1
1000 TABLET ORAL DAILY
Qty: 30 TABLET | Refills: 0 | Status: SHIPPED | OUTPATIENT
Start: 2024-07-25

## 2024-07-25 RX ADMIN — PRAVASTATIN SODIUM 10 MG: 10 TABLET ORAL at 09:07

## 2024-07-25 RX ADMIN — SUCRALFATE 1 G: 1 TABLET ORAL at 09:07

## 2024-07-25 RX ADMIN — MUPIROCIN: 20 OINTMENT TOPICAL at 09:07

## 2024-07-25 RX ADMIN — DOCUSATE SODIUM AND SENNOSIDES 1 TABLET: 8.6; 5 TABLET, FILM COATED ORAL at 09:07

## 2024-07-25 RX ADMIN — OXYCODONE HYDROCHLORIDE AND ACETAMINOPHEN 500 MG: 500 TABLET ORAL at 09:07

## 2024-07-25 RX ADMIN — THERA TABS 1 TABLET: TAB at 09:07

## 2024-07-25 RX ADMIN — Medication 1 EACH: at 09:07

## 2024-07-25 NOTE — PLAN OF CARE
CM met with pt and spoke to her grandson, Prabhjot, [phone], for final discharge planning assessment.    Plans have changed from SNF to home with HH and home Palliative Care.    [facility] has accepted pt for HH, and [facility] has accepted.    Caterina will provide transportation home today, he will arrive around 1500.    Meds sent to pt's outside retail pharmacy.    Follow-up appointment scheduled and added to AVS.    Pt is ready for discharge from CM perspective.   07/25/24 1133   Final Note   Assessment Type Final Discharge Note   Anticipated Discharge Disposition Home-Health   What phone number can be called within the next 1-3 days to see how you are doing after discharge? [phone]   Hospital Resources/Appts/Education Provided Provided patient/caregiver with written discharge plan information;Provided education on problems/symptoms using teachback;Appointments scheduled and added to AVS   Post-Acute Status   Post-Acute Authorization Home Health   Home Health Status Set-up Complete/Auth obtained   Patient choice form signed by patient/caregiver List with quality metrics by geographic area provided;List from CMS Compare;List from System Post-Acute Care   Discharge Delays None known at this time     [facility]  Discharge Final Note    Primary Care Provider: Colten Gould MD    Expected Discharge Date: 7/25/2024    Final Discharge Note (most recent)       Final Note - 07/25/24 1133          Final Note    Assessment Type Final Discharge Note (P)      Anticipated Discharge Disposition Home-Health Care Svc (P)      What phone number can be called within the next 1-3 days to see how you are doing after discharge? [phone] (P)      Hospital Resources/Appts/Education Provided Provided patient/caregiver with written discharge plan information;Provided education on problems/symptoms using teachback;Appointments scheduled and added to AVS (P)         Post-Acute  Status    Post-Acute Authorization Home Health (P)      Home Health Status Set-up Complete/Auth obtained (P)      Patient choice form signed by patient/caregiver List with quality metrics by geographic area provided;List from CMS Compare;List from System Post-Acute Care (P)      Discharge Delays None known at this time (P)                      Important Message from Medicare  Important Message from Medicare regarding Discharge Appeal Rights: Explained to patient/caregiver, Other (comments) (Verbal consent - Caterina Han)     Date IMM was signed: 07/24/24  Time IMM was signed: 0959    Contact Info       Colten Gould MD   Specialty: Family Medicine   Relationship: PCP - General    [address]  [facility] [address]   Phone: [phone]       Next Steps: Schedule an appointment as soon as possible for a visit in 1 week(s)

## 2024-07-25 NOTE — PLAN OF CARE
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.9]  Yes     Priority: 1 - High    COVID-19 virus infection [U07.1]  Yes     Priority: 3     Hyponatremia [E87.1]  Yes     Priority: 4     Acute urinary retention [R33.8]  Yes     Priority: 11     Hypertension [I10]  Yes     Priority: 12     Debility [R53.81]  Yes    Iron deficiency anemia [D50.9]  Yes    Stage 3a chronic kidney disease [N18.31]  Yes    Refusal of blood transfusions as patient is Mandaen [Z53.1]  Not Applicable      Resolved Hospital Problems    Diagnosis Date Resolved POA    Dysphagia [R13.10] 07/23/2024 Yes    ACP (advance care planning) [Z71.89] 07/23/2024 Not Applicable    Hypercholesteremia [E78.00] 07/23/2024 Yes       Follow Up Appointments:  Future Appointments   Date Time Provider Department Center   8/29/2024  1:00 PM LAB, [facility] LAB [place] [facility]   9/6/2024 10:00 AM Colten Gould MD [facility] [place]   9/17/2024  1:00 PM Greg Mckeon MD [address] [place]       Allergies:  Review of patient's allergies indicates:   Allergen Reactions    Contrast media Other (See Comments)    Aspirin Other (See Comments)    Ciprofloxacin Other (See Comments)    Iron Other (See Comments)     Small rash with po iron.  Tolerated IV iron    Iodine Rash    Penicillins Rash       Medications: Review discharge medications with patient and family and provide education.       Medication List        START taking these medications      artificial tears 0.5 % ophthalmic solution  Commonly known as: ISOPTO TEARS  Place 1 drop into both eyes 4 (four) times daily as needed (Dry Eyes).      senna-docusate 8.6-50 mg 8.6-50 mg per tablet  Commonly known as: PERICOLACE  Take 1 tablet by mouth 2 (two) times daily as needed for Constipation.            CHANGE how you take these medications      acetaminophen 500 MG tablet  Commonly known as: TYLENOL  Take 2 tablets (1,000 mg total) by mouth every 8 (eight) hours as needed for Pain.  What changed:   medication strength  how much to take  reasons to take this     folic acid 1 MG tablet  Commonly known as: FOLVITE  Take 1 tablet (1,000 mcg total) by mouth once daily.  What changed: when to take this            CONTINUE taking these medications      losartan 100 MG tablet  Commonly known as: COZAAR  Take 1 tablet (100 mg total) by mouth once daily.     lovastatin 10 MG tablet  Commonly known as: MEVACOR  Take 1 tablet (10 mg total) by mouth every evening.     multivitamin capsule  Take 1 capsule by mouth once daily.     NIFEdipine 30 MG (OSM) 24 hr tablet  Commonly known as: PROCARDIA-XL  Take 1 tablet (30 mg total) by mouth every evening.     sucralfate 1 gram tablet  Commonly known as: CARAFATE  Take 1 tablet (1 g total) by mouth 2 (two) times daily.            STOP taking these medications      alpha lipoic acid 200 mg Cap     b complex vitamins tablet     benzonatate 100 MG capsule  Commonly known as: TESSALON PERLES     calcium citrate-vitamin D3 315-200 mg 315 mg-5 mcg (200 unit) per tablet  Commonly known as: CITRACAL+D     cyanocobalamin 1,000 mcg/mL injection     hydrocortisone 2.5 % cream     PAXLOVID 300 mg (150 mg x 2)-100 mg copackaged tablets (EUA)  Generic drug: nirmatrelvir-ritonavir                I have seen and examined this patient within the last 30 days. My clinical findings that support the need for the home health skilled services and home bound status are the following:no   Weakness/numbness causing balance and gait disturbance due to Infection, Weakness/Debility, and Malignancy/Cancer making it taxing to leave home.     Diet:    cardiac diet    Referrals/ Consults  Physical Therapy to evaluate and treat. Evaluate for home safety and equipment needs; Establish/upgrade home exercise program. Perform / instruct on therapeutic exercises, gait training, transfer training, and Range of Motion.  Occupational Therapy to evaluate and treat. Evaluate home environment for safety and equipment needs. Perform/Instruct on transfers, ADL training, ROM, and therapeutic exercises.   to evaluate for community resources/long-range planning.  Aide to provide assistance with personal care, ADLs, and vital signs.    Activities:   activity as tolerated    Nursing:   Agency to admit patient within 24 hours of hospital discharge unless specified on physician order or at patient request    SN to complete comprehensive assessment including routine vital signs. Instruct on disease process and s/s of complications to report to MD. Review/verify medication list sent home with the patient at time of discharge  and instruct patient/caregiver as needed. Frequency may be adjusted depending on start of care date.     Skilled nurse to perform up to 3 visits PRN for symptoms related to diagnosis    Notify MD if SBP > 160 or < 90; DBP > 90 or < 50; HR > 120 or < 50; Temp > 101; O2 < 88%  For all post-discharge communication and subsequent orders please contact patient's primary care physician.     Miscellaneous   Routine Skin for Bedridden Patients: Instruct patient/caregiver to apply moisture barrier cream to all skin folds and wet areas in perineal area daily and after baths and all bowel movements.    Home Health Aide:  Nursing Twice weekly, Physical Therapy Three times weekly, Occupational Therapy Three times weekly, and Home Health Aide Twice weekly    I certify that this patient is confined to her home and needs intermittent skilled nursing care, physical therapy, and occupational therapy.

## 2024-07-25 NOTE — PLAN OF CARE
Problem: Adult Inpatient Plan of Care  Goal: Plan of Care Review  Outcome: Adequate for Care Transition  Goal: Patient-Specific Goal (Individualized)  Outcome: Adequate for Care Transition  Goal: Absence of Hospital-Acquired Illness or Injury  Outcome: Adequate for Care Transition  Goal: Optimal Comfort and Wellbeing  Outcome: Adequate for Care Transition  Goal: Readiness for Transition of Care  Outcome: Adequate for Care Transition     Problem: Infection  Goal: Absence of Infection Signs and Symptoms  Outcome: Adequate for Care Transition     Problem: Coping Ineffective  Goal: Effective Coping  Outcome: Adequate for Care Transition     Problem: Skin Injury Risk Increased  Goal: Skin Health and Integrity  Outcome: Adequate for Care Transition     Problem: Fall Injury Risk  Goal: Absence of Fall and Fall-Related Injury  Outcome: Adequate for Care Transition

## 2024-07-25 NOTE — NURSING
AVS virtually reviewed with patient's grandjessika Rick in its entirety with emphasis on diet, medications, follow-up appointments and reasons to return to the ED or contact the [facility] On Call Nurse Care Line. Patient also encouraged to utilize their patient portal. Ease and convenience of use reiterated. Education complete and patient voiced understanding. All questions answered. Discharge teaching completed.

## 2024-07-25 NOTE — PLAN OF CARE
[facility] unable to accept until 7/31 due to COVID+ status - [facility] possibly able to accept tomorrow - reviewing referral now - will update CM shortly

## 2024-07-25 NOTE — PLAN OF CARE
POC reviewed. No significant events this shift. Cardiac monitor maintained.  No complaints of pain. Pt voids and shifts in bed independently.  PPD was placed on 07/25/24 at 0715. Pt awaiting placement,Bed low and locked, side rails up x3, call light within reach.      Problem: Adult Inpatient Plan of Care  Goal: Plan of Care Review  Outcome: Progressing  Goal: Patient-Specific Goal (Individualized)  Outcome: Progressing  Goal: Absence of Hospital-Acquired Illness or Injury  Outcome: Progressing  Goal: Optimal Comfort and Wellbeing  Outcome: Progressing  Goal: Readiness for Transition of Care  Outcome: Progressing     Problem: Infection  Goal: Absence of Infection Signs and Symptoms  Outcome: Progressing     Problem: Coping Ineffective  Goal: Effective Coping  Outcome: Progressing     Problem: Skin Injury Risk Increased  Goal: Skin Health and Integrity  Outcome: Progressing     Problem: Fall Injury Risk  Goal: Absence of Fall and Fall-Related Injury  Outcome: Progressing

## 2024-07-25 NOTE — DISCHARGE SUMMARY
"[facility] Medicine  Discharge Summary      Patient Name: Brie Han  MRN: [id number]  RAFAEL: [id number]  Patient Class: IP- Inpatient  Admission Date: 7/21/2024  Hospital Length of Stay: 4 days  Discharge Date and Time: 7/25/2024  4:06 PM  Attending Physician: Obi Galvan MD  Discharging Provider: Obi Galvan MD  Primary Care Provider: Colten Gould MD    HPI:   Per Lesly Johnson, NP:    "Ms. 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 She is a Faith and does not consent to blood transfusions.    She presents to ED with complaints of fatigue, malaise, generalized weakness, tremor onset for the last several days since exposure to and positive Covid test about 5-6 days ago. She also notes some diarrhea earlier in course, followed by constipation requiring laxative use with return of diarrhea. She reports decreased UOP, difficulty urinating, decreased appetite, and nausea as well. She also notes bilateral LE swelling which is abnormal for her. She took several doses of Paxlovid (prescribed by PCP) but discontinued due to side effects and felt it was not alleviating her symptoms.     She denies fever, chills, SOB, HOLT, CP, abdominal pain, vomiting, constipation, light headiness, dizziness, or headache. She lives alone however Grandson stays with her 4-5 days a week, she is independent in ADLs, and uses no ambulatory aides.    In the ED she is HDS and afebrile.  CBC with WBC 4, H/H 10/29, platelets 150.  Chemistry was  (baseline 130-135), K 5.4, chloride 92, CO2 14, BUN 15, SCr 0.8, glucose 122.  LFTs unremarkable.  Lipase 55.  .  Troponin 0.07.  TSH 2.484.  UA noninfectious. " "Covid +.  Flu negative.  CXR without acute findings.  CT AP degraded D/T motion artifact, see full report.      The patient was admitted to the Hospital Medicine Service for further evaluation and management. "    Hospital Course:   Patient is a 89 year-old woman with coronary artery disease, hypertension, hyperlipidemia, prior stroke, iron deficiency anemia, chronic neutropenia, chronic kidney disease stage III, chronic hyponatremia and diagnosis of cecal adenocarcinoma in 2/2024 by biopsy on colonoscopy who presented for evaluation of generalized weakness and fatigue after several days of diarrhea and diagnosis of COVID-19.    Patient admitted to the hospital with worsening hyponatremia and urinary retention. Moyer catheter placed alleviated urinary retention.    Serum sodium 117 mmol/L on presentation.  Suspect hyponatremia due to hypovolemic hypotonic hyponatremia due to poor oral intake due to viral illness.  Patient was given isotonic intravenous resuscitation.  Serum sodium corrected at reasonable rate to within reasonable range.    Patient with mild COVID-19 in terms of her respiratory symptoms but with elevated risk score for clinical deterioration.  Patient treated with 3 days of intravenous remdesivir preventatively to decrease risk of progression to severe disease.    Urology service consult for evaluation of urinary retention.  Voiding trial recommended and she voided without issue without Moyer catheter.    Regarding her colon cancer patient is not interested in pursuing treatment.  Palliative care service consulted and recommended palliative services in the home setting.    Patient's oral intake has improved otherwise stable for discharge. Skilled nursing therapy initially advice however patient declined skilled nursing placement. Patient therefore discharged home with her son with home health with home-based palliative care.     Goals of Care Treatment Preferences:  Code Status: DNR    Living Will: " Yes     What is most important right now is to focus on remaining as independent as possible, symptom/pain control.  Accordingly, we have decided that the best plan to meet the patient's goals includes continuing with treatment, enrolling in hospice care.      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.9] 02/07/2024 Yes    COVID-19 virus infection [U07.1] 07/21/2024 Yes    Hyponatremia [E87.1] 05/20/2013 Yes    Acute urinary retention [R33.8] 07/21/2024 Yes    Hypertension [I10] 09/21/2012 Yes    Debility [R53.81] 07/22/2024 Yes    Iron deficiency anemia [D50.9] 02/08/2024 Yes    Stage 3a chronic kidney disease [N18.31] 01/17/2022 Yes    Refusal of blood transfusions as patient is Yazidism [Z53.1] 02/14/2013 Not Applicable      Problems Resolved During this Admission:    Diagnosis Date Noted Date Resolved POA    Dysphagia [R13.10] 07/22/2024 07/23/2024 Yes    ACP (advance care planning) [Z71.89] 02/07/2024 07/23/2024 Not Applicable    Hypercholesteremia [E78.00] 02/14/2013 07/23/2024 Yes       Discharged Condition: Stable    Disposition: Home-Health Care [facility]    Follow Up:   Follow-up Information       Colten Gould MD. Schedule an appointment as soon as possible for a visit in 1 week(s).    Specialty: Family Medicine  Contact information:  [address] [place] [address]  [phone]               Dme, [facility] Follow up.    Specialties: DME Provider, Orthotics  Why: rolling walker  Contact information:  [address]  [facility] [address]  [phone]                           Patient Instructions:      WALKER FOR HOME USE     Order Specific Question Answer Comments   Type of Walker: Adult (5'4"-6'6")    With wheels? Yes    Height: 5' 2" (1.575 m)    Weight: 49.9 kg (110 lb)    Length of need (1-99 months): 99    Please check all that apply: Patient's condition impairs ambulation.    Please check all that apply: Patient is unable to safely ambulate without equipment.    Please check all that apply: Walker will be used for gait training.      Ambulatory referral/consult to HOME Palliative Care   Standing Status: Future   Referral Priority: Routine Referral Type: Consultation   Requested Specialty: Hospice and Palliative Medicine   Number of Visits Requested: 1     Ambulatory referral/consult to HOME Palliative Care   Standing Status: Future   Referral Priority: Urgent Referral Type: Consultation   Requested Specialty: Hospice and Palliative Medicine   Number of Visits Requested: 1     Diet Cardiac     Notify your health care provider if you experience any of the following:  temperature >100.4     Notify your health care provider if you experience any of the following:  persistent nausea and vomiting or diarrhea     Notify your health care provider if you experience any of the following:  severe uncontrolled pain     Notify your health care provider if you experience any of the following:  difficulty breathing or increased cough     Notify your health care provider if you experience any of the following:  severe persistent headache     Notify your health care provider if you experience any of the following:  worsening rash     Notify your health care provider if you experience any of the following:  persistent dizziness, light-headedness, or visual disturbances     Notify your health care provider if you experience any of the following:  increased confusion or weakness     Activity as tolerated        "   Medications:  Reconciled Home Medications:      Medication List        START taking these medications      artificial tears 0.5 % ophthalmic solution  Commonly known as: ISOPTO TEARS  Place 1 drop into both eyes 4 (four) times daily as needed (Dry Eyes).     senna-docusate 8.6-50 mg 8.6-50 mg per tablet  Commonly known as: PERICOLACE  Take 1 tablet by mouth 2 (two) times daily as needed for Constipation.            CHANGE how you take these medications      acetaminophen 500 MG tablet  Commonly known as: TYLENOL  Take 2 tablets (1,000 mg total) by mouth every 8 (eight) hours as needed for Pain.  What changed:   medication strength  how much to take  reasons to take this     folic acid 1 MG tablet  Commonly known as: FOLVITE  Take 1 tablet (1,000 mcg total) by mouth once daily.  What changed: when to take this            CONTINUE taking these medications      losartan 100 MG tablet  Commonly known as: COZAAR  Take 1 tablet (100 mg total) by mouth once daily.     lovastatin 10 MG tablet  Commonly known as: MEVACOR  Take 1 tablet (10 mg total) by mouth every evening.     multivitamin capsule  Take 1 capsule by mouth once daily.     NIFEdipine 30 MG (OSM) 24 hr tablet  Commonly known as: PROCARDIA-XL  Take 1 tablet (30 mg total) by mouth every evening.     sucralfate 1 gram tablet  Commonly known as: CARAFATE  Take 1 tablet (1 g total) by mouth 2 (two) times daily.            STOP taking these medications      alpha lipoic acid 200 mg Cap     b complex vitamins tablet     benzonatate 100 MG capsule  Commonly known as: TESSALON PERLES     calcium citrate-vitamin D3 315-200 mg 315 mg-5 mcg (200 unit) per tablet  Commonly known as: CITRACAL+D     cyanocobalamin 1,000 mcg/mL injection     hydrocortisone 2.5 % cream     PAXLOVID 300 mg (150 mg x 2)-100 mg copackaged tablets (EUA)  Generic drug: nirmatrelvir-ritonavir              Indwelling Lines/Drains at time of discharge:   Lines/Drains/Airways       None                    Time spent on the discharge of patient: 35 minutes         Obi Galvan MD  Department of Hospital Medicine  [facility] - Med Surg ([place])

## 2024-07-25 NOTE — PT/OT/SLP PROGRESS
Physical Therapy Treatment    Patient Name:  Brie Han   MRN:  [id number]    Recommendations:     Discharge Recommendations: Moderate Intensity Therapy  Discharge Equipment Recommendations: bedside commode, walker, rolling, bath bench  Barriers to discharge: Inaccessible home and Decreased caregiver support    Assessment:     Brie Han is a 89 y.o. female admitted with a medical diagnosis of Adenocarcinoma of colon.  She presents with the following impairments/functional limitations: weakness, gait instability, impaired balance, impaired cardiopulmonary response to activity, impaired endurance, impaired functional mobility.    Supine>sit with SPV  Sit>stand with RW and SBA  Amb 40' with RW and SBA  Pt with good mobility, close to base line  Rec Moderate Intensity Therapy    The mobility limitation cannot be sufficiently resolved by the use of a cane.   Patient's functional mobility deficit can be sufficiently resolved with the use of a rolling walker. Patient's mobility limitation significantly impairs their ability to participate in one of more activities of daily living. The use of a rolling walker will significantly improve the patient's ability to participate in MRADLS and the patient will use it on regular basis in the home.       Rehab Prognosis: Good; patient would benefit from acute skilled PT services to address these deficits and reach maximum level of function.    Recent Surgery: * No surgery found *      Plan:     During this hospitalization, patient to be seen 5 x/week to address the identified rehab impairments via gait training, therapeutic activities, therapeutic exercises and progress toward the following goals:    Plan of Care Expires:  08/08/24    Subjective     Chief Complaint: none stated  Patient/Family Comments/goals: My son wanted me to come to this hospital rather than the one in [place], which is much closer to me  Pain/Comfort:  Pain Rating 1: 0/10  Pain Rating Post-Intervention  1: 0/10      Objective:     Communicated with nurse Kathie prior to session.  Patient found HOB elevated with peripheral IV upon PT entry to room.     General Precautions: Standard, fall  Orthopedic Precautions: N/A  Braces: N/A  Respiratory Status: Room air     Functional Mobility:  Bed Mobility:     Supine to Sit: supervision  Transfers:     Sit to Stand:  stand by assistance with rolling walker  Gait: 40' with RW and SBA      AM-PAC 6 CLICK MOBILITY  Turning over in bed (including adjusting bedclothes, sheets and blankets)?: 4  Sitting down on and standing up from a chair with arms (e.g., wheelchair, bedside commode, etc.): 3  Moving from lying on back to sitting on the side of the bed?: 4  Moving to and from a bed to a chair (including a wheelchair)?: 3  Need to walk in hospital room?: 3  Climbing 3-5 steps with a railing?: 3  Basic Mobility Total Score: 20       Treatment & Education:  Pt left on BS at her request within reach of call light, PCT notified    Gait training as noted; pt feeling ready to go home    Patient left  seated on BSC  with all lines intact, call button in reach, and PCT Dianne notified..    GOALS:   Multidisciplinary Problems       Physical Therapy Goals          Problem: Physical Therapy    Goal Priority Disciplines Outcome Goal Variances Interventions   Physical Therapy Goal     PT, PT/OT Progressing     Description: Goals to be met by: 24    Patient will increase functional independence with mobility by performin. Supine<>sit Mod I without use of HB features.   2. Sit<>stand with Mod I with LRAD.  3. Gait x 100 feet with supervision and LRAD.  4. Ascend/descend 4 step(s) with least restrictive assistive device and uni HR with CGA.                        Time Tracking:     PT Received On: 24  PT Start Time: 1311     PT Stop Time: 1330  PT Total Time (min): 19 min     Billable Minutes: Gait Training 19    Treatment Type: Treatment  PT/PTA: PTA     Number of PTA visits since  last PT visit: 1 07/25/2024

## 2024-07-29 ENCOUNTER — PATIENT OUTREACH (OUTPATIENT)
Dept: ADMINISTRATIVE | Facility: CLINIC | Age: 89
End: 2024-07-29
Payer: MEDICARE

## 2024-07-31 ENCOUNTER — OFFICE VISIT (OUTPATIENT)
Dept: PRIMARY CARE CLINIC | Facility: CLINIC | Age: 89
End: 2024-07-31
Payer: MEDICARE

## 2024-07-31 VITALS
DIASTOLIC BLOOD PRESSURE: 52 MMHG | HEIGHT: 62 IN | HEART RATE: 69 BPM | SYSTOLIC BLOOD PRESSURE: 128 MMHG | WEIGHT: 106.25 LBS | OXYGEN SATURATION: 100 % | TEMPERATURE: 99 F | BODY MASS INDEX: 19.55 KG/M2

## 2024-07-31 DIAGNOSIS — R35.0 URINE FREQUENCY: ICD-10-CM

## 2024-07-31 DIAGNOSIS — C18.0 ADENOCARCINOMA OF CECUM: ICD-10-CM

## 2024-07-31 DIAGNOSIS — N39.0 E. COLI UTI (URINARY TRACT INFECTION): ICD-10-CM

## 2024-07-31 DIAGNOSIS — E43 SEVERE PROTEIN-CALORIE MALNUTRITION: ICD-10-CM

## 2024-07-31 DIAGNOSIS — I10 PRIMARY HYPERTENSION: Primary | ICD-10-CM

## 2024-07-31 DIAGNOSIS — B96.20 E. COLI UTI (URINARY TRACT INFECTION): ICD-10-CM

## 2024-07-31 DIAGNOSIS — E87.1 HYPONATREMIA: ICD-10-CM

## 2024-07-31 DIAGNOSIS — N18.31 STAGE 3A CHRONIC KIDNEY DISEASE: ICD-10-CM

## 2024-07-31 LAB
BILIRUB SERPL-MCNC: NEGATIVE MG/DL
BLOOD URINE, POC: ABNORMAL
CLARITY, POC UA: CLEAR
COLOR, POC UA: YELLOW
GLUCOSE UR QL STRIP: NEGATIVE
KETONES UR QL STRIP: NEGATIVE
LEUKOCYTE ESTERASE URINE, POC: ABNORMAL
NITRITE, POC UA: NEGATIVE
PH, POC UA: 7
PROTEIN, POC: NEGATIVE
SPECIFIC GRAVITY, POC UA: 1.01
UROBILINOGEN, POC UA: 0.2

## 2024-07-31 PROCEDURE — 3288F FALL RISK ASSESSMENT DOCD: CPT | Mod: HCNC,CPTII,S$GLB, | Performed by: FAMILY MEDICINE

## 2024-07-31 PROCEDURE — 87086 URINE CULTURE/COLONY COUNT: CPT | Mod: HCNC | Performed by: FAMILY MEDICINE

## 2024-07-31 PROCEDURE — 1157F ADVNC CARE PLAN IN RCRD: CPT | Mod: HCNC,CPTII,S$GLB, | Performed by: FAMILY MEDICINE

## 2024-07-31 PROCEDURE — 1111F DSCHRG MED/CURRENT MED MERGE: CPT | Mod: HCNC,CPTII,S$GLB, | Performed by: FAMILY MEDICINE

## 2024-07-31 PROCEDURE — 1160F RVW MEDS BY RX/DR IN RCRD: CPT | Mod: HCNC,CPTII,S$GLB, | Performed by: FAMILY MEDICINE

## 2024-07-31 PROCEDURE — 81002 URINALYSIS NONAUTO W/O SCOPE: CPT | Mod: HCNC,S$GLB,, | Performed by: FAMILY MEDICINE

## 2024-07-31 PROCEDURE — 99999 PR PBB SHADOW E&M-EST. PATIENT-LVL IV: CPT | Mod: PBBFAC,HCNC,, | Performed by: FAMILY MEDICINE

## 2024-07-31 PROCEDURE — 99214 OFFICE O/P EST MOD 30 MIN: CPT | Mod: HCNC,S$GLB,, | Performed by: FAMILY MEDICINE

## 2024-07-31 PROCEDURE — 1159F MED LIST DOCD IN RCRD: CPT | Mod: HCNC,CPTII,S$GLB, | Performed by: FAMILY MEDICINE

## 2024-07-31 PROCEDURE — 1101F PT FALLS ASSESS-DOCD LE1/YR: CPT | Mod: HCNC,CPTII,S$GLB, | Performed by: FAMILY MEDICINE

## 2024-07-31 PROCEDURE — 87088 URINE BACTERIA CULTURE: CPT | Mod: HCNC | Performed by: FAMILY MEDICINE

## 2024-07-31 PROCEDURE — 87186 SC STD MICRODIL/AGAR DIL: CPT | Mod: 59,HCNC | Performed by: FAMILY MEDICINE

## 2024-07-31 RX ORDER — OXYBUTYNIN CHLORIDE 5 MG/1
5 TABLET, EXTENDED RELEASE ORAL DAILY
Qty: 30 TABLET | Refills: 11 | Status: SHIPPED | OUTPATIENT
Start: 2024-07-31 | End: 2025-07-31

## 2024-07-31 RX ORDER — SODIUM CHLORIDE 1 G/1
1000 TABLET ORAL DAILY
Qty: 90 TABLET | Refills: 3 | Status: SHIPPED | OUTPATIENT
Start: 2024-07-31

## 2024-08-01 DIAGNOSIS — I10 HTN (HYPERTENSION), BENIGN: ICD-10-CM

## 2024-08-01 RX ORDER — LOSARTAN POTASSIUM 100 MG/1
100 TABLET ORAL DAILY
Qty: 90 TABLET | Refills: 3 | Status: SHIPPED | OUTPATIENT
Start: 2024-08-01

## 2024-08-01 NOTE — TELEPHONE ENCOUNTER
No care due was identified.  [facility] Embedded Care Due Messages. Reference number: [id number].   8/01/2024 11:09:13 AM CDT

## 2024-08-01 NOTE — TELEPHONE ENCOUNTER
----- Message from Myke Rachel sent at 8/1/2024 10:49 AM CDT -----  Type:  RX Refill Request    Who Called:  pt  Refill or New Rx:  REFILL  RX Name and Strength:  losartan (COZAAR) 100 MG tablet  How is the patient currently taking it? (ex. 1XDay):  As directed  Is this a 30 day or 90 day RX:  30  Preferred Pharmacy with phone number:        [facility] - [place], [place] - [address]  [address] [address]  Phone: [phone] Fax: [phone]        Local or Mail Order: mail    Ordering Provider:  noeil Chris Call Back Number:  [phone]  Additional Information:  Please call back to advise. Thanks.

## 2024-08-01 NOTE — TELEPHONE ENCOUNTER
Last office visit was 7/31/2024 rx pended for refill preferred pharmacy selected PCP to authorize for approval.

## 2024-08-02 LAB — BACTERIA UR CULT: ABNORMAL

## 2024-08-05 ENCOUNTER — PATIENT MESSAGE (OUTPATIENT)
Dept: PRIMARY CARE CLINIC | Facility: CLINIC | Age: 89
End: 2024-08-05
Payer: MEDICARE

## 2024-08-05 ENCOUNTER — TELEPHONE (OUTPATIENT)
Dept: PRIMARY CARE CLINIC | Facility: CLINIC | Age: 89
End: 2024-08-05
Payer: MEDICARE

## 2024-08-05 DIAGNOSIS — N39.0 E. COLI UTI (URINARY TRACT INFECTION): ICD-10-CM

## 2024-08-05 DIAGNOSIS — N30.00 ACUTE CYSTITIS WITHOUT HEMATURIA: Primary | ICD-10-CM

## 2024-08-05 DIAGNOSIS — Z51.6 NEED FOR DESENSITIZATION TO ALLERGENS: ICD-10-CM

## 2024-08-05 DIAGNOSIS — B96.20 E. COLI UTI (URINARY TRACT INFECTION): ICD-10-CM

## 2024-08-05 RX ORDER — SULFAMETHOXAZOLE AND TRIMETHOPRIM 800; 160 MG/1; MG/1
1 TABLET ORAL 2 TIMES DAILY
Qty: 10 TABLET | Refills: 0 | Status: SHIPPED | OUTPATIENT
Start: 2024-08-05 | End: 2024-08-06

## 2024-08-05 RX ORDER — DIPHENHYDRAMINE HCL 25 MG
CAPSULE ORAL
Qty: 8 CAPSULE | Refills: 0 | Status: SHIPPED | OUTPATIENT
Start: 2024-08-05

## 2024-08-05 RX ORDER — CEFTRIAXONE 500 MG/1
500 INJECTION, POWDER, FOR SOLUTION INTRAMUSCULAR; INTRAVENOUS
Status: COMPLETED | OUTPATIENT
Start: 2024-08-06 | End: 2024-08-06

## 2024-08-05 RX ORDER — DIPHENHYDRAMINE HYDROCHLORIDE 50 MG/ML
25 INJECTION INTRAMUSCULAR; INTRAVENOUS
Status: DISCONTINUED | OUTPATIENT
Start: 2024-08-05 | End: 2024-08-06

## 2024-08-05 RX ORDER — DEXAMETHASONE 4 MG/1
TABLET ORAL
Qty: 8 TABLET | Refills: 0 | Status: SHIPPED | OUTPATIENT
Start: 2024-08-05 | End: 2024-08-06 | Stop reason: SDUPTHER

## 2024-08-06 ENCOUNTER — CLINICAL SUPPORT (OUTPATIENT)
Dept: FAMILY MEDICINE | Facility: CLINIC | Age: 89
End: 2024-08-06
Payer: MEDICARE

## 2024-08-06 ENCOUNTER — LAB VISIT (OUTPATIENT)
Dept: LAB | Facility: HOSPITAL | Age: 89
End: 2024-08-06
Attending: FAMILY MEDICINE
Payer: MEDICARE

## 2024-08-06 DIAGNOSIS — E87.1 HYPONATREMIA: ICD-10-CM

## 2024-08-06 DIAGNOSIS — Z51.6 NEED FOR DESENSITIZATION TO ALLERGENS: Primary | ICD-10-CM

## 2024-08-06 DIAGNOSIS — U07.1 COVID-19: Primary | ICD-10-CM

## 2024-08-06 LAB
ANION GAP SERPL CALC-SCNC: 11 MMOL/L (ref 8–16)
BUN SERPL-MCNC: 39 MG/DL (ref 10–30)
CALCIUM SERPL-MCNC: 9.5 MG/DL (ref 8.7–10.5)
CHLORIDE SERPL-SCNC: 101 MMOL/L (ref 95–110)
CO2 SERPL-SCNC: 20 MMOL/L (ref 23–29)
CREAT SERPL-MCNC: 0.8 MG/DL (ref 0.5–1.4)
EST. GFR  (NO RACE VARIABLE): >60 ML/MIN/1.73 M^2
GLUCOSE SERPL-MCNC: 92 MG/DL (ref 70–110)
POTASSIUM SERPL-SCNC: 4.1 MMOL/L (ref 3.5–5.1)
SODIUM SERPL-SCNC: 132 MMOL/L (ref 136–145)

## 2024-08-06 PROCEDURE — 80048 BASIC METABOLIC PNL TOTAL CA: CPT | Performed by: FAMILY MEDICINE

## 2024-08-06 PROCEDURE — 99499 UNLISTED E&M SERVICE: CPT | Mod: HCNC,S$GLB,, | Performed by: FAMILY MEDICINE

## 2024-08-06 PROCEDURE — 36415 COLL VENOUS BLD VENIPUNCTURE: CPT | Performed by: FAMILY MEDICINE

## 2024-08-06 PROCEDURE — 96372 THER/PROPH/DIAG INJ SC/IM: CPT | Mod: HCNC,S$GLB,, | Performed by: FAMILY MEDICINE

## 2024-08-06 RX ORDER — DEXAMETHASONE 4 MG/1
TABLET ORAL
Qty: 8 TABLET | Refills: 0 | Status: SHIPPED | OUTPATIENT
Start: 2024-08-06

## 2024-08-06 RX ORDER — DIPHENHYDRAMINE HYDROCHLORIDE 50 MG/ML
25 INJECTION INTRAMUSCULAR; INTRAVENOUS ONCE
Status: COMPLETED | OUTPATIENT
Start: 2024-08-06 | End: 2024-08-06

## 2024-08-06 RX ADMIN — CEFTRIAXONE 500 MG: 500 INJECTION, POWDER, FOR SOLUTION INTRAMUSCULAR; INTRAVENOUS at 04:08

## 2024-08-06 RX ADMIN — DIPHENHYDRAMINE HYDROCHLORIDE 25 MG: 50 INJECTION INTRAMUSCULAR; INTRAVENOUS at 04:08

## 2024-08-06 NOTE — PROGRESS NOTES
SUBJECTIVE: Brie Han is a 89 y.o. female who complains of urinary frequency, urgency and dysuria x 5 days in  days, without flank pain, fever, chills, or abnormal vaginal discharge or bleeding.     OBJECTIVE: Appears well, in no apparent distress.  Vital signs are normal. The abdomen is soft without tenderness, guarding, mass, rebound or organomegaly. No CVA tenderness or inguinal adenopathy noted. Urine dipstick shows positive for WBC's, positive for RBC's, and positive for nitrates.  Micro exam: 10 WBC's per HPF and 10+ bacteria.     ASSESSMENT: UTI uncomplicated without evidence of pyelonephritis    PLAN: Treatment per orders - also push fluids, may use Pyridium OTC prn. Call or return to clinic prn if these symptoms worsen or fail to improve as anticipated.

## 2024-08-12 ENCOUNTER — EXTERNAL HOME HEALTH (OUTPATIENT)
Dept: HOME HEALTH SERVICES | Facility: HOSPITAL | Age: 89
End: 2024-08-12
Payer: MEDICARE

## 2024-08-13 ENCOUNTER — OUTPATIENT CASE MANAGEMENT (OUTPATIENT)
Dept: ADMINISTRATIVE | Facility: OTHER | Age: 89
End: 2024-08-13
Payer: MEDICARE

## 2024-08-13 NOTE — PROGRESS NOTES
Patient is known to this SW. SW contacted patient grandson regarding referral. SW explained to grandson reason for referral to assist with social needs. Caterina reports he is currenlty out of town at work and not available. Prabhjot reports patient has HH in the home . SW provided her contact information for a return call.

## 2024-08-16 ENCOUNTER — DOCUMENT SCAN (OUTPATIENT)
Dept: HOME HEALTH SERVICES | Facility: HOSPITAL | Age: 89
End: 2024-08-16
Payer: MEDICARE

## 2024-08-16 ENCOUNTER — OUTPATIENT CASE MANAGEMENT (OUTPATIENT)
Dept: ADMINISTRATIVE | Facility: OTHER | Age: 89
End: 2024-08-16
Payer: MEDICARE

## 2024-08-16 ENCOUNTER — TELEPHONE (OUTPATIENT)
Dept: FAMILY MEDICINE | Facility: CLINIC | Age: 89
End: 2024-08-16

## 2024-08-16 ENCOUNTER — TELEPHONE (OUTPATIENT)
Dept: PRIMARY CARE CLINIC | Facility: CLINIC | Age: 89
End: 2024-08-16
Payer: MEDICARE

## 2024-08-16 DIAGNOSIS — R41.82 ALTERED MENTAL STATUS, UNSPECIFIED ALTERED MENTAL STATUS TYPE: ICD-10-CM

## 2024-08-16 DIAGNOSIS — R41.82 ALTERED MENTAL STATUS, UNSPECIFIED ALTERED MENTAL STATUS TYPE: Primary | ICD-10-CM

## 2024-08-16 RX ORDER — NITROFURANTOIN 25; 75 MG/1; MG/1
100 CAPSULE ORAL 2 TIMES DAILY
Qty: 10 CAPSULE | Refills: 0 | Status: SHIPPED | OUTPATIENT
Start: 2024-08-16 | End: 2024-08-16 | Stop reason: SDUPTHER

## 2024-08-16 RX ORDER — NITROFURANTOIN 25; 75 MG/1; MG/1
100 CAPSULE ORAL 2 TIMES DAILY
Qty: 10 CAPSULE | Refills: 0 | Status: SHIPPED | OUTPATIENT
Start: 2024-08-16 | End: 2024-08-21

## 2024-08-16 NOTE — PROGRESS NOTES
ARIAS received a return call from patient devan regarding resources. Azalia is known to this SW. Grandson seeking resources for more in home support. Grandson son denied patient has a LTC policy and or had a spouse that served in the . Grandson denied [facility] Placement will be an option for patient at this time. SW explored resources through the state such as Community Choice Waiver Program. Devan reports patient monthly income is around 1700.00 which makes her eligible income wise. ARIAS provided devan with phone number to [facility] in LTC to complete assessment via telephone. Devan aware there is a wait list for services. Devan provided information on [facility] on 03/11/2024 but has not followed through with resources as patient was not in need. SW encouraged devan to contact [employer] to apply for services. Devan voiced understanding. ARIAS asked devan if family was able to afford a hired caregiver in the meantime and devan advised SW he will have to review finances but an agreement with SW providing a list of hired caregiver/ agencies. ARIAS provide all available resources via telephone and email [email or web address]

## 2024-08-16 NOTE — TELEPHONE ENCOUNTER
Patient's son, Prabhjot Han, called on the [employer] Nurse phone with concerns of patient has a change her mental status( confusion, crying, outburst). Patient urine culture on 7/31/24 showed a UTI-bactrim was prescribed-son reports patient had GI side effects from the bactrim-patient came in on 8/6/24 as a nurse visit to receive a ceftriaxone 500 mg injection- I ordered a UA and culture; Macrobid sent

## 2024-08-17 ENCOUNTER — LAB VISIT (OUTPATIENT)
Dept: LAB | Facility: HOSPITAL | Age: 89
End: 2024-08-17
Payer: MEDICARE

## 2024-08-17 DIAGNOSIS — R41.82 ALTERED MENTAL STATUS, UNSPECIFIED ALTERED MENTAL STATUS TYPE: ICD-10-CM

## 2024-08-17 LAB
BILIRUB UR QL STRIP: NEGATIVE
CLARITY UR: CLEAR
COLOR UR: COLORLESS
GLUCOSE UR QL STRIP: NEGATIVE
HGB UR QL STRIP: NEGATIVE
KETONES UR QL STRIP: NEGATIVE
LEUKOCYTE ESTERASE UR QL STRIP: NEGATIVE
NITRITE UR QL STRIP: NEGATIVE
PH UR STRIP: 7 [PH] (ref 5–8)
PROT UR QL STRIP: NEGATIVE
SP GR UR STRIP: 1.01 (ref 1–1.03)
URN SPEC COLLECT METH UR: ABNORMAL
UROBILINOGEN UR STRIP-ACNC: NEGATIVE EU/DL

## 2024-08-17 PROCEDURE — 81003 URINALYSIS AUTO W/O SCOPE: CPT | Performed by: NURSE PRACTITIONER

## 2024-08-17 PROCEDURE — 87086 URINE CULTURE/COLONY COUNT: CPT | Performed by: NURSE PRACTITIONER

## 2024-08-17 NOTE — TELEPHONE ENCOUNTER
After hours call. Patient prescribed antibiotic for uti today. Sent to pharmacy that is closed for the weekend. Pt requesting abx sent to [facility] on [address] [facility].     Elizabeth Rick MD, [facility]   Family Medicine Physician  08/16/2024

## 2024-08-19 ENCOUNTER — HOSPITAL ENCOUNTER (INPATIENT)
Facility: HOSPITAL | Age: 89
LOS: 4 days | Discharge: PSYCHIATRIC OR SUBSTANCE USE FACILITY | DRG: 885 | End: 2024-08-24
Attending: STUDENT IN AN ORGANIZED HEALTH CARE EDUCATION/TRAINING PROGRAM | Admitting: STUDENT IN AN ORGANIZED HEALTH CARE EDUCATION/TRAINING PROGRAM
Payer: MEDICARE

## 2024-08-19 DIAGNOSIS — R79.89 ELEVATED BRAIN NATRIURETIC PEPTIDE (BNP) LEVEL: ICD-10-CM

## 2024-08-19 DIAGNOSIS — R10.84 GENERALIZED ABDOMINAL PAIN: ICD-10-CM

## 2024-08-19 DIAGNOSIS — R79.89 ELEVATED TROPONIN: ICD-10-CM

## 2024-08-19 DIAGNOSIS — R65.10 SIRS (SYSTEMIC INFLAMMATORY RESPONSE SYNDROME): ICD-10-CM

## 2024-08-19 DIAGNOSIS — R41.82 AMS (ALTERED MENTAL STATUS): Primary | ICD-10-CM

## 2024-08-19 DIAGNOSIS — Z91.89 AT RISK FOR PROLONGED QT INTERVAL SYNDROME: ICD-10-CM

## 2024-08-19 DIAGNOSIS — G93.41 ENCEPHALOPATHY, METABOLIC: ICD-10-CM

## 2024-08-19 DIAGNOSIS — R41.0 DELIRIUM: ICD-10-CM

## 2024-08-19 LAB
ALBUMIN SERPL BCP-MCNC: 3.7 G/DL (ref 3.5–5.2)
ALLENS TEST: ABNORMAL
ALP SERPL-CCNC: 64 U/L (ref 55–135)
ALT SERPL W/O P-5'-P-CCNC: 16 U/L (ref 10–44)
AMMONIA PLAS-SCNC: 49 UMOL/L (ref 10–50)
ANION GAP SERPL CALC-SCNC: 14 MMOL/L (ref 8–16)
APTT PPP: 25.3 SEC (ref 21–32)
AST SERPL-CCNC: 22 U/L (ref 10–40)
BACTERIA UR CULT: NO GROWTH
BASOPHILS # BLD AUTO: 0.07 K/UL (ref 0–0.2)
BASOPHILS NFR BLD: 0.5 % (ref 0–1.9)
BILIRUB SERPL-MCNC: 0.5 MG/DL (ref 0.1–1)
BILIRUB UR QL STRIP: NEGATIVE
BNP SERPL-MCNC: 151 PG/ML (ref 0–99)
BUN SERPL-MCNC: 20 MG/DL (ref 10–30)
CALCIUM SERPL-MCNC: 9.9 MG/DL (ref 8.7–10.5)
CHLORIDE SERPL-SCNC: 104 MMOL/L (ref 95–110)
CLARITY UR: CLEAR
CO2 SERPL-SCNC: 19 MMOL/L (ref 23–29)
COLOR UR: YELLOW
CREAT SERPL-MCNC: 0.8 MG/DL (ref 0.5–1.4)
DELSYS: ABNORMAL
DIFFERENTIAL METHOD BLD: ABNORMAL
EOSINOPHIL # BLD AUTO: 0 K/UL (ref 0–0.5)
EOSINOPHIL NFR BLD: 0.2 % (ref 0–8)
ERYTHROCYTE [DISTWIDTH] IN BLOOD BY AUTOMATED COUNT: 14.8 % (ref 11.5–14.5)
EST. GFR  (NO RACE VARIABLE): >60 ML/MIN/1.73 M^2
GLUCOSE SERPL-MCNC: 105 MG/DL (ref 70–110)
GLUCOSE UR QL STRIP: NEGATIVE
HCO3 UR-SCNC: 23.2 MMOL/L (ref 24–28)
HCT VFR BLD AUTO: 30.8 % (ref 37–48.5)
HGB BLD-MCNC: 10 G/DL (ref 12–16)
HGB UR QL STRIP: NEGATIVE
IMM GRANULOCYTES # BLD AUTO: 0.03 K/UL (ref 0–0.04)
IMM GRANULOCYTES NFR BLD AUTO: 0.2 % (ref 0–0.5)
INR PPP: 1 (ref 0.8–1.2)
KETONES UR QL STRIP: ABNORMAL
LDH SERPL L TO P-CCNC: 0.96 MMOL/L (ref 0.5–2.2)
LEUKOCYTE ESTERASE UR QL STRIP: NEGATIVE
LIPASE SERPL-CCNC: 49 U/L (ref 4–60)
LYMPHOCYTES # BLD AUTO: 0.6 K/UL (ref 1–4.8)
LYMPHOCYTES NFR BLD: 4.4 % (ref 18–48)
MAGNESIUM SERPL-MCNC: 2.3 MG/DL (ref 1.6–2.6)
MCH RBC QN AUTO: 30.6 PG (ref 27–31)
MCHC RBC AUTO-ENTMCNC: 32.5 G/DL (ref 32–36)
MCV RBC AUTO: 94 FL (ref 82–98)
MODE: ABNORMAL
MONOCYTES # BLD AUTO: 1.2 K/UL (ref 0.3–1)
MONOCYTES NFR BLD: 8.7 % (ref 4–15)
NEUTROPHILS # BLD AUTO: 11.4 K/UL (ref 1.8–7.7)
NEUTROPHILS NFR BLD: 86 % (ref 38–73)
NITRITE UR QL STRIP: NEGATIVE
NRBC BLD-RTO: 0 /100 WBC
PCO2 BLDA: 38.4 MMHG (ref 35–45)
PH SMN: 7.39 [PH] (ref 7.35–7.45)
PH UR STRIP: 7 [PH] (ref 5–8)
PLATELET # BLD AUTO: 489 K/UL (ref 150–450)
PMV BLD AUTO: 9 FL (ref 9.2–12.9)
PO2 BLDA: 35 MMHG (ref 40–60)
POC BE: -2 MMOL/L
POC SATURATED O2: 66 % (ref 95–100)
POC TCO2: 24 MMOL/L (ref 24–29)
POTASSIUM SERPL-SCNC: 3.9 MMOL/L (ref 3.5–5.1)
PROCALCITONIN SERPL IA-MCNC: 0.06 NG/ML (ref 0–0.5)
PROT SERPL-MCNC: 6.9 G/DL (ref 6–8.4)
PROT UR QL STRIP: NEGATIVE
PROTHROMBIN TIME: 10.6 SEC (ref 9–12.5)
RBC # BLD AUTO: 3.27 M/UL (ref 4–5.4)
SAMPLE: ABNORMAL
SARS-COV-2 RDRP RESP QL NAA+PROBE: NEGATIVE
SITE: ABNORMAL
SODIUM SERPL-SCNC: 137 MMOL/L (ref 136–145)
SP GR UR STRIP: 1.01 (ref 1–1.03)
TROPONIN I SERPL DL<=0.01 NG/ML-MCNC: 0.05 NG/ML (ref 0–0.03)
URN SPEC COLLECT METH UR: ABNORMAL
UROBILINOGEN UR STRIP-ACNC: NEGATIVE EU/DL
WBC # BLD AUTO: 13.21 K/UL (ref 3.9–12.7)

## 2024-08-19 PROCEDURE — 83690 ASSAY OF LIPASE: CPT | Performed by: STUDENT IN AN ORGANIZED HEALTH CARE EDUCATION/TRAINING PROGRAM

## 2024-08-19 PROCEDURE — 83605 ASSAY OF LACTIC ACID: CPT

## 2024-08-19 PROCEDURE — 94760 N-INVAS EAR/PLS OXIMETRY 1: CPT | Mod: XB

## 2024-08-19 PROCEDURE — 99900035 HC TECH TIME PER 15 MIN (STAT)

## 2024-08-19 PROCEDURE — 25000003 PHARM REV CODE 250: Performed by: NURSE PRACTITIONER

## 2024-08-19 PROCEDURE — 80053 COMPREHEN METABOLIC PANEL: CPT | Performed by: STUDENT IN AN ORGANIZED HEALTH CARE EDUCATION/TRAINING PROGRAM

## 2024-08-19 PROCEDURE — 85025 COMPLETE CBC W/AUTO DIFF WBC: CPT | Performed by: STUDENT IN AN ORGANIZED HEALTH CARE EDUCATION/TRAINING PROGRAM

## 2024-08-19 PROCEDURE — 93010 ELECTROCARDIOGRAM REPORT: CPT | Mod: ,,, | Performed by: GENERAL PRACTICE

## 2024-08-19 PROCEDURE — 84484 ASSAY OF TROPONIN QUANT: CPT | Performed by: STUDENT IN AN ORGANIZED HEALTH CARE EDUCATION/TRAINING PROGRAM

## 2024-08-19 PROCEDURE — U0002 COVID-19 LAB TEST NON-CDC: HCPCS | Performed by: STUDENT IN AN ORGANIZED HEALTH CARE EDUCATION/TRAINING PROGRAM

## 2024-08-19 PROCEDURE — 82140 ASSAY OF AMMONIA: CPT | Performed by: STUDENT IN AN ORGANIZED HEALTH CARE EDUCATION/TRAINING PROGRAM

## 2024-08-19 PROCEDURE — 81003 URINALYSIS AUTO W/O SCOPE: CPT | Performed by: STUDENT IN AN ORGANIZED HEALTH CARE EDUCATION/TRAINING PROGRAM

## 2024-08-19 PROCEDURE — 82803 BLOOD GASES ANY COMBINATION: CPT

## 2024-08-19 PROCEDURE — 99285 EMERGENCY DEPT VISIT HI MDM: CPT | Mod: 25

## 2024-08-19 PROCEDURE — G0378 HOSPITAL OBSERVATION PER HR: HCPCS

## 2024-08-19 PROCEDURE — 93005 ELECTROCARDIOGRAM TRACING: CPT

## 2024-08-19 PROCEDURE — 36415 COLL VENOUS BLD VENIPUNCTURE: CPT | Performed by: STUDENT IN AN ORGANIZED HEALTH CARE EDUCATION/TRAINING PROGRAM

## 2024-08-19 PROCEDURE — 94799 UNLISTED PULMONARY SVC/PX: CPT

## 2024-08-19 PROCEDURE — 84145 PROCALCITONIN (PCT): CPT | Performed by: NURSE PRACTITIONER

## 2024-08-19 PROCEDURE — 83880 ASSAY OF NATRIURETIC PEPTIDE: CPT | Performed by: STUDENT IN AN ORGANIZED HEALTH CARE EDUCATION/TRAINING PROGRAM

## 2024-08-19 PROCEDURE — 83735 ASSAY OF MAGNESIUM: CPT | Performed by: STUDENT IN AN ORGANIZED HEALTH CARE EDUCATION/TRAINING PROGRAM

## 2024-08-19 PROCEDURE — 85730 THROMBOPLASTIN TIME PARTIAL: CPT | Performed by: STUDENT IN AN ORGANIZED HEALTH CARE EDUCATION/TRAINING PROGRAM

## 2024-08-19 PROCEDURE — 36415 COLL VENOUS BLD VENIPUNCTURE: CPT | Performed by: NURSE PRACTITIONER

## 2024-08-19 PROCEDURE — 85610 PROTHROMBIN TIME: CPT | Performed by: STUDENT IN AN ORGANIZED HEALTH CARE EDUCATION/TRAINING PROGRAM

## 2024-08-19 RX ORDER — NIFEDIPINE 30 MG/1
30 TABLET, EXTENDED RELEASE ORAL NIGHTLY
Status: DISCONTINUED | OUTPATIENT
Start: 2024-08-19 | End: 2024-08-24 | Stop reason: HOSPADM

## 2024-08-19 RX ORDER — SIMETHICONE 80 MG
1 TABLET,CHEWABLE ORAL 4 TIMES DAILY PRN
Status: DISCONTINUED | OUTPATIENT
Start: 2024-08-19 | End: 2024-08-24 | Stop reason: HOSPADM

## 2024-08-19 RX ORDER — LANOLIN ALCOHOL/MO/W.PET/CERES
800 CREAM (GRAM) TOPICAL
Status: DISCONTINUED | OUTPATIENT
Start: 2024-08-19 | End: 2024-08-24 | Stop reason: HOSPADM

## 2024-08-19 RX ORDER — SODIUM CHLORIDE 0.9 % (FLUSH) 0.9 %
10 SYRINGE (ML) INJECTION EVERY 8 HOURS PRN
Status: DISCONTINUED | OUTPATIENT
Start: 2024-08-19 | End: 2024-08-24 | Stop reason: HOSPADM

## 2024-08-19 RX ORDER — LOSARTAN POTASSIUM 100 MG/1
100 TABLET ORAL DAILY
Status: DISCONTINUED | OUTPATIENT
Start: 2024-08-20 | End: 2024-08-24 | Stop reason: HOSPADM

## 2024-08-19 RX ORDER — TALC
9 POWDER (GRAM) TOPICAL NIGHTLY PRN
Status: DISCONTINUED | OUTPATIENT
Start: 2024-08-19 | End: 2024-08-24 | Stop reason: HOSPADM

## 2024-08-19 RX ORDER — SODIUM,POTASSIUM PHOSPHATES 280-250MG
2 POWDER IN PACKET (EA) ORAL
Status: DISCONTINUED | OUTPATIENT
Start: 2024-08-19 | End: 2024-08-24 | Stop reason: HOSPADM

## 2024-08-19 RX ORDER — GLUCAGON 1 MG
1 KIT INJECTION
Status: DISCONTINUED | OUTPATIENT
Start: 2024-08-19 | End: 2024-08-24

## 2024-08-19 RX ORDER — ALUMINUM HYDROXIDE, MAGNESIUM HYDROXIDE, AND SIMETHICONE 1200; 120; 1200 MG/30ML; MG/30ML; MG/30ML
30 SUSPENSION ORAL 4 TIMES DAILY PRN
Status: DISCONTINUED | OUTPATIENT
Start: 2024-08-19 | End: 2024-08-24 | Stop reason: HOSPADM

## 2024-08-19 RX ORDER — IBUPROFEN 200 MG
24 TABLET ORAL
Status: DISCONTINUED | OUTPATIENT
Start: 2024-08-19 | End: 2024-08-24

## 2024-08-19 RX ORDER — ACETAMINOPHEN 325 MG/1
650 TABLET ORAL EVERY 6 HOURS PRN
Status: DISCONTINUED | OUTPATIENT
Start: 2024-08-19 | End: 2024-08-24

## 2024-08-19 RX ORDER — FOLIC ACID 1 MG/1
1000 TABLET ORAL DAILY
Status: DISCONTINUED | OUTPATIENT
Start: 2024-08-20 | End: 2024-08-24 | Stop reason: HOSPADM

## 2024-08-19 RX ORDER — ONDANSETRON HYDROCHLORIDE 2 MG/ML
4 INJECTION, SOLUTION INTRAVENOUS EVERY 8 HOURS PRN
Status: DISCONTINUED | OUTPATIENT
Start: 2024-08-19 | End: 2024-08-24 | Stop reason: HOSPADM

## 2024-08-19 RX ORDER — IBUPROFEN 200 MG
16 TABLET ORAL
Status: DISCONTINUED | OUTPATIENT
Start: 2024-08-19 | End: 2024-08-24

## 2024-08-19 RX ORDER — NALOXONE HCL 0.4 MG/ML
0.02 VIAL (ML) INJECTION
Status: DISCONTINUED | OUTPATIENT
Start: 2024-08-19 | End: 2024-08-24 | Stop reason: HOSPADM

## 2024-08-19 RX ORDER — IPRATROPIUM BROMIDE AND ALBUTEROL SULFATE 2.5; .5 MG/3ML; MG/3ML
3 SOLUTION RESPIRATORY (INHALATION) EVERY 4 HOURS PRN
Status: DISCONTINUED | OUTPATIENT
Start: 2024-08-19 | End: 2024-08-24 | Stop reason: HOSPADM

## 2024-08-19 RX ORDER — ACETAMINOPHEN 325 MG/1
650 TABLET ORAL EVERY 4 HOURS PRN
Status: DISCONTINUED | OUTPATIENT
Start: 2024-08-19 | End: 2024-08-24 | Stop reason: HOSPADM

## 2024-08-19 RX ADMIN — NIFEDIPINE 30 MG: 30 TABLET, FILM COATED, EXTENDED RELEASE ORAL at 08:08

## 2024-08-19 RX ADMIN — MELATONIN TAB 3 MG 9 MG: 3 TAB at 09:08

## 2024-08-19 NOTE — PHARMACY MED REC
"              .        Admission Medication History     The home medication history was taken by Raquel Atkinson.    You may go to "Admission" then "Reconcile Home Medications" tabs to review and/or act upon these items.     The home medication list has been updated by the Pharmacy department.   Please read ALL comments highlighted in yellow.   Please address this information as you see fit.    Feel free to contact us if you have any questions or require assistance.          Medications listed below were obtained from: Patient/family and Analytic software- [employer]  No current facility-administered medications on file prior to encounter.     Current Outpatient Medications on File Prior to Encounter   Medication Sig Dispense Refill    artificial tears (ISOPTO TEARS) 0.5 % ophthalmic solution Place 1 drop into both eyes 4 (four) times daily as needed (Dry Eyes).      folic acid (FOLVITE) 1 MG tablet Take 1 tablet (1,000 mcg total) by mouth once daily. 30 tablet 0    losartan (COZAAR) 100 MG tablet Take 1 tablet (100 mg total) by mouth once daily. 90 tablet 3    lovastatin (MEVACOR) 10 MG tablet Take 1 tablet (10 mg total) by mouth every evening. 30 tablet 0    multivitamin capsule Take 1 capsule by mouth once daily. 30 capsule 0    NIFEdipine (PROCARDIA-XL) 30 MG (OSM) 24 hr tablet Take 1 tablet (30 mg total) by mouth every evening. 30 tablet 0    senna-docusate 8.6-50 mg (PERICOLACE) 8.6-50 mg per tablet Take 1 tablet by mouth 2 (two) times daily as needed for Constipation.      sodium chloride 1,000 mg TbSO oral tablet Take 1 tablet (1,000 mg total) by mouth once daily. 90 tablet 3    acetaminophen (TYLENOL) 500 MG tablet Take 2 tablets (1,000 mg total) by mouth every 8 (eight) hours as needed for Pain.      oxybutynin (DITROPAN-XL) 5 MG TR24 Take 1 tablet (5 mg total) by mouth once daily. (Patient not taking: Reported on 8/19/2024) 30 tablet 11    sucralfate (CARAFATE) 1 gram tablet Take 1 tablet (1 g total) by " mouth 2 (two) times daily. (Patient not taking: Reported on 8/19/2024) 60 tablet 0    [DISCONTINUED] dexAMETHasone (DECADRON) 4 MG Tab Please start with 1 tab 1/2 hr before the procedure then take it every 12 hrs x 3 days. 8 tablet 0    [DISCONTINUED] diphenhydrAMINE (BENADRYL) 25 mg capsule Take 2 cap 1/2 hr before the appointment the repeat 4 hrs later and continue x 3 days. 8 capsule 0    [DISCONTINUED] nitrofurantoin, macrocrystal-monohydrate, (MACROBID) 100 MG capsule Take 1 capsule (100 mg total) by mouth 2 (two) times daily. for 5 days 10 capsule 0       Potential issues to be addressed PRIOR TO DISCHARGE  Patient reported not taking the following medications: (Oxybutynin & Carafate). These medications remain on the home medication list. Please address accordingly.     Raquel Atkinson  EXT 1924

## 2024-08-19 NOTE — ED PROVIDER NOTES
Encounter Date: 2024       History     Chief Complaint   Patient presents with    Altered Mental Status     Confused outburst     89-year-old female presents for confusion, ongoing since Thursday, worsening, collateral history obtained from grandson.  Patient normally able to live independently and at this time is not oriented although is alert.  Able to move all extremities, no known trauma fever or chills, some associated generalized abdominal pain and decreased bowel movement    The history is provided by the patient, medical records and a relative.     Review of patient's allergies indicates:   Allergen Reactions    Contrast media Other (See Comments)    Aspirin Other (See Comments)    Ciprofloxacin Other (See Comments)    Iron Other (See Comments)     Small rash with po iron.  Tolerated IV iron    Iodine Rash    Penicillins Rash     Past Medical History:   Diagnosis Date    ACP (advance care planning) 2024    Amaurosis fugax     Bilateral left eye worse    Anticoagulant long-term use     Arthritis     Glaucoma     resolved    Hyperlipidemia     Hypertension     Stroke      Past Surgical History:   Procedure Laterality Date    APPENDECTOMY      BREAST SURGERY      CATARACT EXTRACTION W/  INTRAOCULAR LENS IMPLANT Bilateral     Dr Rojo      SECTION      3 c/s and d/c    COLONOSCOPY N/A 2024    Procedure: COLONOSCOPY;  Surgeon: Trev Lafleur MD;  Location: [facility];  Service: Endoscopy;  Laterality: N/A;    ESOPHAGOGASTRODUODENOSCOPY N/A 2024    Procedure: EGD (ESOPHAGOGASTRODUODENOSCOPY);  Surgeon: Trev Lafleur MD;  Location: [facility];  Service: Endoscopy;  Laterality: N/A;    EYE SURGERY      Glaucoma     HYSTERECTOMY      RIGHT OOPHORECTOMY      With postop hemorrhage    TONSILLECTOMY      Yag Capsulotomy Left 2021     Family History   Problem Relation Name Age of Onset    Early death Mother      Stroke Father      Hypertension Father      Diabetes  Father      Hypertension Sister      Thyroid disease Paternal Grandfather      No Known Problems Brother      No Known Problems Maternal Aunt      No Known Problems Maternal Uncle      No Known Problems Paternal Aunt      No Known Problems Paternal Uncle      No Known Problems Maternal Grandmother      No Known Problems Maternal Grandfather      No Known Problems Paternal Grandmother      Amblyopia Neg Hx      Blindness Neg Hx      Cancer Neg Hx      Cataracts Neg Hx      Glaucoma Neg Hx      Macular degeneration Neg Hx      Retinal detachment Neg Hx      Strabismus Neg Hx       Social History     Tobacco Use    Smoking status: Never    Smokeless tobacco: Never   Substance Use Topics    Alcohol use: No    Drug use: No     Review of Systems   All other systems reviewed and are negative.      Physical Exam     Initial Vitals [08/19/24 1255]   BP Pulse Resp Temp SpO2   (!) 165/73 100 20 98.1 °F (36.7 °C) 100 %      MAP       --         Physical Exam    Nursing note and vitals reviewed.  Constitutional: She is not diaphoretic.   HENT:   Head: Normocephalic.   Eyes: No scleral icterus.   Cardiovascular:  Normal rate.           Pulmonary/Chest: Effort normal. No stridor. No respiratory distress.   Abdominal: There is no guarding.     Neurological: She is alert.   Not oriented, moving all extremities   Skin: No rash noted. No erythema.         ED Course   Procedures  Labs Reviewed   CBC W/ AUTO DIFFERENTIAL - Abnormal       Result Value    WBC 13.21 (*)     RBC 3.27 (*)     Hemoglobin 10.0 (*)     Hematocrit 30.8 (*)     MCV 94      MCH 30.6      MCHC 32.5      RDW 14.8 (*)     Platelets 489 (*)     MPV 9.0 (*)     Immature Granulocytes 0.2      Gran # (ANC) 11.4 (*)     Immature Grans (Abs) 0.03      Lymph # 0.6 (*)     Mono # 1.2 (*)     Eos # 0.0      Baso # 0.07      nRBC 0      Gran % 86.0 (*)     Lymph % 4.4 (*)     Mono % 8.7      Eosinophil % 0.2      Basophil % 0.5      Differential Method Automated      COMPREHENSIVE METABOLIC PANEL - Abnormal    Sodium 137      Potassium 3.9      Chloride 104      CO2 19 (*)     Glucose 105      BUN 20      Creatinine 0.8      Calcium 9.9      Total Protein 6.9      Albumin 3.7      Total Bilirubin 0.5      Alkaline Phosphatase 64      AST 22      ALT 16      eGFR >60      Anion Gap 14     URINALYSIS, REFLEX TO URINE CULTURE - Abnormal    Specimen UA Urine, Catheterized      Color, UA Yellow      Appearance, UA Clear      pH, UA 7.0      Specific Gravity, UA 1.010      Protein, UA Negative      Glucose, UA Negative      Ketones, UA 1+ (*)     Bilirubin (UA) Negative      Occult Blood UA Negative      Nitrite, UA Negative      Urobilinogen, UA Negative      Leukocytes, UA Negative      Narrative:     Specimen Source->Urine   B-TYPE NATRIURETIC PEPTIDE - Abnormal     (*)    TROPONIN I - Abnormal    Troponin I 0.047 (*)    ISTAT PROCEDURE - Abnormal    POC PH 7.390      POC PCO2 38.4      POC PO2 35 (*)     POC HCO3 23.2 (*)     POC BE -2      POC SATURATED O2 66      POC Lactate 0.96      POC TCO2 24      Sample VENOUS      Site Other      Allens Test N/A      DelSys Room Air      Mode SPONT     MAGNESIUM    Magnesium 2.3     APTT    aPTT 25.3     PROTIME-INR    Prothrombin Time 10.6      INR 1.0     LIPASE    Lipase 49     SARS-COV-2 RNA AMPLIFICATION, QUAL    SARS-CoV-2 RNA, Amplification, Qual Negative     AMMONIA    Ammonia 49      Narrative:     recollect. needs to be on ice    PROCALCITONIN    Procalcitonin 0.06            Imaging Results              X-Ray Chest AP Portable (Final result)  Result time 08/19/24 14:28:15      Final result by Day Dong MD (08/19/24 14:28:15)                   Impression:      No radiographically evident interval detrimental change in the appearance of the chest when compared to the previous exam.      Electronically signed by: Matteo Dong MD  Date:    08/19/2024  Time:    14:28               Narrative:    EXAMINATION:  XR  CHEST AP PORTABLE    CLINICAL HISTORY:  Sepsis;    TECHNIQUE:  A single portable upright AP chest radiograph was acquired.    COMPARISON:  Chest x-ray-07/21/2024    FINDINGS:  The heart is enlarged.  There is atherosclerotic calcification present within the thoracic aortic arch.  No central pulmonary vascular congestion.  There are calcified granulomas present within the right chest, similar to the prior exam.  No new zone of airspace consolidation or new pulmonary mass.  There is accentuation of the interstitial lung markings in the mid and lower lung zones, similar to the prior exam, possibly due to chronic interstitial lung disease.  No pleural fluid or pneumothorax.  The bones appear osteopenic.                                       CT Abdomen Pelvis  Without Contrast (Final result)  Result time 08/19/24 14:39:26      Final result by Jerry Zuñiga MD (08/19/24 14:39:26)                   Impression:      No acute abdominopelvic process.    Renal cysts.    Diverticulosis.      Electronically signed by: Jerry Zuñiga MD  Date:    08/19/2024  Time:    14:39               Narrative:    EXAMINATION:  CT ABDOMEN PELVIS WITHOUT CONTRAST    CLINICAL HISTORY:  Abdominal pain, acute, nonlocalized;    TECHNIQUE:  Low dose axial images, sagittal and coronal reformations were obtained from the lung bases to the pubic symphysis.  Oral contrast was not administered.    COMPARISON:  07/21/2024    FINDINGS:  The liver, spleen, pancreas, and adrenal glands are unremarkable.    There is a 5.0 cm cyst of the right kidney.  A 1.8 cm parapelvic cyst of the left kidney is present.  No hydronephrosis.    The bowel gas pattern is nonobstructed.  There is extensive diverticulosis coli without evidence for diverticulitis.    No free air, free fluid, or fluid collection.    There has been hysterectomy.  There is heavy calcification of the aorta without aneurysm.    Degenerative change of the spine typical for age is present.                                        CT Head Without Contrast (Final result)  Result time 08/19/24 14:04:54      Final result by Saeid Jeffrey MD (08/19/24 14:04:54)                   Impression:      1. No acute intracranial CT findings.      Electronically signed by: Saeid Jeffrey  Date:    08/19/2024  Time:    14:04               Narrative:    EXAMINATION:  CT HEAD WITHOUT CONTRAST    CLINICAL HISTORY:  Mental status change, unknown cause;    TECHNIQUE:  Low dose axial CT images obtained throughout the head without intravenous contrast. Sagittal and coronal reconstructions were performed.    COMPARISON:  Head CT 06/15/2023.    FINDINGS:  Somewhat motion limited exam.    Brain: There is no evidence of a mass, edema, midline shift, or intracranial hemorrhage. No extra-axial fluid collection. No CT evidence of an acute major vascular territorial infarct.    Ventricles: The ventricles, sulci, and cisterns are within normal limits.    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  No acute process.    Extracranial soft tissues: Limited imaging is within normal limits.    Other: The visualized portions of the sinuses, orbits, and mastoid air cells are unremarkable.                                       Medications - No data to display  Medical Decision Making  89-year-old female presents for altered mental status.  Worsening since Thursday.  Patient alert although not oriented, has not been sleeping, concern for delirium, workup initiated with no evidence of UTI.  Patient does have some cardiac stress with elevated BNP and troponin although EKG with no STEMI..  Chest x-ray per radiology read no acute evidence of pneumonia, patient with no history of significant coughing or fever or breathing difficulty, urinalysis no evidence of infection.  Grandson updated and agreeable with admission.  CT head no hemorrhage.    Amount and/or Complexity of Data Reviewed  Labs: ordered. Decision-making details  documented in ED Course.  Radiology: ordered and independent interpretation performed.     Details: Right-sided density, left-sided density likely breast tissue, no pneumothorax  ECG/medicine tests: ordered and independent interpretation performed.     Details: EKG rate 87, , , PVCs, no STEMI  Discussion of management or test interpretation with external provider(s): Spoke with Jerry Andrew NP with hospitalist team will admit for further management and evaluation      Risk  Decision regarding hospitalization.               ED Course as of 08/19/24 1723   Mon Aug 19, 2024   1424 SARS-CoV-2 RNA, Amplification, Qual: Negative [KB]   1425 POC Lactate: 0.96 [KB]   1425 POC SATURATED O2: 66 [KB]   1425 POC HCO3(!): 23.2 [KB]   1425 POC PO2(!!): 35 [KB]   1425 POC PCO2: 38.4 [KB]   1425 POC PH: 7.390 [KB]   1435 WBC(!): 13.21 [KB]   1435 Hemoglobin(!): 10.0 [KB]   1435 Hematocrit(!): 30.8 [KB]   1448 PTT: 25.3 [KB]   1449 INR: 1.0 [KB]   1449 PT: 10.6 [KB]   1503 Magnesium : 2.3 [KB]   1503 Lipase: 49 [KB]   1503 Sodium: 137 [KB]   1503 Potassium: 3.9 [KB]   1503 Chloride: 104 [KB]   1503 CO2(!): 19 [KB]   1503 Glucose: 105 [KB]   1503 BUN: 20 [KB]   1503 Creatinine: 0.8 [KB]   1503 Calcium: 9.9 [KB]   1503 PROTEIN TOTAL: 6.9 [KB]   1503 Albumin: 3.7 [KB]   1503 BILIRUBIN TOTAL: 0.5 [KB]   1503 AST: 22 [KB]   1503 ALT: 16 [KB]   1503 eGFR: >60 [KB]   1503 Anion Gap: 14 [KB]   1503 Magnesium : 2.3 [KB]   1503 Troponin I(!): 0.047 [KB]   1528 BNP(!): 151 [KB]   1543 Ammonia: 49 [KB]   1555 Leukocyte Esterase, UA: Negative [KB]   1555 UROBILINOGEN UA: Negative [KB]   1555 NITRITE UA: Negative [KB]   1555 Blood, UA: Negative [KB]   1555 Bilirubin (UA): Negative [KB]   1555 Ketones, UA(!): 1+ [KB]   1555 Glucose, UA: Negative [KB]   1555 Protein, UA: Negative [KB]   1555 Spec Grav UA: 1.010 [KB]   1555 pH, UA: 7.0 [KB]   1555 Appearance, UA: Clear [KB]   1555 Color, UA: Yellow [KB]   1555 Specimen UA: Urine,  Catheterized [KB]   1723 Procalcitonin: 0.06 [KB]      ED Course User Index  [KB] Liang Upton Jr.,                            Clinical Impression:  Final diagnoses:  [R41.82] AMS (altered mental status) (Primary)  [R10.84] Generalized abdominal pain  [R65.10] SIRS (systemic inflammatory response syndrome)  [R79.89] Elevated brain natriuretic peptide (BNP) level  [R79.89] Elevated troponin  [R41.0] Delirium          ED Disposition Condition    Admit Stable                Liang Upton Jr.,   08/19/24 1621       Liang Upton Jr.,   08/19/24 1722

## 2024-08-20 LAB
ALBUMIN SERPL BCP-MCNC: 3.3 G/DL (ref 3.5–5.2)
ALP SERPL-CCNC: 65 U/L (ref 55–135)
ALT SERPL W/O P-5'-P-CCNC: 14 U/L (ref 10–44)
ANION GAP SERPL CALC-SCNC: 13 MMOL/L (ref 8–16)
AST SERPL-CCNC: 24 U/L (ref 10–40)
BASOPHILS # BLD AUTO: 0.06 K/UL (ref 0–0.2)
BASOPHILS NFR BLD: 0.5 % (ref 0–1.9)
BILIRUB SERPL-MCNC: 0.6 MG/DL (ref 0.1–1)
BUN SERPL-MCNC: 16 MG/DL (ref 10–30)
CALCIUM SERPL-MCNC: 9.7 MG/DL (ref 8.7–10.5)
CHLORIDE SERPL-SCNC: 105 MMOL/L (ref 95–110)
CO2 SERPL-SCNC: 19 MMOL/L (ref 23–29)
CREAT SERPL-MCNC: 0.7 MG/DL (ref 0.5–1.4)
DIFFERENTIAL METHOD BLD: ABNORMAL
EOSINOPHIL # BLD AUTO: 0 K/UL (ref 0–0.5)
EOSINOPHIL NFR BLD: 0.3 % (ref 0–8)
ERYTHROCYTE [DISTWIDTH] IN BLOOD BY AUTOMATED COUNT: 14.8 % (ref 11.5–14.5)
EST. GFR  (NO RACE VARIABLE): >60 ML/MIN/1.73 M^2
FOLATE SERPL-MCNC: >40 NG/ML (ref 4–24)
GLUCOSE SERPL-MCNC: 101 MG/DL (ref 70–110)
HCT VFR BLD AUTO: 29.9 % (ref 37–48.5)
HGB BLD-MCNC: 9.7 G/DL (ref 12–16)
IMM GRANULOCYTES # BLD AUTO: 0.05 K/UL (ref 0–0.04)
IMM GRANULOCYTES NFR BLD AUTO: 0.4 % (ref 0–0.5)
LYMPHOCYTES # BLD AUTO: 1.1 K/UL (ref 1–4.8)
LYMPHOCYTES NFR BLD: 9.3 % (ref 18–48)
MAGNESIUM SERPL-MCNC: 2.3 MG/DL (ref 1.6–2.6)
MCH RBC QN AUTO: 29.7 PG (ref 27–31)
MCHC RBC AUTO-ENTMCNC: 32.4 G/DL (ref 32–36)
MCV RBC AUTO: 91 FL (ref 82–98)
MONOCYTES # BLD AUTO: 1.6 K/UL (ref 0.3–1)
MONOCYTES NFR BLD: 13.9 % (ref 4–15)
NEUTROPHILS # BLD AUTO: 8.9 K/UL (ref 1.8–7.7)
NEUTROPHILS NFR BLD: 75.6 % (ref 38–73)
NRBC BLD-RTO: 0 /100 WBC
PHOSPHATE SERPL-MCNC: 3.3 MG/DL (ref 2.7–4.5)
PLATELET # BLD AUTO: 476 K/UL (ref 150–450)
PMV BLD AUTO: 9 FL (ref 9.2–12.9)
POTASSIUM SERPL-SCNC: 3.6 MMOL/L (ref 3.5–5.1)
PROT SERPL-MCNC: 6.5 G/DL (ref 6–8.4)
RBC # BLD AUTO: 3.27 M/UL (ref 4–5.4)
SODIUM SERPL-SCNC: 137 MMOL/L (ref 136–145)
WBC # BLD AUTO: 11.73 K/UL (ref 3.9–12.7)

## 2024-08-20 PROCEDURE — 94761 N-INVAS EAR/PLS OXIMETRY MLT: CPT

## 2024-08-20 PROCEDURE — 84425 ASSAY OF VITAMIN B-1: CPT | Performed by: STUDENT IN AN ORGANIZED HEALTH CARE EDUCATION/TRAINING PROGRAM

## 2024-08-20 PROCEDURE — 95819 EEG AWAKE AND ASLEEP: CPT | Mod: 26,,, | Performed by: PSYCHIATRY & NEUROLOGY

## 2024-08-20 PROCEDURE — 86592 SYPHILIS TEST NON-TREP QUAL: CPT | Mod: HCNC | Performed by: NURSE PRACTITIONER

## 2024-08-20 PROCEDURE — 36415 COLL VENOUS BLD VENIPUNCTURE: CPT | Performed by: NURSE PRACTITIONER

## 2024-08-20 PROCEDURE — 80053 COMPREHEN METABOLIC PANEL: CPT | Performed by: NURSE PRACTITIONER

## 2024-08-20 PROCEDURE — 84207 ASSAY OF VITAMIN B-6: CPT | Performed by: STUDENT IN AN ORGANIZED HEALTH CARE EDUCATION/TRAINING PROGRAM

## 2024-08-20 PROCEDURE — 25000003 PHARM REV CODE 250: Performed by: NURSE PRACTITIONER

## 2024-08-20 PROCEDURE — 97535 SELF CARE MNGMENT TRAINING: CPT

## 2024-08-20 PROCEDURE — 84100 ASSAY OF PHOSPHORUS: CPT | Performed by: NURSE PRACTITIONER

## 2024-08-20 PROCEDURE — 97165 OT EVAL LOW COMPLEX 30 MIN: CPT

## 2024-08-20 PROCEDURE — 36410 VNPNXR 3YR/> PHY/QHP DX/THER: CPT

## 2024-08-20 PROCEDURE — 83735 ASSAY OF MAGNESIUM: CPT | Performed by: NURSE PRACTITIONER

## 2024-08-20 PROCEDURE — 82746 ASSAY OF FOLIC ACID SERUM: CPT | Performed by: STUDENT IN AN ORGANIZED HEALTH CARE EDUCATION/TRAINING PROGRAM

## 2024-08-20 PROCEDURE — 97116 GAIT TRAINING THERAPY: CPT

## 2024-08-20 PROCEDURE — 36415 COLL VENOUS BLD VENIPUNCTURE: CPT | Performed by: STUDENT IN AN ORGANIZED HEALTH CARE EDUCATION/TRAINING PROGRAM

## 2024-08-20 PROCEDURE — 85025 COMPLETE CBC W/AUTO DIFF WBC: CPT | Performed by: NURSE PRACTITIONER

## 2024-08-20 PROCEDURE — 87389 HIV-1 AG W/HIV-1&-2 AB AG IA: CPT | Performed by: STUDENT IN AN ORGANIZED HEALTH CARE EDUCATION/TRAINING PROGRAM

## 2024-08-20 PROCEDURE — 97161 PT EVAL LOW COMPLEX 20 MIN: CPT

## 2024-08-20 PROCEDURE — 76937 US GUIDE VASCULAR ACCESS: CPT

## 2024-08-20 PROCEDURE — C1751 CATH, INF, PER/CENT/MIDLINE: HCPCS

## 2024-08-20 PROCEDURE — 25000003 PHARM REV CODE 250: Performed by: STUDENT IN AN ORGANIZED HEALTH CARE EDUCATION/TRAINING PROGRAM

## 2024-08-20 PROCEDURE — 11000001 HC ACUTE MED/SURG PRIVATE ROOM

## 2024-08-20 PROCEDURE — G0425 INPT/ED TELECONSULT30: HCPCS | Mod: 95,,, | Performed by: PSYCHIATRY & NEUROLOGY

## 2024-08-20 PROCEDURE — 99900035 HC TECH TIME PER 15 MIN (STAT)

## 2024-08-20 RX ORDER — DIVALPROEX SODIUM 125 MG/1
250 CAPSULE, COATED PELLETS ORAL NIGHTLY
Status: DISCONTINUED | OUTPATIENT
Start: 2024-08-20 | End: 2024-08-22

## 2024-08-20 RX ORDER — DIVALPROEX SODIUM 125 MG/1
125 CAPSULE, COATED PELLETS ORAL DAILY
Status: DISCONTINUED | OUTPATIENT
Start: 2024-08-21 | End: 2024-08-22

## 2024-08-20 RX ADMIN — LOSARTAN POTASSIUM 100 MG: 100 TABLET, FILM COATED ORAL at 09:08

## 2024-08-20 RX ADMIN — MELATONIN TAB 3 MG 9 MG: 3 TAB at 09:08

## 2024-08-20 RX ADMIN — DIVALPROEX SODIUM 250 MG: 125 CAPSULE, COATED PELLETS ORAL at 09:08

## 2024-08-20 RX ADMIN — FOLIC ACID 1000 MCG: 1 TABLET ORAL at 09:08

## 2024-08-20 RX ADMIN — NIFEDIPINE 30 MG: 30 TABLET, FILM COATED, EXTENDED RELEASE ORAL at 09:08

## 2024-08-20 RX ADMIN — POTASSIUM BICARBONATE 50 MEQ: 977.5 TABLET, EFFERVESCENT ORAL at 09:08

## 2024-08-20 NOTE — PLAN OF CARE
Problem: Physical Therapy  Goal: Physical Therapy Goal  Description: Goals to be met by: 2024     Patient will increase functional independence with mobility by performin. Supine to sit with Contact Guard Assistance  2. Sit to stand transfer with Contact Guard Assistance  3. Bed to chair transfer with Contact Guard Assistance using Rolling Walker  4. Gait  x 250 feet with Contact Guard Assistance using Rolling Walker.   5. Lower extremity exercise program x20 reps   Outcome: Progressing   PT eval and treat. Gait with RW 250ft min assist. G son at bedside. Pt confusional/tangential thoughts/speech. Low intensity with 24 hr supervision

## 2024-08-20 NOTE — CONSULTS
"[facility]  Psychiatry  Telepsychiatry Consult Note    Please see previous notes:    Patient agreeable to consultation via telepsychiatry.    Tele-Consultation from Psychiatry started: 8/20/2024 at 12:47 PM  The chief complaint leading to psychiatric consultation is: "sean, anxiety "  This consultation was requested by Dr Ronald Houston, the inpatient physician.  The location of the consulting psychiatrist is [place].  The patient location is  [facility] *   The patient was admitted at: 8/19/2024  1:14 PM  Also present with the patient at the time of the consultation: RN    Patient Identification:   Brie Han is a 89 y.o. female.    Patient information was obtained from patient, past medical records, ER records, and primary team.  Patient presented voluntarily to the Emergency Department     Inpatient consult to Telemedicine - Psych  Consult performed by: Mame Peacock MD  Consult ordered by: Alfred Merida MD        Teleconsult Time Documentation  Subjective:     History of Present Illness:  Per medicine H&P 8/19/24: "Principal Problem:Encephalopathy, metabolic     Chief Complaint:        Chief Complaint   Patient presents with    Altered Mental Status       Confused outburst         HPI: Brie Han is a 89-year-old female who presented to the emergency room for evaluation of worsening confusion since Thursday evening.  Patient is altered and unable to provide any information.  Patient was grandson is at bedside and provides history.  He denies any recent illness or injury.  He denies any fever or chills.  No known sick contacts or travel.  He reports her confusion started abruptly on Thursday evening.  Previous medical history includes hypertension, peripheral arterial disease, coronary artery disease, CKD, iron-deficiency anemia, and colon cancer.  ER workup: CBC with leukocytosis of 13,000.  CMP was unremarkable.  Troponin mildly elevated 0.047.  BNP elevated at 151.  CT " "of the head was negative.  Urinalysis was negative for infection.  Patient admitted to Hospital Medicine for treatment and management.  Will obtain neurology consult in a.m."    Pt is a 89 yo female with PMH as below and past psychiatric hx dementia per chart currently admitted to medicine as above. Chart reviewed, no PRNs for agitation. On exam, interview limited 2/2 AMS; pt states she is feeling "very tired...I wasn't expecting this blessing, I was strictly to hold the family together...I prayed to get to this time...The blessing it goes better...These are all the blessing they don't go the other day." Unable to to logically participate in exam, orientation questions received tangential responses, when asked the month pt responded "well you would know before I go, I don't want any ideas." Due to the patient's inability to logically or linearly participate in the psychiatric assessment, I reached out to grandjessika Prabhjotwaylon Han (# on the EMR facesheet) for collateral information--no answer.    Psychiatric History:   Previous Psychiatric Hospitalizations: unable to assess  Previous Medication Trials: unable to assess  Previous Suicide Attempts: unable to assess  History of Violence: unable to assess  History of Depression: unable to assess  History of Nicky: unable to assess  History of Auditory/Visual Hallucination unable to assess  History of Delusions: unable to assess  Outpatient psychiatrist (current & past): unable to assess    Substance Abuse History:  Tobacco:unable to assess  Alcohol: unable to assess  Illicit Substances:unable to assess  Detox/Rehab: unable to assess    Legal History: Past charges/incarcerations: unable to assess     Family Psychiatric History: unable to assess      Social History:  Developmental/Childhood:unable to assess  *Education:unable to assess  Employment Status/Finances:unable to assess   Relationship Status/Sexual Orientation: unable to assess  Children: unable to assess  Housing " Status: unable to assess    history:  unable to assess  Access to gun: unable to assess  Caodaism:unable to assess  Recreational activities:unable to assess    Psychiatric Mental Status Exam:  Arousal: awake  Sensorium/Orientation: oriented to unable to assess  Behavior/Cooperation: lying calmly in bed, engages easily   Speech: normal volume, tone, pressured  Language: unable to assess  Mood: unable to assess  Affect: euthymic  Thought Process: disorganized, tangential  Thought Content:   Auditory hallucinations: unable to assess  Visual hallucinations: unable to assess  Paranoia: unable to assess  Delusions:  unable to assess  Suicidal ideation: unable to assess  Homicidal ideation: unable to assess  Attention/Concentration:  impaired  Memory:    Recent: unable to assess   Remote: unable to assess  Fund of Knowledge: unable to assess  Abstract reasoning: unable to assess  Insight:limited  Judgment: limited        Past Medical History:   Past Medical History:   Diagnosis Date    ACP (advance care planning) 2/7/2024    Amaurosis fugax     Bilateral left eye worse    Anticoagulant long-term use     Arthritis     Glaucoma     resolved    Hyperlipidemia     Hypertension     Stroke 2006      Laboratory Data:   Labs Reviewed   CBC W/ AUTO DIFFERENTIAL - Abnormal       Result Value    WBC 13.21 (*)     RBC 3.27 (*)     Hemoglobin 10.0 (*)     Hematocrit 30.8 (*)     MCV 94      MCH 30.6      MCHC 32.5      RDW 14.8 (*)     Platelets 489 (*)     MPV 9.0 (*)     Immature Granulocytes 0.2      Gran # (ANC) 11.4 (*)     Immature Grans (Abs) 0.03      Lymph # 0.6 (*)     Mono # 1.2 (*)     Eos # 0.0      Baso # 0.07      nRBC 0      Gran % 86.0 (*)     Lymph % 4.4 (*)     Mono % 8.7      Eosinophil % 0.2      Basophil % 0.5      Differential Method Automated     COMPREHENSIVE METABOLIC PANEL - Abnormal    Sodium 137      Potassium 3.9      Chloride 104      CO2 19 (*)     Glucose 105      BUN 20      Creatinine 0.8       Calcium 9.9      Total Protein 6.9      Albumin 3.7      Total Bilirubin 0.5      Alkaline Phosphatase 64      AST 22      ALT 16      eGFR >60      Anion Gap 14     URINALYSIS, REFLEX TO URINE CULTURE - Abnormal    Specimen UA Urine, Catheterized      Color, UA Yellow      Appearance, UA Clear      pH, UA 7.0      Specific Gravity, UA 1.010      Protein, UA Negative      Glucose, UA Negative      Ketones, UA 1+ (*)     Bilirubin (UA) Negative      Occult Blood UA Negative      Nitrite, UA Negative      Urobilinogen, UA Negative      Leukocytes, UA Negative      Narrative:     Specimen Source->Urine   B-TYPE NATRIURETIC PEPTIDE - Abnormal     (*)    TROPONIN I - Abnormal    Troponin I 0.047 (*)    ISTAT PROCEDURE - Abnormal    POC PH 7.390      POC PCO2 38.4      POC PO2 35 (*)     POC HCO3 23.2 (*)     POC BE -2      POC SATURATED O2 66      POC Lactate 0.96      POC TCO2 24      Sample VENOUS      Site Other      Allens Test N/A      DelSys Room Air      Mode SPONT     MAGNESIUM    Magnesium 2.3     APTT    aPTT 25.3     PROTIME-INR    Prothrombin Time 10.6      INR 1.0     LIPASE    Lipase 49     SARS-COV-2 RNA AMPLIFICATION, QUAL    SARS-CoV-2 RNA, Amplification, Qual Negative     AMMONIA    Ammonia 49      Narrative:     recollect. needs to be on ice    PROCALCITONIN    Procalcitonin 0.06         Neurological History:  Seizures: unable to assess  Head trauma: unable to assess    Allergies:   Review of patient's allergies indicates:   Allergen Reactions    Contrast media Other (See Comments)    Aspirin Other (See Comments)    Ciprofloxacin Other (See Comments)    Iron Other (See Comments)     Small rash with po iron.  Tolerated IV iron    Iodine Rash    Penicillins Rash       Medications in ER:   Medications   sodium chloride 0.9% flush 10 mL (has no administration in time range)   albuterol-ipratropium 2.5 mg-0.5 mg/3 mL nebulizer solution 3 mL (has no administration in time range)   melatonin tablet 9  mg (9 mg Oral Given 8/19/24 2102)   ondansetron injection 4 mg (has no administration in time range)   simethicone chewable tablet 80 mg (has no administration in time range)   aluminum-magnesium hydroxide-simethicone 200-200-20 mg/5 mL suspension 30 mL (has no administration in time range)   acetaminophen tablet 650 mg (has no administration in time range)   naloxone 0.4 mg/mL injection 0.02 mg (has no administration in time range)   potassium bicarbonate disintegrating tablet 50 mEq (50 mEq Oral Given 8/20/24 0934)   potassium bicarbonate disintegrating tablet 35 mEq (has no administration in time range)   potassium bicarbonate disintegrating tablet 60 mEq (has no administration in time range)   magnesium oxide tablet 800 mg (has no administration in time range)   magnesium oxide tablet 800 mg (has no administration in time range)   potassium, sodium phosphates 280-160-250 mg packet 2 packet (has no administration in time range)   potassium, sodium phosphates 280-160-250 mg packet 2 packet (has no administration in time range)   potassium, sodium phosphates 280-160-250 mg packet 2 packet (has no administration in time range)   glucose chewable tablet 16 g (has no administration in time range)   glucose chewable tablet 24 g (has no administration in time range)   glucagon (human recombinant) injection 1 mg (has no administration in time range)   acetaminophen tablet 650 mg (has no administration in time range)   dextrose 10% bolus 125 mL 125 mL (has no administration in time range)   dextrose 10% bolus 250 mL 250 mL (has no administration in time range)   folic acid tablet 1,000 mcg (1,000 mcg Oral Given 8/20/24 0917)   losartan tablet 100 mg (100 mg Oral Given 8/20/24 0917)   NIFEdipine 24 hr tablet 30 mg (30 mg Oral Given 8/19/24 2059)       Medications at home:   Current Discharge Medication List        CONTINUE these medications which have NOT CHANGED    Details   artificial tears (ISOPTO TEARS) 0.5 %  ophthalmic solution Place 1 drop into both eyes 4 (four) times daily as needed (Dry Eyes).      folic acid (FOLVITE) 1 MG tablet Take 1 tablet (1,000 mcg total) by mouth once daily.  Qty: 30 tablet, Refills: 0      losartan (COZAAR) 100 MG tablet Take 1 tablet (100 mg total) by mouth once daily.  Qty: 90 tablet, Refills: 3    Comments: .  Associated Diagnoses: HTN (hypertension), benign      lovastatin (MEVACOR) 10 MG tablet Take 1 tablet (10 mg total) by mouth every evening.  Qty: 30 tablet, Refills: 0    Associated Diagnoses: Hyperlipidemia with target LDL less than 70      multivitamin capsule Take 1 capsule by mouth once daily.  Qty: 30 capsule, Refills: 0      NIFEdipine (PROCARDIA-XL) 30 MG (OSM) 24 hr tablet Take 1 tablet (30 mg total) by mouth every evening.  Qty: 30 tablet, Refills: 0    Associated Diagnoses: HTN (hypertension), benign      senna-docusate 8.6-50 mg (PERICOLACE) 8.6-50 mg per tablet Take 1 tablet by mouth 2 (two) times daily as needed for Constipation.      sodium chloride 1,000 mg TbSO oral tablet Take 1 tablet (1,000 mg total) by mouth once daily.  Qty: 90 tablet, Refills: 3    Associated Diagnoses: Hyponatremia      acetaminophen (TYLENOL) 500 MG tablet Take 2 tablets (1,000 mg total) by mouth every 8 (eight) hours as needed for Pain.      oxybutynin (DITROPAN-XL) 5 MG TR24 Take 1 tablet (5 mg total) by mouth once daily.  Qty: 30 tablet, Refills: 11    Associated Diagnoses: Urine frequency      sucralfate (CARAFATE) 1 gram tablet Take 1 tablet (1 g total) by mouth 2 (two) times daily.  Qty: 60 tablet, Refills: 0    Associated Diagnoses: Gastroesophageal reflux disease with esophagitis without hemorrhage               Assessment - Diagnosis - Goals:     Diagnosis/Impression:   Acute encephalopathy-unclear cause, noted to be hypoxemic on ABG, incr troponin, per EKG anterior infarct now present, incr WBC    Rec:   - Obtain additional diagnostic data: TSH, B12, folate, HIV  - Would avoid  antipsychotics given possibility of recent cardiac injury  - Avoid benzodiazepines, anticholinergics, minimize opioids, as these agents may contribute to delirium  - If needed for agitation can use Depakote/Depacon PO/IV off label for delirium given prolonged Qtc, start 125 mg PO/IV daily in the morning and 250 mg PO/IV qHS. May titrate up to 900 mg per 24 hours in DIVIDED doses. Trend LFTs, recheck ammonia, can check level after 72 hrs      Time with patient, chart: 30      More than 50% of the time was spent counseling/coordinating care    Consulting clinician was informed of the encounter and consult note.    Consultation ended: 8/20/2024 at 1:21 PM      Mame Peacock MD   Psychiatry  [facility]

## 2024-08-20 NOTE — PT/OT/SLP EVAL
Occupational Therapy   Evaluation and Discharge Note    Name: Brie Han  MRN: [id number]  Admitting Diagnosis: Encephalopathy, metabolic  Recent Surgery: * No surgery found *      Recommendations:     Discharge Recommendations: No Therapy Indicated  Discharge Equipment Recommendations: none  Barriers to discharge:  None    Assessment:     Brie Han is a 89 y.o. female with a medical diagnosis of Encephalopathy, metabolic. At this time, patient is Supervision with ADLs. Patient is a poor historian and oriented only to self. Patient is able to perform all self care tasks but is unsafe to perform ADLs due to confusion. functioning at their prior level of function and does not require further acute OT services.     Plan:     During this hospitalization, patient does not require further acute OT services.  Please re-consult if situation changes.    Plan of Care Reviewed with: patient    Subjective     Chief Complaint: none  Patient/Family Comments/goals: none    Occupational Profile:  Living Environment: Patient lives alone.   Previous level of function: Patient was independent with ADLs and mobility.   Equipment Used at home: walker, rolling  Assistance upon Discharge: Patient will receive assistance from family.     Pain/Comfort:  Pain Rating 1: 0/10  Pain Rating Post-Intervention 1: 0/10    Patients cultural, spiritual, Sabianist conflicts given the current situation: no    Objective:     Communicated with: nurse Contreras/Rocio prior to session.  Patient found up in chair with PureWick, telemetry upon OT entry to room.    General Precautions: Standard, fall  Orthopedic Precautions: N/A  Braces: N/A  Respiratory Status: Room air     Occupational Performance:    Bed Mobility:    Not assessed; patient seated in chair upon arrival. See PT notes.     Functional Mobility/Transfers:  Patient completed Sit <> Stand Transfer with stand by assistance  with  rolling walker   Patient completed Toilet Transfer Stand Pivot  technique with stand by assistance with  rolling walker    Activities of Daily Living:  Grooming: supervision with all grooming tasks standing at sink  Lower Body Dressing: supervision to do/doff socks seated in chair  Toileting: Purewick in place     Cognitive/Visual Perceptual:  Cognitive/Psychosocial Skills:     -       Oriented to: Person and (name only)   -       Follows Commands/attention:Follows one-step commands  -       Communication: clear/fluent  -       Safety awareness/insight to disability: impaired   -       Mood/Affect/Coping skills/emotional control: Cooperative  Visual/Perceptual:      -Intact     Physical Exam:  Postural examination/scapula alignment:    -       Rounded shoulders  -       Forward head  Upper Extremity Range of Motion:     -       Right Upper Extremity: WFL  -       Left Upper Extremity: WFL  Upper Extremity Strength:    -       Right Upper Extremity: WFL  -       Left Upper Extremity: WFL   Strength:    -       Right Upper Extremity: WFL  -       Left Upper Extremity: WFL  Fine Motor Coordination:    -       Intact  Gross motor coordination:   WFL    AMPAC 6 Click ADL:  AMPAC Total Score: 24    Treatment & Education:  Pt was educated on the role of occupational therapy and the importance of safely and independently completing ADLs and functional mobility.       Patient left up in chair with all lines intact, call button in reach, chair alarm on, and nurse notified    GOALS:   Multidisciplinary Problems       Occupational Therapy Goals       Not on file                    History:     Past Medical History:   Diagnosis Date    ACP (advance care planning) 2/7/2024    Amaurosis fugax     Bilateral left eye worse    Anticoagulant long-term use     Arthritis     Glaucoma     resolved    Hyperlipidemia     Hypertension     Stroke 2006         Past Surgical History:   Procedure Laterality Date    APPENDECTOMY  1950's    BREAST SURGERY      CATARACT EXTRACTION W/  INTRAOCULAR LENS  IMPLANT Bilateral     Dr Rojo      SECTION      3 c/s and d/c    COLONOSCOPY N/A 2024    Procedure: COLONOSCOPY;  Surgeon: Trev Lafleur MD;  Location: [facility];  Service: Endoscopy;  Laterality: N/A;    ESOPHAGOGASTRODUODENOSCOPY N/A 2024    Procedure: EGD (ESOPHAGOGASTRODUODENOSCOPY);  Surgeon: Trev Lafleur MD;  Location: [facility];  Service: Endoscopy;  Laterality: N/A;    EYE SURGERY      Glaucoma     HYSTERECTOMY      RIGHT OOPHORECTOMY      With postop hemorrhage    TONSILLECTOMY      Yag Capsulotomy Left 2021       Time Tracking:     OT Date of Treatment: 24  OT Start Time: 1124  OT Stop Time: 1138  OT Total Time (min): 14 min    Billable Minutes:Evaluation 6  Self Care/Home Management 8    2024

## 2024-08-20 NOTE — PT/OT/SLP EVAL
Physical Therapy Evaluation    Patient Name:  Brie Han   MRN:  [id number]    Recommendations:     Discharge Recommendations: Low Intensity Therapy   Discharge Equipment Recommendations: none   Barriers to discharge: None    Assessment:     Brie Han is a 89 y.o. female admitted with a medical diagnosis of Encephalopathy, metabolic.  She presents with the following impairments/functional limitations: weakness, impaired endurance, impaired functional mobility, gait instability, impaired cognition, decreased safety awareness .    Pt seen supine in bed, calm,alert and agreeable to PT. UNRULY son at bedside who stays with pt some days. UNRULY son stated pt was normally ambulatory with and without RW, does some housework and take care of self.  Pt requiring min assist for mobility and ambulated 250ft with RW- interactive with staff at [facility]. OOB chair post PT. UNRULY son present.    Rehab Prognosis: Fair; patient would benefit from acute skilled PT services to address these deficits and reach maximum level of function.    Recent Surgery: * No surgery found *      Plan:     During this hospitalization, patient to be seen 6 x/week to address the identified rehab impairments via gait training, therapeutic activities, therapeutic exercises and progress toward the following goals:    Plan of Care Expires:  08/30/24    Subjective   Pt talkative but has tangential speech/thoughts  Pt following directions  Chief Complaint: none  Patient/Family Comments/goals: none  Pain/Comfort:  Pain Rating 1: 0/10    Patients cultural, spiritual, Muslim conflicts given the current situation:      Living Environment:  Home with UNRULY rosen  Prior to admission, patients level of function was ambulatory with and without AD.  Equipment used at home: walker, rolling.  DME owned (not currently used): none.  Upon discharge, patient will have assistance from family.    Objective:     Communicated with nurse Chan prior to session.  Patient found HOB  elevated with PureWick, telemetry, bed alarm  upon PT entry to room.    General Precautions: Standard, fall  Orthopedic Precautions:N/A   Braces: N/A  Respiratory Status: Room air    Exams:  Postural Exam:  Patient presented with the following abnormalities:    -       Rounded shoulders  -       Forward head  -       BMI 22.23  RLE ROM: WFL  RLE Strength: WFL  LLE ROM: WFL  LLE Strength: WFL    Functional Mobility:  Bed Mobility:     Rolling Left:  minimum assistance  Scooting: minimum assistance  Supine to Sit: minimum assistance  Transfers:     Sit to Stand:  minimum assistance with rolling walker  Bed to Chair: minimum assistance with  rolling walker  using  Stand Pivot  Gait: 250ft with RW min assist with chair following      AM-PAC 6 CLICK MOBILITY  Total Score:16       Treatment & Education:  Patient was educated on the importance of OOB activity and functional mobility to negate negative effects of prolonged bed rest during hospitalization, safe transfers and ambulation, and D/C planning   OOb chair post PT  Encouraged ankle pumping exercises    Patient left up in chair with all lines intact, call button in reach, chair alarm on, and G son present.    GOALS:   Multidisciplinary Problems       Physical Therapy Goals          Problem: Physical Therapy    Goal Priority Disciplines Outcome Goal Variances Interventions   Physical Therapy Goal     PT, PT/OT Progressing     Description: Goals to be met by: 2024     Patient will increase functional independence with mobility by performin. Supine to sit with Contact Guard Assistance  2. Sit to stand transfer with Contact Guard Assistance  3. Bed to chair transfer with Contact Guard Assistance using Rolling Walker  4. Gait  x 250 feet with Contact Guard Assistance using Rolling Walker.   5. Lower extremity exercise program x20 reps                        History:     Past Medical History:   Diagnosis Date    ACP (advance care planning) 2024     Amaurosis fugax     Bilateral left eye worse    Anticoagulant long-term use     Arthritis     Glaucoma     resolved    Hyperlipidemia     Hypertension     Stroke 2006       Past Surgical History:   Procedure Laterality Date    APPENDECTOMY  1950's    BREAST SURGERY      CATARACT EXTRACTION W/  INTRAOCULAR LENS IMPLANT Bilateral     Dr Rojo      SECTION      3 c/s and d/c    COLONOSCOPY N/A 2024    Procedure: COLONOSCOPY;  Surgeon: Trev Lafleur MD;  Location: [facility];  Service: Endoscopy;  Laterality: N/A;    ESOPHAGOGASTRODUODENOSCOPY N/A 2024    Procedure: EGD (ESOPHAGOGASTRODUODENOSCOPY);  Surgeon: Trev Lafleur MD;  Location: [facility];  Service: Endoscopy;  Laterality: N/A;    EYE SURGERY      Glaucoma     HYSTERECTOMY      RIGHT OOPHORECTOMY      With postop hemorrhage    TONSILLECTOMY      Yag Capsulotomy Left 2021       Time Tracking:     PT Received On: 24  PT Start Time: 0949     PT Stop Time: 1005  PT Total Time (min): 16 min     Billable Minutes: Evaluation 8 and Gait Training 8      2024

## 2024-08-20 NOTE — H&P
[facility]  History & Physical    Patient Name: Brie Han  MRN: [id number]  Patient Class: OP- Observation  Admission Date: 2024  Attending Physician: Vidal Reid MD   Primary Care Provider: Colten Gould MD         Patient information was obtained from patient, past medical records, and ER records.     Subjective:     Principal Problem:Encephalopathy, metabolic    Chief Complaint:   Chief Complaint   Patient presents with    Altered Mental Status     Confused outburst        HPI: Brie Han is a 89-year-old female who presented to the emergency room for evaluation of worsening confusion since Thursday evening.  Patient is altered and unable to provide any information.  Patient was grandson is at bedside and provides history.  He denies any recent illness or injury.  He denies any fever or chills.  No known sick contacts or travel.  He reports her confusion started abruptly on Thursday evening.  Previous medical history includes hypertension, peripheral arterial disease, coronary artery disease, CKD, iron-deficiency anemia, and colon cancer.  ER workup: CBC with leukocytosis of 13,000.  CMP was unremarkable.  Troponin mildly elevated 0.047.  BNP elevated at 151.  CT of the head was negative.  Urinalysis was negative for infection.  Patient admitted to Hospital Medicine for treatment and management.  Will obtain neurology consult in a.m.              Past Medical History:   Diagnosis Date    ACP (advance care planning) 2024    Amaurosis fugax     Bilateral left eye worse    Anticoagulant long-term use     Arthritis     Glaucoma     resolved    Hyperlipidemia     Hypertension     Stroke        Past Surgical History:   Procedure Laterality Date    APPENDECTOMY  1950's    BREAST SURGERY      CATARACT EXTRACTION W/  INTRAOCULAR LENS IMPLANT Bilateral     Dr Rojo      SECTION      3 c/s and d/c    COLONOSCOPY N/A 2024    Procedure: COLONOSCOPY;   Surgeon: Trev Lafleur MD;  Location: [facility];  Service: Endoscopy;  Laterality: N/A;    ESOPHAGOGASTRODUODENOSCOPY N/A 2/5/2024    Procedure: EGD (ESOPHAGOGASTRODUODENOSCOPY);  Surgeon: Trev Lafleur MD;  Location: [facility];  Service: Endoscopy;  Laterality: N/A;    EYE SURGERY      Glaucoma     HYSTERECTOMY      RIGHT OOPHORECTOMY      With postop hemorrhage    TONSILLECTOMY      Yag Capsulotomy Left 11/11/2021       Review of patient's allergies indicates:   Allergen Reactions    Contrast media Other (See Comments)    Aspirin Other (See Comments)    Ciprofloxacin Other (See Comments)    Iron Other (See Comments)     Small rash with po iron.  Tolerated IV iron    Iodine Rash    Penicillins Rash       No current facility-administered medications on file prior to encounter.     Current Outpatient Medications on File Prior to Encounter   Medication Sig    artificial tears (ISOPTO TEARS) 0.5 % ophthalmic solution Place 1 drop into both eyes 4 (four) times daily as needed (Dry Eyes).    folic acid (FOLVITE) 1 MG tablet Take 1 tablet (1,000 mcg total) by mouth once daily.    losartan (COZAAR) 100 MG tablet Take 1 tablet (100 mg total) by mouth once daily.    lovastatin (MEVACOR) 10 MG tablet Take 1 tablet (10 mg total) by mouth every evening.    multivitamin capsule Take 1 capsule by mouth once daily.    NIFEdipine (PROCARDIA-XL) 30 MG (OSM) 24 hr tablet Take 1 tablet (30 mg total) by mouth every evening.    senna-docusate 8.6-50 mg (PERICOLACE) 8.6-50 mg per tablet Take 1 tablet by mouth 2 (two) times daily as needed for Constipation.    sodium chloride 1,000 mg TbSO oral tablet Take 1 tablet (1,000 mg total) by mouth once daily.    acetaminophen (TYLENOL) 500 MG tablet Take 2 tablets (1,000 mg total) by mouth every 8 (eight) hours as needed for Pain.    oxybutynin (DITROPAN-XL) 5 MG TR24 Take 1 tablet (5 mg total) by mouth once daily. (Patient not taking: Reported on 8/19/2024)    sucralfate (CARAFATE) 1  gram tablet Take 1 tablet (1 g total) by mouth 2 (two) times daily. (Patient not taking: Reported on 8/19/2024)    [DISCONTINUED] dexAMETHasone (DECADRON) 4 MG Tab Please start with 1 tab 1/2 hr before the procedure then take it every 12 hrs x 3 days.    [DISCONTINUED] diphenhydrAMINE (BENADRYL) 25 mg capsule Take 2 cap 1/2 hr before the appointment the repeat 4 hrs later and continue x 3 days.    [DISCONTINUED] nitrofurantoin, macrocrystal-monohydrate, (MACROBID) 100 MG capsule Take 1 capsule (100 mg total) by mouth 2 (two) times daily. for 5 days     Family History       Problem Relation (Age of Onset)    Diabetes Father    Early death Mother    Hypertension Father, Sister    No Known Problems Brother, Maternal Aunt, Maternal Uncle, Paternal Aunt, Paternal Uncle, Maternal Grandmother, Maternal Grandfather, Paternal Grandmother    Stroke Father    Thyroid disease Paternal Grandfather          Tobacco Use    Smoking status: Never    Smokeless tobacco: Never   Substance and Sexual Activity    Alcohol use: No    Drug use: No    Sexual activity: Never     Review of Systems   Unable to perform ROS: Mental status change     Objective:     Vital Signs (Most Recent):  Temp: 98.1 °F (36.7 °C) (08/19/24 1255)  Pulse: 103 (08/19/24 1702)  Resp: 18 (08/19/24 1419)  BP: (!) 167/69 (08/19/24 1632)  SpO2: 99 % (08/19/24 1419) Vital Signs (24h Range):  Temp:  [98.1 °F (36.7 °C)] 98.1 °F (36.7 °C)  Pulse:  [] 103  Resp:  [18-20] 18  SpO2:  [99 %-100 %] 99 %  BP: (133-167)/(63-73) 167/69     Weight: 45.4 kg (100 lb)  Body mass index is 18.29 kg/m².     Physical Exam  Vitals and nursing note reviewed.   Constitutional:       General: She is not in acute distress.     Appearance: She is well-developed. She is ill-appearing. She is not diaphoretic.   HENT:      Head: Normocephalic.      Mouth/Throat:      Mouth: Mucous membranes are dry.   Eyes:      General: No scleral icterus.     Conjunctiva/sclera: Conjunctivae normal.       Pupils: Pupils are equal, round, and reactive to light.   Neck:      Vascular: No JVD.   Cardiovascular:      Rate and Rhythm: Regular rhythm. Tachycardia present.      Heart sounds: Normal heart sounds. No murmur heard.     No friction rub. No gallop.   Pulmonary:      Effort: Pulmonary effort is normal. No respiratory distress.      Breath sounds: Normal breath sounds. No wheezing or rales.   Abdominal:      General: Bowel sounds are normal. There is no distension.      Palpations: Abdomen is soft.      Tenderness: There is no abdominal tenderness. There is no guarding or rebound.   Musculoskeletal:         General: No tenderness. Normal range of motion.      Cervical back: Normal range of motion and neck supple.   Lymphadenopathy:      Cervical: No cervical adenopathy.   Skin:     General: Skin is warm and dry.      Capillary Refill: Capillary refill takes less than 2 seconds.      Coloration: Skin is not pale.      Findings: No erythema or rash.   Neurological:      Mental Status: She is alert. She is disoriented.              CRANIAL NERVES     CN III, IV, VI   Pupils are equal, round, and reactive to light.       Significant Labs: All pertinent labs within the past 24 hours have been reviewed.  CBC:   Recent Labs   Lab 08/19/24  1420   WBC 13.21*   HGB 10.0*   HCT 30.8*   *     CMP:   Recent Labs   Lab 08/19/24  1420      K 3.9      CO2 19*      BUN 20   CREATININE 0.8   CALCIUM 9.9   PROT 6.9   ALBUMIN 3.7   BILITOT 0.5   ALKPHOS 64   AST 22   ALT 16   ANIONGAP 14     Cardiac Markers:   Recent Labs   Lab 08/19/24  1420   *     Urine Studies:   Recent Labs   Lab 08/19/24  1537   COLORU Yellow   APPEARANCEUA Clear   PHUR 7.0   SPECGRAV 1.010   PROTEINUA Negative   GLUCUA Negative   KETONESU 1+*   BILIRUBINUA Negative   OCCULTUA Negative   NITRITE Negative   UROBILINOGEN Negative   LEUKOCYTESUR Negative       Significant Imaging: I have reviewed all pertinent imaging  results/findings within the past 24 hours.    CT head:   FINDINGS:  Somewhat motion limited exam.     Brain: There is no evidence of a mass, edema, midline shift, or intracranial hemorrhage. No extra-axial fluid collection. No CT evidence of an acute major vascular territorial infarct.     Ventricles: The ventricles, sulci, and cisterns are within normal limits.     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  No acute process.     Extracranial soft tissues: Limited imaging is within normal limits.     Other: The visualized portions of the sinuses, orbits, and mastoid air cells are unremarkable.     Impression:     1. No acute intracranial CT findings.    CT abdomen:   FINDINGS:  The liver, spleen, pancreas, and adrenal glands are unremarkable.     There is a 5.0 cm cyst of the right kidney.  A 1.8 cm parapelvic cyst of the left kidney is present.  No hydronephrosis.     The bowel gas pattern is nonobstructed.  There is extensive diverticulosis coli without evidence for diverticulitis.     No free air, free fluid, or fluid collection.     There has been hysterectomy.  There is heavy calcification of the aorta without aneurysm.     Degenerative change of the spine typical for age is present.     Impression:     No acute abdominopelvic process.     Renal cysts.     Diverticulosis.    Chest x-ray:   FINDINGS:  The heart is enlarged.  There is atherosclerotic calcification present within the thoracic aortic arch.  No central pulmonary vascular congestion.  There are calcified granulomas present within the right chest, similar to the prior exam.  No new zone of airspace consolidation or new pulmonary mass.  There is accentuation of the interstitial lung markings in the mid and lower lung zones, similar to the prior exam, possibly due to chronic interstitial lung disease.  No pleural fluid or pneumothorax.  The bones appear osteopenic.     Impression:     No radiographically evident interval  detrimental change in the appearance of the chest when compared to the previous exam.     Assessment/Plan:     * Encephalopathy, metabolic  Acute problem   Patient has acute metabolic encephalopathy that is secondary to unknown source. Patient's current mental status is Confused. Patient's baseline mental status is. awake and alert; oriented to person, place, and time Evaluation and for underlying cause(s) is underway and inclusive of Blood Chemistries, Consult to Neurology, Neuro-Imaging (MRI/CT), and Toxic metabolite eval . Will monitor neuro checks carefully, avoid narcotics and benzos that will exacerbate agitation, and use PRN medications for controls of behavior for self harm.       Stage 3a chronic kidney disease  Creatine stable for now. BMP reviewed- noted Estimated Creatinine Clearance: 29.5 mL/min (based on SCr of 0.8 mg/dL). according to latest data. Based on current GFR, CKD stage is stage 3 - GFR 30-59.  Monitor UOP and serial BMP and adjust therapy as needed. Renally dose meds. Avoid nephrotoxic medications and procedures.    Hypertension  Chronic, controlled. Latest blood pressure and vitals reviewed-     Temp:  [98.1 °F (36.7 °C)]   Pulse:  []   Resp:  [18-20]   BP: (133-167)/(63-73)   SpO2:  [99 %-100 %] .   Home meds for hypertension were reviewed and noted below.   Hypertension Medications               losartan (COZAAR) 100 MG tablet Take 1 tablet (100 mg total) by mouth once daily.    NIFEdipine (PROCARDIA-XL) 30 MG (OSM) 24 hr tablet Take 1 tablet (30 mg total) by mouth every evening.            While in the hospital, will manage blood pressure as follows; Continue home antihypertensive regimen    Will utilize p.r.n. blood pressure medication only if patient's blood pressure greater than 140/90 and she develops symptoms such as worsening chest pain or shortness of breath.      VTE Risk Mitigation (From admission, onward)           Ordered     IP VTE HIGH RISK PATIENT  Once          08/19/24 1801     Place sequential compression device  Until discontinued         08/19/24 1801     Place MAURA hose  Until discontinued         08/19/24 1801                         On 08/19/2024, patient should be placed in hospital observation services under my care in collaboration with Dr. Reid.           Jerry Andrew NP  Department of Hospital Medicine  [facility]

## 2024-08-20 NOTE — NURSING
Nurses Note -- 4 Eyes      8/20/2024   1:01 AM      Skin assessed during: Admit      [x] No Altered Skin Integrity Present    [x]Prevention Measures Documented      [] Yes- Altered Skin Integrity Present or Discovered   [] LDA Added if Not in Epic (Describe Wound)   [] New Altered Skin Integrity was Present on Admit and Documented in LDA   [] Wound Image Taken    Wound Care Consulted? No    Attending Nurse:  Michelle Graves RN/Staff Member:   Ariadna

## 2024-08-20 NOTE — SUBJECTIVE & OBJECTIVE
Interval History: Patient seen and examined. BRIANA. She is manic. Hard to redirect conversation. Grandson bedside report patient not sleeping at home. No chest pains or shortness of breath      Objective:     Vital Signs (Most Recent):  Temp: 97.9 °F (36.6 °C) (08/20/24 1117)  Pulse: 73 (08/20/24 1117)  Resp: 18 (08/20/24 1117)  BP: (!) 124/58 (08/20/24 1117)  SpO2: 99 % (08/20/24 1117) Vital Signs (24h Range):  Temp:  [97.2 °F (36.2 °C)-98 °F (36.7 °C)] 97.9 °F (36.6 °C)  Pulse:  [] 73  Resp:  [16-18] 18  SpO2:  [97 %-100 %] 99 %  BP: (124-167)/(58-96) 124/58     Weight: 46.6 kg (102 lb 11.8 oz)  Body mass index is 22.23 kg/m².    Intake/Output Summary (Last 24 hours) at 8/20/2024 1505  Last data filed at 8/20/2024 1300  Gross per 24 hour   Intake 980 ml   Output 450 ml   Net 530 ml         Physical Exam  Vitals reviewed.   Constitutional:       General: She is not in acute distress.     Comments: Elderly, thin, no distress   HENT:      Head: Normocephalic and atraumatic.   Cardiovascular:      Rate and Rhythm: Normal rate and regular rhythm.      Heart sounds: Normal heart sounds.   Pulmonary:      Effort: Pulmonary effort is normal. No respiratory distress.      Breath sounds: Normal breath sounds.   Neurological:      General: No focal deficit present.      Mental Status: She is alert. She is disoriented.   Psychiatric:         Mood and Affect: Affect normal.      Comments: Manic              Significant Labs: All pertinent labs within the past 24 hours have been reviewed.    Significant Imaging: I have reviewed all pertinent imaging results/findings within the past 24 hours.

## 2024-08-20 NOTE — ASSESSMENT & PLAN NOTE
Acute problem   Patient has acute metabolic encephalopathy that is secondary to unknown source. Patient's current mental status is Confused. Patient's baseline mental status is. awake and alert; oriented to person, place, and time Evaluation and for underlying cause(s) is underway and inclusive of Blood Chemistries, Consult to Neurology, Neuro-Imaging (MRI/CT), and Toxic metabolite eval . Will monitor neuro checks carefully, avoid narcotics and benzos that will exacerbate agitation, and use PRN medications for controls of behavior for self harm.

## 2024-08-20 NOTE — PROCEDURES
Date of service  08/20/2024    Introduction  Electroencephalographic (EEG) recording is performed with electrodes placed according to the International 10-20 placement system. Thirty two (32) channels of digital signal (sampling rate of 512/sec) including T1 and T2 was simultaneously recorded from the scalp and may include EKG, EMG, and/or eye monitors. Recording band pass was 0.1 to 512 Hz. Digital video recording of the patient is simultaneously recorded with the EEG. The patient is instructed to report clinical symptoms which may occur during the recording session. EEG and video recording is stored and archived in digital format. Activation procedures which include photic stimulation, hyperventilation and instructing patients to perform simple tasks are done in selected patients.    The EEG is displayed on a monitor screen and can be reviewed using different montages. Computer assisted analysis is employed to detect spike and electrographic seizure activity. The entire record is submitted for computer analysis. The entire recording is visually reviewed and, the times identified by computer analysis as being spikes or seizures are reviewed again.     Compressed spectral analysis (CSA) is also performed on the activity recorded from each individual channel. This is displayed as a power display of frequencies from 0 to 30 Hz over time. The CSA is reviewed looking for asymmetries in power between homologous areas of the scalp and then compared with the original EEG recording.     Findings  The patient's background consists of a posteriorly dominant 9 Hz alpha rhythm, which is well-formed, modulated, and abolishes with eye opening.      Photic stimulation did not produce any abnormal response. Hyperventilation was not performed.    During the course of the recording, the patient is noted to be awake, subsequently becomes drowsy, and falls asleep with symmetric sleep architecture seen.     There is no focal slowing.   There are no focal, lateralized, or epileptiform transients.  No electrographic seizures are seen.    EKG demonstrates regular rhythm with occasional premature beat.    Interpretation  This is a normal EEG during wakefulness and sleep. No potentially epileptiform activity was seen. Please be aware that a normal EEG does not exclude the possibility of an underlying seizure disorder.

## 2024-08-20 NOTE — CONSULTS
[facility]/Surg  Neurology  Consult Note    Patient Name: Brie Han  MRN: [id number]  Admission Date: 8/19/2024  Hospital Length of Stay: 0 days  Code Status: DNR   Attending Provider: Vidal Reid MD   Consulting Provider: Mishel Dumas DNP  Primary Care Physician: Colten Gould MD  Principal Problem:Encephalopathy, metabolic    Inpatient consult to Neurology  Consult performed by: Mishel Dumas DNP  Consult ordered by: Jerry Andrew NP        Subjective:     Chief Complaint:   AMS     HPI: as per EMR: Brie Han is a 89-year-old female who presented to the emergency room for evaluation of worsening confusion since Thursday evening. Patient is altered and unable to provide any information. Patient was grandson is at bedside and provides history. He denies any recent illness or injury. He denies any fever or chills. No known sick contacts or travel. He reports her confusion started abruptly on Thursday evening. Previous medical history includes hypertension, peripheral arterial disease, coronary artery disease, CKD, iron-deficiency anemia, and colon cancer. ER workup: CBC with leukocytosis of 13,000. CMP was unremarkable. Troponin mildly elevated 0.047. BNP elevated at 151. CT of the head was negative. Urinalysis was negative for infection. Patient admitted to Hospital Medicine for treatment and management. Will obtain neurology consult in a.m.     NEUROLOGY CONSULT NOTE: Patient seen and examined with Dr Ronald Houston, POC discussed. Patient's grandson is at bedside to assist with history. Grandson reports patient first became altered last Wednesday, states multiple episode of crying without clear reason/ stressors. He reports Thursday and Friday patient was back to baseline. States he did not see patient on Saturday but when he returned to her home on Sunday she was altered, speaking intelligibly, states she had Erich's in her hair, water running in her sink and home was in  disarray. Grandson denies recent fever, head injury, new medication. He does report recent Covid infection, anemia, recent death of sister and lack of sleep x 2 days. He states she had been stressed about bills but states this was unfounded. He reports remote psych history.     On exam patient is alert and oriented to self only, pressured speech noted.   Past Medical History:   Diagnosis Date    ACP (advance care planning) 2024    Amaurosis fugax     Bilateral left eye worse    Anticoagulant long-term use     Arthritis     Glaucoma     resolved    Hyperlipidemia     Hypertension     Stroke        Past Surgical History:   Procedure Laterality Date    APPENDECTOMY      BREAST SURGERY      CATARACT EXTRACTION W/  INTRAOCULAR LENS IMPLANT Bilateral     Dr Rojo      SECTION      3 c/s and d/c    COLONOSCOPY N/A 2024    Procedure: COLONOSCOPY;  Surgeon: Trev Lafleur MD;  Location: [facility];  Service: Endoscopy;  Laterality: N/A;    ESOPHAGOGASTRODUODENOSCOPY N/A 2024    Procedure: EGD (ESOPHAGOGASTRODUODENOSCOPY);  Surgeon: Trev Lafleur MD;  Location: [facility];  Service: Endoscopy;  Laterality: N/A;    EYE SURGERY      Glaucoma     HYSTERECTOMY      RIGHT OOPHORECTOMY      With postop hemorrhage    TONSILLECTOMY      Yag Capsulotomy Left 2021       Review of patient's allergies indicates:   Allergen Reactions    Contrast media Other (See Comments)    Aspirin Other (See Comments)    Ciprofloxacin Other (See Comments)    Iron Other (See Comments)     Small rash with po iron.  Tolerated IV iron    Iodine Rash    Penicillins Rash       Current Neurological Medications: folic acid     No current facility-administered medications on file prior to encounter.     Current Outpatient Medications on File Prior to Encounter   Medication Sig    artificial tears (ISOPTO TEARS) 0.5 % ophthalmic solution Place 1 drop into both eyes 4 (four) times daily as needed (Dry Eyes).    folic  acid (FOLVITE) 1 MG tablet Take 1 tablet (1,000 mcg total) by mouth once daily.    losartan (COZAAR) 100 MG tablet Take 1 tablet (100 mg total) by mouth once daily.    lovastatin (MEVACOR) 10 MG tablet Take 1 tablet (10 mg total) by mouth every evening.    multivitamin capsule Take 1 capsule by mouth once daily.    NIFEdipine (PROCARDIA-XL) 30 MG (OSM) 24 hr tablet Take 1 tablet (30 mg total) by mouth every evening.    senna-docusate 8.6-50 mg (PERICOLACE) 8.6-50 mg per tablet Take 1 tablet by mouth 2 (two) times daily as needed for Constipation.    sodium chloride 1,000 mg TbSO oral tablet Take 1 tablet (1,000 mg total) by mouth once daily.    acetaminophen (TYLENOL) 500 MG tablet Take 2 tablets (1,000 mg total) by mouth every 8 (eight) hours as needed for Pain.    oxybutynin (DITROPAN-XL) 5 MG TR24 Take 1 tablet (5 mg total) by mouth once daily. (Patient not taking: Reported on 8/19/2024)    sucralfate (CARAFATE) 1 gram tablet Take 1 tablet (1 g total) by mouth 2 (two) times daily. (Patient not taking: Reported on 8/19/2024)      Family History       Problem Relation (Age of Onset)    Diabetes Father    Early death Mother    Hypertension Father, Sister    No Known Problems Brother, Maternal Aunt, Maternal Uncle, Paternal Aunt, Paternal Uncle, Maternal Grandmother, Maternal Grandfather, Paternal Grandmother    Stroke Father    Thyroid disease Paternal Grandfather          Tobacco Use    Smoking status: Never    Smokeless tobacco: Never   Substance and Sexual Activity    Alcohol use: No    Drug use: No    Sexual activity: Never     Review of Systems   Unable to perform ROS: Mental status change     Objective:     Vital Signs (Most Recent):  Temp: 98 °F (36.7 °C) (08/20/24 0745)  Pulse: 83 (08/20/24 0745)  Resp: 18 (08/20/24 0745)  BP: (!) 149/65 (08/20/24 0745)  SpO2: 98 % (08/20/24 0800) Vital Signs (24h Range):  Temp:  [97.2 °F (36.2 °C)-98.1 °F (36.7 °C)] 98 °F (36.7 °C)  Pulse:  [] 83  Resp:  [16-20]  "18  SpO2:  [97 %-100 %] 98 %  BP: (124-167)/(58-96) 149/65     Weight: 46.6 kg (102 lb 11.8 oz)  Body mass index is 22.23 kg/m².    Physical Exam  Vitals and nursing note reviewed.   Eyes:      Extraocular Movements: Extraocular movements intact and EOM normal.      Conjunctiva/sclera: Conjunctivae normal.   Cardiovascular:      Rate and Rhythm: Normal rate.   Pulmonary:      Effort: Pulmonary effort is normal.   Skin:     General: Skin is warm and dry.   Neurological:      Mental Status: She is alert.      Motor: Motor strength is normal.     Coordination: Finger-Nose-Finger Test normal.   Psychiatric:         Mood and Affect: Mood is anxious.         Speech: Speech is rapid and pressured.         NEUROLOGICAL EXAMINATION:     MENTAL STATUS   Oriented to person.   Disoriented to place.   Disoriented to time.   Follows 1 step commands.   Attention: decreased.   Level of consciousness: alert  Abnormal comprehension.     CRANIAL NERVES     CN III, IV, VI   Extraocular motions are normal.   Right pupil: Shape: regular.   Left pupil: Shape: regular.   CN III: no CN III palsy  CN VI: no CN VI palsy  Nystagmus: none     CN V   Facial sensation intact.     CN VII   Facial expression full, symmetric.     CN VIII   Hearing: intact    CN XI   CN XI normal.     CN XII   Tongue deviation: none    MOTOR EXAM   Right arm pronator drift: absent  Left arm pronator drift: absent    Strength   Strength 5/5 throughout.     SENSORY EXAM   Light touch normal.     GAIT AND COORDINATION     Gait  Gait: (deferred for safety)     Coordination   Finger to nose coordination: normal    Tremor   Resting tremor: absent      Significant Labs: Hemoglobin A1c: No results for input(s): "HGBA1C" in the last 720 hours.  BMP:   Recent Labs   Lab 08/19/24  1420 08/20/24  0732    101    137   K 3.9 3.6    105   CO2 19* 19*   BUN 20 16   CREATININE 0.8 0.7   CALCIUM 9.9 9.7   MG 2.3 2.3     CBC:   Recent Labs   Lab 08/19/24  1420 " "08/20/24  0733   WBC 13.21* 11.73   HGB 10.0* 9.7*   HCT 30.8* 29.9*   * 476*     CMP:   Recent Labs   Lab 08/19/24  1420 08/20/24  0732    101    137   K 3.9 3.6    105   CO2 19* 19*   BUN 20 16   CREATININE 0.8 0.7   CALCIUM 9.9 9.7   MG 2.3 2.3   PROT 6.9 6.5   ALBUMIN 3.7 3.3*   BILITOT 0.5 0.6   ALKPHOS 64 65   AST 22 24   ALT 16 14   ANIONGAP 14 13     Urine Culture: No results for input(s): "LABURIN" in the last 48 hours.  Urine Studies:   Recent Labs   Lab 08/19/24  1537   COLORU Yellow   APPEARANCEUA Clear   PHUR 7.0   SPECGRAV 1.010   PROTEINUA Negative   GLUCUA Negative   KETONESU 1+*   BILIRUBINUA Negative   OCCULTUA Negative   NITRITE Negative   UROBILINOGEN Negative   LEUKOCYTESUR Negative     All pertinent lab results from the past 24 hours have been reviewed.    Significant Imaging: I have reviewed all pertinent imaging results/findings within the past 24 hours.    CT Head Without Contrast  Order: [id number]  Status: Final result       Visible to patient: Yes (seen)       Next appt: 09/17/2024 at 01:00 PM in Ophthalmology (Greg Mckeon MD)    0 Result Notes  Details    Reading Physician Reading Date Result Priority   Saeid Jeffrey MD  [phone] 8/19/2024 STAT     Narrative & Impression  EXAMINATION:  CT HEAD WITHOUT CONTRAST     CLINICAL HISTORY:  Mental status change, unknown cause;     TECHNIQUE:  Low dose axial CT images obtained throughout the head without intravenous contrast. Sagittal and coronal reconstructions were performed.     COMPARISON:  Head CT 06/15/2023.     FINDINGS:  Somewhat motion limited exam.     Brain: There is no evidence of a mass, edema, midline shift, or intracranial hemorrhage. No extra-axial fluid collection. No CT evidence of an acute major vascular territorial infarct.     Ventricles: The ventricles, sulci, and cisterns are within normal limits.     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  No acute " process.     Extracranial soft tissues: Limited imaging is within normal limits.     Other: The visualized portions of the sinuses, orbits, and mastoid air cells are unremarkable.     Impression:     1. No acute intracranial CT findings.        Electronically signed by:Saeid Jeffrey  Date:                                            08/19/2024  Time:                                           14:04       Assessment and Plan:    Assessment:   AMS    Work up:   CT head wo contrast: negative   MRI brain w and wo contrast: ordered and pending   UA: negative for infection   Memory labs: Vitamin B12 elevated, Vitamin B1, B6, RPR, folate ammonia pending   Routine EEG     PLAN   Consult Inpatient Psychiatry -patient with psych history, lack of sleep x 2 days, recent episodes of severe anxiety, and h/o psychiatric symptoms   PT/OT/ST eval and treat   Neuro checks per unit protocol   Safety precautions     [phone] within 2 weeks from discharge.  Stroke education was provided including stroke risk factors modification and any acute neurological changes including weakness, confusion, visual changes to come straight to the ER.  Seizure educaation was provided including no driving, no swimming by self, no operation of heavy machinery or climbing on ladders.  All side effects of new medications were discussed with patient and/or next of kin and all questions were answered.        Active Diagnoses:    Diagnosis Date Noted POA    PRINCIPAL PROBLEM:  Encephalopathy, metabolic [G93.41] 08/19/2024 Yes    Stage 3a chronic kidney disease [N18.31] 01/17/2022 Yes    Hypertension [I10] 09/21/2012 Yes      Problems Resolved During this Admission:       VTE Risk Mitigation (From admission, onward)           Ordered     IP VTE HIGH RISK PATIENT  Once         08/19/24 1801     Place sequential compression device  Until discontinued         08/19/24 1801     Place MAURA hose  Until discontinued          08/19/24 2683                    Thank you for your consult. I will follow-up with patient. Please contact us if you have any additional questions.    Mishel Dumas, JEFFERSON  Neurology  [facility]/Surg    I, Dr. Ronald Houston, have personally seen and examined the patient with my advanced provider and agree with above. I personally did a focused exam, and reviewed all necessary clinical information. I discussed my management plan with my NP and agree with above.   All side effects of medications were discussed with the patient and/or next of kin including severe mood changes, organ failure, lab abnormalities, rash, passing out, low blood pressure, glaucoma, cognitive changes, life threatening bleeding, passing out, glaucoma, rash, fatigue, weight gain and or weight loss, and death.  No driving until follow up at [facility].  Follow up at [facility] 3 days post discharge. Telehealth available.   Stroke (Including any acute neurological symptoms to return to the ER immediately and call 911, like acute weakness, severe headaches, sensory, visual or speech changes)  and seizure education provided.  Psychiatry consult.  Alfred Houston MD. FAAN.    [facility]  [phone]

## 2024-08-20 NOTE — ASSESSMENT & PLAN NOTE
With sean  CTH NAF. Recent B12, TSH, ammonia ok  Trop chronic minimal elevation. EKG does not appear ischemic and she has no anginal symptoms  VBG was hypoxemic - was not an ABG  No bowel/bladder retention  - f/u b1, b6, RPR, folate  - delirium precautions  - psych and neuro consult  - start depakote as recommended  - hold oxybutynin  - trend condition

## 2024-08-20 NOTE — PROGRESS NOTES
[facility]  Progress Note    Patient Name: Brie Han  MRN: [id number]  Patient Class: IP- Inpatient   Admission Date: 8/19/2024  Length of Stay: 0 days  Attending Physician: Vidal Reid MD  Primary Care Provider: Colten Gould MD        Subjective:     Principal Problem:Encephalopathy, metabolic        HPI:  Brie Han is a 89-year-old female who presented to the emergency room for evaluation of worsening confusion since Thursday evening.  Patient is altered and unable to provide any information.  Patient was grandson is at bedside and provides history.  He denies any recent illness or injury.  He denies any fever or chills.  No known sick contacts or travel.  He reports her confusion started abruptly on Thursday evening.  Previous medical history includes hypertension, peripheral arterial disease, coronary artery disease, CKD, iron-deficiency anemia, and colon cancer.  ER workup: CBC with leukocytosis of 13,000.  CMP was unremarkable.  Troponin mildly elevated 0.047.  BNP elevated at 151.  CT of the head was negative.  Urinalysis was negative for infection.  Patient admitted to Hospital Medicine for treatment and management.  Will obtain neurology consult in a.m.              Overview/Hospital Course:  89 year old female admitted for encephalopathy with recent insomnia for several days manifesting as sean. CT head not acute. Labs unrevealing of etiology. Neuro and psych consulted. Started on depakote.     Interval History: Patient seen and examined. DWAINEON. She is manic. Hard to redirect conversation. Grandson bedside report patient not sleeping at home. No chest pains or shortness of breath      Objective:     Vital Signs (Most Recent):  Temp: 97.9 °F (36.6 °C) (08/20/24 1117)  Pulse: 73 (08/20/24 1117)  Resp: 18 (08/20/24 1117)  BP: (!) 124/58 (08/20/24 1117)  SpO2: 99 % (08/20/24 1117) Vital Signs (24h Range):  Temp:  [97.2 °F (36.2 °C)-98 °F (36.7 °C)] 97.9  °F (36.6 °C)  Pulse:  [] 73  Resp:  [16-18] 18  SpO2:  [97 %-100 %] 99 %  BP: (124-167)/(58-96) 124/58     Weight: 46.6 kg (102 lb 11.8 oz)  Body mass index is 22.23 kg/m².    Intake/Output Summary (Last 24 hours) at 8/20/2024 1505  Last data filed at 8/20/2024 1300  Gross per 24 hour   Intake 980 ml   Output 450 ml   Net 530 ml         Physical Exam  Vitals reviewed.   Constitutional:       General: She is not in acute distress.     Comments: Elderly, thin, no distress   HENT:      Head: Normocephalic and atraumatic.   Cardiovascular:      Rate and Rhythm: Normal rate and regular rhythm.      Heart sounds: Normal heart sounds.   Pulmonary:      Effort: Pulmonary effort is normal. No respiratory distress.      Breath sounds: Normal breath sounds.   Neurological:      General: No focal deficit present.      Mental Status: She is alert. She is disoriented.   Psychiatric:         Mood and Affect: Affect normal.      Comments: Manic              Significant Labs: All pertinent labs within the past 24 hours have been reviewed.    Significant Imaging: I have reviewed all pertinent imaging results/findings within the past 24 hours.    Assessment/Plan:      * Encephalopathy, metabolic  With sean  CTH NAF. Recent B12, TSH, ammonia ok  Trop chronic minimal elevation. EKG does not appear ischemic and she has no anginal symptoms  VBG was hypoxemic - was not an ABG  No bowel/bladder retention  - f/u b1, b6, RPR, folate  - delirium precautions  - psych and neuro consult  - start depakote as recommended  - hold oxybutynin  - trend condition    Adenocarcinoma of colon  Previously documented she does not want to pursue treatment    Hypertension  Chronic, controlled  - continue losartan and nifedipine      VTE Risk Mitigation (From admission, onward)           Ordered     IP VTE HIGH RISK PATIENT  Once         08/19/24 1801     Place sequential compression device  Until discontinued         08/19/24 1801     Place MAURA hose   Until discontinued         08/19/24 1801                    Discharge Planning   VINITA: 8/21/2024     Code Status: DNR   Is the patient medically ready for discharge?:     Reason for patient still in hospital (select all that apply): Patient trending condition, Laboratory test, and Treatment  Discharge Plan A: Home Health                  Vidal Reid MD  Department of Hospital Medicine   [facility]/Surg

## 2024-08-20 NOTE — SUBJECTIVE & OBJECTIVE
Past Medical History:   Diagnosis Date    ACP (advance care planning) 2024    Amaurosis fugax     Bilateral left eye worse    Anticoagulant long-term use     Arthritis     Glaucoma     resolved    Hyperlipidemia     Hypertension     Stroke 2006       Past Surgical History:   Procedure Laterality Date    APPENDECTOMY  's    BREAST SURGERY      CATARACT EXTRACTION W/  INTRAOCULAR LENS IMPLANT Bilateral     Dr Rojo      SECTION      3 c/s and d/c    COLONOSCOPY N/A 2024    Procedure: COLONOSCOPY;  Surgeon: Trev Lafleur MD;  Location: [facility];  Service: Endoscopy;  Laterality: N/A;    ESOPHAGOGASTRODUODENOSCOPY N/A 2024    Procedure: EGD (ESOPHAGOGASTRODUODENOSCOPY);  Surgeon: Trev Lafleur MD;  Location: [facility];  Service: Endoscopy;  Laterality: N/A;    EYE SURGERY      Glaucoma     HYSTERECTOMY      RIGHT OOPHORECTOMY      With postop hemorrhage    TONSILLECTOMY      Yag Capsulotomy Left 2021       Review of patient's allergies indicates:   Allergen Reactions    Contrast media Other (See Comments)    Aspirin Other (See Comments)    Ciprofloxacin Other (See Comments)    Iron Other (See Comments)     Small rash with po iron.  Tolerated IV iron    Iodine Rash    Penicillins Rash       No current facility-administered medications on file prior to encounter.     Current Outpatient Medications on File Prior to Encounter   Medication Sig    artificial tears (ISOPTO TEARS) 0.5 % ophthalmic solution Place 1 drop into both eyes 4 (four) times daily as needed (Dry Eyes).    folic acid (FOLVITE) 1 MG tablet Take 1 tablet (1,000 mcg total) by mouth once daily.    losartan (COZAAR) 100 MG tablet Take 1 tablet (100 mg total) by mouth once daily.    lovastatin (MEVACOR) 10 MG tablet Take 1 tablet (10 mg total) by mouth every evening.    multivitamin capsule Take 1 capsule by mouth once daily.    NIFEdipine (PROCARDIA-XL) 30 MG (OSM) 24 hr tablet Take 1 tablet (30 mg total) by mouth  every evening.    senna-docusate 8.6-50 mg (PERICOLACE) 8.6-50 mg per tablet Take 1 tablet by mouth 2 (two) times daily as needed for Constipation.    sodium chloride 1,000 mg TbSO oral tablet Take 1 tablet (1,000 mg total) by mouth once daily.    acetaminophen (TYLENOL) 500 MG tablet Take 2 tablets (1,000 mg total) by mouth every 8 (eight) hours as needed for Pain.    oxybutynin (DITROPAN-XL) 5 MG TR24 Take 1 tablet (5 mg total) by mouth once daily. (Patient not taking: Reported on 8/19/2024)    sucralfate (CARAFATE) 1 gram tablet Take 1 tablet (1 g total) by mouth 2 (two) times daily. (Patient not taking: Reported on 8/19/2024)    [DISCONTINUED] dexAMETHasone (DECADRON) 4 MG Tab Please start with 1 tab 1/2 hr before the procedure then take it every 12 hrs x 3 days.    [DISCONTINUED] diphenhydrAMINE (BENADRYL) 25 mg capsule Take 2 cap 1/2 hr before the appointment the repeat 4 hrs later and continue x 3 days.    [DISCONTINUED] nitrofurantoin, macrocrystal-monohydrate, (MACROBID) 100 MG capsule Take 1 capsule (100 mg total) by mouth 2 (two) times daily. for 5 days     Family History       Problem Relation (Age of Onset)    Diabetes Father    Early death Mother    Hypertension Father, Sister    No Known Problems Brother, Maternal Aunt, Maternal Uncle, Paternal Aunt, Paternal Uncle, Maternal Grandmother, Maternal Grandfather, Paternal Grandmother    Stroke Father    Thyroid disease Paternal Grandfather          Tobacco Use    Smoking status: Never    Smokeless tobacco: Never   Substance and Sexual Activity    Alcohol use: No    Drug use: No    Sexual activity: Never     Review of Systems   Unable to perform ROS: Mental status change     Objective:     Vital Signs (Most Recent):  Temp: 98.1 °F (36.7 °C) (08/19/24 1255)  Pulse: 103 (08/19/24 1702)  Resp: 18 (08/19/24 1419)  BP: (!) 167/69 (08/19/24 1632)  SpO2: 99 % (08/19/24 1419) Vital Signs (24h Range):  Temp:  [98.1 °F (36.7 °C)] 98.1 °F (36.7 °C)  Pulse:  []  103  Resp:  [18-20] 18  SpO2:  [99 %-100 %] 99 %  BP: (133-167)/(63-73) 167/69     Weight: 45.4 kg (100 lb)  Body mass index is 18.29 kg/m².     Physical Exam  Vitals and nursing note reviewed.   Constitutional:       General: She is not in acute distress.     Appearance: She is well-developed. She is ill-appearing. She is not diaphoretic.   HENT:      Head: Normocephalic.      Mouth/Throat:      Mouth: Mucous membranes are dry.   Eyes:      General: No scleral icterus.     Conjunctiva/sclera: Conjunctivae normal.      Pupils: Pupils are equal, round, and reactive to light.   Neck:      Vascular: No JVD.   Cardiovascular:      Rate and Rhythm: Regular rhythm. Tachycardia present.      Heart sounds: Normal heart sounds. No murmur heard.     No friction rub. No gallop.   Pulmonary:      Effort: Pulmonary effort is normal. No respiratory distress.      Breath sounds: Normal breath sounds. No wheezing or rales.   Abdominal:      General: Bowel sounds are normal. There is no distension.      Palpations: Abdomen is soft.      Tenderness: There is no abdominal tenderness. There is no guarding or rebound.   Musculoskeletal:         General: No tenderness. Normal range of motion.      Cervical back: Normal range of motion and neck supple.   Lymphadenopathy:      Cervical: No cervical adenopathy.   Skin:     General: Skin is warm and dry.      Capillary Refill: Capillary refill takes less than 2 seconds.      Coloration: Skin is not pale.      Findings: No erythema or rash.   Neurological:      Mental Status: She is alert. She is disoriented.              CRANIAL NERVES     CN III, IV, VI   Pupils are equal, round, and reactive to light.       Significant Labs: All pertinent labs within the past 24 hours have been reviewed.  CBC:   Recent Labs   Lab 08/19/24  1420   WBC 13.21*   HGB 10.0*   HCT 30.8*   *     CMP:   Recent Labs   Lab 08/19/24  1420      K 3.9      CO2 19*      BUN 20   CREATININE 0.8    CALCIUM 9.9   PROT 6.9   ALBUMIN 3.7   BILITOT 0.5   ALKPHOS 64   AST 22   ALT 16   ANIONGAP 14     Cardiac Markers:   Recent Labs   Lab 08/19/24  1420   *     Urine Studies:   Recent Labs   Lab 08/19/24  1537   COLORU Yellow   APPEARANCEUA Clear   PHUR 7.0   SPECGRAV 1.010   PROTEINUA Negative   GLUCUA Negative   KETONESU 1+*   BILIRUBINUA Negative   OCCULTUA Negative   NITRITE Negative   UROBILINOGEN Negative   LEUKOCYTESUR Negative       Significant Imaging: I have reviewed all pertinent imaging results/findings within the past 24 hours.    CT head:   FINDINGS:  Somewhat motion limited exam.     Brain: There is no evidence of a mass, edema, midline shift, or intracranial hemorrhage. No extra-axial fluid collection. No CT evidence of an acute major vascular territorial infarct.     Ventricles: The ventricles, sulci, and cisterns are within normal limits.     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  No acute process.     Extracranial soft tissues: Limited imaging is within normal limits.     Other: The visualized portions of the sinuses, orbits, and mastoid air cells are unremarkable.     Impression:     1. No acute intracranial CT findings.    CT abdomen:   FINDINGS:  The liver, spleen, pancreas, and adrenal glands are unremarkable.     There is a 5.0 cm cyst of the right kidney.  A 1.8 cm parapelvic cyst of the left kidney is present.  No hydronephrosis.     The bowel gas pattern is nonobstructed.  There is extensive diverticulosis coli without evidence for diverticulitis.     No free air, free fluid, or fluid collection.     There has been hysterectomy.  There is heavy calcification of the aorta without aneurysm.     Degenerative change of the spine typical for age is present.     Impression:     No acute abdominopelvic process.     Renal cysts.     Diverticulosis.    Chest x-ray:   FINDINGS:  The heart is enlarged.  There is atherosclerotic calcification present  within the thoracic aortic arch.  No central pulmonary vascular congestion.  There are calcified granulomas present within the right chest, similar to the prior exam.  No new zone of airspace consolidation or new pulmonary mass.  There is accentuation of the interstitial lung markings in the mid and lower lung zones, similar to the prior exam, possibly due to chronic interstitial lung disease.  No pleural fluid or pneumothorax.  The bones appear osteopenic.     Impression:     No radiographically evident interval detrimental change in the appearance of the chest when compared to the previous exam.

## 2024-08-20 NOTE — ASSESSMENT & PLAN NOTE
Chronic, controlled. Latest blood pressure and vitals reviewed-     Temp:  [98.1 °F (36.7 °C)]   Pulse:  []   Resp:  [18-20]   BP: (133-167)/(63-73)   SpO2:  [99 %-100 %] .   Home meds for hypertension were reviewed and noted below.   Hypertension Medications               losartan (COZAAR) 100 MG tablet Take 1 tablet (100 mg total) by mouth once daily.    NIFEdipine (PROCARDIA-XL) 30 MG (OSM) 24 hr tablet Take 1 tablet (30 mg total) by mouth every evening.            While in the hospital, will manage blood pressure as follows; Continue home antihypertensive regimen    Will utilize p.r.n. blood pressure medication only if patient's blood pressure greater than 140/90 and she develops symptoms such as worsening chest pain or shortness of breath.

## 2024-08-20 NOTE — HOSPITAL COURSE
Brie Han is a 89 year old female with a past medical history of HTN, HLD, colonic adenocarcinoma, anemia and SIADH who presented with acute encephalopathy secondary to insomnia. Neurology and Psychiatry were consulted. A PEC was placed. She was started on Depakote and he sodium worsened. Nephrology was consulted. She was changed to Seroquel and salt tablets were resumed with improvement in the sodium. She is medically stable for geriatric psychiatric placement which took place 8/24/2024.

## 2024-08-20 NOTE — HPI
Brie Han is a 89-year-old female who presented to the emergency room for evaluation of worsening confusion since Thursday evening.  Patient is altered and unable to provide any information.  Patient was grandson is at bedside and provides history.  He denies any recent illness or injury.  He denies any fever or chills.  No known sick contacts or travel.  He reports her confusion started abruptly on Thursday evening.  Previous medical history includes hypertension, peripheral arterial disease, coronary artery disease, CKD, iron-deficiency anemia, and colon cancer.  ER workup: CBC with leukocytosis of 13,000.  CMP was unremarkable.  Troponin mildly elevated 0.047.  BNP elevated at 151.  CT of the head was negative.  Urinalysis was negative for infection.  Patient admitted to Hospital Medicine for treatment and management.  Will obtain neurology consult in a.m.

## 2024-08-20 NOTE — PLAN OF CARE
Problem: Adult Inpatient Plan of Care  Goal: Patient-Specific Goal (Individualized)  Outcome: Progressing  Flowsheets (Taken 8/20/2024 0699)  Individualized Care Needs: Safety, Reorientation Measures, Kidney Function  Anxieties, Fears or Concerns: Are you a Rastafari?  Patient/Family-Specific Goals (Include Timeframe): Pt will remain safe and free of injury or falls through 8/21/24  Goal: Absence of Hospital-Acquired Illness or Injury  Outcome: Progressing  Goal: Optimal Comfort and Wellbeing  Outcome: Progressing  Goal: Readiness for Transition of Care  Outcome: Progressing     Problem: Infection  Goal: Absence of Infection Signs and Symptoms  Outcome: Progressing     Problem: Fall Injury Risk  Goal: Absence of Fall and Fall-Related Injury  Outcome: Progressing     Problem: Skin Injury Risk Increased  Goal: Skin Health and Integrity  Outcome: Progressing

## 2024-08-20 NOTE — PLAN OF CARE
Problem: Adult Inpatient Plan of Care  Goal: Plan of Care Review  Outcome: Progressing  Goal: Patient-Specific Goal (Individualized)  Outcome: Progressing  Goal: Absence of Hospital-Acquired Illness or Injury  Outcome: Progressing  Goal: Optimal Comfort and Wellbeing  Outcome: Progressing     Problem: Infection  Goal: Absence of Infection Signs and Symptoms  Outcome: Progressing     Problem: Fall Injury Risk  Goal: Absence of Fall and Fall-Related Injury  Outcome: Progressing

## 2024-08-20 NOTE — CARE UPDATE
08/19/24 2123   Patient Assessment/Suction   Level of Consciousness (AVPU) alert   Respiratory Effort Normal;Unlabored   Expansion/Accessory Muscles/Retractions expansion symmetric;no retractions;no use of accessory muscles   All Lung Fields Breath Sounds clear   PRE-TX-O2   Device (Oxygen Therapy) room air   SpO2 99 %   Pulse Oximetry Type Intermittent   Pulse 99   Resp 18   Aerosol Therapy   $ Aerosol Therapy Charges PRN treatment not required   Respiratory Treatment Status (SVN) PRN treatment not required

## 2024-08-20 NOTE — NURSING
Pt. Refused to have blood drawn for labs. She is still confused. Will try to draw at 7am  verbalized.

## 2024-08-20 NOTE — CONSULTS
18 gauge x 10cm Midline placed to patient's right basilic vein with the use of ultrasound guidance.     Ultrasound guidance: yes  Vessel Caliber: large and patent, compressibility normal  Needle advanced into vessel with real time Ultrasound guidance.  Guidewire confirmed in vessel.  Image recorded and saved.  Sterile sheath used.  Sterile dressing applied  Arm circumference- 26cm  Dressing dated   Limb alert applied.

## 2024-08-20 NOTE — PLAN OF CARE
[facility] - Med/Surg  Initial Discharge Assessment       Primary Care Provider: Colten Gould MD    Admission Diagnosis: AMS (altered mental status) [R41.82]    Admission Date: 8/19/2024  Expected Discharge Date: 8/21/2024     Spoke to pt's grandjessika Rick at bedside to complete dc assessment. Pt lives at listed address alone but he is there 4-5 days a week to assist. Pt's baseline independent with ADLs. Active with [facility]. DME RW. Denies hd/dm/o2. DC plan is tbd. Requested caregiver resources    Payor: [employer] MEDICARE / Plan: [employer] MEDICARE HMO / Product Type: Capitation /     Extended Emergency Contact Information  Primary Emergency Contact: Prabhjot Han  Address: unknown           [place] [address] [place]  Mobile Phone: [phone]  Relation: Grandchild  Preferred language: English   needed? No  Secondary Emergency Contact: Anais Aguiar  Address: [place] [address] [place]  Mobile Phone: [phone]  Relation: Friend  Preferred language: English   needed? No    Discharge Plan A: Home Health  Discharge Plan B: Home with [facility] [place] [address]  [address] [facility] [address]  Phone: [phone] Fax: [phone]    [facility]  [address]  [facility] [address]  Phone: [phone] Fax: [phone]      Initial Assessment (most recent)       Adult Discharge Assessment - 08/20/24 1409          Discharge Assessment    Assessment Type Discharge Planning Assessment     Confirmed/corrected address, phone number and insurance Yes     Confirmed Demographics Correct on Facesheet     Source of Information patient;family     People in Home alone   grandson is there 4-5 days a week    Do you expect to return to your current living situation? Yes     Do you have help at home or someone to help you manage your  care at home? Yes     Prior to hospitilization cognitive status: Unable to Assess     Current cognitive status: Not Oriented to Place;Not Oriented to Time     Walking or Climbing Stairs Difficulty yes     Walking or Climbing Stairs ambulation difficulty, requires equipment     Dressing/Bathing Difficulty no     Equipment Currently Used at Home walker, rolling     Readmission within 30 days? No     Patient currently being followed by outpatient case management? No     Do you currently have service(s) that help you manage your care at home? Yes     Name and Contact number of agency [facility]     Is the pt/caregiver preference to resume services with current agency Yes     Do you take prescription medications? Yes     Do you have prescription coverage? Yes     Coverage [employer]     Do you have any problems affording any of your prescribed medications? No     Is the patient taking medications as prescribed? yes     How do you get to doctors appointments? family or friend will provide     Are you on dialysis? No     Do you take coumadin? No     Discharge Plan A Home Health     Discharge Plan B Home with family     DME Needed Upon Discharge  none     Discharge Plan discussed with: Patient   grandson

## 2024-08-21 PROBLEM — E44.0 MODERATE PROTEIN-CALORIE MALNUTRITION: Status: ACTIVE | Noted: 2024-08-21

## 2024-08-21 PROCEDURE — 11000001 HC ACUTE MED/SURG PRIVATE ROOM

## 2024-08-21 PROCEDURE — 25000003 PHARM REV CODE 250: Performed by: STUDENT IN AN ORGANIZED HEALTH CARE EDUCATION/TRAINING PROGRAM

## 2024-08-21 PROCEDURE — 94761 N-INVAS EAR/PLS OXIMETRY MLT: CPT

## 2024-08-21 PROCEDURE — 25000003 PHARM REV CODE 250: Performed by: NURSE PRACTITIONER

## 2024-08-21 PROCEDURE — 97116 GAIT TRAINING THERAPY: CPT | Mod: CQ

## 2024-08-21 PROCEDURE — 99900035 HC TECH TIME PER 15 MIN (STAT)

## 2024-08-21 PROCEDURE — 25500020 PHARM REV CODE 255

## 2024-08-21 PROCEDURE — A9585 GADOBUTROL INJECTION: HCPCS

## 2024-08-21 RX ORDER — GADOBUTROL 604.72 MG/ML
INJECTION INTRAVENOUS
Status: COMPLETED
Start: 2024-08-21 | End: 2024-08-21

## 2024-08-21 RX ADMIN — LOSARTAN POTASSIUM 100 MG: 100 TABLET, FILM COATED ORAL at 10:08

## 2024-08-21 RX ADMIN — GADOBUTROL 4 ML: 604.72 INJECTION INTRAVENOUS at 03:08

## 2024-08-21 RX ADMIN — DIVALPROEX SODIUM 250 MG: 125 CAPSULE, COATED PELLETS ORAL at 09:08

## 2024-08-21 RX ADMIN — FOLIC ACID 1000 MCG: 1 TABLET ORAL at 10:08

## 2024-08-21 RX ADMIN — NIFEDIPINE 30 MG: 30 TABLET, FILM COATED, EXTENDED RELEASE ORAL at 09:08

## 2024-08-21 RX ADMIN — DIVALPROEX SODIUM 125 MG: 125 CAPSULE, COATED PELLETS ORAL at 10:08

## 2024-08-21 NOTE — CONSULTS
[facility]/Surg  Adult Nutrition  Consult Note    SUMMARY     Recommendations  Continue IDDSI level 6 diet  Nursing assistance with feeding when grandson not here  Boost plus with all meals  Multivitamin/mineral supplementation  Collaborate with health care providers as needed.    Goal: intake > 75% upon RD F/U; prevent further wt loss  Nutrition Goal Status: new  Communication of RD Recs: reviewed with physician  Comments: pt pleasantly confused; information obtained from her grandson who lives with her 4 days per week. Prior to admit, she was independent with ADL's. Now she must be fed and redirected; no Hx of N/V or difficulty chewing or swallowing.  Last BM on 8/21.      Nutrition Related Social Determinants of Health: SDOH: Adequate food in home environment    Assessment and Plan  Nutrition Problem  Moderate Acute Malnutrition    Related to (etiology):   Inadequate oral intake d/t Confusion 2/2 metabolic encephalopathy    Signs and Symptoms (as evidenced by):   5% wt loss; mild muscle depletion notable to temple region; weakness     Interventions/Recommendations (treatment strategy):  Modified diet texture for ease in chewing; placed order for nursing assistance with feeding; ordered Boost Plus with all meals.    Nutrition Diagnosis Status:   New    Malnutrition Assessment  Malnutrition Context: acute illness or injury  Malnutrition Level: moderate        Weight Loss (Malnutrition): 5% in 1 month  Energy Intake (Malnutrition): less than 75% for greater than or equal 7 days  Muscle Mass (Malnutrition): mild depletion  Hand  Strength, Left (Malnutrition): reduced d/t age  Hand  Strength, Right (Malnutrition): reduced d/t age       [place] (Muscle Loss): mild depletion           Reason for Assessment:  Malnutrition  Dx: Metabolic encephalopathy; insomnia  PMH:  [place] cancer; HTN; DDD; PAD; CAD; CKD; anemia  Reason For Assessment: consult  Diagnosis: other (see  "comments)  Interdisciplinary Rounds: did not attend  Nutrition Discharge Planning: pending; probably REhab/SNF; continue current diet Rx with ONS.    Nutrition Risk Screen    Nutrition Risk Screen: MST score = 2    Nutrition/Diet History    Patient Reported Diet/Restrictions/Preferences: general  Spiritual, Cultural Beliefs, Baptism Practices, Values that Affect Care: no  Supplemental Drinks or Food Habits: Boost Original  Food Allergies: NKFA  Factors Affecting Nutritional Intake: inability to feed self, other (see comments), impaired cognitive status/motor control    Anthropometrics  Wt Readings from Last 9 Encounters:   08/21/24 46.6 kg (102 lb 11.8 oz)   07/31/24 48.2 kg (106 lb 4.2 oz)   07/21/24 49.9 kg (110 lb)   05/02/24 49.2 kg (108 lb 9.2 oz)   03/20/24 48.1 kg (106 lb 2.4 oz)   03/08/24 47.4 kg (104 lb 8 oz)   02/14/24 50 kg (110 lb 1.9 oz)   02/12/24 50.3 kg (110 lb 12.5 oz)   02/05/24 52.6 kg (116 lb)     Temp: 98.8 °F (37.1 °C)  Height Method: Stated  Height: 4' 11" (149.9 cm)  Height (inches): 59 in  Weight Method: Bed Scale  Weight: 46.6 kg (102 lb 11.8 oz)  Weight (lb): 102.74 lb  Ideal Body Weight (IBW), Female: 95 lb  % Ideal Body Weight, Female (lb): 120.87 %  BMI (Calculated): 22.2  BMI Grade: 18.5-24.9 - normal     Lab/Procedures/Meds   Latest Reference Range & Units Most Recent   Hemoglobin 12.0 - 16.0 g/dL 9.7 (L)  8/20/24 07:33   (L): Data is abnormally low   Latest Reference Range & Units Most Recent   Hematocrit 37.0 - 48.5 % 29.9 (L)  8/20/24 07:33   (L): Data is abnormally low   Latest Reference Range & Units Most Recent   Albumin 3.5 - 5.2 g/dL 3.3 (L)  8/20/24 07:32   (L): Data is abnormally low  Pertinent Labs Reviewed: reviewed  Pertinent Medications Reviewed: reviewed; divalproex; folic acid; losartan; nifedipine    Physical Findings/Assessment  No skin integrity issues documented  Vamshi score = 13     Estimated/Assessed Needs    Weight Used For Calorie Calculations: 46.6 kg (102 " lb 11.8 oz)  Energy Calorie Requirements (kcal): 1400 calories daily;  30/kg  Energy Need Method: Kcal/kg  Protein Requirements: 60 gm protien daily; 1.3/kg  Weight Used For Protein Calculations: 46.6 kg (102 lb 11.8 oz)     Estimated Fluid Requirement Method: RDA Method  RDA Method (mL): 1400     Nutrition Prescription Ordered    Current Diet Order: SOft bite cut foods; IDDSI level 6  Oral Nutrition Supplement: none    Evaluation of Received Nutrient/Fluid Intake    Intake/Output Summary (Last 24 hours) at 8/21/2024 1305  Last data filed at 8/21/2024 0800  Gross per 24 hour   Intake 300 ml   Output 200 ml   Net 100 ml   ]  Energy Calories Required: meeting needs  Protein Required: meeting needs  Fluid Required: meeting needs  Tolerance: tolerating  % Intake of Estimated Energy Needs: 50 - 75 %  % Meal Intake: 50 - 75 %    Nutrition Risk    Level of Risk/Frequency of Follow-up: high 2 x week    Monitor and Evaluation    Food and Nutrient Intake: food and beverage intake  Food and Nutrient Adminstration: diet order  Knowledge/Beliefs/Attitudes: food and nutrition knowledge/skill  Physical Activity and Function: nutrition-related ADLs and IADLs  Anthropometric Measurements: weight change  Biochemical Data, Medical Tests and Procedures: glucose/endocrine profile, gastrointestinal profile, other (specify), electrolyte and renal panel  Nutrition-Focused Physical Findings: overall appearance, skin     Nutrition Follow-Up  yes

## 2024-08-21 NOTE — PLAN OF CARE
Problem: Physical Therapy  Goal: Physical Therapy Goal  Description: Goals to be met by: 2024     Patient will increase functional independence with mobility by performin. Supine to sit with Contact Guard Assistance  2. Sit to stand transfer with Contact Guard Assistance  3. Bed to chair transfer with Contact Guard Assistance using Rolling Walker  4. Gait  x 250 feet with Contact Guard Assistance using Rolling Walker.   5. Lower extremity exercise program x20 reps   Outcome: Progressing   Ambulate with rw and assistance for safety.

## 2024-08-21 NOTE — PT/OT/SLP PROGRESS
Physical Therapy Treatment    Patient Name:  Brie Han   MRN:  [id number]    Recommendations:     Discharge Recommendations: Low Intensity Therapy  Discharge Equipment Recommendations: none  Barriers to discharge: None    Assessment:     Brie Han is a 89 y.o. female admitted with a medical diagnosis of Encephalopathy, metabolic.  She presents with the following impairments/functional limitations: weakness, impaired endurance, impaired functional mobility, gait instability, impaired cognition, decreased safety awareness . Easily awakened.  Grandson at bedside.  Agreed to mobilize.  Sup > sit with Min A.  Sit > stand with rw and Min A.  Ambulated 250' with rw and Min A for safety. Returned to room to sit up in chair at bedside.     Rehab Prognosis: Fair; patient would benefit from acute skilled PT services to address these deficits and reach maximum level of function.    Recent Surgery: * No surgery found *      Plan:     During this hospitalization, patient to be seen 6 x/week to address the identified rehab impairments via gait training, therapeutic activities, therapeutic exercises and progress toward the following goals:    Plan of Care Expires:  08/30/24    Subjective     Chief Complaint: none stated  Patient/Family Comments/goals: to return home per patient  Pain/Comfort:  Pain Rating 1: 0/10      Objective:     Communicated with nurse Smith prior to session.  Patient found supine with bed alarm, telemetry, PureWick upon PT entry to room.     General Precautions: Standard, fall  Orthopedic Precautions: N/A  Braces: N/A  Respiratory Status: Room air     Functional Mobility:  Bed Mobility:     Supine to Sit: minimum assistance  Transfers:     Sit to Stand:  minimum assistance with rolling walker  Gait: 250' with rw and Min A.      AM-PAC 6 CLICK MOBILITY          Treatment & Education:  Ambulated with rw and Min A for safety.     Patient left up in chair with all lines intact, call button in reach,  chair alarm on, nurse Luis  notified, and devan present..    GOALS:   Multidisciplinary Problems       Physical Therapy Goals          Problem: Physical Therapy    Goal Priority Disciplines Outcome Goal Variances Interventions   Physical Therapy Goal     PT, PT/OT Progressing     Description: Goals to be met by: 2024     Patient will increase functional independence with mobility by performin. Supine to sit with Contact Guard Assistance  2. Sit to stand transfer with Contact Guard Assistance  3. Bed to chair transfer with Contact Guard Assistance using Rolling Walker  4. Gait  x 250 feet with Contact Guard Assistance using Rolling Walker.   5. Lower extremity exercise program x20 reps                        Time Tracking:     PT Received On: 24  PT Start Time: 1020     PT Stop Time: 1037  PT Total Time (min): 17 min     Billable Minutes: Gait Training 17min    Treatment Type: Treatment  PT/PTA: PTA     Number of PTA visits since last PT visit: 2024

## 2024-08-21 NOTE — ASSESSMENT & PLAN NOTE
Nutrition consulted. Most recent weight and BMI monitored-     Measurements:  Wt Readings from Last 1 Encounters:   08/21/24 46.6 kg (102 lb 11.8 oz)   Body mass index is 20.75 kg/m².    Patient has been screened and assessed by RD.    Malnutrition Type:  Context: acute illness or injury  Level: moderate    Malnutrition Characteristic Summary:  Weight Loss (Malnutrition): 5% in 1 month  Energy Intake (Malnutrition): less than 75% for greater than or equal to 1 month  Muscle Mass (Malnutrition): mild depletion  Hand  Strength, Left (Malnutrition): reduced d/t age  Hand  Strength, Right (Malnutrition): reduced d/t age    Interventions/Recommendations (treatment strategy):  Continue IDDSI level  diet  Nursing assistance with feeding when grandson not here  Boost plus with all meals  Multivitamin/mineral supplementation  Collaborate with health care providers as needed.

## 2024-08-21 NOTE — PROGRESS NOTES
[facility] Medicine  Progress Note    Patient Name: Brie Han  MRN: [id number]  Patient Class: IP- Inpatient   Admission Date: 8/19/2024  Length of Stay: 1 days  Attending Physician: Vidal Reid MD  Primary Care Provider: Colten Gould MD        Subjective:     Principal Problem:Encephalopathy, metabolic        HPI:  Brie Han is a 89-year-old female who presented to the emergency room for evaluation of worsening confusion since Thursday evening.  Patient is altered and unable to provide any information.  Patient was grandson is at bedside and provides history.  He denies any recent illness or injury.  He denies any fever or chills.  No known sick contacts or travel.  He reports her confusion started abruptly on Thursday evening.  Previous medical history includes hypertension, peripheral arterial disease, coronary artery disease, CKD, iron-deficiency anemia, and colon cancer.  ER workup: CBC with leukocytosis of 13,000.  CMP was unremarkable.  Troponin mildly elevated 0.047.  BNP elevated at 151.  CT of the head was negative.  Urinalysis was negative for infection.  Patient admitted to Hospital Medicine for treatment and management.  Will obtain neurology consult in a.m.              Overview/Hospital Course:  89 year old female admitted for encephalopathy with recent insomnia for several days manifesting as sean. CT head not acute. Labs unrevealing of etiology. Neuro and psych consulted. Started on depakote.     Interval History: Patient seen and examined. Reportedly did not sleep much last night. She is mostly unchanged today from a mental standpoint. Talking Bahai a lot, difficult to redirect conversation. Grandson bedside.       Objective:     Vital Signs (Most Recent):  Temp: 98.8 °F (37.1 °C) (08/21/24 1304)  Pulse: 86 (08/21/24 1304)  Resp: 17 (08/21/24 1304)  BP: 132/63 (08/21/24 1304)  SpO2: 97 % (08/21/24 1304) Vital Signs (24h Range):  Temp:  [97.9 °F  (36.6 °C)-99.1 °F (37.3 °C)] 98.8 °F (37.1 °C)  Pulse:  [] 86  Resp:  [16-18] 17  SpO2:  [96 %-98 %] 97 %  BP: (132-153)/(63-67) 132/63     Weight: 46.6 kg (102 lb 11.8 oz)  Body mass index is 20.75 kg/m².    Intake/Output Summary (Last 24 hours) at 8/21/2024 1452  Last data filed at 8/21/2024 0800  Gross per 24 hour   Intake 360 ml   Output 200 ml   Net 160 ml         Physical Exam  Vitals reviewed.   Constitutional:       General: She is not in acute distress.     Comments: Elderly, thin, no distress   HENT:      Head: Normocephalic and atraumatic.   Cardiovascular:      Rate and Rhythm: Normal rate and regular rhythm.      Heart sounds: Normal heart sounds.   Pulmonary:      Effort: Pulmonary effort is normal. No respiratory distress.      Breath sounds: Normal breath sounds.   Neurological:      General: No focal deficit present.      Mental Status: She is alert. She is disoriented.   Psychiatric:         Mood and Affect: Affect normal.      Comments: Manic              Significant Labs: All pertinent labs within the past 24 hours have been reviewed.    Significant Imaging: I have reviewed all pertinent imaging results/findings within the past 24 hours.    Assessment/Plan:      * Encephalopathy, metabolic  With sean still  CTH NAF. EEG WNL. Recent B12, TSH, ammonia ok. Folate high  Trop chronic minimal elevation. EKG does not appear ischemic and she has no anginal symptoms  VBG was hypoxemic - was not an ABG  No bowel/bladder retention  - f/u b1, b6, RPR, HIV  - delirium precautions  - psych and neuro consult noted  - continue depakote  - hold oxybutynin  - trend condition  - plan to reconsult psych tomorrow if similar  - may have to consider freedom-psych    Moderate protein-calorie malnutrition  Nutrition consulted. Most recent weight and BMI monitored-     Measurements:  Wt Readings from Last 1 Encounters:   08/21/24 46.6 kg (102 lb 11.8 oz)   Body mass index is 20.75 kg/m².    Patient has been screened  and assessed by RD.    Malnutrition Type:  Context: acute illness or injury  Level: moderate    Malnutrition Characteristic Summary:  Weight Loss (Malnutrition): 5% in 1 month  Energy Intake (Malnutrition): less than 75% for greater than or equal to 1 month  Muscle Mass (Malnutrition): mild depletion  Hand  Strength, Left (Malnutrition): reduced d/t age  Hand  Strength, Right (Malnutrition): reduced d/t age    Interventions/Recommendations (treatment strategy):  Continue IDDSI level  diet  Nursing assistance with feeding when grandson not here  Boost plus with all meals  Multivitamin/mineral supplementation  Collaborate with health care providers as needed.      Adenocarcinoma of colon  Previously documented she does not want to pursue treatment    Hypertension  Chronic, controlled  - continue losartan and nifedipine      VTE Risk Mitigation (From admission, onward)           Ordered     IP VTE HIGH RISK PATIENT  Once         08/19/24 1801     Place sequential compression device  Until discontinued         08/19/24 1801     Place MAURA hose  Until discontinued         08/19/24 1801                    Discharge Planning   VINITA: 8/23/2024     Code Status: DNR   Is the patient medically ready for discharge?:     Reason for patient still in hospital (select all that apply): Patient trending condition and Treatment  Discharge Plan A: Home Health                  Vidal Reid MD  Department of Hospital Medicine   [facility]/Surg

## 2024-08-21 NOTE — ASSESSMENT & PLAN NOTE
With sean still  CTH NAF. EEG WNL. Recent B12, TSH, ammonia ok. Folate high  Trop chronic minimal elevation. EKG does not appear ischemic and she has no anginal symptoms  VBG was hypoxemic - was not an ABG  No bowel/bladder retention  - f/u b1, b6, RPR, HIV  - delirium precautions  - psych and neuro consult noted  - continue depakote  - hold oxybutynin  - trend condition  - plan to reconsult psych tomorrow if similar  - may have to consider freedom-psych

## 2024-08-21 NOTE — PLAN OF CARE
Recommendations  Continue IDDSI level  diet  Nursing assistance with feeding when grandson not here  Boost plus with all meals  Multivitamin/mineral supplementation  Collaborate with health care providers as needed.     Goal: intake > 75% upon RD F/U; prevent further wt loss  Nutrition Goal Status: new  Communication of RD Recs: reviewed with physician  Comments: pt pleasantly confused; information obtained from her grandson who lives with her 4 days per week. Prior to admit, she was independent with ADL's. Now she must be fed and redirected; no Hx of N/V or difficulty chewing or swallowing.  Last BM on 8/21.       Nutrition Related Social Determinants of Health: SDOH: Adequate food in home environment     Assessment and Plan  Nutrition Problem  Moderate Acute Malnutrition     Related to (etiology):   Inadequate oral intake d/t Confusion 2/2 metabolic encephalopathy     Signs and Symptoms (as evidenced by):   5% wt loss; mild muscle depletion notable to temple region; weakness      Interventions/Recommendations (treatment strategy):  Modified diet texture for ease in chewing; placed order for nursing assistance with feeding; ordered Boost Plus with all meals.     Nutrition Diagnosis Status:   New     Malnutrition Assessment  Malnutrition Context: acute illness or injury  Malnutrition Level: moderate        Weight Loss (Malnutrition): 5% in 1 month  Energy Intake (Malnutrition): less than 75% for greater than or equal 7 days  Muscle Mass (Malnutrition): mild depletion  Hand  Strength, Left (Malnutrition): reduced d/t age  Hand  Strength, Right (Malnutrition): reduced d/t age       [place] (Muscle Loss): mild depletion

## 2024-08-21 NOTE — SUBJECTIVE & OBJECTIVE
Interval History: Patient seen and examined. Reportedly did not sleep much last night. She is mostly unchanged today from a mental standpoint. Talking Orthodoxy a lot, difficult to redirect conversation. Grandson bedside.       Objective:     Vital Signs (Most Recent):  Temp: 98.8 °F (37.1 °C) (08/21/24 1304)  Pulse: 86 (08/21/24 1304)  Resp: 17 (08/21/24 1304)  BP: 132/63 (08/21/24 1304)  SpO2: 97 % (08/21/24 1304) Vital Signs (24h Range):  Temp:  [97.9 °F (36.6 °C)-99.1 °F (37.3 °C)] 98.8 °F (37.1 °C)  Pulse:  [] 86  Resp:  [16-18] 17  SpO2:  [96 %-98 %] 97 %  BP: (132-153)/(63-67) 132/63     Weight: 46.6 kg (102 lb 11.8 oz)  Body mass index is 20.75 kg/m².    Intake/Output Summary (Last 24 hours) at 8/21/2024 1452  Last data filed at 8/21/2024 0800  Gross per 24 hour   Intake 360 ml   Output 200 ml   Net 160 ml         Physical Exam  Vitals reviewed.   Constitutional:       General: She is not in acute distress.     Comments: Elderly, thin, no distress   HENT:      Head: Normocephalic and atraumatic.   Cardiovascular:      Rate and Rhythm: Normal rate and regular rhythm.      Heart sounds: Normal heart sounds.   Pulmonary:      Effort: Pulmonary effort is normal. No respiratory distress.      Breath sounds: Normal breath sounds.   Neurological:      General: No focal deficit present.      Mental Status: She is alert. She is disoriented.   Psychiatric:         Mood and Affect: Affect normal.      Comments: Manic              Significant Labs: All pertinent labs within the past 24 hours have been reviewed.    Significant Imaging: I have reviewed all pertinent imaging results/findings within the past 24 hours.

## 2024-08-22 PROBLEM — F30.9 MANIA: Status: ACTIVE | Noted: 2024-08-19

## 2024-08-22 LAB
ALBUMIN SERPL BCP-MCNC: 2.7 G/DL (ref 3.5–5.2)
ALP SERPL-CCNC: 60 U/L (ref 55–135)
ALT SERPL W/O P-5'-P-CCNC: 12 U/L (ref 10–44)
ANION GAP SERPL CALC-SCNC: 8 MMOL/L (ref 8–16)
AST SERPL-CCNC: 21 U/L (ref 10–40)
BASOPHILS # BLD AUTO: 0.04 K/UL (ref 0–0.2)
BASOPHILS NFR BLD: 0.4 % (ref 0–1.9)
BILIRUB SERPL-MCNC: 0.4 MG/DL (ref 0.1–1)
BUN SERPL-MCNC: 14 MG/DL (ref 10–30)
CALCIUM SERPL-MCNC: 8.8 MG/DL (ref 8.7–10.5)
CHLORIDE SERPL-SCNC: 97 MMOL/L (ref 95–110)
CO2 SERPL-SCNC: 23 MMOL/L (ref 23–29)
CREAT SERPL-MCNC: 0.7 MG/DL (ref 0.5–1.4)
DIFFERENTIAL METHOD BLD: ABNORMAL
EOSINOPHIL # BLD AUTO: 0.1 K/UL (ref 0–0.5)
EOSINOPHIL NFR BLD: 1.2 % (ref 0–8)
ERYTHROCYTE [DISTWIDTH] IN BLOOD BY AUTOMATED COUNT: 14.7 % (ref 11.5–14.5)
EST. GFR  (NO RACE VARIABLE): >60 ML/MIN/1.73 M^2
GLUCOSE SERPL-MCNC: 83 MG/DL (ref 70–110)
HCT VFR BLD AUTO: 31.2 % (ref 37–48.5)
HGB BLD-MCNC: 10.1 G/DL (ref 12–16)
HIV 1+2 AB+HIV1 P24 AG SERPL QL IA: NEGATIVE
IMM GRANULOCYTES # BLD AUTO: 0.05 K/UL (ref 0–0.04)
IMM GRANULOCYTES NFR BLD AUTO: 0.5 % (ref 0–0.5)
LYMPHOCYTES # BLD AUTO: 1.3 K/UL (ref 1–4.8)
LYMPHOCYTES NFR BLD: 13.3 % (ref 18–48)
MCH RBC QN AUTO: 28.9 PG (ref 27–31)
MCHC RBC AUTO-ENTMCNC: 32.4 G/DL (ref 32–36)
MCV RBC AUTO: 89 FL (ref 82–98)
MONOCYTES # BLD AUTO: 1 K/UL (ref 0.3–1)
MONOCYTES NFR BLD: 10.5 % (ref 4–15)
NEUTROPHILS # BLD AUTO: 6.9 K/UL (ref 1.8–7.7)
NEUTROPHILS NFR BLD: 74.1 % (ref 38–73)
NRBC BLD-RTO: 0 /100 WBC
PLATELET # BLD AUTO: 480 K/UL (ref 150–450)
PMV BLD AUTO: 9.1 FL (ref 9.2–12.9)
POTASSIUM SERPL-SCNC: 3.5 MMOL/L (ref 3.5–5.1)
PROT SERPL-MCNC: 5.8 G/DL (ref 6–8.4)
RBC # BLD AUTO: 3.49 M/UL (ref 4–5.4)
RPR SER QL: NORMAL
SODIUM SERPL-SCNC: 128 MMOL/L (ref 136–145)
SODIUM SERPL-SCNC: 129 MMOL/L (ref 136–145)
WBC # BLD AUTO: 9.37 K/UL (ref 3.9–12.7)

## 2024-08-22 PROCEDURE — 80053 COMPREHEN METABOLIC PANEL: CPT | Performed by: NURSE PRACTITIONER

## 2024-08-22 PROCEDURE — 11000001 HC ACUTE MED/SURG PRIVATE ROOM

## 2024-08-22 PROCEDURE — 99900035 HC TECH TIME PER 15 MIN (STAT)

## 2024-08-22 PROCEDURE — 25000003 PHARM REV CODE 250: Performed by: NURSE PRACTITIONER

## 2024-08-22 PROCEDURE — 36415 COLL VENOUS BLD VENIPUNCTURE: CPT | Performed by: STUDENT IN AN ORGANIZED HEALTH CARE EDUCATION/TRAINING PROGRAM

## 2024-08-22 PROCEDURE — 85025 COMPLETE CBC W/AUTO DIFF WBC: CPT | Performed by: NURSE PRACTITIONER

## 2024-08-22 PROCEDURE — G0407 INPT/TELE FOLLOW UP 25: HCPCS | Mod: 95,,, | Performed by: PSYCHIATRY & NEUROLOGY

## 2024-08-22 PROCEDURE — 84295 ASSAY OF SERUM SODIUM: CPT | Performed by: STUDENT IN AN ORGANIZED HEALTH CARE EDUCATION/TRAINING PROGRAM

## 2024-08-22 PROCEDURE — 25000003 PHARM REV CODE 250: Performed by: STUDENT IN AN ORGANIZED HEALTH CARE EDUCATION/TRAINING PROGRAM

## 2024-08-22 PROCEDURE — 94761 N-INVAS EAR/PLS OXIMETRY MLT: CPT

## 2024-08-22 PROCEDURE — 36415 COLL VENOUS BLD VENIPUNCTURE: CPT | Performed by: NURSE PRACTITIONER

## 2024-08-22 PROCEDURE — 93005 ELECTROCARDIOGRAM TRACING: CPT

## 2024-08-22 PROCEDURE — 93010 ELECTROCARDIOGRAM REPORT: CPT | Mod: ,,, | Performed by: GENERAL PRACTICE

## 2024-08-22 PROCEDURE — 97530 THERAPEUTIC ACTIVITIES: CPT | Mod: CQ

## 2024-08-22 RX ORDER — QUETIAPINE FUMARATE 25 MG/1
50 TABLET, FILM COATED ORAL NIGHTLY
Status: DISCONTINUED | OUTPATIENT
Start: 2024-08-22 | End: 2024-08-24 | Stop reason: HOSPADM

## 2024-08-22 RX ADMIN — NIFEDIPINE 30 MG: 30 TABLET, FILM COATED, EXTENDED RELEASE ORAL at 10:08

## 2024-08-22 RX ADMIN — QUETIAPINE 50 MG: 25 TABLET ORAL at 10:08

## 2024-08-22 RX ADMIN — LOSARTAN POTASSIUM 100 MG: 100 TABLET, FILM COATED ORAL at 09:08

## 2024-08-22 RX ADMIN — FOLIC ACID 1000 MCG: 1 TABLET ORAL at 09:08

## 2024-08-22 RX ADMIN — POTASSIUM BICARBONATE 50 MEQ: 977.5 TABLET, EFFERVESCENT ORAL at 06:08

## 2024-08-22 NOTE — ASSESSMENT & PLAN NOTE
Persistent   CTH NAF. EEG WNL. Recent B12, TSH, ammonia, RPR ok. Folate high  Trop chronic minimal elevation. EKG does not appear ischemic and she has no anginal symptoms  VBG was hypoxemic - was not an ABG  No bowel/bladder retention  - f/u b1, b6, HIV  - delirium precautions  - psych and neuro consult noted  - dc depakote for hyponatremia  - hold oxybutynin  - trend condition  - re-consult psych: rec PEC, EKG to check Qtc, trial seroquel and pursue freedom-psych

## 2024-08-22 NOTE — SUBJECTIVE & OBJECTIVE
Interval History: Patient seen and examined. NAEON. Nicky is essentially unchanged.      Objective:     Vital Signs (Most Recent):  Temp: 98.1 °F (36.7 °C) (08/22/24 0346)  Pulse: 85 (08/22/24 0801)  Resp: 16 (08/22/24 0801)  BP: 133/63 (08/22/24 0346)  SpO2: 98 % (08/22/24 0801) Vital Signs (24h Range):  Temp:  [98.1 °F (36.7 °C)-98.6 °F (37 °C)] 98.1 °F (36.7 °C)  Pulse:  [82-89] 85  Resp:  [16-20] 16  SpO2:  [98 %-99 %] 98 %  BP: (133-171)/(63-74) 133/63     Weight: 46.6 kg (102 lb 11.8 oz)  Body mass index is 20.75 kg/m².    Intake/Output Summary (Last 24 hours) at 8/22/2024 1428  Last data filed at 8/22/2024 0647  Gross per 24 hour   Intake 480 ml   Output 1300 ml   Net -820 ml         Physical Exam  Vitals reviewed.   Constitutional:       General: She is not in acute distress.     Comments: Elderly, thin, no distress   HENT:      Head: Normocephalic and atraumatic.   Cardiovascular:      Rate and Rhythm: Normal rate and regular rhythm.      Heart sounds: Normal heart sounds.   Pulmonary:      Effort: Pulmonary effort is normal. No respiratory distress.      Breath sounds: Normal breath sounds.   Neurological:      General: No focal deficit present.      Mental Status: She is alert. She is disoriented.   Psychiatric:         Mood and Affect: Affect normal.      Comments: Manic   Hyper-Faith              Significant Labs: All pertinent labs within the past 24 hours have been reviewed.    Significant Imaging: I have reviewed all pertinent imaging results/findings within the past 24 hours.

## 2024-08-22 NOTE — PT/OT/SLP PROGRESS
Physical Therapy Treatment    Patient Name:  Brie Han   MRN:  [id number]    Recommendations:     Discharge Recommendations: Low Intensity Therapy  Discharge Equipment Recommendations: none  Barriers to discharge: None    Assessment:     Brie Han is a 89 y.o. female admitted with a medical diagnosis of Encephalopathy, metabolic.  She presents with the following impairments/functional limitations: weakness, impaired endurance, impaired functional mobility, gait instability, impaired cognition, decreased safety awareness . Supine in bed with grandson present.  Agreed to participate in therapy. However, somewhat difficult to understand ( talkative).  Sup > sit with CGA.  Sit > stand with rw and  Min A.  Ambulated 250' with rw and Min A.  Returned to room to sit up in chair.  Performed LE exercises.      Rehab Prognosis: Fair; patient would benefit from acute skilled PT services to address these deficits and reach maximum level of function.    Recent Surgery: * No surgery found *      Plan:     During this hospitalization, patient to be seen 6 x/week to address the identified rehab impairments via gait training, therapeutic activities, therapeutic exercises and progress toward the following goals:    Plan of Care Expires:  08/30/24    Subjective     Chief Complaint: none stated  Patient/Family Comments/goals: none stated  Pain/Comfort:  Pain Rating 1: 0/10      Objective:     Communicated with nurse Stein prior to session.  Patient found supine with bed alarm, PureWick, telemetry upon PT entry to room.     General Precautions: Standard, fall  Orthopedic Precautions: N/A  Braces: N/A  Respiratory Status: Room air     Functional Mobility:  Bed Mobility:     Supine to Sit: contact guard assistance  Transfers:     Sit to Stand:  minimum assistance with rolling walker  Gait: 250' with rw and Min A      AM-PAC 6 CLICK MOBILITY          Treatment & Education:  BLE exercises:  TKE's, Hip abd/add/ flexion, AP's.      Patient left up in chair with all lines intact, call button in reach, chair alarm on, nurse Emil notified, and grandson present..    GOALS:   Multidisciplinary Problems       Physical Therapy Goals          Problem: Physical Therapy    Goal Priority Disciplines Outcome Goal Variances Interventions   Physical Therapy Goal     PT, PT/OT Progressing     Description: Goals to be met by: 2024     Patient will increase functional independence with mobility by performin. Supine to sit with Contact Guard Assistance  2. Sit to stand transfer with Contact Guard Assistance  3. Bed to chair transfer with Contact Guard Assistance using Rolling Walker  4. Gait  x 250 feet with Contact Guard Assistance using Rolling Walker.   5. Lower extremity exercise program x20 reps                        Time Tracking:     PT Received On: 24  PT Start Time: 1050     PT Stop Time: 1108  PT Total Time (min): 18 min     Billable Minutes: Therapeutic Activity 18min    Treatment Type: Treatment  PT/PTA: PTA     Number of PTA visits since last PT visit: 2     2024

## 2024-08-22 NOTE — PROGRESS NOTES
[facility] Medicine  Progress Note    Patient Name: Brie Han  MRN: [id number]  Patient Class: IP- Inpatient   Admission Date: 8/19/2024  Length of Stay: 2 days  Attending Physician: Vidal Reid MD  Primary Care Provider: Colten Gould MD        Subjective:     Principal Problem:Sean        HPI:  Brie Han is a 89-year-old female who presented to the emergency room for evaluation of worsening confusion since Thursday evening.  Patient is altered and unable to provide any information.  Patient was grandson is at bedside and provides history.  He denies any recent illness or injury.  He denies any fever or chills.  No known sick contacts or travel.  He reports her confusion started abruptly on Thursday evening.  Previous medical history includes hypertension, peripheral arterial disease, coronary artery disease, CKD, iron-deficiency anemia, and colon cancer.  ER workup: CBC with leukocytosis of 13,000.  CMP was unremarkable.  Troponin mildly elevated 0.047.  BNP elevated at 151.  CT of the head was negative.  Urinalysis was negative for infection.  Patient admitted to Hospital Medicine for treatment and management.  Will obtain neurology consult in a.m.              Overview/Hospital Course:  89 year old female admitted for sean with recent insomnia for several days. CT head not acute. Labs unrevealing of etiology. Neuro and psych consulted. Started on depakote. Developed hyponatremia so depakote dc'd. PEC'd per psych rec.     Interval History: Patient seen and examined. NAEON. Sean is essentially unchanged.      Objective:     Vital Signs (Most Recent):  Temp: 98.1 °F (36.7 °C) (08/22/24 0346)  Pulse: 85 (08/22/24 0801)  Resp: 16 (08/22/24 0801)  BP: 133/63 (08/22/24 0346)  SpO2: 98 % (08/22/24 0801) Vital Signs (24h Range):  Temp:  [98.1 °F (36.7 °C)-98.6 °F (37 °C)] 98.1 °F (36.7 °C)  Pulse:  [82-89] 85  Resp:  [16-20] 16  SpO2:  [98 %-99 %] 98 %  BP:  (133-171)/(63-74) 133/63     Weight: 46.6 kg (102 lb 11.8 oz)  Body mass index is 20.75 kg/m².    Intake/Output Summary (Last 24 hours) at 8/22/2024 1428  Last data filed at 8/22/2024 0647  Gross per 24 hour   Intake 480 ml   Output 1300 ml   Net -820 ml         Physical Exam  Vitals reviewed.   Constitutional:       General: She is not in acute distress.     Comments: Elderly, thin, no distress   HENT:      Head: Normocephalic and atraumatic.   Cardiovascular:      Rate and Rhythm: Normal rate and regular rhythm.      Heart sounds: Normal heart sounds.   Pulmonary:      Effort: Pulmonary effort is normal. No respiratory distress.      Breath sounds: Normal breath sounds.   Neurological:      General: No focal deficit present.      Mental Status: She is alert. She is disoriented.   Psychiatric:         Mood and Affect: Affect normal.      Comments: Manic   Hyper-Christianity              Significant Labs: All pertinent labs within the past 24 hours have been reviewed.    Significant Imaging: I have reviewed all pertinent imaging results/findings within the past 24 hours.    Assessment/Plan:      * Nicky  Persistent   CTH NAF. EEG WNL. Recent B12, TSH, ammonia, RPR ok. Folate high  Trop chronic minimal elevation. EKG does not appear ischemic and she has no anginal symptoms  VBG was hypoxemic - was not an ABG  No bowel/bladder retention  - f/u b1, b6, HIV  - delirium precautions  - psych and neuro consult noted  - dc depakote for hyponatremia  - hold oxybutynin  - trend condition  - re-consult psych: rec PEC, EKG to check Qtc, trial seroquel and pursue freedom-psych    Moderate protein-calorie malnutrition  Nutrition consulted. Most recent weight and BMI monitored-     Measurements:  Wt Readings from Last 1 Encounters:   08/21/24 46.6 kg (102 lb 11.8 oz)   Body mass index is 20.75 kg/m².    Patient has been screened and assessed by RD.    Malnutrition Type:  Context: acute illness or injury  Level: moderate    Malnutrition  Characteristic Summary:  Weight Loss (Malnutrition): 5% in 1 month  Energy Intake (Malnutrition): less than 75% for greater than or equal to 1 month  Muscle Mass (Malnutrition): mild depletion  Hand  Strength, Left (Malnutrition): reduced d/t age  Hand  Strength, Right (Malnutrition): reduced d/t age    Interventions/Recommendations (treatment strategy):  Continue IDDSI level  diet  Nursing assistance with feeding when grandson not here  Boost plus with all meals  Multivitamin/mineral supplementation  Collaborate with health care providers as needed.      Adenocarcinoma of colon  Previously documented she does not want to pursue treatment    Hyponatremia  Repeat confirmed. Suspect from depakote  - dc depakote  - recheck Na in AM and daily  - encourage PO intake  - not medically clear for freedom-psych due to this    Hypertension  Chronic, controlled  - continue losartan and nifedipine      VTE Risk Mitigation (From admission, onward)           Ordered     IP VTE HIGH RISK PATIENT  Once         08/19/24 1801     Place sequential compression device  Until discontinued         08/19/24 1801     Place MAURA hose  Until discontinued         08/19/24 1801                    Discharge Planning   VINITA:  8/23 -8/24    Code Status: DNR   Is the patient medically ready for discharge?:     Reason for patient still in hospital (select all that apply): Patient new problem, Patient trending condition, and Laboratory test  Discharge Plan A: Psychiatric hospital                  Vidal Reid MD  Department of Hospital Medicine   [facility]/Surg

## 2024-08-22 NOTE — CONSULTS
"[facility]  Psychiatry  Telepsychiatry Progress Note    Please see previous notes:    Patient agreeable to consultation via telepsychiatry.    Tele-Consultation from Psychiatry started: 8/22/2024 at 11:39 AM  The chief complaint leading to psychiatric consultation is: "reconsult sean"  This consultation was requested by Dr Reid, the attending physician.  The location of the consulting psychiatrist is [place].  The patient location is  [facility] CARE *   The patient was admitted at: 8/19/2024  1:14 PM  Also present with the patient at the time of the consultation: RN    Patient Identification:   Brie Han is a 89 y.o. female.    Patient information was obtained from patient, relative(s), past medical records, and primary team.  Patient presented voluntarily to the Emergency Department       Inpatient consult to Telemedicine - Psych  Consult performed by: Mame Peacock MD  Consult ordered by: Vidal Reid MD        Teleconsult Time Documentation  Subjective:     History of Present Illness:  Per medicine progress note 8/22/24: "Principal Problem:Encephalopathy, metabolic           HPI:  Brie Han is a 89-year-old female who presented to the emergency room for evaluation of worsening confusion since Thursday evening.  Patient is altered and unable to provide any information.  Patient was grandson is at bedside and provides history.  He denies any recent illness or injury.  He denies any fever or chills.  No known sick contacts or travel.  He reports her confusion started abruptly on Thursday evening.  Previous medical history includes hypertension, peripheral arterial disease, coronary artery disease, CKD, iron-deficiency anemia, and colon cancer.  ER workup: CBC with leukocytosis of 13,000.  CMP was unremarkable.  Troponin mildly elevated 0.047.  BNP elevated at 151.  CT of the head was negative.  Urinalysis was negative for infection.  Patient admitted to Hospital " "Medicine for treatment and management.  Will obtain neurology consult in a.m.                    Overview/Hospital Course:  89 year old female admitted for encephalopathy with recent insomnia for several days manifesting as sean. CT head not acute. Labs unrevealing of etiology. Neuro and psych consulted. Started on depakote.      Interval History: Patient seen and examined. Reportedly did not sleep much last night. She is mostly unchanged today from a mental standpoint. Talking Yazidism a lot, difficult to redirect conversation. Grandson bedside.    "    Pt seen and chart reviewed, compliant with meds, no PRNs for agitation; trialed on Depakote 8/20-8/21/24. Per RN pt slept only a few hours yesterday, remains hyperreligious and difficult to understand; ambulated well with PT. On exam, pt sitting up in chair wrapped in blankets, has full food tray in front of her. Speech is pressured and disorganized, says "He was in this room he asked me...He has to know that long ago by the thought...They all destroy and I let them know." Due to the patient's inability to logically or linearly participate in the psychiatric assessment, I reached out to pt's grandchild Prabhjot (# on the EMR facesheet) for collateral information. States pt's son has hx of schizophrenia but pt has no known prior hx, has never acted like this before; was independently functioning but Wednesday night last week before tearful, was talking all night, trying to wash clothes that were already clean. Wakes up crying, not making sense. Says she has been under stress lately, medical issues and death of her last remaining sibling last month. Feels she needs inpt [facility] tx to help pt get back to baseline.       Psychiatric Mental Status Exam:  Arousal: awake  Sensorium/Orientation: oriented to unable to assess  Behavior/Cooperation: sitting calmly in chair wrapped in blankets, engages easily   Speech: normal volume, tone, pressured  Language: unable to " assess  Mood: unable to assess  Affect: euthymic  Thought Process: disorganized, tangential  Thought Content:   Auditory hallucinations: unable to assess  Visual hallucinations: unable to assess  Paranoia: unable to assess  Delusions:  unable to assess  Suicidal ideation: unable to assess  Homicidal ideation: unable to assess  Attention/Concentration:  impaired  Memory:               Recent: unable to assess              Remote: unable to assess  Fund of Knowledge: unable to assess  Abstract reasoning: unable to assess  Insight:limited  Judgment: limited      Past Medical History:   Past Medical History:   Diagnosis Date    ACP (advance care planning) 2/7/2024    Amaurosis fugax     Bilateral left eye worse    Anticoagulant long-term use     Arthritis     Glaucoma     resolved    Hyperlipidemia     Hypertension     Stroke 2006      Laboratory Data:   Labs Reviewed   CBC W/ AUTO DIFFERENTIAL - Abnormal       Result Value    WBC 13.21 (*)     RBC 3.27 (*)     Hemoglobin 10.0 (*)     Hematocrit 30.8 (*)     MCV 94      MCH 30.6      MCHC 32.5      RDW 14.8 (*)     Platelets 489 (*)     MPV 9.0 (*)     Immature Granulocytes 0.2      Gran # (ANC) 11.4 (*)     Immature Grans (Abs) 0.03      Lymph # 0.6 (*)     Mono # 1.2 (*)     Eos # 0.0      Baso # 0.07      nRBC 0      Gran % 86.0 (*)     Lymph % 4.4 (*)     Mono % 8.7      Eosinophil % 0.2      Basophil % 0.5      Differential Method Automated     COMPREHENSIVE METABOLIC PANEL - Abnormal    Sodium 137      Potassium 3.9      Chloride 104      CO2 19 (*)     Glucose 105      BUN 20      Creatinine 0.8      Calcium 9.9      Total Protein 6.9      Albumin 3.7      Total Bilirubin 0.5      Alkaline Phosphatase 64      AST 22      ALT 16      eGFR >60      Anion Gap 14     URINALYSIS, REFLEX TO URINE CULTURE - Abnormal    Specimen UA Urine, Catheterized      Color, UA Yellow      Appearance, UA Clear      pH, UA 7.0      Specific Gravity, UA 1.010      Protein, UA Negative       Glucose, UA Negative      Ketones, UA 1+ (*)     Bilirubin (UA) Negative      Occult Blood UA Negative      Nitrite, UA Negative      Urobilinogen, UA Negative      Leukocytes, UA Negative      Narrative:     Specimen Source->Urine   B-TYPE NATRIURETIC PEPTIDE - Abnormal     (*)    TROPONIN I - Abnormal    Troponin I 0.047 (*)    ISTAT PROCEDURE - Abnormal    POC PH 7.390      POC PCO2 38.4      POC PO2 35 (*)     POC HCO3 23.2 (*)     POC BE -2      POC SATURATED O2 66      POC Lactate 0.96      POC TCO2 24      Sample VENOUS      Site Other      Allens Test N/A      DelSys Room Air      Mode SPONT     MAGNESIUM    Magnesium 2.3     APTT    aPTT 25.3     PROTIME-INR    Prothrombin Time 10.6      INR 1.0     LIPASE    Lipase 49     SARS-COV-2 RNA AMPLIFICATION, QUAL    SARS-CoV-2 RNA, Amplification, Qual Negative     AMMONIA    Ammonia 49      Narrative:     recollect. needs to be on ice    PROCALCITONIN    Procalcitonin 0.06         Allergies:   Review of patient's allergies indicates:   Allergen Reactions    Contrast media Other (See Comments)    Aspirin Other (See Comments)    Ciprofloxacin Other (See Comments)    Iron Other (See Comments)     Small rash with po iron.  Tolerated IV iron    Iodine Rash    Penicillins Rash       Medications in ER:   Medications   sodium chloride 0.9% flush 10 mL (has no administration in time range)   albuterol-ipratropium 2.5 mg-0.5 mg/3 mL nebulizer solution 3 mL (has no administration in time range)   melatonin tablet 9 mg (9 mg Oral Given 8/20/24 2105)   ondansetron injection 4 mg (has no administration in time range)   simethicone chewable tablet 80 mg (has no administration in time range)   aluminum-magnesium hydroxide-simethicone 200-200-20 mg/5 mL suspension 30 mL (has no administration in time range)   acetaminophen tablet 650 mg (has no administration in time range)   naloxone 0.4 mg/mL injection 0.02 mg (has no administration in time range)   potassium  bicarbonate disintegrating tablet 50 mEq (50 mEq Oral Given 8/22/24 0636)   potassium bicarbonate disintegrating tablet 35 mEq (has no administration in time range)   potassium bicarbonate disintegrating tablet 60 mEq (has no administration in time range)   magnesium oxide tablet 800 mg (has no administration in time range)   magnesium oxide tablet 800 mg (has no administration in time range)   potassium, sodium phosphates 280-160-250 mg packet 2 packet (has no administration in time range)   potassium, sodium phosphates 280-160-250 mg packet 2 packet (has no administration in time range)   potassium, sodium phosphates 280-160-250 mg packet 2 packet (has no administration in time range)   glucose chewable tablet 16 g (has no administration in time range)   glucose chewable tablet 24 g (has no administration in time range)   glucagon (human recombinant) injection 1 mg (has no administration in time range)   acetaminophen tablet 650 mg (has no administration in time range)   dextrose 10% bolus 125 mL 125 mL (has no administration in time range)   dextrose 10% bolus 250 mL 250 mL (has no administration in time range)   folic acid tablet 1,000 mcg (1,000 mcg Oral Given 8/22/24 0938)   losartan tablet 100 mg (100 mg Oral Given 8/22/24 0938)   NIFEdipine 24 hr tablet 30 mg (30 mg Oral Given 8/21/24 2155)   gadobutroL (GADAVIST) 10 mmol/10 mL (1 mmol/mL) injection (4 mLs Intravenous Given 8/21/24 1519)         Assessment - Diagnosis - Goals:     Diagnosis/Impression:   Unspecified mood d/o  Lower suspicion for encephalopathy given normal EEG    Hyponatremia    Rec:   - Recheck EKG for Qtc  - Recommend trial of Seroquel 50 mg qHS, if insomnia persists 2 hours after given can give second 50 mg dose for total of 100 mg  - Recommend PEC for grave disability and seeking [facility] placement once medically cleared    Time with patient, chart, collateral: 50      More than 50% of the time was spent counseling/coordinating  care    Consulting clinician was informed of the encounter and consult note.    Consultation ended: 8/22/2024 at 12:29 PM      Mame Peacock MD   Psychiatry  [facility]

## 2024-08-22 NOTE — PLAN OF CARE
Met with pt and grandson yesterday to discuss dc planning. He would like SNF or rehab placement as pt cannot take care of herself at home. I explained that is not an option. Therapy is recommending home health and pt is not a SNF candidate. I explained we do not place pt's to [facility] from the hospital. I offered resources for sitter services.      Today-  r/t Depakote. Psych reconsulted to for med adjustment and possible geripsych placement. CM following         08/22/24 1250   Discharge Reassessment   Assessment Type Discharge Planning Reassessment   Did the patient's condition or plan change since previous assessment? Yes   Discharge Plan discussed with: Patient  (grandson)   Communicated VINITA with patient/caregiver Yes   Discharge Plan A Psychiatric hospital   Discharge Plan B Home Health   DME Needed Upon Discharge  none   Transition of Care Barriers No family/friends to help;Social   Why the patient remains in the hospital Requires continued medical care

## 2024-08-22 NOTE — PLAN OF CARE
Problem: Physical Therapy  Goal: Physical Therapy Goal  Description: Goals to be met by: 2024     Patient will increase functional independence with mobility by performin. Supine to sit with Contact Guard Assistance  2. Sit to stand transfer with Contact Guard Assistance  3. Bed to chair transfer with Contact Guard Assistance using Rolling Walker  4. Gait  x 250 feet with Contact Guard Assistance using Rolling Walker.   5. Lower extremity exercise program x20 reps   Outcome: Progressing   Therapeutic activity : bed mobility , transfers, gait with rw and assistance for safety, LE exercises.    No

## 2024-08-22 NOTE — ASSESSMENT & PLAN NOTE
Repeat confirmed. Suspect from [facility]  - [facility]  - recheck Na in AM and daily  - encourage PO intake  - not medically clear for [facility] due to this

## 2024-08-23 LAB
ALBUMIN SERPL BCP-MCNC: 2.6 G/DL (ref 3.5–5.2)
ALP SERPL-CCNC: 56 U/L (ref 55–135)
ALT SERPL W/O P-5'-P-CCNC: 13 U/L (ref 10–44)
ANION GAP SERPL CALC-SCNC: 6 MMOL/L (ref 8–16)
AST SERPL-CCNC: 16 U/L (ref 10–40)
BILIRUB SERPL-MCNC: 0.3 MG/DL (ref 0.1–1)
BUN SERPL-MCNC: 29 MG/DL (ref 10–30)
CALCIUM SERPL-MCNC: 9.2 MG/DL (ref 8.7–10.5)
CHLORIDE SERPL-SCNC: 98 MMOL/L (ref 95–110)
CO2 SERPL-SCNC: 24 MMOL/L (ref 23–29)
CREAT SERPL-MCNC: 0.8 MG/DL (ref 0.5–1.4)
ERYTHROCYTE [DISTWIDTH] IN BLOOD BY AUTOMATED COUNT: 14.6 % (ref 11.5–14.5)
EST. GFR  (NO RACE VARIABLE): >60 ML/MIN/1.73 M^2
GLUCOSE SERPL-MCNC: 74 MG/DL (ref 70–110)
HCT VFR BLD AUTO: 28.1 % (ref 37–48.5)
HGB BLD-MCNC: 9.4 G/DL (ref 12–16)
MAGNESIUM SERPL-MCNC: 2.1 MG/DL (ref 1.6–2.6)
MCH RBC QN AUTO: 29.5 PG (ref 27–31)
MCHC RBC AUTO-ENTMCNC: 33.5 G/DL (ref 32–36)
MCV RBC AUTO: 88 FL (ref 82–98)
OSMOLALITY SERPL: 282 MOSM/KG (ref 275–295)
OSMOLALITY UR: 373 MOSM/KG (ref 50–1200)
PLATELET # BLD AUTO: 428 K/UL (ref 150–450)
PMV BLD AUTO: 9.1 FL (ref 9.2–12.9)
POTASSIUM SERPL-SCNC: 4.2 MMOL/L (ref 3.5–5.1)
PROT SERPL-MCNC: 5.4 G/DL (ref 6–8.4)
RBC # BLD AUTO: 3.19 M/UL (ref 4–5.4)
SODIUM SERPL-SCNC: 128 MMOL/L (ref 136–145)
SODIUM UR-SCNC: 29 MMOL/L (ref 20–250)
SODIUM UR-SCNC: 29 MMOL/L (ref 20–250)
URATE SERPL-MCNC: 3.1 MG/DL (ref 2.4–5.7)
VIT B1 BLD-MCNC: 82 UG/L (ref 38–122)
WBC # BLD AUTO: 7.72 K/UL (ref 3.9–12.7)

## 2024-08-23 PROCEDURE — 36415 COLL VENOUS BLD VENIPUNCTURE: CPT | Performed by: STUDENT IN AN ORGANIZED HEALTH CARE EDUCATION/TRAINING PROGRAM

## 2024-08-23 PROCEDURE — 99900035 HC TECH TIME PER 15 MIN (STAT)

## 2024-08-23 PROCEDURE — 83935 ASSAY OF URINE OSMOLALITY: CPT | Performed by: STUDENT IN AN ORGANIZED HEALTH CARE EDUCATION/TRAINING PROGRAM

## 2024-08-23 PROCEDURE — 84550 ASSAY OF BLOOD/URIC ACID: CPT | Performed by: INTERNAL MEDICINE

## 2024-08-23 PROCEDURE — 84300 ASSAY OF URINE SODIUM: CPT | Performed by: STUDENT IN AN ORGANIZED HEALTH CARE EDUCATION/TRAINING PROGRAM

## 2024-08-23 PROCEDURE — 25000003 PHARM REV CODE 250: Performed by: STUDENT IN AN ORGANIZED HEALTH CARE EDUCATION/TRAINING PROGRAM

## 2024-08-23 PROCEDURE — 83735 ASSAY OF MAGNESIUM: CPT | Performed by: STUDENT IN AN ORGANIZED HEALTH CARE EDUCATION/TRAINING PROGRAM

## 2024-08-23 PROCEDURE — 36415 COLL VENOUS BLD VENIPUNCTURE: CPT | Performed by: NURSE PRACTITIONER

## 2024-08-23 PROCEDURE — 83930 ASSAY OF BLOOD OSMOLALITY: CPT | Performed by: STUDENT IN AN ORGANIZED HEALTH CARE EDUCATION/TRAINING PROGRAM

## 2024-08-23 PROCEDURE — 11000001 HC ACUTE MED/SURG PRIVATE ROOM

## 2024-08-23 PROCEDURE — 97116 GAIT TRAINING THERAPY: CPT | Mod: CQ

## 2024-08-23 PROCEDURE — 25000003 PHARM REV CODE 250: Performed by: NURSE PRACTITIONER

## 2024-08-23 PROCEDURE — 85027 COMPLETE CBC AUTOMATED: CPT | Performed by: STUDENT IN AN ORGANIZED HEALTH CARE EDUCATION/TRAINING PROGRAM

## 2024-08-23 PROCEDURE — 94761 N-INVAS EAR/PLS OXIMETRY MLT: CPT

## 2024-08-23 PROCEDURE — 80053 COMPREHEN METABOLIC PANEL: CPT | Performed by: NURSE PRACTITIONER

## 2024-08-23 RX ORDER — SODIUM CHLORIDE 1 G/1
1000 TABLET ORAL DAILY
Status: DISCONTINUED | OUTPATIENT
Start: 2024-08-23 | End: 2024-08-24 | Stop reason: HOSPADM

## 2024-08-23 RX ADMIN — Medication 1000 MG: at 01:08

## 2024-08-23 RX ADMIN — NIFEDIPINE 30 MG: 30 TABLET, FILM COATED, EXTENDED RELEASE ORAL at 08:08

## 2024-08-23 RX ADMIN — DEXTROSE MONOHYDRATE 250 MG: 50 INJECTION, SOLUTION INTRAVENOUS at 02:08

## 2024-08-23 RX ADMIN — QUETIAPINE 50 MG: 25 TABLET ORAL at 08:08

## 2024-08-23 RX ADMIN — ACETAMINOPHEN 650 MG: 325 TABLET ORAL at 08:08

## 2024-08-23 RX ADMIN — LOSARTAN POTASSIUM 100 MG: 100 TABLET, FILM COATED ORAL at 08:08

## 2024-08-23 RX ADMIN — FOLIC ACID 1000 MCG: 1 TABLET ORAL at 08:08

## 2024-08-23 NOTE — CONSULTS
INPATIENT NEPHROLOGY CONSULT   [facility]    Patient Name: Brie Han  Date: 08/23/2024    Reason for consultation: Hyponatremia    Chief Complaint:   Chief Complaint   Patient presents with    Altered Mental Status     Confused outburst       History of Present Illness:  89 year old female admitted for nicky with recent insomnia for several days. CT head not acute. Labs unrevealing of etiology. Neuro and psych consulted. Started on depakote. Developed hyponatremia so depakote dc'd. PEC'd per psych rec. Consulted for hyponatremia.    Interval History:  8/23- VSS, on RA, UOP 1L    Plan of Care:    Assessment:  Nicky  Hyponatremia- medication induced  HTN  Anemia    Plan:    - off depakote  - check urine Na, serum uric acid- add 1.5L fluid restriction  - ok with current BP meds  - H/H at baseline    No acute hypertonic saline needs.  Decide tomorrow about salt tabs vs samsca.    Thank you for allowing us to participate in this patient's care. We will continue to follow.    Vital Signs:  Temp Readings from Last 3 Encounters:   08/23/24 98.6 °F (37 °C)   07/31/24 98.5 °F (36.9 °C) (Oral)   07/25/24 98.6 °F (37 °C) (Oral)       Pulse Readings from Last 3 Encounters:   08/23/24 98   07/31/24 69   07/25/24 77       BP Readings from Last 3 Encounters:   08/23/24 (!) 163/85   07/31/24 (!) 128/52   07/25/24 133/63       Weight:  Wt Readings from Last 3 Encounters:   08/21/24 46.6 kg (102 lb 11.8 oz)   07/31/24 48.2 kg (106 lb 4.2 oz)   07/21/24 49.9 kg (110 lb)       INS/OUTS:  I/O last 3 completed shifts:  In: 480 [P.O.:480]  Out: 1800 [Urine:1800]  No intake/output data recorded.    Past Medical & Surgical History:  Past Medical History:   Diagnosis Date    ACP (advance care planning) 2/7/2024    Amaurosis fugax     Bilateral left eye worse    Anticoagulant long-term use     Arthritis     Glaucoma     resolved    Hyperlipidemia     Hypertension     Stroke 2006       Past Surgical History:    Procedure Laterality Date    APPENDECTOMY      BREAST SURGERY      CATARACT EXTRACTION W/  INTRAOCULAR LENS IMPLANT Bilateral     Dr Rojo      SECTION      3 c/s and d/c    COLONOSCOPY N/A 2024    Procedure: COLONOSCOPY;  Surgeon: Trev Lafleur MD;  Location: [facility];  Service: Endoscopy;  Laterality: N/A;    ESOPHAGOGASTRODUODENOSCOPY N/A 2024    Procedure: EGD (ESOPHAGOGASTRODUODENOSCOPY);  Surgeon: Trev Lafleur MD;  Location: [facility];  Service: Endoscopy;  Laterality: N/A;    EYE SURGERY      Glaucoma     HYSTERECTOMY      RIGHT OOPHORECTOMY      With postop hemorrhage    TONSILLECTOMY      Yag Capsulotomy Left 2021       Past Social History:  Social History     Socioeconomic History    Marital status:    Tobacco Use    Smoking status: Never    Smokeless tobacco: Never   Substance and Sexual Activity    Alcohol use: No    Drug use: No    Sexual activity: Never     Social Determinants of Health     Financial Resource Strain: Low Risk  (2024)    Overall Financial Resource Strain (CARDIA)     Difficulty of Paying Living Expenses: Not very hard   Food Insecurity: No Food Insecurity (2024)    Hunger Vital Sign     Worried About Running Out of Food in the Last Year: Never true     Ran Out of Food in the Last Year: Never true   Transportation Needs: No Transportation Needs (2024)    TRANSPORTATION NEEDS     Transportation : No   Physical Activity: Inactive (2024)    Exercise Vital Sign     Days of Exercise per Week: 0 days     Minutes of Exercise per Session: 30 min   Stress: No Stress Concern Present (2024)    [employer] [place] [employer] - Occupational Stress Questionnaire     Feeling of Stress : Not at all   Housing Stability: Unknown (2024)    Housing Stability Vital Sign     Unable to Pay for Housing in the Last Year: No       Medications:  Scheduled Meds:   folic acid  1,000 mcg Oral Daily    losartan  100 mg Oral  Daily    NIFEdipine  30 mg Oral QHS    QUEtiapine  50 mg Oral QHS     Continuous Infusions:  PRN Meds:.  Current Facility-Administered Medications:     acetaminophen, 650 mg, Oral, Q4H PRN    acetaminophen, 650 mg, Oral, Q6H PRN    albuterol-ipratropium, 3 mL, Nebulization, Q4H PRN    aluminum-magnesium hydroxide-simethicone, 30 mL, Oral, QID PRN    dextrose 10%, 12.5 g, Intravenous, PRN    dextrose 10%, 25 g, Intravenous, PRN    glucagon (human recombinant), 1 mg, Intramuscular, PRN    glucose, 16 g, Oral, PRN    glucose, 24 g, Oral, PRN    magnesium oxide, 800 mg, Oral, PRN    magnesium oxide, 800 mg, Oral, PRN    melatonin, 9 mg, Oral, Nightly PRN    naloxone, 0.02 mg, Intravenous, PRN    ondansetron, 4 mg, Intravenous, Q8H PRN    potassium bicarbonate, 35 mEq, Oral, PRN    potassium bicarbonate, 50 mEq, Oral, PRN    potassium bicarbonate, 60 mEq, Oral, PRN    potassium, sodium phosphates, 2 packet, Oral, PRN    potassium, sodium phosphates, 2 packet, Oral, PRN    potassium, sodium phosphates, 2 packet, Oral, PRN    simethicone, 1 tablet, Oral, QID PRN    sodium chloride 0.9%, 10 mL, Intravenous, Q8H PRN  No current facility-administered medications on file prior to encounter.     Current Outpatient Medications on File Prior to Encounter   Medication Sig Dispense Refill    artificial tears (ISOPTO TEARS) 0.5 % ophthalmic solution Place 1 drop into both eyes 4 (four) times daily as needed (Dry Eyes).      folic acid (FOLVITE) 1 MG tablet Take 1 tablet (1,000 mcg total) by mouth once daily. 30 tablet 0    losartan (COZAAR) 100 MG tablet Take 1 tablet (100 mg total) by mouth once daily. 90 tablet 3    lovastatin (MEVACOR) 10 MG tablet Take 1 tablet (10 mg total) by mouth every evening. 30 tablet 0    multivitamin capsule Take 1 capsule by mouth once daily. 30 capsule 0    NIFEdipine (PROCARDIA-XL) 30 MG (OSM) 24 hr tablet Take 1 tablet (30 mg total) by mouth every evening. 30 tablet 0    senna-docusate 8.6-50 mg  "(PERICOLACE) 8.6-50 mg per tablet Take 1 tablet by mouth 2 (two) times daily as needed for Constipation.      sodium chloride 1,000 mg TbSO oral tablet Take 1 tablet (1,000 mg total) by mouth once daily. 90 tablet 3    acetaminophen (TYLENOL) 500 MG tablet Take 2 tablets (1,000 mg total) by mouth every 8 (eight) hours as needed for Pain.      oxybutynin (DITROPAN-XL) 5 MG TR24 Take 1 tablet (5 mg total) by mouth once daily. (Patient not taking: Reported on 8/19/2024) 30 tablet 11    sucralfate (CARAFATE) 1 gram tablet Take 1 tablet (1 g total) by mouth 2 (two) times daily. (Patient not taking: Reported on 8/19/2024) 60 tablet 0       Allergies:  Contrast media, Aspirin, Ciprofloxacin, Iron, Iodine, and Penicillins    Past Family History:  Reviewed; refer to Hospitalist Admission Note    Review of Systems:  Per  note    Physical Exam:  BP (!) 163/85   Pulse 98   Temp 98.6 °F (37 °C)   Resp 16   Ht 4' 11" (1.499 m)   Wt 46.6 kg (102 lb 11.8 oz)   LMP 12/07/1972   SpO2 98%   BMI 20.75 kg/m²     General Appearance:    NAD, appears stated age   Head:    Normocephalic, atraumatic   Eyes:    Closed       Mouth:   Moist mucus membranes, no thrush or oral lesions, normal      dentition   Back:     No CVA tenderness   Lungs:     Clear to auscultation bilaterally, no wheeze, crackles, rales      or rhonchi, symmetric air movement, respirations unlabored   Heart:    Regular rate and rhythm, S1 and S2 normal, no murmur, rub   or gallop   Abdomen:     Soft, non-tender, non-distended, bowel sounds active all four   quadrants, no RT or guarding, no masses, no organomegaly   Extremities:   Warm and well perfused, distal pulses intact, no cyanosis or    peripheral edema   MSK:   No joint or muscle swelling, tenderness or deformity   Skin:   Skin color, texture, turgor normal, no rashes or lesions   Neurologic/Psychiatric:   Sleeping     Results:  Lab Results   Component Value Date     (L) 08/23/2024    K 4.2 " 08/23/2024    CL 98 08/23/2024    CO2 24 08/23/2024    BUN 29 08/23/2024    CREATININE 0.8 08/23/2024    CALCIUM 9.2 08/23/2024    ANIONGAP 6 (L) 08/23/2024    ESTGFRAFRICA >60.0 07/06/2022    EGFRNONAA >60.0 07/06/2022       Lab Results   Component Value Date    CALCIUM 9.2 08/23/2024    PHOS 3.3 08/20/2024       Recent Labs   Lab 08/23/24  0547   WBC 7.72   RBC 3.19*   HGB 9.4*   HCT 28.1*      MCV 88   MCH 29.5   MCHC 33.5       I have personally reviewed pertinent radiological imaging and reports from this admission.    I have spent > 70 minutes providing care for this patient for the above diagnoses. These services have included chart/data/imaging review, evaluation, exam, formulation of plan, , note preparation, and discussions with staff involved in this patient's care.    Andres Medrano MD MPH   [facility]  [address] [place] [id number]  [phone] (p)  [phone] (f)

## 2024-08-23 NOTE — ASSESSMENT & PLAN NOTE
Persistent. Slightly improved  CTH NAF. EEG WNL. Recent B12, TSH, ammonia, RPR, B1, HIV ok. Folate high  Trop chronic minimal elevation. EKG does not appear ischemic and she has no anginal symptoms  VBG was hypoxemic - was not an ABG  No bowel/bladder retention  - f/u b6  - delirium precautions  - psych and neuro consults noted  - dc'd depakote for hyponatremia  - hold oxybutynin  - trend condition  - continue PEC and seroquel  - plan for [facility] once medically clear

## 2024-08-23 NOTE — PROGRESS NOTES
[facility]/Surg  Neurology  Progress Note    Patient Name: Brie Han  MRN: [id number]  Admission Date: 8/19/2024  Hospital Length of Stay: 3 days  Code Status: DNR   Attending Provider: Vidal Reid MD   Consulting Provider: Mishel Dumas DNP  Primary Care Physician: Colten Gould MD  Principal Problem:Nicky    Consults  Subjective:     Chief Complaint:   AMS     HPI: as per EMR: Brie Han is a 89-year-old female who presented to the emergency room for evaluation of worsening confusion since Thursday evening. Patient is altered and unable to provide any information. Patient was grandson is at bedside and provides history. He denies any recent illness or injury. He denies any fever or chills. No known sick contacts or travel. He reports her confusion started abruptly on Thursday evening. Previous medical history includes hypertension, peripheral arterial disease, coronary artery disease, CKD, iron-deficiency anemia, and colon cancer. ER workup: CBC with leukocytosis of 13,000. CMP was unremarkable. Troponin mildly elevated 0.047. BNP elevated at 151. CT of the head was negative. Urinalysis was negative for infection. Patient admitted to Hospital Medicine for treatment and management. Will obtain neurology consult in a.m.     NEUROLOGY CONSULT NOTE: Patient seen and examined with Dr Ronald Houston, POC discussed. Patient's grandson is at bedside to assist with history. Grandson reports patient first became altered last Wednesday, states multiple episode of crying without clear reason/ stressors. He reports Thursday and Friday patient was back to baseline. States he did not see patient on Saturday but when he returned to her home on Sunday she was altered, speaking intelligibly, states she had Erich's in her hair, water running in her sink and home was in disarray. Grandson denies recent fever, head injury, new medication. He does report recent Covid infection, anemia, recent death of sister  and lack of sleep x 2 days. He states she had been stressed about bills but states this was unfounded. He reports remote psych history.     On exam patient is alert and oriented to self only, pressured speech noted.     2024: Patient seen and examined this afternoon, friend and sitter at bedside. Patient continues to display pressured and organized speech. Dialogue content is hyper-Synagogue, unable to talk in complete sentences. Neuro w/u unrevealing: MRI brain negative for acute stroke, mild chronic changes; EEG normal; memory labs WNL.   Past Medical History:   Diagnosis Date    ACP (advance care planning) 2024    Amaurosis fugax     Bilateral left eye worse    Anticoagulant long-term use     Arthritis     Glaucoma     resolved    Hyperlipidemia     Hypertension     Stroke        Past Surgical History:   Procedure Laterality Date    APPENDECTOMY      BREAST SURGERY      CATARACT EXTRACTION W/  INTRAOCULAR LENS IMPLANT Bilateral     Dr Rojo      SECTION      3 c/s and d/c    COLONOSCOPY N/A 2024    Procedure: COLONOSCOPY;  Surgeon: Trev Lafleur MD;  Location: [facility];  Service: Endoscopy;  Laterality: N/A;    ESOPHAGOGASTRODUODENOSCOPY N/A 2024    Procedure: EGD (ESOPHAGOGASTRODUODENOSCOPY);  Surgeon: Trev Lafleur MD;  Location: [facility];  Service: Endoscopy;  Laterality: N/A;    EYE SURGERY      Glaucoma     HYSTERECTOMY      RIGHT OOPHORECTOMY      With postop hemorrhage    TONSILLECTOMY      Yag Capsulotomy Left 2021       Review of patient's allergies indicates:   Allergen Reactions    Contrast media Other (See Comments)    Aspirin Other (See Comments)    Ciprofloxacin Other (See Comments)    Iron Other (See Comments)     Small rash with po iron.  Tolerated IV iron    Iodine Rash    Penicillins Rash       Current Neurological Medications: folic acid     No current facility-administered medications on file prior to encounter.     Current Outpatient  Medications on File Prior to Encounter   Medication Sig    artificial tears (ISOPTO TEARS) 0.5 % ophthalmic solution Place 1 drop into both eyes 4 (four) times daily as needed (Dry Eyes).    folic acid (FOLVITE) 1 MG tablet Take 1 tablet (1,000 mcg total) by mouth once daily.    losartan (COZAAR) 100 MG tablet Take 1 tablet (100 mg total) by mouth once daily.    lovastatin (MEVACOR) 10 MG tablet Take 1 tablet (10 mg total) by mouth every evening.    multivitamin capsule Take 1 capsule by mouth once daily.    NIFEdipine (PROCARDIA-XL) 30 MG (OSM) 24 hr tablet Take 1 tablet (30 mg total) by mouth every evening.    senna-docusate 8.6-50 mg (PERICOLACE) 8.6-50 mg per tablet Take 1 tablet by mouth 2 (two) times daily as needed for Constipation.    sodium chloride 1,000 mg TbSO oral tablet Take 1 tablet (1,000 mg total) by mouth once daily.    acetaminophen (TYLENOL) 500 MG tablet Take 2 tablets (1,000 mg total) by mouth every 8 (eight) hours as needed for Pain.    oxybutynin (DITROPAN-XL) 5 MG TR24 Take 1 tablet (5 mg total) by mouth once daily. (Patient not taking: Reported on 8/19/2024)    sucralfate (CARAFATE) 1 gram tablet Take 1 tablet (1 g total) by mouth 2 (two) times daily. (Patient not taking: Reported on 8/19/2024)      Family History       Problem Relation (Age of Onset)    Diabetes Father    Early death Mother    Hypertension Father, Sister    No Known Problems Brother, Maternal Aunt, Maternal Uncle, Paternal Aunt, Paternal Uncle, Maternal Grandmother, Maternal Grandfather, Paternal Grandmother    Stroke Father    Thyroid disease Paternal Grandfather          Tobacco Use    Smoking status: Never    Smokeless tobacco: Never   Substance and Sexual Activity    Alcohol use: No    Drug use: No    Sexual activity: Never     Review of Systems   Unable to perform ROS: Mental status change     Objective:     Vital Signs (Most Recent):  Temp: 98 °F (36.7 °C) (08/23/24 1306)  Pulse: 93 (08/23/24 1306)  Resp: 16  "(08/23/24 1306)  BP: (!) 161/67 (08/23/24 1306)  SpO2: 100 % (08/23/24 1306) Vital Signs (24h Range):  Temp:  [97.5 °F (36.4 °C)-99.3 °F (37.4 °C)] 98 °F (36.7 °C)  Pulse:  [] 93  Resp:  [16-20] 16  SpO2:  [96 %-100 %] 100 %  BP: ()/(52-85) 161/67     Weight: 46.6 kg (102 lb 11.8 oz)  Body mass index is 20.75 kg/m².    Physical Exam  Vitals and nursing note reviewed.   Eyes:      Extraocular Movements: Extraocular movements intact and EOM normal.      Conjunctiva/sclera: Conjunctivae normal.   Cardiovascular:      Rate and Rhythm: Normal rate.   Pulmonary:      Effort: Pulmonary effort is normal.   Skin:     General: Skin is warm and dry.   Neurological:      Mental Status: She is alert.      Motor: Motor strength is normal.     Coordination: Finger-Nose-Finger Test normal.   Psychiatric:         Mood and Affect: Mood is anxious.         Speech: Speech is rapid and pressured.         NEUROLOGICAL EXAMINATION:     MENTAL STATUS   Oriented to person.   Disoriented to place.   Disoriented to time.   Follows 1 step commands.   Attention: decreased.   Level of consciousness: alert  Abnormal comprehension.     CRANIAL NERVES     CN III, IV, VI   Extraocular motions are normal.   Right pupil: Shape: regular.   Left pupil: Shape: regular.   CN III: no CN III palsy  CN VI: no CN VI palsy  Nystagmus: none     CN V   Facial sensation intact.     CN VII   Facial expression full, symmetric.     CN VIII   Hearing: intact    CN XI   CN XI normal.     CN XII   Tongue deviation: none    MOTOR EXAM   Right arm pronator drift: absent  Left arm pronator drift: absent    Strength   Strength 5/5 throughout.     SENSORY EXAM   Light touch normal.     GAIT AND COORDINATION     Gait  Gait: (deferred for safety)     Coordination   Finger to nose coordination: normal    Tremor   Resting tremor: absent      Significant Labs: Hemoglobin A1c: No results for input(s): "HGBA1C" in the last 720 hours.  BMP:   Recent Labs   Lab " "08/22/24  0449 08/22/24  0941 08/23/24  0547   GLU 83  --  74   * 129* 128*   K 3.5  --  4.2   CL 97  --  98   CO2 23  --  24   BUN 14  --  29   CREATININE 0.7  --  0.8   CALCIUM 8.8  --  9.2   MG  --   --  2.1     CBC:   Recent Labs   Lab 08/22/24  0635 08/23/24  0547   WBC 9.37 7.72   HGB 10.1* 9.4*   HCT 31.2* 28.1*   * 428     CMP:   Recent Labs   Lab 08/22/24 0449 08/22/24  0941 08/23/24  0547   GLU 83  --  74   * 129* 128*   K 3.5  --  4.2   CL 97  --  98   CO2 23  --  24   BUN 14  --  29   CREATININE 0.7  --  0.8   CALCIUM 8.8  --  9.2   MG  --   --  2.1   PROT 5.8*  --  5.4*   ALBUMIN 2.7*  --  2.6*   BILITOT 0.4  --  0.3   ALKPHOS 60  --  56   AST 21  --  16   ALT 12  --  13   ANIONGAP 8  --  6*     Urine Culture: No results for input(s): "LABURIN" in the last 48 hours.  Urine Studies:   No results for input(s): "COLORU", "APPEARANCEUA", "PHUR", "SPECGRAV", "PROTEINUA", "GLUCUA", "KETONESU", "BILIRUBINUA", "OCCULTUA", "NITRITE", "UROBILINOGEN", "LEUKOCYTESUR", "RBCUA", "WBCUA", "BACTERIA", "SQUAMEPITHEL", "HYALINECASTS" in the last 48 hours.    Invalid input(s): "WRIGHTSUR"    All pertinent lab results from the past 24 hours have been reviewed.    Significant Imaging: I have reviewed all pertinent imaging results/findings within the past 24 hours.    CT Head Without Contrast  Order: [id number]  Status: Final result       Visible to patient: Yes (seen)       Next appt: 09/17/2024 at 01:00 PM in Ophthalmology (Greg Mckeon MD)    0 Result Notes  Details    Reading Physician Reading Date Result Priority   Saeid Jeffrey MD  [phone] 8/19/2024 STAT     Narrative & Impression  EXAMINATION:  CT HEAD WITHOUT CONTRAST     CLINICAL HISTORY:  Mental status change, unknown cause;     TECHNIQUE:  Low dose axial CT images obtained throughout the head without intravenous contrast. Sagittal and coronal reconstructions were performed.     COMPARISON:  Head CT 06/15/2023.     FINDINGS:  Somewhat " motion limited exam.     Brain: There is no evidence of a mass, edema, midline shift, or intracranial hemorrhage. No extra-axial fluid collection. No CT evidence of an acute major vascular territorial infarct.     Ventricles: The ventricles, sulci, and cisterns are within normal limits.     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  No acute process.     Extracranial soft tissues: Limited imaging is within normal limits.     Other: The visualized portions of the sinuses, orbits, and mastoid air cells are unremarkable.     Impression:     1. No acute intracranial CT findings.        Electronically signed by:Saeid Jeffrey  Date:                                            08/19/2024  Time:                                           14:04     MRI Brain W WO Contrast  Order: [id number]  Status: Final result       Visible to patient: Yes (seen)       Next appt: 09/17/2024 at 01:00 PM in Ophthalmology (Greg Mckeon MD)    0 Result Notes  Details    Reading Physician Reading Date Result Priority   Saeid Ray MD  [phone] 8/21/2024 Routine     Narrative & Impression  EXAMINATION:  MRI BRAIN W WO CONTRAST     CLINICAL HISTORY:  Mental status change, unknown cause;.     TECHNIQUE:  Multiplanar multisequence MR imaging of the brain was performed before and after the administration of 4 mL Gadavist  intravenous contrast.     COMPARISON:  CT head 08/19/2024     FINDINGS:  Mild prominence of the ventricles and sulci compatible with generalized cerebral volume loss.  No hydrocephalus.     Scattered foci of T2/FLAIR hyperintense signal within the supratentorial white matter, nonspecific and may reflect sequelae of mild chronic small vessel ischemic change.    Diffusion-weighted images demonstrate no evidence of an acute infarct.   Susceptibility weighted images demonstrate no evidence of acute or chronic hemorrhage.     No abnormal intracranial enhancement.     Normal vascular flow voids are  present.     Bone marrow signal intensity is normal.  Stable dome shaped T1 hypointense thickening of the right frontal and left parietal calvarial outer tables most compatible with incidental osteomas.  Scattered mild mucosal membrane thickening in the paranasal sinuses.  Trace fluid opacification of the dependent right mastoid air cells.     Bilateral lens replacements are noted.     Impression:     1. No evidence of an acute intracranial abnormality or abnormal enhancement.  2. Mild generalized cerebral volume loss and scattered T2/FLAIR hyperintense signal within the supratentorial white matter, nonspecific but probably reflecting sequelae of chronic small vessel ischemic change.        Electronically signed by:Saeid Ray  Date:                                            08/21/2024  Time:                                           15:46     Assessment and Plan:    Assessment:   AMS    Work up:   CT head wo contrast: negative   MRI brain w and wo contrast: negative for acute stroke   UA: negative for infection   Memory labs: Vitamin B12 elevated, Vitamin B1, folate, ammonia, RPR- WNL ; B6,  RPR, ammonia pending   Routine EEG - normal     PLAN   Inpatient Psychiatry following-patient with psych history, lack of sleep x 2 days, recent episodes of severe anxiety, and h/o psychiatric symptoms   PT/OT/ST eval and treat   Neuro checks per unit protocol   Safety precautions   Agree with psych recommendation to place [facility] for further management and treatment   Neurology will sign off at this time. Please re-consult as needed     Patient to follow up with [facility] at [phone] within 2 weeks from discharge.  Stroke education was provided including stroke risk factors modification and any acute neurological changes including weakness, confusion, visual changes to come straight to the ER.  Seizure educaation was provided including no driving, no swimming by self, no operation of  heavy machinery or climbing on ladders.  All side effects of new medications were discussed with patient and/or next of kin and all questions were answered.        Active Diagnoses:    Diagnosis Date Noted POA    PRINCIPAL PROBLEM:  Nicky [F30.9] 08/19/2024 Yes    Moderate protein-calorie malnutrition [E44.0] 08/21/2024 Yes    Adenocarcinoma of colon [C18.9] 02/07/2024 Yes    Hyponatremia [E87.1] 05/20/2013 Yes    Hypertension [I10] 09/21/2012 Yes      Problems Resolved During this Admission:       VTE Risk Mitigation (From admission, onward)           Ordered     IP VTE HIGH RISK PATIENT  Once         08/19/24 1801     Place sequential compression device  Until discontinued         08/19/24 1801     Place MAURA hose  Until discontinued         08/19/24 1801                    Thank you for your consult. I will follow-up with patient. Please contact us if you have any additional questions.    Mishel Dumas, JEFFERSON  Neurology  [facility]/Surg    I, Dr. Ronald Houston, have personally seen and examined the patient with my advanced provider and agree with above. I personally did a focused exam, and reviewed all necessary clinical information. I discussed my management plan with my NP and agree with above.   All side effects of medications were discussed with the patient and/or next of kin including severe mood changes, organ failure, lab abnormalities, rash, passing out, low blood pressure, glaucoma, cognitive changes, life threatening bleeding, passing out, glaucoma, rash, fatigue, weight gain and or weight loss, and death.  No driving until follow up at [facility].  Follow up at [facility] 3 days post discharge. Telehealth available.   Stroke (Including any acute neurological symptoms to return to the ER immediately and call 911, like acute weakness, severe headaches, sensory, visual or speech changes)  and seizure education provided.    Alfred Houston MD. FAAN.    [facility]  [phone]

## 2024-08-23 NOTE — ASSESSMENT & PLAN NOTE
Persistent. Depakote has been stopped  - resume home salt tabs 1000mg PO daily  - daily Na check  - encourage PO intake  - not medically clear for [facility] due to this  - f/u urine studies  - nephro consulted

## 2024-08-23 NOTE — NURSING
BP 89/52, map 68. Resource nurse at bedside to assess. Patient stable . Respirations even & unlabored.  Will reassess in an hr. Sitter at bedside.

## 2024-08-23 NOTE — PROGRESS NOTES
RD interval note:  8/23    Pt remains confused/sean; intake 50% assistance with feedings required. Last documented wt is 102#.  No reports of N/V; last BM on 8/23. Diet Rx unchanged. Monitor need for nutrition support.    Intake/Output Summary (Last 24 hours) at 8/23/2024 1406  Last data filed at 8/23/2024 0930  Gross per 24 hour   Intake 240 ml   Output 1050 ml   Net -810 ml       Recommendations  Continue IDDSI level 6 diet  Nursing assistance with feeding when grandson not here  Boost plus with all meals  Multivitamin/mineral supplementation  Collaborate with health care providers as needed.     Goal: intake > 75% upon RD F/U; prevent further wt loss  Nutrition Goal Status: new  Communication of RD Recs: reviewed with physician  Comments: pt pleasantly confused; information obtained from her grandson who lives with her 4 days per week. Prior to admit, she was independent with ADL's. Now she must be fed and redirected; no Hx of N/V or difficulty chewing or swallowing.  Last BM on 8/21.       Nutrition Related Social Determinants of Health: SDOH: Adequate food in home environment     Assessment and Plan  Nutrition Problem  Moderate Acute Malnutrition     Related to (etiology):   Inadequate oral intake d/t Confusion 2/2 metabolic encephalopathy     Signs and Symptoms (as evidenced by):   5% wt loss; mild muscle depletion notable to temple region; weakness      Interventions/Recommendations (treatment strategy):  Modified diet texture for ease in chewing; placed order for nursing assistance with feeding; ordered Boost Plus with all meals.     Nutrition Diagnosis Status:   New     Malnutrition Assessment  Malnutrition Context: acute illness or injury  Malnutrition Level: moderate        Weight Loss (Malnutrition): 5% in 1 month  Energy Intake (Malnutrition): less than 75% for greater than or equal 7 days  Muscle Mass (Malnutrition): mild depletion  Hand  Strength, Left (Malnutrition): reduced d/t age  Hand   "Strength, Right (Malnutrition): reduced d/t age       [place] (Muscle Loss): mild depletion           Reason for Assessment:  Malnutrition  Dx: Metabolic encephalopathy; insomnia  PMH:  [place] cancer; HTN; DDD; PAD; CAD; CKD; anemia  Reason For Assessment: consult  Diagnosis: other (see comments)  Interdisciplinary Rounds: did not attend  Nutrition Discharge Planning: pending; probably REhab/SNF; continue current diet Rx with ONS.     Nutrition Risk Screen     Nutrition Risk Screen: MST score = 2     Nutrition/Diet History     Patient Reported Diet/Restrictions/Preferences: general  Spiritual, Cultural Beliefs, Spiritism Practices, Values that Affect Care: no  Supplemental Drinks or Food Habits: Boost Original  Food Allergies: NKFA  Factors Affecting Nutritional Intake: inability to feed self, other (see comments), impaired cognitive status/motor control     Anthropometrics      Wt Readings from Last 9 Encounters:   08/21/24 46.6 kg (102 lb 11.8 oz)   07/31/24 48.2 kg (106 lb 4.2 oz)   07/21/24 49.9 kg (110 lb)   05/02/24 49.2 kg (108 lb 9.2 oz)   03/20/24 48.1 kg (106 lb 2.4 oz)   03/08/24 47.4 kg (104 lb 8 oz)   02/14/24 50 kg (110 lb 1.9 oz)   02/12/24 50.3 kg (110 lb 12.5 oz)   02/05/24 52.6 kg (116 lb)      Temp: 98.8 °F (37.1 °C)  Height Method: Stated  Height: 4' 11" (149.9 cm)  Height (inches): 59 in  Weight Method: Bed Scale  Weight: 46.6 kg (102 lb 11.8 oz)  Weight (lb): 102.74 lb  Ideal Body Weight (IBW), Female: 95 lb  % Ideal Body Weight, Female (lb): 120.87 %  BMI (Calculated): 22.2  BMI Grade: 18.5-24.9 - normal  Lab/Procedures/Meds    Latest Reference Range & Units Most Recent   Hemoglobin 12.0 - 16.0 g/dL 9.7 (L)  8/20/24 07:33   (L): Data is abnormally low    Latest Reference Range & Units Most Recent   Hematocrit 37.0 - 48.5 % 29.9 (L)  8/20/24 07:33   (L): Data is abnormally low    Latest Reference Range & Units Most Recent   Albumin 3.5 - 5.2 g/dL 3.3 (L)  8/20/24 07:32   (L): Data is " abnormally low  Pertinent Labs Reviewed: reviewed  Pertinent Medications Reviewed: reviewed; divalproex; folic acid; losartan; nifedipine     Physical Findings/Assessment  No skin integrity issues documented  Vamshi score = 13  Estimated/Assessed Needs     Weight Used For Calorie Calculations: 46.6 kg (102 lb 11.8 oz)  Energy Calorie Requirements (kcal): 1400 calories daily;  30/kg  Energy Need Method: Kcal/kg  Protein Requirements: 60 gm protien daily; 1.3/kg  Weight Used For Protein Calculations: 46.6 kg (102 lb 11.8 oz)  Estimated Fluid Requirement Method: RDA Method  RDA Method (mL): 1400  Nutrition Prescription Ordered     Current Diet Order: SOft bite cut foods; IDDSI level 6  Oral Nutrition Supplement: none     Evaluation of Received Nutrient/Fluid Intake     Intake/Output Summary (Last 24 hours) at 8/21/2024 1305  Last data filed at 8/21/2024 0800      Gross per 24 hour   Intake 300 ml   Output 200 ml   Net 100 ml   ]  Energy Calories Required: meeting needs  Protein Required: meeting needs  Fluid Required: meeting needs  Tolerance: tolerating  % Intake of Estimated Energy Needs: 50 - 75 %  % Meal Intake: 50 - 75 %     Nutrition Risk     Level of Risk/Frequency of Follow-up: high 2 x week     Monitor and Evaluation     Food and Nutrient Intake: food and beverage intake  Food and Nutrient Adminstration: diet order  Knowledge/Beliefs/Attitudes: food and nutrition knowledge/skill  Physical Activity and Function: nutrition-related ADLs and IADLs  Anthropometric Measurements: weight change  Biochemical Data, Medical Tests and Procedures: glucose/endocrine profile, gastrointestinal profile, other (specify), electrolyte and renal panel  Nutrition-Focused Physical Findings: overall appearance, skin      Nutrition Follow-Up  yes

## 2024-08-23 NOTE — PT/OT/SLP PROGRESS
"Physical Therapy Treatment    Patient Name:  Brie Han   MRN:  [id number]    Recommendations:     Discharge Recommendations: Low Intensity Therapy  Discharge Equipment Recommendations: none  Barriers to discharge: None    Assessment:     Brie Han is a 89 y.o. female admitted with a medical diagnosis of Nicky.  She presents with the following impairments/functional limitations: weakness, impaired endurance, impaired self care skills, impaired functional mobility, gait instability, impaired cognition, decreased safety awareness, pain . Awake, alert, supine in bed with grandson and sitter at bedside. Grandson reports patient has not been sleeping.   Pleasant and cooperative.  Reported discomfort L wrist.  Sup > sit with  CGA.  Sit > stand with rw and Min A.  Ambulated 250' x 2 with rw and Min A. Very talkative , difficult to understand.  Returned to room to bed. Patient stated,  " I'm tired."      Rehab Prognosis: Fair; patient would benefit from acute skilled PT services to address these deficits and reach maximum level of function.    Recent Surgery: * No surgery found *      Plan:     During this hospitalization, patient to be seen 6 x/week to address the identified rehab impairments via gait training, therapeutic activities, therapeutic exercises and progress toward the following goals:    Plan of Care Expires:  08/30/24    Subjective     Chief Complaint: L wrist discomfort , tired  Patient/Family Comments/goals: none stated  Pain/Comfort:  Pain Rating 1: other (see comments) (did not rate)  Location - Side 1: Left  Location - Orientation 1: generalized  Location 1: wrist  Pain Addressed 1: Reposition, Nurse notified      Objective:     Communicated with nurse Faust prior to session.  Patient found supine with bed alarm, PureWick, telemetry (sitter at bedside) upon PT entry to room.     General Precautions: Standard, fall  Orthopedic Precautions: N/A  Braces: N/A  Respiratory Status: Room air   "   Functional Mobility:  Bed Mobility:     Supine to Sit: contact guard assistance  Transfers:     Sit to Stand:  minimum assistance with rolling walker  Gait: 250' x 2 with rw and Min A      AM-PAC 6 CLICK MOBILITY          Treatment & Education:  Ambulated with rw and Min A for safety.     Patient left supine with all lines intact, call button in reach, bed alarm on, nurse Christianne notified, and grandson and sitter present..    GOALS:   Multidisciplinary Problems       Physical Therapy Goals          Problem: Physical Therapy    Goal Priority Disciplines Outcome Goal Variances Interventions   Physical Therapy Goal     PT, PT/OT Progressing     Description: Goals to be met by: 2024     Patient will increase functional independence with mobility by performin. Supine to sit with Contact Guard Assistance  2. Sit to stand transfer with Contact Guard Assistance  3. Bed to chair transfer with Contact Guard Assistance using Rolling Walker  4. Gait  x 250 feet with Contact Guard Assistance using Rolling Walker.   5. Lower extremity exercise program x20 reps                        Time Tracking:     PT Received On: 24  PT Start Time: 935     PT Stop Time: 948  PT Total Time (min): 13 min     Billable Minutes: Gait Training 13min    Treatment Type: Treatment  PT/PTA: PTA     Number of PTA visits since last PT visit: 3     2024

## 2024-08-23 NOTE — NURSING
Mely NP notified that patient is extremely agitated , attempting to get out of bed , swinging at staff. New order received. Sitter remains at bedside.

## 2024-08-23 NOTE — ASSESSMENT & PLAN NOTE
Nutrition consulted. Most recent weight and BMI monitored-     Measurements:  Wt Readings from Last 1 Encounters:   08/21/24 46.6 kg (102 lb 11.8 oz)   Body mass index is 20.75 kg/m².    Patient has been screened and assessed by RD.    Malnutrition Type:  Context: acute illness or injury  Level: moderate    Malnutrition Characteristic Summary:  Weight Loss (Malnutrition): 5% in 1 month  Energy Intake (Malnutrition): less than 75% for greater than or equal to 1 month  Muscle Mass (Malnutrition): mild depletion  Hand  Strength, Left (Malnutrition): reduced d/t age  Hand  Strength, Right (Malnutrition): reduced d/t age    Interventions/Recommendations (treatment strategy):   Continue IDDSI level 6 diet  Nursing assistance with feeding when grandson not here  Boost plus with all meals  Multivitamin/mineral supplementation  Collaborate with health care providers as needed.

## 2024-08-23 NOTE — SUBJECTIVE & OBJECTIVE
Interval History: Patient seen and examined on AM rounds. Had some agitation overnight improved with seroquel. Her sean is less severe this AM but still present. Grandon bedside reports she does take salt tabs at home.       Objective:     Vital Signs (Most Recent):  Temp: 98 °F (36.7 °C) (08/23/24 1306)  Pulse: 93 (08/23/24 1306)  Resp: 16 (08/23/24 1306)  BP: (!) 161/67 (08/23/24 1306)  SpO2: 100 % (08/23/24 1306) Vital Signs (24h Range):  Temp:  [97.5 °F (36.4 °C)-99.3 °F (37.4 °C)] 98 °F (36.7 °C)  Pulse:  [] 93  Resp:  [16-20] 16  SpO2:  [96 %-100 %] 100 %  BP: ()/(52-85) 161/67     Weight: 46.6 kg (102 lb 11.8 oz)  Body mass index is 20.75 kg/m².    Intake/Output Summary (Last 24 hours) at 8/23/2024 1505  Last data filed at 8/23/2024 0930  Gross per 24 hour   Intake 240 ml   Output 1050 ml   Net -810 ml         Physical Exam  Vitals reviewed.   Constitutional:       General: She is not in acute distress.     Comments: Elderly, thin, no distress   HENT:      Head: Normocephalic and atraumatic.   Cardiovascular:      Rate and Rhythm: Normal rate and regular rhythm.      Heart sounds: Normal heart sounds.   Pulmonary:      Effort: Pulmonary effort is normal. No respiratory distress.      Breath sounds: Normal breath sounds.   Neurological:      General: No focal deficit present.      Mental Status: She is alert. She is disoriented.   Psychiatric:         Mood and Affect: Affect normal.      Comments: Still manic but less severe             Significant Labs: All pertinent labs within the past 24 hours have been reviewed.    Significant Imaging: I have reviewed all pertinent imaging results/findings within the past 24 hours.

## 2024-08-23 NOTE — PROGRESS NOTES
[facility]  Progress Note    Patient Name: Brie Han  MRN: [id number]  Patient Class: IP- Inpatient   Admission Date: 8/19/2024  Length of Stay: 3 days  Attending Physician: Vidal Reid MD  Primary Care Provider: Colten Gould MD        Subjective:     Principal Problem:Sean        HPI:  Brie Han is a 89-year-old female who presented to the emergency room for evaluation of worsening confusion since Thursday evening.  Patient is altered and unable to provide any information.  Patient was grandson is at bedside and provides history.  He denies any recent illness or injury.  He denies any fever or chills.  No known sick contacts or travel.  He reports her confusion started abruptly on Thursday evening.  Previous medical history includes hypertension, peripheral arterial disease, coronary artery disease, CKD, iron-deficiency anemia, and colon cancer.  ER workup: CBC with leukocytosis of 13,000.  CMP was unremarkable.  Troponin mildly elevated 0.047.  BNP elevated at 151.  CT of the head was negative.  Urinalysis was negative for infection.  Patient admitted to Hospital Medicine for treatment and management.  Will obtain neurology consult in a.m.              Overview/Hospital Course:  89 year old female admitted for sean with recent insomnia for several days. CT head not acute. Labs unrevealing of etiology. Neuro and psych consulted. Started on depakote. Developed hyponatremia so depakote dc'd. Nephro consulted. PEC'd per psych rec. Started on seroquel. Resumed her home salt tabs.     Interval History: Patient seen and examined on AM rounds. Had some agitation overnight improved with seroquel. Her sean is less severe this AM but still present. Grandon bedside reports she does take salt tabs at home.       Objective:     Vital Signs (Most Recent):  Temp: 98 °F (36.7 °C) (08/23/24 1306)  Pulse: 93 (08/23/24 1306)  Resp: 16 (08/23/24 1306)  BP: (!) 161/67 (08/23/24  1306)  SpO2: 100 % (08/23/24 1306) Vital Signs (24h Range):  Temp:  [97.5 °F (36.4 °C)-99.3 °F (37.4 °C)] 98 °F (36.7 °C)  Pulse:  [] 93  Resp:  [16-20] 16  SpO2:  [96 %-100 %] 100 %  BP: ()/(52-85) 161/67     Weight: 46.6 kg (102 lb 11.8 oz)  Body mass index is 20.75 kg/m².    Intake/Output Summary (Last 24 hours) at 8/23/2024 1505  Last data filed at 8/23/2024 0930  Gross per 24 hour   Intake 240 ml   Output 1050 ml   Net -810 ml         Physical Exam  Vitals reviewed.   Constitutional:       General: She is not in acute distress.     Comments: Elderly, thin, no distress   HENT:      Head: Normocephalic and atraumatic.   Cardiovascular:      Rate and Rhythm: Normal rate and regular rhythm.      Heart sounds: Normal heart sounds.   Pulmonary:      Effort: Pulmonary effort is normal. No respiratory distress.      Breath sounds: Normal breath sounds.   Neurological:      General: No focal deficit present.      Mental Status: She is alert. She is disoriented.   Psychiatric:         Mood and Affect: Affect normal.      Comments: Still manic but less severe             Significant Labs: All pertinent labs within the past 24 hours have been reviewed.    Significant Imaging: I have reviewed all pertinent imaging results/findings within the past 24 hours.    Assessment/Plan:      * Nicky  Persistent. Slightly improved  CTH NAF. EEG WNL. Recent B12, TSH, ammonia, RPR, B1, HIV ok. Folate high  Trop chronic minimal elevation. EKG does not appear ischemic and she has no anginal symptoms  VBG was hypoxemic - was not an ABG  No bowel/bladder retention  - f/u b6  - delirium precautions  - psych and neuro consults noted  - dc'd depakote for hyponatremia  - hold oxybutynin  - trend condition  - continue PEC and seroquel  - plan for [facility] once medically clear     Moderate protein-calorie malnutrition  Nutrition consulted. Most recent weight and BMI monitored-     Measurements:  Wt Readings from Last 1 Encounters:    08/21/24 46.6 kg (102 lb 11.8 oz)   Body mass index is 20.75 kg/m².    Patient has been screened and assessed by RD.    Malnutrition Type:  Context: acute illness or injury  Level: moderate    Malnutrition Characteristic Summary:  Weight Loss (Malnutrition): 5% in 1 month  Energy Intake (Malnutrition): less than 75% for greater than or equal to 1 month  Muscle Mass (Malnutrition): mild depletion  Hand  Strength, Left (Malnutrition): reduced d/t age  Hand  Strength, Right (Malnutrition): reduced d/t age    Interventions/Recommendations (treatment strategy):   Continue IDDSI level 6 diet  Nursing assistance with feeding when grandson not here  Boost plus with all meals  Multivitamin/mineral supplementation  Collaborate with health care providers as needed.    Adenocarcinoma of colon  Previously documented she does not want to pursue treatment    Hyponatremia  Persistent. Depakote has been stopped  - resume home salt tabs 1000mg PO daily  - daily Na check  - encourage PO intake  - not medically clear for [facility] due to this  - f/u urine studies  - nephro consulted    Hypertension  Chronic, controlled  - continue losartan and nifedipine      VTE Risk Mitigation (From admission, onward)           Ordered     IP VTE HIGH RISK PATIENT  Once         08/19/24 1801     Place sequential compression device  Until discontinued         08/19/24 1801     Place MAURA hose  Until discontinued         08/19/24 1801                    Discharge Planning   VINITA: 8/25/2024     Code Status: DNR   Is the patient medically ready for discharge?:     Reason for patient still in hospital (select all that apply): Patient trending condition and Laboratory test  Discharge Plan A: Psychiatric hospital                  Vidal Reid MD  Department of Hospital Medicine   [facility]/Surg

## 2024-08-23 NOTE — CARE UPDATE
08/22/24 1950   Patient Assessment/Suction   Level of Consciousness (AVPU) alert   Respiratory Effort Normal;Unlabored   Expansion/Accessory Muscles/Retractions expansion symmetric;no retractions;no use of accessory muscles   All Lung Fields Breath Sounds clear   PRE-TX-O2   Device (Oxygen Therapy) room air   SpO2 99 %   Pulse Oximetry Type Intermittent   Pulse 95   Resp 18   Aerosol Therapy   $ Aerosol Therapy Charges PRN treatment not required   Respiratory Treatment Status (SVN) PRN treatment not required

## 2024-08-24 VITALS
WEIGHT: 102.75 LBS | HEIGHT: 59 IN | SYSTOLIC BLOOD PRESSURE: 142 MMHG | BODY MASS INDEX: 20.72 KG/M2 | DIASTOLIC BLOOD PRESSURE: 64 MMHG | OXYGEN SATURATION: 98 % | HEART RATE: 102 BPM | RESPIRATION RATE: 18 BRPM | TEMPERATURE: 98 F

## 2024-08-24 PROBLEM — D64.9 SYMPTOMATIC ANEMIA: Status: RESOLVED | Noted: 2024-02-02 | Resolved: 2024-08-24

## 2024-08-24 PROBLEM — R33.8 ACUTE URINARY RETENTION: Status: RESOLVED | Noted: 2024-07-21 | Resolved: 2024-08-24

## 2024-08-24 PROBLEM — F30.9 MANIA: Status: RESOLVED | Noted: 2024-08-19 | Resolved: 2024-08-24

## 2024-08-24 PROBLEM — U07.1 COVID-19 VIRUS INFECTION: Status: RESOLVED | Noted: 2024-07-21 | Resolved: 2024-08-24

## 2024-08-24 PROBLEM — K63.89 COLONIC MASS: Status: RESOLVED | Noted: 2024-02-08 | Resolved: 2024-08-24

## 2024-08-24 PROBLEM — D64.9 ANEMIA: Status: ACTIVE | Noted: 2024-08-24

## 2024-08-24 LAB
ALBUMIN SERPL BCP-MCNC: 2.8 G/DL (ref 3.5–5.2)
ALP SERPL-CCNC: 63 U/L (ref 55–135)
ALT SERPL W/O P-5'-P-CCNC: 12 U/L (ref 10–44)
AMPHET+METHAMPHET UR QL: NEGATIVE
ANION GAP SERPL CALC-SCNC: 8 MMOL/L (ref 8–16)
AST SERPL-CCNC: 16 U/L (ref 10–40)
BARBITURATES UR QL SCN>200 NG/ML: NEGATIVE
BENZODIAZ UR QL SCN>200 NG/ML: NEGATIVE
BILIRUB SERPL-MCNC: 0.2 MG/DL (ref 0.1–1)
BUN SERPL-MCNC: 33 MG/DL (ref 10–30)
BZE UR QL SCN: NEGATIVE
CALCIUM SERPL-MCNC: 9.3 MG/DL (ref 8.7–10.5)
CANNABINOIDS UR QL SCN: NEGATIVE
CHLORIDE SERPL-SCNC: 103 MMOL/L (ref 95–110)
CO2 SERPL-SCNC: 22 MMOL/L (ref 23–29)
CREAT SERPL-MCNC: 0.8 MG/DL (ref 0.5–1.4)
CREAT UR-MCNC: 62.9 MG/DL (ref 15–325)
ERYTHROCYTE [DISTWIDTH] IN BLOOD BY AUTOMATED COUNT: 14.9 % (ref 11.5–14.5)
EST. GFR  (NO RACE VARIABLE): >60 ML/MIN/1.73 M^2
ETHANOL SERPL-MCNC: <10 MG/DL
GLUCOSE SERPL-MCNC: 95 MG/DL (ref 70–110)
HCT VFR BLD AUTO: 29.1 % (ref 37–48.5)
HGB BLD-MCNC: 9.1 G/DL (ref 12–16)
MCH RBC QN AUTO: 28.7 PG (ref 27–31)
MCHC RBC AUTO-ENTMCNC: 31.3 G/DL (ref 32–36)
MCV RBC AUTO: 92 FL (ref 82–98)
METHADONE UR QL SCN>300 NG/ML: NEGATIVE
OHS QRS DURATION: 120 MS
OHS QTC CALCULATION: 471 MS
OPIATES UR QL SCN: NEGATIVE
PCP UR QL SCN>25 NG/ML: NEGATIVE
PLATELET # BLD AUTO: 436 K/UL (ref 150–450)
PMV BLD AUTO: 9.2 FL (ref 9.2–12.9)
POTASSIUM SERPL-SCNC: 4.6 MMOL/L (ref 3.5–5.1)
PROT SERPL-MCNC: 5.9 G/DL (ref 6–8.4)
RBC # BLD AUTO: 3.17 M/UL (ref 4–5.4)
SODIUM SERPL-SCNC: 133 MMOL/L (ref 136–145)
TOXICOLOGY INFORMATION: NORMAL
WBC # BLD AUTO: 8.9 K/UL (ref 3.9–12.7)

## 2024-08-24 PROCEDURE — 85027 COMPLETE CBC AUTOMATED: CPT | Performed by: STUDENT IN AN ORGANIZED HEALTH CARE EDUCATION/TRAINING PROGRAM

## 2024-08-24 PROCEDURE — 99900035 HC TECH TIME PER 15 MIN (STAT)

## 2024-08-24 PROCEDURE — 97116 GAIT TRAINING THERAPY: CPT | Mod: CQ

## 2024-08-24 PROCEDURE — 36415 COLL VENOUS BLD VENIPUNCTURE: CPT | Performed by: STUDENT IN AN ORGANIZED HEALTH CARE EDUCATION/TRAINING PROGRAM

## 2024-08-24 PROCEDURE — 25000003 PHARM REV CODE 250: Performed by: STUDENT IN AN ORGANIZED HEALTH CARE EDUCATION/TRAINING PROGRAM

## 2024-08-24 PROCEDURE — 25000003 PHARM REV CODE 250: Performed by: NURSE PRACTITIONER

## 2024-08-24 PROCEDURE — 82077 ASSAY SPEC XCP UR&BREATH IA: CPT | Performed by: STUDENT IN AN ORGANIZED HEALTH CARE EDUCATION/TRAINING PROGRAM

## 2024-08-24 PROCEDURE — 94761 N-INVAS EAR/PLS OXIMETRY MLT: CPT

## 2024-08-24 PROCEDURE — 80307 DRUG TEST PRSMV CHEM ANLYZR: CPT | Performed by: STUDENT IN AN ORGANIZED HEALTH CARE EDUCATION/TRAINING PROGRAM

## 2024-08-24 PROCEDURE — 80053 COMPREHEN METABOLIC PANEL: CPT | Performed by: NURSE PRACTITIONER

## 2024-08-24 PROCEDURE — 94760 N-INVAS EAR/PLS OXIMETRY 1: CPT

## 2024-08-24 RX ORDER — ENOXAPARIN SODIUM 100 MG/ML
40 INJECTION SUBCUTANEOUS EVERY 24 HOURS
Status: DISCONTINUED | OUTPATIENT
Start: 2024-08-24 | End: 2024-08-24 | Stop reason: HOSPADM

## 2024-08-24 RX ORDER — ATORVASTATIN CALCIUM 10 MG/1
10 TABLET, FILM COATED ORAL DAILY
Status: DISCONTINUED | OUTPATIENT
Start: 2024-08-24 | End: 2024-08-24 | Stop reason: HOSPADM

## 2024-08-24 RX ADMIN — FOLIC ACID 1000 MCG: 1 TABLET ORAL at 08:08

## 2024-08-24 RX ADMIN — Medication 1000 MG: at 08:08

## 2024-08-24 RX ADMIN — LOSARTAN POTASSIUM 100 MG: 100 TABLET, FILM COATED ORAL at 08:08

## 2024-08-24 NOTE — CARE UPDATE
08/23/24 2014   Patient Assessment/Suction   Level of Consciousness (AVPU) alert   Respiratory Effort Unlabored   Expansion/Accessory Muscles/Retractions no use of accessory muscles;no retractions   All Lung Fields Breath Sounds Anterior:;Lateral:;clear   Rhythm/Pattern, Respiratory unlabored;pattern regular   Cough Frequency no cough   PRE-TX-O2   Device (Oxygen Therapy) room air   SpO2 97 %   Pulse Oximetry Type Intermittent   $ Pulse Oximetry - Multiple Charge Pulse Oximetry - Multiple   Pulse 93   Resp 16   Aerosol Therapy   $ Aerosol Therapy Charges PRN treatment not required   Respiratory Treatment Status (SVN) PRN treatment not required

## 2024-08-24 NOTE — PLAN OF CARE
CM completed ADT 22 for [facility] placement. CM called pts devan Rick at [phone] and updated him that the pt is medically cleared for placement . He became very frustrated with me and stated that he had left a message with the patients nurse over an hour ago to see how she was doing and he had not received a phone call back. I updated him that he pts labs where cleared for placement and he asked where the pt would be going . I told him that we would have no control over that but that I would keep him updated. He became very frustrated with me and stated that we have very poor communication and we fail to consider the family and their needs. He began to talk about how sorry social workers are and useless and all they do is give out useless phone numbers. I offered to message the nursing team and have him call him back and asked him how else I could help him and he stated he needed three things, to know what a PEC is, to talk to the pt advocate and to know where his grandmother was going. I explained what a PEC is, why it occurred to his grandmother, typical protocol and discharge. I explained that the pt advocate does not work on the weekends but I would be glad to connect him to her and that I could not tell him where she would be going but would keep him updated throughout the day. He then hung up on me.     I messaged the pts nurse and charge nurse and asked them to return the jaycob call. Per Jax Pts nurse he did not answer the phone.    08/24/24 1010   Post-Acute Status   Post-Acute Authorization Placement   Post-Acute Placement Status Referrals Sent

## 2024-08-24 NOTE — PROGRESS NOTES
INPATIENT NEPHROLOGY progress  [facility]    Patient Name: Brie Han  Date: 08/24/2024    Reason for consultation: Hyponatremia    Chief Complaint:   Chief Complaint   Patient presents with    Altered Mental Status     Confused outburst       History of Present Illness:  89 year old female admitted for nicky with recent insomnia for several days. CT head not acute. Labs unrevealing of etiology. Neuro and psych consulted. Started on depakote. Developed hyponatremia so depakote dc'd. PEC'd per psych rec. Consulted for hyponatremia.    Interval History:  8/23- VSS, on RA, UOP 1L  8/24  950cc UOP recorded.  VSS, Na+ improving.  Eating breakfast, pleasant--NAD noted.    Plan of Care:    Assessment:  Nicky  Hyponatremia- medication induced  HTN  Anemia    Plan:    - off depakote  - check urine Na-29, serum uric acid ok- added 1.5L fluid restriction  - ok with current BP meds  - H/H at baseline    No acute hypertonic saline needs.  Decide about salt tabs vs samsca if Na+ worsens again.    Thank you for allowing us to participate in this patient's care. We will continue to follow.    Vital Signs:  Temp Readings from Last 3 Encounters:   08/24/24 97.6 °F (36.4 °C) (Axillary)   07/31/24 98.5 °F (36.9 °C) (Oral)   07/25/24 98.6 °F (37 °C) (Oral)       Pulse Readings from Last 3 Encounters:   08/24/24 89   07/31/24 69   07/25/24 77       BP Readings from Last 3 Encounters:   08/24/24 (!) 145/65   07/31/24 (!) 128/52   07/25/24 133/63       Weight:  Wt Readings from Last 3 Encounters:   08/21/24 46.6 kg (102 lb 11.8 oz)   07/31/24 48.2 kg (106 lb 4.2 oz)   07/21/24 49.9 kg (110 lb)       INS/OUTS:  I/O last 3 completed shifts:  In: 1132 [P.O.:1132]  Out: 1550 [Urine:1550]  No intake/output data recorded.    Past Medical & Surgical History:  Past Medical History:   Diagnosis Date    ACP (advance care planning) 2/7/2024    Amaurosis fugax     Bilateral left eye worse    Anticoagulant long-term use      Arthritis     Glaucoma     resolved    Hyperlipidemia     Hypertension     Stroke 2006       Past Surgical History:   Procedure Laterality Date    APPENDECTOMY  's    BREAST SURGERY      CATARACT EXTRACTION W/  INTRAOCULAR LENS IMPLANT Bilateral     Dr Rojo      SECTION      3 c/s and d/c    COLONOSCOPY N/A 2024    Procedure: COLONOSCOPY;  Surgeon: Trev Lafleur MD;  Location: [facility] END[facility];  Service: Endoscopy;  Laterality: N/A;    ESOPHAGOGASTRODUODENOSCOPY N/A 2024    Procedure: EGD (ESOPHAGOGASTRODUODENOSCOPY);  Surgeon: Trev Lafleur MD;  Location: [facility] END[facility];  Service: Endoscopy;  Laterality: N/A;    EYE SURGERY      Glaucoma     HYSTERECTOMY      RIGHT OOPHORECTOMY      With postop hemorrhage    TONSILLECTOMY      Yag Capsulotomy Left 2021       Past Social History:  Social History     Socioeconomic History    Marital status:    Tobacco Use    Smoking status: Never    Smokeless tobacco: Never   Substance and Sexual Activity    Alcohol use: No    Drug use: No    Sexual activity: Never     Social Determinants of Health     Financial Resource Strain: Low Risk  (2024)    Overall Financial Resource Strain (CARDIA)     Difficulty of Paying Living Expenses: Not very hard   Food Insecurity: No Food Insecurity (2024)    Hunger Vital Sign     Worried About Running Out of Food in the Last Year: Never true     Ran Out of Food in the Last Year: Never true   Transportation Needs: No Transportation Needs (2024)    TRANSPORTATION NEEDS     Transportation : No   Physical Activity: Inactive (2024)    Exercise Vital Sign     Days of Exercise per Week: 0 days     Minutes of Exercise per Session: 30 min   Stress: No Stress Concern Present (2024)    [employer] - Occupational Stress Questionnaire     Feeling of Stress : Not at all   Housing Stability: Unknown (2024)    Housing Stability Vital Sign     Unable to Pay for Housing in  the Last Year: No       Medications:  Scheduled Meds:   folic acid  1,000 mcg Oral Daily    losartan  100 mg Oral Daily    NIFEdipine  30 mg Oral QHS    QUEtiapine  50 mg Oral QHS    sodium chloride  1,000 mg Oral Daily     Continuous Infusions:  PRN Meds:.  Current Facility-Administered Medications:     acetaminophen, 650 mg, Oral, Q4H PRN    acetaminophen, 650 mg, Oral, Q6H PRN    albuterol-ipratropium, 3 mL, Nebulization, Q4H PRN    aluminum-magnesium hydroxide-simethicone, 30 mL, Oral, QID PRN    dextrose 10%, 12.5 g, Intravenous, PRN    dextrose 10%, 25 g, Intravenous, PRN    glucagon (human recombinant), 1 mg, Intramuscular, PRN    glucose, 16 g, Oral, PRN    glucose, 24 g, Oral, PRN    magnesium oxide, 800 mg, Oral, PRN    magnesium oxide, 800 mg, Oral, PRN    melatonin, 9 mg, Oral, Nightly PRN    naloxone, 0.02 mg, Intravenous, PRN    ondansetron, 4 mg, Intravenous, Q8H PRN    potassium bicarbonate, 35 mEq, Oral, PRN    potassium bicarbonate, 50 mEq, Oral, PRN    potassium bicarbonate, 60 mEq, Oral, PRN    potassium, sodium phosphates, 2 packet, Oral, PRN    potassium, sodium phosphates, 2 packet, Oral, PRN    potassium, sodium phosphates, 2 packet, Oral, PRN    simethicone, 1 tablet, Oral, QID PRN    sodium chloride 0.9%, 10 mL, Intravenous, Q8H PRN  No current facility-administered medications on file prior to encounter.     Current Outpatient Medications on File Prior to Encounter   Medication Sig Dispense Refill    artificial tears (ISOPTO TEARS) 0.5 % ophthalmic solution Place 1 drop into both eyes 4 (four) times daily as needed (Dry Eyes).      folic acid (FOLVITE) 1 MG tablet Take 1 tablet (1,000 mcg total) by mouth once daily. 30 tablet 0    losartan (COZAAR) 100 MG tablet Take 1 tablet (100 mg total) by mouth once daily. 90 tablet 3    lovastatin (MEVACOR) 10 MG tablet Take 1 tablet (10 mg total) by mouth every evening. 30 tablet 0    multivitamin capsule Take 1 capsule by mouth once daily. 30  "capsule 0    NIFEdipine (PROCARDIA-XL) 30 MG (OSM) 24 hr tablet Take 1 tablet (30 mg total) by mouth every evening. 30 tablet 0    senna-docusate 8.6-50 mg (PERICOLACE) 8.6-50 mg per tablet Take 1 tablet by mouth 2 (two) times daily as needed for Constipation.      sodium chloride 1,000 mg TbSO oral tablet Take 1 tablet (1,000 mg total) by mouth once daily. 90 tablet 3    acetaminophen (TYLENOL) 500 MG tablet Take 2 tablets (1,000 mg total) by mouth every 8 (eight) hours as needed for Pain.      oxybutynin (DITROPAN-XL) 5 MG TR24 Take 1 tablet (5 mg total) by mouth once daily. (Patient not taking: Reported on 8/19/2024) 30 tablet 11    sucralfate (CARAFATE) 1 gram tablet Take 1 tablet (1 g total) by mouth 2 (two) times daily. (Patient not taking: Reported on 8/19/2024) 60 tablet 0       Allergies:  Contrast media, Aspirin, Ciprofloxacin, Iron, Iodine, and Penicillins    Past Family History:  Reviewed; refer to Hospitalist Admission Note    Review of Systems:  Per  note    Physical Exam:  BP (!) 145/65 (BP Location: Left arm, Patient Position: Lying)   Pulse 89   Temp 97.6 °F (36.4 °C) (Axillary)   Resp 18   Ht 4' 11" (1.499 m)   Wt 46.6 kg (102 lb 11.8 oz)   LMP 12/07/1972   SpO2 98%   BMI 20.75 kg/m²     General Appearance:    NAD, appears stated age   Head:    Normocephalic, atraumatic   Eyes:    Closed       Mouth:   Moist mucus membranes, no thrush or oral lesions, normal      dentition   Back:     No CVA tenderness   Lungs:     Clear to auscultation bilaterally, no wheeze, crackles, rales      or rhonchi, symmetric air movement, respirations unlabored   Heart:    Regular rate and rhythm, S1 and S2 normal, no murmur, rub   or gallop   Abdomen:     Soft, non-tender, non-distended, bowel sounds active all four   quadrants, no RT or guarding, no masses, no organomegaly   Extremities:   Warm and well perfused, distal pulses intact, no cyanosis or    peripheral edema   MSK:   No joint or muscle swelling, " tenderness or deformity   Skin:   Skin color, texture, turgor normal, no rashes or lesions   Neurologic/Psychiatric:   Sleeping     Results:  Lab Results   Component Value Date     (L) 08/24/2024    K 4.6 08/24/2024     08/24/2024    CO2 22 (L) 08/24/2024    BUN 33 (H) 08/24/2024    CREATININE 0.8 08/24/2024    CALCIUM 9.3 08/24/2024    ANIONGAP 8 08/24/2024    ESTGFRAFRICA >60.0 07/06/2022    EGFRNONAA >60.0 07/06/2022       Lab Results   Component Value Date    CALCIUM 9.3 08/24/2024    PHOS 3.3 08/20/2024       Recent Labs   Lab 08/24/24  0338   WBC 8.90   RBC 3.17*   HGB 9.1*   HCT 29.1*      MCV 92   MCH 28.7   MCHC 31.3*         Amanda Sheth NP    [facility]  [address] [place] [address]  RADHA Mario [id number]  [phone] (p)  [phone] (f)

## 2024-08-24 NOTE — NURSING
Attempted call to number given to me as message no answer wrong number.  Looked up number in chart called devan patel did not identify himself asked who I was and what relation I had to his grandmother asked if her CO2 was out of range and to speak to dr fairchild. Relayed message to dr fairchild via secure chat.

## 2024-08-24 NOTE — PLAN OF CARE
[facility] - Med/Surg  Discharge Final Note    Primary Care Provider: Colten Gould MD    Expected Discharge Date: 8/24/2024    The pt is cleared for discharge to [facility] from case management and is awaiting transportation .    Final Discharge Note (most recent)       Final Note - 08/24/24 1341          Final Note    Assessment Type Final Discharge Note     [facility]position [facility]     What phone number can be called within the next 1-3 days to see how you are doing after discharge? [phone]        Post-Acute Status    Post-Acute Authorization Placement     Post-Acute Placement Status Set-up Complete/Auth obtained     Discharge Delays None known at this time                     Important Message from Medicare             Contact Info       [facility]    [id number] [place] [id number]   Phone: [phone]       Next Steps: Follow up    Instructions: Psych

## 2024-08-24 NOTE — PLAN OF CARE
Problem: Adult Inpatient Plan of Care  Goal: Plan of Care Review  Outcome: Progressing  Goal: Patient-Specific Goal (Individualized)  Outcome: Progressing  Goal: Absence of Hospital-Acquired Illness or Injury  Outcome: Progressing  Goal: Optimal Comfort and Wellbeing  Outcome: Progressing  Goal: Readiness for Transition of Care  Outcome: Progressin     Problem: Fall Injury Risk  Goal: Absence of Fall and Fall-Related Injury  Outcome: Progressing     Problem: Skin Injury Risk Increased  Goal: Skin Health and Integrity  Outcome: Progressing   Plan of care reviewed with patient,no evidence of understanding.  SR on telemetry. Patient remains PEC, sitter at bedside. Remains free from falls, injury. Bed in low & locked position.Bed alarm on .

## 2024-08-24 NOTE — PT/OT/SLP PROGRESS
Physical Therapy Treatment    Patient Name:  Brie Han   MRN:  [id number]    Recommendations:     Discharge Recommendations: Low Intensity Therapy  Discharge Equipment Recommendations: none  Barriers to discharge: None    Assessment:     Brie Han is a 89 y.o. female admitted with a medical diagnosis of Nicky.  She presents with the following impairments/functional limitations: gait instability, impaired cognition, impaired cardiopulmonary response to activity, impaired balance .     Rehab Prognosis: Fair; patient would benefit from acute skilled PT services to address these deficits and reach maximum level of function.    Recent Surgery: * No surgery found *      Plan:     During this hospitalization, patient to be seen 6 x/week to address the identified rehab impairments via gait training, therapeutic activities, therapeutic exercises and progress toward the following goals:    Plan of Care Expires:  08/30/24    Subjective     Chief Complaint: None stated  Patient/Family Comments/goals: Pt agreeable to PT.   Pain/Comfort:  Pain Rating 1: 0/10  Pain Rating Post-Intervention 1: 0/10      Objective:     Communicated with RN prior to session.  Patient found HOB elevated with bed alarm, PureWick, telemetry, PICC line upon PT entry to room.     General Precautions: Standard, fall  Orthopedic Precautions: N/A  Braces: N/A  Respiratory Status: Room air     Functional Mobility:  Bed Mobility:     Supine to Sit: supervision  Transfers:     Sit to Stand:  contact guard assistance with rolling walker  Gait: 250' x 2 min a with rw      AM-PAC 6 CLICK MOBILITY          Treatment & Education:  Pt was educated on the following: call light use, importance of OOB activity and functional mobility to negate the negative effects of prolonged bed rest during this hospitalization, safe transfers/ambulation and discharge planning recommendations/options.     Patient left HOB elevated with all lines intact, call button in reach,  and sitter present..    GOALS:   Multidisciplinary Problems       Physical Therapy Goals          Problem: Physical Therapy    Goal Priority Disciplines Outcome Goal Variances Interventions   Physical Therapy Goal     PT, PT/OT Progressing     Description: Goals to be met by: 2024     Patient will increase functional independence with mobility by performin. Supine to sit with Contact Guard Assistance  2. Sit to stand transfer with Contact Guard Assistance  3. Bed to chair transfer with Contact Guard Assistance using Rolling Walker  4. Gait  x 250 feet with Contact Guard Assistance using Rolling Walker.   5. Lower extremity exercise program x20 reps                        Time Tracking:     PT Received On: 24  PT Start Time: 1150     PT Stop Time: 1207  PT Total Time (min): 17 min     Billable Minutes: Gait Training 17    Treatment Type: Treatment  PT/PTA: PTA     Number of PTA visits since last PT visit: 4     2024

## 2024-08-24 NOTE — PLAN OF CARE
Pt is accepted at     Physician Acceptance Marked complete for Destination [facility] sent a message to Dr. Dumont requesting discharge orders.     Updated pts nurse Jax with number for report - Destination Number for Report: [phone]    08/24/24 1222   Discharge Assessment   Assessment Type Discharge Planning Reassessment

## 2024-08-24 NOTE — NURSING
Discharge instructions given to pt and devan.  Pt transferred to [facility] via w/c with tech and devan.

## 2024-08-26 LAB
OHS QRS DURATION: 68 MS
OHS QTC CALCULATION: 401 MS
PYRIDOXAL SERPL-MCNC: 30 UG/L (ref 5–50)

## 2024-09-03 ENCOUNTER — TELEPHONE (OUTPATIENT)
Dept: PRIMARY CARE CLINIC | Facility: CLINIC | Age: 89
End: 2024-09-03
Payer: MEDICARE

## 2024-09-03 DIAGNOSIS — R41.82 ALTERED MENTAL STATUS, UNSPECIFIED ALTERED MENTAL STATUS TYPE: Primary | ICD-10-CM

## 2024-09-03 DIAGNOSIS — F30.9 MANIA: ICD-10-CM

## 2024-09-03 NOTE — TELEPHONE ENCOUNTER
Patient's son call on the value base phone with concerns of the patient being discharged today form   [facility]. He is requesting patient to be discharged to a nursing home. I will order an outpatient case manger referral to help with this transition-son verbalizes understanding

## 2024-09-12 ENCOUNTER — OUTPATIENT CASE MANAGEMENT (OUTPATIENT)
Dept: ADMINISTRATIVE | Facility: OTHER | Age: 89
End: 2024-09-12
Payer: MEDICARE

## 2024-09-12 NOTE — PROGRESS NOTES
09/12/24 Pt was discharged from the hospital and admitted to [facility]. Does not qualify for OPCM at this time. RN OPCM

## 2024-09-26 ENCOUNTER — TELEPHONE (OUTPATIENT)
Dept: HEMATOLOGY/ONCOLOGY | Facility: CLINIC | Age: 89
End: 2024-09-26
Payer: MEDICARE

## 2024-09-26 DIAGNOSIS — D50.9 IRON DEFICIENCY ANEMIA, UNSPECIFIED IRON DEFICIENCY ANEMIA TYPE: Primary | ICD-10-CM

## 2024-09-26 NOTE — TELEPHONE ENCOUNTER
----- Message from Christal Grove sent at 9/26/2024 11:22 AM CDT -----  Type: Needs Medical Advice  Who Called:  Nayely with [facility] in [place]  Symptoms (please be specific):    How long has patient had these symptoms:    Pharmacy name and phone #:    Best Call Back Number:   Additional Information: Nayely is requesting a call back regarding if a referral was received regarding patient.

## 2024-09-30 ENCOUNTER — TELEPHONE (OUTPATIENT)
Facility: CLINIC | Age: 89
End: 2024-09-30
Payer: MEDICARE

## 2024-09-30 NOTE — TELEPHONE ENCOUNTER
----- Message from [place] sent at 9/30/2024  9:51 AM CDT -----  Type: Needs Medical Advice  Who Called:  Nayely with [facility] in [place]  Symptoms (please be specific):    How long has patient had these symptoms:    Pharmacy name and phone #:    Best Call Back Number:  Additional Information: Nayely is requesting a call back to schedule a NP appt. from referral to see Dr. Page.

## 2024-09-30 NOTE — NURSING
Oncology Navigation   Intake  Cancer Type: Benign hem  Type of Referral: Internal  Date of Referral: 09/26/24  Initial Nurse Navigator Contact: 09/30/24  Referral to Initial Contact Timeline (days): 4     Treatment                              Acuity      Follow Up  No follow-ups on file.     Spoke with Nayely. Requesting pt be seen by Dr. Page. Appt was made.

## 2024-10-03 ENCOUNTER — PATIENT MESSAGE (OUTPATIENT)
Dept: PRIMARY CARE CLINIC | Facility: CLINIC | Age: 89
End: 2024-10-03
Payer: MEDICARE

## 2024-10-07 ENCOUNTER — LAB VISIT (OUTPATIENT)
Dept: LAB | Facility: HOSPITAL | Age: 89
End: 2024-10-07
Attending: PHYSICIAN ASSISTANT
Payer: MEDICARE

## 2024-10-07 DIAGNOSIS — D50.0 IRON DEFICIENCY ANEMIA SECONDARY TO BLOOD LOSS (CHRONIC): Primary | ICD-10-CM

## 2024-10-07 DIAGNOSIS — D63.8 ANEMIA IN OTHER CHRONIC DISEASES CLASSIFIED ELSEWHERE: ICD-10-CM

## 2024-10-07 DIAGNOSIS — C18.0 MALIGNANT NEOPLASM OF CECUM: ICD-10-CM

## 2024-10-07 DIAGNOSIS — C18.9 MALIGNANT NEOPLASM OF COLON: ICD-10-CM

## 2024-10-07 LAB
ALBUMIN SERPL BCP-MCNC: 3.1 G/DL (ref 3.5–5.2)
ALP SERPL-CCNC: 77 U/L (ref 55–135)
ALT SERPL W/O P-5'-P-CCNC: 15 U/L (ref 10–44)
ANION GAP SERPL CALC-SCNC: 9 MMOL/L (ref 8–16)
AST SERPL-CCNC: 18 U/L (ref 10–40)
BASOPHILS # BLD AUTO: 0.06 K/UL (ref 0–0.2)
BASOPHILS NFR BLD: 1.3 % (ref 0–1.9)
BILIRUB SERPL-MCNC: 0.3 MG/DL (ref 0.1–1)
BUN SERPL-MCNC: 26 MG/DL (ref 10–30)
CALCIUM SERPL-MCNC: 9.1 MG/DL (ref 8.7–10.5)
CHLORIDE SERPL-SCNC: 105 MMOL/L (ref 95–110)
CO2 SERPL-SCNC: 22 MMOL/L (ref 23–29)
CREAT SERPL-MCNC: 0.8 MG/DL (ref 0.5–1.4)
DIFFERENTIAL METHOD BLD: ABNORMAL
EOSINOPHIL # BLD AUTO: 0.2 K/UL (ref 0–0.5)
EOSINOPHIL NFR BLD: 3.4 % (ref 0–8)
ERYTHROCYTE [DISTWIDTH] IN BLOOD BY AUTOMATED COUNT: 17.8 % (ref 11.5–14.5)
EST. GFR  (NO RACE VARIABLE): >60 ML/MIN/1.73 M^2
FERRITIN SERPL-MCNC: 22 NG/ML (ref 20–300)
GLUCOSE SERPL-MCNC: 101 MG/DL (ref 70–110)
HCT VFR BLD AUTO: 25.3 % (ref 37–48.5)
HGB BLD-MCNC: 7.7 G/DL (ref 12–16)
IMM GRANULOCYTES # BLD AUTO: 0.02 K/UL (ref 0–0.04)
IMM GRANULOCYTES NFR BLD AUTO: 0.4 % (ref 0–0.5)
LYMPHOCYTES # BLD AUTO: 1.2 K/UL (ref 1–4.8)
LYMPHOCYTES NFR BLD: 25.4 % (ref 18–48)
MCH RBC QN AUTO: 27.6 PG (ref 27–31)
MCHC RBC AUTO-ENTMCNC: 30.4 G/DL (ref 32–36)
MCV RBC AUTO: 91 FL (ref 82–98)
MONOCYTES # BLD AUTO: 1 K/UL (ref 0.3–1)
MONOCYTES NFR BLD: 20.4 % (ref 4–15)
NEUTROPHILS # BLD AUTO: 2.3 K/UL (ref 1.8–7.7)
NEUTROPHILS NFR BLD: 49.1 % (ref 38–73)
NRBC BLD-RTO: 0 /100 WBC
PLATELET # BLD AUTO: 389 K/UL (ref 150–450)
PMV BLD AUTO: 10.1 FL (ref 9.2–12.9)
POTASSIUM SERPL-SCNC: 4.4 MMOL/L (ref 3.5–5.1)
PROT SERPL-MCNC: 6.2 G/DL (ref 6–8.4)
RBC # BLD AUTO: 2.79 M/UL (ref 4–5.4)
SODIUM SERPL-SCNC: 136 MMOL/L (ref 136–145)
WBC # BLD AUTO: 4.65 K/UL (ref 3.9–12.7)

## 2024-10-07 PROCEDURE — 80053 COMPREHEN METABOLIC PANEL: CPT | Performed by: PHYSICIAN ASSISTANT

## 2024-10-07 PROCEDURE — 85025 COMPLETE CBC W/AUTO DIFF WBC: CPT | Performed by: PHYSICIAN ASSISTANT

## 2024-10-07 PROCEDURE — 82728 ASSAY OF FERRITIN: CPT | Performed by: PHYSICIAN ASSISTANT

## 2024-10-07 PROCEDURE — 83540 ASSAY OF IRON: CPT | Performed by: PHYSICIAN ASSISTANT

## 2024-10-07 PROCEDURE — 36415 COLL VENOUS BLD VENIPUNCTURE: CPT | Performed by: PHYSICIAN ASSISTANT

## 2024-10-08 LAB
IRON SERPL-MCNC: 43 UG/DL (ref 30–160)
SATURATED IRON: 8 % (ref 20–50)
TOTAL IRON BINDING CAPACITY: 532 UG/DL (ref 250–450)
TRANSFERRIN SERPL-MCNC: 380 MG/DL (ref 200–375)

## 2024-10-09 ENCOUNTER — TELEPHONE (OUTPATIENT)
Dept: PRIMARY CARE CLINIC | Facility: CLINIC | Age: 89
End: 2024-10-09
Payer: MEDICARE

## 2024-10-09 NOTE — TELEPHONE ENCOUNTER
----- Message from Ella sent at 10/9/2024 11:14 AM CDT -----  Type:  Call    Who Called:Pratima/[employer] [facility]  Does the patient know what this is regarding?:call  Would the patient rather a call back or a response via [facility]? call  Best Call Back Number:[phone]  Additional Information: Pt blood pressure is 162/80 and she has swelling in legs

## 2024-10-10 ENCOUNTER — OFFICE VISIT (OUTPATIENT)
Dept: PRIMARY CARE CLINIC | Facility: CLINIC | Age: 89
End: 2024-10-10
Payer: MEDICARE

## 2024-10-10 VITALS
BODY MASS INDEX: 23.24 KG/M2 | HEIGHT: 59 IN | HEART RATE: 67 BPM | TEMPERATURE: 98 F | WEIGHT: 115.31 LBS | DIASTOLIC BLOOD PRESSURE: 62 MMHG | SYSTOLIC BLOOD PRESSURE: 142 MMHG | OXYGEN SATURATION: 95 %

## 2024-10-10 DIAGNOSIS — I50.30 DIASTOLIC CHF WITH PRESERVED LEFT VENTRICULAR FUNCTION, NYHA CLASS 2: ICD-10-CM

## 2024-10-10 DIAGNOSIS — G30.1 MODERATE LATE ONSET ALZHEIMER'S DEMENTIA WITHOUT BEHAVIORAL DISTURBANCE, PSYCHOTIC DISTURBANCE, MOOD DISTURBANCE, OR ANXIETY: ICD-10-CM

## 2024-10-10 DIAGNOSIS — E78.5 HYPERLIPIDEMIA WITH TARGET LDL LESS THAN 70: ICD-10-CM

## 2024-10-10 DIAGNOSIS — F01.518 VASCULAR DEMENTIA OF ACUTE ONSET, WITH BEHAVIORAL DISTURBANCE: ICD-10-CM

## 2024-10-10 DIAGNOSIS — F02.B0 MODERATE LATE ONSET ALZHEIMER'S DEMENTIA WITHOUT BEHAVIORAL DISTURBANCE, PSYCHOTIC DISTURBANCE, MOOD DISTURBANCE, OR ANXIETY: ICD-10-CM

## 2024-10-10 DIAGNOSIS — Z23 NEEDS FLU SHOT: Primary | ICD-10-CM

## 2024-10-10 DIAGNOSIS — R07.89 ATYPICAL CHEST PAIN: ICD-10-CM

## 2024-10-10 LAB
OHS QRS DURATION: 86 MS
OHS QTC CALCULATION: 424 MS

## 2024-10-10 PROCEDURE — 93010 ELECTROCARDIOGRAM REPORT: CPT | Mod: HCWC,S$GLB,, | Performed by: INTERNAL MEDICINE

## 2024-10-10 PROCEDURE — 93005 ELECTROCARDIOGRAM TRACING: CPT | Mod: HCNC,S$GLB,, | Performed by: FAMILY MEDICINE

## 2024-10-10 PROCEDURE — 99999 PR PBB SHADOW E&M-EST. PATIENT-LVL III: CPT | Mod: PBBFAC,HCNC,, | Performed by: FAMILY MEDICINE

## 2024-10-10 RX ORDER — CLOPIDOGREL BISULFATE 75 MG/1
75 TABLET ORAL DAILY
Qty: 30 TABLET | Refills: 11 | Status: SHIPPED | OUTPATIENT
Start: 2024-10-10 | End: 2025-10-10

## 2024-10-10 RX ORDER — ISOSORBIDE MONONITRATE 30 MG/1
30 TABLET, EXTENDED RELEASE ORAL DAILY
Qty: 30 TABLET | Refills: 11 | Status: SHIPPED | OUTPATIENT
Start: 2024-10-10 | End: 2024-10-10 | Stop reason: SDUPTHER

## 2024-10-10 RX ORDER — FUROSEMIDE 20 MG/1
20 TABLET ORAL 2 TIMES DAILY
Qty: 60 TABLET | Refills: 11 | Status: SHIPPED | OUTPATIENT
Start: 2024-10-10 | End: 2025-10-10

## 2024-10-10 RX ORDER — ISOSORBIDE MONONITRATE 30 MG/1
30 TABLET, EXTENDED RELEASE ORAL DAILY
Qty: 30 TABLET | Refills: 11 | Status: SHIPPED | OUTPATIENT
Start: 2024-10-10 | End: 2024-10-15

## 2024-10-10 RX ORDER — DONEPEZIL HYDROCHLORIDE 5 MG/1
5 TABLET, FILM COATED ORAL NIGHTLY
Qty: 90 TABLET | Refills: 11 | Status: SHIPPED | OUTPATIENT
Start: 2024-10-10

## 2024-10-10 RX ORDER — POLYETHYLENE GLYCOL 3350 17 G/17G
17 POWDER, FOR SOLUTION ORAL DAILY
Qty: 100 PACKET | Refills: 11 | Status: SHIPPED | OUTPATIENT
Start: 2024-10-10

## 2024-10-10 RX ORDER — NIFEDIPINE 30 MG/1
30 TABLET, EXTENDED RELEASE ORAL DAILY
Qty: 90 TABLET | Refills: 3 | Status: SHIPPED | OUTPATIENT
Start: 2024-10-10 | End: 2024-10-10

## 2024-10-10 RX ORDER — METOPROLOL SUCCINATE 25 MG/1
25 TABLET, EXTENDED RELEASE ORAL DAILY
Qty: 90 TABLET | Refills: 3 | Status: SHIPPED | OUTPATIENT
Start: 2024-10-10 | End: 2025-10-10

## 2024-10-10 RX ORDER — LOVASTATIN 10 MG/1
10 TABLET ORAL NIGHTLY
Qty: 30 TABLET | Refills: 4 | Status: SHIPPED | OUTPATIENT
Start: 2024-10-10

## 2024-10-10 RX ORDER — FUROSEMIDE 20 MG/1
20 TABLET ORAL 2 TIMES DAILY
Qty: 60 TABLET | Refills: 11 | Status: SHIPPED | OUTPATIENT
Start: 2024-10-10 | End: 2024-10-10 | Stop reason: SDUPTHER

## 2024-10-10 RX ORDER — POTASSIUM CHLORIDE 750 MG/1
TABLET, EXTENDED RELEASE ORAL
Qty: 120 TABLET | Refills: 4 | Status: SHIPPED | OUTPATIENT
Start: 2024-10-10

## 2024-10-10 RX ORDER — FUROSEMIDE 10 MG/ML
20 INJECTION INTRAMUSCULAR; INTRAVENOUS
Status: DISCONTINUED | OUTPATIENT
Start: 2024-10-10 | End: 2024-10-18

## 2024-10-14 ENCOUNTER — LAB VISIT (OUTPATIENT)
Dept: LAB | Facility: HOSPITAL | Age: 89
End: 2024-10-14
Attending: INTERNAL MEDICINE
Payer: MEDICARE

## 2024-10-14 DIAGNOSIS — C18.9 MALIGNANT NEOPLASM OF COLON: ICD-10-CM

## 2024-10-14 DIAGNOSIS — D63.8 ANEMIA IN OTHER CHRONIC DISEASES CLASSIFIED ELSEWHERE: ICD-10-CM

## 2024-10-14 DIAGNOSIS — D50.0 IRON DEFICIENCY ANEMIA SECONDARY TO BLOOD LOSS (CHRONIC): Primary | ICD-10-CM

## 2024-10-14 DIAGNOSIS — C18.0 MALIGNANT NEOPLASM OF CECUM: ICD-10-CM

## 2024-10-14 LAB
BASOPHILS # BLD AUTO: 0.07 K/UL (ref 0–0.2)
BASOPHILS NFR BLD: 1.3 % (ref 0–1.9)
DIFFERENTIAL METHOD BLD: ABNORMAL
EOSINOPHIL # BLD AUTO: 0.1 K/UL (ref 0–0.5)
EOSINOPHIL NFR BLD: 1.5 % (ref 0–8)
ERYTHROCYTE [DISTWIDTH] IN BLOOD BY AUTOMATED COUNT: 20.4 % (ref 11.5–14.5)
HCT VFR BLD AUTO: 25.3 % (ref 37–48.5)
HGB BLD-MCNC: 7.7 G/DL (ref 12–16)
IMM GRANULOCYTES # BLD AUTO: 0.03 K/UL (ref 0–0.04)
IMM GRANULOCYTES NFR BLD AUTO: 0.6 % (ref 0–0.5)
LYMPHOCYTES # BLD AUTO: 1 K/UL (ref 1–4.8)
LYMPHOCYTES NFR BLD: 19.1 % (ref 18–48)
MCH RBC QN AUTO: 28.1 PG (ref 27–31)
MCHC RBC AUTO-ENTMCNC: 30.4 G/DL (ref 32–36)
MCV RBC AUTO: 92 FL (ref 82–98)
MONOCYTES # BLD AUTO: 1.3 K/UL (ref 0.3–1)
MONOCYTES NFR BLD: 25.2 % (ref 4–15)
NEUTROPHILS # BLD AUTO: 2.8 K/UL (ref 1.8–7.7)
NEUTROPHILS NFR BLD: 52.3 % (ref 38–73)
NRBC BLD-RTO: 0 /100 WBC
PLATELET # BLD AUTO: 367 K/UL (ref 150–450)
PMV BLD AUTO: 9.9 FL (ref 9.2–12.9)
RBC # BLD AUTO: 2.74 M/UL (ref 4–5.4)
WBC # BLD AUTO: 5.28 K/UL (ref 3.9–12.7)

## 2024-10-14 PROCEDURE — 85025 COMPLETE CBC W/AUTO DIFF WBC: CPT | Performed by: INTERNAL MEDICINE

## 2024-10-14 PROCEDURE — 36415 COLL VENOUS BLD VENIPUNCTURE: CPT | Performed by: INTERNAL MEDICINE

## 2024-10-15 ENCOUNTER — PATIENT MESSAGE (OUTPATIENT)
Dept: PRIMARY CARE CLINIC | Facility: CLINIC | Age: 89
End: 2024-10-15
Payer: MEDICARE

## 2024-10-15 ENCOUNTER — TELEPHONE (OUTPATIENT)
Dept: FAMILY MEDICINE | Facility: CLINIC | Age: 89
End: 2024-10-15
Payer: MEDICARE

## 2024-10-15 DIAGNOSIS — I50.30 DIASTOLIC CHF WITH PRESERVED LEFT VENTRICULAR FUNCTION, NYHA CLASS 2: ICD-10-CM

## 2024-10-15 DIAGNOSIS — R07.89 ATYPICAL CHEST PAIN: ICD-10-CM

## 2024-10-15 RX ORDER — ISOSORBIDE MONONITRATE 30 MG/1
60 TABLET, EXTENDED RELEASE ORAL DAILY
Start: 2024-10-15 | End: 2024-10-15

## 2024-10-15 RX ORDER — ISOSORBIDE MONONITRATE 30 MG/1
30 TABLET, EXTENDED RELEASE ORAL DAILY
Qty: 90 TABLET | Refills: 3 | Status: SHIPPED | OUTPATIENT
Start: 2024-10-15 | End: 2024-10-17 | Stop reason: SDUPTHER

## 2024-10-15 NOTE — TELEPHONE ENCOUNTER
----- Message from Rody sent at 10/15/2024 10:34 AM CDT -----  [facility]  Type: Needs Medical Advice  Who Called:  [facility]/[employer]  Best Call Back Number: [phone]  Additional Information: son wanted to let the office know blood pressure is 148/56 pt is due for iron infusion and cannot get unless blood pressure goes down.wanted to let the office know that pt's ankle size has decreased and the laxis is working.please call

## 2024-10-15 NOTE — TELEPHONE ENCOUNTER
son wanted to let the office know blood pressure is 148/56 pt is due for iron infusion and cannot get unless blood pressure goes down.wanted to let the office know that pt's ankle size has decreased and the laxis is working. Please advise

## 2024-10-15 NOTE — TELEPHONE ENCOUNTER
Use another isosorbide (Imdur) now.  Then take two tablets daily.  She may need 1 tablet twice a day.  Keep the dose with 2 tablets in the morning

## 2024-10-17 RX ORDER — ISOSORBIDE MONONITRATE 30 MG/1
30 TABLET, EXTENDED RELEASE ORAL DAILY
Qty: 90 TABLET | Refills: 3 | Status: SHIPPED | OUTPATIENT
Start: 2024-10-17 | End: 2025-10-17

## 2024-10-18 NOTE — PROGRESS NOTES
SUBJECTIVE:   89 y.o. female for annual routine  and checkup.  Recently discharged from [facility].  Previously was admitted [facility] due to psychosis  Reason for Admission/Admission Diagnoses:   Present on Admission:   PAD (peripheral artery disease) ([facility])   Moderate protein-calorie malnutrition (HCC)   Hypertension   (Resolved) Debility   Chronic kidney disease, stage 3a (HCC)   CAD (coronary artery disease)   Anemia   (Resolved) Hypotension   (Resolved) Dehydration   Elevated troponin   Chronic hyponatremia   (Resolved) Elevated lactic acid level   (Resolved) NSTEMI (non-ST elevated myocardial infarction) ([facility])   (Resolved) Urinary tract infection   Bacterial conjunctivitis   NSTEMI (non-ST elevated myocardial infarction) ([facility])        Discharge Diagnoses:         Active Hospital Problems     Diagnosis Date Noted    NSTEMI (non-ST elevated myocardial infarction) ([facility]) 09/12/2024    Elevated troponin 09/11/2024    Chronic hyponatremia 09/11/2024    Bacterial conjunctivitis 09/11/2024    Anemia 08/24/2024    Moderate protein-calorie malnutrition (HCC) 08/21/2024    Chronic kidney disease, stage 3a ([facility]) 07/25/2024    PAD (peripheral artery disease) ([facility]) 02/14/2013    CAD (coronary artery disease) 02/14/2013    Hypertension 09/21/2012       Resolved Hospital Problems     Diagnosis Date Noted Date Resolved    Dehydration 09/11/2024 09/20/2024    Hypotension 09/11/2024 09/20/2024    Elevated lactic acid level 09/11/2024 09/20/2024    NSTEMI (non-ST elevated myocardial infarction) ([facility]) 09/11/2024 09/11/2024    Urinary tract infection 09/11/2024 09/20/2024    Debility 07/22/2024 09/11/2024         History of Present Illness:   89-year-old female with past medical history of chronic hyponatremia on salt tablets, chronic anemia, protein calorie malnutrition, CKD, PAD, CAD, hypertension who [facility] was [facility] on [facility] which is now ended for low blood  pressure readings.  Patient is alert and oriented but appears to be a poor historian.  Patient has vascular dementia.  Unclear baseline mental status.  Patient speaks very little and often nods head yes or no.  Does not provide much history.  History provided [facility] report and [facility].  Apparently, outside facility had difficulty obtaining patient's blood pressure.  Patient reported to be somnolent and reporting chest wall pain which is only present upon deep palpation.  Denies any other inciting or relieving factors denies shortness of breath or heart palpitations.  Denies fever or chills.  Denies nausea or vomiting.  Patient has reportedly had decreased p.o. intake.  Since patient had rectal cancer has lost weight and continued to have poor appetite. Patient had normotensive blood pressure upon arrival via EMS.  No medications or fluids were given by EMS.  Upon arrival to the ED, patient with largely normotensive blood pressure.  Patient did have 1 reading of low blood pressure.  Was given 500 cc fluid bolus with positive improvement in blood pressure and blood pressures remain normotensive since.  Patient was also noted to have slightly elevated troponin and nonspecific EKG changes.  Cardiology consulted by ED.        Hospital Course and Treatment:   Admission Information         Date & Time  9/10/2024 Provider  Vijay Lu MD Department  [facility] Phone  [phone]                Dehydration, resolved  Hypotension, resolved  Lactic acidosis, resolved  -appears volume depleted on presentation with BP low at times and lactate 3.1  -received fluids in the ED; lactic acidosis resolved  -Na 144 on presentation; normally her sodium is low 130s  -Sodium normalized  -monitor electrolytes, renal function I/Os  -encourage PO intake     Urinary tract infection  -UA with 8 WBCs and many bacteria  -started on emperic tx with CTX  -no leukocytosis, afebrile, normal heart rate  and respirations; no signs of sepsis  -urine cultures have not grown any particular organism; patient completed 5-day antibiotic therapy     Bacterial Conjuctivitis  -noted to have conjunctival injection in the ED so started on sulfacetamide eye drops; continue for 5 total days, end date 9/15     Chronic hyponatremia  -normally sodium is in low 130s and she takes salt tablets; will hold for now given Na 144 on presentation and dehydrated  -Last sodium 135.  Resume salt tabs     CKD  -creatinine at baseline on presentation  -CTM; avoid nephrotoxins; renally dose meds     NSTEMI  CAD  PAD  -nonspecific ST abnormalities on EKG  -trop 0.1  -cards consulted in the ED > no invasive workup per patient and family wishes; continue current treatment of UTI and dehydration and obtain echo  -continue plavix and statin; added low does metoprolol now that BP stable     HTN  -home antihypertensives held on presentation given low/low normal BP on and concern for dehydration  -resume on discharge or when able     Disposition: long-term NH. Medically cleared          Patient clinically improved during hospitalization. Patient was discharged in stable and improved condition with close outpatient PCP follow up.       I discussed with the patient disease process and treatment.      I have personally seen and examined the patient, Brie Han, in a face to face encounter on the date of discharge.      She is cleared for discharge with instructions to follow up as directed.   Total time in the care and discharge planning of this patient was greater than 30 minutes.     Severe anemia due to chronic loss/ possible source rectal cancer  Family and/or Caretaker present at visit?  Yes.  Diagnostic tests reviewed/disposition: I have reviewed all completed as well as pending diagnostic tests at the time of discharge.  Disease/illness education: anemia/ adenocarcinoma cecum  Home health/community services discussion/referrals: Patient has home  health established at d/c .   Establishment or re-establishment of referral orders for community resources: No other necessary community resources.   Discussion with other health care providers: No discussion with other health care providers necessary.   Anemia: Patient presents for presents evaluation of anemia. Anemia was found by routine CBC.  It has been present for a few months.  Associated signs & symptoms: fatigue, hematochezia, history of renal disease, and numbness/paresthesias.        Current Outpatient Medications   Medication Sig Dispense Refill    folic acid (FOLVITE) 1 MG tablet Take 1 tablet (1,000 mcg total) by mouth once daily. 30 tablet 0    acetaminophen (TYLENOL) 500 MG tablet Take 2 tablets (1,000 mg total) by mouth every 8 (eight) hours as needed for Pain.      artificial tears (ISOPTO TEARS) 0.5 % ophthalmic solution Place 1 drop into both eyes 4 (four) times daily as needed (Dry Eyes).      clopidogreL (PLAVIX) 75 mg tablet Take 1 tablet (75 mg total) by mouth once daily. 30 tablet 11    donepeziL (ARICEPT) 5 MG tablet Take 1 tablet (5 mg total) by mouth every evening. 90 tablet 11    furosemide (LASIX) 20 MG tablet Take 1 tablet (20 mg total) by mouth 2 (two) times daily. 60 tablet 11    isosorbide mononitrate (IMDUR) 30 MG 24 hr tablet Take 1 tablet (30 mg total) by mouth once daily. 90 tablet 3    lovastatin (MEVACOR) 10 MG tablet Take 1 tablet (10 mg total) by mouth every evening. 30 tablet 4    metoprolol succinate (TOPROL-XL) 25 MG 24 hr tablet Take 1 tablet (25 mg total) by mouth once daily. 90 tablet 3    multivitamin capsule Take 1 capsule by mouth once daily. 30 capsule 0    polyethylene glycol (GLYCOLAX) 17 gram PwPk Mix 17 grams in 8 oz of fluid and drink by mouth once daily. 100 packet 11    potassium chloride SA (K-DUR,KLOR-CON M) 10 MEQ tablet Take 2 tablets by mouth twice a day 120 tablet 4    sodium chloride 1,000 mg TbSO oral tablet Take 1 tablet (1,000 mg total) by mouth  "once daily. 90 tablet 3     Current Facility-Administered Medications   Medication Dose Route Frequency Provider Last Rate Last Admin    furosemide injection 20 mg  20 mg Intramuscular 1 time in Clinic/HOD          Allergies: Contrast media, Aspirin, Ciprofloxacin, Iron, Iodine, and Penicillins   Patient's last menstrual period was 12/07/1972.    ROS:  Feeling well. No dyspnea or chest pain on exertion.  No abdominal pain, change in bowel habits, black or bloody stools.  No urinary tract symptoms. GYN ROS: no breast pain or new or enlarging lumps on self exam, no vaginal bleeding, no discharge or pelvic pain. No neurological complaints.    OBJECTIVE:   The patient appears well, pale weak, thin, alert, oriented x 3, in no distress.  BP (!) 142/62   Pulse 67   Temp 98.2 °F (36.8 °C) (Oral)   Ht 4' 11" (1.499 m)   Wt 52.3 kg (115 lb 4.8 oz)   LMP 12/07/1972   SpO2 95%   BMI 23.29 kg/m²   ENT normal.  Neck supple. No adenopathy or thyromegaly. Left upper lips herpetic lesion.DAVID. Lungs are clear, good air entry, no wheezes, rhonchi or rales. S1 and S2 normal, no murmurs, regular rate and rhythm. Abdomen soft without tenderness, guarding, mass or organomegaly. Extremities show no edema, normal peripheral pulses. Neurological is normal, no focal findings.    Lab Results   Component Value Date    WBC 5.28 10/14/2024    HGB 7.7 (L) 10/14/2024    HCT 25.3 (L) 10/14/2024     10/14/2024    CHOL 172 04/29/2024    TRIG 84 04/29/2024    HDL 67 04/29/2024    ALT 15 10/07/2024    AST 18 10/07/2024     10/07/2024    K 4.4 10/07/2024     10/07/2024    CREATININE 0.8 10/07/2024    BUN 26 10/07/2024    CO2 22 (L) 10/07/2024    TSH 2.484 07/21/2024    INR 1.0 08/19/2024    HGBA1C 5.4 10/23/2017     Cancer will observe only. No invasive therapy  CKD stable  PAD under medications.     ASSESSMENT: Brie was seen today for hospital follow up.    Diagnoses and all orders for this visit:    Needs flu shot  -     " influenza (adjuvanted) (Fluad) 45 mcg/0.5 mL IM vaccine (> or = 64 yo) 0.5 mL    Atypical chest pain  -     IN OFFICE EKG 12-LEAD (to Muse)  -     Discontinue: isosorbide mononitrate (IMDUR) 30 MG 24 hr tablet; Take 1 tablet (30 mg total) by mouth once daily.  -     Discontinue: furosemide (LASIX) 20 MG tablet; Take 1 tablet (20 mg total) by mouth 2 (two) times daily.  -     furosemide (LASIX) 20 MG tablet; Take 1 tablet (20 mg total) by mouth 2 (two) times daily.  -     Discontinue: isosorbide mononitrate (IMDUR) 30 MG 24 hr tablet; Take 1 tablet (30 mg total) by mouth once daily.    Diastolic CHF with preserved left ventricular function, NYHA class 2  -     furosemide injection 20 mg  -     Discontinue: isosorbide mononitrate (IMDUR) 30 MG 24 hr tablet; Take 1 tablet (30 mg total) by mouth once daily.  -     Discontinue: furosemide (LASIX) 20 MG tablet; Take 1 tablet (20 mg total) by mouth 2 (two) times daily.  -     potassium chloride SA (K-DUR,KLOR-CON M) 10 MEQ tablet; Take 2 tablets by mouth twice a day  -     furosemide (LASIX) 20 MG tablet; Take 1 tablet (20 mg total) by mouth 2 (two) times daily.  -     Discontinue: isosorbide mononitrate (IMDUR) 30 MG 24 hr tablet; Take 1 tablet (30 mg total) by mouth once daily.  -     Discontinue: NIFEdipine (PROCARDIA-XL) 30 MG (OSM) 24 hr tablet; Take 1 tablet (30 mg total) by mouth once daily.  -     metoprolol succinate (TOPROL-XL) 25 MG 24 hr tablet; Take 1 tablet (25 mg total) by mouth once daily.  -     clopidogreL (PLAVIX) 75 mg tablet; Take 1 tablet (75 mg total) by mouth once daily.    Hyperlipidemia with target LDL less than 70  -     lovastatin (MEVACOR) 10 MG tablet; Take 1 tablet (10 mg total) by mouth every evening.    Moderate late onset Alzheimer's dementia without behavioral disturbance, psychotic disturbance, mood disturbance, or anxiety    Vascular dementia of acute onset, with behavioral disturbance  -     donepeziL (ARICEPT) 5 MG tablet; Take 1  tablet (5 mg total) by mouth every evening.    Other orders  -     polyethylene glycol (GLYCOLAX) 17 gram PwPk; Mix 17 grams in 8 oz of fluid and drink by mouth once daily.      This has been fully explained to the patient, who indicates understanding.    PLAN:   .DNR  Advance Care Planning     Date: 10/18/2024    ACP Reviewed/No Changes  Voluntary advance care planning discussion had today with patient and adult child/children. Previously completed HCPOA and Legal Will in electronic medical record is current, no changes made.      A total of 20 min was spent on advance care planning, goals of care discussion, emotional support, formulating and communicating prognosis and exploring burden/benefit of various approaches of treatment. This discussion occurred on a fully voluntary basis with the verbal consent of the patient and/or family.         Discussed health maintenance guidelines appropriate for age.    Tracey Zacarias nurse [phone]. Discussed health maintenance guidelines appropriate for age.  Discussed health maintenance guidelines appropriate for age.

## 2024-10-27 ENCOUNTER — PATIENT MESSAGE (OUTPATIENT)
Dept: PRIMARY CARE CLINIC | Facility: CLINIC | Age: 89
End: 2024-10-27
Payer: MEDICARE

## 2024-10-29 ENCOUNTER — OFFICE VISIT (OUTPATIENT)
Dept: PRIMARY CARE CLINIC | Facility: CLINIC | Age: 89
End: 2024-10-29
Payer: MEDICARE

## 2024-10-29 ENCOUNTER — LAB VISIT (OUTPATIENT)
Dept: LAB | Facility: HOSPITAL | Age: 89
End: 2024-10-29
Attending: NURSE PRACTITIONER
Payer: MEDICARE

## 2024-10-29 ENCOUNTER — PATIENT MESSAGE (OUTPATIENT)
Dept: OPHTHALMOLOGY | Facility: CLINIC | Age: 89
End: 2024-10-29
Payer: MEDICARE

## 2024-10-29 VITALS
BODY MASS INDEX: 21.38 KG/M2 | SYSTOLIC BLOOD PRESSURE: 150 MMHG | OXYGEN SATURATION: 99 % | DIASTOLIC BLOOD PRESSURE: 58 MMHG | HEART RATE: 61 BPM | HEIGHT: 59 IN | WEIGHT: 106.06 LBS | TEMPERATURE: 98 F

## 2024-10-29 DIAGNOSIS — I10 PRIMARY HYPERTENSION: Primary | ICD-10-CM

## 2024-10-29 DIAGNOSIS — D50.9 IRON DEFICIENCY ANEMIA, UNSPECIFIED IRON DEFICIENCY ANEMIA TYPE: ICD-10-CM

## 2024-10-29 DIAGNOSIS — I50.30 DIASTOLIC CHF WITH PRESERVED LEFT VENTRICULAR FUNCTION, NYHA CLASS 2: ICD-10-CM

## 2024-10-29 DIAGNOSIS — R35.0 URINE FREQUENCY: ICD-10-CM

## 2024-10-29 DIAGNOSIS — R79.0 LOW MAGNESIUM LEVEL: ICD-10-CM

## 2024-10-29 DIAGNOSIS — F01.518 VASCULAR DEMENTIA OF ACUTE ONSET, WITH BEHAVIORAL DISTURBANCE: ICD-10-CM

## 2024-10-29 DIAGNOSIS — H61.21 EXCESSIVE EAR WAX, RIGHT: ICD-10-CM

## 2024-10-29 DIAGNOSIS — J32.9 CHRONIC RHINOSINUSITIS: ICD-10-CM

## 2024-10-29 DIAGNOSIS — E22.2 SIADH (SYNDROME OF INAPPROPRIATE ADH PRODUCTION): ICD-10-CM

## 2024-10-29 LAB
BASOPHILS # BLD AUTO: 0.05 K/UL (ref 0–0.2)
BASOPHILS NFR BLD: 1.4 % (ref 0–1.9)
BILIRUB UR QL STRIP: NEGATIVE
BNP SERPL-MCNC: 234 PG/ML (ref 0–99)
CLARITY UR: CLEAR
COLOR UR: YELLOW
DIFFERENTIAL METHOD BLD: ABNORMAL
EOSINOPHIL # BLD AUTO: 0.1 K/UL (ref 0–0.5)
EOSINOPHIL NFR BLD: 2.5 % (ref 0–8)
ERYTHROCYTE [DISTWIDTH] IN BLOOD BY AUTOMATED COUNT: 19.3 % (ref 11.5–14.5)
FERRITIN SERPL-MCNC: 50 NG/ML (ref 20–300)
GLUCOSE UR QL STRIP: NEGATIVE
HCT VFR BLD AUTO: 31.7 % (ref 37–48.5)
HGB BLD-MCNC: 9.2 G/DL (ref 12–16)
HGB UR QL STRIP: NEGATIVE
IMM GRANULOCYTES # BLD AUTO: 0.01 K/UL (ref 0–0.04)
IMM GRANULOCYTES NFR BLD AUTO: 0.3 % (ref 0–0.5)
IRON SERPL-MCNC: 94 UG/DL (ref 30–160)
KETONES UR QL STRIP: NEGATIVE
LEUKOCYTE ESTERASE UR QL STRIP: NEGATIVE
LYMPHOCYTES # BLD AUTO: 1.3 K/UL (ref 1–4.8)
LYMPHOCYTES NFR BLD: 34.9 % (ref 18–48)
MAGNESIUM SERPL-MCNC: 2.1 MG/DL (ref 1.6–2.6)
MCH RBC QN AUTO: 26.3 PG (ref 27–31)
MCHC RBC AUTO-ENTMCNC: 29 G/DL (ref 32–36)
MCV RBC AUTO: 91 FL (ref 82–98)
MONOCYTES # BLD AUTO: 0.6 K/UL (ref 0.3–1)
MONOCYTES NFR BLD: 15.5 % (ref 4–15)
NEUTROPHILS # BLD AUTO: 1.7 K/UL (ref 1.8–7.7)
NEUTROPHILS NFR BLD: 45.4 % (ref 38–73)
NITRITE UR QL STRIP: NEGATIVE
NRBC BLD-RTO: 0 /100 WBC
PH UR STRIP: 7 [PH] (ref 5–8)
PLATELET # BLD AUTO: 216 K/UL (ref 150–450)
PLATELET BLD QL SMEAR: ABNORMAL
PMV BLD AUTO: 10.6 FL (ref 9.2–12.9)
PROT UR QL STRIP: NEGATIVE
RBC # BLD AUTO: 3.5 M/UL (ref 4–5.4)
SATURATED IRON: 17 % (ref 20–50)
SP GR UR STRIP: 1.01 (ref 1–1.03)
TOTAL IRON BINDING CAPACITY: 542 UG/DL (ref 250–450)
TRANSFERRIN SERPL-MCNC: 387 MG/DL (ref 200–375)
URN SPEC COLLECT METH UR: NORMAL
UROBILINOGEN UR STRIP-ACNC: NEGATIVE EU/DL
WBC # BLD AUTO: 3.67 K/UL (ref 3.9–12.7)

## 2024-10-29 PROCEDURE — 36415 COLL VENOUS BLD VENIPUNCTURE: CPT | Performed by: NURSE PRACTITIONER

## 2024-10-29 PROCEDURE — 81003 URINALYSIS AUTO W/O SCOPE: CPT | Performed by: NURSE PRACTITIONER

## 2024-10-29 PROCEDURE — 99999 PR PBB SHADOW E&M-EST. PATIENT-LVL IV: CPT | Mod: PBBFAC,HCNC,, | Performed by: NURSE PRACTITIONER

## 2024-10-29 PROCEDURE — 1157F ADVNC CARE PLAN IN RCRD: CPT | Mod: HCNC,CPTII,S$GLB, | Performed by: NURSE PRACTITIONER

## 2024-10-29 PROCEDURE — 3288F FALL RISK ASSESSMENT DOCD: CPT | Mod: HCNC,CPTII,S$GLB, | Performed by: NURSE PRACTITIONER

## 2024-10-29 PROCEDURE — 85025 COMPLETE CBC W/AUTO DIFF WBC: CPT | Performed by: NURSE PRACTITIONER

## 2024-10-29 PROCEDURE — 1159F MED LIST DOCD IN RCRD: CPT | Mod: HCNC,CPTII,S$GLB, | Performed by: NURSE PRACTITIONER

## 2024-10-29 PROCEDURE — 99214 OFFICE O/P EST MOD 30 MIN: CPT | Mod: HCNC,S$GLB,, | Performed by: NURSE PRACTITIONER

## 2024-10-29 PROCEDURE — 83735 ASSAY OF MAGNESIUM: CPT | Performed by: NURSE PRACTITIONER

## 2024-10-29 PROCEDURE — 82728 ASSAY OF FERRITIN: CPT | Performed by: NURSE PRACTITIONER

## 2024-10-29 PROCEDURE — 87086 URINE CULTURE/COLONY COUNT: CPT | Performed by: NURSE PRACTITIONER

## 2024-10-29 PROCEDURE — 83880 ASSAY OF NATRIURETIC PEPTIDE: CPT | Performed by: NURSE PRACTITIONER

## 2024-10-29 PROCEDURE — 1126F AMNT PAIN NOTED NONE PRSNT: CPT | Mod: HCNC,CPTII,S$GLB, | Performed by: NURSE PRACTITIONER

## 2024-10-29 PROCEDURE — 1101F PT FALLS ASSESS-DOCD LE1/YR: CPT | Mod: HCNC,CPTII,S$GLB, | Performed by: NURSE PRACTITIONER

## 2024-10-29 PROCEDURE — 83540 ASSAY OF IRON: CPT | Performed by: NURSE PRACTITIONER

## 2024-10-29 RX ORDER — DONEPEZIL HYDROCHLORIDE 5 MG/1
5 TABLET, FILM COATED ORAL NIGHTLY
COMMUNITY
Start: 2024-09-20

## 2024-10-29 RX ORDER — AZELASTINE 1 MG/ML
1 SPRAY, METERED NASAL 2 TIMES DAILY
Qty: 30 ML | Refills: 0 | Status: SHIPPED | OUTPATIENT
Start: 2024-10-29 | End: 2025-10-29

## 2024-10-31 DIAGNOSIS — R79.89 ELEVATED BRAIN NATRIURETIC PEPTIDE (BNP) LEVEL: Primary | ICD-10-CM

## 2024-10-31 DIAGNOSIS — I51.89 DIASTOLIC DYSFUNCTION: ICD-10-CM

## 2024-11-01 ENCOUNTER — TELEPHONE (OUTPATIENT)
Dept: PRIMARY CARE CLINIC | Facility: CLINIC | Age: 89
End: 2024-11-01
Payer: MEDICARE

## 2024-11-01 DIAGNOSIS — I50.30 DIASTOLIC CHF WITH PRESERVED LEFT VENTRICULAR FUNCTION, NYHA CLASS 2: Primary | ICD-10-CM

## 2024-11-01 DIAGNOSIS — R79.89 ELEVATED BRAIN NATRIURETIC PEPTIDE (BNP) LEVEL: ICD-10-CM

## 2024-11-01 LAB — BACTERIA UR CULT: NO GROWTH

## 2024-11-05 ENCOUNTER — LAB VISIT (OUTPATIENT)
Dept: LAB | Facility: HOSPITAL | Age: 89
End: 2024-11-05
Attending: NURSE PRACTITIONER
Payer: MEDICARE

## 2024-11-05 DIAGNOSIS — I10 ESSENTIAL HYPERTENSION, MALIGNANT: Primary | ICD-10-CM

## 2024-11-05 DIAGNOSIS — D64.9 ANEMIA, UNSPECIFIED: ICD-10-CM

## 2024-11-05 LAB
ANION GAP SERPL CALC-SCNC: 13 MMOL/L (ref 8–16)
BASOPHILS # BLD AUTO: 0.05 K/UL (ref 0–0.2)
BASOPHILS NFR BLD: 1.8 % (ref 0–1.9)
BUN SERPL-MCNC: 29 MG/DL (ref 10–30)
CALCIUM SERPL-MCNC: 9.2 MG/DL (ref 8.7–10.5)
CHLORIDE SERPL-SCNC: 104 MMOL/L (ref 95–110)
CO2 SERPL-SCNC: 20 MMOL/L (ref 23–29)
CREAT SERPL-MCNC: 0.7 MG/DL (ref 0.5–1.4)
DIFFERENTIAL METHOD BLD: ABNORMAL
EOSINOPHIL # BLD AUTO: 0.1 K/UL (ref 0–0.5)
EOSINOPHIL NFR BLD: 1.8 % (ref 0–8)
ERYTHROCYTE [DISTWIDTH] IN BLOOD BY AUTOMATED COUNT: 20.4 % (ref 11.5–14.5)
EST. GFR  (NO RACE VARIABLE): >60 ML/MIN/1.73 M^2
GLUCOSE SERPL-MCNC: 74 MG/DL (ref 70–110)
HCT VFR BLD AUTO: 32.4 % (ref 37–48.5)
HGB BLD-MCNC: 9.2 G/DL (ref 12–16)
IMM GRANULOCYTES # BLD AUTO: 0.01 K/UL (ref 0–0.04)
IMM GRANULOCYTES NFR BLD AUTO: 0.4 % (ref 0–0.5)
LYMPHOCYTES # BLD AUTO: 1 K/UL (ref 1–4.8)
LYMPHOCYTES NFR BLD: 37.5 % (ref 18–48)
MAGNESIUM SERPL-MCNC: 2.1 MG/DL (ref 1.6–2.6)
MCH RBC QN AUTO: 26.5 PG (ref 27–31)
MCHC RBC AUTO-ENTMCNC: 28.4 G/DL (ref 32–36)
MCV RBC AUTO: 93 FL (ref 82–98)
MONOCYTES # BLD AUTO: 0.5 K/UL (ref 0.3–1)
MONOCYTES NFR BLD: 17.5 % (ref 4–15)
NEUTROPHILS # BLD AUTO: 1.1 K/UL (ref 1.8–7.7)
NEUTROPHILS NFR BLD: 41 % (ref 38–73)
NRBC BLD-RTO: 0 /100 WBC
PLATELET # BLD AUTO: 207 K/UL (ref 150–450)
PLATELET BLD QL SMEAR: ABNORMAL
PMV BLD AUTO: 11.1 FL (ref 9.2–12.9)
POTASSIUM SERPL-SCNC: 4.5 MMOL/L (ref 3.5–5.1)
RBC # BLD AUTO: 3.47 M/UL (ref 4–5.4)
SODIUM SERPL-SCNC: 137 MMOL/L (ref 136–145)
WBC # BLD AUTO: 2.75 K/UL (ref 3.9–12.7)

## 2024-11-05 PROCEDURE — 83735 ASSAY OF MAGNESIUM: CPT | Performed by: NURSE PRACTITIONER

## 2024-11-05 PROCEDURE — 80048 BASIC METABOLIC PNL TOTAL CA: CPT | Performed by: NURSE PRACTITIONER

## 2024-11-05 PROCEDURE — 85025 COMPLETE CBC W/AUTO DIFF WBC: CPT | Performed by: NURSE PRACTITIONER

## 2024-11-05 PROCEDURE — 36415 COLL VENOUS BLD VENIPUNCTURE: CPT | Performed by: NURSE PRACTITIONER

## 2024-11-26 ENCOUNTER — LAB VISIT (OUTPATIENT)
Dept: LAB | Facility: HOSPITAL | Age: 89
End: 2024-11-26
Attending: INTERNAL MEDICINE
Payer: MEDICARE

## 2024-11-26 DIAGNOSIS — D63.8 MEGALOBLASTIC ANEMIA DUE TO FISH TAPEWORM: ICD-10-CM

## 2024-11-26 DIAGNOSIS — B70.0 MEGALOBLASTIC ANEMIA DUE TO FISH TAPEWORM: ICD-10-CM

## 2024-11-26 DIAGNOSIS — C18.0 MALIGNANT NEOPLASM OF CECUM: ICD-10-CM

## 2024-11-26 DIAGNOSIS — D50.0 IRON DEFICIENCY ANEMIA SECONDARY TO BLOOD LOSS (CHRONIC): Primary | ICD-10-CM

## 2024-11-26 DIAGNOSIS — C18.9 COLON CANCER: ICD-10-CM

## 2024-11-26 DIAGNOSIS — I51.89 HYPERKINETIC HEART DISEASE: ICD-10-CM

## 2024-11-26 LAB
ALBUMIN SERPL BCP-MCNC: 3.7 G/DL (ref 3.5–5.2)
ALP SERPL-CCNC: 82 U/L (ref 40–150)
ALT SERPL W/O P-5'-P-CCNC: 13 U/L (ref 10–44)
ANION GAP SERPL CALC-SCNC: 12 MMOL/L (ref 8–16)
ANION GAP SERPL CALC-SCNC: 9 MMOL/L (ref 8–16)
AST SERPL-CCNC: 23 U/L (ref 10–40)
BASOPHILS # BLD AUTO: 0.05 K/UL (ref 0–0.2)
BASOPHILS NFR BLD: 1.2 % (ref 0–1.9)
BILIRUB SERPL-MCNC: 0.6 MG/DL (ref 0.1–1)
BUN SERPL-MCNC: 23 MG/DL (ref 10–30)
BUN SERPL-MCNC: 23 MG/DL (ref 10–30)
CALCIUM SERPL-MCNC: 9.6 MG/DL (ref 8.7–10.5)
CALCIUM SERPL-MCNC: 9.7 MG/DL (ref 8.7–10.5)
CHLORIDE SERPL-SCNC: 106 MMOL/L (ref 95–110)
CHLORIDE SERPL-SCNC: 106 MMOL/L (ref 95–110)
CO2 SERPL-SCNC: 17 MMOL/L (ref 23–29)
CO2 SERPL-SCNC: 20 MMOL/L (ref 23–29)
CREAT SERPL-MCNC: 1 MG/DL (ref 0.5–1.4)
CREAT SERPL-MCNC: 1 MG/DL (ref 0.5–1.4)
DIFFERENTIAL METHOD BLD: ABNORMAL
EOSINOPHIL # BLD AUTO: 0.1 K/UL (ref 0–0.5)
EOSINOPHIL NFR BLD: 1.7 % (ref 0–8)
ERYTHROCYTE [DISTWIDTH] IN BLOOD BY AUTOMATED COUNT: 20.8 % (ref 11.5–14.5)
EST. GFR  (NO RACE VARIABLE): 51 ML/MIN/1.73 M^2
EST. GFR  (NO RACE VARIABLE): 51 ML/MIN/1.73 M^2
FERRITIN SERPL-MCNC: 37 NG/ML (ref 20–300)
GLUCOSE SERPL-MCNC: 105 MG/DL (ref 70–110)
GLUCOSE SERPL-MCNC: 108 MG/DL (ref 70–110)
HCT VFR BLD AUTO: 35.4 % (ref 37–48.5)
HGB BLD-MCNC: 10.8 G/DL (ref 12–16)
IMM GRANULOCYTES # BLD AUTO: 0.01 K/UL (ref 0–0.04)
IMM GRANULOCYTES NFR BLD AUTO: 0.2 % (ref 0–0.5)
IRON SERPL-MCNC: 127 UG/DL (ref 30–160)
LYMPHOCYTES # BLD AUTO: 1.1 K/UL (ref 1–4.8)
LYMPHOCYTES NFR BLD: 27.9 % (ref 18–48)
MCH RBC QN AUTO: 26.9 PG (ref 27–31)
MCHC RBC AUTO-ENTMCNC: 30.5 G/DL (ref 32–36)
MCV RBC AUTO: 88 FL (ref 82–98)
MONOCYTES # BLD AUTO: 0.4 K/UL (ref 0.3–1)
MONOCYTES NFR BLD: 8.9 % (ref 4–15)
NEUTROPHILS # BLD AUTO: 2.4 K/UL (ref 1.8–7.7)
NEUTROPHILS NFR BLD: 60.1 % (ref 38–73)
NRBC BLD-RTO: 0 /100 WBC
PLATELET # BLD AUTO: 247 K/UL (ref 150–450)
PMV BLD AUTO: 10.5 FL (ref 9.2–12.9)
POTASSIUM SERPL-SCNC: 4.4 MMOL/L (ref 3.5–5.1)
POTASSIUM SERPL-SCNC: 4.5 MMOL/L (ref 3.5–5.1)
PROT SERPL-MCNC: 7.1 G/DL (ref 6–8.4)
RBC # BLD AUTO: 4.02 M/UL (ref 4–5.4)
SATURATED IRON: 27 % (ref 20–50)
SODIUM SERPL-SCNC: 135 MMOL/L (ref 136–145)
SODIUM SERPL-SCNC: 135 MMOL/L (ref 136–145)
TOTAL IRON BINDING CAPACITY: 472 UG/DL (ref 250–450)
TRANSFERRIN SERPL-MCNC: 337 MG/DL (ref 200–375)
WBC # BLD AUTO: 4.05 K/UL (ref 3.9–12.7)

## 2024-11-26 PROCEDURE — 84466 ASSAY OF TRANSFERRIN: CPT | Performed by: INTERNAL MEDICINE

## 2024-11-26 PROCEDURE — 80048 BASIC METABOLIC PNL TOTAL CA: CPT | Performed by: NURSE PRACTITIONER

## 2024-11-26 PROCEDURE — 85025 COMPLETE CBC W/AUTO DIFF WBC: CPT | Performed by: INTERNAL MEDICINE

## 2024-11-26 PROCEDURE — 80053 COMPREHEN METABOLIC PANEL: CPT | Performed by: INTERNAL MEDICINE

## 2024-11-26 PROCEDURE — 82728 ASSAY OF FERRITIN: CPT | Performed by: INTERNAL MEDICINE

## 2024-12-09 ENCOUNTER — OFFICE VISIT (OUTPATIENT)
Dept: OPHTHALMOLOGY | Facility: CLINIC | Age: 89
End: 2024-12-09
Payer: MEDICARE

## 2024-12-09 DIAGNOSIS — H40.1113 PRIMARY OPEN ANGLE GLAUCOMA (POAG) OF RIGHT EYE, SEVERE STAGE: ICD-10-CM

## 2024-12-09 DIAGNOSIS — H40.1122 PRIMARY OPEN-ANGLE GLAUCOMA, LEFT EYE, MODERATE STAGE: Primary | ICD-10-CM

## 2024-12-09 PROCEDURE — G2211 COMPLEX E/M VISIT ADD ON: HCPCS | Mod: HCNC,S$GLB,, | Performed by: OPHTHALMOLOGY

## 2024-12-09 PROCEDURE — 1126F AMNT PAIN NOTED NONE PRSNT: CPT | Mod: HCNC,CPTII,S$GLB, | Performed by: OPHTHALMOLOGY

## 2024-12-09 PROCEDURE — 1101F PT FALLS ASSESS-DOCD LE1/YR: CPT | Mod: HCNC,CPTII,S$GLB, | Performed by: OPHTHALMOLOGY

## 2024-12-09 PROCEDURE — 1157F ADVNC CARE PLAN IN RCRD: CPT | Mod: HCNC,CPTII,S$GLB, | Performed by: OPHTHALMOLOGY

## 2024-12-09 PROCEDURE — 99213 OFFICE O/P EST LOW 20 MIN: CPT | Mod: HCNC,S$GLB,, | Performed by: OPHTHALMOLOGY

## 2024-12-09 PROCEDURE — 3288F FALL RISK ASSESSMENT DOCD: CPT | Mod: HCNC,CPTII,S$GLB, | Performed by: OPHTHALMOLOGY

## 2024-12-09 NOTE — PROGRESS NOTES
HPI    DLS: 6/21/2024- IOP ck     Pt states has been having some pain top of head and down right side of   face. Has sinus troubles for sometime. Not sure if related.      Denies pain today. Denies FOL/ floaters     Tears OU PRN   Last edited by Toma Greer on 12/9/2024  3:12 PM.            Assessment /Plan     For exam results, see Encounter Report.    Primary open-angle glaucoma, left eye, moderate stage    Primary open angle glaucoma (POAG) of right eye, severe stage      Unk famhx  Thick pachy  Hx of LPI OU  Hx of CEIOL OU    Tmax 23/23  +HVF progression 2008 to 2021  +OCT progression OU  Brim allergy  I suspect allergy to preservatives  S/p SLT OU in 2024, good response    IOP reasonable off meds s/p SLT    F/u 4 months, IOP check, OCT NFL    Consider PF drop if IOP higher such as tafluprost    Visit today is associated with current or anticipated ongoing medical care related to this patients single serious and complex condition, glaucoma.

## 2025-01-10 ENCOUNTER — LAB VISIT (OUTPATIENT)
Dept: LAB | Facility: HOSPITAL | Age: OVER 89
End: 2025-01-10
Attending: INTERNAL MEDICINE
Payer: MEDICARE

## 2025-01-10 DIAGNOSIS — C18.9 MALIGNANT NEOPLASM OF COLON: ICD-10-CM

## 2025-01-10 DIAGNOSIS — D63.8 ANEMIA IN OTHER CHRONIC DISEASES CLASSIFIED ELSEWHERE: ICD-10-CM

## 2025-01-10 DIAGNOSIS — C18.0 MALIGNANT NEOPLASM OF CECUM: ICD-10-CM

## 2025-01-10 DIAGNOSIS — D50.0 IRON DEFICIENCY ANEMIA SECONDARY TO BLOOD LOSS (CHRONIC): Primary | ICD-10-CM

## 2025-01-10 DIAGNOSIS — I10 ESSENTIAL HYPERTENSION: ICD-10-CM

## 2025-01-10 LAB
ALBUMIN SERPL BCP-MCNC: 3.8 G/DL (ref 3.5–5.2)
ALP SERPL-CCNC: 73 U/L (ref 40–150)
ALT SERPL W/O P-5'-P-CCNC: 13 U/L (ref 10–44)
ANION GAP SERPL CALC-SCNC: 9 MMOL/L (ref 8–16)
ANION GAP SERPL CALC-SCNC: 9 MMOL/L (ref 8–16)
AST SERPL-CCNC: 23 U/L (ref 10–40)
BASOPHILS # BLD AUTO: 0.03 K/UL (ref 0–0.2)
BASOPHILS NFR BLD: 0.9 % (ref 0–1.9)
BILIRUB SERPL-MCNC: 0.3 MG/DL (ref 0.1–1)
BUN SERPL-MCNC: 57 MG/DL (ref 10–30)
BUN SERPL-MCNC: 57 MG/DL (ref 10–30)
CALCIUM SERPL-MCNC: 9.6 MG/DL (ref 8.7–10.5)
CALCIUM SERPL-MCNC: 9.6 MG/DL (ref 8.7–10.5)
CHLORIDE SERPL-SCNC: 101 MMOL/L (ref 95–110)
CHLORIDE SERPL-SCNC: 101 MMOL/L (ref 95–110)
CO2 SERPL-SCNC: 22 MMOL/L (ref 23–29)
CO2 SERPL-SCNC: 22 MMOL/L (ref 23–29)
CREAT SERPL-MCNC: 1.1 MG/DL (ref 0.5–1.4)
CREAT SERPL-MCNC: 1.1 MG/DL (ref 0.5–1.4)
DIFFERENTIAL METHOD BLD: ABNORMAL
EOSINOPHIL # BLD AUTO: 0.1 K/UL (ref 0–0.5)
EOSINOPHIL NFR BLD: 2 % (ref 0–8)
ERYTHROCYTE [DISTWIDTH] IN BLOOD BY AUTOMATED COUNT: 21.6 % (ref 11.5–14.5)
EST. GFR  (NO RACE VARIABLE): 46 ML/MIN/1.73 M^2
EST. GFR  (NO RACE VARIABLE): 46 ML/MIN/1.73 M^2
FERRITIN SERPL-MCNC: 28 NG/ML (ref 20–300)
GLUCOSE SERPL-MCNC: 95 MG/DL (ref 70–110)
GLUCOSE SERPL-MCNC: 95 MG/DL (ref 70–110)
HCT VFR BLD AUTO: 37.1 % (ref 37–48.5)
HGB BLD-MCNC: 11.6 G/DL (ref 12–16)
IMM GRANULOCYTES # BLD AUTO: 0.01 K/UL (ref 0–0.04)
IMM GRANULOCYTES NFR BLD AUTO: 0.3 % (ref 0–0.5)
LYMPHOCYTES # BLD AUTO: 1.1 K/UL (ref 1–4.8)
LYMPHOCYTES NFR BLD: 33.3 % (ref 18–48)
MCH RBC QN AUTO: 28 PG (ref 27–31)
MCHC RBC AUTO-ENTMCNC: 31.3 G/DL (ref 32–36)
MCV RBC AUTO: 89 FL (ref 82–98)
MONOCYTES # BLD AUTO: 0.4 K/UL (ref 0.3–1)
MONOCYTES NFR BLD: 12.6 % (ref 4–15)
NEUTROPHILS # BLD AUTO: 1.7 K/UL (ref 1.8–7.7)
NEUTROPHILS NFR BLD: 50.9 % (ref 38–73)
NRBC BLD-RTO: 0 /100 WBC
PLATELET # BLD AUTO: 183 K/UL (ref 150–450)
PMV BLD AUTO: 10.6 FL (ref 9.2–12.9)
POTASSIUM SERPL-SCNC: 5.2 MMOL/L (ref 3.5–5.1)
POTASSIUM SERPL-SCNC: 5.2 MMOL/L (ref 3.5–5.1)
PROT SERPL-MCNC: 7.2 G/DL (ref 6–8.4)
RBC # BLD AUTO: 4.15 M/UL (ref 4–5.4)
SODIUM SERPL-SCNC: 132 MMOL/L (ref 136–145)
SODIUM SERPL-SCNC: 132 MMOL/L (ref 136–145)
WBC # BLD AUTO: 3.42 K/UL (ref 3.9–12.7)

## 2025-01-10 PROCEDURE — 83540 ASSAY OF IRON: CPT | Performed by: INTERNAL MEDICINE

## 2025-01-10 PROCEDURE — 82728 ASSAY OF FERRITIN: CPT | Performed by: INTERNAL MEDICINE

## 2025-01-10 PROCEDURE — 85025 COMPLETE CBC W/AUTO DIFF WBC: CPT | Performed by: INTERNAL MEDICINE

## 2025-01-10 PROCEDURE — 36415 COLL VENOUS BLD VENIPUNCTURE: CPT | Performed by: INTERNAL MEDICINE

## 2025-01-10 PROCEDURE — 80053 COMPREHEN METABOLIC PANEL: CPT | Performed by: INTERNAL MEDICINE

## 2025-01-11 LAB
IRON SERPL-MCNC: 17 UG/DL (ref 30–160)
SATURATED IRON: 4 % (ref 20–50)
TOTAL IRON BINDING CAPACITY: 466 UG/DL (ref 250–450)
TRANSFERRIN SERPL-MCNC: 333 MG/DL (ref 200–375)

## 2025-01-17 ENCOUNTER — E-VISIT (OUTPATIENT)
Dept: PRIMARY CARE CLINIC | Facility: CLINIC | Age: OVER 89
End: 2025-01-17
Payer: MEDICARE

## 2025-01-17 ENCOUNTER — PATIENT MESSAGE (OUTPATIENT)
Dept: PRIMARY CARE CLINIC | Facility: CLINIC | Age: OVER 89
End: 2025-01-17

## 2025-01-17 DIAGNOSIS — H10.9: Primary | ICD-10-CM

## 2025-01-17 PROCEDURE — 99422 OL DIG E/M SVC 11-20 MIN: CPT | Mod: ,,, | Performed by: NURSE PRACTITIONER

## 2025-01-17 RX ORDER — POLYMYXIN B SULFATE AND TRIMETHOPRIM 1; 10000 MG/ML; [USP'U]/ML
1 SOLUTION OPHTHALMIC EVERY 4 HOURS
Qty: 10 ML | Refills: 0 | Status: SHIPPED | OUTPATIENT
Start: 2025-01-17 | End: 2025-01-22

## 2025-01-17 RX ORDER — POLYMYXIN B SULFATE AND TRIMETHOPRIM 1; 10000 MG/ML; [USP'U]/ML
1 SOLUTION OPHTHALMIC EVERY 4 HOURS
Qty: 10 ML | Refills: 0 | Status: SHIPPED | OUTPATIENT
Start: 2025-01-17 | End: 2025-01-17

## 2025-01-17 NOTE — PROGRESS NOTES
Patient ID: Brie Han is a 89 y.o. female.    Chief Complaint: General Illness (Entered automatically based on patient selection in [employer].)    The patient initiated a request through [employer] on 1/17/2025 for evaluation and management with a chief complaint of General Illness (Entered automatically based on patient selection in [employer].)     I evaluated the questionnaire submission on [employer].    Ohs Peq Evisit Supergroup-Common Problems    1/17/2025 10:05 AM CST - Filed by Patient   What do you need help with? Red Eye   Do you agree to participate in an E-Visit? Yes   If you have any of the following symptoms, please present to your local emergency room or call 911:  I acknowledge   What is the main issue you would like addressed today? Left eye redish started Jan 14   Which of the following have you been experiencing? One eye is red;  Eye drainage or crusting   Have you had any of the following? None of the above    How long have you been having these symptoms? For a few days   Do you have a fever? No, I do not have a fever   Are your symptoms associated with swimming? No, I have not been swimming recently   Have your eyes been exposed to any chemicals, creams, or drops that may be causing irritation? No   Have you suffered any recent injury to your eyes? I am not sure   Have you been exposed to anyone with similar symptoms? No   Did or do you wear contact lenses? No   Have you had any of the following? None of the above   Have you had any of the following in the past? Something not on the list   Please enter the details of your past eye problems: Summer of 2024 laser for glaucoma   What medications are you currently using for these symptoms? Eye drops from the shelf in the pharmacy   Please enter the medications you have been using: Non preservative artificial tears   Provide any additional information you feel is important. Ms. Han has been reusing the same tissue to wipe her eyes, instead of single  usage.   At least one photo is required for treatment to be provided. You can upload a maximum of three photos of the affected area.     Are you able to take your vital signs? Yes   Systolic Blood Pressure: 153   Diastolic Blood Pressure: 65   Weight: 100   Height: 57   Pulse: 53   Temperature: 97.3   Respiration rate: 21   Pulse Oxygen: 100         Encounter Diagnosis   Name Primary?    Infection of conjunctiva of left eye Yes        No orders of the defined types were placed in this encounter.     Medications Ordered This Encounter   Medications    polymyxin B sulf-trimethoprim (POLYTRIM) 10,000 unit- 1 mg/mL Drop     Sig: Place 1 drop into the left eye every 4 (four) hours. for 5 days     Dispense:  10 mL     Refill:  0        No follow-ups on file.      E-Visit Time Tracking:    Day 1 Time (in minutes): 20    Total Time (in minutes): 20

## 2025-01-23 NOTE — DISCHARGE SUMMARY
[facility] Medicine  Discharge Summary      Patient Name: Brie Han  MRN: [id number]  RAFAEL: [id number]  Patient Class: IP- Inpatient  Admission Date: 8/19/2024  Hospital Length of Stay: 4 days  Discharge Date and Time: No discharge date for patient encounter.  Attending Physician: Saeid Dumont MD   Discharging Provider: Saeid Dumont MD  Primary Care Provider: Colten Gould MD    Primary Care Team: Networked reference to record PCT     HPI:   Brie Han is a 89-year-old female who presented to the emergency room for evaluation of worsening confusion since Thursday evening.  Patient is altered and unable to provide any information.  Patient was grandson is at bedside and provides history.  He denies any recent illness or injury.  He denies any fever or chills.  No known sick contacts or travel.  He reports her confusion started abruptly on Thursday evening.  Previous medical history includes hypertension, peripheral arterial disease, coronary artery disease, CKD, iron-deficiency anemia, and colon cancer.  ER workup: CBC with leukocytosis of 13,000.  CMP was unremarkable.  Troponin mildly elevated 0.047.  BNP elevated at 151.  CT of the head was negative.  Urinalysis was negative for infection.  Patient admitted to Hospital Medicine for treatment and management.  Will obtain neurology consult in a.m.              * No surgery found *      Hospital Course:   Brie Han is a 89 year old female with a past medical history of HTN, HLD, colonic adenocarcinoma, anemia and SIADH who presented with acute encephalopathy secondary to insomnia. Neurology and Psychiatry were consulted. A PEC was placed. She was started on Depakote and he sodium worsened. Nephrology was consulted. She was changed to Seroquel and salt tablets were resumed with improvement in the sodium. She is medically stable for geriatric psychiatric placement which took place 8/24/2024.     Goals of Care Treatment  Preferences:  Code Status: DNR    Living Will: Yes     What is most important right now is to focus on remaining as independent as possible, symptom/pain control.  Accordingly, we have decided that the best plan to meet the patient's goals includes continuing with treatment, enrolling in hospice care.         Consults:   Consults (From admission, onward)          Status Ordering Provider     Inpatient consult to Nephrology  Once        Provider:  Andres Medrano MD    Completed ARTURO HUSAIN     Inpatient consult to Telemedicine - Psych  Once        Provider:  Andre Flor MD    Completed ARTURO HUSAIN     Inpatient consult to Midline team  Once        Provider:  (Not yet assigned)    Completed ARTURO HUSAIN     Inpatient consult to Telemedicine - Psych  Once        Provider:  Andre Flor MD    Completed ANGELIC AYERS     Inpatient consult to Neurology  Once        Provider:  (Not yet assigned)    Completed ALEX RIOJAS     IP consult to dietary  Once        Provider:  (Not yet assigned)    Completed ALEX RIOJAS            Cardiac/Vascular  Hypertension  -Continue home losartan and nifedipine    Oncology  Anemia  Stable. Chronic.    Adenocarcinoma of colon  Previously documented she does not want to pursue treatment    Endocrine  Moderate protein-calorie malnutrition  Nutrition consulted. Most recent weight and BMI monitored-     Measurements:  Wt Readings from Last 1 Encounters:   08/21/24 46.6 kg (102 lb 11.8 oz)   Body mass index is 20.75 kg/m².    Patient has been screened and assessed by RD.    Malnutrition Type:  Context: acute illness or injury  Level: moderate    Malnutrition Characteristic Summary:  Weight Loss (Malnutrition): 5% in 1 month  Energy Intake (Malnutrition): less than 75% for greater than or equal to 1 month  Muscle Mass (Malnutrition): mild depletion  Hand  Strength, Left (Malnutrition): reduced d/t age  Hand  Strength, Right (Malnutrition): reduced d/t  age    Interventions/Recommendations (treatment strategy):   Continue IDDSI level 6 diet  Nursing assistance with feeding when grandson not here  Boost plus with all meals  Multivitamin/mineral supplementation  Collaborate with health care providers as needed.    SIADH (syndrome of inappropriate ADH production)  -Continue home salt tablets      Final Active Diagnoses:    Diagnosis Date Noted POA    Anemia [D64.9] 08/24/2024 Yes    Moderate protein-calorie malnutrition [E44.0] 08/21/2024 Yes    Adenocarcinoma of colon [C18.9] 02/07/2024 Yes    SIADH (syndrome of inappropriate ADH production) [E22.2] 05/20/2013 Yes    Hypertension [I10] 09/21/2012 Yes      Problems Resolved During this Admission:    Diagnosis Date Noted Date Resolved POA    PRINCIPAL PROBLEM:  Nicky [F30.9] 08/19/2024 08/24/2024 Yes       Discharged Condition: stable    Disposition: Psychiatric Hospital    Follow Up:   Follow-up Information       [facility] Follow up.    Why: Psych  Contact information:  [id number]  [facility] [address] [phone]                         Patient Instructions:      Diet Cardiac     Activity as tolerated       Significant Diagnostic Studies: Labs: All labs within the past 24 hours have been reviewed    Pending Diagnostic Studies:       Procedure Component Value Units Date/Time    EKG 12-lead [[id number]]     Order Status: Sent Lab Status: No result     Vitamin B6 [[id number]] Collected: 08/20/24 0927    Order Status: Sent Lab Status: In process Updated: 08/20/24 0933    Specimen: Blood            Medications:  Reconciled Home Medications:      Medication List        CONTINUE taking these medications      acetaminophen 500 MG tablet  Commonly known as: TYLENOL  Take 2 tablets (1,000 mg total) by mouth every 8 (eight) hours as needed for Pain.     artificial tears 0.5 % ophthalmic solution  Commonly known as: ISOPTO TEARS  Place 1 drop into both eyes 4 (four) times daily as  needed (Dry Eyes).     folic acid 1 MG tablet  Commonly known as: FOLVITE  Take 1 tablet (1,000 mcg total) by mouth once daily.     losartan 100 MG tablet  Commonly known as: COZAAR  Take 1 tablet (100 mg total) by mouth once daily.     lovastatin 10 MG tablet  Commonly known as: MEVACOR  Take 1 tablet (10 mg total) by mouth every evening.     multivitamin capsule  Take 1 capsule by mouth once daily.     NIFEdipine 30 MG (OSM) 24 hr tablet  Commonly known as: PROCARDIA-XL  Take 1 tablet (30 mg total) by mouth every evening.     senna-docusate 8.6-50 mg 8.6-50 mg per tablet  Commonly known as: PERICOLACE  Take 1 tablet by mouth 2 (two) times daily as needed for Constipation.     sodium chloride 1,000 mg Tbso oral tablet  Take 1 tablet (1,000 mg total) by mouth once daily.            STOP taking these medications      oxybutynin 5 MG Tr24  Commonly known as: DITROPAN-XL     sucralfate 1 gram tablet  Commonly known as: CARAFATE              Indwelling Lines/Drains at time of discharge:   Lines/Drains/Airways       Drain  Duration             Female External Urinary Catheter w/ Suction 08/23/24 0630 1 day                    Time spent on the discharge of patient: 32 minutes         Saeid Dumont MD  Department of Hospital Medicine  [facility]/Surg   no

## 2025-01-30 DIAGNOSIS — Z00.00 ENCOUNTER FOR MEDICARE ANNUAL WELLNESS EXAM: ICD-10-CM

## 2025-02-18 ENCOUNTER — E-VISIT (OUTPATIENT)
Dept: PRIMARY CARE CLINIC | Facility: CLINIC | Age: OVER 89
End: 2025-02-18
Payer: MEDICARE

## 2025-02-18 ENCOUNTER — APPOINTMENT (OUTPATIENT)
Dept: LAB | Facility: HOSPITAL | Age: OVER 89
End: 2025-02-18
Attending: NURSE PRACTITIONER
Payer: MEDICARE

## 2025-02-18 DIAGNOSIS — R30.0 DYSURIA: Primary | ICD-10-CM

## 2025-02-18 LAB
BILIRUB UR QL STRIP: NEGATIVE
CLARITY UR: CLEAR
COLOR UR: YELLOW
GLUCOSE UR QL STRIP: NEGATIVE
HGB UR QL STRIP: NEGATIVE
KETONES UR QL STRIP: NEGATIVE
LEUKOCYTE ESTERASE UR QL STRIP: NEGATIVE
NITRITE UR QL STRIP: NEGATIVE
PH UR STRIP: 6 [PH] (ref 5–8)
PROT UR QL STRIP: ABNORMAL
SP GR UR STRIP: 1.01 (ref 1–1.03)
URN SPEC COLLECT METH UR: ABNORMAL
UROBILINOGEN UR STRIP-ACNC: NEGATIVE EU/DL

## 2025-02-18 PROCEDURE — 81003 URINALYSIS AUTO W/O SCOPE: CPT | Performed by: NURSE PRACTITIONER

## 2025-02-18 PROCEDURE — 87086 URINE CULTURE/COLONY COUNT: CPT | Performed by: NURSE PRACTITIONER

## 2025-02-18 RX ORDER — NITROFURANTOIN 25; 75 MG/1; MG/1
100 CAPSULE ORAL 2 TIMES DAILY
Qty: 14 CAPSULE | Refills: 0 | Status: SHIPPED | OUTPATIENT
Start: 2025-02-18 | End: 2025-02-25

## 2025-02-18 NOTE — PROGRESS NOTES
Patient ID: Brie Han is a 89 y.o. female.    Chief Complaint: General Illness (Entered automatically based on patient selection in [employer].)    The patient initiated a request through [employer] on 2/18/2025 for evaluation and management with a chief complaint of General Illness (Entered automatically based on patient selection in [employer].)     I evaluated the questionnaire submission on [employer]    Ohs Peq Ev"[email or web address] Supergroup-Women's Health    2/18/2025  8:31 AM CST - Filed by Patient   What do you need help with? Urinary Symptoms   Do you agree to participate in an E-Visit? Yes   If you have any of the following symptoms, please present to your local emergency room or call 911:  I acknowledge   What is the main issue you would like addressed today? Burning when urinating   What symptoms do you currently have? Pain while passing urine   When did your symptoms first appear? 2/16/2025   List what you have done or taken to help your symptoms. New medication introduced Amolodipine 5mg   Please indicate whether you have had the following symptoms during the past 24 hours     Urgent urination (a sudden and uncontrollable urge to urinate) None   Frequent urination of small amounts of urine (going to the toilet very often) Mild   Burning pain when urinating Mild   Incomplete bladder emptying (still feel like you need to urinate again after urination) None   Pain not associated with urination in the lower abdomen below the belly button) None   What does your urine look like? I am not sure   Blood seen in the urine None   Flank pain (pain in one or both sides of the lower back) None   Abnormal Vaginal Discharge (abnormal amount, color and/or odor) None   Discharge from the urethra (urinary opening) without urination None   High body temperature/fever? None-<99.5   Please rate how much discomfort you have experience because of the symptoms in the past 24 hours: Mild   Please indicate how the symptoms have interfered with  your every day activities/work in the past 24 hours: None   Please indicate how these symptoms have interfered with your social activities (visiting people, meeting with friends, etc.) in the past 24 hours? None   Are you a diabetic? No   Please indicate whether you have the following at the time of completion of this questionnaire: None of the above   Provide any additional information you feel is important. Would prefer to  test container, take home, return to [facility]. If prescription is issued please send to [facility] on [address].   Please attach any relevant images or files (if you have performed a home test for UTI, please submit a photo of results)    Are you able to take your vital signs? Yes   Systolic Blood Pressure: 154   Diastolic Blood Pressure: 64   Weight: 100   Height:    Pulse: 50   Temperature: 97   Respiration rate:    Pulse Oxygen: 95         Encounter Diagnosis   Name Primary?    Dysuria Yes        Orders Placed This Encounter   Procedures    Urine Culture High Risk           Send normal result to authorizing provider's In Basket if patient is active on MyChart::   Yes    Urinalysis           Collection Type:   Urine, Clean Catch      Medications Ordered This Encounter   Medications    nitrofurantoin, macrocrystal-monohydrate, (MACROBID) 100 MG capsule     Sig: Take 1 capsule (100 mg total) by mouth 2 (two) times daily. for 7 days     Dispense:  14 capsule     Refill:  0        No follow-ups on file.      E-Visit Time Tracking:    Day 1 Time (in minutes): 20    Total Time (in minutes): 20

## 2025-02-20 ENCOUNTER — RESULTS FOLLOW-UP (OUTPATIENT)
Dept: LAB | Facility: HOSPITAL | Age: OVER 89
End: 2025-02-20

## 2025-02-21 ENCOUNTER — LAB VISIT (OUTPATIENT)
Dept: LAB | Facility: HOSPITAL | Age: OVER 89
End: 2025-02-21
Attending: PHYSICIAN ASSISTANT
Payer: MEDICARE

## 2025-02-21 ENCOUNTER — PATIENT MESSAGE (OUTPATIENT)
Dept: PRIMARY CARE CLINIC | Facility: CLINIC | Age: OVER 89
End: 2025-02-21
Payer: MEDICARE

## 2025-02-21 DIAGNOSIS — C18.0 MALIGNANT NEOPLASM OF CECUM: ICD-10-CM

## 2025-02-21 DIAGNOSIS — D63.8 MEGALOBLASTIC ANEMIA DUE TO FISH TAPEWORM: ICD-10-CM

## 2025-02-21 DIAGNOSIS — D50.0 IRON DEFICIENCY ANEMIA SECONDARY TO BLOOD LOSS (CHRONIC): ICD-10-CM

## 2025-02-21 DIAGNOSIS — C18.9 COLON CANCER: Primary | ICD-10-CM

## 2025-02-21 DIAGNOSIS — B70.0 MEGALOBLASTIC ANEMIA DUE TO FISH TAPEWORM: ICD-10-CM

## 2025-02-21 LAB
ALBUMIN SERPL BCP-MCNC: 4 G/DL (ref 3.5–5.2)
ALP SERPL-CCNC: 87 U/L (ref 40–150)
ALT SERPL W/O P-5'-P-CCNC: 27 U/L (ref 10–44)
ANION GAP SERPL CALC-SCNC: 8 MMOL/L (ref 8–16)
AST SERPL-CCNC: 29 U/L (ref 10–40)
BACTERIA UR CULT: NO GROWTH
BASOPHILS # BLD AUTO: 0.03 K/UL (ref 0–0.2)
BASOPHILS NFR BLD: 0.7 % (ref 0–1.9)
BILIRUB SERPL-MCNC: 0.4 MG/DL (ref 0.1–1)
BUN SERPL-MCNC: 22 MG/DL (ref 10–30)
CALCIUM SERPL-MCNC: 9 MG/DL (ref 8.7–10.5)
CHLORIDE SERPL-SCNC: 101 MMOL/L (ref 95–110)
CO2 SERPL-SCNC: 25 MMOL/L (ref 23–29)
CREAT SERPL-MCNC: 0.9 MG/DL (ref 0.5–1.4)
DIFFERENTIAL METHOD BLD: ABNORMAL
EOSINOPHIL # BLD AUTO: 0.1 K/UL (ref 0–0.5)
EOSINOPHIL NFR BLD: 2.4 % (ref 0–8)
ERYTHROCYTE [DISTWIDTH] IN BLOOD BY AUTOMATED COUNT: 20 % (ref 11.5–14.5)
EST. GFR  (NO RACE VARIABLE): 58 ML/MIN/1.73 M^2
FERRITIN SERPL-MCNC: 182 NG/ML (ref 20–300)
GLUCOSE SERPL-MCNC: 70 MG/DL (ref 70–110)
HCT VFR BLD AUTO: 31.1 % (ref 37–48.5)
HGB BLD-MCNC: 9.7 G/DL (ref 12–16)
IMM GRANULOCYTES # BLD AUTO: 0.01 K/UL (ref 0–0.04)
IMM GRANULOCYTES NFR BLD AUTO: 0.2 % (ref 0–0.5)
IRON SERPL-MCNC: 56 UG/DL (ref 30–160)
LYMPHOCYTES # BLD AUTO: 1.4 K/UL (ref 1–4.8)
LYMPHOCYTES NFR BLD: 34 % (ref 18–48)
MCH RBC QN AUTO: 31.2 PG (ref 27–31)
MCHC RBC AUTO-ENTMCNC: 31.2 G/DL (ref 32–36)
MCV RBC AUTO: 100 FL (ref 82–98)
MONOCYTES # BLD AUTO: 0.5 K/UL (ref 0.3–1)
MONOCYTES NFR BLD: 13 % (ref 4–15)
NEUTROPHILS # BLD AUTO: 2 K/UL (ref 1.8–7.7)
NEUTROPHILS NFR BLD: 49.7 % (ref 38–73)
NRBC BLD-RTO: 0 /100 WBC
PLATELET # BLD AUTO: 254 K/UL (ref 150–450)
PMV BLD AUTO: 9.4 FL (ref 9.2–12.9)
POTASSIUM SERPL-SCNC: 4.6 MMOL/L (ref 3.5–5.1)
PROT SERPL-MCNC: 7.4 G/DL (ref 6–8.4)
RBC # BLD AUTO: 3.11 M/UL (ref 4–5.4)
SATURATED IRON: 13 % (ref 20–50)
SODIUM SERPL-SCNC: 134 MMOL/L (ref 136–145)
TOTAL IRON BINDING CAPACITY: 440 UG/DL (ref 250–450)
TRANSFERRIN SERPL-MCNC: 314 MG/DL (ref 200–375)
WBC # BLD AUTO: 4.09 K/UL (ref 3.9–12.7)

## 2025-02-21 PROCEDURE — 82728 ASSAY OF FERRITIN: CPT | Performed by: PHYSICIAN ASSISTANT

## 2025-02-21 PROCEDURE — 80053 COMPREHEN METABOLIC PANEL: CPT | Performed by: PHYSICIAN ASSISTANT

## 2025-02-21 PROCEDURE — 85025 COMPLETE CBC W/AUTO DIFF WBC: CPT | Performed by: PHYSICIAN ASSISTANT

## 2025-02-21 PROCEDURE — 36415 COLL VENOUS BLD VENIPUNCTURE: CPT | Performed by: PHYSICIAN ASSISTANT

## 2025-02-21 PROCEDURE — 84466 ASSAY OF TRANSFERRIN: CPT | Performed by: PHYSICIAN ASSISTANT

## 2025-03-14 NOTE — TELEPHONE ENCOUNTER
----- Message from Alberta Iraj sent at 3/6/2024 12:04 PM CST -----  Regarding: advice  Contact: patient  Type: Needs Medical Advice  Who Called:  Itzel with Home Health  Symptoms (please be specific):  headaches high blood pressure  How long has patient had these symptoms:    Pharmacy name and phone #:    [id number]  [place], [place] - [address]  [id number]  [facility] [address]  Phone: [phone] Fax: [phone]        Best Call Back Number: [phone]  Additional Information: Itzel states she is discharging patient however patient is having headaches, her blood pressure was 170/65 this morning.  Please call Itzel to advise.  Thanks!        
Home health orders written  
Spoke to home health nurse via phone. She is requesting a continuation of HH orders due to blood pressure concerns and change in blood pressure medications   
- no labs  - no IVF  - no artificial feeding  - vitals once per day  - continue Nasal cannula ( no face masks)  - no injections  - no FS  - continue oral meds for now     Roxanol 5 mg SL Q 3 H PRN for pain or dyspnea  Haldol 0.5 mg IVP Q 6 H PRN for agitation- change to SL tomorrow AM prior to d/c  Ativan 0.5 mg IVP Q 4 H PRN for agitation- change to SL tomorrow AM prior to d/c   increased Trazodone from 75 mg to 150 mg (home dose) QHS
- no labs  - no IVF  - no artificial feeding  - vitals once per day  - continue Nasal cannula ( no face masks)  - no injections  - no FS  - continue oral meds for now     Roxanol 5 mg SL Q 3 H PRN for pain or dyspnea  Haldol concentrate 0.5 mg SL Q 6 H PRN for agitation  Ativan concentrate 0.5 mg SL Q 4 H PRN for agitation

## 2025-03-18 NOTE — TELEPHONE ENCOUNTER
Here for foot and leg pain. They come on suddenly and seem to hurt the left leg more. Nutrition isn't great so mom thinks it may be cramp related. He just started multivitamin.    Please send to [facility] Isosorbide mononitrate 30 MG

## 2025-04-21 ENCOUNTER — OFFICE VISIT (OUTPATIENT)
Dept: OPHTHALMOLOGY | Facility: CLINIC | Age: OVER 89
End: 2025-04-21
Payer: MEDICARE

## 2025-04-21 ENCOUNTER — LAB VISIT (OUTPATIENT)
Dept: LAB | Facility: HOSPITAL | Age: OVER 89
End: 2025-04-21
Attending: PHYSICIAN ASSISTANT
Payer: MEDICARE

## 2025-04-21 DIAGNOSIS — H04.123 DRY EYE SYNDROME, BILATERAL: ICD-10-CM

## 2025-04-21 DIAGNOSIS — H40.1113 PRIMARY OPEN ANGLE GLAUCOMA (POAG) OF RIGHT EYE, SEVERE STAGE: ICD-10-CM

## 2025-04-21 DIAGNOSIS — B70.0 MEGALOBLASTIC ANEMIA DUE TO FISH TAPEWORM: ICD-10-CM

## 2025-04-21 DIAGNOSIS — D63.8 MEGALOBLASTIC ANEMIA DUE TO FISH TAPEWORM: ICD-10-CM

## 2025-04-21 DIAGNOSIS — C18.9 COLON CANCER: ICD-10-CM

## 2025-04-21 DIAGNOSIS — H40.1122 PRIMARY OPEN-ANGLE GLAUCOMA, LEFT EYE, MODERATE STAGE: Primary | ICD-10-CM

## 2025-04-21 DIAGNOSIS — D50.0 IRON DEFICIENCY ANEMIA SECONDARY TO BLOOD LOSS (CHRONIC): Primary | ICD-10-CM

## 2025-04-21 LAB
ABSOLUTE EOSINOPHIL (SMH): 0.06 K/UL
ABSOLUTE MONOCYTE (SMH): 0.44 K/UL (ref 0.3–1)
ABSOLUTE NEUTROPHIL COUNT (SMH): 1.8 K/UL (ref 1.8–7.7)
ALBUMIN SERPL-MCNC: 4.1 G/DL (ref 3.5–5.2)
ALP SERPL-CCNC: 86 UNIT/L (ref 40–150)
ALT SERPL-CCNC: 15 UNIT/L (ref 10–44)
ANION GAP (SMH): 12 MMOL/L (ref 8–16)
AST SERPL-CCNC: 24 UNIT/L (ref 11–45)
BASOPHILS # BLD AUTO: 0.04 K/UL
BASOPHILS NFR BLD AUTO: 1.2 %
BILIRUB SERPL-MCNC: 0.3 MG/DL (ref 0.1–1)
BUN SERPL-MCNC: 33 MG/DL (ref 10–30)
CALCIUM SERPL-MCNC: 9.2 MG/DL (ref 8.7–10.5)
CHLORIDE SERPL-SCNC: 103 MMOL/L (ref 95–110)
CO2 SERPL-SCNC: 23 MMOL/L (ref 23–29)
CREAT SERPL-MCNC: 1.1 MG/DL (ref 0.5–1.4)
ERYTHROCYTE [DISTWIDTH] IN BLOOD BY AUTOMATED COUNT: 14.2 % (ref 11.5–14.5)
FERRITIN SERPL-MCNC: 39.1 NG/ML (ref 20–300)
GFR SERPLBLD CREATININE-BSD FMLA CKD-EPI: 46 ML/MIN/1.73/M2
GLUCOSE SERPL-MCNC: 96 MG/DL (ref 70–110)
HCT VFR BLD AUTO: 31.4 % (ref 37–48.5)
HGB BLD-MCNC: 9.9 GM/DL (ref 12–16)
IMM GRANULOCYTES # BLD AUTO: 0.01 K/UL (ref 0–0.04)
IMM GRANULOCYTES NFR BLD AUTO: 0.3 % (ref 0–0.5)
IRON SATN MFR SERPL: 7 % (ref 20–50)
IRON SERPL-MCNC: 38 UG/DL (ref 30–160)
LYMPHOCYTES # BLD AUTO: 1 K/UL (ref 1–4.8)
MCH RBC QN AUTO: 32.1 PG (ref 27–31)
MCHC RBC AUTO-ENTMCNC: 31.5 G/DL (ref 32–36)
MCV RBC AUTO: 102 FL (ref 82–98)
NUCLEATED RBC (/100WBC) (SMH): 0 /100 WBC
PLATELET # BLD AUTO: 262 K/UL (ref 150–450)
PMV BLD AUTO: 9.8 FL (ref 9.2–12.9)
POTASSIUM SERPL-SCNC: 3.9 MMOL/L (ref 3.5–5.1)
PROT SERPL-MCNC: 7.1 GM/DL (ref 6–8.4)
RBC # BLD AUTO: 3.08 M/UL (ref 4–5.4)
RELATIVE EOSINOPHIL (SMH): 1.8 % (ref 0–8)
RELATIVE LYMPHOCYTE (SMH): 29.5 % (ref 18–48)
RELATIVE MONOCYTE (SMH): 13 % (ref 4–15)
RELATIVE NEUTROPHIL (SMH): 54.2 % (ref 38–73)
SODIUM SERPL-SCNC: 138 MMOL/L (ref 136–145)
TIBC SERPL-MCNC: 542 UG/DL (ref 250–450)
TRANSFERRIN SERPL-MCNC: 366 MG/DL (ref 200–375)
WBC # BLD AUTO: 3.39 K/UL (ref 3.9–12.7)

## 2025-04-21 PROCEDURE — 36415 COLL VENOUS BLD VENIPUNCTURE: CPT

## 2025-04-21 PROCEDURE — 3288F FALL RISK ASSESSMENT DOCD: CPT | Mod: HCNC,CPTII,S$GLB, | Performed by: OPHTHALMOLOGY

## 2025-04-21 PROCEDURE — 1159F MED LIST DOCD IN RCRD: CPT | Mod: HCNC,CPTII,S$GLB, | Performed by: OPHTHALMOLOGY

## 2025-04-21 PROCEDURE — 85025 COMPLETE CBC W/AUTO DIFF WBC: CPT

## 2025-04-21 PROCEDURE — 1101F PT FALLS ASSESS-DOCD LE1/YR: CPT | Mod: HCNC,CPTII,S$GLB, | Performed by: OPHTHALMOLOGY

## 2025-04-21 PROCEDURE — 99213 OFFICE O/P EST LOW 20 MIN: CPT | Mod: HCNC,S$GLB,, | Performed by: OPHTHALMOLOGY

## 2025-04-21 PROCEDURE — 1126F AMNT PAIN NOTED NONE PRSNT: CPT | Mod: HCNC,CPTII,S$GLB, | Performed by: OPHTHALMOLOGY

## 2025-04-21 PROCEDURE — 80053 COMPREHEN METABOLIC PANEL: CPT

## 2025-04-21 PROCEDURE — 1157F ADVNC CARE PLAN IN RCRD: CPT | Mod: HCNC,CPTII,S$GLB, | Performed by: OPHTHALMOLOGY

## 2025-04-21 PROCEDURE — 84466 ASSAY OF TRANSFERRIN: CPT

## 2025-04-21 PROCEDURE — G2211 COMPLEX E/M VISIT ADD ON: HCPCS | Mod: HCNC,S$GLB,, | Performed by: OPHTHALMOLOGY

## 2025-04-21 PROCEDURE — 82728 ASSAY OF FERRITIN: CPT

## 2025-04-21 PROCEDURE — 92133 CPTRZD OPH DX IMG PST SGM ON: CPT | Mod: HCNC,S$GLB,, | Performed by: OPHTHALMOLOGY

## 2025-04-21 PROCEDURE — 99999 PR PBB SHADOW E&M-EST. PATIENT-LVL III: CPT | Mod: PBBFAC,HCNC,, | Performed by: OPHTHALMOLOGY

## 2025-04-21 NOTE — PROGRESS NOTES
HPI    DLS: 12/9/2024- POAG w/ IOP ck and OCT N     Pt states she has been having a lot of strain in eyes to read. Sometimes   it gets painful to read. States specs are about 2 yrs old. In the morning   vision is worse, better after waking up and washing face.     PF Systane OU PRN.   Last edited by Toma Greer on 4/21/2025  3:31 PM.            Assessment /Plan     For exam results, see Encounter Report.    Primary open-angle glaucoma, left eye, moderate stage    Primary open angle glaucoma (POAG) of right eye, severe stage    Dry eye syndrome, bilateral      1. Primary open-angle glaucoma, left eye, moderate stage (Primary)  2. Primary open angle glaucoma (POAG) of right eye, severe stage  Unk famhx  Thick pachy  Hx of LPI OU  Hx of CEIOL OU    Tmax 23/23  +HVF progression 2008 to 2021  +OCT progression OU  Brim allergy  I suspect allergy to preservatives  S/p SLT OU in 2024, good response    IOP reasonable off meds s/p SLT    F/u 5 months, IOP, DFE    Consider PF drop if IOP higher such as tafluprost    Visit today is associated with current or anticipated ongoing medical care related to this patients single serious and complex condition, glaucoma.      3. Dry eye syndrome, bilateral  OS>OD  Not using ATs enough  Recommend increase

## 2025-05-14 ENCOUNTER — TELEPHONE (OUTPATIENT)
Dept: PRIMARY CARE CLINIC | Facility: CLINIC | Age: OVER 89
End: 2025-05-14
Payer: MEDICARE

## 2025-05-14 NOTE — TELEPHONE ENCOUNTER
Provider on extended leave. Sw pt grandson via phone in regards to r/s appt with Dr. Gould. He has declined appt times offered with Marjorie Fareed and states he will look at appointment times on the portal and reschedule.

## 2025-05-19 ENCOUNTER — PATIENT MESSAGE (OUTPATIENT)
Dept: FAMILY MEDICINE | Facility: CLINIC | Age: OVER 89
End: 2025-05-19
Payer: MEDICARE

## 2025-05-20 NOTE — NURSING
"0430 Pt c/o tremors. In assessing pt, tremors are visible. Vital signs taken - WNL, temp 99.2 noted. Pt also c/o generalized pain "all over". Lisa Salinas DNP at nurses station - spoke face to face with her concerning pt. JEFFERSON Gonzalez stated that she would go and look at the patient. Waiting for response.   "
Grandson would like to speak with MD regarding her care and medicines.  Sami at [phone].      
I spoke with the patients devan Hoffman regarding midline insertion for IV abx administration.  Benefits and risks were also discussed.  Pt grandson is ok with patient getting a midline.  Orders for midline are in.  
Lisa Salinas DNP was notified of critical H&H 5.5/18.3. Pt is a known Islam. No new orders at this time.   
Nurses Note -- 4 Eyes      2/2/2024   6:01 PM      Skin assessed during: Admit      [x] No Altered Skin Integrity Present    [x]Prevention Measures Documented      [] Yes- Altered Skin Integrity Present or Discovered   [] LDA Added if Not in Epic (Describe Wound)   [] New Altered Skin Integrity was Present on Admit and Documented in LDA   [] Wound Image Taken    Wound Care Consulted? No    Attending Nurse:  Josh Graves RN/Staff Member:  OMAYRA Allen           
Patient temp 103.2 oral. OPAL Proctor NP notified. Chest xray to be ordered. Will give Tylenol now.   
Per patient family request that Lab use Butterfly needles and collect minimal blood due to patient being anemic and Presybeterian and will not be able to receive blood. Informed . States she will place on board in Lab and notify staff.   
Pt cleared for Dc per CM. Tele [id number] removed and placed at ns station. Midline in right upper arm flushed with 10 cc saline. Pt tolerated well. Dsg dry and intact and without drainage. Grandson at bedside. Dc instructions given and reviewd with pt. Verbalized understanding. Pt dcd via wheelchair to her grandsons car and going home.     
Pt transferred back to floor. Report received from Gail.     
RANI Salinas NP notified of hemoglobin of 6.5.  Pt Jehovah Witness and refusing blood.  KUB scheduled for Monday.    Ptl grandson is requesting for Jaleesa Proctor NP with GI to contact him, Dionisio Han [phone].  
Robina Mera NP was notified of critical lab: H&H 5.9/19.1. Pt is a known Spiritism. No new orders at this time.   
Sn notified hospitalist of 1 ped bottle resulted with gram pos cocci.   
PROCEDURES:  Laparoscopy, with proctectomy 20-May-2025 13:31:31  Wen Flores

## 2025-06-09 DIAGNOSIS — E78.5 HYPERLIPIDEMIA WITH TARGET LDL LESS THAN 70: ICD-10-CM

## 2025-06-10 RX ORDER — LOVASTATIN 10 MG/1
10 TABLET ORAL NIGHTLY
Qty: 90 TABLET | Refills: 0 | Status: SHIPPED | OUTPATIENT
Start: 2025-06-10

## 2025-06-10 NOTE — TELEPHONE ENCOUNTER
Care Due:                  Date            Visit Type   Department     Provider  --------------------------------------------------------------------------------                                HOSPITAL  Last Visit: 10-      FOLLOW UP    None Found     Colten Gould  Next Visit: None Scheduled  None         None Found                                                            Last  Test          Frequency    Reason                     Performed    Due Date  --------------------------------------------------------------------------------    Lipid Panel.  12 months..  lovastatin...............  04- 04-    [facility] Embedded Care Due Messages. Reference number: [id number].   6/09/2025 10:17:43 PM CDT

## 2025-06-10 NOTE — TELEPHONE ENCOUNTER
Refill Routing Note   Medication(s) are not appropriate for processing by [facility] for the following reason(s):        Required labs outdated    [facility] action(s):  Defer   Requires labs : Yes             Appointments  past 12m or future 3m with PCP    Date Provider   Last Visit   10/10/2024 Colten Gould MD   Next Visit   Visit date not found Colten Gould MD   ED visits in past 90 days: 0        Note composed:11:16 PM 06/09/2025

## 2025-06-23 ENCOUNTER — OFFICE VISIT (OUTPATIENT)
Dept: PRIMARY CARE CLINIC | Facility: CLINIC | Age: OVER 89
End: 2025-06-23
Payer: MEDICARE

## 2025-06-23 ENCOUNTER — LAB VISIT (OUTPATIENT)
Dept: LAB | Facility: HOSPITAL | Age: OVER 89
End: 2025-06-23
Attending: NURSE PRACTITIONER
Payer: MEDICARE

## 2025-06-23 VITALS
SYSTOLIC BLOOD PRESSURE: 130 MMHG | OXYGEN SATURATION: 98 % | DIASTOLIC BLOOD PRESSURE: 58 MMHG | TEMPERATURE: 98 F | BODY MASS INDEX: 19.18 KG/M2 | HEART RATE: 62 BPM | HEIGHT: 59 IN | WEIGHT: 95.13 LBS

## 2025-06-23 DIAGNOSIS — R35.0 URINARY FREQUENCY: ICD-10-CM

## 2025-06-23 DIAGNOSIS — I10 PRIMARY HYPERTENSION: ICD-10-CM

## 2025-06-23 DIAGNOSIS — M50.30 DDD (DEGENERATIVE DISC DISEASE), CERVICAL: ICD-10-CM

## 2025-06-23 DIAGNOSIS — F01.518 VASCULAR DEMENTIA OF ACUTE ONSET, WITH BEHAVIORAL DISTURBANCE: ICD-10-CM

## 2025-06-23 DIAGNOSIS — I73.9 PAD (PERIPHERAL ARTERY DISEASE): ICD-10-CM

## 2025-06-23 DIAGNOSIS — I10 PRIMARY HYPERTENSION: Primary | ICD-10-CM

## 2025-06-23 DIAGNOSIS — F02.B0 MODERATE LATE ONSET ALZHEIMER'S DEMENTIA WITHOUT BEHAVIORAL DISTURBANCE, PSYCHOTIC DISTURBANCE, MOOD DISTURBANCE, OR ANXIETY: ICD-10-CM

## 2025-06-23 DIAGNOSIS — N18.31 CHRONIC KIDNEY DISEASE, STAGE 3A: ICD-10-CM

## 2025-06-23 DIAGNOSIS — D50.9 IRON DEFICIENCY ANEMIA, UNSPECIFIED IRON DEFICIENCY ANEMIA TYPE: ICD-10-CM

## 2025-06-23 DIAGNOSIS — I50.30 DIASTOLIC CHF WITH PRESERVED LEFT VENTRICULAR FUNCTION, NYHA CLASS 2: ICD-10-CM

## 2025-06-23 DIAGNOSIS — E78.5 HYPERLIPIDEMIA WITH TARGET LDL LESS THAN 70: ICD-10-CM

## 2025-06-23 DIAGNOSIS — G30.1 MODERATE LATE ONSET ALZHEIMER'S DEMENTIA WITHOUT BEHAVIORAL DISTURBANCE, PSYCHOTIC DISTURBANCE, MOOD DISTURBANCE, OR ANXIETY: ICD-10-CM

## 2025-06-23 LAB
ABSOLUTE EOSINOPHIL (SMH): 0.03 K/UL
ABSOLUTE MONOCYTE (SMH): 0.49 K/UL (ref 0.3–1)
ABSOLUTE NEUTROPHIL COUNT (SMH): 1.8 K/UL (ref 1.8–7.7)
ALBUMIN SERPL-MCNC: 4.2 G/DL (ref 3.5–5.2)
ALP SERPL-CCNC: 90 UNIT/L (ref 40–150)
ALT SERPL-CCNC: 17 UNIT/L (ref 10–44)
ANION GAP (SMH): 8 MMOL/L (ref 8–16)
AST SERPL-CCNC: 30 UNIT/L (ref 11–45)
BASOPHILS # BLD AUTO: 0.06 K/UL
BASOPHILS NFR BLD AUTO: 1.9 %
BILIRUB SERPL-MCNC: 0.5 MG/DL (ref 0.1–1)
BUN SERPL-MCNC: 18 MG/DL (ref 10–30)
CALCIUM SERPL-MCNC: 9.7 MG/DL (ref 8.7–10.5)
CHLORIDE SERPL-SCNC: 100 MMOL/L (ref 95–110)
CHOLEST SERPL-MCNC: 185 MG/DL (ref 120–199)
CHOLEST/HDLC SERPL: 2.5 {RATIO} (ref 2–5)
CO2 SERPL-SCNC: 26 MMOL/L (ref 23–29)
CREAT SERPL-MCNC: 0.9 MG/DL (ref 0.5–1.4)
ERYTHROCYTE [DISTWIDTH] IN BLOOD BY AUTOMATED COUNT: 15.9 % (ref 11.5–14.5)
FERRITIN SERPL-MCNC: 309.9 NG/ML (ref 20–300)
GFR SERPLBLD CREATININE-BSD FMLA CKD-EPI: 58 ML/MIN/1.73/M2
GLUCOSE SERPL-MCNC: 86 MG/DL (ref 70–110)
HCT VFR BLD AUTO: 31.9 % (ref 37–48.5)
HDLC SERPL-MCNC: 75 MG/DL (ref 40–75)
HDLC SERPL: 40.5 % (ref 20–50)
HGB BLD-MCNC: 10.4 GM/DL (ref 12–16)
IMM GRANULOCYTES # BLD AUTO: 0.01 K/UL (ref 0–0.04)
IMM GRANULOCYTES NFR BLD AUTO: 0.3 % (ref 0–0.5)
IRON SATN MFR SERPL: 21 % (ref 20–50)
IRON SERPL-MCNC: 97 UG/DL (ref 30–160)
LDLC SERPL CALC-MCNC: 99.6 MG/DL (ref 63–159)
LYMPHOCYTES # BLD AUTO: 0.8 K/UL (ref 1–4.8)
MCH RBC QN AUTO: 32.8 PG (ref 27–31)
MCHC RBC AUTO-ENTMCNC: 32.6 G/DL (ref 32–36)
MCV RBC AUTO: 101 FL (ref 82–98)
NONHDLC SERPL-MCNC: 110 MG/DL
NUCLEATED RBC (/100WBC) (SMH): 0 /100 WBC
PLATELET # BLD AUTO: 277 K/UL (ref 150–450)
PMV BLD AUTO: 9.4 FL (ref 9.2–12.9)
POTASSIUM SERPL-SCNC: 4.8 MMOL/L (ref 3.5–5.1)
PROT SERPL-MCNC: 7.3 GM/DL (ref 6–8.4)
RBC # BLD AUTO: 3.17 M/UL (ref 4–5.4)
RELATIVE EOSINOPHIL (SMH): 0.9 % (ref 0–8)
RELATIVE LYMPHOCYTE (SMH): 25 % (ref 18–48)
RELATIVE MONOCYTE (SMH): 15.3 % (ref 4–15)
RELATIVE NEUTROPHIL (SMH): 56.6 % (ref 38–73)
SODIUM SERPL-SCNC: 134 MMOL/L (ref 136–145)
TIBC SERPL-MCNC: 471 UG/DL (ref 250–450)
TRANSFERRIN SERPL-MCNC: 318 MG/DL (ref 200–375)
TRIGL SERPL-MCNC: 52 MG/DL (ref 30–150)
WBC # BLD AUTO: 3.2 K/UL (ref 3.9–12.7)

## 2025-06-23 PROCEDURE — 36415 COLL VENOUS BLD VENIPUNCTURE: CPT

## 2025-06-23 PROCEDURE — 1101F PT FALLS ASSESS-DOCD LE1/YR: CPT | Mod: CPTII,HCNC,S$GLB, | Performed by: NURSE PRACTITIONER

## 2025-06-23 PROCEDURE — 99214 OFFICE O/P EST MOD 30 MIN: CPT | Mod: HCNC,S$GLB,, | Performed by: NURSE PRACTITIONER

## 2025-06-23 PROCEDURE — 1160F RVW MEDS BY RX/DR IN RCRD: CPT | Mod: CPTII,HCNC,S$GLB, | Performed by: NURSE PRACTITIONER

## 2025-06-23 PROCEDURE — 99999 PR PBB SHADOW E&M-EST. PATIENT-LVL III: CPT | Mod: PBBFAC,HCNC,, | Performed by: NURSE PRACTITIONER

## 2025-06-23 PROCEDURE — 80061 LIPID PANEL: CPT

## 2025-06-23 PROCEDURE — 1159F MED LIST DOCD IN RCRD: CPT | Mod: CPTII,HCNC,S$GLB, | Performed by: NURSE PRACTITIONER

## 2025-06-23 PROCEDURE — 3288F FALL RISK ASSESSMENT DOCD: CPT | Mod: CPTII,HCNC,S$GLB, | Performed by: NURSE PRACTITIONER

## 2025-06-23 PROCEDURE — 84466 ASSAY OF TRANSFERRIN: CPT

## 2025-06-23 PROCEDURE — 82728 ASSAY OF FERRITIN: CPT

## 2025-06-23 PROCEDURE — 1125F AMNT PAIN NOTED PAIN PRSNT: CPT | Mod: CPTII,HCNC,S$GLB, | Performed by: NURSE PRACTITIONER

## 2025-06-23 PROCEDURE — 82310 ASSAY OF CALCIUM: CPT

## 2025-06-23 PROCEDURE — 85025 COMPLETE CBC W/AUTO DIFF WBC: CPT

## 2025-06-23 PROCEDURE — 1157F ADVNC CARE PLAN IN RCRD: CPT | Mod: CPTII,HCNC,S$GLB, | Performed by: NURSE PRACTITIONER

## 2025-06-23 RX ORDER — LANOLIN ALCOHOL/MO/W.PET/CERES
200 CREAM (GRAM) TOPICAL DAILY
COMMUNITY

## 2025-06-23 NOTE — PROGRESS NOTES
Subjective:       Patient ID: Brie Han is a 89 y.o. female.    Chief Complaint: Follow-up    HPI    Patient presents today with grandsonPrabhjot for follow up visit.   5/5/25 Hem/Onc  iron injection-7/14/25 labs next visit 7/21/25    Magnesium over the counter 200mg daily  Plavix every other day  Aricept everyday    5/5/25 Hem/Onc-Dr.Jack Oneill: iron efficiency anemia-Iron infusion    4/21/25 : Primary open-angle glaucoma, left eye, moderate stage  Primary open angle glaucoma (POAG) of right eye, severe stage, Dry eye syndrome, bilateral    4/21/25 Cardiology- Dr.Anthony Barba-Pure hypercholesterolemia;   Diastolic dysfunction; History of non-ST elevation myocardial infarction (NSTEMI) -metoprolol     4Ms for Medical Decision-Making in Older Adults    Last Completed EAWV:  None    MEDICATIONS:  High Risk Medications:  Total Active Medications: 0  This patient does not have an active medication from one of the medication groupers.    MOBILITY:  Activities of Daily Living:       No data to display              Fall Risk:      6/23/2025     9:00 AM 4/21/2025     3:15 PM 12/9/2024     3:00 PM   Fall Risk Assessment - Outpatient   Mobility Status Ambulatory Ambulatory Ambulatory   Number of falls 0 0 1   Identified as fall risk False False False     Disability Status:      5/6/2018     5:51 AM   Disability Status   Are you deaf or do you have serious difficulty hearing? N   Are you blind or do you have serious difficulty seeing, even when wearing glasses? N   Because of a physical, mental, or emotional condition, do you have serious difficulty concentrating, remembering, or making decisions? N   Do you have serious difficulty walking or climbing stairs? N   Do you have difficulty dressing or bathing? N   Because of a physical, mental, or emotional condition, do you have difficulty doing errands alone such as visiting a doctor's office or shopping? N     Nutrition Screening:       No data to display              Screening Score: 0-7 Malnourished, 8-11 At Risk, 12-14 Normal  Get Up and Go:       No data to display              Whisper Test:       No data to display                    MENTATION:   Has Dementia Dx: Yes  Has Anxiety Dx: No    Depression Patient Health Questionnaire:      6/23/2025     9:26 AM   Depression Patient Health Questionnaire   Over the last two weeks how often have you been bothered by little interest or pleasure in doing things Not at all   Over the last two weeks how often have you been bothered by feeling down, depressed or hopeless Not at all   PHQ-2 Total Score 0     Cognitive Function Screening:       No data to display              Cognitive Function Screening Total - Less than 4 = Abnormal,  Greater than or equal to 4 = Normal        WHAT MATTERS MOST:  Advance Care Planning   ACP Status:   Patient has had an ACP conversation  Living Will: Yes  Power of : Yes  [place]: No    What is most important right now is to focus on avoiding the hospital and remaining as independent as possible    Accordingly, we have decided that the best plan to meet the patient's goals includes continuing with treatment      What matters most to patient today is: quality of life          Past Medical History:   Diagnosis Date    ACP (advance care planning) 2/7/2024    Amaurosis fugax     Bilateral left eye worse    Anticoagulant long-term use     Arthritis     Diastolic CHF with preserved left ventricular function, NYHA class 2 6/27/2025    Glaucoma     resolved    Hyperlipidemia     Hypertension     Stroke 2006       Review of patient's allergies indicates:   Allergen Reactions    Contrast media Other (See Comments)    Aspirin Other (See Comments)    Ciprofloxacin Other (See Comments)    Iron Other (See Comments)     Small rash with po iron.  Tolerated IV iron    Iodine Rash    Penicillins Rash       Current Medications[1]    Review of Systems   Constitutional:  Negative for unexpected weight change.  "  HENT:  Negative for trouble swallowing.    Eyes:  Negative for visual disturbance.   Respiratory:  Negative for shortness of breath.    Cardiovascular:  Negative for chest pain, palpitations and leg swelling.   Gastrointestinal:  Negative for blood in stool.   Genitourinary:  Negative for hematuria.   Skin:  Negative for rash.   Allergic/Immunologic: Negative for immunocompromised state.   Neurological:  Negative for headaches.   Hematological:  Does not bruise/bleed easily.   Psychiatric/Behavioral:  Negative for agitation. The patient is not nervous/anxious.        Objective:      BP (!) 130/58 (BP Location: Left arm, Patient Position: Sitting)   Pulse 62   Temp 98.4 °F (36.9 °C) (Oral)   Ht 4' 11" (1.499 m)   Wt 43.2 kg (95 lb 2.1 oz)   LMP 12/07/1972   SpO2 98%   BMI 19.21 kg/m²   Physical Exam  Constitutional:       Appearance: She is well-developed.   Eyes:      Pupils: Pupils are equal, round, and reactive to light.   Cardiovascular:      Rate and Rhythm: Normal rate and regular rhythm.      Heart sounds: Normal heart sounds.   Pulmonary:      Effort: Pulmonary effort is normal.      Breath sounds: Normal breath sounds.   Abdominal:      General: Bowel sounds are normal.      Palpations: Abdomen is soft.   Musculoskeletal:         General: Normal range of motion.      Cervical back: Normal range of motion.   Skin:     General: Skin is warm and dry.   Neurological:      Mental Status: She is alert and oriented to person, place, and time.   Psychiatric:         Behavior: Behavior normal.         Thought Content: Thought content normal.         Judgment: Judgment normal.         Assessment:       1. Primary hypertension    2. Hyperlipidemia with target LDL less than 70    3. Iron deficiency anemia, unspecified iron deficiency anemia type    4. Vascular dementia of acute onset, with behavioral disturbance    5. PAD (peripheral artery disease)    6. DDD (degenerative disc disease), cervical    7. Urinary " frequency    8. 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 Diastolic CHF with preserved left ventricular function, NYHA class 2    10. Chronic kidney disease, stage 3a    11. Body mass index (BMI) of 19.0-19.9 in adult        Plan:       Primary hypertension  -     Comprehensive Metabolic Panel; Future; Expected date: 06/23/2025  -     CBC W/ AUTO DIFFERENTIAL; Future; Expected date: 06/23/2025  -     Lipid Panel; Future; Expected date: 06/23/2025  Stable reading today, continue management  Low sodium  BP Readings from Last 3 Encounters:   06/23/25 (!) 130/58   10/29/24 (!) 150/58   10/10/24 (!) 142/62      Hyperlipidemia with target LDL less than 70  Stable, on lovastatin  Iron deficiency anemia, unspecified iron deficiency anemia type  -     Iron and TIBC; Future; Expected date: 06/23/2025  -     FERRITIN; Future; Expected date: 06/23/2025  Stable, continue management  Vascular dementia of acute onset, with behavioral disturbance  Stable, continue management  PAD (peripheral artery disease)  Stable, continue medication  DDD (degenerative disc disease), cervical  Stable, continue management  Urinary frequency  -     Urinalysis; Future; Expected date: 06/23/2025  -     Urine Culture High Risk; Future; Expected date: 06/23/2025    Moderate late onset Alzheimer's dementia without behavioral disturbance, psychotic disturbance, mood disturbance, or anxiety  Stable, continue follow put  Diastolic CHF with preserved left ventricular function, NYHA class 2  Stable, continue follow up  On lasix  Chronic kidney disease, stage 3a  Stable and chronic.  Will continue to monitor q3-6 months and control chronic conditions as optimally as possible to preserve function.   Body mass index (BMI) of 19.0-19.9 in adult          Patient readiness: acceptance and barriers:none    During the course of the visit the patient was educated and counseled  about the following:     Hypertension:   Dietary sodium restriction.  Regular aerobic exercise.    Goals: Hypertension: Reduce Blood Pressure    Did patient meet goals/outcomes: Yes    The following self management tools provided: declined    Patient Instructions (the written plan) was given to the patient/family.     Time spent with patient: 30 minutes    Barriers to medications present (no )    Adverse reactions to current medications (no)    Over the counter medications reviewed (Yes)               [1]   Current Outpatient Medications:     acetaminophen (TYLENOL) 500 MG tablet, Take 2 tablets (1,000 mg total) by mouth every 8 (eight) hours as needed for Pain., Disp: , Rfl:     amLODIPine (NORVASC) 5 MG tablet, Take 1 tablet by mouth at bedtime, Disp: 90 tablet, Rfl: 3    artificial tears (ISOPTO TEARS) 0.5 % ophthalmic solution, Place 1 drop into both eyes 4 (four) times daily as needed (Dry Eyes)., Disp: , Rfl:     clopidogreL (PLAVIX) 75 mg tablet, Take 1 tablet (75 mg total) by mouth once daily., Disp: 30 tablet, Rfl: 11    donepeziL (ARICEPT) 5 MG tablet, Take 1 tablet (5 mg total) by mouth every evening., Disp: 90 tablet, Rfl: 11    donepeziL (ARICEPT) 5 MG tablet, Take 5 mg by mouth every evening., Disp: , Rfl:     folic acid (FOLVITE) 1 MG tablet, Take 1 tablet (1,000 mcg total) by mouth once daily., Disp: 30 tablet, Rfl: 0    furosemide (LASIX) 20 MG tablet, Take 1 tablet (20 mg total) by mouth 2 (two) times daily., Disp: 60 tablet, Rfl: 11    losartan (COZAAR) 50 MG tablet, Take 1 tablet by mouth daily, Disp: 90 tablet, Rfl: 3    lovastatin (MEVACOR) 10 MG tablet, Take 1 tablet (10 mg total) by mouth every evening., Disp: 90 tablet, Rfl: 0    magnesium oxide (MAG-OX) 400 mg (241.3 mg magnesium) tablet, Take 400 mg by mouth once daily., Disp: , Rfl:     multivitamin capsule, Take 1 capsule by mouth once daily., Disp: 30 capsule, Rfl: 0    azelastine (ASTELIN) 137 mcg (0.1 %) nasal spray, 1 spray (137 mcg  total) by Nasal route 2 (two) times daily. (Patient not taking: Reported on 6/23/2025), Disp: 30 mL, Rfl: 0    losartan (COZAAR) 50 MG tablet, Take 1 tablet by mouth 2 (two) times daily (Patient not taking: Reported on 6/23/2025), Disp: 180 tablet, Rfl: 3    metoprolol succinate (TOPROL-XL) 25 MG 24 hr tablet, Take 1 tablet (25 mg total) by mouth once daily., Disp: 90 tablet, Rfl: 3    sodium chloride 1,000 mg TbSO oral tablet, Take 1 tablet (1,000 mg total) by mouth once daily. (Patient not taking: Reported on 12/9/2024), Disp: 90 tablet, Rfl: 3    spironolactone (ALDACTONE) 25 MG tablet, Take 1 tablet by mouth daily, Disp: 90 tablet, Rfl: 3

## 2025-06-27 PROBLEM — N18.31 CHRONIC KIDNEY DISEASE, STAGE 3A: Status: ACTIVE | Noted: 2025-06-27

## 2025-06-27 PROBLEM — I50.30 DIASTOLIC CHF WITH PRESERVED LEFT VENTRICULAR FUNCTION, NYHA CLASS 2: Status: ACTIVE | Noted: 2025-06-27

## 2025-07-01 ENCOUNTER — RESULTS FOLLOW-UP (OUTPATIENT)
Dept: PRIMARY CARE CLINIC | Facility: CLINIC | Age: OVER 89
End: 2025-07-01

## 2025-07-14 ENCOUNTER — LAB VISIT (OUTPATIENT)
Dept: LAB | Facility: HOSPITAL | Age: OVER 89
End: 2025-07-14
Attending: PHYSICIAN ASSISTANT
Payer: MEDICARE

## 2025-07-14 DIAGNOSIS — D63.8 ANEMIA OF CHRONIC DISORDER: ICD-10-CM

## 2025-07-14 DIAGNOSIS — C18.0 MALIGNANT NEOPLASM OF CECUM: ICD-10-CM

## 2025-07-14 DIAGNOSIS — C18.9 MALIGNANT NEOPLASM OF COLON: ICD-10-CM

## 2025-07-14 DIAGNOSIS — D50.0 IRON DEFICIENCY ANEMIA SECONDARY TO BLOOD LOSS (CHRONIC): Primary | ICD-10-CM

## 2025-07-14 LAB
ABSOLUTE EOSINOPHIL (SMH): 0.04 K/UL
ABSOLUTE MONOCYTE (SMH): 0.37 K/UL (ref 0.3–1)
ABSOLUTE NEUTROPHIL COUNT (SMH): 2 K/UL (ref 1.8–7.7)
ALBUMIN SERPL-MCNC: 3.7 G/DL (ref 3.5–5.2)
ALP SERPL-CCNC: 88 UNIT/L (ref 40–150)
ALT SERPL-CCNC: 20 UNIT/L (ref 10–44)
ANION GAP (SMH): 6 MMOL/L (ref 8–16)
AST SERPL-CCNC: 32 UNIT/L (ref 11–45)
BASOPHILS # BLD AUTO: 0.04 K/UL
BASOPHILS NFR BLD AUTO: 1.3 %
BILIRUB SERPL-MCNC: 0.2 MG/DL (ref 0.1–1)
BUN SERPL-MCNC: 32 MG/DL (ref 10–30)
CALCIUM SERPL-MCNC: 9 MG/DL (ref 8.7–10.5)
CHLORIDE SERPL-SCNC: 105 MMOL/L (ref 95–110)
CO2 SERPL-SCNC: 26 MMOL/L (ref 23–29)
CREAT SERPL-MCNC: 1 MG/DL (ref 0.5–1.4)
ERYTHROCYTE [DISTWIDTH] IN BLOOD BY AUTOMATED COUNT: 14.9 % (ref 11.5–14.5)
FERRITIN SERPL-MCNC: 148.2 NG/ML (ref 20–300)
GFR SERPLBLD CREATININE-BSD FMLA CKD-EPI: 51 ML/MIN/1.73/M2
GLUCOSE SERPL-MCNC: 90 MG/DL (ref 70–110)
HCT VFR BLD AUTO: 27.8 % (ref 37–48.5)
HGB BLD-MCNC: 8.7 GM/DL (ref 12–16)
IMM GRANULOCYTES # BLD AUTO: 0.01 K/UL (ref 0–0.04)
IMM GRANULOCYTES NFR BLD AUTO: 0.3 % (ref 0–0.5)
IRON SATN MFR SERPL: 14 % (ref 20–50)
IRON SERPL-MCNC: 62 UG/DL (ref 30–160)
LYMPHOCYTES # BLD AUTO: 0.71 K/UL (ref 1–4.8)
MCH RBC QN AUTO: 32.2 PG (ref 27–31)
MCHC RBC AUTO-ENTMCNC: 31.3 G/DL (ref 32–36)
MCV RBC AUTO: 103 FL (ref 82–98)
NUCLEATED RBC (/100WBC) (SMH): 0 /100 WBC
PLATELET # BLD AUTO: 244 K/UL (ref 150–450)
PMV BLD AUTO: 9.3 FL (ref 9.2–12.9)
POTASSIUM SERPL-SCNC: 4.3 MMOL/L (ref 3.5–5.1)
PROT SERPL-MCNC: 6.3 GM/DL (ref 6–8.4)
RBC # BLD AUTO: 2.7 M/UL (ref 4–5.4)
RELATIVE EOSINOPHIL (SMH): 1.3 % (ref 0–8)
RELATIVE LYMPHOCYTE (SMH): 22.3 % (ref 18–48)
RELATIVE MONOCYTE (SMH): 11.6 % (ref 4–15)
RELATIVE NEUTROPHIL (SMH): 63.2 % (ref 38–73)
SODIUM SERPL-SCNC: 137 MMOL/L (ref 136–145)
TIBC SERPL-MCNC: 435 UG/DL (ref 250–450)
TRANSFERRIN SERPL-MCNC: 294 MG/DL (ref 200–375)
WBC # BLD AUTO: 3.19 K/UL (ref 3.9–12.7)

## 2025-07-14 PROCEDURE — 84466 ASSAY OF TRANSFERRIN: CPT

## 2025-07-14 PROCEDURE — 84075 ASSAY ALKALINE PHOSPHATASE: CPT

## 2025-07-14 PROCEDURE — 82728 ASSAY OF FERRITIN: CPT

## 2025-07-14 PROCEDURE — 85025 COMPLETE CBC W/AUTO DIFF WBC: CPT

## 2025-07-14 PROCEDURE — 36415 COLL VENOUS BLD VENIPUNCTURE: CPT

## 2025-08-28 DIAGNOSIS — E78.5 HYPERLIPIDEMIA WITH TARGET LDL LESS THAN 70: ICD-10-CM

## 2025-08-28 RX ORDER — LOVASTATIN 10 MG/1
10 TABLET ORAL NIGHTLY
Qty: 90 TABLET | Refills: 0 | Status: SHIPPED | OUTPATIENT
Start: 2025-08-28